# Patient Record
Sex: FEMALE | Race: ASIAN | NOT HISPANIC OR LATINO | Employment: OTHER
[De-identification: names, ages, dates, MRNs, and addresses within clinical notes are randomized per-mention and may not be internally consistent; named-entity substitution may affect disease eponyms.]

---

## 2016-05-31 LAB
ALT SERPL-CCNC: 29 IU/L (ref 8–45)
AST SERPL-CCNC: 25 IU/L (ref 2–10)
CREAT SERPL-MCNC: 0.63 MG/DL (ref 0.57–1.11)
GFR SERPL CREATININE-BSD FRML MDRD: >60 ML/MIN/1.73M2

## 2016-08-26 LAB
ALT SERPL-CCNC: 41 IU/L (ref 8–45)
AST SERPL-CCNC: 29 IU/L (ref 2–40)
CREAT SERPL-MCNC: 0.62 MG/DL (ref 0.57–1.11)
GFR SERPL CREATININE-BSD FRML MDRD: >60 ML/MIN/1.73M2

## 2016-11-30 LAB
ALT SERPL-CCNC: 40 IU/L (ref 8–45)
AST SERPL-CCNC: 29 IU/L (ref 2–10)
CREAT SERPL-MCNC: 0.66 MG/DL (ref 0.57–1.11)
GFR SERPL CREATININE-BSD FRML MDRD: >60 ML/MIN/1.73M2

## 2017-01-06 ENCOUNTER — SURGERY (OUTPATIENT)
Age: 67
End: 2017-01-06

## 2017-01-06 ENCOUNTER — ANESTHESIA EVENT (OUTPATIENT)
Dept: SURGERY | Facility: CLINIC | Age: 67
End: 2017-01-06
Payer: COMMERCIAL

## 2017-01-06 ENCOUNTER — ANESTHESIA (OUTPATIENT)
Dept: SURGERY | Facility: CLINIC | Age: 67
End: 2017-01-06
Payer: COMMERCIAL

## 2017-01-06 PROCEDURE — 25800025 ZZH RX 258: Performed by: NURSE ANESTHETIST, CERTIFIED REGISTERED

## 2017-01-06 PROCEDURE — 25000125 ZZHC RX 250: Performed by: NURSE ANESTHETIST, CERTIFIED REGISTERED

## 2017-01-06 PROCEDURE — 25000128 H RX IP 250 OP 636: Performed by: NURSE ANESTHETIST, CERTIFIED REGISTERED

## 2017-01-06 RX ORDER — ONDANSETRON 2 MG/ML
INJECTION INTRAMUSCULAR; INTRAVENOUS PRN
Status: DISCONTINUED | OUTPATIENT
Start: 2017-01-06 | End: 2017-01-06

## 2017-01-06 RX ORDER — SODIUM CHLORIDE, SODIUM LACTATE, POTASSIUM CHLORIDE, CALCIUM CHLORIDE 600; 310; 30; 20 MG/100ML; MG/100ML; MG/100ML; MG/100ML
INJECTION, SOLUTION INTRAVENOUS CONTINUOUS PRN
Status: DISCONTINUED | OUTPATIENT
Start: 2017-01-06 | End: 2017-01-06

## 2017-01-06 RX ORDER — DEXAMETHASONE SODIUM PHOSPHATE 4 MG/ML
INJECTION, SOLUTION INTRA-ARTICULAR; INTRALESIONAL; INTRAMUSCULAR; INTRAVENOUS; SOFT TISSUE PRN
Status: DISCONTINUED | OUTPATIENT
Start: 2017-01-06 | End: 2017-01-06

## 2017-01-06 RX ORDER — FENTANYL CITRATE 50 UG/ML
INJECTION, SOLUTION INTRAMUSCULAR; INTRAVENOUS PRN
Status: DISCONTINUED | OUTPATIENT
Start: 2017-01-06 | End: 2017-01-06

## 2017-01-06 RX ORDER — PROPOFOL 10 MG/ML
INJECTION, EMULSION INTRAVENOUS PRN
Status: DISCONTINUED | OUTPATIENT
Start: 2017-01-06 | End: 2017-01-06

## 2017-01-06 RX ORDER — LIDOCAINE HYDROCHLORIDE 20 MG/ML
INJECTION, SOLUTION INFILTRATION; PERINEURAL PRN
Status: DISCONTINUED | OUTPATIENT
Start: 2017-01-06 | End: 2017-01-06

## 2017-01-06 RX ADMIN — SODIUM CHLORIDE, POTASSIUM CHLORIDE, SODIUM LACTATE AND CALCIUM CHLORIDE: 600; 310; 30; 20 INJECTION, SOLUTION INTRAVENOUS at 08:40

## 2017-01-06 RX ADMIN — ONDANSETRON 4 MG: 2 INJECTION INTRAMUSCULAR; INTRAVENOUS at 08:54

## 2017-01-06 RX ADMIN — FENTANYL CITRATE 50 MCG: 50 INJECTION, SOLUTION INTRAMUSCULAR; INTRAVENOUS at 08:40

## 2017-01-06 RX ADMIN — DEXAMETHASONE SODIUM PHOSPHATE 4 MG: 4 INJECTION, SOLUTION INTRAMUSCULAR; INTRAVENOUS at 08:54

## 2017-01-06 RX ADMIN — TETRACAINE HYDROCHLORIDE 2 DROP: 5 SOLUTION OPHTHALMIC at 08:49

## 2017-01-06 RX ADMIN — PROPOFOL 10 MG: 10 INJECTION, EMULSION INTRAVENOUS at 08:47

## 2017-01-06 RX ADMIN — ERYTHROMYCIN 2 G: 5 OINTMENT OPHTHALMIC at 08:49

## 2017-01-06 RX ADMIN — PROPOFOL 10 MG: 10 INJECTION, EMULSION INTRAVENOUS at 08:48

## 2017-01-06 RX ADMIN — MIDAZOLAM HYDROCHLORIDE 1 MG: 1 INJECTION, SOLUTION INTRAMUSCULAR; INTRAVENOUS at 08:40

## 2017-01-06 RX ADMIN — DEXMEDETOMIDINE 4 MCG: 100 INJECTION, SOLUTION, CONCENTRATE INTRAVENOUS at 08:40

## 2017-01-06 RX ADMIN — PROPOFOL 20 MG: 10 INJECTION, EMULSION INTRAVENOUS at 08:45

## 2017-01-06 RX ADMIN — LIDOCAINE HYDROCHLORIDE 40 MG: 20 INJECTION, SOLUTION INFILTRATION; PERINEURAL at 08:45

## 2017-01-06 RX ADMIN — Medication 8 ML: at 08:51

## 2017-01-06 NOTE — ANESTHESIA CARE TRANSFER NOTE
Patient: Noreen Florence    COMBINED REPAIR PTOSIS WITH BLEPHAROPLASTY BILATERAL (Bilateral Eye)  REPAIR ENTROPION BILATERAL (Bilateral Eye)  Additional InformationProcedure(s):  BILATERAL UPPER LID BLEPHAROPLASTY WITH PTOSIS REPAIR, BILATERAL LOWER LID ENTROPION REPAIR - Wound Class: I-Clean   - Wound Class: I-Clean    Diagnosis: DERMATOCHALSIS, PTOSIS, ENTROPION   Diagnosis Additional Information: No value filed.    Anesthesia Type:   MAC     Note:  Airway :Room Air  Patient transferred to:PACU  Comments: Pt to PACU on room air, airway patent, VSS.  Report to RN.      Vitals: (Last set prior to Anesthesia Care Transfer)              Electronically Signed By: DEJA Martin CRNA  January 6, 2017  9:49 AM

## 2017-01-06 NOTE — ANESTHESIA POSTPROCEDURE EVALUATION
Patient: Noreen Florence    COMBINED REPAIR PTOSIS WITH BLEPHAROPLASTY BILATERAL (Bilateral Eye)  REPAIR ENTROPION BILATERAL (Bilateral Eye)  Additional InformationProcedure(s):  BILATERAL UPPER LID BLEPHAROPLASTY WITH PTOSIS REPAIR, BILATERAL LOWER LID ENTROPION REPAIR - Wound Class: I-Clean   - Wound Class: I-Clean    Diagnosis:DERMATOCHALSIS, PTOSIS, ENTROPION   Diagnosis Additional Information: No value filed.    Anesthesia Type:  MAC    Note:  Anesthesia Post Evaluation    Patient location during evaluation: PACU  Patient participation: Able to fully participate in evaluation  Level of consciousness: awake  Pain management: adequate  Airway patency: patent  Cardiovascular status: acceptable  Respiratory status: acceptable  Hydration status: acceptable  PONV: controlled     Anesthetic complications: None          Last vitals:  Filed Vitals:    01/06/17 1030 01/06/17 1045 01/06/17 1110   BP: 108/78 105/68 125/78   Temp:  36.9  C (98.4  F)    Resp: 16 18 16   SpO2: 98% 98% 97%       Electronically Signed By: Johnny Santoro MD  January 6, 2017  11:37 AM

## 2017-01-06 NOTE — ANESTHESIA PREPROCEDURE EVALUATION
Anesthesia Evaluation     . Pt has had prior anesthetic. Type: General      ROS/MED HX    ENT/Pulmonary:       Neurologic:       Cardiovascular:     (+) hypertension----. : . . . :. .       METS/Exercise Tolerance:     Hematologic:         Musculoskeletal:   (+) arthritis, , , -       GI/Hepatic:     (+) GERD       Renal/Genitourinary:     (+) chronic renal disease,       Endo:         Psychiatric:         Infectious Disease:         Malignancy:         Other:                              Anesthesia Plan      History & Physical Review  History and physical reviewed and following examination; no interval change.    ASA Status:  2 .        Plan for MAC with Intravenous induction. Maintenance will be TIVA.    PONV prophylaxis:  Ondansetron (or other 5HT-3) and Dexamethasone or Solumedrol       Postoperative Care  Postoperative pain management:  IV analgesics and Oral pain medications.      Consents  Anesthetic plan, risks, benefits and alternatives discussed with:  Patient..                          .

## 2017-01-09 ENCOUNTER — RADIANT APPOINTMENT (OUTPATIENT)
Dept: ULTRASOUND IMAGING | Facility: CLINIC | Age: 67
End: 2017-01-09
Attending: UROLOGY
Payer: COMMERCIAL

## 2017-01-09 ENCOUNTER — TELEPHONE (OUTPATIENT)
Dept: OPHTHALMOLOGY | Facility: CLINIC | Age: 67
End: 2017-01-09

## 2017-01-09 ENCOUNTER — OFFICE VISIT (OUTPATIENT)
Dept: UROLOGY | Facility: CLINIC | Age: 67
End: 2017-01-09
Payer: COMMERCIAL

## 2017-01-09 VITALS
DIASTOLIC BLOOD PRESSURE: 84 MMHG | HEART RATE: 74 BPM | RESPIRATION RATE: 18 BRPM | SYSTOLIC BLOOD PRESSURE: 132 MMHG | OXYGEN SATURATION: 97 %

## 2017-01-09 DIAGNOSIS — D17.71 ANGIOMYOLIPOMA OF RIGHT KIDNEY: ICD-10-CM

## 2017-01-09 DIAGNOSIS — R31.29 MICROSCOPIC HEMATURIA: Primary | ICD-10-CM

## 2017-01-09 DIAGNOSIS — D17.9 ANGIOMYOLIPOMA: ICD-10-CM

## 2017-01-09 LAB
ALBUMIN UR-MCNC: 10 MG/DL
APPEARANCE UR: CLEAR
BILIRUB UR QL STRIP: NEGATIVE
COLOR UR AUTO: YELLOW
GLUCOSE UR STRIP-MCNC: NEGATIVE MG/DL
HGB UR QL STRIP: ABNORMAL
KETONES UR STRIP-MCNC: NEGATIVE MG/DL
LEUKOCYTE ESTERASE UR QL STRIP: ABNORMAL
MUCOUS THREADS #/AREA URNS LPF: PRESENT /LPF
NITRATE UR QL: NEGATIVE
NON-SQ EPI CELLS #/AREA URNS LPF: ABNORMAL /LPF
PH UR STRIP: 7 PH (ref 5–7)
RBC #/AREA URNS AUTO: ABNORMAL /HPF (ref 0–2)
SP GR UR STRIP: 1.02 (ref 1–1.03)
URN SPEC COLLECT METH UR: ABNORMAL
UROBILINOGEN UR STRIP-MCNC: NORMAL MG/DL (ref 0–2)
WBC #/AREA URNS AUTO: ABNORMAL /HPF (ref 0–2)

## 2017-01-09 PROCEDURE — 88112 CYTOPATH CELL ENHANCE TECH: CPT | Performed by: UROLOGY

## 2017-01-09 PROCEDURE — 51798 US URINE CAPACITY MEASURE: CPT | Performed by: UROLOGY

## 2017-01-09 PROCEDURE — 99214 OFFICE O/P EST MOD 30 MIN: CPT | Performed by: UROLOGY

## 2017-01-09 PROCEDURE — 76770 US EXAM ABDO BACK WALL COMP: CPT | Performed by: RADIOLOGY

## 2017-01-09 PROCEDURE — 81001 URINALYSIS AUTO W/SCOPE: CPT | Performed by: UROLOGY

## 2017-01-09 ASSESSMENT — PAIN SCALES - GENERAL: PAINLEVEL: NO PAIN (0)

## 2017-01-09 NOTE — NURSING NOTE
"Noreen Florence's goals for this visit include:   Chief Complaint   Patient presents with     RECHECK     6 month f/u hematuria and angiomyolipoma       She requests these members of her care team be copied on today's visit information: yes, PCP    PCP: Jia Castro    Referring Provider:  No referring provider defined for this encounter.    Chief Complaint   Patient presents with     RECHECK     6 month f/u hematuria and angiomyolipoma       Initial /84 mmHg  Pulse 74  Resp 18  SpO2 97% Estimated body mass index is 21.03 kg/(m^2) as calculated from the following:    Height as of 1/6/17: 1.575 m (5' 2\").    Weight as of 12/29/16: 52.164 kg (115 lb).  BP completed using cuff size: regular    Do you need any medication refills at today's visit? None    post void residual: 0 mL    Deepali Sanchez  General Surgery - Urology RN      "

## 2017-01-09 NOTE — MR AVS SNAPSHOT
After Visit Summary   1/9/2017    Noreen Florence    MRN: [id number]           Patient Information     Date Of Birth          1950        Visit Information        Provider Department      1/9/2017 10:00 AM Emi Morales MD [facility]        Today's Diagnoses     Microscopic hematuria    -  1     Angiomyolipoma of right kidney            Follow-ups after your visit        Follow-up notes from your care team     Return in about 6 months (around 7/9/2017) for ua and urine cytology.      Your next 10 appointments already scheduled     Jan 25, 2017 11:00 AM   [facility] Visit with Carly Norman MD   [facility]    [id number] [place] [id number] [phone]            Jul 17, 2017 10:00 AM   Return Visit with Emi Morales MD   [facility]    [address] [place] [id number] [phone]            Oct 30, 2017 12:30 PM   Return Visit with Shai Levin MD, [facility] NURSE ONLY   [facility]    [address] [place] [id number] [phone]              Future tests that were ordered for you today     Open Future Orders        Priority Expected Expires Ordered    Renal US Routine 1/9/2018 5/9/2018 1/9/2017            Who to contact     If you have questions or need follow up information about today's clinic visit or your schedule please contact [facility] directly at [phone].  Normal or non-critical lab and imaging results will be communicated to you by MyChart, letter or phone within 4 business days after the clinic has received the results. If you do not hear from us within 7 days, please contact the clinic through MyChart or phone. If you have a critical or abnormal lab result, we will notify you by phone as soon as possible.  Submit refill requests through  Touch-Writer or call your pharmacy and they will forward the refill request to us. Please allow 3 business days for your refill to be completed.          Additional Information About Your Visit        logolineuphart Information     Touch-Writer gives you secure access to your electronic health record. If you see a primary care provider, you can also send messages to your care team and make appointments. If you have questions, please call your primary care clinic.  If you do not have a primary care provider, please call [phone] and they will assist you.      Touch-Writer is an electronic gateway that provides easy, online access to your medical records. With Touch-Writer, you can request a clinic appointment, read your test results, renew a prescription or communicate with your care team.     To access your existing account, please contact your [facility] or call [phone] for assistance.        Care EveryWhere ID     This is your Care EveryWhere ID. This could be used by other organizations to access your [facility] medical records  [id number]        Your Vitals Were     Pulse Respirations Pulse Oximetry             74 18 97%          Blood Pressure from Last 3 Encounters:   01/09/17 132/84   01/06/17 125/78   12/29/16 110/69    Weight from Last 3 Encounters:   01/06/17 51.71 kg (114 lb)   12/29/16 52.164 kg (115 lb)   12/23/16 5.352 kg (11 lb 12.8 oz)              We Performed the Following     Cytology non gyn     MEASURE POST-VOID RESIDUAL URINE/BLADDER CAPACITY, US NON-IMAGING     UA reflex to Microscopic and Culture        Primary Care Provider Office Phone # Fax #    Jia Ashli Castro [phone]       [facility] [address]  [facility] [address]        Thank you!     Thank you for choosing [facility]  for your care. Our goal is always to provide you with excellent care. Hearing back from our patients is one way we can continue to improve  our services. Please take a few minutes to complete the written survey that you may receive in the mail after your visit with us. Thank you!             Your Updated Medication List - Protect others around you: Learn how to safely use, store and throw away your medicines at www.disposemymeds.org.          This list is accurate as of: 1/9/17 10:25 AM.  Always use your most recent med list.                   Brand Name Dispense Instructions for use    calcium-vitamin D 500-125 MG-UNIT Tabs     180 tablet    Take 1 tablet by mouth 2 times daily       cetirizine 10 MG tablet    zyrTEC    30 tablet    TAKE 1 TABLET BY MOUTH DAILY       chlorhexidine 0.12 % solution    PERIDEX    473 mL    Swish and spit 15 mLs in mouth 2 times daily       erythromycin ophthalmic ointment    ROMYCIN    3.5 g    Apply small amount to incision sites three times daily for 7 days, then apply to inner lower lid of operative eye(s) at bedtime for 7 days, as directed per physician instructions.       fluticasone 50 MCG/ACT spray    FLONASE    1 Package    Spray 1-2 sprays into both nostrils daily       HYDROcodone-acetaminophen 5-325 MG per tablet    NORCO    10 tablet    Take 1 tablet by mouth every 6 hours as needed for pain Maximum of 4000 mg of acetaminophen in 24 hours.       leucovorin 5 MG tablet    WELLCOVORIN     TK 1 T PO 12 H AFTER METHOTREXATE ONE TIME EACH WEEK       losartan-hydrochlorothiazide 50-12.5 MG per tablet    HYZAAR    90 tablet    Take 1 tablet by mouth daily       methotrexate 2.5 MG tablet     1 tablet    Take 6 tablets (15 mg) by mouth once a week       metoprolol 25 MG tablet    LOPRESSOR    90 tablet    Take 1 tablet (25 mg) by mouth At Bedtime       omeprazole 40 MG capsule    priLOSEC    30 capsule    Take 1 capsule (40 mg) by mouth daily as needed Take 30-60 minutes before a meal.       pravastatin 20 MG tablet    PRAVACHOL    90 tablet    Take 1 tablet (20 mg) by mouth daily       ranitidine 300 MG tablet     ZANTAC    30 tablet    TAKE 1 TABLET(300 MG) BY MOUTH AT BEDTIME       tobramycin-dexamethasone 0.3-0.1 % ophthalmic susp    TOBRADEX    5 mL    Place 1 drop into both eyes 3 times daily for 10 days Instill into operative eye(s) per physician instructions.       triamcinolone 0.1 % cream    KENALOG    80 g    APPLY TOPICALLY TWICE DAILY

## 2017-01-09 NOTE — TELEPHONE ENCOUNTER
Pt's daughter called on evening of 1/8 with concerns regarding itching, bruising, and swelling following surgery on Friday 1/6. With daughter interpreting, mother denied any vision change. She has been using cool compresses. Advised patient to change to warm compress and to call back with any further concerns during the week.

## 2017-01-09 NOTE — PROGRESS NOTES
Reason for Visit:  F/u on microhematuria and review US    Clinical Data: Ms. Noreen Florence is a 66 year old female with a hx of microhematuria with negative w/u.  She was found to have a very small AML (1.2 cm).  She was seen by IR and they recommended yearly u/s to review.    Cytology7/16:  NEM    CT urogram 6/15/16:  Impression:  1. 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 This  abuts the renal collecting system and may be the source of  microhematuria although significant bleeding is uncommon in lesions of  this size. No other abnormality in the urinary collecting system.    2. Other incidental findings as above.    Cystoscopy 6/27/16: Normal.    Renal U/S 1/9/17:  IMPRESSION:  1.  Stable 11 mm angiomyolipoma in the right kidney.    A/P:  66 year old female with microhematuria and negative w/u except for a small 1.2 cm AML, This is unchanged since June of 2016.  We discussed continued observation of this lesion, it is fairly small and patient agrees with this  -cc urine cytology from today.  -f/u in 6 months with urine cytology and UA, bp check  -f/u with in 12 months with renal u/s      Thank you for allowing me to participate in the care of  Ms. Noreen Florence and I will keep you updated on her progress.    Emi Morales MD  25 min spent with patient,  >50% in discussion and coordination of care.

## 2017-01-10 LAB — COPATH REPORT: NORMAL

## 2017-01-11 ENCOUNTER — OFFICE VISIT (OUTPATIENT)
Dept: OPHTHALMOLOGY | Facility: CLINIC | Age: 67
End: 2017-01-11
Payer: COMMERCIAL

## 2017-01-11 DIAGNOSIS — H02.836 DERMATOCHALASIS OF EYELIDS OF BOTH EYES: Primary | ICD-10-CM

## 2017-01-11 DIAGNOSIS — H02.833 DERMATOCHALASIS OF EYELIDS OF BOTH EYES: Primary | ICD-10-CM

## 2017-01-11 DIAGNOSIS — H02.423 MYOGENIC PTOSIS, BILATERAL: ICD-10-CM

## 2017-01-11 PROCEDURE — 99024 POSTOP FOLLOW-UP VISIT: CPT | Performed by: OPHTHALMOLOGY

## 2017-01-11 NOTE — PROGRESS NOTES
Post op day 5 s/p bilateral upper lids blepharoplasty, ptosis repair (mmcr), and both lower eyelid retractor reinsertion for entropion.   She is concerned about the asymmetry, otherwise healing well.    On exam, margin to reflex distance 1: about 3 both eyes. Crease same height, Right upper lid more full and with more edema. Both lower lids in excellent position. No trichiasis. Cornea is clear.    Reassurance provided. Looks good.  She would like to keep her original postop appointment for  1/25/17.  Desirae   Warm soaks    Attending Physician Attestation:  Complete documentation of historical and exam elements from today's encounter can be found in the full encounter summary report (not reduplicated in this progress note).  I personally obtained the chief complaint(s) and history of present illness.  I confirmed and edited as necessary the review of systems, past medical/surgical history, family history, social history, and examination findings as documented by others; and I examined the patient myself.  I personally reviewed the relevant tests, images, and reports as documented above.  I formulated and edited as necessary the assessment and plan and discussed the findings and management plan with the patient and family. - Carly Norman MD

## 2017-01-25 ENCOUNTER — OFFICE VISIT (OUTPATIENT)
Dept: OPHTHALMOLOGY | Facility: CLINIC | Age: 67
End: 2017-01-25
Payer: COMMERCIAL

## 2017-01-25 DIAGNOSIS — H02.023 MECHANICAL ENTROPION OF RIGHT EYE: ICD-10-CM

## 2017-01-25 DIAGNOSIS — H02.026: ICD-10-CM

## 2017-01-25 DIAGNOSIS — H02.836 DERMATOCHALASIS OF EYELIDS OF BOTH EYES: Primary | ICD-10-CM

## 2017-01-25 DIAGNOSIS — H02.423 MYOGENIC PTOSIS, BILATERAL: ICD-10-CM

## 2017-01-25 DIAGNOSIS — H02.833 DERMATOCHALASIS OF EYELIDS OF BOTH EYES: Primary | ICD-10-CM

## 2017-01-25 PROCEDURE — 99024 POSTOP FOLLOW-UP VISIT: CPT | Performed by: OPHTHALMOLOGY

## 2017-01-25 ASSESSMENT — VISUAL ACUITY
OS_SC: 20/20
METHOD: SNELLEN - LINEAR
OS_SC+: -3
OD_SC: 20/20
OD_SC+: -3

## 2017-01-25 NOTE — PROGRESS NOTES
Postop both upper eyelid sosa muscle conjunctival resection , blepharoplasty, and both lower eyelid entropion repair.     Unhappy with difference in crease.  Right upper eyelid more full and crease is a bit higher nasally left eye.   margin to reflex distance 1 is 3 both eyes very symmetric  Both lower lid position excellent with no entropion.     Recommend warm soaks, ceci. I reassured her that she is still swollen, but she may require in office revision.    Attending Physician Attestation:  Complete documentation of historical and exam elements from today's encounter can be found in the full encounter summary report (not reduplicated in this progress note).  I personally obtained the chief complaint(s) and history of present illness.  I confirmed and edited as necessary the review of systems, past medical/surgical history, family history, social history, and examination findings as documented by others; and I examined the patient myself.  I personally reviewed the relevant tests, images, and reports as documented above.  I formulated and edited as necessary the assessment and plan and discussed the findings and management plan with the patient and family. - Carly Norman MD

## 2017-01-25 NOTE — MR AVS SNAPSHOT
After Visit Summary   1/25/2017    Noreen Florence    MRN: [id number]           Patient Information     Date Of Birth          1950        Visit Information        Provider Department      1/25/2017 11:00 AM Carly Norman MD [facility]         Follow-ups after your visit        Your next 10 appointments already scheduled     Mar 08, 2017 10:00 AM   Return Visit with Carly Norman MD   [facility] ([facility])    [address] [place] [id number] [phone]            Jul 17, 2017 10:00 AM   Return Visit with Emi Morales MD   [facility] ([facility])    [address] [place] [id number] [phone]            Oct 30, 2017 12:30 PM   Return Visit with Shai Levin MD, [facility] NURSE ONLY   [facility])    [address] [place] [id number] [phone]              Who to contact     If you have questions or need follow up information about today's clinic visit or your schedule please contact [facility] directly at [phone].  Normal or non-critical lab and imaging results will be communicated to you by MyChart, letter or phone within 4 business days after the clinic has received the results. If you do not hear from us within 7 days, please contact the clinic through MyChart or phone. If you have a critical or abnormal lab result, we will notify you by phone as soon as possible.  Submit refill requests through [email or web address] ([employer]) or call your pharmacy and they will forward the refill request to us. Please allow 3 business days for your refill to be completed.          Additional Information About Your Visit        [employer] Global Technologieshart Information     [email or web address]) gives you secure access to your electronic health record. If you see a primary care provider, you can also send messages to your care team and make  appointments. If you have questions, please call your primary care clinic.  If you do not have a primary care provider, please call [phone] and they will assist you.      FundedByMe is an electronic gateway that provides easy, online access to your medical records. With FundedByMe, you can request a clinic appointment, read your test results, renew a prescription or communicate with your care team.     To access your existing account, please contact your [facility] or call [phone] for assistance.        Care EveryWhere ID     This is your Care EveryWhere ID. This could be used by other organizations to access your [place] medical records  [id number]         Blood Pressure from Last 3 Encounters:   01/09/17 132/84   01/06/17 125/78   12/29/16 110/69    Weight from Last 3 Encounters:   01/06/17 51.71 kg (114 lb)   12/29/16 52.164 kg (115 lb)   12/23/16 5.352 kg (11 lb 12.8 oz)              Today, you had the following     No orders found for display       Primary Care Provider Office Phone # Fax #    Jia Ashli Castro [phone]       [facility] [address] [facility] [address]        Thank you!     Thank you for choosing [facility]  for your care. Our goal is always to provide you with excellent care. Hearing back from our patients is one way we can continue to improve our services. Please take a few minutes to complete the written survey that you may receive in the mail after your visit with us. Thank you!             Your Updated Medication List - Protect others around you: Learn how to safely use, store and throw away your medicines at www.disposemymeds.org.          This list is accurate as of: 1/25/17 11:08 AM.  Always use your most recent med list.                   Brand Name Dispense Instructions for use    calcium-vitamin D 500-125 MG-UNIT Tabs     180 tablet    Take 1 tablet by mouth 2 times daily        cetirizine 10 MG tablet    zyrTEC    30 tablet    TAKE 1 TABLET BY MOUTH DAILY       chlorhexidine 0.12 % solution    PERIDEX    473 mL    Swish and spit 15 mLs in mouth 2 times daily       fluticasone 50 MCG/ACT spray    FLONASE    1 Package    Spray 1-2 sprays into both nostrils daily       leucovorin 5 MG tablet    WELLCOVORIN     TK 1 T PO 12 H AFTER METHOTREXATE ONE TIME EACH WEEK       losartan-hydrochlorothiazide 50-12.5 MG per tablet    HYZAAR    90 tablet    Take 1 tablet by mouth daily       methotrexate 2.5 MG tablet     1 tablet    Take 6 tablets (15 mg) by mouth once a week       metoprolol 25 MG tablet    LOPRESSOR    90 tablet    Take 1 tablet (25 mg) by mouth At Bedtime       omeprazole 40 MG capsule    priLOSEC    30 capsule    Take 1 capsule (40 mg) by mouth daily as needed Take 30-60 minutes before a meal.       pravastatin 20 MG tablet    PRAVACHOL    90 tablet    Take 1 tablet (20 mg) by mouth daily       ranitidine 300 MG tablet    ZANTAC    30 tablet    TAKE 1 TABLET(300 MG) BY MOUTH AT BEDTIME       triamcinolone 0.1 % cream    KENALOG    80 g    APPLY TOPICALLY TWICE DAILY

## 2017-01-25 NOTE — NURSING NOTE
Chief Complaint   Patient presents with     Post-op Blepharaplasty     BILATERAL UPPER LID BLEPHAROPLASTY WITH PTOSIS REPAIR, BILATERAL LOWER LID ENTROPION REPAIR 1/6/17

## 2017-02-24 LAB
ALT SERPL-CCNC: 27 IU/L (ref 8–45)
AST SERPL-CCNC: 25 IU/L (ref 2–40)
CREAT SERPL-MCNC: 0.65 MG/DL (ref 0.57–1.11)
GFR SERPL CREATININE-BSD FRML MDRD: >60 ML/MIN/1.73M2

## 2017-02-28 ENCOUNTER — TRANSFERRED RECORDS (OUTPATIENT)
Dept: HEALTH INFORMATION MANAGEMENT | Facility: CLINIC | Age: 67
End: 2017-02-28

## 2017-03-08 ENCOUNTER — OFFICE VISIT (OUTPATIENT)
Dept: OPHTHALMOLOGY | Facility: CLINIC | Age: 67
End: 2017-03-08
Payer: MEDICARE

## 2017-03-08 DIAGNOSIS — H02.833 DERMATOCHALASIS OF EYELIDS OF BOTH EYES: Primary | ICD-10-CM

## 2017-03-08 DIAGNOSIS — H02.423 MYOGENIC PTOSIS, BILATERAL: ICD-10-CM

## 2017-03-08 DIAGNOSIS — H02.836 DERMATOCHALASIS OF EYELIDS OF BOTH EYES: Primary | ICD-10-CM

## 2017-03-08 DIAGNOSIS — H02.026: ICD-10-CM

## 2017-03-08 DIAGNOSIS — H02.023 MECHANICAL ENTROPION OF RIGHT EYE: ICD-10-CM

## 2017-03-08 PROCEDURE — 99024 POSTOP FOLLOW-UP VISIT: CPT | Performed by: OPHTHALMOLOGY

## 2017-03-08 NOTE — PROGRESS NOTES
Healing well post bulb, b sosa muscle conjunctival resection , Bilateral lower lids retractor reinsertion entropion repair.   She notes nasally on the left the crease is pulled in higher. This has softened since her last visit. I did try a phenylephrine drop - did not seem to help much.     Recommend ceci, warm soaks. I will see her back in 3 months time. May consider small revision but I think she really looks good with mild asymmetry.     Complete documentation of historical and exam elements from today's encounter can be found in the full encounter summary report (not reduplicated in this progress note). I personally obtained the chief complaint(s) and history of present illness.  I confirmed and edited as necessary the review of systems, past medical/surgical history, family history, social history, and examination findings as documented by others; and I examined the patient myself. I personally reviewed the relevant tests, images, and reports as documented above. I formulated and edited as necessary the assessment and plan and discussed the findings and management plan with the patient and family. - Carly Norman MD

## 2017-03-08 NOTE — NURSING NOTE
Patient presents with:  Post Op (Ophthalmology) Both Eyes: BILATERAL UPPER LID BLEPHAROPLASTY WITH PTOSIS REPAIR, BILATERAL LOWER LID ENTROPION REPAIR 1/6/17      Referring Provider:  No referring provider defined for this encounter.    HPI    Symptoms:              Comments:    Post Op (Ophthalmology) Both Eyes: BILATERAL UPPER LID BLEPHAROPLASTY WITH PTOSIS REPAIR, BILATERAL LOWER LID ENTROPION REPAIR 1/6/17

## 2017-03-08 NOTE — MR AVS SNAPSHOT
After Visit Summary   3/8/2017    Noreen Florence    MRN: [id number]           Patient Information     Date Of Birth          1950        Visit Information        Provider Department      3/8/2017 10:00 AM Carly Norman MD [facility]         Follow-ups after your visit        Your next 10 appointments already scheduled     Mar 14, 2017   Procedure with William Charles Duane, MD   [facility] (--)    [address] [facility] [address]   [phone]            Jun 07, 2017 10:30 AM CDT   Return Visit with Carly Norman MD   [facility] ([facility])    [address] [place] [address]   [phone]            Jul 17, 2017 10:00 AM CDT   Return Visit with Emi Morales MD   [facility] ([facility])    [address] [place] [address]   [phone]            Oct 30, 2017 12:30 PM CDT   Return Visit with Shai Levin MD, [facility] NURSE ONLY   [facility] ([facility])    [address] [place] [address]   [phone]              Who to contact     If you have questions or need follow up information about today's clinic visit or your schedule please contact [facility] directly at [phone].  Normal or non-critical lab and imaging results will be communicated to you by MyChart, letter or phone within 4 business days after the clinic has received the results. If you do not hear from us within 7 days, please contact the clinic through NPShart or phone. If you have a critical or abnormal lab result, we will notify you by phone as soon as possible.  Submit refill requests through 28msec or call your pharmacy and they will forward the refill request to us. Please allow 3 business days for your refill to be completed.          Additional Information About Your Visit        [facility]  Information     [facility] gives you secure access to your electronic health record. If you see a primary care provider, you can also send messages to your care team and make appointments. If you have questions, please call your primary care clinic.  If you do not have a primary care provider, please call [phone] and they will assist you.      [facility] is an electronic gateway that provides easy, online access to your medical records. With [facility] you can request a clinic appointment, read your test results, renew a prescription or communicate with your care team.     To access your existing account, please contact your [facility] or call [phone] for assistance.        Care EveryWhere ID     This is your Care EveryWhere ID. This could be used by other organizations to access your [facility] medical records  [id number]         Blood Pressure from Last 3 Encounters:   01/09/17 132/84   01/06/17 125/78   12/29/16 110/69    Weight from Last 3 Encounters:   01/06/17 51.7 kg (114 lb)   12/29/16 52.2 kg (115 lb)   12/23/16 5.352 kg (11 lb 12.8 oz)              Today, you had the following     No orders found for display       Primary Care Provider Office Phone # Fax #    Jia Ashli Castro [phone]       [facility] [address]  [place] [address]        Thank you!     Thank you for choosing [facility]  for your care. Our goal is always to provide you with excellent care. Hearing back from our patients is one way we can continue to improve our services. Please take a few minutes to complete the written survey that you may receive in the mail after your visit with us. Thank you!             Your Updated Medication List - Protect others around you: Learn how to safely use, store and throw away your medicines at www.disposemymeds.org.          This list is accurate as of: 3/8/17 10:14 AM.  Always use your most recent med list.                    Brand Name Dispense Instructions for use    calcium-vitamin D 500-125 MG-UNIT Tabs     180 tablet    Take 1 tablet by mouth 2 times daily       cetirizine 10 MG tablet    zyrTEC    30 tablet    TAKE 1 TABLET BY MOUTH DAILY       chlorhexidine 0.12 % solution    PERIDEX    473 mL    Swish and spit 15 mLs in mouth 2 times daily       fluticasone 50 MCG/ACT spray    FLONASE    1 Package    Spray 1-2 sprays into both nostrils daily       leucovorin 5 MG tablet    WELLCOVORIN     TK 1 T PO 12 H AFTER METHOTREXATE ONE TIME EACH WEEK       losartan-hydrochlorothiazide 50-12.5 MG per tablet    HYZAAR    90 tablet    Take 1 tablet by mouth daily       methotrexate 2.5 MG tablet     1 tablet    Take 6 tablets (15 mg) by mouth once a week       metoprolol 25 MG tablet    LOPRESSOR    90 tablet    Take 1 tablet (25 mg) by mouth At Bedtime       omeprazole 40 MG capsule    priLOSEC    30 capsule    Take 1 capsule (40 mg) by mouth daily as needed Take 30-60 minutes before a meal.       pravastatin 20 MG tablet    PRAVACHOL    90 tablet    Take 1 tablet (20 mg) by mouth daily       ranitidine 300 MG tablet    ZANTAC    30 tablet    TAKE 1 TABLET(300 MG) BY MOUTH AT BEDTIME       triamcinolone 0.1 % cream    KENALOG    80 g    APPLY TOPICALLY TWICE DAILY

## 2017-04-03 DIAGNOSIS — E78.2 MIXED HYPERLIPIDEMIA: ICD-10-CM

## 2017-04-03 DIAGNOSIS — I10 HYPERTENSION GOAL BP (BLOOD PRESSURE) < 150/90: ICD-10-CM

## 2017-04-03 NOTE — TELEPHONE ENCOUNTER
metoprolol (LOPRESSOR) 25 MG tablet      Last Written Prescription Date: 03/14/16  Last Fill Quantity: 90, # refills: 3    Last Office Visit with [facility] or [facility] prescribing provider:  12/29/16   Future Office Visit:    Next 5 appointments (look out 90 days)     Jun 07, 2017 10:30 AM CDT   Return Visit with Carly Norman MD   [facility] ([facility])    [address] [facility] [address] [phone]                    BP Readings from Last 3 Encounters:   01/09/17 132/84   01/06/17 125/78   12/29/16 110/69       pravastatin (PRAVACHOL) 20 MG tablet     Last Written Prescription Date: 03/14/16  Last Fill Quantity: 90, # refills: 3  Last Office Visit with [facility], [facility] or [facility] prescribing provider: 12/29/16    Next 5 appointments (look out 90 days)     Jun 07, 2017 10:30 AM CDT   Return Visit with Carly Norman MD   [facility] ([facility])    [address] [facility] [address] [phone]                   Lab Results   Component Value Date    CHOL 208 03/03/2016     Lab Results   Component Value Date    HDL 41 03/03/2016     Lab Results   Component Value Date     03/03/2016     Lab Results   Component Value Date    TRIG 184 03/03/2016     Lab Results   Component Value Date    CHOLHDLRATIO 5.5 02/09/2015         losartan-hydrochlorothiazide (HYZAAR) 50-12.5 MG per tablet      Last Written Prescription Date: 03/14/16  Last Fill Quantity: 90, # refills: 3  Last Office Visit with [facility], [facility] or [facility] prescribing provider: 12/29/16  Next 5 appointments (look out 90 days)     Jun 07, 2017 10:30 AM CDT   Return Visit with Carly Norman MD   [facility] ([facility])    [address] [facility] [address] [phone]                   Potassium   Date Value Ref Range Status   12/23/2016 3.9 3.4 - 5.3 mmol/L Final     Creatinine   Date Value Ref Range  Status   02/24/2017 0.65 0.57 - 1.11 mg/dL Final     BP Readings from Last 3 Encounters:   01/09/17 132/84   01/06/17 125/78   12/29/16 110/69     [facility]

## 2017-04-04 RX ORDER — PRAVASTATIN SODIUM 20 MG
20 TABLET ORAL DAILY
Qty: 90 TABLET | Refills: 0 | Status: SHIPPED | OUTPATIENT
Start: 2017-04-04 | End: 2017-06-28

## 2017-04-04 RX ORDER — LOSARTAN POTASSIUM AND HYDROCHLOROTHIAZIDE 12.5; 5 MG/1; MG/1
1 TABLET ORAL DAILY
Qty: 90 TABLET | Refills: 0 | Status: SHIPPED | OUTPATIENT
Start: 2017-04-04 | End: 2017-06-28

## 2017-04-04 RX ORDER — METOPROLOL TARTRATE 25 MG/1
25 TABLET, FILM COATED ORAL AT BEDTIME
Qty: 90 TABLET | Refills: 0 | Status: SHIPPED | OUTPATIENT
Start: 2017-04-04 | End: 2017-06-28

## 2017-04-04 NOTE — TELEPHONE ENCOUNTER
Metoprolol and Losartan - Hydrochlorothiazide - Prescription approved per [facility] Refill Protocol.    Pravastatin - Routing refill request to provider for review/approval because:  Labs not current:  Last lipid panel was over one year ago.  Giuliana Booth RN

## 2017-04-05 ENCOUNTER — SURGERY (OUTPATIENT)
Age: 67
End: 2017-04-05

## 2017-04-05 ENCOUNTER — HOSPITAL ENCOUNTER (OUTPATIENT)
Facility: AMBULATORY SURGERY CENTER | Age: 67
Discharge: HOME | End: 2017-04-05
Attending: INTERNAL MEDICINE | Admitting: INTERNAL MEDICINE
Payer: MEDICARE

## 2017-04-05 VITALS
WEIGHT: 114 LBS | OXYGEN SATURATION: 99 % | RESPIRATION RATE: 16 BRPM | BODY MASS INDEX: 20.2 KG/M2 | SYSTOLIC BLOOD PRESSURE: 127 MMHG | HEIGHT: 63 IN | TEMPERATURE: 97.7 F | DIASTOLIC BLOOD PRESSURE: 80 MMHG

## 2017-04-05 LAB — COLONOSCOPY: NORMAL

## 2017-04-05 PROCEDURE — G8907 PT DOC NO EVENTS ON DISCHARG: HCPCS

## 2017-04-05 PROCEDURE — G8918 PT W/O PREOP ORDER IV AB PRO: HCPCS

## 2017-04-05 PROCEDURE — G0121 COLON CA SCRN NOT HI RSK IND: HCPCS

## 2017-04-05 RX ORDER — ONDANSETRON 2 MG/ML
4 INJECTION INTRAMUSCULAR; INTRAVENOUS
Status: DISCONTINUED | OUTPATIENT
Start: 2017-04-05 | End: 2017-04-06 | Stop reason: HOSPADM

## 2017-04-05 RX ORDER — LIDOCAINE 40 MG/G
CREAM TOPICAL
Status: DISCONTINUED | OUTPATIENT
Start: 2017-04-05 | End: 2017-04-06 | Stop reason: HOSPADM

## 2017-04-05 RX ORDER — FENTANYL CITRATE 50 UG/ML
INJECTION, SOLUTION INTRAMUSCULAR; INTRAVENOUS PRN
Status: DISCONTINUED | OUTPATIENT
Start: 2017-04-05 | End: 2017-04-05 | Stop reason: HOSPADM

## 2017-04-05 RX ADMIN — FENTANYL CITRATE 100 MCG: 50 INJECTION, SOLUTION INTRAMUSCULAR; INTRAVENOUS at 07:01

## 2017-04-14 ENCOUNTER — OFFICE VISIT (OUTPATIENT)
Dept: URGENT CARE | Facility: URGENT CARE | Age: 67
End: 2017-04-14
Payer: MEDICARE

## 2017-04-14 VITALS
BODY MASS INDEX: 20.37 KG/M2 | OXYGEN SATURATION: 94 % | WEIGHT: 115 LBS | SYSTOLIC BLOOD PRESSURE: 105 MMHG | HEART RATE: 77 BPM | TEMPERATURE: 97.1 F | DIASTOLIC BLOOD PRESSURE: 70 MMHG

## 2017-04-14 DIAGNOSIS — K21.9 GASTROESOPHAGEAL REFLUX DISEASE, ESOPHAGITIS PRESENCE NOT SPECIFIED: Primary | ICD-10-CM

## 2017-04-14 DIAGNOSIS — R05.9 COUGH: ICD-10-CM

## 2017-04-14 PROCEDURE — 99213 OFFICE O/P EST LOW 20 MIN: CPT | Performed by: FAMILY MEDICINE

## 2017-04-14 NOTE — MR AVS SNAPSHOT
After Visit Summary   4/14/2017    Noreen Florence    MRN: [id number]           Patient Information     Date Of Birth          1950        Visit Information        Provider Department      4/14/2017 11:30 AM Frandy Arteaga MD [facility]        Today's Diagnoses     Gastroesophageal reflux disease, esophagitis presence not specified    -  1    Cough           Follow-ups after your visit        Your next 10 appointments already scheduled     Apr 28, 2017 12:45 PM CDT   MA SCREENING DIGITAL BILATERAL with BKMA1   [facility] ([facility])    [address] [facility] [address]   [phone]           Do not use any powder, lotion or deodorant under your arms or on your breast. If you do, we will ask you to remove it before your exam.  Wear comfortable, two-piece clothing.  If you have any allergies, tell your care team.  Bring any previous mammograms from other facilities or have them mailed to the breast center.            Jun 07, 2017 10:30 AM CDT   Return Visit with Carly Norman MD   [facility])    [address] [place] [id number] [phone]            Jul 17, 2017 10:00 AM CDT   Return Visit with Emi Morales MD   [facility])    [address] [place] [address] [phone]            Oct 30, 2017 12:30 PM CDT   Return Visit with Shai Levin MD, [facility] NURSE ONLY   [facility])    [address] [place] [id number] [phone]              Who to contact     If you have questions or need follow up information about today's clinic visit or your schedule please contact [facility] directly at [phone].  Normal or non-critical lab and imaging results will be communicated to you by [facility]  letter or phone within 4 business days after the clinic has received the results. If you do not hear from us within 7 days, please contact the clinic through Thrupoint or phone. If you have a critical or abnormal lab result, we will notify you by phone as soon as possible.  Submit refill requests through Thrupoint or call your pharmacy and they will forward the refill request to us. Please allow 3 business days for your refill to be completed.          Additional Information About Your Visit        [facility] Information     [employer] gives you secure access to your electronic health record. If you see a primary care provider, you can also send messages to your care team and make appointments. If you have questions, please call your primary care clinic.  If you do not have a primary care provider, please call [phone] and they will assist you.        Care EveryWhere ID     This is your Care EveryWhere ID. This could be used by other organizations to access your [facility] medical records  [id number]        Your Vitals Were     Pulse Temperature Pulse Oximetry Breastfeeding? BMI (Body Mass Index)       77 97.1  F (36.2  C) (Oral) 94% No 20.37 kg/m2        Blood Pressure from Last 3 Encounters:   04/14/17 105/70   04/05/17 127/80   01/09/17 132/84    Weight from Last 3 Encounters:   04/14/17 115 lb (52.2 kg)   03/29/17 114 lb (51.7 kg)   01/06/17 114 lb (51.7 kg)              Today, you had the following     No orders found for display       Primary Care Provider Office Phone # Fax #    Jia Ashli Castro [phone]       [facility] [address]  [facility] [address]        Thank you!     Thank you for choosing [facility]  for your care. Our goal is always to provide you with excellent care. Hearing back from our patients is one way we can continue to improve our services. Please take a few minutes to complete the written survey that you may receive in the mail  after your visit with us. Thank you!             Your Updated Medication List - Protect others around you: Learn how to safely use, store and throw away your medicines at www.disposemymeds.org.          This list is accurate as of: 4/14/17 12:37 PM.  Always use your most recent med list.                   Brand Name Dispense Instructions for use    calcium-vitamin D 500-125 MG-UNIT Tabs     180 tablet    Take 1 tablet by mouth 2 times daily       cetirizine 10 MG tablet    zyrTEC    30 tablet    TAKE 1 TABLET BY MOUTH DAILY       chlorhexidine 0.12 % solution    PERIDEX    473 mL    Swish and spit 15 mLs in mouth 2 times daily       fluticasone 50 MCG/ACT spray    FLONASE    1 Package    Spray 1-2 sprays into both nostrils daily       leucovorin 5 MG tablet    WELLCOVORIN     TK 1 T PO 12 H AFTER METHOTREXATE ONE TIME EACH WEEK       losartan-hydrochlorothiazide 50-12.5 MG per tablet    HYZAAR    90 tablet    Take 1 tablet by mouth daily       methotrexate 2.5 MG tablet     1 tablet    Take 6 tablets (15 mg) by mouth once a week       metoprolol 25 MG tablet    LOPRESSOR    90 tablet    Take 1 tablet (25 mg) by mouth At Bedtime       omeprazole 40 MG capsule    priLOSEC    30 capsule    Take 1 capsule (40 mg) by mouth daily as needed Take 30-60 minutes before a meal.       pravastatin 20 MG tablet    PRAVACHOL    90 tablet    Take 1 tablet (20 mg) by mouth daily       ranitidine 300 MG tablet    ZANTAC    30 tablet    TAKE 1 TABLET(300 MG) BY MOUTH AT BEDTIME       triamcinolone 0.1 % cream    KENALOG    80 g    APPLY TOPICALLY TWICE DAILY

## 2017-04-14 NOTE — NURSING NOTE
"Chief Complaint   Patient presents with     Cough     Pt c/o cough for one week with headaches and mid-back pain.        Initial /70 (BP Location: Right arm, Patient Position: Chair, Cuff Size: Adult Regular)  Pulse 77  Temp 97.1  F (36.2  C) (Oral)  Wt 115 lb (52.2 kg)  SpO2 94%  Breastfeeding? No  BMI 20.37 kg/m2 Estimated body mass index is 20.37 kg/(m^2) as calculated from the following:    Height as of 3/29/17: 5' 3\" (1.6 m).    Weight as of this encounter: 115 lb (52.2 kg).  Medication Reconciliation: complete     Brissa Benitez CMA (AAMA)      "

## 2017-04-14 NOTE — PROGRESS NOTES
Some of this note was populated by a medical assistant.      SUBJECTIVE:                                                    Noreen Florence is a 67 year old female who presents to clinic today for the following health issues:      RESPIRATORY SYMPTOMS      Duration: one week    Description  Cough (non-productive), headaches  Apical (mostly at night) with cough. Lasting 5 min.     Severity: moderate    Accompanying signs and symptoms: mid-back pain started today from cough.     History (predisposing factors):  none    Precipitating or alleviating factors: None    Therapies tried and outcome:  rest and fluids, cough drops- no relief.      Has hx of GERD however stopped her GERD medicine a few weeks ago       Problem list and histories reviewed & adjusted, as indicated.  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 pylori test     Pterygium of right eye     Sicca syndrome (H)     Angiomyolipoma of right kidney     Past Surgical History:   Procedure Laterality Date     COLONOSCOPY       COLONOSCOPY WITH CO2 INSUFFLATION N/A 4/5/2017    Procedure: COLONOSCOPY WITH CO2 INSUFFLATION;  Surgeon: Duane, William Charles, MD;  Location:  OR     COMBINED REPAIR PTOSIS WITH BLEPHAROPLASTY BILATERAL Bilateral 1/6/2017    Procedure: COMBINED REPAIR PTOSIS WITH BLEPHAROPLASTY BILATERAL;  Surgeon: Carly Norman MD;  Location: [facility]     ESOPHAGOSCOPY, GASTROSCOPY, DUODENOSCOPY (EGD), COMBINED N/A 1/5/2016    Procedure: COMBINED ESOPHAGOSCOPY, GASTROSCOPY, DUODENOSCOPY (EGD), BIOPSY SINGLE OR MULTIPLE;  Surgeon: Duane, William Charles, MD;  Location:  OR     REPAIR  ENTROPION BILATERAL Bilateral 1/6/2017    Procedure: REPAIR ENTROPION BILATERAL;  Surgeon: Carly Norman MD;  Location: [facility]       Social History   Substance Use Topics     Smoking status: Never Smoker     Smokeless tobacco: Never Used     Alcohol use No     Family History   Problem Relation Age of Onset     DIABETES Mother      CANCER No family hx of      Hypertension No family hx of      CEREBROVASCULAR DISEASE No family hx of      Thyroid Disease No family hx of      Glaucoma No family hx of      Macular Degeneration No family hx of          Current Outpatient Prescriptions   Medication Sig Dispense Refill     metoprolol (LOPRESSOR) 25 MG tablet Take 1 tablet (25 mg) by mouth At Bedtime 90 tablet 0     pravastatin (PRAVACHOL) 20 MG tablet Take 1 tablet (20 mg) by mouth daily 90 tablet 0     losartan-hydrochlorothiazide (HYZAAR) 50-12.5 MG per tablet Take 1 tablet by mouth daily 90 tablet 0     leucovorin (WELLCOVORIN) 5 MG tablet TK 1 T PO 12 H AFTER METHOTREXATE ONE TIME EACH WEEK  3     methotrexate 2.5 MG tablet Take 6 tablets (15 mg) by mouth once a week 1 tablet 0     omeprazole (PRILOSEC) 40 MG capsule Take 1 capsule (40 mg) by mouth daily as needed Take 30-60 minutes before a meal. 30 capsule 6     chlorhexidine (CHLORHEXIDINE) 0.12 % solution Swish and spit 15 mLs in mouth 2 times daily 473 mL 11     ranitidine (ZANTAC) 300 MG tablet TAKE 1 TABLET(300 MG) BY MOUTH AT BEDTIME 30 tablet 0     cetirizine (ZYRTEC) 10 MG tablet TAKE 1 TABLET BY MOUTH DAILY 30 tablet 11     triamcinolone (KENALOG) 0.1 % cream APPLY TOPICALLY TWICE DAILY 80 g 1     fluticasone (FLONASE) 50 MCG/ACT nasal spray Spray 1-2 sprays into both nostrils daily 1 Package 11     calcium-vitamin D 500-125 MG-UNIT TABS Take 1 tablet by mouth 2 times daily 180 tablet 3     No Known Allergies    Reviewed and updated as needed this visit by clinical staff       Reviewed and updated as needed this visit by Provider          ROS:  Constitutional, HEENT, cardiovascular, pulmonary, gi and gu systems are negative, except as otherwise noted.    OBJECTIVE:                                                    /70 (BP Location: Right arm, Patient Position: Chair, Cuff Size: Adult Regular)  Pulse 77  Temp 97.1  F (36.2  C) (Oral)  Wt 115 lb (52.2 kg)  SpO2 94%  Breastfeeding? No  BMI 20.37 kg/m2  Body mass index is 20.37 kg/(m^2).  GENERAL: healthy, alert and no distress  NECK: no adenopathy, no asymmetry, masses, or scars and thyroid normal to palpation  RESP: lungs clear to auscultation - no rales, rhonchi or wheezes  CV: regular rate and rhythm, normal S1 S2, no S3 or S4, no murmur, click or rub, no peripheral edema and peripheral pulses strong  ABDOMEN: soft, nontender, no hepatosplenomegaly, no masses and bowel sounds normal  MS: no gross musculoskeletal defects noted, no edema    Diagnostic Test Results:  none      ASSESSMENT/PLAN:                                                        ICD-10-CM    1. Gastroesophageal reflux disease, esophagitis presence not specified K21.9    2. Cough R05      -- consider cough made worse secondary to viral process and GERD     PLAN  Discussed use of zantac or omeprazole for 1-2 weeks for therapeutic and/or diagnostic purposes.   Patient educational/instructional material provided including reasons for follow-up   The patient indicates understanding of these issues and agrees with the plan.  Frandy Arteaga MD    [facility]

## 2017-05-02 DIAGNOSIS — I10 HYPERTENSION GOAL BP (BLOOD PRESSURE) < 150/90: ICD-10-CM

## 2017-05-02 DIAGNOSIS — E78.2 MIXED HYPERLIPIDEMIA: ICD-10-CM

## 2017-05-02 NOTE — TELEPHONE ENCOUNTER
metoprolol (LOPRESSOR) 25 MG tablet      Last Written Prescription Date: 4/4/17  Last Fill Quantity: 90, # refills: 0    Last Office Visit with [facility] or [facility] prescribing provider:  12/29/16   Future Office Visit:    Next 5 appointments (look out 90 days)     Jun 07, 2017 10:30 AM CDT   Return Visit with Carly Norman MD   [facility])    [address] [facility] [address]   [phone]            Jul 17, 2017 10:00 AM CDT   Return Visit with Emi Morales MD   [facility])    [address] [facility] [address]   [phone]                    BP Readings from Last 3 Encounters:   04/14/17 105/70   04/05/17 127/80   01/09/17 132/84       losartan-hydrochlorothiazide (HYZAAR) 50-12.5 MG per tablet      Last Written Prescription Date: 4/4/17  Last Fill Quantity: 90, # refills: 0  Last Office Visit with [facility] or [facility] prescribing provider: 12/29/16  Next 5 appointments (look out 90 days)     Jun 07, 2017 10:30 AM CDT   Return Visit with Carly Norman MD   [facility])    [address] [facility] [address]   [phone]            Jul 17, 2017 10:00 AM CDT   Return Visit with Emi Morales MD   [facility])    [address] [facility] [address]   [phone]                   Potassium   Date Value Ref Range Status   12/23/2016 3.9 3.4 - 5.3 mmol/L Final     Creatinine   Date Value Ref Range Status   02/24/2017 0.65 0.57 - 1.11 mg/dL Final     BP Readings from Last 3 Encounters:   04/14/17 105/70   04/05/17 127/80   01/09/17 132/84       pravastatin (PRAVACHOL) 20 MG tablet     Last Written Prescription Date: 4/4/17  Last Fill Quantity: 90, # refills: 0  Last Office Visit with [facility], [facility] or [facility] prescribing provider: 12/29/16  Next 5 appointments (look  out 90 days)     Jun 07, 2017 10:30 AM CDT   Return Visit with Carly Norman MD   [facility])    [address] [address] [place] [address]   [phone]            Jul 17, 2017 10:00 AM CDT   Return Visit with Emi Morales MD   [facility])    [address] [address] [address]   [phone]                   Lab Results   Component Value Date    CHOL 208 03/03/2016     Lab Results   Component Value Date    HDL 41 03/03/2016     Lab Results   Component Value Date     03/03/2016     Lab Results   Component Value Date    TRIG 184 03/03/2016     Lab Results   Component Value Date    CHOLHDLRATIO 5.5 02/09/2015           Rik Faarax  [facility]

## 2017-05-04 RX ORDER — METOPROLOL TARTRATE 25 MG/1
TABLET, FILM COATED ORAL
Qty: 90 TABLET | Refills: 0 | OUTPATIENT
Start: 2017-05-04

## 2017-05-04 RX ORDER — PRAVASTATIN SODIUM 20 MG
TABLET ORAL
Qty: 90 TABLET | Refills: 0 | OUTPATIENT
Start: 2017-05-04

## 2017-05-04 RX ORDER — LOSARTAN POTASSIUM AND HYDROCHLOROTHIAZIDE 12.5; 5 MG/1; MG/1
TABLET ORAL
Qty: 90 TABLET | Refills: 0 | OUTPATIENT
Start: 2017-05-04

## 2017-05-04 NOTE — TELEPHONE ENCOUNTER
Denied as too soon - refill sent 1 month ago for 3 month supply. Pharmacy advised via E-Scribe response.  Last RX in EPIC shows it was sent to the same pharmacy (as requesting) with 'Receipt confirmed by pharmacy'.      Chan Kern RN

## 2017-05-06 ENCOUNTER — RADIANT APPOINTMENT (OUTPATIENT)
Dept: MAMMOGRAPHY | Facility: CLINIC | Age: 67
End: 2017-05-06
Attending: FAMILY MEDICINE
Payer: MEDICARE

## 2017-05-06 DIAGNOSIS — Z12.31 VISIT FOR SCREENING MAMMOGRAM: ICD-10-CM

## 2017-05-06 PROCEDURE — G0202 SCR MAMMO BI INCL CAD: HCPCS | Mod: TC

## 2017-05-25 LAB
ALT SERPL-CCNC: 33 IU/L (ref 8–45)
AST SERPL-CCNC: 29 IU/L (ref 2–40)
CREAT SERPL-MCNC: 0.66 MG/DL (ref 0.57–1.11)
GFR SERPL CREATININE-BSD FRML MDRD: >60 ML/MIN/1.73M2

## 2017-05-31 ENCOUNTER — TRANSFERRED RECORDS (OUTPATIENT)
Dept: HEALTH INFORMATION MANAGEMENT | Facility: CLINIC | Age: 67
End: 2017-05-31

## 2017-06-07 ENCOUNTER — OFFICE VISIT (OUTPATIENT)
Dept: OPHTHALMOLOGY | Facility: CLINIC | Age: 67
End: 2017-06-07
Payer: MEDICARE

## 2017-06-07 DIAGNOSIS — H02.026: ICD-10-CM

## 2017-06-07 DIAGNOSIS — H02.836 DERMATOCHALASIS OF EYELIDS OF BOTH EYES: Primary | ICD-10-CM

## 2017-06-07 DIAGNOSIS — H02.423 MYOGENIC PTOSIS, BILATERAL: ICD-10-CM

## 2017-06-07 DIAGNOSIS — H02.833 DERMATOCHALASIS OF EYELIDS OF BOTH EYES: Primary | ICD-10-CM

## 2017-06-07 PROCEDURE — 99213 OFFICE O/P EST LOW 20 MIN: CPT | Performed by: OPHTHALMOLOGY

## 2017-06-07 ASSESSMENT — VISUAL ACUITY
OS_SC: 20/20
OD_SC: 20/20
OD_SC+: -1
METHOD: SNELLEN - LINEAR
OS_SC+: -1

## 2017-06-07 NOTE — MR AVS SNAPSHOT
After Visit Summary   6/7/2017    Noreen Florence    MRN: [id number]           Patient Information     Date Of Birth          1950        Visit Information        Provider Department      6/7/2017 10:30 AM Carly Norman MD [facility]         Follow-ups after your visit        Your next 10 appointments already scheduled     Jun 19, 2017  1:40 PM CDT   [facility]ia [facility]   [facility]    [id number] [id number] [phone]           Bring a current list of meds and any records pertaining to this visit.  For Physicals, please bring immunization records and any forms needing to be filled out.  Please arrive 10 minutes early to complete paperwork.            Jul 17, 2017 10:00 AM CDT   [facility] Visit with Emi Morales MD   [facility]    [address] [place] [id number] [phone]            Oct 30, 2017 12:30 PM CDT   Return Visit with Shai Levin MD, [facility] NURSE ONLY   [facility]    [address] [place] [id number] [phone]              Who to contact     If you have questions or need follow up information about today's clinic visit or your schedule please contact [facility] directly at [phone].  Normal or non-critical lab and imaging results will be communicated to you by MyChart, letter or phone within 4 business days after the clinic has received the results. If you do not hear from us within 7 days, please contact the clinic through MyChart or phone. If you have a critical or abnormal lab result, we will notify you by phone as soon as possible.  Submit refill requests through [employer] or call your pharmacy and they will forward the refill request to us. Please allow 3 business days for your refill to be  completed.          Additional Information About Your Visit        Trendrating[employer] Information     Hoblee gives you secure access to your electronic health record. If you see a primary care provider, you can also send messages to your care team and make appointments. If you have questions, please call your primary care clinic.  If you do not have a primary care provider, please call [phone] and they will assist you.      Hoblee is an electronic gateway that provides easy, online access to your medical records. With Hoblee, you can request a clinic appointment, read your test results, renew a prescription or communicate with your care team.     To access your existing account, please contact your [facility] or call [phone] for assistance.        Care EveryWhere ID     This is your Care EveryWhere ID. This could be used by other organizations to access your [facility] medical records  [id number]         Blood Pressure from Last 3 Encounters:   04/14/17 105/70   04/05/17 127/80   01/09/17 132/84    Weight from Last 3 Encounters:   04/14/17 52.2 kg (115 lb)   03/29/17 51.7 kg (114 lb)   01/06/17 51.7 kg (114 lb)              Today, you had the following     No orders found for display       Primary Care Provider Office Phone # Fax #    Jia Ashli Castro [phone]       [facility] [address] [address] [address]        Thank you!     Thank you for choosing [facility]  for your care. Our goal is always to provide you with excellent care. Hearing back from our patients is one way we can continue to improve our services. Please take a few minutes to complete the written survey that you may receive in the mail after your visit with us. Thank you!             Your Updated Medication List - Protect others around you: Learn how to safely use, store and throw away your medicines at www.disposemymeds.org.          This list is  accurate as of: 6/7/17 10:45 AM.  Always use your most recent med list.                   Brand Name Dispense Instructions for use    calcium-vitamin D 500-125 MG-UNIT Tabs     180 tablet    Take 1 tablet by mouth 2 times daily       cetirizine 10 MG tablet    zyrTEC    30 tablet    TAKE 1 TABLET BY MOUTH DAILY       chlorhexidine 0.12 % solution    PERIDEX    473 mL    Swish and spit 15 mLs in mouth 2 times daily       fluticasone 50 MCG/ACT spray    FLONASE    1 Package    Spray 1-2 sprays into both nostrils daily       leucovorin 5 MG tablet    WELLCOVORIN     TK 1 T PO 12 H AFTER METHOTREXATE ONE TIME EACH WEEK       losartan-hydrochlorothiazide 50-12.5 MG per tablet    HYZAAR    90 tablet    Take 1 tablet by mouth daily       methotrexate 2.5 MG tablet     1 tablet    Take 6 tablets (15 mg) by mouth once a week       metoprolol 25 MG tablet    LOPRESSOR    90 tablet    Take 1 tablet (25 mg) by mouth At Bedtime       omeprazole 40 MG capsule    priLOSEC    30 capsule    Take 1 capsule (40 mg) by mouth daily as needed Take 30-60 minutes before a meal.       pravastatin 20 MG tablet    PRAVACHOL    90 tablet    Take 1 tablet (20 mg) by mouth daily       ranitidine 300 MG tablet    ZANTAC    30 tablet    TAKE 1 TABLET(300 MG) BY MOUTH AT BEDTIME       triamcinolone 0.1 % cream    KENALOG    80 g    APPLY TOPICALLY TWICE DAILY

## 2017-06-07 NOTE — NURSING NOTE
Patient presents with:  Post Op (Ophthalmology) Both Eyes: BILATERAL UPPER LID BLEPHAROPLASTY WITH PTOSIS REPAIR, BILATERAL LOWER LID ENTROPION REPAIR 1/6/17      Referring Provider:  No referring provider defined for this encounter.    HPI    Symptoms:              Comments:  OS lid still lower than OD.

## 2017-06-19 ENCOUNTER — OFFICE VISIT (OUTPATIENT)
Dept: FAMILY MEDICINE | Facility: CLINIC | Age: 67
End: 2017-06-19
Payer: MEDICARE

## 2017-06-19 VITALS
TEMPERATURE: 98.5 F | OXYGEN SATURATION: 99 % | WEIGHT: 116 LBS | HEIGHT: 63 IN | BODY MASS INDEX: 20.55 KG/M2 | SYSTOLIC BLOOD PRESSURE: 119 MMHG | HEART RATE: 80 BPM | DIASTOLIC BLOOD PRESSURE: 75 MMHG

## 2017-06-19 DIAGNOSIS — L30.9 DERMATITIS: ICD-10-CM

## 2017-06-19 DIAGNOSIS — E78.5 HYPERLIPIDEMIA WITH TARGET LDL LESS THAN 130: ICD-10-CM

## 2017-06-19 DIAGNOSIS — Z11.59 NEED FOR HEPATITIS C SCREENING TEST: ICD-10-CM

## 2017-06-19 DIAGNOSIS — I10 HYPERTENSION, GOAL BELOW 150/90: Primary | ICD-10-CM

## 2017-06-19 DIAGNOSIS — M85.80 OSTEOPENIA: ICD-10-CM

## 2017-06-19 DIAGNOSIS — M17.11 PRIMARY OSTEOARTHRITIS OF RIGHT KNEE: ICD-10-CM

## 2017-06-19 DIAGNOSIS — Z23 NEED FOR PROPHYLACTIC VACCINATION AGAINST STREPTOCOCCUS PNEUMONIAE (PNEUMOCOCCUS): ICD-10-CM

## 2017-06-19 DIAGNOSIS — M06.4 POLYARTHRITIS, INFLAMMATORY (H): ICD-10-CM

## 2017-06-19 DIAGNOSIS — K21.9 GASTROESOPHAGEAL REFLUX DISEASE, ESOPHAGITIS PRESENCE NOT SPECIFIED: ICD-10-CM

## 2017-06-19 PROCEDURE — G0009 ADMIN PNEUMOCOCCAL VACCINE: HCPCS | Performed by: FAMILY MEDICINE

## 2017-06-19 PROCEDURE — 99214 OFFICE O/P EST MOD 30 MIN: CPT | Mod: 25 | Performed by: FAMILY MEDICINE

## 2017-06-19 PROCEDURE — 90670 PCV13 VACCINE IM: CPT | Performed by: FAMILY MEDICINE

## 2017-06-19 RX ORDER — TRIAMCINOLONE ACETONIDE 1 MG/G
CREAM TOPICAL
Qty: 80 G | Refills: 3 | Status: SHIPPED | OUTPATIENT
Start: 2017-06-19 | End: 2018-01-30

## 2017-06-19 ASSESSMENT — PAIN SCALES - GENERAL: PAINLEVEL: NO PAIN (0)

## 2017-06-19 NOTE — PROGRESS NOTES
SUBJECTIVE:                                                    Noreen Florence is a 67 year old female who presents to clinic today for the following health issues:    Joint Pain - Right knee follow up      Onset:  About 2weeks    Description:   Location: Right knee  Character: Dull ache    Intensity: moderate, currently 0/10    Progression of Symptoms: same    Accompanying Signs & Symptoms:  Other symptoms: weakness of right leg   History:   Previous similar pain: YES      Precipitating factors:   Trauma or overuse: no     Alleviating factors:  Improved by: knee injection provide temporary relief, last injection helped x1week       Therapies Tried and outcome: Knee injection done at [facility] with Dr Goode 5/31/17. Thinks that she may need to change clinics for rheumatologist due to insurance changes.     Leg cramps left leg since stopping calcium supplements.       Hyperlipidemia Follow-Up      Rate your low fat/cholesterol diet?: good    Taking statin?  No    Other lipid medications/supplements?:  none     Hypertension Follow-up      Outpatient blood pressures are not being checked.    Low Salt Diet: no added salt       Problem list and histories reviewed & adjusted, as indicated.  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 pylori test     Pterygium of right eye     Sicca syndrome (H)     Angiomyolipoma of right kidney     Past Surgical History:   Procedure Laterality Date     COLONOSCOPY       COLONOSCOPY WITH CO2 INSUFFLATION N/A 4/5/2017    Procedure: COLONOSCOPY WITH CO2 INSUFFLATION;  Surgeon: Duane, William Charles, MD;  Location:  [facility] OR     COMBINED REPAIR PTOSIS WITH BLEPHAROPLASTY BILATERAL Bilateral 1/6/2017    Procedure: COMBINED REPAIR PTOSIS WITH BLEPHAROPLASTY BILATERAL;  Surgeon: Carly Norman MD;  Location: [facility]     ESOPHAGOSCOPY, GASTROSCOPY, DUODENOSCOPY (EGD), COMBINED N/A 1/5/2016    Procedure: COMBINED ESOPHAGOSCOPY, GASTROSCOPY, DUODENOSCOPY (EGD), BIOPSY SINGLE OR MULTIPLE;  Surgeon: Duane, William Charles, MD;  Location: [facility] OR     REPAIR ENTROPION BILATERAL Bilateral 1/6/2017    Procedure: REPAIR ENTROPION BILATERAL;  Surgeon: Carly Norman MD;  Location: [facility]     REPAIR PTOSIS Bilateral 01/2017       Social History   Substance Use Topics     Smoking status: Never Smoker     Smokeless tobacco: Never Used     Alcohol use No     Family History   Problem Relation Age of Onset     DIABETES Mother      CANCER No family hx of      Hypertension No family hx of      CEREBROVASCULAR DISEASE No family hx of      Thyroid Disease No family hx of      Glaucoma No family hx of      Macular Degeneration No family hx of          Current Outpatient Prescriptions   Medication Sig Dispense Refill     triamcinolone (KENALOG) 0.1 % cream APPLY TOPICALLY TWICE DAILY 80 g 3     calcium-vitamin D (CALCIUM 600 + D) 600-400 MG-UNIT per tablet Take 1 tablet by mouth 2 times daily 60 tablet 11     metoprolol (LOPRESSOR) 25 MG tablet Take 1 tablet (25 mg) by mouth At Bedtime 90 tablet 0     pravastatin (PRAVACHOL) 20 MG tablet Take 1 tablet (20 mg) by mouth daily 90 tablet 0     losartan-hydrochlorothiazide (HYZAAR) 50-12.5 MG per tablet Take 1 tablet by mouth daily 90 tablet 0     leucovorin (WELLCOVORIN) 5 MG tablet TK 1 T PO 12 H AFTER METHOTREXATE ONE TIME EACH WEEK  3     methotrexate 2.5 MG tablet Take 6 tablets (15 mg) by mouth once a week 1 tablet 0     omeprazole (PRILOSEC) 40 MG capsule Take 1 capsule (40 mg) by mouth daily as needed Take 30-60 minutes before a meal. 30 capsule 6     chlorhexidine (CHLORHEXIDINE) 0.12 % solution  Swish and spit 15 mLs in mouth 2 times daily 473 mL 11     ranitidine (ZANTAC) 300 MG tablet TAKE 1 TABLET(300 MG) BY MOUTH AT BEDTIME 30 tablet 0     cetirizine (ZYRTEC) 10 MG tablet TAKE 1 TABLET BY MOUTH DAILY 30 tablet 11     fluticasone (FLONASE) 50 MCG/ACT nasal spray Spray 1-2 sprays into both nostrils daily 1 Package 11     No Known Allergies  Recent Labs   Lab Test 02/24/17 12/23/16   1152 11/30/16  10/27/16   0939 08/26/16 03/03/16   0856   02/09/15   0755   04/22/14   0808   A1C   --    --    --   5.9   --    --    --    --    --    --    --    LDL   --    --    --    --    --    --   130*   --   120   --   111   HDL   --    --    --    --    --    --   41*   --   42*   --   38*   TRIG   --    --    --    --    --    --   184*   --   337*   --   104   ALT  27   --   40   --   41   < >   --    < >   --    < >   --    CR  0.65  0.51*  0.66  0.55  0.62   < >   --    < >  0.47*   < >   --    GFRESTIMATED  >60  >90  Non  GFR Calc    >60  >90  Non  GFR Calc    >60   < >   --    < >  >90  Non  GFR Calc     < >   --    GFRESTBLACK  >60  >90   GFR Calc    >60  >90   GFR Calc    >60   < >   --    < >  >90   GFR Calc     < >   --    POTASSIUM   --   3.9   --   3.7   --    < >   --    < >  3.5   < >   --     < > = values in this interval not displayed.      BP Readings from Last 3 Encounters:   06/19/17 119/75   04/14/17 105/70   04/05/17 127/80    Wt Readings from Last 3 Encounters:   06/19/17 116 lb (52.6 kg)   04/14/17 115 lb (52.2 kg)   03/29/17 114 lb (51.7 kg)                  Labs reviewed in EPIC    Reviewed and updated as needed this visit by clinical staff       Reviewed and updated as needed this visit by Provider    Refills needed today for dermatitis.          ROS:  Constitutional, HEENT, cardiovascular, pulmonary, gi and gu systems are negative, except as otherwise noted.    OBJECTIVE:                       "                              /75 (BP Location: Right arm, Patient Position: Chair, Cuff Size: Adult Regular)  Pulse 80  Temp 98.5  F (36.9  C) (Oral)  Ht 5' 3\" (1.6 m)  Wt 116 lb (52.6 kg)  SpO2 99%  Breastfeeding? No  BMI 20.55 kg/m2  Body mass index is 20.55 kg/(m^2).  GENERAL APPEARANCE: healthy, alert and no distress  EYES: Eyes grossly normal to inspection, PERRL and conjunctivae and sclerae normal  HENT: ear canals and TM's normal, nose and mouth without ulcers or lesions, oropharynx clear and oral mucous membranes moist  NECK: no adenopathy, no asymmetry, masses, or scars and thyroid normal to palpation  RESP: lungs clear to auscultation - no rales, rhonchi or wheezes  CV: regular rates and rhythm, normal S1 S2, no S3 or S4, no murmur, click or rub, no peripheral edema and peripheral pulses strong  ABDOMEN: soft, nontender, no hepatosplenomegaly, no masses and bowel sounds normal  MS: no musculoskeletal defects are noted and gait is age appropriate without ataxia  SKIN: no suspicious lesions or rashes  NEURO: Normal strength and tone, sensory exam grossly normal, mentation intact and speech normal  PSYCH: mentation appears normal and affect normal/bright     Diagnostic Test Results:  No results found for this or any previous visit (from the past 24 hour(s)).  Results for orders placed or performed in visit on 05/06/17   MA Screening Digital Bilateral    Narrative    SCREENING MAMMOGRAM, BILATERAL, DIGITAL w/CAD - 5/6/2017 11:00 AM.    BREAST SYMPTOMS: No current breast complaints.     COMPARISON:  04/26/2016, 04/27/2015, 04/24/2014, 01/10/2013.    BREAST DENSITY: Almost entirely fat.    COMMENTS: No findings of suspicion for malignancy.       Impression    IMPRESSION: BI-RADS CATEGORY: 1 - Negative.    RECOMMENDED FOLLOW-UP: Annual Mammography.  Recommend routine annual screening mammography.    Exam results letter mailed to patient.                CHEN COREAS MD        ASSESSMENT/PLAN:        "                                                       ICD-10-CM    1. Hypertension, goal below 150/90 I10 Well controlled on medications    2. Polyarthritis, inflammatory (H) M06.4 RHEUMATOLOGY REFERRAL- knee injection worked and knee is now pain free.    3. Hyperlipidemia with target LDL less than 130 E78.5 Lipid panel reflex to direct LDL   4. Primary osteoarthritis of right knee M17.11 better   5. Need for hepatitis C screening test Z11.59 Hepatitis C antibody   6. Gastroesophageal reflux disease, esophagitis presence not specified K21.9 Continue omeprazole but taking every other day.    7. Dermatitis L30.9 triamcinolone (KENALOG) 0.1 % cream twice a day as needed.    8. Need for prophylactic vaccination against Streptococcus pneumoniae (pneumococcus) Z23      ADMIN VACCINE, ADDL [50803]     Pneumococcal vaccine 13 valent PCV13 IM (Prevnar) [90428]   9. Osteopenia M85.80 calcium-vitamin D (CALCIUM 600 + D) 600-400 MG-UNIT per tablet- restart daily or twice a day, DEXA done last year and does not need to be repeated yet.        CONSULTATION/REFERRAL to rheumatology as needed.   FUTURE LABS:       - Schedule fasting labs in 6 months  FUTURE APPOINTMENTS:       - Follow-up visit in 6 months or sooner if any questions or concerns.   Work on weight loss  Regular exercise  See Patient Instructions    Jia Castro MD  [facility]

## 2017-06-19 NOTE — NURSING NOTE
"Chief Complaint   Patient presents with     Knee Pain     Follow up       Initial /75 (BP Location: Right arm, Patient Position: Chair, Cuff Size: Adult Regular)  Pulse 80  Temp 98.5  F (36.9  C) (Oral)  Ht 5' 3\" (1.6 m)  Wt 116 lb (52.6 kg)  SpO2 99%  Breastfeeding? No  BMI 20.55 kg/m2 Estimated body mass index is 20.55 kg/(m^2) as calculated from the following:    Height as of this encounter: 5' 3\" (1.6 m).    Weight as of this encounter: 116 lb (52.6 kg).  Medication Reconciliation: complete     Tim Montgomery CMA    "

## 2017-06-19 NOTE — PATIENT INSTRUCTIONS
How to contact your care team: [phone] Pharmacy [phone]   MOSES VALENZUELA MD KATYA GEORGIEV, PA-C CHRIS JONES, PA-C NAM HO, MD JONATHAN BATES, MD ARVIN VOCAL, MD    Clinic hours M-Th 7am-7pm Fri 7am-5pm.   Urgent care M-F 11am-9pm  Sat/Sun 9am-5pm.   Pharmacy   Mon-Th:  8:00am-8pm   Fri:  8:00am-6:00pm  Sat/Sun  8:00am-5:00 pm             Established High Blood Pressure    High blood pressure (hypertension) is a chronic disease. Often, healthcare providers don t know what causes it. But it can be caused by certain health conditions and medicines.  If you have high blood pressure, you may not have any symptoms. If you do have symptoms, they may include headache, dizziness, changes in your vision, chest pain, and shortness of breath. But even without symptoms, high blood pressure that s not treated raises your risk for heart attack and stroke. High blood pressure is a serious health risk and shouldn t be ignored.  A blood pressure reading is made up of two numbers: a higher number over a lower number. The top number is the systolic pressure. The bottom number is the diastolic pressure. A normal blood pressure is a systolic pressure of  less than 120 over a diastolic pressure of less than 80. You will see your blood pressure readings written together. For example, a person with a systolic pressure of 188 and a diastolic pressure of 78 will have 118/78 written in the medical record.  High blood pressure is when either the top number is 140 or higher, or the bottom number is 90 or higher. This must be the result when taking your blood pressure a number of times. The blood pressures between normal and high are called prehypertension.  Home care  If you have high blood pressure, you should do what is listed below to lower your blood pressure. If you are taking medicines for high blood pressure, these methods may reduce or end your need for medicines in the future.    Begin a  weight-loss program if you are overweight.    Cut back on how much salt you get in your diet. Here s how to do this:    Don t eat foods that have a lot of salt. These include olives, pickles, smoked meats, and salted potato chips.    Don t add salt to your food at the table.    Use only small amounts of salt when cooking.    Start an exercise program. Talk with your healthcare provider about the type of exercise program that would be best for you. It doesn't have to be hard. Even brisk walking for 20 minutes 3 times a week is a good form of exercise.    Don t take medicines that stimulate the heart. This includes many over-the-counter cold and sinus decongestant pills and sprays, as well as diet pills. Check the warnings about hypertension on the label. Before buying any over-the-counter medicines or supplements, always ask the pharmacist about the product's potential interaction with your high blood pressure and your high blood pressure medicines.    Stimulants such as amphetamine or cocaine could be deadly for someone with high blood pressure. Never take these.    Limit how much caffeine you get in your diet. Switch to caffeine-free products.    Stop smoking. If you are a long-time smoker, this can be hard. Talk to your healthcare provider about medicines and nicotine replacement options to help you. Also, enroll in a stop-smoking program to make it more likely that you will quit for good.    Learn how to handle stress. This is an important part of any program to lower blood pressure. Learn about relaxation methods like meditation, yoga, or biofeedback.    If your provider prescribed medicines, take them exactly as directed. Missing doses may cause your blood pressure get out of control.    If you miss a dose or doses, check with your healthcare provider or pharmacist about what to do.    Consider buying an automatic blood pressure machine. Ask your provider for a recommendation. You can get one of these at most  pharmacies.     The American Heart Association recommends the following guidelines for home blood pressure monitoring:    Don't smoke or drink coffee for 30 minutes before taking your blood pressure.    Go to the bathroom before the test.    Relax for 5 minutes before taking the measurement.    Sit with your back supported (don't sit on a couch or soft chair); keep your feet on the floor uncrossed. Place your arm on a solid flat surface (like a table) with the upper part of the arm at heart level. Place the middle of the cuff directly above the eye of the elbow. Check the monitor's instruction manual for an illustration.    Take multiple readings. When you measure, take 2 to 3 readings one minute apart and record all of the results.    Take your blood pressure at the same time every day, or as your healthcare provider recommends.    Record the date, time, and blood pressure reading.    Take the record with you to your next medical appointment. If your blood pressure monitor has a built-in memory, simply take the monitor with you to your next appointment.    Call your provider if you have several high readings. Don't be frightened by a single high blood pressure reading, but if you get several high readings, check in with your healthcare provider.    Note: When blood pressure reaches a systolic (top number) of 180 or higher OR diastolic (bottom number) of 110 or higher, seek emergency medical treatment.  Follow-up care  You will need to see your healthcare provider regularly. This is to check your blood pressure and to make changes to your medicines. Make a follow-up appointment as directed. Bring the record of your home blood pressure readings to the appointment.  When to seek medical advice  Call your healthcare provider right away if any of these occur:    Blood pressure reaches a systolic (upper number) of 180 or higher OR a diastolic (bottom number) of 110 or higher    Chest pain or shortness of breath    Severe  headache    Throbbing or rushing sound in the ears    Nosebleed    Sudden severe pain in your belly (abdomen)    Extreme drowsiness, confusion, or fainting    Dizziness or spinning sensation (vertigo)    Weakness of an arm or leg or one side of the face    You have problems speaking or seeing   Date Last Reviewed: 12/1/2016 2000-2017 [employer]. [address] [place] [address]. All rights reserved. This information is not intended as a substitute for professional medical care. Always follow your healthcare professional's instructions.

## 2017-06-19 NOTE — MR AVS SNAPSHOT
After Visit Summary   6/19/2017    Noreen Florence    MRN: [id number]           Patient Information     Date Of Birth          1950        Visit Information        Provider Department      6/19/2017 1:40 PM Jia Castro MD [facility]        Today's Diagnoses     Hypertension, goal below 150/90    -  1    Need for hepatitis C screening test        Need for prophylactic vaccination against Streptococcus pneumoniae (pneumococcus)        Polyarthritis, inflammatory (H)        Hyperlipidemia with target LDL less than 130        Primary osteoarthritis of right knee        Gastroesophageal reflux disease, esophagitis presence not specified        Dermatitis        Osteopenia          Care Instructions    How to contact your care team: [phone] Pharmacy [phone]   MOSES VALENZUELA MD KATYA GEORGIEV, PA-C CHRIS JONES, PA-C NAM HO, MD JONATHAN BATES, MD ARVIN VOCAL, MD    Clinic hours M-Th 7am-7pm Fri 7am-5pm.   Urgent care M-F 11am-9pm  Sat/Sun 9am-5pm.   Pharmacy   Mon-Th:  8:00am-8pm   Fri:  8:00am-6:00pm  Sat/Sun  8:00am-5:00 pm             Established High Blood Pressure    High blood pressure (hypertension) is a chronic disease. Often, healthcare providers don t know what causes it. But it can be caused by certain health conditions and medicines.  If you have high blood pressure, you may not have any symptoms. If you do have symptoms, they may include headache, dizziness, changes in your vision, chest pain, and shortness of breath. But even without symptoms, high blood pressure that s not treated raises your risk for heart attack and stroke. High blood pressure is a serious health risk and shouldn t be ignored.  A blood pressure reading is made up of two numbers: a higher number over a lower number. The top number is the systolic pressure. The bottom number is the diastolic pressure. A normal blood pressure is a systolic pressure of  less than  120 over a diastolic pressure of less than 80. You will see your blood pressure readings written together. For example, a person with a systolic pressure of 188 and a diastolic pressure of 78 will have 118/78 written in the medical record.  High blood pressure is when either the top number is 140 or higher, or the bottom number is 90 or higher. This must be the result when taking your blood pressure a number of times. The blood pressures between normal and high are called prehypertension.  Home care  If you have high blood pressure, you should do what is listed below to lower your blood pressure. If you are taking medicines for high blood pressure, these methods may reduce or end your need for medicines in the future.    Begin a weight-loss program if you are overweight.    Cut back on how much salt you get in your diet. Here s how to do this:    Don t eat foods that have a lot of salt. These include olives, pickles, smoked meats, and salted potato chips.    Don t add salt to your food at the table.    Use only small amounts of salt when cooking.    Start an exercise program. Talk with your healthcare provider about the type of exercise program that would be best for you. It doesn't have to be hard. Even brisk walking for 20 minutes 3 times a week is a good form of exercise.    Don t take medicines that stimulate the heart. This includes many over-the-counter cold and sinus decongestant pills and sprays, as well as diet pills. Check the warnings about hypertension on the label. Before buying any over-the-counter medicines or supplements, always ask the pharmacist about the product's potential interaction with your high blood pressure and your high blood pressure medicines.    Stimulants such as amphetamine or cocaine could be deadly for someone with high blood pressure. Never take these.    Limit how much caffeine you get in your diet. Switch to caffeine-free products.    Stop smoking. If you are a long-time smoker,  this can be hard. Talk to your healthcare provider about medicines and nicotine replacement options to help you. Also, enroll in a stop-smoking program to make it more likely that you will quit for good.    Learn how to handle stress. This is an important part of any program to lower blood pressure. Learn about relaxation methods like meditation, yoga, or biofeedback.    If your provider prescribed medicines, take them exactly as directed. Missing doses may cause your blood pressure get out of control.    If you miss a dose or doses, check with your healthcare provider or pharmacist about what to do.    Consider buying an automatic blood pressure machine. Ask your provider for a recommendation. You can get one of these at most pharmacies.     The American Heart Association recommends the following guidelines for home blood pressure monitoring:    Don't smoke or drink coffee for 30 minutes before taking your blood pressure.    Go to the bathroom before the test.    Relax for 5 minutes before taking the measurement.    Sit with your back supported (don't sit on a couch or soft chair); keep your feet on the floor uncrossed. Place your arm on a solid flat surface (like a table) with the upper part of the arm at heart level. Place the middle of the cuff directly above the eye of the elbow. Check the monitor's instruction manual for an illustration.    Take multiple readings. When you measure, take 2 to 3 readings one minute apart and record all of the results.    Take your blood pressure at the same time every day, or as your healthcare provider recommends.    Record the date, time, and blood pressure reading.    Take the record with you to your next medical appointment. If your blood pressure monitor has a built-in memory, simply take the monitor with you to your next appointment.    Call your provider if you have several high readings. Don't be frightened by a single high blood pressure reading, but if you get several  high readings, check in with your healthcare provider.    Note: When blood pressure reaches a systolic (top number) of 180 or higher OR diastolic (bottom number) of 110 or higher, seek emergency medical treatment.  Follow-up care  You will need to see your healthcare provider regularly. This is to check your blood pressure and to make changes to your medicines. Make a follow-up appointment as directed. Bring the record of your home blood pressure readings to the appointment.  When to seek medical advice  Call your healthcare provider right away if any of these occur:    Blood pressure reaches a systolic (upper number) of 180 or higher OR a diastolic (bottom number) of 110 or higher    Chest pain or shortness of breath    Severe headache    Throbbing or rushing sound in the ears    Nosebleed    Sudden severe pain in your belly (abdomen)    Extreme drowsiness, confusion, or fainting    Dizziness or spinning sensation (vertigo)    Weakness of an arm or leg or one side of the face    You have problems speaking or seeing   Date Last Reviewed: 12/1/2016 2000-2017 [employer]. [address] [place] [address]. All rights reserved. This information is not intended as a substitute for professional medical care. Always follow your healthcare professional's instructions.                Follow-ups after your visit        Additional Services     RHEUMATOLOGY REFERRAL       Your provider has referred you to: [facility]: [facility] [phone]   [email or web address]    Please be aware that coverage of these services is subject to the terms and limitations of your health insurance plan.  Call member services at your health plan with any benefit or coverage questions.      Please bring the following with you to your appointment:    (1) Any X-Rays, CTs or MRIs which have been performed.  Contact the facility where they were done to arrange for   prior to your scheduled appointment.    (2) List of current medications   (3) This referral request   (4) Any documents/labs given to you for this referral                  Follow-up notes from your care team     Return in about 6 months (around 12/19/2017) for medication follow up, Lab Work, Physical Exam.      Your next 10 appointments already scheduled     Jul 17, 2017 10:00 AM CDT   [facility]   [facility]    [address] [place] [address] [phone]            Oct 30, 2017 12:30 PM CDT   Return Visit with Shai Levin MD, [facility] NURSE ONLY   [facility]    [address] [place] [address] [phone]              Future tests that were ordered for you today     Open Future Orders        Priority Expected Expires Ordered    Lipid panel reflex to direct LDL Routine  6/19/2018 6/19/2017    Hepatitis C antibody Routine  6/19/2018 6/19/2017            Who to contact     If you have questions or need follow up information about today's clinic visit or your schedule please contact [facility] directly at [phone].  Normal or non-critical lab and imaging results will be communicated to you by [employer], letter or phone within 4 business days after the clinic has received the results. If you do not hear from us within 7 days, please contact the clinic through [employer] or phone. If you have a critical or abnormal lab result, we will notify you by phone as soon as possible.  Submit refill requests through [employer] or call your pharmacy and they will forward the refill request to us. Please allow 3 business days for your refill to be completed.          Additional Information About Your Visit        [employer] Information     [employer] gives you secure access to your electronic health record. If you see a primary care provider, you can  "also send messages to your care team and make appointments. If you have questions, please call your primary care clinic.  If you do not have a primary care provider, please call [phone] and they will assist you.        Care EveryWhere ID     This is your Care EveryWhere ID. This could be used by other organizations to access your [place] medical records  [id number]        Your Vitals Were     Pulse Temperature Height Pulse Oximetry Breastfeeding? BMI (Body Mass Index)    80 98.5  F (36.9  C) (Oral) 5' 3\" (1.6 m) 99% No 20.55 kg/m2       Blood Pressure from Last 3 Encounters:   06/19/17 119/75   04/14/17 105/70   04/05/17 127/80    Weight from Last 3 Encounters:   06/19/17 116 lb (52.6 kg)   04/14/17 115 lb (52.2 kg)   03/29/17 114 lb (51.7 kg)              We Performed the Following          ADMIN VACCINE, ADDL [91781]     Pneumococcal vaccine 13 valent PCV13 IM (Prevnar) [72605]     RHEUMATOLOGY REFERRAL          Today's Medication Changes          These changes are accurate as of: 6/19/17  2:38 PM.  If you have any questions, ask your nurse or doctor.               Start taking these medicines.        Dose/Directions    calcium-vitamin D 600-400 MG-UNIT per tablet   Commonly known as:  calcium 600 + D   Used for:  Osteopenia   Started by:  Jia Castro MD        Dose:  1 tablet   Take 1 tablet by mouth 2 times daily   Quantity:  60 tablet   Refills:  11         These medicines have changed or have updated prescriptions.        Dose/Directions    triamcinolone 0.1 % cream   Commonly known as:  KENALOG   This may have changed:  See the new instructions.   Used for:  Dermatitis   Changed by:  Jia Castro MD        APPLY TOPICALLY TWICE DAILY   Quantity:  80 g   Refills:  3         Stop taking these medicines if you haven't already. Please contact your care team if you have questions.     calcium-vitamin D 500-125 MG-UNIT Tabs   Stopped by:  Jia Castro MD                Where to get " your medicines      These medications were sent to [id number] - [facility], [place] - [address]  [address], [facility] [place] [address]     Phone:  [phone]     calcium-vitamin D 600-400 MG-UNIT per tablet    triamcinolone 0.1 % cream                Primary Care Provider Office Phone # Fax #    Jiaadarsh Castro [phone]       [facility] [address]  [place] [address]        Thank you!     Thank you for choosing [facility]  for your care. Our goal is always to provide you with excellent care. Hearing back from our patients is one way we can continue to improve our services. Please take a few minutes to complete the written survey that you may receive in the mail after your visit with us. Thank you!             Your Updated Medication List - Protect others around you: Learn how to safely use, store and throw away your medicines at www.disposemymeds.org.          This list is accurate as of: 6/19/17  2:38 PM.  Always use your most recent med list.                   Brand Name Dispense Instructions for use    calcium-vitamin D 600-400 MG-UNIT per tablet    calcium 600 + D    60 tablet    Take 1 tablet by mouth 2 times daily       cetirizine 10 MG tablet    zyrTEC    30 tablet    TAKE 1 TABLET BY MOUTH DAILY       chlorhexidine 0.12 % solution    PERIDEX    473 mL    Swish and spit 15 mLs in mouth 2 times daily       fluticasone 50 MCG/ACT spray    FLONASE    1 Package    Spray 1-2 sprays into both nostrils daily       leucovorin 5 MG tablet    WELLCOVORIN     TK 1 T PO 12 H AFTER METHOTREXATE ONE TIME EACH WEEK       losartan-hydrochlorothiazide 50-12.5 MG per tablet    HYZAAR    90 tablet    Take 1 tablet by mouth daily       methotrexate 2.5 MG tablet     1 tablet    Take 6 tablets (15 mg) by mouth once a week       metoprolol 25 MG tablet    LOPRESSOR    90 tablet    Take 1  tablet (25 mg) by mouth At Bedtime       omeprazole 40 MG capsule    priLOSEC    30 capsule    Take 1 capsule (40 mg) by mouth daily as needed Take 30-60 minutes before a meal.       pravastatin 20 MG tablet    PRAVACHOL    90 tablet    Take 1 tablet (20 mg) by mouth daily       ranitidine 300 MG tablet    ZANTAC    30 tablet    TAKE 1 TABLET(300 MG) BY MOUTH AT BEDTIME       triamcinolone 0.1 % cream    KENALOG    80 g    APPLY TOPICALLY TWICE DAILY

## 2017-06-20 ENCOUNTER — CARE COORDINATION (OUTPATIENT)
Dept: CARE COORDINATION | Facility: CLINIC | Age: 67
End: 2017-06-20

## 2017-06-20 NOTE — LETTER
[address] [address]       June 20, 2017      Noreen Florence  [address]  [facility] [address]    Dear Noreen,  I am one of the Clinic Care Coordinators that works with your primary care provider's clinic. I wanted to thank you for spending the time to talk with me.  Below is a description of what Clinic Care Coordination is and how I can further assist you in the future if needed.     The Clinic Care Coordinator role is a Registered Nurse and/or  who understands the health care system. The goal of Clinic Care Coordination is to help you manage your health and improve access to the [facility] in the most efficient manner.  The Registered Nurse can assist you in meeting your health care goals by providing education, coordinating services, and strengthening the communication among your providers. The  can assist you with financial, behavioral, psychosocial, and chemical dependency and counseling/psychiatric resources.    Please feel free to keep this letter and contact information to contact me at [phone] with any further questions or concerns that may arise. We at [facility] are focused on providing you with the highest-quality healthcare experience possible and that all starts with you.       Sincerely,     Melissa Behl BSN, RN, PHN  [facility] Care Coordinator  [phone]  [email or web address]      Enclosed: I have enclosed a copy of a 24 Hour Access Plan. This has helpful phone numbers for you to call when needed. Please keep this in an easy to access place to use as needed.   I have also enclosed an Authorization to Discuss Protected Health Information form. Please review, fill out, sign and send back to me so I can make sure we have this on file to be able to talk to family/friends if you would like us to be able to.

## 2017-06-20 NOTE — PROGRESS NOTES
Clinic Care Coordination Contact  OUTREACH    Referral Information:  Referral Source: Pro-Active Outreach  Reason for Contact: RN CC initial call to patient for proactive outreach.  Care Conference: No     Universal Utilization:   ED Visits in last year: 0  Hospital visits in last year: 0  Last PCP appointment: 06/19/17  Missed Appointments: 1  Concerns: pt denies  Multiple Providers or Specialists: FP, Urology, Ophth    Clinical Concerns:  Current Medical Concerns: Patient with diagnoses of polyarthritis, osteoarthritis, GERD, hypertension.  Patient was seen yesterday by PCP and referred to rheumatology for her polyarthritis.  Patient denies any questions or concerns regarding her health at this time.  RN CC informed patient of future orders for hep c screening and lipid panel.  RN CC assisted patient in scheduling a fasting lab only appointment.    Current Behavioral Concerns: n/a    Education Provided to patient: RN CC educated patient on care coordination services and health maintenance.   Clinical Pathway Name: None    Medication Management:  Patient independent in medication management and verbalizes adherence and understanding of medication regimen.       Functional Status:  Mobility Status: Independent  Equipment Currently Used at Home: none  Transportation: Patient drives.           Psychosocial:  Current living arrangement:: I live in a private home  Financial/Insurance: Denies concerns.       Resources and Interventions:  Current Resources:  ;          Advanced Care Plans/Directives on file:: No     Patient/Caregiver understanding: Patient verbalizes understanding of care coordination services, but denies the need for this service at this time.  Patient verbalized understanding of needing future lab order for lipid panel and hep C screening and future appointment was scheduled for patient tomorrow.       Upcoming appointment: 07/17/17 (urology)     Plan:   Patient will continue to follow treatment plan as  directed and follow up with PCP with concerns ongoing.   Patient will have her lipid panel and Hepatitis C screening drawn tomorrow.  RN CC will mail out care coordination letter, brochure and access plan.  RN CC will do no further outreaches due to patient denying the need for care coordination at this time.    Melissa Behl BSN, RN, PHN  [facility] Care Coordinator  [phone]  [email or web address]

## 2017-06-20 NOTE — LETTER
Health Care Home - Access Care Plan    About Me  Patient Name:  Noreen Florence    YOB: 1950  Age:                            67 year old   Mitch MRN:         [id number] Telephone Information:     Home Phone [phone]   Mobile [phone]       Address:    [address]  [place] [facility] [address] Email address:  [email or web address]      Emergency Contact(s)  Name Relationship Lgl Grd Work Phone Home Phone Mobile Phone   1. ALBERT MIKE Daughter No none [phone] none   2. SHELLY MIKE Daughter    [phone]   3. JOANA MIKE Son No none none [phone]             Health Maintenance: Routine Health maintenance Reviewed: Due/Overdue     My Access Plan  Medical Emergency 911   Questions or concerns during clinic hours Primary Clinic Line, I will call the clinic directly: Primary Clinic: [facility] [place]- [phone]   24 Hour Appointment Line [phone] or  [phone])  (toll free)   24 Hour Nurse Line [phone] (toll free)   Questions or concerns outside clinic hours 24 Hour Appointment Line, I will call the after-hours on-call line:   [facility] [phone] or [phone]) (toll-free)   Preferred Urgent Care Preferred Urgent Care: [facility], [phone]   Preferred Hospital     [facility]acy [facility] - [facility] [address] [facility] [address] [place] [address] [facility] [place]     Behavioral Health Crisis Line [facility], [phone] or 911     My Care Team Members  Patient Care Team       Relationship Specialty Notifications Start End    Jia Castro MD PCP - General Family Practice  4/21/14     Phone: [phone] Fax: [phone]         [facility] [address] [facility] [address]    Jia Castro MD PCP Family Practice All results, Admissions 4/21/14     Phone: [phone] Fax: [phone]         [facility]  [address] [facility] [address]    Emi Morales MD MD Urology  7/6/16     Phone: [phone] Fax: [phone]         [facility] [place] [address] [facility] [address]    Luis Cabrera MD Resident Radiology  8/3/16     Phone: [phone] Fax: [phone]         [address] [facility] [address]    Bri Morales MD MD Urology  8/3/16     Phone: [phone] Fax: [phone]         [address] [facility] [address]        My Medical and Care Information  Problem List   Patient Active Problem List   Diagnosis     Osteopenia     Hayfever     Hypertension, goal below 150/90     Polyarthritis, inflammatory (H)     GERD (gastroesophageal reflux disease)     Hyperlipidemia with target LDL less than 130     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     High risk medication use     Nodule of kidney     Microscopic hematuria     Positive H. pylori test     Pterygium of right eye     Sicca syndrome (H)     Angiomyolipoma of right kidney      Current Medications and Allergies:  See printed Medication Report

## 2017-06-21 DIAGNOSIS — Z11.59 NEED FOR HEPATITIS C SCREENING TEST: ICD-10-CM

## 2017-06-21 DIAGNOSIS — E78.5 HYPERLIPIDEMIA WITH TARGET LDL LESS THAN 130: ICD-10-CM

## 2017-06-21 LAB
CHOLEST SERPL-MCNC: 231 MG/DL
HCV AB SERPL QL IA: NORMAL
HDLC SERPL-MCNC: 53 MG/DL
LDLC SERPL CALC-MCNC: 143 MG/DL
NONHDLC SERPL-MCNC: 178 MG/DL
TRIGL SERPL-MCNC: 174 MG/DL

## 2017-06-21 PROCEDURE — 86803 HEPATITIS C AB TEST: CPT | Performed by: FAMILY MEDICINE

## 2017-06-21 PROCEDURE — G0472 HEP C SCREEN HIGH RISK/OTHER: HCPCS | Performed by: FAMILY MEDICINE

## 2017-06-21 PROCEDURE — 80061 LIPID PANEL: CPT | Performed by: FAMILY MEDICINE

## 2017-06-21 PROCEDURE — 36415 COLL VENOUS BLD VENIPUNCTURE: CPT | Performed by: FAMILY MEDICINE

## 2017-06-22 NOTE — PROGRESS NOTES
Dear Noreen    Your test results are attached. I am happy to let you know that they are stable.    The test for hepatitis C was normal. The cholesterol is staying the same and is not too high.    Please contact me by [employer] if you have any questions about your labs or management.    Jia Castro MD

## 2017-06-28 DIAGNOSIS — K21.00 GASTROESOPHAGEAL REFLUX DISEASE WITH ESOPHAGITIS: ICD-10-CM

## 2017-06-28 DIAGNOSIS — I10 HYPERTENSION GOAL BP (BLOOD PRESSURE) < 150/90: ICD-10-CM

## 2017-06-28 DIAGNOSIS — E78.2 MIXED HYPERLIPIDEMIA: ICD-10-CM

## 2017-06-28 NOTE — TELEPHONE ENCOUNTER
omeprazole (PRILOSEC) 40 MG capsule    WOULD LIKE A 90 DAY SUPPLY FOR THIS  Last Written Prescription Date: 11/04/16  Last Fill Quantity: 30,  # refills: 6   Last Office Visit with [facility], [facility] or [facility] prescribing provider: 06/19/17                                           Next 5 appointments (look out 90 days)     Jul 17, 2017 10:00 AM CDT   [facility]   [facility]    [address] [place] [address] [phone]

## 2017-06-28 NOTE — TELEPHONE ENCOUNTER
metoprolol (LOPRESSOR) 25 MG tablet      Last Written Prescription Date: 4/4/17  Last Fill Quantity: 90, # refills: 0    Last Office Visit with [facility] or [facility] prescribing provider:  6/19/17   Future Office Visit:    Next 5 appointments (look out 90 days)     Jul 17, 2017 10:00 AM CDT   Return Visit with Emi Morales MD   [facility] ([facility])    [address] [facility] [id number] [phone]                    BP Readings from Last 3 Encounters:   06/19/17 119/75   04/14/17 105/70   04/05/17 127/80         losartan-hydrochlorothiazide (HYZAAR) 50-12.5 MG per tablet      Last Written Prescription Date: 4/4/17  Last Fill Quantity: 90, # refills: 0  Last Office Visit with [facility] or [facility] prescribing provider: 6/19/17  Next 5 appointments (look out 90 days)     Jul 17, 2017 10:00 AM CDT   Return Visit with Emi Morales MD   [facility] ([facility])    [address] [facility] [id number] [phone]                   Potassium   Date Value Ref Range Status   12/23/2016 3.9 3.4 - 5.3 mmol/L Final     Creatinine   Date Value Ref Range Status   02/24/2017 0.65 0.57 - 1.11 mg/dL Final     BP Readings from Last 3 Encounters:   06/19/17 119/75   04/14/17 105/70   04/05/17 127/80     pravastatin (PRAVACHOL) 20 MG tablet     Last Written Prescription Date: 4/4/17  Last Fill Quantity: 90, # refills: 0  Last Office Visit with [facility], [facility] or [facility] prescribing provider: 6/19/17  Next 5 appointments (look out 90 days)     Jul 17, 2017 10:00 AM CDT   Return Visit with Emi Morales MD   [facility] ([facility])    [address] [facility] [id number] [phone]                   Lab Results   Component Value Date    CHOL 231 06/21/2017     Lab Results   Component Value Date    HDL 53 06/21/2017     Lab Results   Component Value Date     06/21/2017     Lab  Results   Component Value Date    TRIG 174 06/21/2017     Lab Results   Component Value Date    CHOLHDLRATIO 5.5 02/09/2015           Rik Faarax  [facility]

## 2017-07-06 ENCOUNTER — CARE COORDINATION (OUTPATIENT)
Dept: CARE COORDINATION | Facility: CLINIC | Age: 67
End: 2017-07-06

## 2017-07-06 RX ORDER — PRAVASTATIN SODIUM 20 MG
TABLET ORAL
Qty: 90 TABLET | Refills: 1 | Status: SHIPPED | OUTPATIENT
Start: 2017-07-06 | End: 2017-12-15

## 2017-07-06 RX ORDER — LOSARTAN POTASSIUM AND HYDROCHLOROTHIAZIDE 12.5; 5 MG/1; MG/1
TABLET ORAL
Qty: 90 TABLET | Refills: 1 | Status: SHIPPED | OUTPATIENT
Start: 2017-07-06 | End: 2017-12-14

## 2017-07-06 RX ORDER — OMEPRAZOLE 40 MG/1
40 CAPSULE, DELAYED RELEASE ORAL DAILY PRN
Qty: 90 CAPSULE | Refills: 1 | Status: SHIPPED | OUTPATIENT
Start: 2017-07-06 | End: 2017-12-14

## 2017-07-06 RX ORDER — METOPROLOL TARTRATE 25 MG/1
TABLET, FILM COATED ORAL
Qty: 90 TABLET | Refills: 1 | Status: SHIPPED | OUTPATIENT
Start: 2017-07-06 | End: 2017-12-14

## 2017-07-06 NOTE — PROGRESS NOTES
Clinic Care Coordination Contact  Care Team Conversations    RN CC received a phone call from patient's daughter Betty stating patient is out of all of her medications.  RN CC informed Tram that writer would look into this for patient, as no consent to communicate with patient's daughter is on file.  RN CC called Sami after noting that several of patient's medications were refilled today.  Sami informed writer that all refills were received and patient has no other pending refills.    Melissa Behl BSN, RN, N  [facility] Care Coordinator  [phone]  [email or web address]

## 2017-07-17 ENCOUNTER — OFFICE VISIT (OUTPATIENT)
Dept: UROLOGY | Facility: CLINIC | Age: 67
End: 2017-07-17
Payer: MEDICARE

## 2017-07-17 VITALS — HEART RATE: 79 BPM | DIASTOLIC BLOOD PRESSURE: 82 MMHG | SYSTOLIC BLOOD PRESSURE: 129 MMHG

## 2017-07-17 DIAGNOSIS — R31.29 MICROSCOPIC HEMATURIA: ICD-10-CM

## 2017-07-17 DIAGNOSIS — D17.71 ANGIOMYOLIPOMA OF RIGHT KIDNEY: Primary | ICD-10-CM

## 2017-07-17 LAB
ALBUMIN UR-MCNC: 10 MG/DL
APPEARANCE UR: CLEAR
BACTERIA #/AREA URNS HPF: ABNORMAL /HPF
BILIRUB UR QL STRIP: NEGATIVE
COLOR UR AUTO: YELLOW
GLUCOSE UR STRIP-MCNC: NEGATIVE MG/DL
HGB UR QL STRIP: NEGATIVE
KETONES UR STRIP-MCNC: NEGATIVE MG/DL
LEUKOCYTE ESTERASE UR QL STRIP: ABNORMAL
MUCOUS THREADS #/AREA URNS LPF: PRESENT /LPF
NITRATE UR QL: NEGATIVE
NON-SQ EPI CELLS #/AREA URNS LPF: ABNORMAL /LPF
PH UR STRIP: 7 PH (ref 5–7)
RBC #/AREA URNS AUTO: ABNORMAL /HPF (ref 0–2)
SP GR UR STRIP: 1.02 (ref 1–1.03)
URN SPEC COLLECT METH UR: ABNORMAL
UROBILINOGEN UR STRIP-MCNC: NORMAL MG/DL (ref 0–2)
WBC #/AREA URNS AUTO: ABNORMAL /HPF (ref 0–2)

## 2017-07-17 PROCEDURE — 51798 US URINE CAPACITY MEASURE: CPT | Performed by: UROLOGY

## 2017-07-17 PROCEDURE — 99213 OFFICE O/P EST LOW 20 MIN: CPT | Mod: 25 | Performed by: UROLOGY

## 2017-07-17 PROCEDURE — 81001 URINALYSIS AUTO W/SCOPE: CPT | Performed by: UROLOGY

## 2017-07-17 ASSESSMENT — PAIN SCALES - GENERAL: PAINLEVEL: NO PAIN (0)

## 2017-07-17 NOTE — NURSING NOTE
"Noreen Florence's goals for this visit include:   Chief Complaint   Patient presents with     RECHECK     hematuria and angiomyolipoma     She requests these members of her care team be copied on today's visit information: YES - PCP     Initial /82 (BP Location: Left arm, Patient Position: Chair, Cuff Size: Adult Regular)  Pulse 79 Estimated body mass index is 20.55 kg/(m^2) as calculated from the following:    Height as of 6/19/17: 1.6 m (5' 3\").    Weight as of 6/19/17: 52.6 kg (116 lb).  BP completed using cuff size: regular    post void residual - 0cc        "

## 2017-07-17 NOTE — PROGRESS NOTES
Reason for Visit:  F/u on microhematuria and review US    Clinical Data: Ms. Noreen Florence is a 66 year old female with a hx of microhematuria with negative w/u.  She was found to have a very small AML (1.2 cm).  She was seen by IR and they recommended yearly u/s to review.  She has been doing well and denies any gross hematuria.    Cytology7/16:  NEM    CT urogram 6/15/16:  Impression:  1. 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 This  abuts the renal collecting system and may be the source of  microhematuria although significant bleeding is uncommon in lesions of  this size. No other abnormality in the urinary collecting system.    2. Other incidental findings as above.    Cystoscopy 6/27/16: Normal.    Renal U/S 1/9/17:  IMPRESSION:  1.  Stable 11 mm angiomyolipoma in the right kidney.    UA today with 10 protein and trace LE but negative for blood.  /82 (BP Location: Left arm, Patient Position: Chair, Cuff Size: Adult Regular)  Pulse 79      A/P:  67 year old female with microhematuria and negative w/u except for a small 1.2 cm AML, This is unchanged since June of 2016.  Her UA and BP remain normal.  We discussed continued observation of this lesion, it is fairly small and patient agrees with this  -cc urine cytology from today.  -f/u in 6 months with urine cytology and UA, bp check, and repeat renal U/S.       Thank you for allowing me to participate in the care of  Ms. Noreen Florence and I will keep you updated on her progress.    Emi Morales MD  15 min spent with patient,  >50% in discussion and coordination of care.

## 2017-07-17 NOTE — MR AVS SNAPSHOT
After Visit Summary   7/17/2017    Noreen Florence    MRN: [id number]           Patient Information     Date Of Birth          1950        Visit Information        Provider Department      7/17/2017 10:00 AM Emi Morales MD [facility]        Today's Diagnoses     Angiomyolipoma of right kidney    -  1    Microscopic hematuria           Follow-ups after your visit        Follow-up notes from your care team     Return in about 6 months (around 1/17/2018) for for renal us.      Your next 10 appointments already scheduled     Oct 30, 2017 12:30 PM CDT   Return Visit with Shai Levin MD,  OPH NURSE ONLY   [facility] ([facility])    [address] [place] [address] [phone]            Jan 29, 2018  1:00 PM CST   US RENAL COMPLETE with MG NIMO US TECH   [facility] ([facility])    [address] [place] [address] [phone]           Please bring a list of your medicines (including vitamins, minerals and over-the-counter drugs). Also, tell your doctor about any allergies you may have. Wear comfortable clothes and leave your valuables at home.  You do not need to do anything special to prepare for your exam.  Please call the Imaging Department at your exam site with any questions.            Jan 29, 2018  1:30 PM CST   Return Visit with Emi Morales MD   [facility] ([facility])    [address] [place] [address] [phone]              Future tests that were ordered for you today     Open Future Orders        Priority Expected Expires Ordered    US Renal Complete [[id number]] Routine  7/17/2018 7/17/2017            Who to contact     If you have questions or need follow up information about today's clinic visit or your schedule please contact [facility] directly at [phone].  Normal or  non-critical lab and imaging results will be communicated to you by Telepathyhart, letter or phone within 4 business days after the clinic has received the results. If you do not hear from us within 7 days, please contact the clinic through Attunityt or phone. If you have a critical or abnormal lab result, we will notify you by phone as soon as possible.  Submit refill requests through [employer] or call your pharmacy and they will forward the refill request to us. Please allow 3 business days for your refill to be completed.          Additional Information About Your Visit        [employer] Information     [employer] gives you secure access to your electronic health record. If you see a primary care provider, you can also send messages to your care team and make appointments. If you have questions, please call your primary care clinic.  If you do not have a primary care provider, please call [phone] and they will assist you.      [employer] is an electronic gateway that provides easy, online access to your medical records. With [employer], you can request a clinic appointment, read your test results, renew a prescription or communicate with your care team.     To access your existing account, please contact your [facility] or call [phone] for assistance.        Care EveryWhere ID     This is your Care EveryWhere ID. This could be used by other organizations to access your [facility] medical records  [id number]        Your Vitals Were     Pulse                   79            Blood Pressure from Last 3 Encounters:   07/17/17 129/82   06/19/17 119/75   04/14/17 105/70    Weight from Last 3 Encounters:   06/19/17 52.6 kg (116 lb)   04/14/17 52.2 kg (115 lb)   03/29/17 51.7 kg (114 lb)              We Performed the Following     MEASURE POST-VOID RESIDUAL URINE/BLADDER CAPACITY, US NON-IMAGING     UA reflex to Microscopic and Culture     Urine Microscopic        Primary Care Provider Office Phone #  Fax #    Jia Ashli Castro [phone]       [facility] [address]  [facility] [address]        Equal Access to Services     MILTON WALKER: Hadii mariana de la rosa hadzeldao Soomaali, waaxda luqadaha, qaybta kaalmada adeegyada, waxsaray sanchezjon zuñigapaulo acuna carlo walker. So [facility] [phone].    ATENCIÓN: Si habla español, tiene a barker disposición servicios gratuitos de asistencia lingüística. Llame al [phone].    We comply with applicable federal civil rights laws and [place] laws. We do not discriminate on the basis of race, color, national origin, age, disability sex, sexual orientation or gender identity.            Thank you!     Thank you for choosing [facility]  for your care. Our goal is always to provide you with excellent care. Hearing back from our patients is one way we can continue to improve our services. Please take a few minutes to complete the written survey that you may receive in the mail after your visit with us. Thank you!             Your Updated Medication List - Protect others around you: Learn how to safely use, store and throw away your medicines at www.disposemymeds.org.          This list is accurate as of: 7/17/17 10:30 AM.  Always use your most recent med list.                   Brand Name Dispense Instructions for use Diagnosis    calcium-vitamin D 600-400 MG-UNIT per tablet    calcium 600 + D    60 tablet    Take 1 tablet by mouth 2 times daily    Osteopenia       cetirizine 10 MG tablet    zyrTEC    30 tablet    TAKE 1 TABLET BY MOUTH DAILY    Chronic rhinitis       chlorhexidine 0.12 % solution    PERIDEX    473 mL    Swish and spit 15 mLs in mouth 2 times daily    Gingivitis       fluticasone 50 MCG/ACT spray    FLONASE    1 Package    Spray 1-2 sprays into both nostrils daily    Hayfever       leucovorin 5 MG tablet    WELLCOVORIN     TK 1 T PO 12 H AFTER METHOTREXATE ONE TIME EACH WEEK        losartan-hydrochlorothiazide 50-12.5  MG per tablet    HYZAAR    90 tablet    TAKE 1 TABLET BY MOUTH DAILY    Hypertension goal BP (blood pressure) < 150/90       methotrexate 2.5 MG tablet     1 tablet    Take 6 tablets (15 mg) by mouth once a week    Polyarthritis, inflammatory (H)       metoprolol 25 MG tablet    LOPRESSOR    90 tablet    TAKE 1 TABLET(25 MG) BY MOUTH AT BEDTIME    Hypertension goal BP (blood pressure) < 150/90       omeprazole 40 MG capsule    priLOSEC    90 capsule    Take 1 capsule (40 mg) by mouth daily as needed Take 30-60 minutes before a meal.    Gastroesophageal reflux disease with esophagitis       pravastatin 20 MG tablet    PRAVACHOL    90 tablet    TAKE 1 TABLET(20 MG) BY MOUTH DAILY    Mixed hyperlipidemia       ranitidine 300 MG tablet    ZANTAC    30 tablet    TAKE 1 TABLET(300 MG) BY MOUTH AT BEDTIME    Abdominal pain, epigastric, Gastroesophageal reflux disease with esophagitis       triamcinolone 0.1 % cream    KENALOG    80 g    APPLY TOPICALLY TWICE DAILY    Dermatitis

## 2017-07-18 NOTE — PROGRESS NOTES
Dear Noreen    Your test results are attached. I am happy to let you know that they are stable.    Please contact me by [employer] if you have any questions about your labs or management.    Jia Castro MD

## 2017-08-15 ENCOUNTER — TRANSFERRED RECORDS (OUTPATIENT)
Dept: HEALTH INFORMATION MANAGEMENT | Facility: CLINIC | Age: 67
End: 2017-08-15

## 2017-10-02 ENCOUNTER — OFFICE VISIT (OUTPATIENT)
Dept: FAMILY MEDICINE | Facility: CLINIC | Age: 67
End: 2017-10-02
Payer: MEDICARE

## 2017-10-02 VITALS
HEIGHT: 63 IN | OXYGEN SATURATION: 97 % | BODY MASS INDEX: 20.73 KG/M2 | SYSTOLIC BLOOD PRESSURE: 124 MMHG | TEMPERATURE: 98.6 F | DIASTOLIC BLOOD PRESSURE: 76 MMHG | WEIGHT: 117 LBS | HEART RATE: 84 BPM

## 2017-10-02 DIAGNOSIS — Z13.5 SCREENING FOR DIABETIC RETINOPATHY: ICD-10-CM

## 2017-10-02 DIAGNOSIS — Z23 NEED FOR PROPHYLACTIC VACCINATION AND INOCULATION AGAINST INFLUENZA: ICD-10-CM

## 2017-10-02 DIAGNOSIS — E78.5 HYPERLIPIDEMIA WITH TARGET LDL LESS THAN 130: ICD-10-CM

## 2017-10-02 DIAGNOSIS — I10 HYPERTENSION, GOAL BELOW 150/90: Primary | ICD-10-CM

## 2017-10-02 DIAGNOSIS — M35.00 SICCA SYNDROME (H): ICD-10-CM

## 2017-10-02 DIAGNOSIS — R25.2 CRAMP OF LIMB: ICD-10-CM

## 2017-10-02 DIAGNOSIS — M06.4 POLYARTHRITIS, INFLAMMATORY (H): ICD-10-CM

## 2017-10-02 LAB
ALBUMIN SERPL-MCNC: 4.1 G/DL (ref 3.4–5)
ANION GAP SERPL CALCULATED.3IONS-SCNC: 8 MMOL/L (ref 3–14)
BUN SERPL-MCNC: 11 MG/DL (ref 7–30)
CALCIUM SERPL-MCNC: 9.2 MG/DL (ref 8.5–10.1)
CHLORIDE SERPL-SCNC: 100 MMOL/L (ref 94–109)
CO2 SERPL-SCNC: 29 MMOL/L (ref 20–32)
CREAT SERPL-MCNC: 0.61 MG/DL (ref 0.52–1.04)
ERYTHROCYTE [DISTWIDTH] IN BLOOD BY AUTOMATED COUNT: 14.9 % (ref 10–15)
GFR SERPL CREATININE-BSD FRML MDRD: >90 ML/MIN/1.7M2
GLUCOSE SERPL-MCNC: 115 MG/DL (ref 70–99)
HCT VFR BLD AUTO: 43.5 % (ref 35–47)
HGB BLD-MCNC: 14.6 G/DL (ref 11.7–15.7)
MCH RBC QN AUTO: 29.1 PG (ref 26.5–33)
MCHC RBC AUTO-ENTMCNC: 33.6 G/DL (ref 31.5–36.5)
MCV RBC AUTO: 87 FL (ref 78–100)
PHOSPHATE SERPL-MCNC: 3.5 MG/DL (ref 2.5–4.5)
PLATELET # BLD AUTO: 246 10E9/L (ref 150–450)
POTASSIUM SERPL-SCNC: 3.5 MMOL/L (ref 3.4–5.3)
RBC # BLD AUTO: 5.01 10E12/L (ref 3.8–5.2)
SODIUM SERPL-SCNC: 137 MMOL/L (ref 133–144)
WBC # BLD AUTO: 7.2 10E9/L (ref 4–11)

## 2017-10-02 PROCEDURE — 85027 COMPLETE CBC AUTOMATED: CPT | Performed by: FAMILY MEDICINE

## 2017-10-02 PROCEDURE — 99214 OFFICE O/P EST MOD 30 MIN: CPT | Mod: 25 | Performed by: FAMILY MEDICINE

## 2017-10-02 PROCEDURE — 80069 RENAL FUNCTION PANEL: CPT | Performed by: FAMILY MEDICINE

## 2017-10-02 PROCEDURE — 90662 IIV NO PRSV INCREASED AG IM: CPT | Performed by: FAMILY MEDICINE

## 2017-10-02 PROCEDURE — G0008 ADMIN INFLUENZA VIRUS VAC: HCPCS | Performed by: FAMILY MEDICINE

## 2017-10-02 PROCEDURE — 36415 COLL VENOUS BLD VENIPUNCTURE: CPT | Performed by: FAMILY MEDICINE

## 2017-10-02 ASSESSMENT — PAIN SCALES - GENERAL: PAINLEVEL: NO PAIN (0)

## 2017-10-02 NOTE — PATIENT INSTRUCTIONS
How to contact your care team: [phone] Pharmacy [phone]   MD NICOLE HILL PA-C CHRIS JONES, PA-C NAM HO, MD JONATHAN BATES, MD ARVIN VOCAL, MD    Clinic hours M-Th 7am-7pm Fri 7am-5pm.   Urgent care M-F 11am-9pm  Sat/Sun 9am-5pm.   Pharmacy   Mon-Th:  8:00am-8pm   Fri:  8:00am-6:00pm  Sat/Sun  8:00am-5:00 pm       GERD (Adult)    The esophagus is a tube that carries food from the mouth to the stomach. A valve at the lower end of the esophagus prevents stomach acid from flowing upward. When this valve doesn't work properly, stomach contents may repeatedly flow back up (reflux) into the esophagus. This is called gastroesophageal reflux disease (GERD). GERD can irritate the esophagus. It can cause problems with swallowing or breathing. In severe cases, GERD can cause recurrent pneumonia or other serious problems.  Symptoms of reflux include burning, pressure or sharp pain in the upper abdomen or mid to lower chest. The pain can spread to the neck, back, or shoulder. There may be belching, an acid taste in the back of the throat, chronic cough, or sore throat or hoarseness. GERD symptoms often occur during the day after a big meal. They can also occur at night when lying down.   Home care  Lifestyle changes can help reduce symptoms. If needed, medicines may be prescribed. Symptoms often improve with treatment, but if treatment is stopped, the symptoms often return after a few months. So most persons with GERD will need to continue treatment.  Lifestyle changes    Limit or avoid fatty, fried, and spicy foods, as well as coffee, chocolate, mint, and foods with high acid content such as tomatoes and citrus fruit and juices (orange, grapefruit, lemon).    Don t eat large meals, especially at night. Frequent, smaller meals are best. Do not lie down right after eating. And don t eat anything 3 hours before going to bed.    Avoid drinking alcohol and smoking. As much as possible, stay away  "from second hand smoke.    If you are overweight, losing weight will reduce symptoms.     Avoid wearing tight clothing around your stomach area.    If your symptoms occur during sleep, use a foam wedge to elevate your upper body (not just your head.) Or, place 4\" blocks under the head of your bed.  Medicines  If needed, medicines can help relieve the symptoms of GERD and prevent damage to the esophagus. Discuss a medicine plan with your healthcare provider. This may include one or more of the following medicines:    Antacids to help neutralize the normal acids in your stomach.    Acid blockers (H2 blockers) to decrease acid production.    Acid inhibitors (PPIs) to decrease acid production in a different way than the blockers. They may work better, but can take a little longer to take effect.  Take an antacid 30-60 minutes after eating and at bedtime, but not at the same time as an acid blocker.  Try not to take medicines such as ibuprofen and aspirin. If you are taking aspirin for your heart or other medical reasons, talk to your healthcare provider about stopping it.  Follow-up care  Follow up with your healthcare provider or as advised by our staff.  When to seek medical advice  Call your healthcare provider if any of the following occur:    Stomach pain gets worse or moves to the lower right abdomen (appendix area)    Chest pain appears or gets worse, or spreads to the back, neck, shoulder, or arm    Frequent vomiting (can t keep down liquids)    Blood in the stool or vomit (red or black in color)    Feeling weak or dizzy    Fever of 100.4 F (38 C) or higher, or as directed by your healthcare provider  Date Last Reviewed: 6/23/2015 2000-2017 [employer]. [address] [place] [address], [place] [address]. All rights reserved. This information is not intended as a substitute for professional medical care. Always follow your healthcare professional's instructions.        "

## 2017-10-02 NOTE — PROGRESS NOTES
SUBJECTIVE:   Noreen Florence is a 67 year old female who presents to clinic today for the following health issues:    Headache- not concerned about headache as much as need for follow up eye exam and whether this will be covered by insurance. Needs annual exam for elevated risk for glaucoma.   Onset: 1week    Description:   Location: bilateral in the frontal area   Character: dull pain  Frequency:  Every few days  Duration:  2days    Intensity: mild    Progression of Symptoms:  Same, intermittent    Accompanying Signs & Symptoms:  Stiff neck: no   Neck or upper back pain: no   Fever: no   Sinus pressure: no   Nausea or vomiting: no   Dizziness: no   Numbness: no   Weakness: no   Visual changes: no     History:   Head trauma: no   Family history of migraines: no   Previous tests for headaches: no   Neurologist evaluations: no   Able to do daily activities: YES  Wake with a headaches: no   Do headaches wake you up: no   Daily pain medication use: no   Work/school stressors/changes: no     Precipitating factors:   Does light make it worse: no   Does sound make it worse: no     Alleviating factors:  Does sleep help: yes    Therapies Tried and outcome: None    Joint Pain leg cramps more frequent and not helped by calcium supplements.     Onset: Persistent    Description:   Location: Right knee and calf pain. Worse after right knee injection. Calcium did help.   Character: Denies pain. Hx sharp pain.    Intensity: moderate    Progression of Symptoms: same    Accompanying Signs & Symptoms:  Other symptoms: weakness of right knee    History:   Previous similar pain: YES      Precipitating factors:   Trauma or overuse: no    Alleviating factors:  Improved by: walking    Therapies Tried and outcome: Currently seeing specialist every 6months, walking      GERD symptoms with spicy foods or eating too much.       Hypertension Follow-up      Outpatient blood pressures are not being checked.    Low Salt Diet: no added  salt          Problem list and histories reviewed & adjusted, as indicated.  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 pylori test     Pterygium of right eye     Sicca syndrome (H)     Angiomyolipoma of right kidney     Past Surgical History:   Procedure Laterality Date     COLONOSCOPY       COLONOSCOPY WITH CO2 INSUFFLATION N/A 4/5/2017    Procedure: COLONOSCOPY WITH CO2 INSUFFLATION;  Surgeon: Duane, William Charles, MD;  Location: [facility]     COMBINED REPAIR PTOSIS WITH BLEPHAROPLASTY BILATERAL Bilateral 1/6/2017    Procedure: COMBINED REPAIR PTOSIS WITH BLEPHAROPLASTY BILATERAL;  Surgeon: Carly Norman MD;  Location: [facility]     ESOPHAGOSCOPY, GASTROSCOPY, DUODENOSCOPY (EGD), COMBINED N/A 1/5/2016    Procedure: COMBINED ESOPHAGOSCOPY, GASTROSCOPY, DUODENOSCOPY (EGD), BIOPSY SINGLE OR MULTIPLE;  Surgeon: Duane, William Charles, MD;  Location: [facility]     REPAIR ENTROPION BILATERAL Bilateral 1/6/2017    Procedure: REPAIR ENTROPION BILATERAL;  Surgeon: Carly Norman MD;  Location: [facility]     REPAIR PTOSIS Bilateral 01/2017       Social History   Substance Use Topics     Smoking status: Never Smoker     Smokeless tobacco: Never Used     Alcohol use No     Family History   Problem Relation Age of Onset     DIABETES Mother      CANCER No family hx of      Hypertension No family hx of      CEREBROVASCULAR DISEASE No family hx of      Thyroid Disease No family hx of      Glaucoma No family hx of      Macular Degeneration No family hx of          Current Outpatient Prescriptions   Medication Sig Dispense Refill     metoprolol (LOPRESSOR) 25 MG tablet  TAKE 1 TABLET(25 MG) BY MOUTH AT BEDTIME 90 tablet 1     losartan-hydrochlorothiazide (HYZAAR) 50-12.5 MG per tablet TAKE 1 TABLET BY MOUTH DAILY 90 tablet 1     pravastatin (PRAVACHOL) 20 MG tablet TAKE 1 TABLET(20 MG) BY MOUTH DAILY 90 tablet 1     omeprazole (PRILOSEC) 40 MG capsule Take 1 capsule (40 mg) by mouth daily as needed Take 30-60 minutes before a meal. 90 capsule 1     triamcinolone (KENALOG) 0.1 % cream APPLY TOPICALLY TWICE DAILY 80 g 3     calcium-vitamin D (CALCIUM 600 + D) 600-400 MG-UNIT per tablet Take 1 tablet by mouth 2 times daily 60 tablet 11     leucovorin (WELLCOVORIN) 5 MG tablet TK 1 T PO 12 H AFTER METHOTREXATE ONE TIME EACH WEEK  3     methotrexate 2.5 MG tablet Take 6 tablets (15 mg) by mouth once a week 1 tablet 0     chlorhexidine (CHLORHEXIDINE) 0.12 % solution Swish and spit 15 mLs in mouth 2 times daily 473 mL 11     ranitidine (ZANTAC) 300 MG tablet TAKE 1 TABLET(300 MG) BY MOUTH AT BEDTIME 30 tablet 0     cetirizine (ZYRTEC) 10 MG tablet TAKE 1 TABLET BY MOUTH DAILY 30 tablet 11     fluticasone (FLONASE) 50 MCG/ACT nasal spray Spray 1-2 sprays into both nostrils daily 1 Package 11     No Known Allergies  Recent Labs   Lab Test  06/21/17   0850 05/25/17 02/24/17 12/23/16   1152 11/30/16  10/27/16   0939   03/03/16   0856   02/09/15   0755   A1C   --    --    --    --    --   5.9   --    --    --    --    LDL  143*   --    --    --    --    --    --   130*   --   120   HDL  53   --    --    --    --    --    --   41*   --   42*   TRIG  174*   --    --    --    --    --    --   184*   --   337*   ALT   --   33  27   --   40   --    < >   --    < >   --    CR   --   0.66  0.65  0.51*  0.66  0.55   < >   --    < >  0.47*   GFRESTIMATED   --   >60  >60  >90  Non  GFR Calc    >60  >90  Non  GFR Calc     < >   --    < >  >90  Non  GFR Calc     GFRESTBLACK   --   >60  >60  >90  African American GFR Calc    >60  >90   GFR  "Calc     < >   --    < >  >90   GFR Calc     POTASSIUM   --    --    --   3.9   --   3.7   < >   --    < >  3.5    < > = values in this interval not displayed.      BP Readings from Last 3 Encounters:   10/02/17 124/76   07/17/17 129/82   06/19/17 119/75    Wt Readings from Last 3 Encounters:   10/02/17 117 lb (53.1 kg)   06/19/17 116 lb (52.6 kg)   04/14/17 115 lb (52.2 kg)                  Labs reviewed in EPIC          Reviewed and updated as needed this visit by clinical staffAllergies       Reviewed and updated as needed this visit by Provider         ROS:  Constitutional, HEENT, cardiovascular, pulmonary, gi and gu systems are negative, except as otherwise noted.      OBJECTIVE:   /76 (BP Location: Right arm, Patient Position: Chair, Cuff Size: Adult Regular)  Pulse 84  Temp 98.6  F (37  C) (Oral)  Ht 5' 3\" (1.6 m)  Wt 117 lb (53.1 kg)  SpO2 97%  Breastfeeding? No  BMI 20.73 kg/m2  Body mass index is 20.73 kg/(m^2).  GENERAL APPEARANCE: healthy, alert and no distress  EYES: Eyes grossly normal to inspection, PERRL and conjunctivae and sclerae normal  HENT: ear canals and TM's normal, nose and mouth without ulcers or lesions, oropharynx clear and oral mucous membranes moist  NECK: no adenopathy, no asymmetry, masses, or scars and thyroid normal to palpation  RESP: lungs clear to auscultation - no rales, rhonchi or wheezes  CV: regular rates and rhythm, normal S1 S2, no S3 or S4, no murmur, click or rub, no peripheral edema and peripheral pulses strong  ABDOMEN: soft, nontender, no hepatosplenomegaly, no masses and bowel sounds normal  MS: no musculoskeletal defects are noted and gait is age appropriate without ataxia  SKIN: no suspicious lesions or rashes  NEURO: Normal strength and tone, sensory exam grossly normal, mentation intact and speech normal  PSYCH: mentation appears normal and affect normal/bright but anxious.     Diagnostic Test Results:  Results for orders placed or " performed in visit on 10/02/17   Renal panel   Result Value Ref Range    Sodium 137 133 - 144 mmol/L    Potassium 3.5 3.4 - 5.3 mmol/L    Chloride 100 94 - 109 mmol/L    Carbon Dioxide 29 20 - 32 mmol/L    Anion Gap 8 3 - 14 mmol/L    Glucose 115 (H) 70 - 99 mg/dL    Urea Nitrogen 11 7 - 30 mg/dL    Creatinine 0.61 0.52 - 1.04 mg/dL    GFR Estimate >90 >60 mL/min/1.7m2    GFR Estimate If Black >90 >60 mL/min/1.7m2    Calcium 9.2 8.5 - 10.1 mg/dL    Phosphorus 3.5 2.5 - 4.5 mg/dL    Albumin 4.1 3.4 - 5.0 g/dL   CBC with platelets   Result Value Ref Range    WBC 7.2 4.0 - 11.0 10e9/L    RBC Count 5.01 3.8 - 5.2 10e12/L    Hemoglobin 14.6 11.7 - 15.7 g/dL    Hematocrit 43.5 35.0 - 47.0 %    MCV 87 78 - 100 fl    MCH 29.1 26.5 - 33.0 pg    MCHC 33.6 31.5 - 36.5 g/dL    RDW 14.9 10.0 - 15.0 %    Platelet Count 246 150 - 450 10e9/L       ASSESSMENT/PLAN:               ICD-10-CM    1. Hypertension, goal below 150/90 I10 Renal panel     CBC with platelets   2. Cramp of limb R25.2 Exam normal and no cause found not typical of claudication or restless legs. Discussed stretching and exercise. Recheck labs.    3. Polyarthritis, inflammatory (H) M06.4 Follow up with rheumatology   4. Hyperlipidemia with target LDL less than 130 E78.5 stable   5. Sicca syndrome (H) M35.00 Dry mouth symptoms stable   6. Screening for diabetic retinopathy Z13.5 Due for yearly eye exam after November 9th.    7. Need for prophylactic vaccination and inoculation against influenza Z23 FLU VACCINE, INCREASED ANTIGEN, PRESV FREE, AGE 65+ [85744]     ADMIN INFLUENZA  (For MEDICARE Patients ONLY) []       CONSULTATION/REFERRAL to vascular or neurology if symptoms do not resolve for further testing.   FUTURE LABS:       - Schedule fasting labs in 6 months  FUTURE APPOINTMENTS:       - Follow-up visit in 2-3 months or sooner if any questions or concerns.   See Patient Instructions    Jia Castro MD  [facility]

## 2017-10-02 NOTE — MR AVS SNAPSHOT
After Visit Summary   10/2/2017    Noreen Florence    MRN: [id number]           Patient Information     Date Of Birth          1950        Visit Information        Provider Department      10/2/2017 4:00 PM Jia Castro MD [facility]        Today's Diagnoses     Hypertension, goal below 150/90    -  1    Cramp of limb        Polyarthritis, inflammatory (H)        Hyperlipidemia with target LDL less than 130        Sicca syndrome (H)        Screening for diabetic retinopathy        Need for prophylactic vaccination and inoculation against influenza          Care Instructions    How to contact your care team: [phone] Pharmacy [phone]   MD NICOLE HILL PA-C CHRIS JONES, PA-C NAM HO, MD JONATHAN BATES, MD ARVIN VOCAL, MD    Clinic hours M-Th 7am-7pm Fri 7am-5pm.   Urgent care M-F 11am-9pm  Sat/Sun 9am-5pm.   Pharmacy   Mon-Th:  8:00am-8pm   Fri:  8:00am-6:00pm  Sat/Sun  8:00am-5:00 pm       GERD (Adult)    The esophagus is a tube that carries food from the mouth to the stomach. A valve at the lower end of the esophagus prevents stomach acid from flowing upward. When this valve doesn't work properly, stomach contents may repeatedly flow back up (reflux) into the esophagus. This is called gastroesophageal reflux disease (GERD). GERD can irritate the esophagus. It can cause problems with swallowing or breathing. In severe cases, GERD can cause recurrent pneumonia or other serious problems.  Symptoms of reflux include burning, pressure or sharp pain in the upper abdomen or mid to lower chest. The pain can spread to the neck, back, or shoulder. There may be belching, an acid taste in the back of the throat, chronic cough, or sore throat or hoarseness. GERD symptoms often occur during the day after a big meal. They can also occur at night when lying down.   Home care  Lifestyle changes can help reduce symptoms. If needed, medicines may be  "prescribed. Symptoms often improve with treatment, but if treatment is stopped, the symptoms often return after a few months. So most persons with GERD will need to continue treatment.  Lifestyle changes    Limit or avoid fatty, fried, and spicy foods, as well as coffee, chocolate, mint, and foods with high acid content such as tomatoes and citrus fruit and juices (orange, grapefruit, lemon).    Don t eat large meals, especially at night. Frequent, smaller meals are best. Do not lie down right after eating. And don t eat anything 3 hours before going to bed.    Avoid drinking alcohol and smoking. As much as possible, stay away from second hand smoke.    If you are overweight, losing weight will reduce symptoms.     Avoid wearing tight clothing around your stomach area.    If your symptoms occur during sleep, use a foam wedge to elevate your upper body (not just your head.) Or, place 4\" blocks under the head of your bed.  Medicines  If needed, medicines can help relieve the symptoms of GERD and prevent damage to the esophagus. Discuss a medicine plan with your healthcare provider. This may include one or more of the following medicines:    Antacids to help neutralize the normal acids in your stomach.    Acid blockers (H2 blockers) to decrease acid production.    Acid inhibitors (PPIs) to decrease acid production in a different way than the blockers. They may work better, but can take a little longer to take effect.  Take an antacid 30-60 minutes after eating and at bedtime, but not at the same time as an acid blocker.  Try not to take medicines such as ibuprofen and aspirin. If you are taking aspirin for your heart or other medical reasons, talk to your healthcare provider about stopping it.  Follow-up care  Follow up with your healthcare provider or as advised by our staff.  When to seek medical advice  Call your healthcare provider if any of the following occur:    Stomach pain gets worse or moves to the lower " right abdomen (appendix area)    Chest pain appears or gets worse, or spreads to the back, neck, shoulder, or arm    Frequent vomiting (can t keep down liquids)    Blood in the stool or vomit (red or black in color)    Feeling weak or dizzy    Fever of 100.4 F (38 C) or higher, or as directed by your healthcare provider  Date Last Reviewed: 6/23/2015 2000-2017 [employer]. [address] [place] [address], [place] [address]. All rights reserved. This information is not intended as a substitute for professional medical care. Always follow your healthcare professional's instructions.                Follow-ups after your visit        Follow-up notes from your care team     Return in about 2 months (around 12/2/2017) for medication follow up, recheck.      Your next 10 appointments already scheduled     Oct 30, 2017 12:30 PM CDT   Return Visit with Shai Levin MD, [facility] NURSE ONLY   [facility]    [address] [place] [id number] [phone]            Jan 29, 2018  1:00 PM CST   US RENAL COMPLETE with MGUS1, MG US TECH   [facility] ([facility])    [address] [place] [id number] [phone]           Please bring a list of your medicines (including vitamins, minerals and over-the-counter drugs). Also, tell your doctor about any allergies you may have. Wear comfortable clothes and leave your valuables at home.  You do not need to do anything special to prepare for your exam.  Please call the Imaging Department at your exam site with any questions.            Jan 29, 2018  1:30 PM CST   Return Visit with Emi Morales MD   [facility] ([facility])    [id number] [place] [address] [phone]              Who to contact     If you have questions or need follow up information about today's clinic visit or your schedule please  "contact [facility] directly at [phone].  Normal or non-critical lab and imaging results will be communicated to you by MyChart, letter or phone within 4 business days after the clinic has received the results. If you do not hear from us within 7 days, please contact the clinic through MyChart or phone. If you have a critical or abnormal lab result, we will notify you by phone as soon as possible.  Submit refill requests through [employer] or call your pharmacy and they will forward the refill request to us. Please allow 3 business days for your refill to be completed.          Additional Information About Your Visit        [facility] Information     [employer] gives you secure access to your electronic health record. If you see a primary care provider, you can also send messages to your care team and make appointments. If you have questions, please call your primary care clinic.  If you do not have a primary care provider, please call [phone] and they will assist you.        Care EveryWhere ID     This is your Care EveryWhere ID. This could be used by other organizations to access your [facility] medical records  [id number]        Your Vitals Were     Pulse Temperature Height Pulse Oximetry Breastfeeding? BMI (Body Mass Index)    84 98.6  F (37  C) (Oral) 5' 3\" (1.6 m) 97% No 20.73 kg/m2       Blood Pressure from Last 3 Encounters:   10/02/17 124/76   07/17/17 129/82   06/19/17 119/75    Weight from Last 3 Encounters:   10/02/17 117 lb (53.1 kg)   06/19/17 116 lb (52.6 kg)   04/14/17 115 lb (52.2 kg)              We Performed the Following     ADMIN INFLUENZA  (For MEDICARE Patients ONLY) []     CBC with platelets     FLU VACCINE, INCREASED ANTIGEN, PRESV FREE, AGE 65+ [67927]     Renal panel        Primary Care Provider Office Phone # Fax #    Jia Castro [phone]       [address]  [facility] [address]        Equal Access to Services     MILTON JOAQUIN AH: " Hadii mariana garcia Soyeseniaali, waaxda luqadaha, qaybta kaalmada tom, axel raniin hayaajon zuñigapaulo acuna laanabellajon denise. [facility] [phone].    ATENCIÓN: Si deana rodriguez, tiene a barker disposición servicios gratuitos de asistencia lingüística. Parvez al [phone].    We comply with applicable federal civil rights laws and [place] laws. We do not discriminate on the basis of race, color, national origin, age, disability, sex, sexual orientation, or gender identity.            Thank you!     Thank you for choosing [facility]  for your care. Our goal is always to provide you with excellent care. Hearing back from our patients is one way we can continue to improve our services. Please take a few minutes to complete the written survey that you may receive in the mail after your visit with us. Thank you!             Your Updated Medication List - Protect others around you: Learn how to safely use, store and throw away your medicines at www.disposemymeds.org.          This list is accurate as of: 10/2/17  7:54 PM.  Always use your most recent med list.                   Brand Name Dispense Instructions for use Diagnosis    calcium-vitamin D 600-400 MG-UNIT per tablet    calcium 600 + D    60 tablet    Take 1 tablet by mouth 2 times daily    Osteopenia       cetirizine 10 MG tablet    zyrTEC    30 tablet    TAKE 1 TABLET BY MOUTH DAILY    Chronic rhinitis       chlorhexidine 0.12 % solution    PERIDEX    473 mL    Swish and spit 15 mLs in mouth 2 times daily    Gingivitis       fluticasone 50 MCG/ACT spray    FLONASE    1 Package    Spray 1-2 sprays into both nostrils daily    Hayfever       leucovorin 5 MG tablet    WELLCOVORIN     TK 1 T PO 12 H AFTER METHOTREXATE ONE TIME EACH WEEK        losartan-hydrochlorothiazide 50-12.5 MG per tablet    HYZAAR    90 tablet    TAKE 1 TABLET BY MOUTH DAILY    Hypertension goal BP (blood pressure) < 150/90       methotrexate 2.5 MG tablet     1 tablet    Take 6  tablets (15 mg) by mouth once a week    Polyarthritis, inflammatory (H)       metoprolol 25 MG tablet    LOPRESSOR    90 tablet    TAKE 1 TABLET(25 MG) BY MOUTH AT BEDTIME    Hypertension goal BP (blood pressure) < 150/90       omeprazole 40 MG capsule    priLOSEC    90 capsule    Take 1 capsule (40 mg) by mouth daily as needed Take 30-60 minutes before a meal.    Gastroesophageal reflux disease with esophagitis       pravastatin 20 MG tablet    PRAVACHOL    90 tablet    TAKE 1 TABLET(20 MG) BY MOUTH DAILY    Mixed hyperlipidemia       ranitidine 300 MG tablet    ZANTAC    30 tablet    TAKE 1 TABLET(300 MG) BY MOUTH AT BEDTIME    Abdominal pain, epigastric, Gastroesophageal reflux disease with esophagitis       triamcinolone 0.1 % cream    KENALOG    80 g    APPLY TOPICALLY TWICE DAILY    Dermatitis

## 2017-10-02 NOTE — NURSING NOTE

## 2017-10-02 NOTE — NURSING NOTE
"Chief Complaint   Patient presents with     Knee Pain     Right knee       Initial /76 (BP Location: Right arm, Patient Position: Chair, Cuff Size: Adult Regular)  Pulse 84  Temp 98.6  F (37  C) (Oral)  Ht 5' 3\" (1.6 m)  Wt 117 lb (53.1 kg)  SpO2 97%  Breastfeeding? No  BMI 20.73 kg/m2 Estimated body mass index is 20.73 kg/(m^2) as calculated from the following:    Height as of this encounter: 5' 3\" (1.6 m).    Weight as of this encounter: 117 lb (53.1 kg).  Medication Reconciliation: complete     Tim Montgomery CMA    "

## 2017-10-03 NOTE — PROGRESS NOTES
Dear Noreen    Your test results are attached. I am happy to let you know that they are stable.    The blood sugar is normal and you do not have diabetes. You do have pre-diabetes and we can recheck in 1 year. Please remember to take your vitamin D 1000 units a day. Stretching gently can help also. If you would like a referral to physical therapy let me know.     Please contact me by [facility] if you have any questions about your labs or management.    Jia Castro MD

## 2017-10-30 ENCOUNTER — OFFICE VISIT (OUTPATIENT)
Dept: OPHTHALMOLOGY | Facility: CLINIC | Age: 67
End: 2017-10-30
Payer: MEDICARE

## 2017-10-30 DIAGNOSIS — H52.4 PRESBYOPIA: ICD-10-CM

## 2017-10-30 DIAGNOSIS — H52.03 HYPERMETROPIA OF BOTH EYES: ICD-10-CM

## 2017-10-30 DIAGNOSIS — H25.13 AGE-RELATED NUCLEAR CATARACT OF BOTH EYES: ICD-10-CM

## 2017-10-30 DIAGNOSIS — H40.003 GLAUCOMA SUSPECT OF BOTH EYES: Primary | ICD-10-CM

## 2017-10-30 DIAGNOSIS — H02.055 TRICHIASIS OF LEFT LOWER EYELID: ICD-10-CM

## 2017-10-30 PROCEDURE — 92014 COMPRE OPH EXAM EST PT 1/>: CPT | Performed by: OPHTHALMOLOGY

## 2017-10-30 PROCEDURE — 92015 DETERMINE REFRACTIVE STATE: CPT | Mod: GY | Performed by: OPHTHALMOLOGY

## 2017-10-30 PROCEDURE — 76514 ECHO EXAM OF EYE THICKNESS: CPT | Performed by: OPHTHALMOLOGY

## 2017-10-30 PROCEDURE — 92083 EXTENDED VISUAL FIELD XM: CPT | Performed by: OPHTHALMOLOGY

## 2017-10-30 PROCEDURE — 92133 CPTRZD OPH DX IMG PST SGM ON: CPT | Performed by: OPHTHALMOLOGY

## 2017-10-30 RX ORDER — LATANOPROST 50 UG/ML
1 SOLUTION/ DROPS OPHTHALMIC AT BEDTIME
Qty: 2.5 ML | Refills: 8 | Status: SHIPPED | OUTPATIENT
Start: 2017-10-30 | End: 2018-05-21

## 2017-10-30 ASSESSMENT — PACHYMETRY
OD_CT(UM): 526
OS_CT(UM): 533

## 2017-10-30 ASSESSMENT — VISUAL ACUITY
OS_SC: 20/20
METHOD: SNELLEN - LINEAR
OD_SC+: -1
OS_SC+: -2
OD_SC: 20/70
OS_SC: 20/60
OD_SC: 20/20

## 2017-10-30 ASSESSMENT — TONOMETRY
IOP_METHOD: TONOPEN
OS_IOP_MMHG: 16
OS_IOP_MMHG: 14
OD_IOP_MMHG: 17
OD_IOP_MMHG: 14

## 2017-10-30 ASSESSMENT — REFRACTION_MANIFEST
OS_SPHERE: +0.25
OD_SPHERE: +0.25
OS_AXIS: 045
OD_ADD: +2.50
OS_ADD: +2.50
OD_AXIS: 075
OS_CYLINDER: +0.25
OD_CYLINDER: +0.25

## 2017-10-30 ASSESSMENT — EXTERNAL EXAM - LEFT EYE: OS_EXAM: NORMAL

## 2017-10-30 ASSESSMENT — CUP TO DISC RATIO
OS_RATIO: 0.6
OD_RATIO: 0.6

## 2017-10-30 ASSESSMENT — CONF VISUAL FIELD: COMMENTS: HVF DONE TODAY

## 2017-10-30 ASSESSMENT — EXTERNAL EXAM - RIGHT EYE: OD_EXAM: NORMAL

## 2017-10-30 NOTE — NURSING NOTE
Patient presents with:  Glaucoma Suspect Evaluation: Referred by Dr. Mcmahon.  Pt currently denies any glaucoma gtts.       Referring Provider:  No referring provider defined for this encounter.    HPI    Last Eye Exam:  9/21/16   Additional Referring Providers:  Dr. Mcmahon   Informant(s):  [facility]   Affected eye(s):  Both   Symptoms:     Tearing   Dryness   Itching      Frequency:  Daily       Do you have eye pain now?:  No      Comments:  Does use AT ([employer] )TID OU.  Does have some watering and itching.  Gtts make it feel better. No VA changes.

## 2017-10-30 NOTE — MR AVS SNAPSHOT
After Visit Summary   10/30/2017    Noreen Florence    MRN: [id number]           Patient Information     Date Of Birth          1950        Visit Information        Provider Department      10/30/2017 12:30 PM Shai Levin MD; MG OPHTH NURSE ONLY [facility]        Today's Diagnoses     Glaucoma suspect of both eyes    -  1    Hypermetropia of both eyes        Presbyopia        Age-related nuclear cataract of both eyes        Trichiasis of left lower eyelid           Follow-ups after your visit        Your next 10 appointments already scheduled     Jan 29, 2018  1:00 PM CST   US RENAL COMPLETE with MGUS1, MG US TECH   [facility] ([facility]    [address] [address] [place] [id number] [phone]           Please bring a list of your medicines (including vitamins, minerals and over-the-counter drugs). Also, tell your doctor about any allergies you may have. Wear comfortable clothes and leave your valuables at home.  You do not need to do anything special to prepare for your exam.  Please call the Imaging Department at your exam site with any questions.            Jan 29, 2018  1:30 PM CST   Return Visit with Emi Morales MD   [facility] ([facility]    [address] [place] [id number] [phone]            Apr 30, 2018  1:15 PM CDT   Return Visit with Shai Levin MD, MG OPHTH NURSE ONLY   [facility] ([facility])    [address] [place] [phone] [phone]              Who to contact     If you have questions or need follow up information about today's clinic visit or your schedule please contact [facility] directly at [phone].  Normal or non-critical lab and imaging results will be communicated to you by MyChart, letter or phone within 4 business days after the clinic has received the  results. If you do not hear from us within 7 days, please contact the clinic through [facility] or phone. If you have a critical or abnormal lab result, we will notify you by phone as soon as possible.  Submit refill requests through [facility] or call your pharmacy and they will forward the refill request to us. Please allow 3 business days for your refill to be completed.          Additional Information About Your Visit        [employer] Information     [facility] gives you secure access to your electronic health record. If you see a primary care provider, you can also send messages to your care team and make appointments. If you have questions, please call your primary care clinic.  If you do not have a primary care provider, please call [phone] and they will assist you.      [facility] is an electronic gateway that provides easy, online access to your medical records. With [facility], you can request a clinic appointment, read your test results, renew a prescription or communicate with your care team.     To access your existing account, please contact your [facility] or call [phone] for assistance.        Care EveryWhere ID     This is your Care EveryWhere ID. This could be used by other organizations to access your [place] medical records  [id number]         Blood Pressure from Last 3 Encounters:   10/02/17 124/76   07/17/17 129/82   06/19/17 119/75    Weight from Last 3 Encounters:   10/02/17 53.1 kg (117 lb)   06/19/17 52.6 kg (116 lb)   04/14/17 52.2 kg (115 lb)              We Performed the Following     HVF 24-2 OU     OCT Optic Nerve RNFL Optovue OU (both eyes)        Primary Care Provider Office Phone # Fax #    Jia Ashli Castro [phone]       [address]  [facility] [address]        Equal Access to Services     MILTON JOAQUIN : Shandra Walker, waaxda luqadaha, qaybta kaalmaamairani santos, axel walker. So  [facility] [phone].    ATENCIÓN: Si deana rodriguez, tiene a barker disposición servicios gratuitos de asistencia lingüística. Parvez mansfield [phone].    We comply with applicable federal civil rights laws and [place] laws. We do not discriminate on the basis of race, color, national origin, age, disability, sex, sexual orientation, or gender identity.            Thank you!     Thank you for choosing [facility]  for your care. Our goal is always to provide you with excellent care. Hearing back from our patients is one way we can continue to improve our services. Please take a few minutes to complete the written survey that you may receive in the mail after your visit with us. Thank you!             Your Updated Medication List - Protect others around you: Learn how to safely use, store and throw away your medicines at www.disposemymeds.org.          This list is accurate as of: 10/30/17  1:27 PM.  Always use your most recent med list.                   Brand Name Dispense Instructions for use Diagnosis    calcium-vitamin D 600-400 MG-UNIT per tablet    calcium 600 + D    60 tablet    Take 1 tablet by mouth 2 times daily    Osteopenia       cetirizine 10 MG tablet    zyrTEC    30 tablet    TAKE 1 TABLET BY MOUTH DAILY    Chronic rhinitis       chlorhexidine 0.12 % solution    PERIDEX    473 mL    Swish and spit 15 mLs in mouth 2 times daily    Gingivitis       fluticasone 50 MCG/ACT spray    FLONASE    1 Package    Spray 1-2 sprays into both nostrils daily    Hayfever       leucovorin 5 MG tablet    WELLCOVORIN     TK 1 T PO 12 H AFTER METHOTREXATE ONE TIME EACH WEEK        losartan-hydrochlorothiazide 50-12.5 MG per tablet    HYZAAR    90 tablet    TAKE 1 TABLET BY MOUTH DAILY    Hypertension goal BP (blood pressure) < 150/90       methotrexate 2.5 MG tablet     1 tablet    Take 6 tablets (15 mg) by mouth once a week    Polyarthritis, inflammatory (H)       metoprolol 25 MG tablet    LOPRESSOR    90  tablet    TAKE 1 TABLET(25 MG) BY MOUTH AT BEDTIME    Hypertension goal BP (blood pressure) < 150/90       omeprazole 40 MG capsule    priLOSEC    90 capsule    Take 1 capsule (40 mg) by mouth daily as needed Take 30-60 minutes before a meal.    Gastroesophageal reflux disease with esophagitis       pravastatin 20 MG tablet    PRAVACHOL    90 tablet    TAKE 1 TABLET(20 MG) BY MOUTH DAILY    Mixed hyperlipidemia       ranitidine 300 MG tablet    ZANTAC    30 tablet    TAKE 1 TABLET(300 MG) BY MOUTH AT BEDTIME    Abdominal pain, epigastric, Gastroesophageal reflux disease with esophagitis       triamcinolone 0.1 % cream    KENALOG    80 g    APPLY TOPICALLY TWICE DAILY    Dermatitis

## 2017-10-30 NOTE — PROGRESS NOTES
Assessment & Plan   Noreen Florence is a 67 year old female who presents with:   Review of systems for the eyes was negative other than the pertinent positives and negatives noted in the HPI.  History is obtained from the patient.    Glaucoma suspect of both eyes  - OCT Optic Nerve RNFL Optovue OU (both eyes)  - HVF 24-2 OU  - US OPHTHALMIC DIAG, PACHYMETRY  - latanoprost (XALATAN) 0.005 % ophthalmic solution; Place 1 drop into both eyes At Bedtime    Hypermetropia of both eyes    Presbyopia  - No change in RX indicated  - REFRACTION    Age-related nuclear cataract of both eyes  - Not visually significant, continue to observe    Trichiasis of left lower eyelid  - lashes removed at slit lamp    Return in 6 mo for pressure check only      Documentation for today's encounter was performed by Angie Lepe COA. OSC. Acting as a scribe in my presence. I have reviewed and verified that it is an accurate recording of today's encounter.    Attending Physician Attestation:  Complete documentation of historical and exam elements from today's encounter can be found in the full encounter summary report (not reduplicated in this progress note).  I personally obtained the chief complaint(s) and history of present illness.  I confirmed and edited as necessary the review of systems, past medical/surgical history, family history, social history, and examination findings as documented by others; and I examined the patient myself.  I personally reviewed the relevant tests, images, and reports as documented above.  I formulated and edited as necessary the assessment and plan and discussed the findings and management plan with the patient and family. - Shai Levin MD

## 2017-11-03 ENCOUNTER — OFFICE VISIT (OUTPATIENT)
Dept: FAMILY MEDICINE | Facility: CLINIC | Age: 67
End: 2017-11-03
Payer: MEDICARE

## 2017-11-03 VITALS
OXYGEN SATURATION: 95 % | DIASTOLIC BLOOD PRESSURE: 80 MMHG | WEIGHT: 116 LBS | HEART RATE: 70 BPM | BODY MASS INDEX: 20.55 KG/M2 | TEMPERATURE: 97.9 F | HEIGHT: 63 IN | SYSTOLIC BLOOD PRESSURE: 134 MMHG

## 2017-11-03 DIAGNOSIS — M54.42 ACUTE LEFT-SIDED LOW BACK PAIN WITH LEFT-SIDED SCIATICA: Primary | ICD-10-CM

## 2017-11-03 DIAGNOSIS — M62.830 BACK MUSCLE SPASM: ICD-10-CM

## 2017-11-03 DIAGNOSIS — K64.4 EXTERNAL HEMORRHOIDS: ICD-10-CM

## 2017-11-03 DIAGNOSIS — Z79.899 HIGH RISK MEDICATION USE: ICD-10-CM

## 2017-11-03 DIAGNOSIS — M06.4 POLYARTHRITIS, INFLAMMATORY (H): ICD-10-CM

## 2017-11-03 PROCEDURE — 99214 OFFICE O/P EST MOD 30 MIN: CPT | Performed by: FAMILY MEDICINE

## 2017-11-03 ASSESSMENT — PAIN SCALES - GENERAL: PAINLEVEL: MODERATE PAIN (5)

## 2017-11-03 NOTE — PATIENT INSTRUCTIONS
"At [facility], we strive to deliver an exceptional experience to you, every time we see you.  If you receive a survey in the mail, please send us back your thoughts. We really do value your feedback.    Based on your medical history, these are the current health maintenance/preventive care services that you are due for (some may have been done at this visit.)  Health Maintenance Due   Topic Date Due     ADVANCE DIRECTIVE PLANNING Q5 YRS  02/04/2007         Suggested websites for health information:  Www.Visualase.org : Up to date and easily searchable information on multiple topics.  Www.Pop.it.gov : medication info, interactive tutorials, watch real surgeries online  Www.familydoctor.org : good info from the Academy of Family Physicians  Www.cdc.gov : public health info, travel advisories, epidemics (H1N1)  Www.aap.org : children's health info, normal development, vaccinations  Www.health.[facility].[place].us : [place] dept of health, public health issues in [place], N1N1    Your care team:                            Family Medicine Internal Medicine   MD Alfredo Morgan MD Shantel Branch-Fleming, MD Katya Georgiev PA-C Nam Ho, MD Pediatrics   Jose Bryson, PAKIRAN Holman, CNP Julianna Burton APRN CNP   MD Naila Champion MD Deborah Mielke, MD Kim Thein, APRN CNP      Clinic hours: Monday - Thursday 7 am-7 pm; Fridays 7 am-5 pm.   Urgent care: Monday - Friday 11 am-9 pm; Saturday and Sunday 9 am-5 pm.  Pharmacy : Monday -Thursday 8 am-8 pm; Friday 8 am-6 pm; Saturday and Sunday 9 am-5 pm.     Clinic: [phone]   Pharmacy: [phone]      * Sciatica    Sciatica (\"Lumbar Radiculopathy\") causes a pain that spreads from the lower back down into the buttock, hip and leg. Sometimes leg pain can occur without any back pain. Sciatica is due to irritation or pressure on a spinal nerve as it comes out of the spinal canal. This is most often due to a bulge or " rupture of a nearby spinal disk (the cartilage cushion between each spinal bone), which presses on a nearby nerve. Other causes include spinal stenosis (narrowing of the spinal canal) and spasm of the piriformis muscle (a muscle in the buttocks that the sciatic nerve passes through).  Sciatica may begin after a sudden twisting/bending force (such as in a car accident), or sometimes after a simple awkward movement. In either case, muscle spasm is commonly present and contributes to the pain.  The diagnosis of sciatica is made from the symptoms and physical exam. Unless you had a physical injury (such as a car accident or fall), X-rays are usually not ordered for the initial evaluation of sciatica because the nerves and disks cannot be seen on an X-ray. Most sciatica (80-90%) gets better with time.  What can I do about my low back pain?  There are three main things you can do to ease low back pain and help it go away.    Use heat or cold packs.    Take medicine as directed.    Use positions, movements and exercises. Stay active! Too much rest can make your symptoms worse.  Using heat or cold packs  Try cold packs or gentle heat to ease your pain. Use whichever gives the most relief. Apply the cold pack or heat for 15 minutes at a time, as often as needed.  Taking medicine  If taking over-the-counter medicine:    Take ibuprofen (Advil, Motrin) 600 mg. three times a day as needed for pain.  OR    Take Aleve (naproxen sodium) 220 to 440 mg. two times a day as needed for pain  If your doctor prescribed a muscle relaxant (cyclobenzapine 10 mg.):    Take one half ( ) to 1 tablet at bedtime    Do not drive when taking this medicine. This drug may make you sleepy.  Using positions, movements and exercises  Research tells us that moving your joints and muscles can help you recover from back pain. Such activity should be simple and gentle.  Use the positions below as well as walking to help relieve your discomfort. Try taking a  short walk every 3 to 4 hours during the day. Walk for a few minutes inside your home or take longer walks outside, on a treadmill or at a mall. Slowly increase the amount of time you walk. Expect discomfort when you begin, but it should lessen as your back starts to recover.  Finding a position that is comfortable  When your back pain is new, you may find that certain positions will ease your pain. Gently try each of the following positions until you find one that eases your pain. Once you find a position of comfort, use it as often as you like while you recover. Return to your daily routine as soon as possible.     Lie on your back with your legs bent. You can do this by placing a pillow under your knees or lie on the floor and rest your lower legs on the seat of a chair.    Lie on your side with your knees bent and place a pillow between your knees.    Lie on your stomach over pillows.  When should I call my doctor?  Your back pain should improve over the first couple of weeks. As it improves, you should be able to return to your normal activities. But call your doctor if:    You have a sudden change in your ability to control? your bladder or bowels.    You begin to feel tingling in your groin or legs.    The pain spreads down your leg and into your foot.    Your toes, feet or leg muscles begin to feel weak.    You feel generally unwell or sick.    Your pain gets worse.    6517-3483 [employer]. [address] [place] [address]. All rights reserved. This information is not intended as a substitute for professional medical care. Always follow your healthcare professional's instructions.  This information has been modified by your health care provider with permission from the publisher.        Understanding Lumbar Radiculopathy    Lumbar radiculopathy is irritation or inflammation of a nerve root in the low back. It causes symptoms that spread out from the back down one or both legs. To  understand this condition, it helps to understand the parts of the spine:    Vertebrae. These are bones that stack to form the spine. The lumbar spine contains the 5 bottom vertebrae.    Disks. These are soft pads of tissue between the vertebrae. They act as shock absorbers for the spine.    Spinal canal. This is a tunnel formed within the stacked vertebrae. In the lumbar spine, nerves run through this canal.    Nerves. These branch off and leave the spinal canal, traveling out to parts of the body. As they leave the spinal canal, nerves pass through openings between the vertebrae. The nerve root is the part of the nerve that is closest to the spinal canal.    Sciatic nerve. This is a large nerve formed from several nerve roots in the low back. This nerve extends down the back of the leg to the foot.  With lumbar radiculopathy, nerve roots in the low back become irritated. This leads to pain and symptoms. The sciatic nerve is commonly involved, so the condition is often called sciatica.  What causes lumbar radiculopathy?  Aging, injury, poor posture, extra body weight, and other issues can lead to problems in the low back. These problems may then irritate nerve roots. They include:    Damage to a disk in the lumbar spine. The damaged disk may then press on nearby nerve roots.    Degeneration from wear and tear, and aging. This can lead to narrowing (stenosis) of the openings between the vertebrae. The narrowed openings press on nerve roots as they leave the spinal canal.    Unstable spine. This is when a vertebra slips forward. It can then press on a nerve root.  Other, less common things can put pressure on nerves in the low back. These include diabetes, infection, or a tumor.  Symptoms of lumbar radiculopathy  These include:    Pain in the low back    Pain, numbness, tingling, or weakness that travels into the buttocks, hip, groin, or leg    Muscle spasms  Treatment for lumbar radiculopathy  In most cases, your  healthcare provider will first try treatments that help relieve symptoms. These may include:    Prescription and over-the-counter pain medicines. These help relieve pain, swelling, and irritation.    Limits on positions and activities that increase pain. But lying in bed or avoiding all movement is only recommended for a short period of time.    Physical therapy, including exercises and stretches. This helps decrease pain and increase movement and function.    Steroid shots into the lower back. This may help relieve symptoms for a time.    Weight-loss program. If you are overweight, losing extra pounds may help relieve symptoms.  In some cases, you may need surgery to fix the underlying problem. This depends on the cause, the symptoms, and how long the pain has lasted.  Possible complications  Over time, an irritated and inflamed nerve may become damaged. This may lead to long-lasting (permanent) numbness or weakness in your legs and feet. If symptoms change suddenly or get worse, be sure to let your healthcare provider know.  When to call your healthcare provider  Call your healthcare provider right away if you have any of these:    New pain or pain that gets worse    New or increasing weakness, tingling, or numbness in your leg or foot    Problems controlling your bladder or bowel   Date Last Reviewed: 3/10/2016    4456-0622 [employer]. [address] [place] [address], [place] [address]. All rights reserved. This information is not intended as a substitute for professional medical care. Always follow your healthcare professional's instructions.        Back Exercises: Lower Back Rotation    To start, lie on your back with your knees bent and feet flat on the floor. Don t press your neck or lower back to the floor. Breathe deeply. You should feel comfortable and relaxed in this position.    Drop both knees to one side. Turn your head to the other side. Keep your shoulders flat on the floor.    Do not push  through pain.    Hold for 20 seconds.    Slowly switch sides.    Repeat 2 to 5 times.  Date Last Reviewed: 10/11/2015    0605-6810 [employer]. [address] [place] [address]. All rights reserved. This information is not intended as a substitute for professional medical care. Always follow your healthcare professional's instructions.        Back Exercises: Pelvic Tilt    To start, lie on your back with your knees bent and feet flat on the floor. Don t press your neck or lower back to the floor. Breathe deeply. You should feel comfortable and relaxed in this position:    Tighten your stomach and buttocks, and press your lower back toward the floor. This should be a small, subtle movement. This should not increase your pain.    Hold for 5 to 15 seconds. Release.    Repeat 2 to 5 times.  Date Last Reviewed: 10/11/2015    5752-0596 The "[employer]. [address] [place] [address]. All rights reserved. This information is not intended as a substitute for professional medical care. Always follow your healthcare professional's instructions.        Back Exercises: Back Press    Do this exercise on your hands and knees. Keep your knees under your hips and your hands under your shoulders. Keep your spine in a neutral position (not arched or sagging). Be sure to maintain your neck s natural curve:    Tighten your stomach and buttock muscles to press your back upward. Let your head drop slightly.    Hold for 5 seconds. Return to starting position.    Repeat 5 times.  Date Last Reviewed: 10/11/2015    9257-5310 [employer]. [address] [place] [address]. All rights reserved. This information is not intended as a substitute for professional medical care. Always follow your healthcare professional's instructions.        Back Exercises: Back Release  Do this exercise on your hands and knees. Keep your knees under your hips and your hands under your shoulders.         Relax your abdominal and buttocks muscles, lift your head, and let your back sag. Be sure to keep your weight evenly distributed. Don t sit back on your hips.     Hold for 5 seconds.    Return to starting position.    Tuck your head and lift (arch) your back.    Hold for 5 seconds    Return to starting position.    Repeat 5 times.  Date Last Reviewed: 8/16/2015 2000-2017 [employer]. [address] [place] [address], [place] [address]. All rights reserved. This information is not intended as a substitute for professional medical care. Always follow your healthcare professional's instructions.

## 2017-11-03 NOTE — MR AVS SNAPSHOT
After Visit Summary   11/3/2017    Noreen Florence    MRN: [id number]           Patient Information     Date Of Birth          1950        Visit Information        Provider Department      11/3/2017 10:40 AM Jia Castro MD [facility]        Today's Diagnoses     Acute left-sided low back pain with left-sided sciatica    -  1    Polyarthritis, inflammatory (H)        Back muscle spasm          Care Instructions    At [facility], we strive to deliver an exceptional experience to you, every time we see you.  If you receive a survey in the mail, please send us back your thoughts. We really do value your feedback.    Based on your medical history, these are the current health maintenance/preventive care services that you are due for (some may have been done at this visit.)  Health Maintenance Due   Topic Date Due     ADVANCE DIRECTIVE PLANNING Q5 YRS  02/04/2007         Suggested websites for health information:  Www.[employer].Pictorama : Up to date and easily searchable information on multiple topics.  Www.medlineplus.gov : medication info, interactive tutorials, watch real surgeries online  Www.familydoctor.org : good info from the Academy of Family Physicians  Www.cdc.gov : public health info, travel advisories, epidemics (H1N1)  Www.aap.org : children's health info, normal development, vaccinations  Www.health.state.[place].us : [place] dept of health, public health issues in [place], N1N1    Your care team:                            Family Medicine Internal Medicine   MD Alfredo Morgan MD Shantel Branch-Fleming, MD Katya Georgiev PA-C Nam Ho, MD Pediatrics   MOSES Alberts, PHIL Burton APRN MD Naila Herrera MD Deborah Mielke, MD Kim Thein, APRN CNP      Clinic hours: Monday - Thursday 7 am-7 pm; Fridays 7 am-5 pm.   Urgent care: Monday - Friday 11 am-9 pm; Saturday and Sunday 9 am-5 pm.  Pharmacy :  "Monday -Thursday 8 am-8 pm; Friday 8 am-6 pm; Saturday and Sunday 9 am-5 pm.     Clinic: [phone]   Pharmacy: [phone]      * Sciatica    Sciatica (\"Lumbar Radiculopathy\") causes a pain that spreads from the lower back down into the buttock, hip and leg. Sometimes leg pain can occur without any back pain. Sciatica is due to irritation or pressure on a spinal nerve as it comes out of the spinal canal. This is most often due to a bulge or rupture of a nearby spinal disk (the cartilage cushion between each spinal bone), which presses on a nearby nerve. Other causes include spinal stenosis (narrowing of the spinal canal) and spasm of the piriformis muscle (a muscle in the buttocks that the sciatic nerve passes through).  Sciatica may begin after a sudden twisting/bending force (such as in a car accident), or sometimes after a simple awkward movement. In either case, muscle spasm is commonly present and contributes to the pain.  The diagnosis of sciatica is made from the symptoms and physical exam. Unless you had a physical injury (such as a car accident or fall), X-rays are usually not ordered for the initial evaluation of sciatica because the nerves and disks cannot be seen on an X-ray. Most sciatica (80-90%) gets better with time.  What can I do about my low back pain?  There are three main things you can do to ease low back pain and help it go away.    Use heat or cold packs.    Take medicine as directed.    Use positions, movements and exercises. Stay active! Too much rest can make your symptoms worse.  Using heat or cold packs  Try cold packs or gentle heat to ease your pain. Use whichever gives the most relief. Apply the cold pack or heat for 15 minutes at a time, as often as needed.  Taking medicine  If taking over-the-counter medicine:    Take ibuprofen (Advil, Motrin) 600 mg. three times a day as needed for pain.  OR    Take Aleve (naproxen sodium) 220 to 440 mg. two times a day as needed for " pain  If your doctor prescribed a muscle relaxant (cyclobenzapine 10 mg.):    Take one half ( ) to 1 tablet at bedtime    Do not drive when taking this medicine. This drug may make you sleepy.  Using positions, movements and exercises  Research tells us that moving your joints and muscles can help you recover from back pain. Such activity should be simple and gentle.  Use the positions below as well as walking to help relieve your discomfort. Try taking a short walk every 3 to 4 hours during the day. Walk for a few minutes inside your home or take longer walks outside, on a treadmill or at a mall. Slowly increase the amount of time you walk. Expect discomfort when you begin, but it should lessen as your back starts to recover.  Finding a position that is comfortable  When your back pain is new, you may find that certain positions will ease your pain. Gently try each of the following positions until you find one that eases your pain. Once you find a position of comfort, use it as often as you like while you recover. Return to your daily routine as soon as possible.     Lie on your back with your legs bent. You can do this by placing a pillow under your knees or lie on the floor and rest your lower legs on the seat of a chair.    Lie on your side with your knees bent and place a pillow between your knees.    Lie on your stomach over pillows.  When should I call my doctor?  Your back pain should improve over the first couple of weeks. As it improves, you should be able to return to your normal activities. But call your doctor if:    You have a sudden change in your ability to control? your bladder or bowels.    You begin to feel tingling in your groin or legs.    The pain spreads down your leg and into your foot.    Your toes, feet or leg muscles begin to feel weak.    You feel generally unwell or sick.    Your pain gets worse.    1403-0098 [employer]. [address] [place] [address], [place] [address]. All  rights reserved. This information is not intended as a substitute for professional medical care. Always follow your healthcare professional's instructions.  This information has been modified by your health care provider with permission from the publisher.        Understanding Lumbar Radiculopathy    Lumbar radiculopathy is irritation or inflammation of a nerve root in the low back. It causes symptoms that spread out from the back down one or both legs. To understand this condition, it helps to understand the parts of the spine:    Vertebrae. These are bones that stack to form the spine. The lumbar spine contains the 5 bottom vertebrae.    Disks. These are soft pads of tissue between the vertebrae. They act as shock absorbers for the spine.    Spinal canal. This is a tunnel formed within the stacked vertebrae. In the lumbar spine, nerves run through this canal.    Nerves. These branch off and leave the spinal canal, traveling out to parts of the body. As they leave the spinal canal, nerves pass through openings between the vertebrae. The nerve root is the part of the nerve that is closest to the spinal canal.    Sciatic nerve. This is a large nerve formed from several nerve roots in the low back. This nerve extends down the back of the leg to the foot.  With lumbar radiculopathy, nerve roots in the low back become irritated. This leads to pain and symptoms. The sciatic nerve is commonly involved, so the condition is often called sciatica.  What causes lumbar radiculopathy?  Aging, injury, poor posture, extra body weight, and other issues can lead to problems in the low back. These problems may then irritate nerve roots. They include:    Damage to a disk in the lumbar spine. The damaged disk may then press on nearby nerve roots.    Degeneration from wear and tear, and aging. This can lead to narrowing (stenosis) of the openings between the vertebrae. The narrowed openings press on nerve roots as they leave the spinal  canal.    Unstable spine. This is when a vertebra slips forward. It can then press on a nerve root.  Other, less common things can put pressure on nerves in the low back. These include diabetes, infection, or a tumor.  Symptoms of lumbar radiculopathy  These include:    Pain in the low back    Pain, numbness, tingling, or weakness that travels into the buttocks, hip, groin, or leg    Muscle spasms  Treatment for lumbar radiculopathy  In most cases, your healthcare provider will first try treatments that help relieve symptoms. These may include:    Prescription and over-the-counter pain medicines. These help relieve pain, swelling, and irritation.    Limits on positions and activities that increase pain. But lying in bed or avoiding all movement is only recommended for a short period of time.    Physical therapy, including exercises and stretches. This helps decrease pain and increase movement and function.    Steroid shots into the lower back. This may help relieve symptoms for a time.    Weight-loss program. If you are overweight, losing extra pounds may help relieve symptoms.  In some cases, you may need surgery to fix the underlying problem. This depends on the cause, the symptoms, and how long the pain has lasted.  Possible complications  Over time, an irritated and inflamed nerve may become damaged. This may lead to long-lasting (permanent) numbness or weakness in your legs and feet. If symptoms change suddenly or get worse, be sure to let your healthcare provider know.  When to call your healthcare provider  Call your healthcare provider right away if you have any of these:    New pain or pain that gets worse    New or increasing weakness, tingling, or numbness in your leg or foot    Problems controlling your bladder or bowel   Date Last Reviewed: 3/10/2016    8181-9710 [employer]. [address] [place] [address], [place] [address]. All rights reserved. This information is not intended as a substitute  for professional medical care. Always follow your healthcare professional's instructions.        Back Exercises: Lower Back Rotation    To start, lie on your back with your knees bent and feet flat on the floor. Don t press your neck or lower back to the floor. Breathe deeply. You should feel comfortable and relaxed in this position.    Drop both knees to one side. Turn your head to the other side. Keep your shoulders flat on the floor.    Do not push through pain.    Hold for 20 seconds.    Slowly switch sides.    Repeat 2 to 5 times.  Date Last Reviewed: 10/11/2015    5793-2033 [employer]. [address] [place] [address]. All rights reserved. This information is not intended as a substitute for professional medical care. Always follow your healthcare professional's instructions.        Back Exercises: Pelvic Tilt    To start, lie on your back with your knees bent and feet flat on the floor. Don t press your neck or lower back to the floor. Breathe deeply. You should feel comfortable and relaxed in this position:    Tighten your stomach and buttocks, and press your lower back toward the floor. This should be a small, subtle movement. This should not increase your pain.    Hold for 5 to 15 seconds. Release.    Repeat 2 to 5 times.  Date Last Reviewed: 10/11/2015    8015-7099 [employer]. [address] [place] [address]. All rights reserved. This information is not intended as a substitute for professional medical care. Always follow your healthcare professional's instructions.        Back Exercises: Back Press    Do this exercise on your hands and knees. Keep your knees under your hips and your hands under your shoulders. Keep your spine in a neutral position (not arched or sagging). Be sure to maintain your neck s natural curve:    Tighten your stomach and buttock muscles to press your back upward. Let your head drop slightly.    Hold for 5 seconds. Return to  starting position.    Repeat 5 times.  Date Last Reviewed: 10/11/2015    8300-0234 [employer]. [address] [place] [address]. All rights reserved. This information is not intended as a substitute for professional medical care. Always follow your healthcare professional's instructions.        Back Exercises: Back Release  Do this exercise on your hands and knees. Keep your knees under your hips and your hands under your shoulders.        Relax your abdominal and buttocks muscles, lift your head, and let your back sag. Be sure to keep your weight evenly distributed. Don t sit back on your hips.     Hold for 5 seconds.    Return to starting position.    Tuck your head and lift (arch) your back.    Hold for 5 seconds    Return to starting position.    Repeat 5 times.  Date Last Reviewed: 8/16/2015 2000-2017 [employer]. [address] [place] [address]. All rights reserved. This information is not intended as a substitute for professional medical care. Always follow your healthcare professional's instructions.                Follow-ups after your visit        Additional Services     [facility] PT, HAND, AND CHIROPRACTIC REFERRAL       **This order will print in the [facility] Scheduling Office**    Physical Therapy, Hand Therapy and Chiropractic Care are available through:    *[facility]  *[facility]  *[facility]    Call one number to schedule at any of the above locations: [phone].    Your provider has referred you to: Physical Therapy at [facility] or [facility]    Indication/Reason for Referral: Low Back Pain  Onset of Illness: 2 weeks  Therapy Orders: Evaluate and Treat  Special Programs: None  Special Request: None    Jaky Starkey      Additional Comments for the Therapist or Chiropractor:     Please be aware that coverage of these services is subject to the terms and limitations of your health insurance plan.  Call member  services at your health plan with any benefit or coverage questions.      Please bring the following to your appointment:    *Your personal calendar for scheduling future appointments  *Comfortable clothing                  Follow-up notes from your care team     Return if symptoms worsen or fail to improve.      Your next 10 appointments already scheduled     Jan 29, 2018  1:00 PM CST   US RENAL COMPLETE with MG NIMO US TECH   [facility] ([facility])    [address] [place] [id number] [phone]           Please bring a list of your medicines (including vitamins, minerals and over-the-counter drugs). Also, tell your doctor about any allergies you may have. Wear comfortable clothes and leave your valuables at home.  You do not need to do anything special to prepare for your exam.  Please call the Imaging Department at your exam site with any questions.            Jan 29, 2018  1:30 PM CST   Return Visit with Emi Morales MD   [facility])    [address] [place] [id number] [phone]            Apr 30, 2018  1:30 PM CDT   Return Visit with Shai Levin MD   [facility])    [address] [place] [id number] [phone]              Who to contact     If you have questions or need follow up information about today's clinic visit or your schedule please contact [facility] directly at [phone].  Normal or non-critical lab and imaging results will be communicated to you by MyChart, letter or phone within 4 business days after the clinic has received the results. If you do not hear from us within 7 days, please contact the clinic through MyChart or phone. If you have a critical or abnormal lab result, we will notify you by phone as soon as possible.  Submit refill requests through [employer] or call  "your pharmacy and they will forward the refill request to us. Please allow 3 business days for your refill to be completed.          Additional Information About Your Visit        [employer] Information     [employer] gives you secure access to your electronic health record. If you see a primary care provider, you can also send messages to your care team and make appointments. If you have questions, please call your primary care clinic.  If you do not have a primary care provider, please call [phone] and they will assist you.        Care EveryWhere ID     This is your Care EveryWhere ID. This could be used by other organizations to access your [place] medical records  [id number]        Your Vitals Were     Pulse Temperature Height Pulse Oximetry Breastfeeding? BMI (Body Mass Index)    70 97.9  F (36.6  C) (Oral) 5' 3\" (1.6 m) 95% No 20.55 kg/m2       Blood Pressure from Last 3 Encounters:   11/03/17 134/80   10/02/17 124/76   07/17/17 129/82    Weight from Last 3 Encounters:   11/03/17 116 lb (52.6 kg)   10/02/17 117 lb (53.1 kg)   06/19/17 116 lb (52.6 kg)              We Performed the Following     [facility] PT, HAND, AND CHIROPRACTIC REFERRAL          Today's Medication Changes          These changes are accurate as of: 11/3/17 11:03 AM.  If you have any questions, ask your nurse or doctor.               Start taking these medicines.        Dose/Directions    tiZANidine 4 MG tablet   Commonly known as:  ZANAFLEX   Used for:  Back muscle spasm   Started by:  Jia Castro MD        Dose:  4 mg   Take 1 tablet (4 mg) by mouth 3 times daily as needed for muscle spasms   Quantity:  60 tablet   Refills:  1            Where to get your medicines      These medications were sent to [facility] [address] [facility], [place] - [address] [facility] [address]  [address] [facility] [address]     Phone:  [phone]     tiZANidine 4 MG tablet                Primary Care " Provider Office Phone # Fax #    Jia Ashli Castro [phone] [address]  [facility] [address]        Equal Access to Services     MILTON JOAQUIN : Hadii mariana ku hadzeldao Soomaali, waaxda luqadaha, qaybta kaalmada adeegyada, axel acuna laNomansean walker. So [facility] [phone].    ATENCIÓN: Si habla español, tiene a barker disposición servicios gratuitos de asistencia lingüística. Llame al [phone].    We comply with applicable federal civil rights laws and [place] laws. We do not discriminate on the basis of race, color, national origin, age, disability, sex, sexual orientation, or gender identity.            Thank you!     Thank you for choosing [facility]  for your care. Our goal is always to provide you with excellent care. Hearing back from our patients is one way we can continue to improve our services. Please take a few minutes to complete the written survey that you may receive in the mail after your visit with us. Thank you!             Your Updated Medication List - Protect others around you: Learn how to safely use, store and throw away your medicines at www.disposemymeds.org.          This list is accurate as of: 11/3/17 11:03 AM.  Always use your most recent med list.                   Brand Name Dispense Instructions for use Diagnosis    calcium-vitamin D 600-400 MG-UNIT per tablet    calcium 600 + D    60 tablet    Take 1 tablet by mouth 2 times daily    Osteopenia       cetirizine 10 MG tablet    zyrTEC    30 tablet    TAKE 1 TABLET BY MOUTH DAILY    Chronic rhinitis       chlorhexidine 0.12 % solution    PERIDEX    473 mL    Swish and spit 15 mLs in mouth 2 times daily    Gingivitis       fluticasone 50 MCG/ACT spray    FLONASE    1 Package    Spray 1-2 sprays into both nostrils daily    Hayfever       latanoprost 0.005 % ophthalmic solution    XALATAN    2.5 mL    Place 1 drop into both eyes At Bedtime    Glaucoma suspect of both eyes        leucovorin 5 MG tablet    WELLCOVORIN     TK 1 T PO 12 H AFTER METHOTREXATE ONE TIME EACH WEEK        losartan-hydrochlorothiazide 50-12.5 MG per tablet    HYZAAR    90 tablet    TAKE 1 TABLET BY MOUTH DAILY    Hypertension goal BP (blood pressure) < 150/90       methotrexate 2.5 MG tablet     1 tablet    Take 6 tablets (15 mg) by mouth once a week    Polyarthritis, inflammatory (H)       metoprolol 25 MG tablet    LOPRESSOR    90 tablet    TAKE 1 TABLET(25 MG) BY MOUTH AT BEDTIME    Hypertension goal BP (blood pressure) < 150/90       omeprazole 40 MG capsule    priLOSEC    90 capsule    Take 1 capsule (40 mg) by mouth daily as needed Take 30-60 minutes before a meal.    Gastroesophageal reflux disease with esophagitis       pravastatin 20 MG tablet    PRAVACHOL    90 tablet    TAKE 1 TABLET(20 MG) BY MOUTH DAILY    Mixed hyperlipidemia       ranitidine 300 MG tablet    ZANTAC    30 tablet    TAKE 1 TABLET(300 MG) BY MOUTH AT BEDTIME    Abdominal pain, epigastric, Gastroesophageal reflux disease with esophagitis       tiZANidine 4 MG tablet    ZANAFLEX    60 tablet    Take 1 tablet (4 mg) by mouth 3 times daily as needed for muscle spasms    Back muscle spasm       triamcinolone 0.1 % cream    KENALOG    80 g    APPLY TOPICALLY TWICE DAILY    Dermatitis

## 2017-11-03 NOTE — PROGRESS NOTES
SUBJECTIVE:   Noreen Florence is a 67 year old female who presents to clinic today for the following health issues:    Joint Pain - Leg pain follow up    Onset: [id number]    Description:   Location: Left leg from hip and low back into lateral upper thigh and buttocks  Character: Dull ache/tightness    Intensity: severe    Progression of Symptoms: worse with new type of pain. Currently 5/10. At night 8-9/10. Yesterday felt good but this morning returned.    Accompanying Signs & Symptoms:  Other symptoms: Legs tired like doesn't want to stand    History:   Previous similar pain: YES      Precipitating factors:   Trauma or overuse: YES- standing. Walking is okay. Sitting is better.    Alleviating factors:  Improved by: rest/inactivity    Therapies Tried and outcome: Rest/sitting          Hypertension Follow-up      Outpatient blood pressures are not being checked.    Low Salt Diet: no added salt    Tapered off prednisone for arthritis and now only on methotrexate.    Needs refill of cream for external hemorrhoids.        Problem list and histories reviewed & adjusted, as indicated.  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 pylori test     Pterygium of right eye     Sicca syndrome (H)     Angiomyolipoma of right kidney     Past Surgical History:   Procedure Laterality Date     COLONOSCOPY       COLONOSCOPY WITH CO2 INSUFFLATION N/A 4/5/2017    Procedure: COLONOSCOPY WITH CO2 INSUFFLATION;  Surgeon: Duane, William Charles, MD;  Location: [facility]     COMBINED REPAIR PTOSIS WITH BLEPHAROPLASTY BILATERAL Bilateral 1/6/2017    Procedure: COMBINED REPAIR PTOSIS WITH  BLEPHAROPLASTY BILATERAL;  Surgeon: Carly Norman MD;  Location: [facility]     ESOPHAGOSCOPY, GASTROSCOPY, DUODENOSCOPY (EGD), COMBINED N/A 1/5/2016    Procedure: COMBINED ESOPHAGOSCOPY, GASTROSCOPY, DUODENOSCOPY (EGD), BIOPSY SINGLE OR MULTIPLE;  Surgeon: Duane, William Charles, MD;  Location: [facility]     REPAIR ENTROPION BILATERAL Bilateral 1/6/2017    Procedure: REPAIR ENTROPION BILATERAL;  Surgeon: Carly Norman MD;  Location: [facility]     REPAIR PTOSIS Bilateral 01/2017       Social History   Substance Use Topics     Smoking status: Never Smoker     Smokeless tobacco: Never Used     Alcohol use No     Family History   Problem Relation Age of Onset     DIABETES Mother      CANCER No family hx of      Hypertension No family hx of      CEREBROVASCULAR DISEASE No family hx of      Thyroid Disease No family hx of      Glaucoma No family hx of      Macular Degeneration No family hx of          Current Outpatient Prescriptions   Medication Sig Dispense Refill     tiZANidine (ZANAFLEX) 4 MG tablet Take 1 tablet (4 mg) by mouth 3 times daily as needed for muscle spasms 60 tablet 1     hydrocortisone (ANUSOL-HC) 2.5 % cream Place rectally 2 times daily 30 g 3     latanoprost (XALATAN) 0.005 % ophthalmic solution Place 1 drop into both eyes At Bedtime 2.5 mL 8     metoprolol (LOPRESSOR) 25 MG tablet TAKE 1 TABLET(25 MG) BY MOUTH AT BEDTIME 90 tablet 1     losartan-hydrochlorothiazide (HYZAAR) 50-12.5 MG per tablet TAKE 1 TABLET BY MOUTH DAILY 90 tablet 1     pravastatin (PRAVACHOL) 20 MG tablet TAKE 1 TABLET(20 MG) BY MOUTH DAILY 90 tablet 1     omeprazole (PRILOSEC) 40 MG capsule Take 1 capsule (40 mg) by mouth daily as needed Take 30-60 minutes before a meal. 90 capsule 1     triamcinolone (KENALOG) 0.1 % cream APPLY TOPICALLY TWICE DAILY 80 g 3     calcium-vitamin D (CALCIUM 600 + D) 600-400 MG-UNIT per tablet Take 1 tablet by mouth 2 times daily 60 tablet 11     leucovorin (WELLCOVORIN) 5 MG tablet TK 1 T PO 12 H  AFTER METHOTREXATE ONE TIME EACH WEEK  3     methotrexate 2.5 MG tablet Take 6 tablets (15 mg) by mouth once a week 1 tablet 0     chlorhexidine (CHLORHEXIDINE) 0.12 % solution Swish and spit 15 mLs in mouth 2 times daily 473 mL 11     ranitidine (ZANTAC) 300 MG tablet TAKE 1 TABLET(300 MG) BY MOUTH AT BEDTIME 30 tablet 0     cetirizine (ZYRTEC) 10 MG tablet TAKE 1 TABLET BY MOUTH DAILY 30 tablet 11     fluticasone (FLONASE) 50 MCG/ACT nasal spray Spray 1-2 sprays into both nostrils daily 1 Package 11     No Known Allergies  Recent Labs   Lab Test  10/02/17   1646  06/21/17   0850 05/25/17 02/24/17 12/23/16   1152 11/30/16  10/27/16   0939   03/03/16   0856   02/09/15   0755   A1C   --    --    --    --    --    --   5.9   --    --    --    --    LDL   --   143*   --    --    --    --    --    --   130*   --   120   HDL   --   53   --    --    --    --    --    --   41*   --   42*   TRIG   --   174*   --    --    --    --    --    --   184*   --   337*   ALT   --    --   33  27   --   40   --    < >   --    < >   --    CR  0.61   --   0.66  0.65  0.51*  0.66  0.55   < >   --    < >  0.47*   GFRESTIMATED  >90   --   >60  >60  >90  Non  GFR Calc    >60  >90  Non  GFR Calc     < >   --    < >  >90  Non  GFR Calc     GFRESTBLACK  >90   --   >60  >60  >90  African American GFR Calc    >60  >90   GFR Calc     < >   --    < >  >90   GFR Calc     POTASSIUM  3.5   --    --    --   3.9   --   3.7   < >   --    < >  3.5    < > = values in this interval not displayed.      BP Readings from Last 3 Encounters:   11/03/17 134/80   10/02/17 124/76   07/17/17 129/82    Wt Readings from Last 3 Encounters:   11/03/17 116 lb (52.6 kg)   10/02/17 117 lb (53.1 kg)   06/19/17 116 lb (52.6 kg)                  Labs reviewed in EPIC          Reviewed and updated as needed this visit by clinical staffTobacco  Allergies  Med Hx  Surg Hx  Fam Hx  Soc Hx     "  Reviewed and updated as needed this visit by Provider         ROS:  Constitutional, HEENT, cardiovascular, pulmonary, gi and gu systems are negative, except as otherwise noted.      OBJECTIVE:   /80 (BP Location: Left arm, Patient Position: Chair, Cuff Size: Adult Regular)  Pulse 70  Temp 97.9  F (36.6  C) (Oral)  Ht 5' 3\" (1.6 m)  Wt 116 lb (52.6 kg)  SpO2 95%  Breastfeeding? No  BMI 20.55 kg/m2  Body mass index is 20.55 kg/(m^2).  GENERAL APPEARANCE: healthy, alert and no distress  EYES: Eyes grossly normal to inspection, PERRL and conjunctivae and sclerae normal  HENT: ear canals and TM's normal, nose and mouth without ulcers or lesions, oropharynx clear and oral mucous membranes moist  NECK: no adenopathy, no asymmetry, masses, or scars and thyroid normal to palpation  RESP: lungs clear to auscultation - no rales, rhonchi or wheezes  CV: regular rates and rhythm, normal S1 S2, no S3 or S4, no murmur, click or rub, no peripheral edema and peripheral pulses strong  ABDOMEN: soft, nontender, no hepatosplenomegaly, no masses and bowel sounds normal  MS: no musculoskeletal defects are noted and gait is age appropriate without ataxia, tends to move very quickly, some pain with straight leg raise on left side otherwise normal back and leg exam. Mild back spasms.  SKIN: no suspicious lesions or rashes  NEURO: Normal strength and tone, sensory exam grossly normal, mentation intact and speech normal  PSYCH: mentation appears normal and affect normal/bright     Diagnostic Test Results:  No results found for this or any previous visit (from the past 24 hour(s)).    ASSESSMENT/PLAN:               ICD-10-CM    1. Acute left-sided low back pain with left-sided sciatica M54.42 [facility] PT, HAND, AND CHIROPRACTIC REFERRAL for physical therapy if symptoms do not improve with exercise and medication    2. Polyarthritis, inflammatory (H) M06.4 Now off prednisone and only on methotrexate. Follow up with rheumatology as " scheduled.    3. Back muscle spasm M62.830 tiZANidine (ZANAFLEX) 4 MG tablet three times a day as needed   4. External hemorrhoids K64.4 hydrocortisone (ANUSOL-HC) 2.5 % cream   5. High risk medication use Z79.899 methotrexate       CONSULTATION/REFERRAL to physical therapy for treatment if not improving in 2-3 weeks  FUTURE LABS:       - Schedule fasting labs in 6 months  FUTURE APPOINTMENTS:       - Follow-up visit in 2-3 months or sooner if any questions or concerns.   Work on weight loss  Regular exercise    Patient Instructions     At [facility], we strive to deliver an exceptional experience to you, every time we see you.  If you receive a survey in the mail, please send us back your thoughts. We really do value your feedback.    Based on your medical history, these are the current health maintenance/preventive care services that you are due for (some may have been done at this visit.)  Health Maintenance Due   Topic Date Due     ADVANCE DIRECTIVE PLANNING Q5 YRS  02/04/2007         Suggested websites for health information:  Www.[employer] : Up to date and easily searchable information on multiple topics.  Www.medlineplus.gov : medication info, interactive tutorials, watch real surgeries online  Www.familydoctor.org : good info from the Academy of Family Physicians  Www.cdc.gov : public health info, travel advisories, epidemics (H1N1)  Www.aap.org : children's health info, normal development, vaccinations  Www.health.state.[place].us : [place] dept of health, public health issues in [place], N1N1    Your care team:                            Family Medicine Internal Medicine   MD Alfredo Morgan MD Shantel Branch-Fleming, MD Katya Georgiev PA-C Nam Ho, MD Pediatrics   MOSES Alberts, MD Naila Thompson CNP, MD Deborah Mielke, MD Kim Thein, APRN CNP      Clinic hours: Monday - Thursday 7 am-7 pm; Fridays 7 am-5  "pm.   Urgent care: Monday - Friday 11 am-9 pm; Saturday and Sunday 9 am-5 pm.  Pharmacy : Monday -Thursday 8 am-8 pm; Friday 8 am-6 pm; Saturday and Sunday 9 am-5 pm.     Clinic: [phone]   Pharmacy: [phone]      * Sciatica    Sciatica (\"Lumbar Radiculopathy\") causes a pain that spreads from the lower back down into the buttock, hip and leg. Sometimes leg pain can occur without any back pain. Sciatica is due to irritation or pressure on a spinal nerve as it comes out of the spinal canal. This is most often due to a bulge or rupture of a nearby spinal disk (the cartilage cushion between each spinal bone), which presses on a nearby nerve. Other causes include spinal stenosis (narrowing of the spinal canal) and spasm of the piriformis muscle (a muscle in the buttocks that the sciatic nerve passes through).  Sciatica may begin after a sudden twisting/bending force (such as in a car accident), or sometimes after a simple awkward movement. In either case, muscle spasm is commonly present and contributes to the pain.  The diagnosis of sciatica is made from the symptoms and physical exam. Unless you had a physical injury (such as a car accident or fall), X-rays are usually not ordered for the initial evaluation of sciatica because the nerves and disks cannot be seen on an X-ray. Most sciatica (80-90%) gets better with time.  What can I do about my low back pain?  There are three main things you can do to ease low back pain and help it go away.    Use heat or cold packs.    Take medicine as directed.    Use positions, movements and exercises. Stay active! Too much rest can make your symptoms worse.  Using heat or cold packs  Try cold packs or gentle heat to ease your pain. Use whichever gives the most relief. Apply the cold pack or heat for 15 minutes at a time, as often as needed.  Taking medicine  If taking over-the-counter medicine:    Take ibuprofen (Advil, Motrin) 600 mg. three times a day as needed for " pain.  OR    Take Aleve (naproxen sodium) 220 to 440 mg. two times a day as needed for pain  If your doctor prescribed a muscle relaxant (cyclobenzapine 10 mg.):    Take one half ( ) to 1 tablet at bedtime    Do not drive when taking this medicine. This drug may make you sleepy.  Using positions, movements and exercises  Research tells us that moving your joints and muscles can help you recover from back pain. Such activity should be simple and gentle.  Use the positions below as well as walking to help relieve your discomfort. Try taking a short walk every 3 to 4 hours during the day. Walk for a few minutes inside your home or take longer walks outside, on a treadmill or at a mall. Slowly increase the amount of time you walk. Expect discomfort when you begin, but it should lessen as your back starts to recover.  Finding a position that is comfortable  When your back pain is new, you may find that certain positions will ease your pain. Gently try each of the following positions until you find one that eases your pain. Once you find a position of comfort, use it as often as you like while you recover. Return to your daily routine as soon as possible.     Lie on your back with your legs bent. You can do this by placing a pillow under your knees or lie on the floor and rest your lower legs on the seat of a chair.    Lie on your side with your knees bent and place a pillow between your knees.    Lie on your stomach over pillows.  When should I call my doctor?  Your back pain should improve over the first couple of weeks. As it improves, you should be able to return to your normal activities. But call your doctor if:    You have a sudden change in your ability to control? your bladder or bowels.    You begin to feel tingling in your groin or legs.    The pain spreads down your leg and into your foot.    Your toes, feet or leg muscles begin to feel weak.    You feel generally unwell or sick.    Your pain gets worse.     4812-0144 [employer]. [address] [place] [address], [place] [address]. All rights reserved. This information is not intended as a substitute for professional medical care. Always follow your healthcare professional's instructions.  This information has been modified by your health care provider with permission from the publisher.        Understanding Lumbar Radiculopathy    Lumbar radiculopathy is irritation or inflammation of a nerve root in the low back. It causes symptoms that spread out from the back down one or both legs. To understand this condition, it helps to understand the parts of the spine:    Vertebrae. These are bones that stack to form the spine. The lumbar spine contains the 5 bottom vertebrae.    Disks. These are soft pads of tissue between the vertebrae. They act as shock absorbers for the spine.    Spinal canal. This is a tunnel formed within the stacked vertebrae. In the lumbar spine, nerves run through this canal.    Nerves. These branch off and leave the spinal canal, traveling out to parts of the body. As they leave the spinal canal, nerves pass through openings between the vertebrae. The nerve root is the part of the nerve that is closest to the spinal canal.    Sciatic nerve. This is a large nerve formed from several nerve roots in the low back. This nerve extends down the back of the leg to the foot.  With lumbar radiculopathy, nerve roots in the low back become irritated. This leads to pain and symptoms. The sciatic nerve is commonly involved, so the condition is often called sciatica.  What causes lumbar radiculopathy?  Aging, injury, poor posture, extra body weight, and other issues can lead to problems in the low back. These problems may then irritate nerve roots. They include:    Damage to a disk in the lumbar spine. The damaged disk may then press on nearby nerve roots.    Degeneration from wear and tear, and aging. This can lead to narrowing (stenosis) of the openings  between the vertebrae. The narrowed openings press on nerve roots as they leave the spinal canal.    Unstable spine. This is when a vertebra slips forward. It can then press on a nerve root.  Other, less common things can put pressure on nerves in the low back. These include diabetes, infection, or a tumor.  Symptoms of lumbar radiculopathy  These include:    Pain in the low back    Pain, numbness, tingling, or weakness that travels into the buttocks, hip, groin, or leg    Muscle spasms  Treatment for lumbar radiculopathy  In most cases, your healthcare provider will first try treatments that help relieve symptoms. These may include:    Prescription and over-the-counter pain medicines. These help relieve pain, swelling, and irritation.    Limits on positions and activities that increase pain. But lying in bed or avoiding all movement is only recommended for a short period of time.    Physical therapy, including exercises and stretches. This helps decrease pain and increase movement and function.    Steroid shots into the lower back. This may help relieve symptoms for a time.    Weight-loss program. If you are overweight, losing extra pounds may help relieve symptoms.  In some cases, you may need surgery to fix the underlying problem. This depends on the cause, the symptoms, and how long the pain has lasted.  Possible complications  Over time, an irritated and inflamed nerve may become damaged. This may lead to long-lasting (permanent) numbness or weakness in your legs and feet. If symptoms change suddenly or get worse, be sure to let your healthcare provider know.  When to call your healthcare provider  Call your healthcare provider right away if you have any of these:    New pain or pain that gets worse    New or increasing weakness, tingling, or numbness in your leg or foot    Problems controlling your bladder or bowel   Date Last Reviewed: 3/10/2016    0649-7336 [employer]. [address] [facility] [facility],  [place] [address]. All rights reserved. This information is not intended as a substitute for professional medical care. Always follow your healthcare professional's instructions.        Back Exercises: Lower Back Rotation    To start, lie on your back with your knees bent and feet flat on the floor. Don t press your neck or lower back to the floor. Breathe deeply. You should feel comfortable and relaxed in this position.    Drop both knees to one side. Turn your head to the other side. Keep your shoulders flat on the floor.    Do not push through pain.    Hold for 20 seconds.    Slowly switch sides.    Repeat 2 to 5 times.  Date Last Reviewed: 10/11/2015    4612-2287 [employer]. [address] [place] [address]. All rights reserved. This information is not intended as a substitute for professional medical care. Always follow your healthcare professional's instructions.        Back Exercises: Pelvic Tilt    To start, lie on your back with your knees bent and feet flat on the floor. Don t press your neck or lower back to the floor. Breathe deeply. You should feel comfortable and relaxed in this position:    Tighten your stomach and buttocks, and press your lower back toward the floor. This should be a small, subtle movement. This should not increase your pain.    Hold for 5 to 15 seconds. Release.    Repeat 2 to 5 times.  Date Last Reviewed: 10/11/2015    0181-2016 [employer]. [address] [place] [address]. All rights reserved. This information is not intended as a substitute for professional medical care. Always follow your healthcare professional's instructions.        Back Exercises: Back Press    Do this exercise on your hands and knees. Keep your knees under your hips and your hands under your shoulders. Keep your spine in a neutral position (not arched or sagging). Be sure to maintain your neck s natural curve:    Tighten your stomach and buttock muscles to  press your back upward. Let your head drop slightly.    Hold for 5 seconds. Return to starting position.    Repeat 5 times.  Date Last Reviewed: 10/11/2015    1545-1327 [employer] [address] [place] [address]. All rights reserved. This information is not intended as a substitute for professional medical care. Always follow your healthcare professional's instructions.        Back Exercises: Back Release  Do this exercise on your hands and knees. Keep your knees under your hips and your hands under your shoulders.        Relax your abdominal and buttocks muscles, lift your head, and let your back sag. Be sure to keep your weight evenly distributed. Don t sit back on your hips.     Hold for 5 seconds.    Return to starting position.    Tuck your head and lift (arch) your back.    Hold for 5 seconds    Return to starting position.    Repeat 5 times.  Date Last Reviewed: 8/16/2015 2000-2017 [employer] [address] [place] [address]. All rights reserved. This information is not intended as a substitute for professional medical care. Always follow your healthcare professional's instructions.            Jia Castro MD  [facility]

## 2017-11-03 NOTE — NURSING NOTE
"Chief Complaint   Patient presents with     Leg Pain     New symptom [id number]       Initial /80 (BP Location: Left arm, Patient Position: Chair, Cuff Size: Adult Regular)  Pulse 70  Temp 97.9  F (36.6  C) (Oral)  Ht 5' 3\" (1.6 m)  Wt 116 lb (52.6 kg)  SpO2 95%  Breastfeeding? No  BMI 20.55 kg/m2 Estimated body mass index is 20.55 kg/(m^2) as calculated from the following:    Height as of this encounter: 5' 3\" (1.6 m).    Weight as of this encounter: 116 lb (52.6 kg).  Medication Reconciliation: complete     Tim Montgomery CMA    "

## 2017-11-06 NOTE — TELEPHONE ENCOUNTER
Patient contacted and informed. She was confused about instructions whether she needed to take 3 times a day for 20 days and reiterated instructions.  Tim Montgomery CMA

## 2017-12-14 DIAGNOSIS — K21.00 GASTROESOPHAGEAL REFLUX DISEASE WITH ESOPHAGITIS: ICD-10-CM

## 2017-12-14 DIAGNOSIS — K05.10 GINGIVITIS: ICD-10-CM

## 2017-12-14 DIAGNOSIS — E78.2 MIXED HYPERLIPIDEMIA: ICD-10-CM

## 2017-12-14 DIAGNOSIS — I10 HYPERTENSION GOAL BP (BLOOD PRESSURE) < 150/90: ICD-10-CM

## 2017-12-15 DIAGNOSIS — K21.00 GASTROESOPHAGEAL REFLUX DISEASE WITH ESOPHAGITIS: ICD-10-CM

## 2017-12-15 DIAGNOSIS — I10 HYPERTENSION GOAL BP (BLOOD PRESSURE) < 150/90: ICD-10-CM

## 2017-12-15 DIAGNOSIS — K05.10 GINGIVITIS: ICD-10-CM

## 2017-12-15 DIAGNOSIS — E78.2 MIXED HYPERLIPIDEMIA: ICD-10-CM

## 2017-12-15 NOTE — TELEPHONE ENCOUNTER
Requested Prescriptions   Pending Prescriptions Disp Refills     pravastatin (PRAVACHOL) 20 MG tablet [Pharmacy Med Name: PRAVASTATIN 20MG TABLETS]    Last Written Prescription Date:  7/6/17  Last Fill Quantity: 90,  # refills: 1   Last Office Visit with [facility] or [facility] prescribing provider:  11/3/17   Future Office Visit:    Next 5 appointments (look out 90 days)     Jan 29, 2018  1:30 PM CST   Return Visit with Emi Morales MD   [facility] ([facility])    [address] [address] [place] [address] [phone] [phone]                  90 tablet 0     Sig: TAKE 1 TABLET(20 MG) BY MOUTH DAILY    Statins Protocol Passed    12/15/2017  3:30 PM       Passed - LDL on file in past 12 months    Recent Labs   Lab Test  06/21/17   0850   LDL  143*            Passed - No abnormal creatine kinase in past 12 months    No lab results found.         Passed - Recent or future visit with authorizing provider    Patient had office visit in the last year or has a visit in the next 30 days with authorizing provider.  See chart review.              Passed - Patient is age 18 or older       Passed - No active pregnancy on record       Passed - No positive pregnancy test in past 12 months        omeprazole (PRILOSEC) 40 MG capsule [Pharmacy Med Name: OMEPRAZOLE 40MG CAPSULES]    Last Written Prescription Date:  7/6/17  Last Fill Quantity: 90,  # refills: 1   Last Office Visit with [facility] or [facility] prescribing provider:  11/3/17   Future Office Visit:    Next 5 appointments (look out 90 days)     Jan 29, 2018  1:30 PM CST   Return Visit with Emi Morales MD   [facility] ([facility]    [address] [address] [place] [address] [id number] [phone]                  90 capsule 0     Sig: TAKE 1 CAPSULE(40 MG) BY MOUTH DAILY 30 TO 60 MINUTES BEFORE A MEAL AS NEEDED    PPI Protocol Passed    12/15/2017  3:30 PM       Passed - Not on Clopidogrel (unless  Pantoprazole ordered)       Passed - No diagnosis of osteoporosis on record       Passed - Recent or future visit with authorizing provider's specialty    Patient had office visit in the last year or has a visit in the next 30 days with authorizing provider.  See chart review.              Passed - Patient is age 18 or older       Passed - No active pregnacy on record       Passed - No positive pregnancy test in past 12 months        metoprolol (LOPRESSOR) 25 MG tablet [Pharmacy Med Name: METOPROLOL TARTRATE 25MG TABLETS]    Last Written Prescription Date:  7/6/17  Last Fill Quantity: 90,  # refills: 1   Last Office Visit with [facility] or [facility] prescribing provider:  11/3/17   Future Office Visit:    Next 5 appointments (look out 90 days)     Jan 29, 2018  1:30 PM CST   Return Visit with Emi Morales MD   [facility]    [address] [place] [id number] [phone]                  90 tablet 0     Sig: TAKE 1 TABLET(25 MG) BY MOUTH AT BEDTIME    Beta-Blockers Protocol Passed    12/15/2017  3:30 PM       Passed - Blood pressure under 140/90    BP Readings from Last 3 Encounters:   11/03/17 134/80   10/02/17 124/76   07/17/17 129/82                Passed - Patient is age 6 or older       Passed - Recent or future visit with authorizing provider's specialty    Patient had office visit in the last year or has a visit in the next 30 days with authorizing provider.  See chart review.               losartan-hydrochlorothiazide (HYZAAR) 50-12.5 MG per tablet [Pharmacy Med Name: LOSARTAN/HCTZ 50/12.5MG TABLETS]    Last Written Prescription Date:  7/6/17  Last Fill Quantity: 90,  # refills: 1   Last Office Visit with [facility], [facility] or [facility] prescribing provider:  11/3/17   Future Office Visit:    Next 5 appointments (look out 90 days)     Jan 29, 2018  1:30 PM CST   Return Visit with Emi Morales MD   [facility] ([facility])    [address] [place] [address] [phone]                  90 tablet 0     Sig: TAKE 1 TABLET BY MOUTH DAILY    Angiotensin-II Receptors Passed    12/15/2017  3:30 PM       Passed - Blood pressure under 140/90 in past 12 months.    BP Readings from Last 3 Encounters:   11/03/17 134/80   10/02/17 124/76   07/17/17 129/82                Passed - Recent or future visit with authorizing provider's specialty    Patient had office visit in the last year or has a visit in the next 30 days with authorizing provider.  See chart review.              Passed - Patient is age 18 or older       Passed - No active pregnancy on record       Passed - Normal serum creatinine on file in past 12 months    Recent Labs   Lab Test  10/02/17   1646   CR  0.61            Passed - Normal serum potassium on file in past 12 months    Recent Labs   Lab Test  10/02/17   1646   POTASSIUM  3.5                   Passed - No positive pregnancy test in past 12 months        chlorhexidine (PERIDEX) 0.12 % solution [Pharmacy Med Name: CHLORHEXIDINE 0.12% ORAL RINSE]    Last Written Prescription Date:  11/4/16  Last Fill Quantity: 473 ml,  # refills: 11   Last Office Visit with [facility] or [facility] prescribing provider:  11/3/17   Future Office Visit:    Next 5 appointments (look out 90 days)     Jan 29, 2018  1:30 PM CST   Return Visit with Emi Morales MD   [facility] ([facility])    [address] [address] [place] [address] [phone]                  473 mL 0     Sig: SWISH AND SPIT 15 ML BY MOUTH TWICE DAILY    There is no refill protocol information for this order              Rik Faarax  [facility]

## 2017-12-15 NOTE — TELEPHONE ENCOUNTER
Requested Prescriptions   Pending Prescriptions Disp Refills     pravastatin (PRAVACHOL) 20 MG tablet [Pharmacy Med Name: PRAVASTATIN 20MG TABLETS]    Last Written Prescription Date:  7/6/17  Last Fill Quantity: 90,  # refills: 1   Last Office Visit with [facility] prescribing provider:  11/3/17   Future Office Visit:    Next 5 appointments (look out 90 days)     Jan 29, 2018  1:30 PM CST   Return Visit with Emi Morales MD   [facility] ([facility])    [address] [place] [phone]                  90 tablet 0     Sig: TAKE 1 TABLET(20 MG) BY MOUTH DAILY    Statins Protocol Passed    12/14/2017  4:05 PM       Passed - LDL on file in past 12 months    Recent Labs   Lab Test  06/21/17   0850   LDL  143*            Passed - No abnormal creatine kinase in past 12 months    No lab results found.         Passed - Recent or future visit with authorizing provider    Patient had office visit in the last year or has a visit in the next 30 days with authorizing provider.  See chart review.              Passed - Patient is age 18 or older       Passed - No active pregnancy on record       Passed - No positive pregnancy test in past 12 months        omeprazole (PRILOSEC) 40 MG capsule [Pharmacy Med Name: OMEPRAZOLE 40MG CAPSULES]    Last Written Prescription Date:  7/6/17  Last Fill Quantity: 90,  # refills: 1   Last Office Visit with [facility] prescribing provider:  11/3/17   Future Office Visit:    Next 5 appointments (look out 90 days)     Jan 29, 2018  1:30 PM CST   Return Visit with Emi Morales MD   [facility] ([facility])    [address] [place] [address] [phone]                      90 capsule 0     Sig: TAKE 1 CAPSULE(40 MG) BY MOUTH DAILY 30 TO 60 MINUTES BEFORE A MEAL AS NEEDED    PPI Protocol Passed    12/14/2017  4:05 PM       Passed - Not on Clopidogrel  (unless Pantoprazole ordered)       Passed - No diagnosis of osteoporosis on record       Passed - Recent or future visit with authorizing provider's specialty    Patient had office visit in the last year or has a visit in the next 30 days with authorizing provider.  See chart review.              Passed - Patient is age 18 or older       Passed - No active pregnacy on record       Passed - No positive pregnancy test in past 12 months        chlorhexidine (PERIDEX) 0.12 % solution [Pharmacy Med Name: CHLORHEXIDINE 0.12% ORAL RINSE]    Last Written Prescription Date:  11/4/16  Last Fill Quantity: 473 ml,  # refills: 11   [facility] Visit [facility] prescribing provider:  11/3/17   Future Office Visit:    Next 5 appointments (look out 90 days)     Jan 29, 2018  1:30 PM CST   Return Visit with Emi Morales MD   [facility] ([facility])    [address] [place] [address] [phone]                  473 mL 0     Sig: SWISH AND SPIT 15 ML BY MOUTH TWICE DAILY    There is no refill protocol information for this order        metoprolol (LOPRESSOR) 25 MG tablet [Pharmacy Med Name: METOPROLOL TARTRATE 25MG TABLETS]    Last Written Prescription Date:  7/6/17  Last Fill Quantity: 90,  # refills: 1   Last Office Visit with [facility] or [facility] prescribing provider:  11/3/17   Future Office Visit:    Next 5 appointments (look out 90 days)     Jan 29, 2018  1:30 PM CST   Return Visit with Emi Morales MD   [facility] ([facility])    [address] [place] [address] [phone]                  90 tablet 0     Sig: TAKE 1 TABLET(25 MG) BY MOUTH AT BEDTIME    Beta-Blockers Protocol Passed    12/14/2017  4:05 PM       Passed - Blood pressure under 140/90    BP Readings from Last 3 Encounters:   11/03/17 134/80   10/02/17 124/76   07/17/17 129/82                Passed - Patient is age 6 or older        Passed - Recent or future visit with authorizing provider's specialty    Patient had office visit in the last year or has a visit in the next 30 days with authorizing provider.  See chart review.               losartan-hydrochlorothiazide (HYZAAR) 50-12.5 MG per tablet [Pharmacy Med Name: LOSARTAN/HCTZ 50/12.5MG TABLETS]    Last Written Prescription Date:  7/6/17  Last Fill Quantity: 90,  # refills: 1   Last Office Visit with [facility], [facility] or [facility] prescribing provider:  11/3/17   Future Office Visit:    Next 5 appointments (look out 90 days)     Jan 29, 2018  1:30 PM CST   Return Visit with Emi Morales MD   [facility] ([facility])    [address] [place] [phone]                  90 tablet 0     Sig: TAKE 1 TABLET BY MOUTH DAILY    Angiotensin-II Receptors Passed    12/14/2017  4:05 PM       Passed - Blood pressure under 140/90 in past 12 months.    BP Readings from Last 3 Encounters:   11/03/17 134/80   10/02/17 124/76   07/17/17 129/82                Passed - Recent or future visit with authorizing provider's specialty    Patient had office visit in the last year or has a visit in the next 30 days with authorizing provider.  See chart review.              Passed - Patient is age 18 or older       Passed - No active pregnancy on record       Passed - Normal serum creatinine on file in past 12 months    Recent Labs   Lab Test  10/02/17   1646   CR  0.61            Passed - Normal serum potassium on file in past 12 months    Recent Labs   Lab Test  10/02/17   1646   POTASSIUM  3.5                   Passed - No positive pregnancy test in past 12 months              Rik Faarax  [facility]

## 2017-12-20 RX ORDER — OMEPRAZOLE 40 MG/1
CAPSULE, DELAYED RELEASE ORAL
Qty: 90 CAPSULE | Refills: 1 | Status: SHIPPED | OUTPATIENT
Start: 2017-12-20 | End: 2018-02-06

## 2017-12-20 RX ORDER — METOPROLOL TARTRATE 25 MG/1
TABLET, FILM COATED ORAL
Qty: 90 TABLET | Refills: 0 | OUTPATIENT
Start: 2017-12-20

## 2017-12-20 RX ORDER — CHLORHEXIDINE GLUCONATE ORAL RINSE 1.2 MG/ML
SOLUTION DENTAL
Qty: 473 ML | Refills: 0 | Status: SHIPPED | OUTPATIENT
Start: 2017-12-20 | End: 2018-02-06

## 2017-12-20 RX ORDER — METOPROLOL TARTRATE 25 MG/1
TABLET, FILM COATED ORAL
Qty: 90 TABLET | Refills: 0 | Status: SHIPPED | OUTPATIENT
Start: 2017-12-20 | End: 2018-02-06

## 2017-12-20 RX ORDER — PRAVASTATIN SODIUM 20 MG
TABLET ORAL
Qty: 90 TABLET | Refills: 3 | Status: SHIPPED | OUTPATIENT
Start: 2017-12-20 | End: 2019-02-25

## 2017-12-20 RX ORDER — LOSARTAN POTASSIUM AND HYDROCHLOROTHIAZIDE 12.5; 5 MG/1; MG/1
TABLET ORAL
Qty: 90 TABLET | Refills: 0 | Status: SHIPPED | OUTPATIENT
Start: 2017-12-20 | End: 2018-02-06

## 2017-12-20 RX ORDER — CHLORHEXIDINE GLUCONATE ORAL RINSE 1.2 MG/ML
SOLUTION DENTAL
Qty: 473 ML | Refills: 3 | Status: SHIPPED | OUTPATIENT
Start: 2017-12-20 | End: 2018-09-14

## 2017-12-20 RX ORDER — PRAVASTATIN SODIUM 20 MG
TABLET ORAL
Qty: 90 TABLET | Refills: 0 | Status: SHIPPED | OUTPATIENT
Start: 2017-12-20 | End: 2018-02-06

## 2017-12-20 RX ORDER — LOSARTAN POTASSIUM AND HYDROCHLOROTHIAZIDE 12.5; 5 MG/1; MG/1
TABLET ORAL
Qty: 90 TABLET | Refills: 0 | OUTPATIENT
Start: 2017-12-20

## 2017-12-20 RX ORDER — OMEPRAZOLE 40 MG/1
CAPSULE, DELAYED RELEASE ORAL
Qty: 90 CAPSULE | Refills: 3 | Status: SHIPPED | OUTPATIENT
Start: 2017-12-20 | End: 2018-09-14

## 2017-12-20 NOTE — TELEPHONE ENCOUNTER
chlorhexidine (CHLORHEXIDINE) 0.12 % solution  Routing refill request to provider for review/approval because:  Drug not on the [facility] refill protocol   Order is considered  as it was sent 2016    losartan-hydrochlorothiazide (HYZAAR) 50-12.5 MG per tablet  Rx was sent 2017 for 90 tabs and 0 refills.   Pharmacy notified via E-prescribe refusal.  Mansi Vincent RN, BSN     metoprolol (LOPRESSOR) 25 MG tablet  Rx was sent 2017 for 90 tabs and 0 refills.   Pharmacy notified via E-prescribe refusal.  Mansi Vincent RN, BSN     omeprazole (PRILOSEC) 40 MG capsule  Routing refill request to provider for review/approval because:  Drug interaction warning with Methotrexate    pravastatin (PRAVACHOL) 20 MG tablet  Prescription approved per [facility] Refill Protocol.    Mansi Vincent RN, BSN

## 2017-12-20 NOTE — TELEPHONE ENCOUNTER
Pravastatin  Routing refill request to provider for review/approval because:  Labs out of range:  LDL    Omeprazole  Routing refill request to provider for review/approval because:  Drug interaction warning    Chlorhexidine  Routing refill request to provider for review/approval because:  Drug not on the [facility] refill protocol     Metoprolol & Hyzaar  Prescription approved per [facility] Refill Protocol.    Wyatt Terry RN, BSN

## 2018-01-29 ENCOUNTER — CARE COORDINATION (OUTPATIENT)
Dept: CARE COORDINATION | Facility: CLINIC | Age: 68
End: 2018-01-29

## 2018-01-29 ENCOUNTER — RADIANT APPOINTMENT (OUTPATIENT)
Dept: ULTRASOUND IMAGING | Facility: CLINIC | Age: 68
End: 2018-01-29
Attending: UROLOGY
Payer: MEDICARE

## 2018-01-29 ENCOUNTER — OFFICE VISIT (OUTPATIENT)
Dept: UROLOGY | Facility: CLINIC | Age: 68
End: 2018-01-29
Payer: MEDICARE

## 2018-01-29 VITALS
WEIGHT: 116 LBS | DIASTOLIC BLOOD PRESSURE: 76 MMHG | HEART RATE: 79 BPM | BODY MASS INDEX: 20.55 KG/M2 | SYSTOLIC BLOOD PRESSURE: 131 MMHG | HEIGHT: 63 IN

## 2018-01-29 DIAGNOSIS — R31.29 MICROSCOPIC HEMATURIA: ICD-10-CM

## 2018-01-29 DIAGNOSIS — R31.21 ASYMPTOMATIC MICROSCOPIC HEMATURIA: Primary | ICD-10-CM

## 2018-01-29 DIAGNOSIS — D17.71 ANGIOMYOLIPOMA OF RIGHT KIDNEY: ICD-10-CM

## 2018-01-29 LAB
ALBUMIN UR-MCNC: NEGATIVE MG/DL
APPEARANCE UR: CLEAR
BILIRUB UR QL STRIP: NEGATIVE
COLOR UR AUTO: ABNORMAL
GLUCOSE UR STRIP-MCNC: NEGATIVE MG/DL
HGB UR QL STRIP: ABNORMAL
KETONES UR STRIP-MCNC: NEGATIVE MG/DL
LEUKOCYTE ESTERASE UR QL STRIP: ABNORMAL
NITRATE UR QL: NEGATIVE
NON-SQ EPI CELLS #/AREA URNS LPF: ABNORMAL /LPF
PH UR STRIP: 6.5 PH (ref 5–7)
RBC #/AREA URNS AUTO: ABNORMAL /HPF
SOURCE: ABNORMAL
SP GR UR STRIP: 1.01 (ref 1–1.03)
UROBILINOGEN UR STRIP-MCNC: NORMAL MG/DL (ref 0–2)
WBC #/AREA URNS AUTO: ABNORMAL /HPF

## 2018-01-29 PROCEDURE — 81001 URINALYSIS AUTO W/SCOPE: CPT | Performed by: UROLOGY

## 2018-01-29 PROCEDURE — 88112 CYTOPATH CELL ENHANCE TECH: CPT | Performed by: UROLOGY

## 2018-01-29 PROCEDURE — 99212 OFFICE O/P EST SF 10 MIN: CPT | Performed by: UROLOGY

## 2018-01-29 PROCEDURE — 76770 US EXAM ABDO BACK WALL COMP: CPT | Performed by: RADIOLOGY

## 2018-01-29 ASSESSMENT — PAIN SCALES - GENERAL: PAINLEVEL: NO PAIN (0)

## 2018-01-29 NOTE — NURSING NOTE
"Noreen Ludivina's goals for this visit include: No chief complaint on file.      She requests these members of her care team be copied on today's visit information: yes    PCP: Jia Castro    Referring Provider:  No referring provider defined for this encounter.    No chief complaint on file.      Initial /76  Pulse 79  Ht 1.6 m (5' 3\")  Wt 52.6 kg (116 lb)  BMI 20.55 kg/m2 Estimated body mass index is 20.55 kg/(m^2) as calculated from the following:    Height as of this encounter: 1.6 m (5' 3\").    Weight as of this encounter: 52.6 kg (116 lb).  Medication Reconciliation: complete    Do you need any medication refills at today's visit?  No    Amorrmery Farah CMA         "

## 2018-01-29 NOTE — PROGRESS NOTES
Reason for Visit:  F/u on small AML and review US     Clinical Data: Ms. Noreen Florence is a 67 year old female with a hx of microhematuria with negative w/u.  She was found to have a very small AML (1.2 cm).  She was seen by IR and they recommended yearly u/s to review.  She has been doing well and denies any gross hematuria.    In reviewing the u/s today it is slightly larger now 1.3 cm     Cytology7/16:  NEM     CT urogram 6/15/16:  Impression:  1. 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 This  abuts the renal collecting system and may be the source of  microhematuria although significant bleeding is uncommon in lesions of  this size. No other abnormality in the urinary collecting system.    2. Other incidental findings as above.     Cystoscopy 6/27/16: Normal.     Renal U/S 1/9/17:  IMPRESSION:  1.  Stable 11 mm angiomyolipoma in the right kidney.     UA today with 10 protein and trace LE but negative for blood.  /82 (BP Location: Left arm, Patient Position: Chair, Cuff Size: Adult Regular)  Pulse 79        A/P:  67 year old female with microhematuria and negative w/u except for a small 1.3 cm AML previously 1.1 in June of 2016.  Her UA and BP remain normal.  We discussed continued observation of this lesion, it is fairly small and patient agrees with this  -cc urine cytology from today.  -f/u in 12 months with urine cytology and UA, bp check, and repeat renal U/S.         Thank you for allowing me to participate in the care of  Ms. Noreen Florence and I will keep you updated on her progress.     Emi Morales MD

## 2018-01-29 NOTE — MR AVS SNAPSHOT
After Visit Summary   1/29/2018    Noreen Florence    MRN: [id number]           Patient Information     Date Of Birth          1950        Visit Information        Provider Department      1/29/2018 1:30 PM Emi Morales MD [facility]        Today's Diagnoses     Hematuria    -  1    Angiomyolipoma of right kidney           Follow-ups after your visit        Your next 10 appointments already scheduled     Apr 30, 2018  1:30 PM CDT   [facility] Visit with Shai Levin MD   [facility]    [address] [place] [phone]            Jan 28, 2019  1:30 PM CST   Return Visit with Emi Morales MD   [facility]    [address] [place] [phone]              Who to contact     If you have questions or need follow up information about today's clinic visit or your schedule please contact [facility] directly at [phone].  Normal or non-critical lab and imaging results will be communicated to you by [employer], letter or phone within 4 business days after the clinic has received the results. If you do not hear from us within 7 days, please contact the clinic through [employer] or phone. If you have a critical or abnormal lab result, we will notify you by phone as soon as possible.  Submit refill requests through [employer] or call your pharmacy and they will forward the refill request to us. Please allow 3 business days for your refill to be completed.          Additional Information About Your Visit        [employer] Information     [employer] gives you secure access to your electronic health record. If you see a primary care provider, you can also send messages to your care team and make appointments. If you have questions, please call your primary care clinic.  If you do not have a primary care provider, please call  "[phone] and they will assist you.      [email or web address] is an electronic gateway that provides easy, online access to your medical records. With [email or web address], you can request a clinic appointment, read your test results, renew a prescription or communicate with your care team.     To access your existing account, please contact your [facility] or call [phone] for assistance.        Care EveryWhere ID     This is your Care EveryWhere ID. This could be used by other organizations to access your [facility] medical records  [id number]        Your Vitals Were     Pulse Height BMI (Body Mass Index)             79 1.6 m (5' 3\") 20.55 kg/m2          Blood Pressure from Last 3 Encounters:   01/29/18 131/76   11/03/17 134/80   10/02/17 124/76    Weight from Last 3 Encounters:   01/29/18 52.6 kg (116 lb)   11/03/17 52.6 kg (116 lb)   10/02/17 53.1 kg (117 lb)              We Performed the Following     Cytology non gyn     MEASURE POST-VOID RESIDUAL URINE/BLADDER CAPACITY, US NON-IMAGING     UA reflex to Microscopic and Culture        Primary Care Provider Office Phone # Fax #    Jia Ashli Castro [phone]       [id number] [address] [facility] [address]        Equal Access to Services     MILTON JOAQUIN AH: Hadii aad ku hadasho Soomaali, waaxda luqadaha, qaybta kaalmada adeegyada, waxay idiin hayaan sydnie khree matos . So [facility] [phone].    ATENCIÓN: Si habla español, tiene a barker disposición servicios gratuitos de asistencia lingüística. Llame al [phone].    We comply with applicable federal civil rights laws and [place] laws. We do not discriminate on the basis of race, color, national origin, age, disability, sex, sexual orientation, or gender identity.            Thank you!     Thank you for choosing [facility]  for your care. Our goal is always to provide you with excellent care. Hearing back from our patients is one way we can continue to improve " our services. Please take a few minutes to complete the written survey that you may receive in the mail after your visit with us. Thank you!             Your Updated Medication List - Protect others around you: Learn how to safely use, store and throw away your medicines at www.disposemymeds.org.          This list is accurate as of 1/29/18  2:47 PM.  Always use your most recent med list.                   Brand Name Dispense Instructions for use Diagnosis    calcium-vitamin D 600-400 MG-UNIT per tablet    calcium 600 + D    60 tablet    Take 1 tablet by mouth 2 times daily    Osteopenia       cetirizine 10 MG tablet    zyrTEC    30 tablet    TAKE 1 TABLET BY MOUTH DAILY    Chronic rhinitis       * chlorhexidine 0.12 % solution    PERIDEX    473 mL    SWISH AND SPIT 15 ML BY MOUTH TWICE DAILY    Gingivitis       * chlorhexidine 0.12 % solution    PERIDEX    473 mL    SWISH AND SPIT 15 ML BY MOUTH TWICE DAILY    Gingivitis       fluticasone 50 MCG/ACT spray    FLONASE    1 Package    Spray 1-2 sprays into both nostrils daily    Hayfever       hydrocortisone 2.5 % cream    ANUSOL-HC    30 g    Place rectally 2 times daily    External hemorrhoids       latanoprost 0.005 % ophthalmic solution    XALATAN    2.5 mL    Place 1 drop into both eyes At Bedtime    Glaucoma suspect of both eyes       leucovorin 5 MG tablet    WELLCOVORIN     TK 1 T PO 12 H AFTER METHOTREXATE ONE TIME EACH WEEK        losartan-hydrochlorothiazide 50-12.5 MG per tablet    HYZAAR    90 tablet    TAKE 1 TABLET BY MOUTH DAILY    Hypertension goal BP (blood pressure) < 150/90       methotrexate 2.5 MG tablet     1 tablet    Take 6 tablets (15 mg) by mouth once a week    Polyarthritis, inflammatory (H)       metoprolol tartrate 25 MG tablet    LOPRESSOR    90 tablet    TAKE 1 TABLET(25 MG) BY MOUTH AT BEDTIME    Hypertension goal BP (blood pressure) < 150/90       * omeprazole 40 MG capsule    priLOSEC    90 capsule    TAKE 1 CAPSULE(40 MG) BY MOUTH  DAILY 30 TO 60 MINUTES BEFORE A MEAL AS NEEDED    Gastroesophageal reflux disease with esophagitis       * omeprazole 40 MG capsule    priLOSEC    90 capsule    TAKE 1 CAPSULE(40 MG) BY MOUTH DAILY 30 TO 60 MINUTES BEFORE A MEAL AS NEEDED    Gastroesophageal reflux disease with esophagitis       * pravastatin 20 MG tablet    PRAVACHOL    90 tablet    TAKE 1 TABLET(20 MG) BY MOUTH DAILY    Mixed hyperlipidemia       * pravastatin 20 MG tablet    PRAVACHOL    90 tablet    TAKE 1 TABLET(20 MG) BY MOUTH DAILY    Mixed hyperlipidemia       ranitidine 300 MG tablet    ZANTAC    30 tablet    TAKE 1 TABLET(300 MG) BY MOUTH AT BEDTIME    Abdominal pain, epigastric, Gastroesophageal reflux disease with esophagitis       tiZANidine 4 MG tablet    ZANAFLEX    60 tablet    Take 1 tablet (4 mg) by mouth 3 times daily as needed for muscle spasms    Back muscle spasm       triamcinolone 0.1 % cream    KENALOG    80 g    APPLY TOPICALLY TWICE DAILY    Dermatitis       * Notice:  This list has 6 medication(s) that are the same as other medications prescribed for you. Read the directions carefully, and ask your doctor or other care provider to review them with you.

## 2018-01-29 NOTE — PROGRESS NOTES
Clinic Care Coordination Contact  RN CC received a phone call from patient and her daughter Tram inquiring if she has had a flu shot this season.  RN CC reviewed last flu shot on file, 10/2/17.  Patient and daughter had no further questions.    Melissa Behl BSN, RN, [facility] Care Coordinator  [phone]

## 2018-01-29 NOTE — LETTER
Patient:  Noreen Wolf  :   1950  MRN:     [id number]        Ms.Hanh Wolf  [address]  [facility] [address]        2018    Dear ,    We are writing to inform you of your test results which are essentially normal. The urine cytology was normal (no cancer cells seen).     Resulted Orders   UA reflex to Microscopic and Culture   Result Value Ref Range    Color Urine Light Yellow     Appearance Urine Clear     Glucose Urine Negative NEG^Negative mg/dL    Bilirubin Urine Negative NEG^Negative    Ketones Urine Negative NEG^Negative mg/dL    Specific Gravity Urine 1.006 1.003 - 1.035    Blood Urine Trace (A) NEG^Negative    pH Urine 6.5 5.0 - 7.0 pH    Protein Albumin Urine Negative NEG^Negative mg/dL    Urobilinogen mg/dL Normal 0.0 - 2.0 mg/dL    Nitrite Urine Negative NEG^Negative    Leukocyte Esterase Urine Small (A) NEG^Negative    Source Midstream Urine    Cytology non gyn   Result Value Ref Range    Copath Report       Patient Name: NOREEN WOLF  MR#: [id number]  Specimen #: [id number]  Collected: 2018  Received: 2018  Reported: 2018 16:05  Ordering Phy(s): JUANJOSE CHRISTIANSON    For improved result formatting, select 'View Enhanced Report Format' under   Linked Documents section.    SPECIMEN/STAIN PROCESS:  Urine-voided       Pap-Cyto x 1    ----------------------------------------------------------------    CYTOLOGIC INTERPRETATION:    Urine, voided:  - Negative for high-grade urothelial carcinoma  - Acute inflammation present  Specimen Adequacy: Satisfactory for evaluation.    I have personally reviewed all specimens and/or slides, including the   listed special stains, and used them  with my medical judgement to determine or confirm the final diagnosis.    Electronically signed out by:  Benita Johnson M.D., Physicians    Processed and screened at [facility]    CLINICAL HISTORY:  This patient is a 67-year-old  female with a histor y of microhematuria and   small angiomyolipoma.    ,    GROSS:  Urine-voided:  Received 30 ml of yellow, clear fluid, processed as 1 Pap   stained Autocyte..    MICROSCOPIC:  Microscopic examination is performed.    Nicol Holliday MD, Pathology Resident; Renay Dominique MD, Cytopathology   Fellow; Benita Johnson MD.    CLINICAL LAB RESULTS:  Battery Order No. Lab Test Code Clinical Result Ref. Range Units Result   Date  UA Macro Rfx Ward&Cult [id number] MG Color Light Yellow   1/29/2018 14:32       Appearance Clear   1/29/2018 14:32       Glucose, Urine Negative NEG mg/dL 1/29/2018 14:32       Bilirubin, Urine Negative NEG  1/29/2018 14:32       Ketones, Urine Negative NEG mg/dL 1/29/2018 14:32       Specific Gravity, Ur 1.006 1.003-1.035  1/29/2018 14:32       Blood, Urine A Trace NEG  1/29/2018 14:32       pH, Urine 6.5 5.0-7.0 pH 1/29/2018 14:32       Protein, Albumin, Ur Negative NEG mg/dL 1/29/2018 14:32       Urobilinogen Normal 0.0-2.0 mg/dL 1/29/2018 14:32       Nitrites Negative NE G  1/29/2018 14:32       Leuk Esterase A Small NEG  1/29/2018 14:32       Source SEE TEXT   1/29/2018 14:28       Text/Comments:  Midstream Urine    Urine Microscopic with reflex culture   WBC A 2-5 OTO2 /HPF 1/29/2018   14:50       RBC O - 2 OTO2 /HPF 1/29/2018 14:50       Squamous Epithelial Few FEW /LPF 1/29/2018 14:50    CPT Codes:  A: 90561-CECUQTX    TESTING LAB LOCATION:  [facility], [facility] [address] [place]   [address] [phone]    COLLECTION SITE:  Client:  [facility]  Location:  [facility] (B)    Resident  MX     Urine Microscopic   Result Value Ref Range    WBC Urine 2-5 (A) OTO2^O - 2 /HPF    RBC Urine O - 2 OTO2^O - 2 /HPF    Squamous Epithelial /LPF Urine Few FEW^Few /LPF       Please call [facility] at [phone] with any questions or concerns.      Sincerely,      Dr. Morales's  Office

## 2018-01-30 DIAGNOSIS — L30.9 DERMATITIS: ICD-10-CM

## 2018-01-30 LAB — COPATH REPORT: NORMAL

## 2018-01-30 NOTE — TELEPHONE ENCOUNTER
"Requested Prescriptions   Pending Prescriptions Disp Refills     triamcinolone (KENALOG) 0.1 % cream    Last Written Prescription Date:  6/19/17  Last Fill Quantity: 80 g,  # refills: 3   Last Office Visit with RANI, HUGO or [facility] prescribing provider:  11/3/17   Future Office Visit:    Next 5 appointments (look out 90 days)     Apr 30, 2018  1:30 PM CDT   [facility] Visit with Shai Levin MD   [facility]    [address] [place] [address] [phone]                  80 g 3     Sig: APPLY TOPICALLY TWICE DAILY    Topical Steroid Protocol Passed    1/30/2018  2:27 PM       Passed - Patient is age 6 or older       Passed - Authorizing prescriber's most recent note related to this medication read.       Passed - High potency steroid not ordered       Passed - Recent or future visit with authorizing provider's specialty    Patient had office visit in the last year or has a visit in the next 30 days with authorizing provider.  See \"Patient Info\" tab in inbasket, or \"Choose Columns\" in Meds & Orders section of the refill encounter.                   Rik Faarax  [facility]  "

## 2018-02-02 RX ORDER — TRIAMCINOLONE ACETONIDE 1 MG/G
CREAM TOPICAL
Qty: 80 G | Refills: 0 | Status: SHIPPED | OUTPATIENT
Start: 2018-02-02 | End: 2018-06-21

## 2018-02-06 ENCOUNTER — OFFICE VISIT (OUTPATIENT)
Dept: FAMILY MEDICINE | Facility: CLINIC | Age: 68
End: 2018-02-06
Payer: MEDICARE

## 2018-02-06 VITALS
DIASTOLIC BLOOD PRESSURE: 76 MMHG | OXYGEN SATURATION: 98 % | TEMPERATURE: 97.9 F | HEIGHT: 63 IN | BODY MASS INDEX: 20.91 KG/M2 | WEIGHT: 118 LBS | SYSTOLIC BLOOD PRESSURE: 113 MMHG | RESPIRATION RATE: 20 BRPM | HEART RATE: 84 BPM

## 2018-02-06 DIAGNOSIS — M06.4 POLYARTHRITIS, INFLAMMATORY (H): ICD-10-CM

## 2018-02-06 DIAGNOSIS — R07.89 ATYPICAL CHEST PAIN: ICD-10-CM

## 2018-02-06 DIAGNOSIS — K21.9 GASTROESOPHAGEAL REFLUX DISEASE, ESOPHAGITIS PRESENCE NOT SPECIFIED: Primary | ICD-10-CM

## 2018-02-06 DIAGNOSIS — M85.89 OSTEOPENIA OF MULTIPLE SITES: ICD-10-CM

## 2018-02-06 DIAGNOSIS — L30.9 DERMATITIS: ICD-10-CM

## 2018-02-06 DIAGNOSIS — I10 HYPERTENSION, GOAL BELOW 150/90: ICD-10-CM

## 2018-02-06 DIAGNOSIS — E78.5 HYPERLIPIDEMIA WITH TARGET LDL LESS THAN 130: ICD-10-CM

## 2018-02-06 DIAGNOSIS — Z12.11 SCREEN FOR COLON CANCER: ICD-10-CM

## 2018-02-06 PROCEDURE — 99214 OFFICE O/P EST MOD 30 MIN: CPT | Performed by: FAMILY MEDICINE

## 2018-02-06 RX ORDER — TRIAMCINOLONE ACETONIDE 1 MG/G
CREAM TOPICAL
Qty: 80 G | Refills: 3 | Status: SHIPPED | OUTPATIENT
Start: 2018-02-06 | End: 2018-12-26

## 2018-02-06 RX ORDER — METOPROLOL TARTRATE 25 MG/1
25 TABLET, FILM COATED ORAL AT BEDTIME
Qty: 90 TABLET | Refills: 3 | Status: SHIPPED | OUTPATIENT
Start: 2018-02-06 | End: 2018-06-21

## 2018-02-06 RX ORDER — LOSARTAN POTASSIUM AND HYDROCHLOROTHIAZIDE 12.5; 5 MG/1; MG/1
1 TABLET ORAL DAILY
Qty: 90 TABLET | Refills: 3 | Status: SHIPPED | OUTPATIENT
Start: 2018-02-06 | End: 2018-06-21

## 2018-02-06 ASSESSMENT — PAIN SCALES - GENERAL: PAINLEVEL: NO PAIN (0)

## 2018-02-06 NOTE — MR AVS SNAPSHOT
After Visit Summary   2/6/2018    Noreen Florence    MRN: [id number]           Patient Information     Date Of Birth          1950        Visit Information        Provider Department      2/6/2018 5:20 PM Jia Castro MD [facility]        Today's Diagnoses     Hypertension, goal below 150/90    -  1    Polyarthritis, inflammatory (H)        Gastroesophageal reflux disease, esophagitis presence not specified        Atypical chest pain        Osteopenia of multiple sites        Screen for colon cancer        Hypertension goal BP (blood pressure) < 150/90        Dermatitis        Hyperlipidemia with target LDL less than 130          Care Instructions    At [facility], we strive to deliver an exceptional experience to you, every time we see you.  If you receive a survey in the mail, please send us back your thoughts. We really do value your feedback.    Based on your medical history, these are the current health maintenance/preventive care services that you are due for (some may have been done at this visit.)  Health Maintenance Due   Topic Date Due     ADVANCE DIRECTIVE PLANNING Q5 YRS  02/04/2007     FIT Q1 YR  12/27/2017         Suggested websites for health information:  Www.[employer].GoMiles : Up to date and easily searchable information on multiple topics.  Www.medlineplus.gov : medication info, interactive tutorials, watch real surgeries online  Www.familydoctor.org : good info from the Academy of Family Physicians  Www.cdc.gov : public health info, travel advisories, epidemics (H1N1)  Www.aap.org : children's health info, normal development, vaccinations  Www.health.state.[place].us : [place] dept of health, public health issues in [place], N1N1    Your care team:                            Family Medicine Internal Medicine   MD Alfredo Morgan MD Shantel Branch-Fleming, MD Katya Georgiev PA-C Nam Ho, MD Pediatrics   MOSES Alberts,  PHIL Burton APRN CNP   MD Naila Champion MD Deborah Mielke, MD Kim Thein, APRN [facility]      Clinic hours: Monday - Thursday 7 am-7 pm; Fridays 7 am-5 pm.   Urgent care: Monday - Friday 11 am-9 pm; Saturday and Sunday 9 am-5 pm.  Pharmacy : Monday -Thursday 8 am-8 pm; Friday 8 am-6 pm; Saturday and Sunday 9 am-5 pm.     Clinic: [phone]   Pharmacy: [phone]    GERD (Adult)    The esophagus is a tube that carries food from the mouth to the stomach. A valve at the lower end of the esophagus prevents stomach acid from flowing upward. When this valve doesn't work properly, stomach contents may repeatedly flow back up (reflux) into the esophagus. This is called gastroesophageal reflux disease (GERD). GERD can irritate the esophagus. It can cause problems with swallowing or breathing. In severe cases, GERD can cause recurrent pneumonia or other serious problems.  Symptoms of reflux include burning, pressure or sharp pain in the upper abdomen or mid to lower chest. The pain can spread to the neck, back, or shoulder. There may be belching, an acid taste in the back of the throat, chronic cough, or sore throat or hoarseness. GERD symptoms often occur during the day after a big meal. They can also occur at night when lying down.   Home care  Lifestyle changes can help reduce symptoms. If needed, medicines may be prescribed. Symptoms often improve with treatment, but if treatment is stopped, the symptoms often return after a few months. So most persons with GERD will need to continue treatment.  Lifestyle changes    Limit or avoid fatty, fried, and spicy foods, as well as coffee, chocolate, mint, and foods with high acid content such as tomatoes and citrus fruit and juices (orange, grapefruit, lemon).    Don t eat large meals, especially at night. Frequent, smaller meals are best. Do not lie down right after eating. And don t eat anything 3 hours before going to bed.    Avoid drinking  "alcohol and smoking. As much as possible, stay away from second hand smoke.    If you are overweight, losing weight will reduce symptoms.     Avoid wearing tight clothing around your stomach area.    If your symptoms occur during sleep, use a foam wedge to elevate your upper body (not just your head.) Or, place 4\" blocks under the head of your bed.  Medicines  If needed, medicines can help relieve the symptoms of GERD and prevent damage to the esophagus. Discuss a medicine plan with your healthcare provider. This may include one or more of the following medicines:    Antacids to help neutralize the normal acids in your stomach.    Acid blockers (H2 blockers) to decrease acid production.    Acid inhibitors (PPIs) to decrease acid production in a different way than the blockers. They may work better, but can take a little longer to take effect.  Take an antacid 30-60 minutes after eating and at bedtime, but not at the same time as an acid blocker.  Try not to take medicines such as ibuprofen and aspirin. If you are taking aspirin for your heart or other medical reasons, talk to your healthcare provider about stopping it.  Follow-up care  Follow up with your healthcare provider or as advised by our staff.  When to seek medical advice  Call your healthcare provider if any of the following occur:    Stomach pain gets worse or moves to the lower right abdomen (appendix area)    Chest pain appears or gets worse, or spreads to the back, neck, shoulder, or arm    Frequent vomiting (can t keep down liquids)    Blood in the stool or vomit (red or black in color)    Feeling weak or dizzy    Fever of 100.4 F (38 C) or higher, or as directed by your healthcare provider  Date Last Reviewed: 6/23/2015 2000-2017 [employer]. [address] [place] [address], [place] [address]. All rights reserved. This information is not intended as a substitute for professional medical care. Always follow your healthcare professional's " instructions.                Follow-ups after your visit        Additional Services     GASTROENTEROLOGY ADULT REF PROCEDURE ONLY [facility] [phone]       Last Lab Result: Creatinine (mg/dL)       Date                     Value                 10/02/2017               0.61             ----------  Body mass index is 20.9 kg/(m^2).     Needed:  No  Language:  Greenlandic    Patient will be contacted to schedule procedure.     Please be aware that coverage of these services is subject to the terms and limitations of your health insurance plan.  Call member services at your health plan with any benefit or coverage questions.  Any procedures must be performed at a [facility] OR coordinated by your clinic's referral office.    Please bring the following with you to your appointment:    (1) Any X-Rays, CTs or MRIs which have been performed.  Contact the facility where they were done to arrange for  prior to your scheduled appointment.    (2) List of current medications   (3) This referral request   (4) Any documents/labs given to you for this referral                  Follow-up notes from your care team     Return in about 4 months (around 6/6/2018).      Your next 10 appointments already scheduled     Apr 30, 2018  1:30 PM CDT   Return Visit with Shai Levin MD   [facility]    [id number] [place] [id number] [phone]            Jun 04, 2018  9:00 AM CDT   PHYSICAL with Jia Castro MD   [facility]    [address] [facility] [id number] [phone]            Jan 28, 2019  1:00 PM CST   US RENAL COMPLETE with MGUS1, MG US TECH   [facility] ([facility])    [address] [place] [id number] [phone]           Please bring a list of your medicines (including vitamins, minerals  and over-the-counter drugs). Also, tell your doctor about any allergies you may have. Wear comfortable clothes and leave your valuables at home.  You do not need to do anything special to prepare for your exam.  Please call the Imaging Department at your exam site with any questions.            Jan 28, 2019  1:30 PM CST   Return Visit with Emi Morales MD   [facility] ([facility])    [address] [place] [address] [phone]              Future tests that were ordered for you today     Open Future Orders        Priority Expected Expires Ordered    Fecal colorectal cancer screen (FIT) Routine 2/27/2018 5/1/2018 2/6/2018            Who to contact     If you have questions or need follow up information about today's clinic visit or your schedule please contact [facility] directly at [phone].  Normal or non-critical lab and imaging results will be communicated to you by MyChart, letter or phone within 4 business days after the clinic has received the results. If you do not hear from us within 7 days, please contact the clinic through [employer] or phone. If you have a critical or abnormal lab result, we will notify you by phone as soon as possible.  Submit refill requests through [employer] or call your pharmacy and they will forward the refill request to us. Please allow 3 business days for your refill to be completed.          Additional Information About Your Visit        [employer] Information     [employer] gives you secure access to your electronic health record. If you see a primary care provider, you can also send messages to your care team and make appointments. If you have questions, please call your primary care clinic.  If you do not have a primary care provider, please call [phone] and they will assist you.        Care EveryWhere ID     This is your Care EveryWhere ID. This could be used by other organizations to access your  "[facility] medical records  [id number]        Your Vitals Were     Pulse Temperature Respirations Height Pulse Oximetry Breastfeeding?    84 97.9  F (36.6  C) (Oral) 20 5' 3\" (1.6 m) 98% No    BMI (Body Mass Index)                   20.9 kg/m2            Blood Pressure from Last 3 Encounters:   02/06/18 113/76   01/29/18 131/76   11/03/17 134/80    Weight from Last 3 Encounters:   02/06/18 118 lb (53.5 kg)   01/29/18 116 lb (52.6 kg)   11/03/17 116 lb (52.6 kg)              We Performed the Following     GASTROENTEROLOGY ADULT REF PROCEDURE ONLY [facility] [phone]          Today's Medication Changes          These changes are accurate as of 2/6/18  5:59 PM.  If you have any questions, ask your nurse or doctor.               These medicines have changed or have updated prescriptions.        Dose/Directions    losartan-hydrochlorothiazide 50-12.5 MG per tablet   Commonly known as:  HYZAAR   This may have changed:  See the new instructions.   Used for:  Hypertension goal BP (blood pressure) < 150/90   Changed by:  Jia Castro MD        Dose:  1 tablet   Take 1 tablet by mouth daily   Quantity:  90 tablet   Refills:  3       metoprolol tartrate 25 MG tablet   Commonly known as:  LOPRESSOR   This may have changed:  See the new instructions.   Used for:  Hypertension goal BP (blood pressure) < 150/90   Changed by:  Jia Castro MD        Dose:  25 mg   Take 1 tablet (25 mg) by mouth At Bedtime   Quantity:  90 tablet   Refills:  3       pravastatin 20 MG tablet   Commonly known as:  PRAVACHOL   This may have changed:  Another medication with the same name was removed. Continue taking this medication, and follow the directions you see here.   Used for:  Mixed hyperlipidemia   Changed by:  Jia Castro MD        TAKE 1 TABLET(20 MG) BY MOUTH DAILY   Quantity:  90 tablet   Refills:  3       * triamcinolone 0.1 % cream   Commonly known as:  KENALOG   This may have changed:  Another " medication with the same name was added. Make sure you understand how and when to take each.   Used for:  Dermatitis   Changed by:  Jia Castro MD        APPLY TOPICALLY TWICE DAILY   Quantity:  80 g   Refills:  0       * triamcinolone 0.1 % cream   Commonly known as:  KENALOG   This may have changed:  You were already taking a medication with the same name, and this prescription was added. Make sure you understand how and when to take each.   Used for:  Dermatitis   Changed by:  Jia Castro MD        APPLY TOPICALLY TWICE DAILY   Quantity:  80 g   Refills:  3       * Notice:  This list has 2 medication(s) that are the same as other medications prescribed for you. Read the directions carefully, and ask your doctor or other care provider to review them with you.      Stop taking these medicines if you haven't already. Please contact your care team if you have questions.     ranitidine 300 MG tablet   Commonly known as:  ZANTAC   Stopped by:  Jia Castro MD                Where to get your medicines      These medications were sent to [facility] [address] [address] AT [facility]  [address] [facility] [address]     Phone:  [phone]     calcium-vitamin D 600-400 MG-UNIT per tablet    losartan-hydrochlorothiazide 50-12.5 MG per tablet    metoprolol tartrate 25 MG tablet    triamcinolone 0.1 % cream                Primary Care Provider Office Phone # Fax #    Jia Castro [phone]       [address]  [place] [address]        Equal Access to Services     [facility] AH: Hadii aad ku hadasho Soomaali, waaxda luqadaha, qaybta kaalmada adeegyada, axel walker. So [facility] [phone].    ATENCIÓN: Si habla español, tiene a barker disposición servicios gratuitos de asistencia lingüística. Llame al [phone].    We comply with applicable federal civil rights  laws and [place] laws. We do not discriminate on the basis of race, color, national origin, age, disability, sex, sexual orientation, or gender identity.            Thank you!     Thank you for choosing [facility]  for your care. Our goal is always to provide you with excellent care. Hearing back from our patients is one way we can continue to improve our services. Please take a few minutes to complete the written survey that you may receive in the mail after your visit with us. Thank you!             Your Updated Medication List - Protect others around you: Learn how to safely use, store and throw away your medicines at www.disposemymeds.org.          This list is accurate as of 2/6/18  5:59 PM.  Always use your most recent med list.                   Brand Name Dispense Instructions for use Diagnosis    calcium-vitamin D 600-400 MG-UNIT per tablet    calcium 600 + D    60 tablet    Take 1 tablet by mouth 2 times daily    Osteopenia of multiple sites       cetirizine 10 MG tablet    zyrTEC    30 tablet    TAKE 1 TABLET BY MOUTH DAILY    Chronic rhinitis       chlorhexidine 0.12 % solution    PERIDEX    473 mL    SWISH AND SPIT 15 ML BY MOUTH TWICE DAILY    Gingivitis       fluticasone 50 MCG/ACT spray    FLONASE    1 Package    Spray 1-2 sprays into both nostrils daily    Hayfever       hydrocortisone 2.5 % cream    ANUSOL-HC    30 g    Place rectally 2 times daily    External hemorrhoids       latanoprost 0.005 % ophthalmic solution    XALATAN    2.5 mL    Place 1 drop into both eyes At Bedtime    Glaucoma suspect of both eyes       leucovorin 5 MG tablet    WELLCOVORIN     TK 1 T PO 12 H AFTER METHOTREXATE ONE TIME EACH WEEK        losartan-hydrochlorothiazide 50-12.5 MG per tablet    HYZAAR    90 tablet    Take 1 tablet by mouth daily    Hypertension goal BP (blood pressure) < 150/90       methotrexate 2.5 MG tablet     1 tablet    Take 6 tablets (15 mg) by mouth once a week     Polyarthritis, inflammatory (H)       metoprolol tartrate 25 MG tablet    LOPRESSOR    90 tablet    Take 1 tablet (25 mg) by mouth At Bedtime    Hypertension goal BP (blood pressure) < 150/90       omeprazole 40 MG capsule    priLOSEC    90 capsule    TAKE 1 CAPSULE(40 MG) BY MOUTH DAILY 30 TO 60 MINUTES BEFORE A MEAL AS NEEDED    Gastroesophageal reflux disease with esophagitis       pravastatin 20 MG tablet    PRAVACHOL    90 tablet    TAKE 1 TABLET(20 MG) BY MOUTH DAILY    Mixed hyperlipidemia       tiZANidine 4 MG tablet    ZANAFLEX    60 tablet    Take 1 tablet (4 mg) by mouth 3 times daily as needed for muscle spasms    Back muscle spasm       * triamcinolone 0.1 % cream    KENALOG    80 g    APPLY TOPICALLY TWICE DAILY    Dermatitis       * triamcinolone 0.1 % cream    KENALOG    80 g    APPLY TOPICALLY TWICE DAILY    Dermatitis       * Notice:  This list has 2 medication(s) that are the same as other medications prescribed for you. Read the directions carefully, and ask your doctor or other care provider to review them with you.

## 2018-02-06 NOTE — PATIENT INSTRUCTIONS
At [facility], we strive to deliver an exceptional experience to you, every time we see you.  If you receive a survey in the mail, please send us back your thoughts. We really do value your feedback.    Based on your medical history, these are the current health maintenance/preventive care services that you are due for (some may have been done at this visit.)  Health Maintenance Due   Topic Date Due     ADVANCE DIRECTIVE PLANNING Q5 YRS  02/04/2007     FIT Q1 YR  12/27/2017         Suggested websites for health information:  Www.Foods You Can.org : Up to date and easily searchable information on multiple topics.  Www.Sun LifeLight.gov : medication info, interactive tutorials, watch real surgeries online  Www.familydoctor.org : good info from the Academy of Family Physicians  Www.cdc.gov : public health info, travel advisories, epidemics (H1N1)  Www.aap.org : children's health info, normal development, vaccinations  Www.health.[facility].[place].us : [place] dept of health, public health issues in [place], N1N1    Your care team:                            Family Medicine Internal Medicine   MD Alfredo Morgan MD Shantel Branch-Fleming, MD Katya Georgiev PA-C Nam Ho, MD Pediatrics   MOSES Alberts, PHIL SHORT CNP   MD Naila Champion MD Deborah Mielke, MD Kim Thein, APRN [facility]      Clinic hours: Monday - Thursday 7 am-7 pm; Fridays 7 am-5 pm.   Urgent care: Monday - Friday 11 am-9 pm; Saturday and Sunday 9 am-5 pm.  Pharmacy : Monday -Thursday 8 am-8 pm; Friday 8 am-6 pm; Saturday and Sunday 9 am-5 pm.     Clinic: [phone]   Pharmacy: [phone]    GERD (Adult)    The esophagus is a tube that carries food from the mouth to the stomach. A valve at the lower end of the esophagus prevents stomach acid from flowing upward. When this valve doesn't work properly, stomach contents may repeatedly flow back up (reflux) into the esophagus. This is  "called gastroesophageal reflux disease (GERD). GERD can irritate the esophagus. It can cause problems with swallowing or breathing. In severe cases, GERD can cause recurrent pneumonia or other serious problems.  Symptoms of reflux include burning, pressure or sharp pain in the upper abdomen or mid to lower chest. The pain can spread to the neck, back, or shoulder. There may be belching, an acid taste in the back of the throat, chronic cough, or sore throat or hoarseness. GERD symptoms often occur during the day after a big meal. They can also occur at night when lying down.   Home care  Lifestyle changes can help reduce symptoms. If needed, medicines may be prescribed. Symptoms often improve with treatment, but if treatment is stopped, the symptoms often return after a few months. So most persons with GERD will need to continue treatment.  Lifestyle changes    Limit or avoid fatty, fried, and spicy foods, as well as coffee, chocolate, mint, and foods with high acid content such as tomatoes and citrus fruit and juices (orange, grapefruit, lemon).    Don t eat large meals, especially at night. Frequent, smaller meals are best. Do not lie down right after eating. And don t eat anything 3 hours before going to bed.    Avoid drinking alcohol and smoking. As much as possible, stay away from second hand smoke.    If you are overweight, losing weight will reduce symptoms.     Avoid wearing tight clothing around your stomach area.    If your symptoms occur during sleep, use a foam wedge to elevate your upper body (not just your head.) Or, place 4\" blocks under the head of your bed.  Medicines  If needed, medicines can help relieve the symptoms of GERD and prevent damage to the esophagus. Discuss a medicine plan with your healthcare provider. This may include one or more of the following medicines:    Antacids to help neutralize the normal acids in your stomach.    Acid blockers (H2 blockers) to decrease acid " production.    Acid inhibitors (PPIs) to decrease acid production in a different way than the blockers. They may work better, but can take a little longer to take effect.  Take an antacid 30-60 minutes after eating and at bedtime, but not at the same time as an acid blocker.  Try not to take medicines such as ibuprofen and aspirin. If you are taking aspirin for your heart or other medical reasons, talk to your healthcare provider about stopping it.  Follow-up care  Follow up with your healthcare provider or as advised by our staff.  When to seek medical advice  Call your healthcare provider if any of the following occur:    Stomach pain gets worse or moves to the lower right abdomen (appendix area)    Chest pain appears or gets worse, or spreads to the back, neck, shoulder, or arm    Frequent vomiting (can t keep down liquids)    Blood in the stool or vomit (red or black in color)    Feeling weak or dizzy    Fever of 100.4 F (38 C) or higher, or as directed by your healthcare provider  Date Last Reviewed: 6/23/2015 2000-2017 [employer] [address] [place] [id number]. All rights reserved. This information is not intended as a substitute for professional medical care. Always follow your healthcare professional's instructions.

## 2018-02-06 NOTE — PROGRESS NOTES
SUBJECTIVE:   Noreen Florence is a 68 year old female who presents to clinic today for the following health issues:    Medication Followup of Omeprazole 40mg  Yesterday at night noticed a sharp pain left sided chest for few seconds, similar pain as before when discuss that may be related to acid reflux. She drank water and helped. No anginal symptoms or chest pressure.       Taking Medication as prescribed: no only takes once every morning 30-60 minutes before breakfast    Side Effects:  None    Medication Helping Symptoms: Yes in the morning       Hyperlipidemia Follow-Up      Rate your low fat/cholesterol diet?: good    Taking statin?  No    Other lipid medications/supplements?:  none    Hypertension Follow-up      Outpatient blood pressures are not being checked.    Low Salt Diet: no added salt      Problem list and histories reviewed & adjusted, as indicated.  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 pylori test     Pterygium of right eye     Sicca syndrome (H)     Angiomyolipoma of right kidney     Past Surgical History:   Procedure Laterality Date     COLONOSCOPY       COLONOSCOPY WITH CO2 INSUFFLATION N/A 4/5/2017    Procedure: COLONOSCOPY WITH CO2 INSUFFLATION;  Surgeon: Duane, William Charles, MD;  Location: [facility]     COMBINED REPAIR PTOSIS WITH BLEPHAROPLASTY BILATERAL Bilateral 1/6/2017    Procedure: COMBINED REPAIR PTOSIS WITH BLEPHAROPLASTY BILATERAL;  Surgeon: Carly Norman MD;  Location: [facility]     ESOPHAGOSCOPY, GASTROSCOPY, DUODENOSCOPY (EGD), COMBINED N/A 1/5/2016    Procedure: COMBINED ESOPHAGOSCOPY, GASTROSCOPY, DUODENOSCOPY (EGD),  BIOPSY SINGLE OR MULTIPLE;  Surgeon: Duane, William Charles, MD;  Location: [facility]     REPAIR ENTROPION BILATERAL Bilateral 1/6/2017    Procedure: REPAIR ENTROPION BILATERAL;  Surgeon: Carly Norman MD;  Location: [facility]     REPAIR PTOSIS Bilateral 01/2017       Social History   Substance Use Topics     Smoking status: Never Smoker     Smokeless tobacco: Never Used     Alcohol use No     Family History   Problem Relation Age of Onset     DIABETES Mother      CANCER No family hx of      Hypertension No family hx of      CEREBROVASCULAR DISEASE No family hx of      Thyroid Disease No family hx of      Glaucoma No family hx of      Macular Degeneration No family hx of          Current Outpatient Prescriptions   Medication Sig Dispense Refill     calcium-vitamin D (CALCIUM 600 + D) 600-400 MG-UNIT per tablet Take 1 tablet by mouth 2 times daily 60 tablet 11     metoprolol tartrate (LOPRESSOR) 25 MG tablet Take 1 tablet (25 mg) by mouth At Bedtime 90 tablet 3     losartan-hydrochlorothiazide (HYZAAR) 50-12.5 MG per tablet Take 1 tablet by mouth daily 90 tablet 3     triamcinolone (KENALOG) 0.1 % cream APPLY TOPICALLY TWICE DAILY 80 g 3     triamcinolone (KENALOG) 0.1 % cream APPLY TOPICALLY TWICE DAILY 80 g 0     pravastatin (PRAVACHOL) 20 MG tablet TAKE 1 TABLET(20 MG) BY MOUTH DAILY 90 tablet 3     omeprazole (PRILOSEC) 40 MG capsule TAKE 1 CAPSULE(40 MG) BY MOUTH DAILY 30 TO 60 MINUTES BEFORE A MEAL AS NEEDED 90 capsule 3     chlorhexidine (PERIDEX) 0.12 % solution SWISH AND SPIT 15 ML BY MOUTH TWICE DAILY 473 mL 3     tiZANidine (ZANAFLEX) 4 MG tablet Take 1 tablet (4 mg) by mouth 3 times daily as needed for muscle spasms 60 tablet 1     hydrocortisone (ANUSOL-HC) 2.5 % cream Place rectally 2 times daily 30 g 3     latanoprost (XALATAN) 0.005 % ophthalmic solution Place 1 drop into both eyes At Bedtime 2.5 mL 8     leucovorin (WELLCOVORIN) 5 MG tablet TK 1 T PO 12 H AFTER METHOTREXATE ONE TIME EACH WEEK  3      methotrexate 2.5 MG tablet Take 6 tablets (15 mg) by mouth once a week 1 tablet 0     cetirizine (ZYRTEC) 10 MG tablet TAKE 1 TABLET BY MOUTH DAILY 30 tablet 11     fluticasone (FLONASE) 50 MCG/ACT nasal spray Spray 1-2 sprays into both nostrils daily 1 Package 11     No Known Allergies  Recent Labs   Lab Test  10/02/17   1646  06/21/17   0850 05/25/17 02/24/17 12/23/16   1152 11/30/16  10/27/16   0939   03/03/16   0856   02/09/15   0755   A1C   --    --    --    --    --    --   5.9   --    --    --    --    LDL   --   143*   --    --    --    --    --    --   130*   --   120   HDL   --   53   --    --    --    --    --    --   41*   --   42*   TRIG   --   174*   --    --    --    --    --    --   184*   --   337*   ALT   --    --   33  27   --   40   --    < >   --    < >   --    CR  0.61   --   0.66  0.65  0.51*  0.66  0.55   < >   --    < >  0.47*   GFRESTIMATED  >90   --   >60  >60  >90  Non  GFR Calc    >60  >90  Non  GFR Calc     < >   --    < >  >90  Non  GFR Calc     GFRESTBLACK  >90   --   >60  >60  >90  African American GFR Calc    >60  >90   GFR Calc     < >   --    < >  >90   GFR Calc     POTASSIUM  3.5   --    --    --   3.9   --   3.7   < >   --    < >  3.5    < > = values in this interval not displayed.      BP Readings from Last 3 Encounters:   02/06/18 113/76   01/29/18 131/76   11/03/17 134/80    Wt Readings from Last 3 Encounters:   02/06/18 118 lb (53.5 kg)   01/29/18 116 lb (52.6 kg)   11/03/17 116 lb (52.6 kg)                  Labs reviewed in EPIC    Reviewed and updated as needed this visit by clinical staff  Tobacco  Allergies  Meds  Med Hx  Surg Hx  Fam Hx  Soc Hx      Reviewed and updated as needed this visit by Provider         ROS:  Constitutional, HEENT, cardiovascular, pulmonary, gi and gu systems are negative, except as otherwise noted.    OBJECTIVE:     /76 (BP Location: Right arm, Patient  "Position: Chair, Cuff Size: Adult Regular)  Pulse 84  Temp 97.9  F (36.6  C) (Oral)  Resp 20  Ht 5' 3\" (1.6 m)  Wt 118 lb (53.5 kg)  SpO2 98%  Breastfeeding? No  BMI 20.9 kg/m2  Body mass index is 20.9 kg/(m^2).  GENERAL APPEARANCE: healthy, alert and no distress  EYES: Eyes grossly normal to inspection, PERRL and conjunctivae and sclerae normal  HENT: ear canals and TM's normal, nose and mouth without ulcers or lesions, oropharynx clear and oral mucous membranes moist  NECK: no adenopathy, no asymmetry, masses, or scars and thyroid normal to palpation  RESP: lungs clear to auscultation - no rales, rhonchi or wheezes  CV: regular rates and rhythm, normal S1 S2, no S3 or S4, no murmur, click or rub, no peripheral edema and peripheral pulses strong  ABDOMEN: soft, nontender, no hepatosplenomegaly, no masses and bowel sounds normal  MS: no musculoskeletal defects are noted and gait is age appropriate without ataxia  SKIN: no suspicious lesions or rashes  NEURO: Normal strength and tone, sensory exam grossly normal, mentation intact and speech normal  PSYCH: mentation appears normal and affect normal/bright     Diagnostic Test Results:  Results for orders placed or performed in visit on 01/29/18   UA reflex to Microscopic and Culture   Result Value Ref Range    Color Urine Light Yellow     Appearance Urine Clear     Glucose Urine Negative NEG^Negative mg/dL    Bilirubin Urine Negative NEG^Negative    Ketones Urine Negative NEG^Negative mg/dL    Specific Gravity Urine 1.006 1.003 - 1.035    Blood Urine Trace (A) NEG^Negative    pH Urine 6.5 5.0 - 7.0 pH    Protein Albumin Urine Negative NEG^Negative mg/dL    Urobilinogen mg/dL Normal 0.0 - 2.0 mg/dL    Nitrite Urine Negative NEG^Negative    Leukocyte Esterase Urine Small (A) NEG^Negative    Source Midstream Urine    Cytology non gyn   Result Value Ref Range    Copath Report       Patient Name: MOISES WOLF  MR#: [id number]  Specimen #: [id number]  Collected: " 1/29/2018  Received: 1/30/2018  Reported: 1/30/2018 16:05  Ordering Phy(s): JUANJOSE CHRISTIANSON    For improved result formatting, select 'View Enhanced Report Format' under   Linked Documents section.    SPECIMEN/STAIN PROCESS:  Urine-voided       Pap-Cyto x 1    ----------------------------------------------------------------    CYTOLOGIC INTERPRETATION:    Urine, voided:  - Negative for high-grade urothelial carcinoma  - Acute inflammation present  Specimen Adequacy: Satisfactory for evaluation.    I have personally reviewed all specimens and/or slides, including the   listed special stains, and used them  with my medical judgement to determine or confirm the final diagnosis.    Electronically signed out by:  Benita Johnson M.D., [facility]    Processed and screened at [facility]    CLINICAL HISTORY:  This patient is a 67-year-old female with a histor y of microhematuria and   small angiomyolipoma.    ,    GROSS:  Urine-voided:  Received 30 ml of yellow, clear fluid, processed as 1 Pap   stained Autocyte..    MICROSCOPIC:  Microscopic examination is performed.    Nicol Holliday MD, Pathology Resident; Renay Dominique MD, Cytopathology   Fellow; Benita Johnson MD.    CLINICAL LAB RESULTS:  Battery Order No. Lab Test Code Clinical Result Ref. Range Units Result   Date  UA Macro Rfx Ward&Cult [id number] MG Color Light Yellow   1/29/2018 14:32       Appearance Clear   1/29/2018 14:32       Glucose, Urine Negative NEG mg/dL 1/29/2018 14:32       Bilirubin, Urine Negative NEG  1/29/2018 14:32       Ketones, Urine Negative NEG mg/dL 1/29/2018 14:32       Specific Gravity, Ur 1.006 1.003-1.035  1/29/2018 14:32       Blood, Urine A Trace NEG  1/29/2018 14:32       pH, Urine 6.5 5.0-7.0 pH 1/29/2018 14:32       Protein, Albumin, Ur Negative NEG mg/dL 1/29/2018 14:32       Urobilinogen Normal 0.0-2.0 mg/dL 1/29/2018 14:32       Nitrites Negative NE G  1/29/2018  14:32       Leuk Esterase A Small NEG  1/29/2018 14:32       Source SEE TEXT   1/29/2018 14:28       Text/Comments:  Midstream Urine    Urine Microscopic with reflex culture   WBC A 2-5 OTO2 /HPF 1/29/2018   14:50       RBC O - 2 OTO2 /HPF 1/29/2018 14:50       Squamous Epithelial Few FEW /LPF 1/29/2018 14:50    CPT Codes:  A: 63317-[id number]    TESTING LAB LOCATION:  [facility], [address]   [address] [phone]    COLLECTION SITE:  Client:  [facility]  Location:  [facility] (B)    Resident  [id number]     Urine Microscopic   Result Value Ref Range    WBC Urine 2-5 (A) OTO2^O - 2 /HPF    RBC Urine O - 2 OTO2^O - 2 /HPF    Squamous Epithelial /LPF Urine Few FEW^Few /LPF       ASSESSMENT/PLAN:               ICD-10-CM    1. Gastroesophageal reflux disease, esophagitis presence not specified K21.9 Symptoms responding to omeprazole and discussed taking as needed and not every day.   2. Atypical chest pain R07.89 Very brief pains and discussed causes but not suspicious of cardiac source. Discussed signs and symptoms of heart problems   3. Hypertension, goal below 150/90 I10 Well controlled on medications    4. Polyarthritis, inflammatory (H) M06.4 Managed by rheumatology and is stable   5. Osteopenia of multiple sites M85.89 calcium-vitamin D (CALCIUM 600 + D) 600-400 MG-UNIT per tablet   6. Screen for colon cancer Z12.11 GASTROENTEROLOGY ADULT REF PROCEDURE ONLY [facility] [phone]     Fecal colorectal cancer screen (FIT)   7. Dermatitis L30.9 triamcinolone (KENALOG) 0.1 % cream   8. Hyperlipidemia with target LDL less than 130 E78.5 Stable        FUTURE APPOINTMENTS:       - Follow-up visit in 3-4 months or sooner if any questions or concerns.   See Patient Instructions    Jia Castro MD  [facility]

## 2018-02-08 ENCOUNTER — TELEPHONE (OUTPATIENT)
Dept: FAMILY MEDICINE | Facility: CLINIC | Age: 68
End: 2018-02-08

## 2018-02-08 DIAGNOSIS — Z12.11 SCREEN FOR COLON CANCER: ICD-10-CM

## 2018-02-08 LAB — HEMOCCULT STL QL IA: NEGATIVE

## 2018-02-08 PROCEDURE — 82274 ASSAY TEST FOR BLOOD FECAL: CPT | Performed by: FAMILY MEDICINE

## 2018-02-08 NOTE — LETTER
February 9, 2018      Noreen Florence  [address] [place] [address]        Dear ,    We are writing to inform you of your test results.    The lab results from the most recent visit are all normal or in a good range.     There was no blood in the stool. Recheck in 1 year.     Please call me if you have any questions about your condition or care.     Resulted Orders   Fecal colorectal cancer screen (FIT)   Result Value Ref Range    Occult Blood Scn FIT Negative NEG^Negative       If you have any questions or concerns, please call the clinic at the number listed above.       Sincerely,    Dr. Castro

## 2018-02-08 NOTE — TELEPHONE ENCOUNTER
Chart review shows last colonoscopy done in 4/2017 with recommendation to repeat in 10 years.   Routing to provider to review and advise.   Rylie Gómez RN

## 2018-02-08 NOTE — TELEPHONE ENCOUNTER
Reason for Call:  Other     Detailed comments: Son called for mom patient had colonscopy 1.5 yrs ago why repeat now. Please call back and advise.    Phone Number can be reached at: Archie Jimenez (son) [phone]    Best Time: any    Can we leave a detailed message on this number? YES    Call taken on 2/8/2018 at 9:40 AM by Ebony Ackerman

## 2018-02-09 ENCOUNTER — CARE COORDINATION (OUTPATIENT)
Dept: CARE COORDINATION | Facility: CLINIC | Age: 68
End: 2018-02-09

## 2018-02-09 NOTE — PROGRESS NOTES
I agree. I see no reason for her to have a colonoscopy before 2027. I'm not sure why the colonoscopy order was pended, but Dr. Castro signed it.

## 2018-02-09 NOTE — PROGRESS NOTES
Dear Noreen Florence,    The lab results from the most recent visit are all normal or in a good range.     There was no blood in the stool. Recheck in 1 year.    Please call me if you have any questions about your condition or care.     Sincerely,    Jia Castro MD

## 2018-02-09 NOTE — LETTER
[place] [facility] CARE COORDINATION  [address] [address]    February 9, 2018      Noreen Florence  [address]  [facility] [address]      Dear Noreen,    I have enclosed an Authorization to Discuss Protected Health Information form. Please review, fill out, sign and send back to me so I can make sure we have this on file to be able to talk to family/friends if you would like us to be able to.   Please call me with any questions or concerns.  Sincerely,  Melissa Behl BSN, RN, PHN  [facility] Care Coordinator  [phone]

## 2018-02-09 NOTE — PROGRESS NOTES
Clinic Care Coordination Contact  Patient called into writer to inquire why a colonoscopy was ordered, as she had one done on 4/5/17 and was told her next one was due in 10 years.    Please advise.    Melissa Behl BSN, RN, N  [facility] Care Coordinator  [phone]

## 2018-02-09 NOTE — PROGRESS NOTES
This writer attempted to contact [facility] on 02/09/18      Reason for call Colonsocopy and left message to return call.      If patient calls back:   Patient contacted by clinic RN team. Inform patient that someone from the team will contact them, document that pt called and route to care team.         Katerina Pina RN

## 2018-02-12 NOTE — PROGRESS NOTES
Pt updated on the below, no questions at this time, verbalized understanding.    Ayesha Ritter RN

## 2018-02-15 ENCOUNTER — TRANSFERRED RECORDS (OUTPATIENT)
Dept: HEALTH INFORMATION MANAGEMENT | Facility: CLINIC | Age: 68
End: 2018-02-15

## 2018-04-16 ENCOUNTER — OFFICE VISIT (OUTPATIENT)
Dept: FAMILY MEDICINE | Facility: CLINIC | Age: 68
End: 2018-04-16
Payer: MEDICARE

## 2018-04-16 VITALS
HEIGHT: 63 IN | TEMPERATURE: 98.4 F | DIASTOLIC BLOOD PRESSURE: 68 MMHG | BODY MASS INDEX: 21.09 KG/M2 | OXYGEN SATURATION: 98 % | WEIGHT: 119 LBS | HEART RATE: 91 BPM | SYSTOLIC BLOOD PRESSURE: 123 MMHG

## 2018-04-16 DIAGNOSIS — Z79.899 HIGH RISK MEDICATION USE: ICD-10-CM

## 2018-04-16 DIAGNOSIS — R10.13 ABDOMINAL PAIN, EPIGASTRIC: Primary | ICD-10-CM

## 2018-04-16 DIAGNOSIS — I10 HYPERTENSION, GOAL BELOW 150/90: ICD-10-CM

## 2018-04-16 DIAGNOSIS — M06.4 POLYARTHRITIS, INFLAMMATORY (H): ICD-10-CM

## 2018-04-16 DIAGNOSIS — K21.00 GASTROESOPHAGEAL REFLUX DISEASE WITH ESOPHAGITIS: ICD-10-CM

## 2018-04-16 PROBLEM — Z86.19 HISTORY OF HELICOBACTER PYLORI INFECTION: Status: ACTIVE | Noted: 2018-04-16

## 2018-04-16 LAB
ALBUMIN SERPL-MCNC: 3.8 G/DL (ref 3.4–5)
ALP SERPL-CCNC: 70 U/L (ref 40–150)
ALT SERPL W P-5'-P-CCNC: 31 U/L (ref 0–50)
ANION GAP SERPL CALCULATED.3IONS-SCNC: 7 MMOL/L (ref 3–14)
AST SERPL W P-5'-P-CCNC: 27 U/L (ref 0–45)
BILIRUB SERPL-MCNC: 0.3 MG/DL (ref 0.2–1.3)
BUN SERPL-MCNC: 13 MG/DL (ref 7–30)
CALCIUM SERPL-MCNC: 9.2 MG/DL (ref 8.5–10.1)
CHLORIDE SERPL-SCNC: 103 MMOL/L (ref 94–109)
CO2 SERPL-SCNC: 29 MMOL/L (ref 20–32)
CREAT SERPL-MCNC: 0.43 MG/DL (ref 0.52–1.04)
ERYTHROCYTE [DISTWIDTH] IN BLOOD BY AUTOMATED COUNT: 14.9 % (ref 10–15)
GFR SERPL CREATININE-BSD FRML MDRD: >90 ML/MIN/1.7M2
GLUCOSE SERPL-MCNC: 122 MG/DL (ref 70–99)
HCT VFR BLD AUTO: 41.3 % (ref 35–47)
HGB BLD-MCNC: 13.9 G/DL (ref 11.7–15.7)
MCH RBC QN AUTO: 28.7 PG (ref 26.5–33)
MCHC RBC AUTO-ENTMCNC: 33.7 G/DL (ref 31.5–36.5)
MCV RBC AUTO: 85 FL (ref 78–100)
PLATELET # BLD AUTO: 238 10E9/L (ref 150–450)
POTASSIUM SERPL-SCNC: 3.6 MMOL/L (ref 3.4–5.3)
PROT SERPL-MCNC: 7.9 G/DL (ref 6.8–8.8)
RBC # BLD AUTO: 4.84 10E12/L (ref 3.8–5.2)
SODIUM SERPL-SCNC: 139 MMOL/L (ref 133–144)
WBC # BLD AUTO: 6.9 10E9/L (ref 4–11)

## 2018-04-16 PROCEDURE — 36415 COLL VENOUS BLD VENIPUNCTURE: CPT | Performed by: FAMILY MEDICINE

## 2018-04-16 PROCEDURE — 80053 COMPREHEN METABOLIC PANEL: CPT | Performed by: FAMILY MEDICINE

## 2018-04-16 PROCEDURE — 85027 COMPLETE CBC AUTOMATED: CPT | Performed by: FAMILY MEDICINE

## 2018-04-16 PROCEDURE — 99214 OFFICE O/P EST MOD 30 MIN: CPT | Performed by: FAMILY MEDICINE

## 2018-04-16 RX ORDER — ALUMINA, MAGNESIA, AND SIMETHICONE 2400; 2400; 240 MG/30ML; MG/30ML; MG/30ML
15 SUSPENSION ORAL EVERY 6 HOURS PRN
Qty: 1 BOTTLE | Refills: 1 | Status: SHIPPED | OUTPATIENT
Start: 2018-04-16 | End: 2018-09-14

## 2018-04-16 ASSESSMENT — PAIN SCALES - GENERAL: PAINLEVEL: NO PAIN (0)

## 2018-04-16 NOTE — PROGRESS NOTES
SUBJECTIVE:   Noreen Florence is a 68 year old female who presents to clinic today for the following health issues:      ABDOMINAL PAIN     Onset: Last night     Description:   Character: Sharp pain lasted for 5-6 hours  Location: epigastric region  Radiation: None    Intensity: moderate    Progression of Symptoms:  improving    Accompanying Signs & Symptoms:  Fever/Chills?: no   Gas/Bloating: yes, preceded the pain  Nausea: no   Vomitting: no   Diarrhea?: no   Constipation:no   Dysuria or Hematuria: no    History:   Trauma: no   Previous similar pain: YES- but it last only 10 min    Previous tests done: none    Precipitating factors:   Does the pain change with:     Food: no      BM: no     Urination: no     Alleviating factors:  none    Therapies Tried and outcome: Tums didn't help this time    LMP:  not applicable       Hypertension Follow-up      Outpatient blood pressures are not being checked.    Low Salt Diet: no added salt    Chronic Pain Follow-Up       Type / Location of Pain: multiple joints, neck, hands, feet- treatment for inflammatory arthritis keeps this mostly under control but still has pain at times.  Analgesia/pain control:       Recent changes:  same      Overall control: Tolerable with discomfort  Activity level/function:      Daily activities:  Able to do light housework, cooking    Work:  Able to work part time with limitations  Adverse effects:  No  Adherance    Taking medication as directed?  Yes    Participating in other treatments: yes  Risk Factors:    Sleep:  Fair    Mood/anxiety:  controlled    Recent family or social stressors:  none noted    Other aggravating factors: none  No flowsheet data found.  No flowsheet data found.  Encounter-Level CSA:     There are no encounter-level csa.          Problem list and histories reviewed & adjusted, as indicated.  Additional history: as documented    Patient Active Problem List   Diagnosis     Osteopenia     Hayfever     Hypertension, goal below  150/90     Polyarthritis, inflammatory (H)     GERD (gastroesophageal reflux disease)     Hyperlipidemia with target LDL less than 130     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     High risk medication use     Microscopic hematuria     Pterygium of right eye     Sicca syndrome (H)     Angiomyolipoma of right kidney     History of Helicobacter pylori infection     Past Surgical History:   Procedure Laterality Date     COLONOSCOPY       COLONOSCOPY WITH CO2 INSUFFLATION N/A 4/5/2017    Procedure: COLONOSCOPY WITH CO2 INSUFFLATION;  Surgeon: Duane, William Charles, MD;  Location: [facility]     COMBINED REPAIR PTOSIS WITH BLEPHAROPLASTY BILATERAL Bilateral 1/6/2017    Procedure: COMBINED REPAIR PTOSIS WITH BLEPHAROPLASTY BILATERAL;  Surgeon: Carly Norman MD;  Location: [facility]     ESOPHAGOSCOPY, GASTROSCOPY, DUODENOSCOPY (EGD), COMBINED N/A 1/5/2016    Procedure: COMBINED ESOPHAGOSCOPY, GASTROSCOPY, DUODENOSCOPY (EGD), BIOPSY SINGLE OR MULTIPLE;  Surgeon: Duane, William Charles, MD;  Location: [facility]     REPAIR ENTROPION BILATERAL Bilateral 1/6/2017    Procedure: REPAIR ENTROPION BILATERAL;  Surgeon: Carly Norman MD;  Location: [facility]     REPAIR PTOSIS Bilateral 01/2017       Social History   Substance Use Topics     Smoking status: Never Smoker     Smokeless tobacco: Never Used     Alcohol use No     Family History   Problem Relation Age of Onset     DIABETES Mother      CANCER No family hx of      Hypertension No family hx of      CEREBROVASCULAR DISEASE No family hx of      Thyroid Disease No family hx of      Glaucoma No family hx of      Macular Degeneration No family hx of          Current Outpatient Prescriptions   Medication Sig Dispense Refill     alum & mag hydroxide-simethicone (MYLANTA MAXIMUM STRENGTH) 400-400-40 MG/5ML SUSP suspension Take 15 mLs by mouth every 6 hours as needed for indigestion 1 Bottle 1     calcium-vitamin D (CALCIUM 600 + D) 600-400 MG-UNIT per tablet Take 1 tablet by  mouth 2 times daily 60 tablet 11     metoprolol tartrate (LOPRESSOR) 25 MG tablet Take 1 tablet (25 mg) by mouth At Bedtime 90 tablet 3     losartan-hydrochlorothiazide (HYZAAR) 50-12.5 MG per tablet Take 1 tablet by mouth daily 90 tablet 3     triamcinolone (KENALOG) 0.1 % cream APPLY TOPICALLY TWICE DAILY 80 g 3     triamcinolone (KENALOG) 0.1 % cream APPLY TOPICALLY TWICE DAILY 80 g 0     pravastatin (PRAVACHOL) 20 MG tablet TAKE 1 TABLET(20 MG) BY MOUTH DAILY 90 tablet 3     omeprazole (PRILOSEC) 40 MG capsule TAKE 1 CAPSULE(40 MG) BY MOUTH DAILY 30 TO 60 MINUTES BEFORE A MEAL AS NEEDED 90 capsule 3     chlorhexidine (PERIDEX) 0.12 % solution SWISH AND SPIT 15 ML BY MOUTH TWICE DAILY 473 mL 3     tiZANidine (ZANAFLEX) 4 MG tablet Take 1 tablet (4 mg) by mouth 3 times daily as needed for muscle spasms 60 tablet 1     hydrocortisone (ANUSOL-HC) 2.5 % cream Place rectally 2 times daily 30 g 3     latanoprost (XALATAN) 0.005 % ophthalmic solution Place 1 drop into both eyes At Bedtime 2.5 mL 8     leucovorin (WELLCOVORIN) 5 MG tablet TK 1 T PO 12 H AFTER METHOTREXATE ONE TIME EACH WEEK  3     methotrexate 2.5 MG tablet Take 6 tablets (15 mg) by mouth once a week 1 tablet 0     cetirizine (ZYRTEC) 10 MG tablet TAKE 1 TABLET BY MOUTH DAILY 30 tablet 11     fluticasone (FLONASE) 50 MCG/ACT nasal spray Spray 1-2 sprays into both nostrils daily 1 Package 11     No Known Allergies  Recent Labs   Lab Test  04/16/18   1610  10/02/17   1646  06/21/17   0850 05/25/17 02/24/17   10/27/16   0939   03/03/16   0856   02/09/15   0755   A1C   --    --    --    --    --    --   5.9   --    --    --    --    LDL   --    --   143*   --    --    --    --    --   130*   --   120   HDL   --    --   53   --    --    --    --    --   41*   --   42*   TRIG   --    --   174*   --    --    --    --    --   184*   --   337*   ALT  31   --    --   33  27   < >   --    < >   --    < >   --    CR  0.43*  0.61   --   0.66  0.65   < >  0.55   < >    "--    < >  0.47*   GFRESTIMATED  >90  >90   --   >60  >60   < >  >90  Non  GFR Calc     < >   --    < >  >90  Non  GFR Calc     GFRESTBLACK  >90  >90   --   >60  >60   < >  >90   GFR Calc     < >   --    < >  >90   GFR Calc     POTASSIUM  3.6  3.5   --    --    --    < >  3.7   < >   --    < >  3.5    < > = values in this interval not displayed.      BP Readings from Last 3 Encounters:   04/16/18 123/68   02/06/18 113/76   01/29/18 131/76    Wt Readings from Last 3 Encounters:   04/16/18 119 lb (54 kg)   02/06/18 118 lb (53.5 kg)   01/29/18 116 lb (52.6 kg)                  Labs reviewed in EPIC    Reviewed and updated as needed this visit by clinical staff  Tobacco  Allergies  Meds  Problems  Med Hx  Surg Hx  Fam Hx  Soc Hx        Reviewed and updated as needed this visit by Provider         ROS:  Constitutional, HEENT, cardiovascular, pulmonary, gi and gu systems are negative, except as otherwise noted.    OBJECTIVE:     /68 (BP Location: Right arm, Patient Position: Chair, Cuff Size: Adult Regular)  Pulse 91  Temp 98.4  F (36.9  C) (Oral)  Ht 5' 3\" (1.6 m)  Wt 119 lb (54 kg)  SpO2 98%  BMI 21.08 kg/m2  Body mass index is 21.08 kg/(m^2).  GENERAL APPEARANCE: healthy, alert and no distress  EYES: Eyes grossly normal to inspection, PERRL and conjunctivae and sclerae normal  HENT: ear canals and TM's normal, nose and mouth without ulcers or lesions, oropharynx clear and oral mucous membranes moist  NECK: no adenopathy, no asymmetry, masses, or scars and thyroid normal to palpation  RESP: lungs clear to auscultation - no rales, rhonchi or wheezes  CV: regular rates and rhythm, normal S1 S2, no S3 or S4, no murmur, click or rub, no peripheral edema and peripheral pulses strong  ABDOMEN: soft, nontender, no hepatosplenomegaly, no masses and bowel sounds normal  MS: no musculoskeletal defects are noted and gait is age appropriate without " "ataxia  SKIN: no suspicious lesions or rashes  NEURO: Normal strength and tone, sensory exam grossly normal, mentation intact and speech normal  PSYCH: mentation appears normal and affect normal/bright but anxious and focused on symptoms that have already resolved.     Diagnostic Test Results:  Results for orders placed or performed in visit on 04/16/18 (from the past 24 hour(s))   CBC with platelets   Result Value Ref Range    WBC 6.9 4.0 - 11.0 10e9/L    RBC Count 4.84 3.8 - 5.2 10e12/L    Hemoglobin 13.9 11.7 - 15.7 g/dL    Hematocrit 41.3 35.0 - 47.0 %    MCV 85 78 - 100 fl    MCH 28.7 26.5 - 33.0 pg    MCHC 33.7 31.5 - 36.5 g/dL    RDW 14.9 10.0 - 15.0 %    Platelet Count 238 150 - 450 10e9/L   Comprehensive metabolic panel   Result Value Ref Range    Sodium 139 133 - 144 mmol/L    Potassium 3.6 3.4 - 5.3 mmol/L    Chloride 103 94 - 109 mmol/L    Carbon Dioxide 29 20 - 32 mmol/L    Anion Gap 7 3 - 14 mmol/L    Glucose 122 (H) 70 - 99 mg/dL    Urea Nitrogen 13 7 - 30 mg/dL    Creatinine 0.43 (L) 0.52 - 1.04 mg/dL    GFR Estimate >90 >60 mL/min/1.7m2    GFR Estimate If Black >90 >60 mL/min/1.7m2    Calcium 9.2 8.5 - 10.1 mg/dL    Bilirubin Total 0.3 0.2 - 1.3 mg/dL    Albumin 3.8 3.4 - 5.0 g/dL    Protein Total 7.9 6.8 - 8.8 g/dL    Alkaline Phosphatase 70 40 - 150 U/L    ALT 31 0 - 50 U/L    AST 27 0 - 45 U/L       ASSESSMENT/PLAN:         Tobacco Cessation:   reports that she has never smoked. She has never used smokeless tobacco.      BMI:   Estimated body mass index is 21.08 kg/(m^2) as calculated from the following:    Height as of this encounter: 5' 3\" (1.6 m).    Weight as of this encounter: 119 lb (54 kg).           ICD-10-CM    1. Abdominal pain, epigastric- limited episode with abdominal bloating. Exam is normal at this time and pain has resolved. Will follow up if pain is recurrent. Go to emergency department if severe.  R10.13 CBC with platelets     Comprehensive metabolic panel     US Abdomen Limited "     alum & mag hydroxide-simethicone (MYLANTA MAXIMUM STRENGTH) 400-400-40 MG/5ML SUSP suspension     H Pylori antigen, stool   2. Hypertension, goal below 150/90 I10 Well controlled on medications    3. Polyarthritis, inflammatory (H) M06.4 Discussed follow up with rheumatologist and medication management    4. Gastroesophageal reflux disease with esophagitis K21.0 Symptoms under good control   5. High risk medication use Z79.899 Monitored by specialist but is at risk for infections.       FURTHER TESTING:       - abdominal ultrasound if pain is recurrent to look for gallstones or liver disease  FUTURE LABS:       - Schedule fasting labs in 6 months  FUTURE APPOINTMENTS:       - Follow-up visit in 1-2 months or sooner if any questions or concerns.   Work on weight loss  Regular exercise  See Patient Instructions    Jia Castro MD  [facility]

## 2018-04-16 NOTE — MR AVS SNAPSHOT
After Visit Summary   4/16/2018    Noreen Florence    MRN: [id number]           Patient Information     Date Of Birth          1950        Visit Information        Provider Department      4/16/2018 3:40 PM Jia Castro MD [facility]        Today's Diagnoses     Abdominal pain, epigastric    -  1    Hypertension, goal below 150/90          Care Instructions      Epigastric Pain (Uncertain Cause)     Epigastric pain can be a sign of disease in the upper abdomen. Common causes include:    Acid reflux (stomach acid flowing up into the esophagus)    Gastritis (irritation of the stomach lining)    Peptic Ulcer Disease    Inflammation of the pancreas    Gallstone    Infection in the gallbladder  Pain may be dull or burning. It may spread upward to the chest or to the back. There may be other symptoms such as belching, bloating, cramps or hunger pains. There may be weight loss or poor appetite, nausea or vomiting.  Since the diagnosis of your pain is not certain yet, further tests may sometimes be needed. Sometimes the doctor will treat you for the most likely condition to see if there is improvement before doing further tests.  Home care  Medicines    Antacids help neutralize the normal acids in your stomach. Examples are Maalox, Mylanta, Rolaids, and Tums. If you don t like the liquid, you can also try a chewable one. You may find one works better than another for you. Overuse can cause diarrhea or constipation.    Acid blockers (H2 blockers) decrease acid production. Examples are cimetidine (Tagamet), famotidine (Pepcid) and ranitidine (Zantac).    Acid inhibitors (PPIs) decrease acid production in a different way than the blockers. You may find they work better, but can take a little longer to take effect.  Examples are omeprazole (Prilosec), lansoprazole (Prevacid), pantoprazole (Protonix), rabeprazole (Aciphex), and esomeprazole (Nexium).    Take an antacid 30-60 minutes after  eating and at bedtime, but not at the same time as an acid blocker.    Try not to take NSAIDs. Aspirin may also cause problems, but if taking it for your heart or other medical reasons, talk to your doctor before stopping it; you do not want to cause a worse problem, like a heart attack or stroke.  Diet    If certain foods seem to cause your spasm, try to avoid them.     Eat slowly and chew food well before swallowing. Symptoms of gastritis can be worsened by certain foods. Limit or avoid fatty, fried, and spicy foods, as well as coffee, chocolate, mint, and foods with high acid content such as tomatoes and citrus fruit and juices (orange, grapefruit, lemon).    Avoid alcohol, caffeine, and tobacco, which can delay healing and worsen your problem.    Try eating smaller meals with snacks in between  Follow-up care  Follow up with your healthcare provider or as advised.  When to seek medical advice  Call your healthcare provider right away if any of the following occur:    Stomach pain worsens or moves to the right lower part of the abdomen    Chest pain appears, or if it worsens or spreads to the chest, back, neck, shoulder, or arm    Frequent vomiting (can t keep down liquids)    Blood in the stool or vomit (red or black color)    Feeling weak or dizzy, fainting, or having trouble breathing    Fever of 100.4 F (38 C) or higher, or as directed by your healthcare provider    Abdominal swelling  Date Last Reviewed: 9/25/2015 2000-2017 [employer]. [address] [place] [address]. All rights reserved. This information is not intended as a substitute for professional medical care. Always follow your healthcare professional's instructions.                Follow-ups after your visit        Follow-up notes from your care team     Return in about 3 months (around 7/16/2018) for Lab Work, medication follow up, BP Recheck.      Your next 10 appointments already scheduled     May 21, 2018 10:45 AM CDT    [facility] Levin MD   [facility]    [id number] [place] [id number] [phone]            Jun 04, 2018  9:00 AM CDT   PHYSICAL with Jia Castro MD   [facility] ([facility])    [address] [facility] [id number] [phone]            Jan 28, 2019  1:00 PM CST   US RENAL COMPLETE with MGUS1 MG US TECH   [facility]    [address] [place] [id number] [phone]           Please bring a list of your medicines (including vitamins, minerals and over-the-counter drugs). Also, tell your doctor about any allergies you may have. Wear comfortable clothes and leave your valuables at home.  You do not need to do anything special to prepare for your exam.  Please call the Imaging Department at your exam site with any questions.            Jan 28, 2019  1:30 PM CST   Return Visit with Emi Morales MD   [facility] ([facility])    [address] [place] [id number] [phone]              Future tests that were ordered for you today     Open Future Orders        Priority Expected Expires Ordered    H Pylori antigen, stool Routine  5/16/2018 4/16/2018    US Abdomen Limited Routine  4/16/2019 4/16/2018            Who to contact     If you have questions or need follow up information about today's clinic visit or your schedule please contact [facility] directly at [phone].  Normal or non-critical lab and imaging results will be communicated to you by MyChart, letter or phone within 4 business days after the clinic has received the results. If you do not hear from us within 7 days, please contact the clinic through MyChart or phone. If you have a critical or abnormal lab result, we will notify you by phone as soon as  "possible.  Submit refill requests through [employer] or call your pharmacy and they will forward the refill request to us. Please allow 3 business days for your refill to be completed.          Additional Information About Your Visit        [employer] Information     [employer] gives you secure access to your electronic health record. If you see a primary care provider, you can also send messages to your care team and make appointments. If you have questions, please call your primary care clinic.  If you do not have a primary care provider, please call [phone] and they will assist you.        Care EveryWhere ID     This is your Care EveryWhere ID. This could be used by other organizations to access your [facility] medical records  [id number]        Your Vitals Were     Pulse Temperature Height Pulse Oximetry BMI (Body Mass Index)       91 98.4  F (36.9  C) (Oral) 5' 3\" (1.6 m) 98% 21.08 kg/m2        Blood Pressure from Last 3 Encounters:   04/16/18 123/68   02/06/18 113/76   01/29/18 131/76    Weight from Last 3 Encounters:   04/16/18 119 lb (54 kg)   02/06/18 118 lb (53.5 kg)   01/29/18 116 lb (52.6 kg)              We Performed the Following     CBC with platelets     Comprehensive metabolic panel          Today's Medication Changes          These changes are accurate as of 4/16/18  4:05 PM.  If you have any questions, ask your nurse or doctor.               Start taking these medicines.        Dose/Directions    alum & mag hydroxide-simethicone 400-400-40 MG/5ML Susp suspension   Commonly known as:  MYLANTA MAXIMUM STRENGTH   Used for:  Abdominal pain, epigastric   Started by:  Jia Castro MD        Dose:  15 mL   Take 15 mLs by mouth every 6 hours as needed for indigestion   Quantity:  1 Bottle   Refills:  1            Where to get your medicines      These medications were sent to [facility] - [place], [place] - [address] [facility]  [address], " [facility] [address]     Phone:  [phone]     alum & mag hydroxide-simethicone 400-400-40 MG/5ML Susp suspension                Primary Care Provider Office Phone # Fax #    Jia Ashli Castro [phone] [address]  [facility] [address]        Equal Access to Services     [facility] : Hadii aad ku hadasho Soomaali, waaxda luqadaha, qaybta kaalmada adeegyada, waxay idiin hayaan adeeg kharash la'aan ah. So [facility] [phone].    ATENCIÓN: Si habla español, tiene a barker disposición servicios gratuitos de asistencia lingüística. Parvez al [phone].    We comply with applicable federal civil rights laws and [place] laws. We do not discriminate on the basis of race, color, national origin, age, disability, sex, sexual orientation, or gender identity.            Thank you!     Thank you for choosing [facility]  for your care. Our goal is always to provide you with excellent care. Hearing back from our patients is one way we can continue to improve our services. Please take a few minutes to complete the written survey that you may receive in the mail after your visit with us. Thank you!             Your Updated Medication List - Protect others around you: Learn how to safely use, store and throw away your medicines at www.disposemymeds.org.          This list is accurate as of 4/16/18  4:05 PM.  Always use your most recent med list.                   Brand Name Dispense Instructions for use Diagnosis    alum & mag hydroxide-simethicone 400-400-40 MG/5ML Susp suspension    MYLANTA MAXIMUM STRENGTH    1 Bottle    Take 15 mLs by mouth every 6 hours as needed for indigestion    Abdominal pain, epigastric       calcium-vitamin D 600-400 MG-UNIT per tablet    calcium 600 + D    60 tablet    Take 1 tablet by mouth 2 times daily    Osteopenia of multiple sites       cetirizine 10 MG tablet    zyrTEC    30 tablet    TAKE 1 TABLET BY MOUTH DAILY    Chronic rhinitis        chlorhexidine 0.12 % solution    PERIDEX    473 mL    SWISH AND SPIT 15 ML BY MOUTH TWICE DAILY    Gingivitis       fluticasone 50 MCG/ACT spray    FLONASE    1 Package    Spray 1-2 sprays into both nostrils daily    Hayfever       hydrocortisone 2.5 % cream    ANUSOL-HC    30 g    Place rectally 2 times daily    External hemorrhoids       latanoprost 0.005 % ophthalmic solution    XALATAN    2.5 mL    Place 1 drop into both eyes At Bedtime    Glaucoma suspect of both eyes       leucovorin 5 MG tablet    WELLCOVORIN     TK 1 T PO 12 H AFTER METHOTREXATE ONE TIME EACH WEEK        losartan-hydrochlorothiazide 50-12.5 MG per tablet    HYZAAR    90 tablet    Take 1 tablet by mouth daily        methotrexate 2.5 MG tablet     1 tablet    Take 6 tablets (15 mg) by mouth once a week    Polyarthritis, inflammatory (H)       metoprolol tartrate 25 MG tablet    LOPRESSOR    90 tablet    Take 1 tablet (25 mg) by mouth At Bedtime        omeprazole 40 MG capsule    priLOSEC    90 capsule    TAKE 1 CAPSULE(40 MG) BY MOUTH DAILY 30 TO 60 MINUTES BEFORE A MEAL AS NEEDED    Gastroesophageal reflux disease with esophagitis       pravastatin 20 MG tablet    PRAVACHOL    90 tablet    TAKE 1 TABLET(20 MG) BY MOUTH DAILY    Mixed hyperlipidemia       tiZANidine 4 MG tablet    ZANAFLEX    60 tablet    Take 1 tablet (4 mg) by mouth 3 times daily as needed for muscle spasms    Back muscle spasm       * triamcinolone 0.1 % cream    KENALOG    80 g    APPLY TOPICALLY TWICE DAILY    Dermatitis       * triamcinolone 0.1 % cream    KENALOG    80 g    APPLY TOPICALLY TWICE DAILY    Dermatitis       * Notice:  This list has 2 medication(s) that are the same as other medications prescribed for you. Read the directions carefully, and ask your doctor or other care provider to review them with you.

## 2018-04-16 NOTE — PATIENT INSTRUCTIONS
Epigastric Pain (Uncertain Cause)     Epigastric pain can be a sign of disease in the upper abdomen. Common causes include:    Acid reflux (stomach acid flowing up into the esophagus)    Gastritis (irritation of the stomach lining)    Peptic Ulcer Disease    Inflammation of the pancreas    Gallstone    Infection in the gallbladder  Pain may be dull or burning. It may spread upward to the chest or to the back. There may be other symptoms such as belching, bloating, cramps or hunger pains. There may be weight loss or poor appetite, nausea or vomiting.  Since the diagnosis of your pain is not certain yet, further tests may sometimes be needed. Sometimes the doctor will treat you for the most likely condition to see if there is improvement before doing further tests.  Home care  Medicines    Antacids help neutralize the normal acids in your stomach. Examples are Maalox, Mylanta, Rolaids, and Tums. If you don t like the liquid, you can also try a chewable one. You may find one works better than another for you. Overuse can cause diarrhea or constipation.    Acid blockers (H2 blockers) decrease acid production. Examples are cimetidine (Tagamet), famotidine (Pepcid) and ranitidine (Zantac).    Acid inhibitors (PPIs) decrease acid production in a different way than the blockers. You may find they work better, but can take a little longer to take effect.  Examples are omeprazole (Prilosec), lansoprazole (Prevacid), pantoprazole (Protonix), rabeprazole (Aciphex), and esomeprazole (Nexium).    Take an antacid 30-60 minutes after eating and at bedtime, but not at the same time as an acid blocker.    Try not to take NSAIDs. Aspirin may also cause problems, but if taking it for your heart or other medical reasons, talk to your doctor before stopping it; you do not want to cause a worse problem, like a heart attack or stroke.  Diet    If certain foods seem to cause your spasm, try to avoid them.     Eat slowly and chew food well  before swallowing. Symptoms of gastritis can be worsened by certain foods. Limit or avoid fatty, fried, and spicy foods, as well as coffee, chocolate, mint, and foods with high acid content such as tomatoes and citrus fruit and juices (orange, grapefruit, lemon).    Avoid alcohol, caffeine, and tobacco, which can delay healing and worsen your problem.    Try eating smaller meals with snacks in between  Follow-up care  Follow up with your healthcare provider or as advised.  When to seek medical advice  Call your healthcare provider right away if any of the following occur:    Stomach pain worsens or moves to the right lower part of the abdomen    Chest pain appears, or if it worsens or spreads to the chest, back, neck, shoulder, or arm    Frequent vomiting (can t keep down liquids)    Blood in the stool or vomit (red or black color)    Feeling weak or dizzy, fainting, or having trouble breathing    Fever of 100.4 F (38 C) or higher, or as directed by your healthcare provider    Abdominal swelling  Date Last Reviewed: 9/25/2015 2000-2017 The "[employer]. [address] [place] [address], [place] [address]. All rights reserved. This information is not intended as a substitute for professional medical care. Always follow your healthcare professional's instructions.

## 2018-04-17 DIAGNOSIS — A04.8 H. PYLORI INFECTION: Primary | ICD-10-CM

## 2018-04-17 DIAGNOSIS — R10.13 ABDOMINAL PAIN, EPIGASTRIC: ICD-10-CM

## 2018-04-17 PROCEDURE — 87338 HPYLORI STOOL AG IA: CPT | Performed by: FAMILY MEDICINE

## 2018-04-18 LAB
H PYLORI AG STL QL IA: ABNORMAL
SPECIMEN SOURCE: ABNORMAL

## 2018-04-18 RX ORDER — AMOXICILLIN 500 MG/1
1000 CAPSULE ORAL 2 TIMES DAILY
Qty: 56 CAPSULE | Refills: 0 | Status: SHIPPED | OUTPATIENT
Start: 2018-04-18 | End: 2019-02-01

## 2018-04-18 RX ORDER — CLARITHROMYCIN 500 MG
500 TABLET ORAL 2 TIMES DAILY
Qty: 28 TABLET | Refills: 0 | Status: SHIPPED | OUTPATIENT
Start: 2018-04-18 | End: 2019-02-01

## 2018-04-18 NOTE — PROGRESS NOTES
Dear Noreen    Your test results are attached. I am happy to let you know that they are stable.    The blood sugar is under 125 and this is pre-diabetes. The kidney and liver tests were normal. We can recheck labs in 6 months. Eating healthy and exercise can help to prevent diabetes.     Please contact me by [employer] if you have any questions about your labs or management.    Jia Castro MD

## 2018-04-18 NOTE — PROGRESS NOTES
Dear Noreen    Your test results are attached.     The test for Helicobacter Pylori is positive and should be retreated. I will send prescriptions for the antibiotics to your pharmacy. We should check for cure at least 4 weeks after you finish the treatment. Please let me know if you have any questions. You must call your rheumatologist about starting antibiotics to see if the dose of methotrexate needs to be adjusted. Continue to take the omeprazole 40 mg once a day.     Please contact me by [employer] if you have any questions about your labs or management.    Jia Castro MD

## 2018-05-11 ENCOUNTER — RADIANT APPOINTMENT (OUTPATIENT)
Dept: MAMMOGRAPHY | Facility: CLINIC | Age: 68
End: 2018-05-11
Attending: FAMILY MEDICINE
Payer: MEDICARE

## 2018-05-11 DIAGNOSIS — Z12.31 VISIT FOR SCREENING MAMMOGRAM: ICD-10-CM

## 2018-05-11 PROCEDURE — 77067 SCR MAMMO BI INCL CAD: CPT | Mod: TC

## 2018-05-13 NOTE — PROGRESS NOTES
Dear Noreen Florence,    I am happy to let you know that your mammogram was normal. You may schedule a follow up mammogram in 1-2 years depending on your risk factors and family history. Please let me know if you have any questions about your results. You should be receiving a letter from the radiologist.    Jia Castro MD

## 2018-05-21 ENCOUNTER — OFFICE VISIT (OUTPATIENT)
Dept: OPHTHALMOLOGY | Facility: CLINIC | Age: 68
End: 2018-05-21
Payer: MEDICARE

## 2018-05-21 DIAGNOSIS — H40.003 GLAUCOMA SUSPECT OF BOTH EYES: ICD-10-CM

## 2018-05-21 PROCEDURE — 92012 INTRM OPH EXAM EST PATIENT: CPT | Performed by: OPHTHALMOLOGY

## 2018-05-21 RX ORDER — LATANOPROST 50 UG/ML
1 SOLUTION/ DROPS OPHTHALMIC AT BEDTIME
Qty: 2.5 ML | Refills: 8 | Status: SHIPPED | OUTPATIENT
Start: 2018-05-21 | End: 2018-05-24

## 2018-05-21 ASSESSMENT — VISUAL ACUITY
OS_SC+: -2
OS_SC: 20/20
OD_SC+: -1
OD_SC: 20/20
METHOD: SNELLEN - LINEAR

## 2018-05-21 ASSESSMENT — EXTERNAL EXAM - RIGHT EYE: OD_EXAM: NORMAL

## 2018-05-21 ASSESSMENT — TONOMETRY
OS_IOP_MMHG: 14
OD_IOP_MMHG: 14
OD_IOP_MMHG: 10
IOP_METHOD: TONOPEN

## 2018-05-21 ASSESSMENT — CUP TO DISC RATIO
OD_RATIO: 0.6
OS_RATIO: 0.6

## 2018-05-21 ASSESSMENT — EXTERNAL EXAM - LEFT EYE: OS_EXAM: NORMAL

## 2018-05-21 NOTE — MR AVS SNAPSHOT
After Visit Summary   5/21/2018    Noreen Florence    MRN: [id number]           Patient Information     Date Of Birth          1950        Visit Information        Provider Department      5/21/2018 10:45 AM Shai Levin MD [facility]        Today's Diagnoses     Glaucoma suspect of both eyes           Follow-ups after your visit        Your next 10 appointments already scheduled     Jun 04, 2018  9:00 AM CDT   PHYSICAL with Jia Castro MD   [facility]    [address] [place] [id number]   [phone]            Oct 29, 2018 12:30 PM CDT   Return Visit with Shai Levin MD, MG OPHTH NURSE ONLY   [facility]    [address] [place] [id number] [phone]            Jan 28, 2019  1:00 PM CST   US RENAL COMPLETE with MGUS1, MG US TECH   [facility] ([facility])    [address] [place] [id number] [phone]           Please bring a list of your medicines (including vitamins, minerals and over-the-counter drugs). Also, tell your doctor about any allergies you may have. Wear comfortable clothes and leave your valuables at home.  You do not need to do anything special to prepare for your exam.  Please call the Imaging Department at your exam site with any questions.            Jan 28, 2019  1:30 PM CST   Return Visit with Emi Morales MD   [facility]    [address] [place] [address] [phone]              Who to contact     If you have questions or need follow up information about today's clinic visit or your schedule please contact [facility] directly at [phone].  Normal or non-critical lab and imaging results will be communicated to you by MyChart, letter or phone within 4  business days after the clinic has received the results. If you do not hear from us within 7 days, please contact the clinic through [employer] or phone. If you have a critical or abnormal lab result, we will notify you by phone as soon as possible.  Submit refill requests through [employer] or call your pharmacy and they will forward the refill request to us. Please allow 3 business days for your refill to be completed.          Additional Information About Your Visit        Osen Information     Osen gives you secure access to your electronic health record. If you see a primary care provider, you can also send messages to your care team and make appointments. If you have questions, please call your primary care clinic.  If you do not have a primary care provider, please call [phone] and they will assist you.      [employer] is an electronic gateway that provides easy, online access to your medical records. With [employer], you can request a clinic appointment, read your test results, renew a prescription or communicate with your care team.     To access your existing account, please contact your [facility] or call [phone] for assistance.        Care EveryWhere ID     This is your Care EveryWhere ID. This could be used by other organizations to access your [place] medical records  [id number]         Blood Pressure from Last 3 Encounters:   04/16/18 123/68   02/06/18 113/76   01/29/18 131/76    Weight from Last 3 Encounters:   04/16/18 54 kg (119 lb)   02/06/18 53.5 kg (118 lb)   01/29/18 52.6 kg (116 lb)              Today, you had the following     No orders found for display       Primary Care Provider Office Phone # Fax #    Jia Ashli Castro [phone]       [address]  [facility] [address]        Equal Access to Services     MILTON JOAQUIN : Shandra Walker, thaddeus howe, axel veliz  laignacio walker. [facility] [phone].    ATENCIÓN: Si wallacela michael, tiene a barker disposición servicios gratuitos de asistencia lingüística. Parvez mansfield [phone].    We comply with applicable federal civil rights laws and [place] laws. We do not discriminate on the basis of race, color, national origin, age, disability, sex, sexual orientation, or gender identity.            Thank you!     Thank you for choosing [facility]  for your care. Our goal is always to provide you with excellent care. Hearing back from our patients is one way we can continue to improve our services. Please take a few minutes to complete the written survey that you may receive in the mail after your visit with us. Thank you!             Your Updated Medication List - Protect others around you: Learn how to safely use, store and throw away your medicines at www.disposemymeds.org.          This list is accurate as of 5/21/18 11:00 AM.  Always use your most recent med list.                   Brand Name Dispense Instructions for use Diagnosis    alum & mag hydroxide-simethicone 400-400-40 MG/5ML Susp suspension    MYLANTA MAXIMUM STRENGTH    1 Bottle    Take 15 mLs by mouth every 6 hours as needed for indigestion    Abdominal pain, epigastric       calcium-vitamin D 600-400 MG-UNIT per tablet    calcium 600 + D    60 tablet    Take 1 tablet by mouth 2 times daily    Osteopenia of multiple sites       cetirizine 10 MG tablet    zyrTEC    30 tablet    TAKE 1 TABLET BY MOUTH DAILY    Chronic rhinitis       chlorhexidine 0.12 % solution    PERIDEX    473 mL    SWISH AND SPIT 15 ML BY MOUTH TWICE DAILY    Gingivitis       fluticasone 50 MCG/ACT spray    FLONASE    1 Package    Spray 1-2 sprays into both nostrils daily    Hayfever       hydrocortisone 2.5 % cream    ANUSOL-HC    30 g    Place rectally 2 times daily    External hemorrhoids       latanoprost 0.005 % ophthalmic solution    XALATAN    2.5 mL    Place 1 drop into both eyes At  Bedtime    Glaucoma suspect of both eyes       leucovorin 5 MG tablet    WELLCOVORIN     TK 1 T PO 12 H AFTER METHOTREXATE ONE TIME EACH WEEK        losartan-hydrochlorothiazide 50-12.5 MG per tablet    HYZAAR    90 tablet    Take 1 tablet by mouth daily        methotrexate 2.5 MG tablet     1 tablet    Take 6 tablets (15 mg) by mouth once a week    Polyarthritis, inflammatory (H)       metoprolol tartrate 25 MG tablet    LOPRESSOR    90 tablet    Take 1 tablet (25 mg) by mouth At Bedtime        omeprazole 40 MG capsule    priLOSEC    90 capsule    TAKE 1 CAPSULE(40 MG) BY MOUTH DAILY 30 TO 60 MINUTES BEFORE A MEAL AS NEEDED    Gastroesophageal reflux disease with esophagitis       pravastatin 20 MG tablet    PRAVACHOL    90 tablet    TAKE 1 TABLET(20 MG) BY MOUTH DAILY    Mixed hyperlipidemia       tiZANidine 4 MG tablet    ZANAFLEX    60 tablet    Take 1 tablet (4 mg) by mouth 3 times daily as needed for muscle spasms    Back muscle spasm       * triamcinolone 0.1 % cream    KENALOG    80 g    APPLY TOPICALLY TWICE DAILY    Dermatitis       * triamcinolone 0.1 % cream    KENALOG    80 g    APPLY TOPICALLY TWICE DAILY    Dermatitis       * Notice:  This list has 2 medication(s) that are the same as other medications prescribed for you. Read the directions carefully, and ask your doctor or other care provider to review them with you.

## 2018-05-21 NOTE — NURSING NOTE
Patient presents with:  Pressure Check: started Xalatan 10/30/17, compliant every night, last drop last night      Referring Provider:  No referring provider defined for this encounter.    HPI    Affected eye(s):  Both   Symptoms:     No blurred vision   No redness   No foreign body sensation         Do you have eye pain now?:  No      Comments:     Pressure Check: started Xalatan 10/30/17, compliant every night, last drop last night                 Angie TEMPLETON. [facility]

## 2018-05-22 NOTE — PROGRESS NOTES
Assessment & Plan   Noreen Florence is a 68 year old female who presents with:   Review of systems for the eyes was negative other than the pertinent positives and negatives noted in the HPI.    Glaucoma suspect of both eyes  - Continue Latanoprost (XALATAN) 0.005 % ophthalmic solution (1 drop into both eyes at bedtime)  - Stable exam  - [facility]          Attending Physician Attestation:  Complete documentation of historical and exam elements from today's encounter can be found in the full encounter summary report (not reduplicated in this progress note).  I personally obtained the chief complaint(s) and history of present illness.  I confirmed and edited as necessary the review of systems, past medical/surgical history, family history, social history, and examination findings as documented by others; and I examined the patient myself.  I personally reviewed the relevant tests, images, and reports as documented above.  I formulated and edited as necessary the assessment and plan and discussed the findings and management plan with the patient and family. - Shai Levin MD

## 2018-05-24 DIAGNOSIS — H40.003 GLAUCOMA SUSPECT OF BOTH EYES: ICD-10-CM

## 2018-05-24 RX ORDER — LATANOPROST 50 UG/ML
1 SOLUTION/ DROPS OPHTHALMIC AT BEDTIME
Qty: 7.5 ML | Refills: 1 | Status: SHIPPED | OUTPATIENT
Start: 2018-05-24 | End: 2018-10-22

## 2018-06-14 ENCOUNTER — APPOINTMENT (OUTPATIENT)
Dept: OPTOMETRY | Facility: CLINIC | Age: 68
End: 2018-06-14
Payer: MEDICARE

## 2018-06-14 PROCEDURE — 92340 FIT SPECTACLES MONOFOCAL: CPT | Performed by: OPTOMETRIST

## 2018-06-21 ENCOUNTER — OFFICE VISIT (OUTPATIENT)
Dept: FAMILY MEDICINE | Facility: CLINIC | Age: 68
End: 2018-06-21
Payer: MEDICARE

## 2018-06-21 VITALS
SYSTOLIC BLOOD PRESSURE: 130 MMHG | BODY MASS INDEX: 21.17 KG/M2 | TEMPERATURE: 98.1 F | WEIGHT: 112.1 LBS | RESPIRATION RATE: 16 BRPM | DIASTOLIC BLOOD PRESSURE: 81 MMHG | HEIGHT: 61 IN | OXYGEN SATURATION: 97 % | HEART RATE: 79 BPM

## 2018-06-21 DIAGNOSIS — Z00.01 ENCOUNTER FOR ROUTINE ADULT PHYSICAL EXAM WITH ABNORMAL FINDINGS: Primary | ICD-10-CM

## 2018-06-21 DIAGNOSIS — K64.4 EXTERNAL HEMORRHOIDS: ICD-10-CM

## 2018-06-21 DIAGNOSIS — M62.830 BACK MUSCLE SPASM: ICD-10-CM

## 2018-06-21 DIAGNOSIS — R73.9 ELEVATED BLOOD SUGAR: ICD-10-CM

## 2018-06-21 DIAGNOSIS — J31.0 CHRONIC RHINITIS, UNSPECIFIED TYPE: ICD-10-CM

## 2018-06-21 DIAGNOSIS — I10 HYPERTENSION, GOAL BELOW 150/90: ICD-10-CM

## 2018-06-21 DIAGNOSIS — E78.5 HYPERLIPIDEMIA WITH TARGET LDL LESS THAN 130: ICD-10-CM

## 2018-06-21 DIAGNOSIS — Z86.19 HISTORY OF HELICOBACTER PYLORI INFECTION: ICD-10-CM

## 2018-06-21 DIAGNOSIS — M06.4 POLYARTHRITIS, INFLAMMATORY (H): ICD-10-CM

## 2018-06-21 LAB
ALBUMIN SERPL-MCNC: 3.7 G/DL (ref 3.4–5)
ANION GAP SERPL CALCULATED.3IONS-SCNC: 10 MMOL/L (ref 3–14)
BUN SERPL-MCNC: 15 MG/DL (ref 7–30)
CALCIUM SERPL-MCNC: 9.5 MG/DL (ref 8.5–10.1)
CHLORIDE SERPL-SCNC: 104 MMOL/L (ref 94–109)
CHOLEST SERPL-MCNC: 174 MG/DL
CO2 SERPL-SCNC: 27 MMOL/L (ref 20–32)
CREAT SERPL-MCNC: 0.47 MG/DL (ref 0.52–1.04)
CREAT UR-MCNC: 86 MG/DL
ERYTHROCYTE [DISTWIDTH] IN BLOOD BY AUTOMATED COUNT: 14.4 % (ref 10–15)
GFR SERPL CREATININE-BSD FRML MDRD: >90 ML/MIN/1.7M2
GLUCOSE SERPL-MCNC: 105 MG/DL (ref 70–99)
HBA1C MFR BLD: 5.9 % (ref 0–5.6)
HCT VFR BLD AUTO: 42.2 % (ref 35–47)
HDLC SERPL-MCNC: 36 MG/DL
HGB BLD-MCNC: 13.9 G/DL (ref 11.7–15.7)
LDLC SERPL CALC-MCNC: 88 MG/DL
MCH RBC QN AUTO: 28.3 PG (ref 26.5–33)
MCHC RBC AUTO-ENTMCNC: 32.9 G/DL (ref 31.5–36.5)
MCV RBC AUTO: 86 FL (ref 78–100)
MICROALBUMIN UR-MCNC: 8 MG/L
MICROALBUMIN/CREAT UR: 9.85 MG/G CR (ref 0–25)
NONHDLC SERPL-MCNC: 138 MG/DL
PHOSPHATE SERPL-MCNC: 3.9 MG/DL (ref 2.5–4.5)
PLATELET # BLD AUTO: 231 10E9/L (ref 150–450)
POTASSIUM SERPL-SCNC: 3.9 MMOL/L (ref 3.4–5.3)
RBC # BLD AUTO: 4.91 10E12/L (ref 3.8–5.2)
SODIUM SERPL-SCNC: 141 MMOL/L (ref 133–144)
TRIGL SERPL-MCNC: 250 MG/DL
WBC # BLD AUTO: 5.6 10E9/L (ref 4–11)

## 2018-06-21 PROCEDURE — 83036 HEMOGLOBIN GLYCOSYLATED A1C: CPT | Performed by: FAMILY MEDICINE

## 2018-06-21 PROCEDURE — 80069 RENAL FUNCTION PANEL: CPT | Performed by: FAMILY MEDICINE

## 2018-06-21 PROCEDURE — G0438 PPPS, INITIAL VISIT: HCPCS | Performed by: FAMILY MEDICINE

## 2018-06-21 PROCEDURE — 80061 LIPID PANEL: CPT | Performed by: FAMILY MEDICINE

## 2018-06-21 PROCEDURE — 36415 COLL VENOUS BLD VENIPUNCTURE: CPT | Performed by: FAMILY MEDICINE

## 2018-06-21 PROCEDURE — 82043 UR ALBUMIN QUANTITATIVE: CPT | Performed by: FAMILY MEDICINE

## 2018-06-21 PROCEDURE — 85027 COMPLETE CBC AUTOMATED: CPT | Performed by: FAMILY MEDICINE

## 2018-06-21 RX ORDER — CETIRIZINE HYDROCHLORIDE 10 MG/1
10 TABLET ORAL DAILY
Qty: 90 TABLET | Refills: 3 | Status: SHIPPED | OUTPATIENT
Start: 2018-06-21 | End: 2019-05-14

## 2018-06-21 RX ORDER — LOSARTAN POTASSIUM AND HYDROCHLOROTHIAZIDE 12.5; 5 MG/1; MG/1
1 TABLET ORAL DAILY
Qty: 90 TABLET | Refills: 3 | Status: SHIPPED | OUTPATIENT
Start: 2018-06-21 | End: 2019-07-31

## 2018-06-21 RX ORDER — METOPROLOL TARTRATE 25 MG/1
25 TABLET, FILM COATED ORAL AT BEDTIME
Qty: 90 TABLET | Refills: 3 | Status: SHIPPED | OUTPATIENT
Start: 2018-06-21 | End: 2019-05-14

## 2018-06-21 ASSESSMENT — ACTIVITIES OF DAILY LIVING (ADL): CURRENT_FUNCTION: NO ASSISTANCE NEEDED

## 2018-06-21 ASSESSMENT — PAIN SCALES - GENERAL: PAINLEVEL: NO PAIN (0)

## 2018-06-21 NOTE — PATIENT INSTRUCTIONS
At [facility], we strive to deliver an exceptional experience to you, every time we see you.  If you receive a survey in the mail, please send us back your thoughts. We really do value your feedback.    Based on your medical history, these are the current health maintenance/preventive care services that you are due for (some may have been done at this visit.)  Health Maintenance Due   Topic Date Due     ADVANCE DIRECTIVE PLANNING Q5 YRS  02/04/2007     PNEUMOCOCCAL (2 of 2 - PPSV23) 06/19/2018         Suggested websites for health information:  Www.Toothpick.org : Up to date and easily searchable information on multiple topics.  Www.CondoDomain.gov : medication info, interactive tutorials, watch real surgeries online  Www.familydoctor.org : good info from the Academy of Family Physicians  Www.cdc.gov : public health info, travel advisories, epidemics (H1N1)  Www.aap.org : children's health info, normal development, vaccinations  Www.health.[facility].[place].us : [place] dept of health, public health issues in [place], N1N1    Your care team:                            Family Medicine Internal Medicine   MD Alfredo Morgan MD Shantel Branch-Fleming, MD Katya Georgiev PA-C Nam Ho, MD Pediatrics   MOSES Alberts, PHIL SHORT CNP   MD Naila Champion MD Deborah Mielke, MD Kim Thein, APRN CNP      Clinic hours: Monday - Thursday 7 am-7 pm; Fridays 7 am-5 pm.   Urgent care: Monday - Friday 11 am-9 pm; Saturday and Sunday 9 am-5 pm.  Pharmacy : Monday -Thursday 8 am-8 pm; Friday 8 am-6 pm; Saturday and Sunday 9 am-5 pm.     Clinic: [phone]   Pharmacy: [phone]    Preventive Health Recommendations  Female Ages 65 +    Yearly exam:     See your health care provider every year in order to  o Review health changes.   o Discuss preventive care.    o Review your medicines if your doctor has prescribed any.      You no longer need a yearly  Pap test unless you've had an abnormal Pap test in the past 10 years. If you have vaginal symptoms, such as bleeding or discharge, be sure to talk with your provider about a Pap test.      Every 1 to 2 years, have a mammogram.  If you are over 69, talk with your health care provider about whether or not you want to continue having screening mammograms.      Every 10 years, have a colonoscopy. Or, have a yearly FIT test (stool test). These exams will check for colon cancer.       Have a cholesterol test every 5 years, or more often if your doctor advises it.       Have a diabetes test (fasting glucose) every three years. If you are at risk for diabetes, you should have this test more often.       At age 65, have a bone density scan (DEXA) to check for osteoporosis (brittle bone disease).    Shots:    Get a flu shot each year.    Get a tetanus shot every 10 years.    Talk to your doctor about your pneumonia vaccines. There are now two you should receive - Pneumovax (PPSV 23) and Prevnar (PCV 13).    Talk to your doctor about the shingles vaccine.    Talk to your doctor about the hepatitis B vaccine.    Nutrition:     Eat at least 5 servings of fruits and vegetables each day.      Eat whole-grain bread, whole-wheat pasta and brown rice instead of white grains and rice.      Talk to your provider about Calcium and Vitamin D.     Lifestyle    Exercise at least 150 minutes a week (30 minutes a day, 5 days a week). This will help you control your weight and prevent disease.      Limit alcohol to one drink per day.      No smoking.       Wear sunscreen to prevent skin cancer.       See your dentist twice a year for an exam and cleaning.      See your eye doctor every 1 to 2 years to screen for conditions such as glaucoma, macular degeneration, cataracts, etc

## 2018-06-21 NOTE — PROGRESS NOTES
SUBJECTIVE:   Noreen Florence is a 68 year old female who presents for Preventive Visit.  Are you in the first 12 months of your Medicare coverage?  No    Physical   Annual:     Getting at least 3 servings of Calcium per day::  Yes    Bi-annual eye exam::  Yes    Dental care twice a year::  Yes    Sleep apnea or symptoms of sleep apnea::  None    Diet::  Regular (no restrictions)    Frequency of exercise::  6-7 days/week    Duration of exercise::  30-45 minutes (about 40 minutes per day)    Taking medications regularly::  Yes    Medication side effects::  None    Additional concerns today::  No    Ability to successfully perform activities of daily living: no assistance needed  Home Safety:  No safety concerns identified  Hearing Impairment: no hearing concerns    Fall risk:  Fallen 2 or more times in the past year?: No  Any fall with injury in the past year?: No    COGNITIVE SCREEN  1) Repeat 3 items (Banana, Sunrise, Chair)    2) Clock draw: NORMAL  3) 3 item recall: Recalls 3 objects  Results: 3 items recalled: COGNITIVE IMPAIRMENT LESS LIKELY    Mini-CogTM Copyright GUIDO Cárdenas. Licensed by the author for use in [facility]; reprinted with permission (anthony@[facility]). All rights reserved.        Reviewed and updated as needed this visit by clinical staff  Tobacco  Allergies  Meds  Med Hx  Surg Hx  Fam Hx  Soc Hx        Reviewed and updated as needed this visit by Provider        Social History   Substance Use Topics     Smoking status: Never Smoker     Smokeless tobacco: Never Used     Alcohol use No       No flowsheet data found.No flowsheet data found.        Hyperlipidemia Follow-Up      Rate your low fat/cholesterol diet?: good    Taking statin?  Yes, no muscle aches from statin    Other lipid medications/supplements?:  none    Hypertension Follow-up      Outpatient blood pressures are not being checked.    Low Salt Diet: no added salt    Treated for Helicobacter Pylori and no follow up test done.  Feeling better but still taking omeprazole as needed.     Today's PHQ-2 Score:   PHQ-2 ( 1999 Pfizer) 6/21/2018   Q1: Little interest or pleasure in doing things 0   Q2: Feeling down, depressed or hopeless 0   PHQ-2 Score 0       Do you feel safe in your environment - No    Do you have a Health Care Directive?: No: Advance care planning reviewed with patient; information given to patient to review.    Current providers sharing in care for this patient include:   Patient Care Team:  Jia Castro MD as PCP - General (Family Practice)  Jia Castro MD as PCP (Family Practice)  Emi Morales MD as MD (Urology)  Luis Cabrera MD as Resident (Radiology)  Bri Morales MD as MD (Urology)    The following health maintenance items are reviewed in Epic and correct as of today:  Health Maintenance   Topic Date Due     ADVANCE DIRECTIVE PLANNING Q5 YRS  02/04/2007     PNEUMOCOCCAL (2 of 2 - PPSV23) 06/19/2018     FALL RISK ASSESSMENT  02/06/2019     PHQ-2 Q1 YR  02/06/2019     EYE EXAM Q1 YEAR  05/22/2019     MAMMO SCREEN Q2 YR (SYSTEM ASSIGNED)  05/11/2020     LIPID SCREEN Q5 YR FEMALE (SYSTEM ASSIGNED)  06/21/2022     TETANUS IMMUNIZATION (SYSTEM ASSIGNED)  07/03/2022     COLONOSCOPY Q10 YR  04/05/2027     DEXA SCAN SCREENING (SYSTEM ASSIGNED)  Completed     INFLUENZA VACCINE  Completed     HEPATITIS C SCREENING  Completed     Labs reviewed in EPIC  BP Readings from Last 3 Encounters:   06/21/18 130/81   04/16/18 123/68   02/06/18 113/76    Wt Readings from Last 3 Encounters:   06/21/18 112 lb 1.6 oz (50.8 kg)   04/16/18 119 lb (54 kg)   02/06/18 118 lb (53.5 kg)                  Patient Active Problem List   Diagnosis     Osteopenia     Hayfever     Hypertension, goal below 150/90     Polyarthritis, inflammatory (H)     GERD (gastroesophageal reflux disease)     Hyperlipidemia with target LDL less than 130     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     High risk medication  use     Microscopic hematuria     Pterygium of right eye     Sicca syndrome (H)     Angiomyolipoma of right kidney     History of Helicobacter pylori infection     Past Surgical History:   Procedure Laterality Date     COLONOSCOPY       COLONOSCOPY WITH CO2 INSUFFLATION N/A 4/5/2017    Procedure: COLONOSCOPY WITH CO2 INSUFFLATION;  Surgeon: Duane, William Charles, MD;  Location: [facility] OR     COMBINED REPAIR PTOSIS WITH BLEPHAROPLASTY BILATERAL Bilateral 1/6/2017    Procedure: COMBINED REPAIR PTOSIS WITH BLEPHAROPLASTY BILATERAL;  Surgeon: Carly Norman MD;  Location: [facility]     ESOPHAGOSCOPY, GASTROSCOPY, DUODENOSCOPY (EGD), COMBINED N/A 1/5/2016    Procedure: COMBINED ESOPHAGOSCOPY, GASTROSCOPY, DUODENOSCOPY (EGD), BIOPSY SINGLE OR MULTIPLE;  Surgeon: Duane, William Charles, MD;  Location: [facility]     REPAIR ENTROPION BILATERAL Bilateral 1/6/2017    Procedure: REPAIR ENTROPION BILATERAL;  Surgeon: Carly Norman MD;  Location: [facility]     REPAIR PTOSIS Bilateral 01/2017       Social History   Substance Use Topics     Smoking status: Never Smoker     Smokeless tobacco: Never Used     Alcohol use No     Family History   Problem Relation Age of Onset     Diabetes Mother      Cancer No family hx of      Hypertension No family hx of      Cerebrovascular Disease No family hx of      Thyroid Disease No family hx of      Glaucoma No family hx of      Macular Degeneration No family hx of          Current Outpatient Prescriptions   Medication Sig Dispense Refill     alum & mag hydroxide-simethicone (MYLANTA MAXIMUM STRENGTH) 400-400-40 MG/5ML SUSP suspension Take 15 mLs by mouth every 6 hours as needed for indigestion 1 Bottle 1     calcium-vitamin D (CALCIUM 600 + D) 600-400 MG-UNIT per tablet Take 1 tablet by mouth 2 times daily 60 tablet 11     cetirizine (ZYRTEC) 10 MG tablet Take 1 tablet (10 mg) by mouth daily 90 tablet 3     chlorhexidine (PERIDEX) 0.12 % solution SWISH AND SPIT 15 ML BY MOUTH TWICE DAILY 473 mL 3      fluticasone (FLONASE) 50 MCG/ACT nasal spray Spray 1-2 sprays into both nostrils daily 1 Package 11     hydrocortisone (ANUSOL-HC) 2.5 % cream Place rectally 2 times daily 30 g 3     latanoprost (XALATAN) 0.005 % ophthalmic solution Place 1 drop into both eyes At Bedtime 7.5 mL 1     leucovorin (WELLCOVORIN) 5 MG tablet TK 1 T PO 12 H AFTER METHOTREXATE ONE TIME EACH WEEK  3     losartan-hydrochlorothiazide (HYZAAR) 50-12.5 MG per tablet Take 1 tablet by mouth daily 90 tablet 3     methotrexate 2.5 MG tablet Take 6 tablets (15 mg) by mouth once a week 1 tablet 0     metoprolol tartrate (LOPRESSOR) 25 MG tablet Take 1 tablet (25 mg) by mouth At Bedtime 90 tablet 3     omeprazole (PRILOSEC) 40 MG capsule TAKE 1 CAPSULE(40 MG) BY MOUTH DAILY 30 TO 60 MINUTES BEFORE A MEAL AS NEEDED 90 capsule 3     pravastatin (PRAVACHOL) 20 MG tablet TAKE 1 TABLET(20 MG) BY MOUTH DAILY 90 tablet 3     tiZANidine (ZANAFLEX) 4 MG tablet Take 1 tablet (4 mg) by mouth 3 times daily as needed for muscle spasms 60 tablet 1     triamcinolone (KENALOG) 0.1 % cream APPLY TOPICALLY TWICE DAILY 80 g 3     [DISCONTINUED] losartan-hydrochlorothiazide (HYZAAR) 50-12.5 MG per tablet Take 1 tablet by mouth daily 90 tablet 3     [DISCONTINUED] metoprolol tartrate (LOPRESSOR) 25 MG tablet Take 1 tablet (25 mg) by mouth At Bedtime 90 tablet 3     No Known Allergies  Recent Labs   Lab Test  06/21/18   0953  04/16/18   1610  10/02/17   1646  06/21/17   0850 05/25/17 02/24/17   10/27/16   0939   03/03/16   0856   02/09/15   0755   A1C  5.9*   --    --    --    --    --    --   5.9   --    --    --    --    LDL   --    --    --   143*   --    --    --    --    --   130*   --   120   HDL   --    --    --   53   --    --    --    --    --   41*   --   42*   TRIG   --    --    --   174*   --    --    --    --    --   184*   --   337*   ALT   --   31   --    --   33  27   < >   --    < >   --    < >   --    CR   --   0.43*  0.61   --   0.66  0.65   < >   "0.55   < >   --    < >  0.47*   GFRESTIMATED   --   >90  >90   --   >60  >60   < >  >90  Non  GFR Calc     < >   --    < >  >90  Non  GFR Calc     GFRESTBLACK   --   >90  >90   --   >60  >60   < >  >90   GFR Calc     < >   --    < >  >90   GFR Calc     POTASSIUM   --   3.6  3.5   --    --    --    < >  3.7   < >   --    < >  3.5    < > = values in this interval not displayed.        Pneumonia Vaccine:Adults age 65+ who received Pneumovax (PPSV23) at 65 years or older: Should be given PCV13 > 1 year after their most recent PPSV23  Mammogram Screening: Mammo discussed, not appropriate for or declined by this patient.    Review of Systems  Constitutional, HEENT, cardiovascular, pulmonary, gi and gu systems are negative, except as otherwise noted.    OBJECTIVE:   /81 (BP Location: Right arm, Patient Position: Chair, Cuff Size: Adult Regular)  Pulse 79  Temp 98.1  F (36.7  C) (Oral)  Resp 16  Ht 5' 1\" (1.549 m)  Wt 112 lb 1.6 oz (50.8 kg)  SpO2 97%  BMI 21.18 kg/m2 Estimated body mass index is 21.18 kg/(m^2) as calculated from the following:    Height as of this encounter: 5' 1\" (1.549 m).    Weight as of this encounter: 112 lb 1.6 oz (50.8 kg).  Physical Exam  GENERAL APPEARANCE: healthy, alert and no distress  EYES: Eyes grossly normal to inspection, PERRL and conjunctivae and sclerae normal  HENT: ear canals and TM's normal, nose and mouth without ulcers or lesions, oropharynx clear and oral mucous membranes moist  NECK: no adenopathy, no asymmetry, masses, or scars and thyroid normal to palpation  RESP: lungs clear to auscultation - no rales, rhonchi or wheezes  CV: regular rates and rhythm, normal S1 S2, no S3 or S4, no murmur, click or rub, no peripheral edema and peripheral pulses strong  ABDOMEN: soft, nontender, no hepatosplenomegaly, no masses and bowel sounds normal  MS: no musculoskeletal defects are noted and gait is age appropriate " "without ataxia  SKIN: no suspicious lesions or rashes  NEURO: Normal strength and tone, sensory exam grossly normal, mentation intact and speech normal  PSYCH: mentation appears normal and affect normal/bright     ASSESSMENT / PLAN:       ICD-10-CM    1. Encounter for routine adult physical exam with abnormal findings Z00.01 Doing well but has intermittent symptoms from her arthritis. Follow up with rheumatology in 2 weeks.    2. Chronic rhinitis, unspecified type J31.0 cetirizine (ZYRTEC) 10 MG tablet daily as needed    3. Polyarthritis, inflammatory (H) M06.4 Medication managed by rheumatology with periodic blood work.    4. Back muscle spasm M62.830 tiZANidine (ZANAFLEX) 4 MG tablet   5. External hemorrhoids K64.4 hydrocortisone (ANUSOL-HC) 2.5 % cream   6. History of Helicobacter pylori infection Z86.19 H Pylori antigen, stool- check for cure, endoscopy if still positive    7. Hypertension, goal below 150/90- well controlled on medications  I10 losartan-hydrochlorothiazide (HYZAAR) 50-12.5 MG per tablet     metoprolol tartrate (LOPRESSOR) 25 MG tablet     CBC with platelets     Renal panel     Albumin Random Urine Quantitative with Creat Ratio   8. Hyperlipidemia with target LDL less than 130 E78.5 Lipid panel reflex to direct LDL Fasting   9. Elevated blood sugar R73.9 Hemoglobin A1c       End of Life Planning:  Patient currently has an advanced directive: Yes.  Practitioner is supportive of decision.    COUNSELING:  Reviewed preventive health counseling, as reflected in patient instructions       Regular exercise       Healthy diet/nutrition       Dental care       Osteoporosis Prevention/Bone Health        Estimated body mass index is 21.18 kg/(m^2) as calculated from the following:    Height as of this encounter: 5' 1\" (1.549 m).    Weight as of this encounter: 112 lb 1.6 oz (50.8 kg).     reports that she has never smoked. She has never used smokeless tobacco.      Appropriate preventive services were " discussed with this patient, including applicable screening as appropriate for cardiovascular disease, diabetes, osteopenia/osteoporosis, and glaucoma.  As appropriate for age/gender, discussed screening for colorectal cancer, prostate cancer, breast cancer, and cervical cancer. Checklist reviewing preventive services available has been given to the patient.    Reviewed patients plan of care and provided an AVS. The Basic Care Plan (routine screening as documented in Health Maintenance) for Noreen meets the Care Plan requirement. This Care Plan has been established and reviewed with the Patient.    Counseling Resources:  ATP IV Guidelines  Pooled Cohorts Equation Calculator  Breast Cancer Risk Calculator  FRAX Risk Assessment  ICSI Preventive Guidelines  Dietary Guidelines for Americans, 2010  USDA's MyPlate  ASA Prophylaxis  Lung CA Screening    Jia Castro MD  [facility]

## 2018-06-21 NOTE — PROGRESS NOTES
Dear Noreen    Your test results are attached. I am happy to let you know that they are stable.    The blood sugar is normal and you do not have diabetes. The cholesterol looks great. The kidneys are healthy. We can recheck labs in 6 months.     Please contact me by Didi-Dachehart if you have any questions about your labs or management.    Jia Castro MD

## 2018-06-21 NOTE — TELEPHONE ENCOUNTER
Received refill request error for patient-pharmacy had her as male instead of female.  Pharmacy requesting 90 day supply of tizanidine  Routing refill request to provider for review/approval because:  Drug [place] [facility] [place]     Requested Prescriptions   Pending Prescriptions Disp Refills     tiZANidine (ZANAFLEX) 4 MG tablet 270 tablet 0     Sig: Take 1 tablet (4 mg) by mouth 3 times daily as needed for muscle spasms    There is no refill protocol information for this order          Jaky Driver RN, BSN

## 2018-06-21 NOTE — MR AVS SNAPSHOT
After Visit Summary   6/21/2018    Noreen Florence    MRN: [id number]           Patient Information     Date Of Birth          1950        Visit Information        Provider Department      6/21/2018 9:20 AM Jia Castro MD [facility]        Today's Diagnoses     Encounter for routine adult physical exam with abnormal findings    -  1    Chronic rhinitis, unspecified type        Polyarthritis, inflammatory (H)        Back muscle spasm        External hemorrhoids        History of Helicobacter pylori infection        Hypertension, goal below 150/90        Hyperlipidemia with target LDL less than 130        Elevated blood sugar          Care Instructions    At [facility], we strive to deliver an exceptional experience to you, every time we see you.  If you receive a survey in the mail, please send us back your thoughts. We really do value your feedback.    Based on your medical history, these are the current health maintenance/preventive care services that you are due for (some may have been done at this visit.)  Health Maintenance Due   Topic Date Due     ADVANCE DIRECTIVE PLANNING Q5 YRS  02/04/2007     PNEUMOCOCCAL (2 of 2 - PPSV23) 06/19/2018         Suggested websites for health information:  Www.Swarm64.org : Up to date and easily searchable information on multiple topics.  Www.medlineplus.gov : medication info, interactive tutorials, watch real surgeries online  Www.familydoctor.org : good info from the Academy of Family Physicians  Www.cdc.gov : public health info, travel advisories, epidemics (H1N1)  Www.aap.org : children's health info, normal development, vaccinations  Www.health.[facility].[place].us : [place] dept of health, public health issues in [place], N1N1    Your care team:                            Family Medicine Internal Medicine   MD Alfredo Morgan MD Shantel Branch-Fleming, MD Katya Georgiev PA-C Nam Ho, MD Pediatrics   Jose  MOSES Bryson CNP Amelia Massimini APRN CNP Shaista Malik, MD Bethany Templen, MD Deborah Mielke, MD Kim Thein, APRN CNP      Clinic hours: Monday - Thursday 7 am-7 pm; Fridays 7 am-5 pm.   Urgent care: Monday - Friday 11 am-9 pm; Saturday and Sunday 9 am-5 pm.  Pharmacy : Monday -Thursday 8 am-8 pm; Friday 8 am-6 pm; Saturday and Sunday 9 am-5 pm.     Clinic: [phone]   Pharmacy: [phone]    Preventive Health Recommendations  Female Ages 65 +    Yearly exam:     See your health care provider every year in order to  o Review health changes.   o Discuss preventive care.    o Review your medicines if your doctor has prescribed any.      You no longer need a yearly Pap test unless you've had an abnormal Pap test in the past 10 years. If you have vaginal symptoms, such as bleeding or discharge, be sure to talk with your provider about a Pap test.      Every 1 to 2 years, have a mammogram.  If you are over 69, talk with your health care provider about whether or not you want to continue having screening mammograms.      Every 10 years, have a colonoscopy. Or, have a yearly FIT test (stool test). These exams will check for colon cancer.       Have a cholesterol test every 5 years, or more often if your doctor advises it.       Have a diabetes test (fasting glucose) every three years. If you are at risk for diabetes, you should have this test more often.       At age 65, have a bone density scan (DEXA) to check for osteoporosis (brittle bone disease).    Shots:    Get a flu shot each year.    Get a tetanus shot every 10 years.    Talk to your doctor about your pneumonia vaccines. There are now two you should receive - Pneumovax (PPSV 23) and Prevnar (PCV 13).    Talk to your doctor about the shingles vaccine.    Talk to your doctor about the hepatitis B vaccine.    Nutrition:     Eat at least 5 servings of fruits and vegetables each day.      Eat whole-grain bread, whole-wheat pasta and  brown rice instead of white grains and rice.      Talk to your provider about Calcium and Vitamin D.     Lifestyle    Exercise at least 150 minutes a week (30 minutes a day, 5 days a week). This will help you control your weight and prevent disease.      Limit alcohol to one drink per day.      No smoking.       Wear sunscreen to prevent skin cancer.       See your dentist twice a year for an exam and cleaning.      See your eye doctor every 1 to 2 years to screen for conditions such as glaucoma, macular degeneration, cataracts, etc           Follow-ups after your visit        Follow-up notes from your care team     Return in about 6 months (around 12/21/2018) for Lab Work, medication follow up, BP Recheck.      Your next 10 appointments already scheduled     Oct 29, 2018 12:30 PM CDT   Return Visit with Shai Levin MD, MG [facility] NURSE ONLY   [facility]    [address] [place] [address] [phone]            Jan 28, 2019  1:00 PM CST   US RENAL COMPLETE with MGUS1, MG US TECH   [facility]    [address] [place] [address] [phone]           Please bring a list of your medicines (including vitamins, minerals and over-the-counter drugs). Also, tell your doctor about any allergies you may have. Wear comfortable clothes and leave your valuables at home.  You do not need to do anything special to prepare for your exam.  Please call the Imaging Department at your exam site with any questions.            Jan 28, 2019  1:30 PM CST   Return Visit with Emi Morales MD   [facility]    [address] [place] [address] [phone]              Future tests that were ordered for you today     Open Future Orders        Priority Expected Expires Ordered    H Pylori antigen, stool Routine  7/21/2018 6/21/2018        "     Who to contact     If you have questions or need follow up information about today's clinic visit or your schedule please contact [facility] [place] directly at [phone].  Normal or non-critical lab and imaging results will be communicated to you by MobAppCreatorhart, letter or phone within 4 business days after the clinic has received the results. If you do not hear from us within 7 days, please contact the clinic through MobAppCreatorhart or phone. If you have a critical or abnormal lab result, we will notify you by phone as soon as possible.  Submit refill requests through [employer]ef or call your pharmacy and they will forward the refill request to us. Please allow 3 business days for your refill to be completed.          Additional Information About Your Visit        MobAppCreatorharSTEMpowerkids Information     [employer] gives you secure access to your electronic health record. If you see a primary care provider, you can also send messages to your care team and make appointments. If you have questions, please call your primary care clinic.  If you do not have a primary care provider, please call [phone] and they will assist you.        Care EveryWhere ID     This is your Care EveryWhere ID. This could be used by other organizations to access your [place] medical records  [id number]        Your Vitals Were     Pulse Temperature Respirations Height Last Period Pulse Oximetry    79 98.1  F (36.7  C) (Oral) 16 5' 1\" (1.549 m) 06/21/2002 (Approximate) 97%    BMI (Body Mass Index)                   21.18 kg/m2            Blood Pressure from Last 3 Encounters:   06/21/18 130/81   04/16/18 123/68   02/06/18 113/76    Weight from Last 3 Encounters:   06/21/18 112 lb 1.6 oz (50.8 kg)   04/16/18 119 lb (54 kg)   02/06/18 118 lb (53.5 kg)              We Performed the Following     Albumin Random Urine Quantitative with Creat Ratio     CBC with platelets     Hemoglobin A1c     Lipid panel reflex to direct LDL Fasting     Renal panel        "   Today's Medication Changes          These changes are accurate as of 6/21/18 10:49 AM.  If you have any questions, ask your nurse or doctor.               These medicines have changed or have updated prescriptions.        Dose/Directions    cetirizine 10 MG tablet   Commonly known as:  zyrTEC   This may have changed:  See the new instructions.   Used for:  Chronic rhinitis, unspecified type   Changed by:  Jia Castro MD        Dose:  10 mg   Take 1 tablet (10 mg) by mouth daily   Quantity:  90 tablet   Refills:  3            Where to get your medicines      These medications were sent to [facility] [address] [facility], [place] - [address] [facility] [address] [facility]  [address] [facility] [address]     Phone:  [phone]     cetirizine 10 MG tablet    hydrocortisone 2.5 % cream    losartan-hydrochlorothiazide 50-12.5 MG per tablet    metoprolol tartrate 25 MG tablet    tiZANidine 4 MG tablet                Primary Care Provider Office Phone # Fax #    Jia Castro [phone]       [address]  [place] [address]        Equal Access to Services     KIM JOAQUIN AH: Hadii mariana ku hadasho Soomaali, waaxda luqadaha, qaybta kaalmada adeegyada, waxay idiin hayaan adepaulo khree walker. So [facility] [phone].    ATENCIÓN: Si habla español, tiene a barker disposición servicios gratuitos de asistencia lingüística. Llame al [phone].    We comply with applicable federal civil rights laws and [place] laws. We do not discriminate on the basis of race, color, national origin, age, disability, sex, sexual orientation, or gender identity.            Thank you!     Thank you for choosing [facility]  for your care. Our goal is always to provide you with excellent care. Hearing back from our patients is one way we can continue to improve our services. Please take a few minutes to complete the written survey that you may  receive in the mail after your visit with us. Thank you!             Your Updated Medication List - Protect others around you: Learn how to safely use, store and throw away your medicines at www.disposemymeds.org.          This list is accurate as of 6/21/18 10:49 AM.  Always use your most recent med list.                   Brand Name Dispense Instructions for use Diagnosis    alum & mag hydroxide-simethicone 400-400-40 MG/5ML Susp suspension    MYLANTA MAXIMUM STRENGTH    1 Bottle    Take 15 mLs by mouth every 6 hours as needed for indigestion    Abdominal pain, epigastric       calcium-vitamin D 600-400 MG-UNIT per tablet    calcium 600 + D    60 tablet    Take 1 tablet by mouth 2 times daily    Osteopenia of multiple sites       cetirizine 10 MG tablet    zyrTEC    90 tablet    Take 1 tablet (10 mg) by mouth daily    Chronic rhinitis, unspecified type       chlorhexidine 0.12 % solution    PERIDEX    473 mL    SWISH AND SPIT 15 ML BY MOUTH TWICE DAILY    Gingivitis       fluticasone 50 MCG/ACT spray    FLONASE    1 Package    Spray 1-2 sprays into both nostrils daily    Hayfever       hydrocortisone 2.5 % cream    ANUSOL-HC    30 g    Place rectally 2 times daily    External hemorrhoids       latanoprost 0.005 % ophthalmic solution    XALATAN    7.5 mL    Place 1 drop into both eyes At Bedtime    Glaucoma suspect of both eyes       leucovorin 5 MG tablet    WELLCOVORIN     TK 1 T PO 12 H AFTER METHOTREXATE ONE TIME EACH WEEK        losartan-hydrochlorothiazide 50-12.5 MG per tablet    HYZAAR    90 tablet    Take 1 tablet by mouth daily    Hypertension, goal below 150/90       methotrexate 2.5 MG tablet     1 tablet    Take 6 tablets (15 mg) by mouth once a week    Polyarthritis, inflammatory (H)       metoprolol tartrate 25 MG tablet    LOPRESSOR    90 tablet    Take 1 tablet (25 mg) by mouth At Bedtime    Hypertension, goal below 150/90       omeprazole 40 MG capsule    priLOSEC    90 capsule    TAKE 1  CAPSULE(40 MG) BY MOUTH DAILY 30 TO 60 MINUTES BEFORE A MEAL AS NEEDED    Gastroesophageal reflux disease with esophagitis       pravastatin 20 MG tablet    PRAVACHOL    90 tablet    TAKE 1 TABLET(20 MG) BY MOUTH DAILY    Mixed hyperlipidemia       tiZANidine 4 MG tablet    ZANAFLEX    60 tablet    Take 1 tablet (4 mg) by mouth 3 times daily as needed for muscle spasms    Back muscle spasm       triamcinolone 0.1 % cream    KENALOG    80 g    APPLY TOPICALLY TWICE DAILY    Dermatitis

## 2018-06-22 DIAGNOSIS — Z86.19 HISTORY OF HELICOBACTER PYLORI INFECTION: ICD-10-CM

## 2018-06-22 DIAGNOSIS — A04.8 H. PYLORI INFECTION: Primary | ICD-10-CM

## 2018-06-22 PROCEDURE — 87338 HPYLORI STOOL AG IA: CPT | Performed by: FAMILY MEDICINE

## 2018-06-25 LAB
H PYLORI AG STL QL IA: ABNORMAL
SPECIMEN SOURCE: ABNORMAL

## 2018-06-25 NOTE — PROGRESS NOTES
Dear Noreen    Your test results are attached.     The test for Helicobacter Pylori still shows positive and I would like to refer you to gastroenterology so that they can see if this is a true infection.     Preferred Location: [facility] [phone]    Please call to schedule an appointment at one of their clinic sites.     Please contact me by MyChart if you have any questions about your labs or management.    Jia Castro MD

## 2018-07-16 ENCOUNTER — TRANSFERRED RECORDS (OUTPATIENT)
Dept: HEALTH INFORMATION MANAGEMENT | Facility: CLINIC | Age: 68
End: 2018-07-16

## 2018-08-04 DIAGNOSIS — K21.00 GASTROESOPHAGEAL REFLUX DISEASE WITH ESOPHAGITIS: ICD-10-CM

## 2018-08-04 NOTE — TELEPHONE ENCOUNTER
"Requested Prescriptions   Pending Prescriptions Disp Refills     omeprazole (PRILOSEC) 40 MG capsule [Pharmacy Med Name: OMEPRAZOLE 40MG CAPSULES] 90 capsule 0     Sig: TAKE 1 CAPSULE(40 MG) BY MOUTH DAILY 30 TO 60 MINUTES BEFORE A MEAL AS NEEDED    PPI Protocol Passed    8/4/2018  9:04 AM       Passed - Not on Clopidogrel (unless Pantoprazole ordered)       Passed - No diagnosis of osteoporosis on record       Passed - Recent (12 mo) or future (30 days) visit within the authorizing provider's specialty    Patient had office visit in the last 12 months or has a visit in the next 30 days with authorizing provider or within the authorizing provider's specialty.  See \"Patient Info\" tab in inbasket, or \"Choose Columns\" in Meds & Orders section of the refill encounter.           Passed - Patient is age 18 or older       Passed - No active pregnacy on record       Passed - No positive pregnancy test in past 12 months        Last Written Prescription Date:  12/20/17  Last Fill Quantity: 90,  # refills: 3   Last office visit: 6/21/2018 with prescribing provider:     Future Office Visit:   Next 5 appointments (look out 90 days)     Oct 29, 2018 12:30 PM CDT   Return Visit with Shai Levin MD, MG [facility] NURSE ONLY   [facility]    [address] [address] [place] [address] [phone]                   "

## 2018-08-07 RX ORDER — OMEPRAZOLE 40 MG/1
CAPSULE, DELAYED RELEASE ORAL
Qty: 90 CAPSULE | Refills: 0 | OUTPATIENT
Start: 2018-08-07

## 2018-08-17 ENCOUNTER — TRANSFERRED RECORDS (OUTPATIENT)
Dept: HEALTH INFORMATION MANAGEMENT | Facility: CLINIC | Age: 68
End: 2018-08-17

## 2018-09-04 ENCOUNTER — TRANSFERRED RECORDS (OUTPATIENT)
Dept: HEALTH INFORMATION MANAGEMENT | Facility: CLINIC | Age: 68
End: 2018-09-04

## 2018-09-14 ENCOUNTER — OFFICE VISIT (OUTPATIENT)
Dept: FAMILY MEDICINE | Facility: CLINIC | Age: 68
End: 2018-09-14
Payer: MEDICARE

## 2018-09-14 VITALS
RESPIRATION RATE: 16 BRPM | HEART RATE: 79 BPM | DIASTOLIC BLOOD PRESSURE: 80 MMHG | SYSTOLIC BLOOD PRESSURE: 126 MMHG | OXYGEN SATURATION: 97 % | TEMPERATURE: 98.2 F | WEIGHT: 115.4 LBS | BODY MASS INDEX: 21.79 KG/M2 | HEIGHT: 61 IN

## 2018-09-14 DIAGNOSIS — B96.81 CHRONIC HELICOBACTER PYLORI GASTRITIS: Primary | ICD-10-CM

## 2018-09-14 DIAGNOSIS — K05.10 GINGIVITIS: ICD-10-CM

## 2018-09-14 DIAGNOSIS — K29.50 CHRONIC HELICOBACTER PYLORI GASTRITIS: Primary | ICD-10-CM

## 2018-09-14 PROCEDURE — 99214 OFFICE O/P EST MOD 30 MIN: CPT | Performed by: FAMILY MEDICINE

## 2018-09-14 RX ORDER — PANTOPRAZOLE SODIUM 20 MG/1
20 TABLET, DELAYED RELEASE ORAL 2 TIMES DAILY
Qty: 180 TABLET | Refills: 1 | Status: SHIPPED | OUTPATIENT
Start: 2018-09-14 | End: 2018-09-17

## 2018-09-14 RX ORDER — PANTOPRAZOLE SODIUM 20 MG/1
20 TABLET, DELAYED RELEASE ORAL 2 TIMES DAILY
Qty: 60 TABLET | Refills: 1 | Status: SHIPPED | OUTPATIENT
Start: 2018-09-14 | End: 2018-09-14

## 2018-09-14 RX ORDER — CHLORHEXIDINE GLUCONATE ORAL RINSE 1.2 MG/ML
SOLUTION DENTAL
Qty: 473 ML | Refills: 3 | Status: SHIPPED | OUTPATIENT
Start: 2018-09-14 | End: 2019-05-14

## 2018-09-14 RX ORDER — LEVOFLOXACIN 500 MG/1
500 TABLET, FILM COATED ORAL DAILY
Qty: 14 TABLET | Refills: 0 | Status: SHIPPED | OUTPATIENT
Start: 2018-09-14 | End: 2019-02-01

## 2018-09-14 RX ORDER — AMOXICILLIN 500 MG/1
1000 CAPSULE ORAL 2 TIMES DAILY
Qty: 56 CAPSULE | Refills: 0 | Status: SHIPPED | OUTPATIENT
Start: 2018-09-14 | End: 2019-02-01

## 2018-09-14 NOTE — MR AVS SNAPSHOT
After Visit Summary   9/14/2018    Noreen Florence    MRN: [id number]           Patient Information     Date Of Birth          1950        Visit Information        Provider Department      9/14/2018 3:00 PM Jia Castro MD [facility]        Today's Diagnoses     Chronic Helicobacter pylori gastritis    -  1    Gingivitis          Care Instructions      H. Pylori Infection with Peptic Ulcer    A peptic ulcer is an open sore in the lining of the stomach. It may also form in the lining of the first part of the small intestine (duodenum). Symptoms of a peptic ulcer include stomach pain and upset. Nausea, vomiting, bloating, or bleeding may sometimes occur. In many cases, bacteria called H. pylori are thought to be involved in the development of a peptic ulcer.  Many people have H. pylori in their bodies. Most of the time, it causes no problems. In some people, though, the H. pylori infection causes irritation of the stomach lining. This may make the lining more likely to be damaged by normal stomach acids. H. pylori may also increase the amount of acid in the stomach. It is not clear why this infection leads to problems in some people and not in others.  Tests may be done to check for H. pylori infection. These include a blood test, a breath test, and a stool test. In some cases, a test called endoscopy may be done. During this test, a thin, lighted tube is put into the mouth and down the throat. The healthcare provider can look at the esophagus, stomach, and duodenum through this tube. During this test, a tiny sample of stomach lining (biopsy) may be taken and tested for H. pylori.  Home care    Medicines are used to treat H. pylori infection. Two or more medicines are usually taken together.     Take all prescribed medicines as directed. Take all of the medicines until they are gone or you are told to stop. This is very important. If you do not finish the medicines, the infection  may remain and may be harder to treat.    Ask your healthcare provider what side effects the medicines might cause. These can include stomach cramps, diarrhea, or constipation.    After the medicine is finished, you may have another test to see if H. pylori infection is still present.    Avoid alcohol during treatment.  Follow-up care  Follow up with your healthcare provider as directed. Be sure to return to be retested for H. pylori after treatment.  When to seek medical advice  Call your healthcare provider for any of the following:    Stomach pain that worsens or moves to the right lower part of the abdomen    Chest pain appears or worsens, or spreads to the back, neck, shoulder, or arm    Vomiting    Blood in stool or vomit    Feeling weak or dizzy  Date Last Reviewed: 6/24/2015 2000-2017 [employer]. [address] [place] [address]. All rights reserved. This information is not intended as a substitute for professional medical care. Always follow your healthcare professional's instructions.                Follow-ups after your visit        Additional Services     GASTROENTEROLOGY ADULT REF CONSULT ONLY       Preferred Location: [facility]: [phone]      Please be aware that coverage of these services is subject to the terms and limitations of your health insurance plan.  Call member services at your health plan with any benefit or coverage questions.  Any procedures must be performed at a [facility] facility OR coordinated by your clinic's referral office.    Please bring the following with you to your appointment:    (1) Any X-Rays, CTs or MRIs which have been performed.  Contact the facility where they were done to arrange for  prior to your scheduled appointment.    (2) List of current medications   (3) This referral request   (4) Any documents/labs given to you for this referral                  Follow-up notes from your care team     Return in about 3  months (around 12/14/2018) for recheck.      Your next 10 appointments already scheduled     Oct 22, 2018  2:00 PM CDT   Return Visit with Shai Levin MD, Mg Ophth Nurse Only 2   [facility])    [address] [place] [address] [phone]            Jan 28, 2019  1:00 PM CST   US RENAL COMPLETE with MGUS1, MG US TECH   [facility])    [address] [address] [place] [address] [phone]           How do I prepare for my exam? (Food and drink instructions) No Food and Drink Restrictions.  How do I prepare for my exam? (Other instructions) You do not need to do anything special to prepare for your exam.  What should I wear: Wear comfortable clothes.  How long does the exam take: Most ultrasounds take 30 to 60 minutes.  What should I bring: Bring a list of your medicines, including vitamins, minerals and over-the-counter drugs. It is safest to leave personal items at home.  Do I need a :  No  is needed.  What do I need to tell my doctor: Tell your doctor about any allergies you may have.  What should I do after the exam: No restrictions, You may resume normal activities.  What is this test: An ultrasound uses sound waves to make pictures of the body. Sound waves do not cause pain. The only discomfort may be the pressure of the wand against your skin or full bladder.  Who should I call with questions: If you have any questions, please call the Imaging Department where you will have your exam. Directions, parking instructions, and other information is available on our website, [email or web address].            Jan 28, 2019  1:30 PM CST   Return Visit with Emi Morales MD   [facility])    [address] [place] [address] [phone]              Future tests that were ordered for you today     Open Future Orders  "       Priority Expected Expires Ordered    H Pylori antigen, stool Routine 10/29/2018 11/14/2018 9/14/2018            Who to contact     If you have questions or need follow up information about today's clinic visit or your schedule please contact [facility] directly at [phone].  Normal or non-critical lab and imaging results will be communicated to you by MyChart, letter or phone within 4 business days after the clinic has received the results. If you do not hear from us within 7 days, please contact the clinic through [employer] or phone. If you have a critical or abnormal lab result, we will notify you by phone as soon as possible.  Submit refill requests through [employer] or call your pharmacy and they will forward the refill request to us. Please allow 3 business days for your refill to be completed.          Additional Information About Your Visit        MyChart Information     [employer] gives you secure access to your electronic health record. If you see a primary care provider, you can also send messages to your care team and make appointments. If you have questions, please call your primary care clinic.  If you do not have a primary care provider, please call [phone] and they will assist you.        Care EveryWhere ID     This is your Care EveryWhere ID. This could be used by other organizations to access your [facility] medical records  [id number]        Your Vitals Were     Pulse Temperature Respirations Height Last Period Pulse Oximetry    79 98.2  F (36.8  C) (Oral) 16 5' 1\" (1.549 m) 06/21/2002 (Approximate) 97%    BMI (Body Mass Index)                   21.8 kg/m2            Blood Pressure from Last 3 Encounters:   09/14/18 126/80   06/21/18 130/81   04/16/18 123/68    Weight from Last 3 Encounters:   09/14/18 115 lb 6.4 oz (52.3 kg)   06/21/18 112 lb 1.6 oz (50.8 kg)   04/16/18 119 lb (54 kg)              We Performed the Following     GASTROENTEROLOGY ADULT REF CONSULT ONLY  "         Today's Medication Changes          These changes are accurate as of 9/14/18  3:36 PM.  If you have any questions, ask your nurse or doctor.               Start taking these medicines.        Dose/Directions    amoxicillin 500 MG capsule   Commonly known as:  AMOXIL   Used for:  Chronic Helicobacter pylori gastritis   Started by:  Jia Castro MD        Dose:  1000 mg   Take 2 capsules (1,000 mg) by mouth 2 times daily for 14 days   Quantity:  56 capsule   Refills:  0       levofloxacin 500 MG tablet   Commonly known as:  LEVAQUIN   Used for:  Chronic Helicobacter pylori gastritis   Started by:  Jia Castro MD        Dose:  500 mg   Take 1 tablet (500 mg) by mouth daily   Quantity:  14 tablet   Refills:  0       pantoprazole 20 MG EC tablet   Commonly known as:  PROTONIX   Used for:  Chronic Helicobacter pylori gastritis   Started by:  Jia Castro MD        Dose:  20 mg   Take 1 tablet (20 mg) by mouth 2 times daily Take by mouth 30-60 minutes before a meal.   Quantity:  180 tablet   Refills:  1         Stop taking these medicines if you haven't already. Please contact your care team if you have questions.     alum & mag hydroxide-simethicone 400-400-40 MG/5ML Susp suspension   Commonly known as:  MYLANTA MAXIMUM STRENGTH   Stopped by:  Jia Castro MD           omeprazole 40 MG capsule   Commonly known as:  priLOSEC   Stopped by:  Jia Castro MD                Where to get your medicines      These medications were sent to [facility] [address] [facility] [place] - [address] [address] [address]  [address] [address]     Phone:  [phone]     amoxicillin 500 MG capsule    chlorhexidine 0.12 % solution    levofloxacin 500 MG tablet    pantoprazole 20 MG EC tablet                Primary Care Provider Office Phone # Fax #    Jia Castro [phone] [address]  [facility] [address]        Equal Access to Services     [facility]: Hadii aad ku hadzeldakarolina Kevali, waaxda luqadaha, qaybta kaalmada tom, axel walker. So [facility] [phone].    ATENCIÓN: Si habla español, tiene a barker disposición servicios gratuitos de asistencia lingüística. Parvez al [phone].    We comply with applicable federal civil rights laws and [place] laws. We do not discriminate on the basis of race, color, national origin, age, disability, sex, sexual orientation, or gender identity.            Thank you!     Thank you for choosing [facility]  for your care. Our goal is always to provide you with excellent care. Hearing back from our patients is one way we can continue to improve our services. Please take a few minutes to complete the written survey that you may receive in the mail after your visit with us. Thank you!             Your Updated Medication List - Protect others around you: Learn how to safely use, store and throw away your medicines at www.disposemymeds.org.          This list is accurate as of 9/14/18  3:36 PM.  Always use your most recent med list.                   Brand Name Dispense Instructions for use Diagnosis    amoxicillin 500 MG capsule    AMOXIL    56 capsule    Take 2 capsules (1,000 mg) by mouth 2 times daily for 14 days    Chronic Helicobacter pylori gastritis       calcium carbonate 600 mg-vitamin D 400 units 600-400 MG-UNIT per tablet    calcium 600 + D    60 tablet    Take 1 tablet by mouth 2 times daily    Osteopenia of multiple sites       cetirizine 10 MG tablet    zyrTEC    90 tablet    Take 1 tablet (10 mg) by mouth daily    Chronic rhinitis, unspecified type       chlorhexidine 0.12 % solution    PERIDEX    473 mL    SWISH AND SPIT 15 ML BY MOUTH TWICE DAILY    Gingivitis       fluticasone 50 MCG/ACT spray    FLONASE    1 Package    Spray 1-2 sprays into both nostrils daily    Hayfever       hydrocortisone 2.5 %  cream    ANUSOL-HC    30 g    Place rectally 2 times daily    External hemorrhoids       latanoprost 0.005 % ophthalmic solution    XALATAN    7.5 mL    Place 1 drop into both eyes At Bedtime    Glaucoma suspect of both eyes       leucovorin 5 MG tablet    WELLCOVORIN     TK 1 T PO 12 H AFTER METHOTREXATE ONE TIME EACH WEEK        levofloxacin 500 MG tablet    LEVAQUIN    14 tablet    Take 1 tablet (500 mg) by mouth daily    Chronic Helicobacter pylori gastritis       losartan-hydrochlorothiazide 50-12.5 MG per tablet    HYZAAR    90 tablet    Take 1 tablet by mouth daily    Hypertension, goal below 150/90       methotrexate 2.5 MG tablet     1 tablet    Take 6 tablets (15 mg) by mouth once a week    Polyarthritis, inflammatory (H)       metoprolol tartrate 25 MG tablet    LOPRESSOR    90 tablet    Take 1 tablet (25 mg) by mouth At Bedtime    Hypertension, goal below 150/90       pantoprazole 20 MG EC tablet    PROTONIX    180 tablet    Take 1 tablet (20 mg) by mouth 2 times daily Take by mouth 30-60 minutes before a meal.    Chronic Helicobacter pylori gastritis       pravastatin 20 MG tablet    PRAVACHOL    90 tablet    TAKE 1 TABLET(20 MG) BY MOUTH DAILY    Mixed hyperlipidemia       tiZANidine 4 MG tablet    ZANAFLEX    90 tablet    Take 1 tablet (4 mg) by mouth 3 times daily as needed for muscle spasms    Back muscle spasm       triamcinolone 0.1 % cream    KENALOG    80 g    APPLY TOPICALLY TWICE DAILY    Dermatitis

## 2018-09-14 NOTE — PROGRESS NOTES
SUBJECTIVE:   Noreen Florence is a 68 year old female who presents to clinic today for the following health issues:        ABDOMINAL/Fu on H-Pylori     Onset: about a month ago    Description:   Character: None  Location: middle abdominal  Radiation: None    Intensity: None    Progression of Symptoms:  improving    Accompanying Signs & Symptoms:  Fever/Chills?: no   Gas/Bloating: YES  Nausea: no   Vomitting: no   Diarrhea?: no   Constipation:no   Dysuria or Hematuria: no    History:   Trauma: no   Previous similar pain: no    Previous tests done: Gastroenterology    Precipitating factors:   Does the pain change with:     Food: no      BM: no     Urination: no       Therapies Tried and outcome: None    LMP:  not applicable     Dates of Helicobacter Pylori positive:  7-6-2016 11-6-2016 4- 6- 9-4-2018    The first 3 episodes were treated with Amoxicillin 1000 mg/ Clarithromycin 500 mg twice a day for 14 days with omeprazole 20 mg twice a day. The last 2 dates have not been treated at this time. She was referred to [facility] but they have not initiated medication and she would not like to go back to their clinic.     Hypertension Follow-up      Outpatient blood pressures are not being checked.    Low Salt Diet: no added salt    Inflammatory arthritis on methotrexate.     Problem list and histories reviewed & adjusted, as indicated.  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     High risk medication use     Microscopic hematuria     Pterygium of right eye     Sicca syndrome (H)     Angiomyolipoma of right kidney     History of Helicobacter pylori infection     Past Surgical History:   Procedure Laterality Date     COLONOSCOPY        COLONOSCOPY WITH CO2 INSUFFLATION N/A 4/5/2017    Procedure: COLONOSCOPY WITH CO2 INSUFFLATION;  Surgeon: Duane, William Charles, MD;  Location: [facility] OR     COMBINED REPAIR PTOSIS WITH BLEPHAROPLASTY BILATERAL Bilateral 1/6/2017    Procedure: COMBINED REPAIR PTOSIS WITH BLEPHAROPLASTY BILATERAL;  Surgeon: Carly Norman MD;  Location: [facility]     ESOPHAGOSCOPY, GASTROSCOPY, DUODENOSCOPY (EGD), COMBINED N/A 1/5/2016    Procedure: COMBINED ESOPHAGOSCOPY, GASTROSCOPY, DUODENOSCOPY (EGD), BIOPSY SINGLE OR MULTIPLE;  Surgeon: Duane, William Charles, MD;  Location: [facility] OR     REPAIR ENTROPION BILATERAL Bilateral 1/6/2017    Procedure: REPAIR ENTROPION BILATERAL;  Surgeon: Carly Norman MD;  Location: [facility]     REPAIR PTOSIS Bilateral 01/2017       Social History   Substance Use Topics     Smoking status: Never Smoker     Smokeless tobacco: Never Used     Alcohol use No     Family History   Problem Relation Age of Onset     Diabetes Mother      Cancer No family hx of      Hypertension No family hx of      Cerebrovascular Disease No family hx of      Thyroid Disease No family hx of      Glaucoma No family hx of      Macular Degeneration No family hx of          Current Outpatient Prescriptions   Medication Sig Dispense Refill     amoxicillin (AMOXIL) 500 MG capsule Take 2 capsules (1,000 mg) by mouth 2 times daily for 14 days 56 capsule 0     calcium-vitamin D (CALCIUM 600 + D) 600-400 MG-UNIT per tablet Take 1 tablet by mouth 2 times daily 60 tablet 11     cetirizine (ZYRTEC) 10 MG tablet Take 1 tablet (10 mg) by mouth daily 90 tablet 3     chlorhexidine (PERIDEX) 0.12 % solution SWISH AND SPIT 15 ML BY MOUTH TWICE DAILY 473 mL 3     fluticasone (FLONASE) 50 MCG/ACT nasal spray Spray 1-2 sprays into both nostrils daily 1 Package 11     hydrocortisone (ANUSOL-HC) 2.5 % cream Place rectally 2 times daily 30 g 3     latanoprost (XALATAN) 0.005 % ophthalmic solution Place 1 drop into both eyes At Bedtime 7.5 mL 1      leucovorin (WELLCOVORIN) 5 MG tablet TK 1 T PO 12 H AFTER METHOTREXATE ONE TIME EACH WEEK  3     levofloxacin (LEVAQUIN) 500 MG tablet Take 1 tablet (500 mg) by mouth daily 14 tablet 0     losartan-hydrochlorothiazide (HYZAAR) 50-12.5 MG per tablet Take 1 tablet by mouth daily 90 tablet 3     methotrexate 2.5 MG tablet Take 6 tablets (15 mg) by mouth once a week 1 tablet 0     metoprolol tartrate (LOPRESSOR) 25 MG tablet Take 1 tablet (25 mg) by mouth At Bedtime 90 tablet 3     pantoprazole (PROTONIX) 20 MG EC tablet Take 1 tablet (20 mg) by mouth 2 times daily Take by mouth 30-60 minutes before a meal. 180 tablet 1     pravastatin (PRAVACHOL) 20 MG tablet TAKE 1 TABLET(20 MG) BY MOUTH DAILY 90 tablet 3     tiZANidine (ZANAFLEX) 4 MG tablet Take 1 tablet (4 mg) by mouth 3 times daily as needed for muscle spasms 90 tablet 1     triamcinolone (KENALOG) 0.1 % cream APPLY TOPICALLY TWICE DAILY 80 g 3     No Known Allergies  Recent Labs   Lab Test  06/21/18   0953  04/16/18   1610   06/21/17   0850 05/25/17 02/24/17   10/27/16   0939   03/03/16   0856   A1C  5.9*   --    --    --    --    --    --   5.9   --    --    LDL  88   --    --   143*   --    --    --    --    --   130*   HDL  36*   --    --   53   --    --    --    --    --   41*   TRIG  250*   --    --   174*   --    --    --    --    --   184*   ALT   --   31   --    --   33  27   < >   --    < >   --    CR  0.47*  0.43*   < >   --   0.66  0.65   < >  0.55   < >   --    GFRESTIMATED  >90  >90   < >   --   >60  >60   < >  >90  Non  GFR Calc     < >   --    GFRESTBLACK  >90  >90   < >   --   >60  >60   < >  >90   GFR Calc     < >   --    POTASSIUM  3.9  3.6   < >   --    --    --    < >  3.7   < >   --     < > = values in this interval not displayed.      BP Readings from Last 3 Encounters:   09/14/18 126/80   06/21/18 130/81   04/16/18 123/68    Wt Readings from Last 3 Encounters:   09/14/18 115 lb 6.4 oz (52.3 kg)   06/21/18 112  "lb 1.6 oz (50.8 kg)   04/16/18 119 lb (54 kg)                  Labs reviewed in EPIC    Reviewed and updated as needed this visit by clinical staff  Tobacco  Allergies  Meds  Med Hx  Surg Hx  Fam Hx  Soc Hx      Reviewed and updated as needed this visit by Provider         ROS:  Constitutional, HEENT, cardiovascular, pulmonary, gi and gu systems are negative, except as otherwise noted.    OBJECTIVE:     /80 (BP Location: Right arm, Patient Position: Sitting, Cuff Size: Adult Regular)  Pulse 79  Temp 98.2  F (36.8  C) (Oral)  Resp 16  Ht 5' 1\" (1.549 m)  Wt 115 lb 6.4 oz (52.3 kg)  LMP 06/21/2002 (Approximate)  SpO2 97%  BMI 21.8 kg/m2  Body mass index is 21.8 kg/(m^2).  GENERAL: healthy, alert and no distress  EYES: Eyes grossly normal to inspection, conjunctivae and sclerae normal  MS: no gross musculoskeletal defects noted, no edema  SKIN: no suspicious lesions or rashes  NEURO: Normal strength and tone, gait and speech normal  PSYCH: mentation appears normal, affect normal/bright     Diagnostic Test Results:  Results for orders placed or performed in visit on 06/22/18   H Pylori antigen, stool   Result Value Ref Range    Specimen Description Feces     H Pylori Antigen (A)      Positive for Helicobacter pylori antigen by enzyme immunoassay. A positive result   indicates the presence of H. pylori antigen.         ASSESSMENT/PLAN:             1. Chronic Helicobacter pylori gastritis  The correct second line therapy for persistent Helicobacter Pylori is to change the clarithromycin, which has not been done yet. Will do a new treatment regiment and schedule follow up lab and follow up with GI at [facility] as originally recommended. If test comes back negative, she can cancel the consult.   - pantoprazole (PROTONIX) 20 MG EC tablet; Take 1 tablet (20 mg) by mouth 2 times daily Take by mouth 30-60 minutes before a meal.  Dispense: 180 tablet; Refill: 1  - levofloxacin (LEVAQUIN) 500 MG " tablet; Take 1 tablet (500 mg) by mouth daily  Dispense: 14 tablet; Refill: 0  - amoxicillin (AMOXIL) 500 MG capsule; Take 2 capsules (1,000 mg) by mouth 2 times daily for 14 days  Dispense: 56 capsule; Refill: 0  - GASTROENTEROLOGY ADULT REF CONSULT ONLY- schedule in November  - H Pylori antigen, stool; Future- 1 month after therapy completed    2. Gingivitis  Refill requested.   - chlorhexidine (PERIDEX) 0.12 % solution; SWISH AND SPIT 15 ML BY MOUTH TWICE DAILY  Dispense: 473 mL; Refill: 3    FUTURE APPOINTMENTS:       - Follow-up visit in 3 months or sooner if any questions or concerns.     Jia Castro MD  [facility]

## 2018-09-14 NOTE — PATIENT INSTRUCTIONS
H. Pylori Infection with Peptic Ulcer    A peptic ulcer is an open sore in the lining of the stomach. It may also form in the lining of the first part of the small intestine (duodenum). Symptoms of a peptic ulcer include stomach pain and upset. Nausea, vomiting, bloating, or bleeding may sometimes occur. In many cases, bacteria called H. pylori are thought to be involved in the development of a peptic ulcer.  Many people have H. pylori in their bodies. Most of the time, it causes no problems. In some people, though, the H. pylori infection causes irritation of the stomach lining. This may make the lining more likely to be damaged by normal stomach acids. H. pylori may also increase the amount of acid in the stomach. It is not clear why this infection leads to problems in some people and not in others.  Tests may be done to check for H. pylori infection. These include a blood test, a breath test, and a stool test. In some cases, a test called endoscopy may be done. During this test, a thin, lighted tube is put into the mouth and down the throat. The healthcare provider can look at the esophagus, stomach, and duodenum through this tube. During this test, a tiny sample of stomach lining (biopsy) may be taken and tested for H. pylori.  Home care    Medicines are used to treat H. pylori infection. Two or more medicines are usually taken together.     Take all prescribed medicines as directed. Take all of the medicines until they are gone or you are told to stop. This is very important. If you do not finish the medicines, the infection may remain and may be harder to treat.    Ask your healthcare provider what side effects the medicines might cause. These can include stomach cramps, diarrhea, or constipation.    After the medicine is finished, you may have another test to see if H. pylori infection is still present.    Avoid alcohol during treatment.  Follow-up care  Follow up with your healthcare provider as directed.  Be sure to return to be retested for H. pylori after treatment.  When to seek medical advice  Call your healthcare provider for any of the following:    Stomach pain that worsens or moves to the right lower part of the abdomen    Chest pain appears or worsens, or spreads to the back, neck, shoulder, or arm    Vomiting    Blood in stool or vomit    Feeling weak or dizzy  Date Last Reviewed: 6/24/2015 2000-2017 [employer]. [address] [place] [address], [place] [address]. All rights reserved. This information is not intended as a substitute for professional medical care. Always follow your healthcare professional's instructions.

## 2018-09-17 ENCOUNTER — PATIENT OUTREACH (OUTPATIENT)
Dept: CARE COORDINATION | Facility: CLINIC | Age: 68
End: 2018-09-17

## 2018-09-17 ENCOUNTER — TELEPHONE (OUTPATIENT)
Dept: FAMILY MEDICINE | Facility: CLINIC | Age: 68
End: 2018-09-17

## 2018-09-17 DIAGNOSIS — B96.81 CHRONIC HELICOBACTER PYLORI GASTRITIS: ICD-10-CM

## 2018-09-17 DIAGNOSIS — K29.50 CHRONIC HELICOBACTER PYLORI GASTRITIS: ICD-10-CM

## 2018-09-17 RX ORDER — PANTOPRAZOLE SODIUM 20 MG/1
40 TABLET, DELAYED RELEASE ORAL DAILY
Qty: 90 TABLET | Refills: 1 | Status: SHIPPED | OUTPATIENT
Start: 2018-09-17 | End: 2018-09-17

## 2018-09-17 RX ORDER — PANTOPRAZOLE SODIUM 40 MG/1
TABLET, DELAYED RELEASE ORAL
Qty: 90 TABLET | Refills: 1 | Status: SHIPPED | OUTPATIENT
Start: 2018-09-17 | End: 2019-02-01 | Stop reason: ALTCHOICE

## 2018-09-17 ASSESSMENT — ACTIVITIES OF DAILY LIVING (ADL): DEPENDENT_IADLS:: INDEPENDENT

## 2018-09-17 NOTE — PROGRESS NOTES
Clinic Care Coordination Contact  Care Team Conversations    Patient's daughter Tram (consent to communicate on file) reports patient's pantoprazole prescription was unable to be filled at the pharmacy and she is inquiring what to do to obtain this medication for patient.    RN CC noted a telephone encounter from 9/17/18 regarding insurance coverage not covering 2 times/day.     RN CC will monitor for outcome and update patient/daughter Tram.    Melissa Behl BSN, RN, PHN  [facility] Care Coordinator  [phone]

## 2018-09-17 NOTE — TELEPHONE ENCOUNTER
I can send the once a day, but the directions for treatment of Helicobacter Pylori specify twice a day dosing and the insurance company should not be altering this treatment. She has had 4 positive Helicobacter Pylori infections that have not resolved. New prescription sent. Jia Castro MD

## 2018-09-17 NOTE — TELEPHONE ENCOUNTER
Insurance does not cover 2 per day (pantoprazole (PROTONIX) 20 MG EC tablet)    Can you send order for 40mg one time daily?    That would be covered option.  Plan [phone] ID# [id number], if PA is needed    Thanks

## 2018-09-19 NOTE — PROGRESS NOTES
Clinic Care Coordination Contact  [facility]/Voicemail    Referral Source: Self-patient/Caregiver  Clinical Data: Care Coordinator Outreach  Noted a new prescription of pantoprazole 40 mg was sent for once/day by PCP on 9/17/18.  Outreach attempted x 1.  Left message on voicemail with call back information and requested return call.  Plan: Care Coordinator mailed out care coordination introduction letter on 6/20/17. Care Coordinator will try to reach patient again in 3-5 business days.    Melissa Behl BSN, RN, PHN  [facility] Care Coordinator  [phone]

## 2018-09-25 NOTE — PROGRESS NOTES
Clinic Care Coordination Contact  Care Team Conversations    RN CC spoke with patient's daughter Tram who states patient received her medication and has no other questions, concerns or needs from care coordination at this time.    Plan:   RN CC will make no further outreaches, but will remain available to patient, family and care team if needed in the future.    Melissa Behl BSN, RN, N  [facility] Care Coordinator  [phone]

## 2018-10-22 ENCOUNTER — OFFICE VISIT (OUTPATIENT)
Dept: OPHTHALMOLOGY | Facility: CLINIC | Age: 68
End: 2018-10-22
Payer: MEDICARE

## 2018-10-22 DIAGNOSIS — H40.003 GLAUCOMA SUSPECT OF BOTH EYES: ICD-10-CM

## 2018-10-22 PROCEDURE — 92014 COMPRE OPH EXAM EST PT 1/>: CPT | Performed by: OPHTHALMOLOGY

## 2018-10-22 RX ORDER — LATANOPROST 50 UG/ML
1 SOLUTION/ DROPS OPHTHALMIC AT BEDTIME
Qty: 7.5 ML | Refills: 4 | Status: SHIPPED | OUTPATIENT
Start: 2018-10-22 | End: 2019-05-14

## 2018-10-22 ASSESSMENT — REFRACTION_MANIFEST
OS_ADD: +2.50
OS_SPHERE: +0.25
OD_SPHERE: +0.25
OS_CYLINDER: +0.50
OD_CYLINDER: +0.50
OD_ADD: +2.50

## 2018-10-22 ASSESSMENT — VISUAL ACUITY
OS_SC+: -2
OD_SC+: -2
METHOD: SNELLEN - LINEAR
OD_SC: 20/20
OS_SC: 20/20
OD_CC: 20/25
OS_CC: 20/25

## 2018-10-22 ASSESSMENT — EXTERNAL EXAM - LEFT EYE: OS_EXAM: NORMAL

## 2018-10-22 ASSESSMENT — CUP TO DISC RATIO
OS_RATIO: 0.6
OD_RATIO: 0.6

## 2018-10-22 ASSESSMENT — TONOMETRY
OD_IOP_MMHG: 11
OS_IOP_MMHG: 14
OD_IOP_MMHG: 14
OS_IOP_MMHG: 10
IOP_METHOD: ICARE

## 2018-10-22 ASSESSMENT — EXTERNAL EXAM - RIGHT EYE: OD_EXAM: NORMAL

## 2018-10-22 NOTE — MR AVS SNAPSHOT
After Visit Summary   10/22/2018    Noreen Florence    MRN: [id number]           Patient Information     Date Of Birth          1950        Visit Information        Provider Department      10/22/2018 2:00 PM 2, Mg Ophth Nurse Only; Shai Levin MD [facility]        Today's Diagnoses     Glaucoma suspect of both eyes           Follow-ups after your visit        Your next 10 appointments already scheduled     Nov 13, 2018 11:00 AM CST   New Visit with Sly De Santiago MD   [facility])    [address] [place] [id number] [phone]            Jan 14, 2019  1:00 PM CST   US RENAL COMPLETE with MGUS1, MG US TECH   [facility])    [address] [place] [id number] [phone]           How do I prepare for my exam? (Food and drink instructions) No Food and Drink Restrictions.  How do I prepare for my exam? (Other instructions) You do not need to do anything special to prepare for your exam.  What should I wear: Wear comfortable clothes.  How long does the exam take: Most ultrasounds take 30 to 60 minutes.  What should I bring: Bring a list of your medicines, including vitamins, minerals and over-the-counter drugs. It is safest to leave personal items at home.  Do I need a :  No  is needed.  What do I need to tell my doctor: Tell your doctor about any allergies you may have.  What should I do after the exam: No restrictions, You may resume normal activities.  What is this test: An ultrasound uses sound waves to make pictures of the body. Sound waves do not cause pain. The only discomfort may be the pressure of the wand against your skin or full bladder.  Who should I call with questions: If you have any questions, please call the Imaging Department where you will have your exam. Directions, parking instructions, and other information  is available on our website, [email or web address].            Jan 14, 2019  1:30 PM CST   [facility]   [facility]    [address] [place] [address] [phone]            Oct 21, 2019  2:00 PM CDT   Return Visit with Shai Levin MD,  Oph Nurse Only 2   [facility]    [address] [place] [address] [phone]              Who to contact     If you have questions or need follow up information about today's clinic visit or your schedule please contact [facility] directly at [phone].  Normal or non-critical lab and imaging results will be communicated to you by Ev[employer]hart, letter or phone within 4 business days after the clinic has received the results. If you do not hear from us within 7 days, please contact the clinic through Evocalizehart or phone. If you have a critical or abnormal lab result, we will notify you by phone as soon as possible.  Submit refill requests through [employer] or call your pharmacy and they will forward the refill request to us. Please allow 3 business days for your refill to be completed.          Additional Information About Your Visit        [facility] Information     [employer] gives you secure access to your electronic health record. If you see a primary care provider, you can also send messages to your care team and make appointments. If you have questions, please call your primary care clinic.  If you do not have a primary care provider, please call [phone] and they will assist you.      [employer] is an electronic gateway that provides easy, online access to your medical records. With [employer], you can request a clinic appointment, read your test results, renew a prescription or communicate with your care team.     To access your existing account, please contact your [facility]  Clinic or call [phone] for assistance.        Care EveryWhere ID     This is your Care EveryWhere ID. This could be used by other organizations to access your [place] medical records  [id number]        Your Vitals Were     Last Period                   06/21/2002 (Approximate)            Blood Pressure from Last 3 Encounters:   09/14/18 126/80   06/21/18 130/81   04/16/18 123/68    Weight from Last 3 Encounters:   09/14/18 52.3 kg (115 lb 6.4 oz)   06/21/18 50.8 kg (112 lb 1.6 oz)   04/16/18 54 kg (119 lb)              Today, you had the following     No orders found for display       Primary Care Provider Office Phone # Fax #    Jia Ashli Castro [phone]       [address] [place]  [facility] [address]        Equal Access to Services     [facility]: Hadii aad ku hadasho Soomaali, waaxda luqadaha, qaybta kaalmada adeegyada, waxay raniin hayaan sydnie matos . So [facility] [phone].    ATENCIÓN: Si habla español, tiene a barker disposición servicios gratuitos de asistencia lingüística. Llame al [phone].    We comply with applicable federal civil rights laws and [place] laws. We do not discriminate on the basis of race, color, national origin, age, disability, sex, sexual orientation, or gender identity.            Thank you!     Thank you for choosing [facility]  for your care. Our goal is always to provide you with excellent care. Hearing back from our patients is one way we can continue to improve our services. Please take a few minutes to complete the written survey that you may receive in the mail after your visit with us. Thank you!             Your Updated Medication List - Protect others around you: Learn how to safely use, store and throw away your medicines at www.disposemymeds.org.          This list is accurate as of 10/22/18  2:43 PM.  Always use your most recent med list.                   Brand Name Dispense Instructions for use Diagnosis     calcium carbonate 600 mg-vitamin D 400 units 600-400 MG-UNIT per tablet    calcium 600 + D    60 tablet    Take 1 tablet by mouth 2 times daily    Osteopenia of multiple sites       cetirizine 10 MG tablet    zyrTEC    90 tablet    Take 1 tablet (10 mg) by mouth daily    Chronic rhinitis, unspecified type       chlorhexidine 0.12 % solution    PERIDEX    473 mL    SWISH AND SPIT 15 ML BY MOUTH TWICE DAILY    Gingivitis       fluticasone 50 MCG/ACT spray    FLONASE    1 Package    Spray 1-2 sprays into both nostrils daily    Hayfever       hydrocortisone 2.5 % cream    ANUSOL-HC    30 g    Place rectally 2 times daily    External hemorrhoids       latanoprost 0.005 % ophthalmic solution    XALATAN    7.5 mL    Place 1 drop into both eyes At Bedtime    Glaucoma suspect of both eyes       leucovorin 5 MG tablet    WELLCOVORIN     TK 1 T PO 12 H AFTER METHOTREXATE ONE TIME EACH WEEK        levofloxacin 500 MG tablet    LEVAQUIN    14 tablet    Take 1 tablet (500 mg) by mouth daily    Chronic Helicobacter pylori gastritis       losartan-hydrochlorothiazide 50-12.5 MG per tablet    HYZAAR    90 tablet    Take 1 tablet by mouth daily    Hypertension, goal below 150/90       methotrexate 2.5 MG tablet     1 tablet    Take 6 tablets (15 mg) by mouth once a week    Polyarthritis, inflammatory (H)       metoprolol tartrate 25 MG tablet    LOPRESSOR    90 tablet    Take 1 tablet (25 mg) by mouth At Bedtime    Hypertension, goal below 150/90       pantoprazole 40 MG EC tablet    PROTONIX    90 tablet    TAKE 1 TABLET(40 MG) BY MOUTH DAILY 30 TO 60 MINUTES BEFORE A MEAL    Chronic Helicobacter pylori gastritis       pravastatin 20 MG tablet    PRAVACHOL    90 tablet    TAKE 1 TABLET(20 MG) BY MOUTH DAILY    Mixed hyperlipidemia       tiZANidine 4 MG tablet    ZANAFLEX    90 tablet    Take 1 tablet (4 mg) by mouth 3 times daily as needed for muscle spasms    Back muscle spasm       triamcinolone 0.1 % cream    KENALOG    80 g     APPLY TOPICALLY TWICE DAILY    Dermatitis

## 2018-10-22 NOTE — NURSING NOTE
Patient presents with:  COMPREHENSIVE EYE EXAM: no visual complaints   Follow Up For: 6 month Glaucoma Suspect Latanoprost (XALATAN) 0.005 % ophthalmic solution qhs OU patient compliant with gtts      Referring Provider:  No referring provider defined for this encounter.    HPI    Last Eye Exam:  10/30/17   Informant(s):  pt   Symptoms:              Comments:  No visual or eye comfort complaints             Taylor Ribeiro, COA

## 2018-10-22 NOTE — PROGRESS NOTES
Assessment & Plan   Noreen Florence is a 68 year old female who presents with:   Review of systems for the eyes was negative other than the pertinent positives and negatives noted in the HPI.  History is obtained from the patient.    Chief Complaint   Patient presents with     COMPREHENSIVE EYE EXAM     no visual complaints      Follow Up For     6 month Glaucoma Suspect Latanoprost (XALATAN) 0.005 % ophthalmic solution qhs OU patient compliant with gtts       Lab Results   Component Value Date    A1C 5.9 06/21/2018    A1C 5.9 10/27/2016         Glaucoma suspect of both eyes  - Excellent IOP today, CPM w/ Xalatan. Refills sent to pharmacy  - latanoprost (XALATAN) 0.005 % ophthalmic solution; Place 1 drop into both eyes At Bedtime  - OCT Optic Nerve RNFL Optovue OU (both eyes)  - HVF 24-2 OU      Return in about 1 year (around 10/22/2019) for Annual Eye Exam, Glaucoma check, OCT.    Documentation for today's encounter was performed by Angie Lepe COA. OSC. Acting as a scribe in my presence. I have reviewed and verified that it is an accurate recording of today's encounter.    Attending Physician Attestation:  Complete documentation of historical and exam elements from today's encounter can be found in the full encounter summary report (not reduplicated in this progress note).  I personally obtained the chief complaint(s) and history of present illness.  I confirmed and edited as necessary the review of systems, past medical/surgical history, family history, social history, and examination findings as documented by others; and I examined the patient myself.  I personally reviewed the relevant tests, images, and reports as documented above.  I formulated and edited as necessary the assessment and plan and discussed the findings and management plan with the patient and family. - Shai Levin MD

## 2018-10-29 DIAGNOSIS — B96.81 CHRONIC HELICOBACTER PYLORI GASTRITIS: ICD-10-CM

## 2018-10-29 DIAGNOSIS — K29.50 CHRONIC HELICOBACTER PYLORI GASTRITIS: ICD-10-CM

## 2018-10-29 PROCEDURE — 87338 HPYLORI STOOL AG IA: CPT | Performed by: FAMILY MEDICINE

## 2018-10-30 ENCOUNTER — ALLIED HEALTH/NURSE VISIT (OUTPATIENT)
Dept: NURSING | Facility: CLINIC | Age: 68
End: 2018-10-30
Payer: MEDICARE

## 2018-10-30 DIAGNOSIS — Z23 NEED FOR PROPHYLACTIC VACCINATION AND INOCULATION AGAINST INFLUENZA: Primary | ICD-10-CM

## 2018-10-30 LAB
H PYLORI AG STL QL IA: ABNORMAL
SPECIMEN SOURCE: ABNORMAL

## 2018-10-30 PROCEDURE — G0008 ADMIN INFLUENZA VIRUS VAC: HCPCS

## 2018-10-30 PROCEDURE — 90662 IIV NO PRSV INCREASED AG IM: CPT

## 2018-10-30 PROCEDURE — 99207 ZZC NO CHARGE NURSE ONLY: CPT

## 2018-10-30 NOTE — MR AVS SNAPSHOT
After Visit Summary   10/30/2018    Noreen Florence    MRN: [id number]           Patient Information     Date Of Birth          1950        Visit Information        Provider Department      10/30/2018 3:00 PM BK ANCILLARY [facility]        Today's Diagnoses     Need for prophylactic vaccination and inoculation against influenza    -  1       Follow-ups after your visit        Your next 10 appointments already scheduled     Nov 13, 2018 11:00 AM CST   New Visit with Sly De Santiago MD   [facility])    [address] [place] [id number] [phone]            Jan 14, 2019  1:00 PM CST   US RENAL COMPLETE with MGUS1, MG US TECH   [facility])    [address] [place] [id number] [phone]           How do I prepare for my exam? (Food and drink instructions) No Food and Drink Restrictions.  How do I prepare for my exam? (Other instructions) You do not need to do anything special to prepare for your exam.  What should I wear: Wear comfortable clothes.  How long does the exam take: Most ultrasounds take 30 to 60 minutes.  What should I bring: Bring a list of your medicines, including vitamins, minerals and over-the-counter drugs. It is safest to leave personal items at home.  Do I need a :  No  is needed.  What do I need to tell my doctor: Tell your doctor about any allergies you may have.  What should I do after the exam: No restrictions, You may resume normal activities.  What is this test: An ultrasound uses sound waves to make pictures of the body. Sound waves do not cause pain. The only discomfort may be the pressure of the wand against your skin or full bladder.  Who should I call with questions: If you have any questions, please call the Imaging Department where you will have your exam. Directions, parking instructions, and other  information is available on our website, [email or web address].            Jan 14, 2019  1:30 PM CST   [facility] Visit with Emi [facility]ales MD   [facility]    [address] [address] [place] [address] [phone]            Oct 21, 2019  2:00 PM CDT   Return Visit with Shai Levin MD, Mg Oph Nurse Only 2   [facility]    [address] [address] [place] [address] [phone]              Who to contact     If you have questions or need follow up information about today's clinic visit or your schedule please contact [facility] directly at [phone].  Normal or non-critical lab and imaging results will be communicated to you by MyChart, letter or phone within 4 business days after the clinic has received the results. If you do not hear from us within 7 days, please contact the clinic through MyChart or phone. If you have a critical or abnormal lab result, we will notify you by phone as soon as possible.  Submit refill requests through [employer] or call your pharmacy and they will forward the refill request to us. Please allow 3 business days for your refill to be completed.          Additional Information About Your Visit        MyChart Information     [employer] gives you secure access to your electronic health record. If you see a primary care provider, you can also send messages to your care team and make appointments. If you have questions, please call your primary care clinic.  If you do not have a primary care provider, please call [phone] and they will assist you.        Care EveryWhere ID     This is your Care EveryWhere ID. This could be used by other organizations to access your [facility] medical records  [id number]        Your Vitals Were     Last Period                   06/21/2002 (Approximate)            Blood Pressure from Last 3 Encounters:   09/14/18  126/80   06/21/18 130/81   04/16/18 123/68    Weight from Last 3 Encounters:   09/14/18 115 lb 6.4 oz (52.3 kg)   06/21/18 112 lb 1.6 oz (50.8 kg)   04/16/18 119 lb (54 kg)              We Performed the Following     FLU VACCINE, INCREASED ANTIGEN, PRESV FREE, AGE 65+ [15355]     Vaccine Administration, Initial [08030]        Primary Care Provider Office Phone # Fax #    Jia Ashli Castro [phone]       [address]  [facility] [address]        Equal Access to Services     [facility]MAN[facility] : Hadii aad ku hadasho Sopam, waaxda luqadaha, qaybta kaalmada tom, axel walker. So [facility] [phone].    ATENCIÓN: Si habla español, tiene a barker disposición servicios gratuitos de asistencia lingüística. [facility] [phone].    We comply with applicable federal civil rights laws and [place] laws. We do not discriminate on the basis of race, color, national origin, age, disability, sex, sexual orientation, or gender identity.            Thank you!     Thank you for choosing [facility]  for your care. Our goal is always to provide you with excellent care. Hearing back from our patients is one way we can continue to improve our services. Please take a few minutes to complete the written survey that you may receive in the mail after your visit with us. Thank you!             Your Updated Medication List - Protect others around you: Learn how to safely use, store and throw away your medicines at www.disposemymeds.org.          This list is accurate as of 10/30/18  3:49 PM.  Always use your most recent med list.                   Brand Name Dispense Instructions for use Diagnosis    calcium carbonate 600 mg-vitamin D 400 units 600-400 MG-UNIT per tablet    calcium 600 + D    60 tablet    Take 1 tablet by mouth 2 times daily    Osteopenia of multiple sites       cetirizine 10 MG tablet    zyrTEC    90 tablet    Take 1 tablet (10 mg) by mouth daily     Chronic rhinitis, unspecified type       chlorhexidine 0.12 % solution    PERIDEX    473 mL    SWISH AND SPIT 15 ML BY MOUTH TWICE DAILY    Gingivitis       fluticasone 50 MCG/ACT spray    FLONASE    1 Package    Spray 1-2 sprays into both nostrils daily    Hayfever       hydrocortisone 2.5 % cream    ANUSOL-HC    30 g    Place rectally 2 times daily    External hemorrhoids       latanoprost 0.005 % ophthalmic solution    XALATAN    7.5 mL    Place 1 drop into both eyes At Bedtime    Glaucoma suspect of both eyes       leucovorin 5 MG tablet    WELLCOVORIN     TK 1 T PO 12 H AFTER METHOTREXATE ONE TIME EACH WEEK        levofloxacin 500 MG tablet    LEVAQUIN    14 tablet    Take 1 tablet (500 mg) by mouth daily    Chronic Helicobacter pylori gastritis       losartan-hydrochlorothiazide 50-12.5 MG per tablet    HYZAAR    90 tablet    Take 1 tablet by mouth daily    Hypertension, goal below 150/90       methotrexate 2.5 MG tablet     1 tablet    Take 6 tablets (15 mg) by mouth once a week    Polyarthritis, inflammatory (H)       metoprolol tartrate 25 MG tablet    LOPRESSOR    90 tablet    Take 1 tablet (25 mg) by mouth At Bedtime    Hypertension, goal below 150/90       pantoprazole 40 MG EC tablet    PROTONIX    90 tablet    TAKE 1 TABLET(40 MG) BY MOUTH DAILY 30 TO 60 MINUTES BEFORE A MEAL    Chronic Helicobacter pylori gastritis       pravastatin 20 MG tablet    PRAVACHOL    90 tablet    TAKE 1 TABLET(20 MG) BY MOUTH DAILY    Mixed hyperlipidemia       tiZANidine 4 MG tablet    ZANAFLEX    90 tablet    Take 1 tablet (4 mg) by mouth 3 times daily as needed for muscle spasms    Back muscle spasm       triamcinolone 0.1 % cream    KENALOG    80 g    APPLY TOPICALLY TWICE DAILY    Dermatitis

## 2018-10-31 NOTE — PROGRESS NOTES
Dear Noreen    Your test results are attached.     The results show that you still have the Helicobacter Pylori infection. I see that you have an appointment with Dr. De Santiago at the [facility] on November 13th at 11 am. Please keep this appointment. He will be able to help you. I have met him and I think that you will like his care.     Please contact me by [employer] if you have any questions about your labs or management.    Jia Castro MD

## 2018-11-13 ENCOUNTER — OFFICE VISIT (OUTPATIENT)
Dept: GASTROENTEROLOGY | Facility: CLINIC | Age: 68
End: 2018-11-13
Attending: FAMILY MEDICINE
Payer: MEDICARE

## 2018-11-13 VITALS
BODY MASS INDEX: 22.77 KG/M2 | HEART RATE: 76 BPM | DIASTOLIC BLOOD PRESSURE: 90 MMHG | OXYGEN SATURATION: 98 % | SYSTOLIC BLOOD PRESSURE: 144 MMHG | WEIGHT: 120.5 LBS

## 2018-11-13 DIAGNOSIS — A04.8 H. PYLORI INFECTION: Primary | ICD-10-CM

## 2018-11-13 PROCEDURE — 99204 OFFICE O/P NEW MOD 45 MIN: CPT | Performed by: INTERNAL MEDICINE

## 2018-11-13 RX ORDER — METRONIDAZOLE 250 MG/1
250 TABLET ORAL 4 TIMES DAILY
Qty: 56 TABLET | Refills: 0 | Status: SHIPPED | OUTPATIENT
Start: 2018-11-13 | End: 2019-02-01

## 2018-11-13 RX ORDER — DOXYCYCLINE 100 MG/1
100 CAPSULE ORAL DAILY
Qty: 14 CAPSULE | Refills: 0 | Status: SHIPPED | OUTPATIENT
Start: 2018-11-13 | End: 2019-02-01

## 2018-11-13 RX ORDER — BISMUTH SUBSALICYLATE 262 MG/1
1 TABLET, CHEWABLE ORAL 4 TIMES DAILY
Qty: 56 TABLET | Refills: 0 | Status: SHIPPED | OUTPATIENT
Start: 2018-11-13 | End: 2019-02-01

## 2018-11-13 RX ORDER — TETRACYCLINE HYDROCHLORIDE 500 MG/1
500 CAPSULE ORAL 4 TIMES DAILY
Qty: 56 CAPSULE | Refills: 0 | Status: SHIPPED | OUTPATIENT
Start: 2018-11-13 | End: 2018-11-13

## 2018-11-13 ASSESSMENT — PAIN SCALES - GENERAL: PAINLEVEL: NO PAIN (0)

## 2018-11-13 NOTE — NURSING NOTE
Chief Complaint   Patient presents with     Consult     Chronic Helicobacter pylori gastritis     Sly MONROY

## 2018-11-13 NOTE — MR AVS SNAPSHOT
After Visit Summary   11/13/2018    Noreen Florence    MRN: [id number]           Patient Information     Date Of Birth          1950        Visit Information        Provider Department      11/13/2018 11:00 AM Sly De Santiago MD [facility]        Today's Diagnoses     H. pylori infection    -  1      Care Instructions    For Treatment of H-pylori:    1.  Increase omeprazole twice per day for 2 weeks while on antibiotics.  Take 30 minutes before breakfast and 30 minutes before dinner.    2.  Take the bismuth tablet, the tetracycline antibiotic and the metronidazole antibiotic four times per day for 14 days.    Then, you will finish the antibiotic ~ November 28th.    On January 2nd, stop the omeprazole and use zantac over the counter for acid reducing medication for 2 weeks.  After 2 weeks, ~January 16th, go to the lab and give a stool sample to check that the H-pylori is gone.  After you give the stool sample to check the H-pylori, you can stop the Zantac and restart your omeprazole 1 time per day.    Then follow up here in the GI office ~Feb 5th in case the H-pylori is still there and needs more treatment.          Follow-ups after your visit        Follow-up notes from your care team     Return in about 3 months (around 2/13/2019).      Your next 10 appointments already scheduled     Jan 14, 2019  1:00 PM JONA MCNULTY RENAL COMPLETE with MGUS1, MG US TECH   [facility] ([facility])    [address] [place] [id number] [phone]           How do I prepare for my exam? (Food and drink instructions) No Food and Drink Restrictions.  How do I prepare for my exam? (Other instructions) You do not need to do anything special to prepare for your exam.  What should I wear: Wear comfortable clothes.  How long does the exam take: Most ultrasounds take 30 to 60 minutes.  What should I bring: Bring a list of your medicines, including  vitamins, minerals and over-the-counter drugs. It is safest to leave personal items at home.  Do I need a :  No  is needed.  What do I need to tell my doctor: Tell your doctor about any allergies you may have.  What should I do after the exam: No restrictions, You may resume normal activities.  What is this test: An ultrasound uses sound waves to make pictures of the body. Sound waves do not cause pain. The only discomfort may be the pressure of the wand against your skin or full bladder.  Who should I call with questions: If you have any questions, please call the Imaging Department where you will have your exam. Directions, parking instructions, and other information is available on our website, [email or web address].            Jan 14, 2019  1:30 PM CST   Return Visit with Emi Morales MD   [facility] ([facility])    [address] [place] [id number] [phone]            Feb 19, 2019  1:30 PM CST   Return Visit with Sly De Santiago MD   [facility] ([facility])    [address] [place] [id number] [phone]            Oct 21, 2019  2:00 PM CDT   Return Visit with Shai Levin MD, Mg Ophth Nurse Only 2   [facility] ([facility])    [address] [place] [id number] [phone]              Future tests that were ordered for you today     Open Future Orders        Priority Expected Expires Ordered    H Pylori antigen stool Routine 1/16/2019 11/13/2019 11/13/2018            Who to contact     If you have questions or need follow up information about today's clinic visit or your schedule please contact [facility] directly at [phone].  Normal or non-critical lab and imaging results will be communicated to you by MyChart, letter or phone within 4 business days after the clinic has received the results.  If you do not hear from us within 7 days, please contact the clinic through [employer] or phone. If you have a critical or abnormal lab result, we will notify you by phone as soon as possible.  Submit refill requests through [employer] or call your pharmacy and they will forward the refill request to us. Please allow 3 business days for your refill to be completed.          Additional Information About Your Visit        [facility] [employer] Information     [employer] gives you secure access to your electronic health record. If you see a primary care provider, you can also send messages to your care team and make appointments. If you have questions, please call your primary care clinic.  If you do not have a primary care provider, please call [phone] and they will assist you.      [employer] is an electronic gateway that provides easy, online access to your medical records. With [employer], you can request a clinic appointment, read your test results, renew a prescription or communicate with your care team.     To access your existing account, please contact your [facility] or call [phone] for assistance.        Care EveryWhere ID     This is your Care EveryWhere ID. This could be used by other organizations to access your [facility] medical records  [id number]        Your Vitals Were     Pulse Last Period Pulse Oximetry BMI (Body Mass Index)          76 06/21/2002 (Approximate) 98% 22.77 kg/m2         Blood Pressure from Last 3 Encounters:   11/13/18 144/90   09/14/18 126/80   06/21/18 130/81    Weight from Last 3 Encounters:   11/13/18 54.7 kg (120 lb 8 oz)   09/14/18 52.3 kg (115 lb 6.4 oz)   06/21/18 50.8 kg (112 lb 1.6 oz)                 Today's Medication Changes          These changes are accurate as of 11/13/18 11:37 AM.  If you have any questions, ask your nurse or doctor.               Start taking these medicines.        Dose/Directions    bismuth subsalicylate 262 MG chewable tablet    Commonly known as:  PEPTO BISMOL   Used for:  H. pylori infection   Started by:  Sly De Santiago MD        Dose:  1 tablet   Take 1 tablet (262 mg) by mouth 4 times daily for 14 days   Quantity:  56 tablet   Refills:  0       metroNIDAZOLE 250 MG tablet   Commonly known as:  FLAGYL   Used for:  H. pylori infection   Started by:  Sly De Santiago MD        Dose:  250 mg   Take 1 tablet (250 mg) by mouth 4 times daily for 14 days   Quantity:  56 tablet   Refills:  0       omeprazole 20 MG CR capsule   Commonly known as:  priLOSEC   Used for:  H. pylori infection   Started by:  Sly De Santiago MD        Dose:  20 mg   Take 1 capsule (20 mg) by mouth 2 times daily for 14 days   Quantity:  28 capsule   Refills:  0       tetracycline 500 MG capsule   Commonly known as:  ACHROMYCIN/SUMYCIN   Used for:  H. pylori infection   Started by:  Sly De Santiago MD        Dose:  500 mg   Take 1 capsule (500 mg) by mouth 4 times daily for 14 days   Quantity:  56 capsule   Refills:  0            Where to get your medicines      These medications were sent to [facility] [address] [facility] [address] [facility] [address] [facility]  [address] [facility] [facility], [facility] [address]     Phone:  [phone]     bismuth subsalicylate 262 MG chewable tablet    metroNIDAZOLE 250 MG tablet    omeprazole 20 MG CR capsule    tetracycline 500 MG capsule                Primary Care Provider Office Phone # Fax #    Jia Ashli Castro [phone]       [address]  [facility] [address]        Equal Access to Services     [facility]: Hadii aad ku hadasho Soomaali, waaxda luqadaha, qaybta kaalmada adeegyada, axel walker. So [facility] [phone].    ATENCIÓN: Si habla español, tiene a barker disposición servicios gratuitos de asistencia lingüística. Llame al [phone].    We comply with applicable federal civil rights  laws and [place] laws. We do not discriminate on the basis of race, color, national origin, age, disability, sex, sexual orientation, or gender identity.            Thank you!     Thank you for choosing [facility]  for your care. Our goal is always to provide you with excellent care. Hearing back from our patients is one way we can continue to improve our services. Please take a few minutes to complete the written survey that you may receive in the mail after your visit with us. Thank you!             Your Updated Medication List - Protect others around you: Learn how to safely use, store and throw away your medicines at www.disposemymeds.org.          This list is accurate as of 11/13/18 11:37 AM.  Always use your most recent med list.                   Brand Name Dispense Instructions for use Diagnosis    bismuth subsalicylate 262 MG chewable tablet    PEPTO BISMOL    56 tablet    Take 1 tablet (262 mg) by mouth 4 times daily for 14 days    H. pylori infection       calcium carbonate 600 mg-vitamin D 400 units 600-400 MG-UNIT per tablet    calcium 600 + D    60 tablet    Take 1 tablet by mouth 2 times daily    Osteopenia of multiple sites       cetirizine 10 MG tablet    zyrTEC    90 tablet    Take 1 tablet (10 mg) by mouth daily    Chronic rhinitis, unspecified type       chlorhexidine 0.12 % solution    PERIDEX    473 mL    SWISH AND SPIT 15 ML BY MOUTH TWICE DAILY    Gingivitis       fluticasone 50 MCG/ACT spray    FLONASE    1 Package    Spray 1-2 sprays into both nostrils daily    Hayfever       hydrocortisone 2.5 % cream    ANUSOL-HC    30 g    Place rectally 2 times daily    External hemorrhoids       latanoprost 0.005 % ophthalmic solution    XALATAN    7.5 mL    Place 1 drop into both eyes At Bedtime    Glaucoma suspect of both eyes       leucovorin 5 MG tablet    WELLCOVORIN     TK 1 T PO 12 H AFTER METHOTREXATE ONE TIME EACH WEEK        levofloxacin 500 MG tablet    LEVAQUIN    14  tablet    Take 1 tablet (500 mg) by mouth daily    Chronic Helicobacter pylori gastritis       losartan-hydrochlorothiazide 50-12.5 MG per tablet    HYZAAR    90 tablet    Take 1 tablet by mouth daily    Hypertension, goal below 150/90       methotrexate 2.5 MG tablet     1 tablet    Take 6 tablets (15 mg) by mouth once a week    Polyarthritis, inflammatory (H)       metoprolol tartrate 25 MG tablet    LOPRESSOR    90 tablet    Take 1 tablet (25 mg) by mouth At Bedtime    Hypertension, goal below 150/90       metroNIDAZOLE 250 MG tablet    FLAGYL    56 tablet    Take 1 tablet (250 mg) by mouth 4 times daily for 14 days    H. pylori infection       omeprazole 20 MG CR capsule    priLOSEC    28 capsule    Take 1 capsule (20 mg) by mouth 2 times daily for 14 days    H. pylori infection       pantoprazole 40 MG EC tablet    PROTONIX    90 tablet    TAKE 1 TABLET(40 MG) BY MOUTH DAILY 30 TO 60 MINUTES BEFORE A MEAL    Chronic Helicobacter pylori gastritis       pravastatin 20 MG tablet    PRAVACHOL    90 tablet    TAKE 1 TABLET(20 MG) BY MOUTH DAILY    Mixed hyperlipidemia       tetracycline 500 MG capsule    ACHROMYCIN/SUMYCIN    56 capsule    Take 1 capsule (500 mg) by mouth 4 times daily for 14 days    H. pylori infection       tiZANidine 4 MG tablet    ZANAFLEX    90 tablet    Take 1 tablet (4 mg) by mouth 3 times daily as needed for muscle spasms    Back muscle spasm       triamcinolone 0.1 % cream    KENALOG    80 g    APPLY TOPICALLY TWICE DAILY    Dermatitis

## 2018-11-13 NOTE — PATIENT INSTRUCTIONS
For Treatment of H-pylori:    1.  Increase omeprazole twice per day for 2 weeks while on antibiotics.  Take 30 minutes before breakfast and 30 minutes before dinner.    2.  Take the bismuth tablet, the tetracycline antibiotic and the metronidazole antibiotic four times per day for 14 days.    Then, you will finish the antibiotic ~ November 28th.    On January 2nd, stop the omeprazole and use zantac over the counter for acid reducing medication for 2 weeks.  After 2 weeks, ~January 16th, go to the lab and give a stool sample to check that the H-pylori is gone.  After you give the stool sample to check the H-pylori, you can stop the Zantac and restart your omeprazole 1 time per day.    Then follow up here in the GI office ~Feb 5th in case the H-pylori is still there and needs more treatment.

## 2018-11-15 NOTE — PROGRESS NOTES
GASTROENTEROLOGY NEW PATIENT CLINIC VISIT    CC/REFERRING MD:    Jia Castro    REASON FOR CONSULTATION:   Jia Castro for   Chief Complaint   Patient presents with     Consult     Chronic Helicobacter pylori gastritis       HISTORY OF PRESENT ILLNESS:    Noreen Florence is 68 year old female who presents for evaluation of chronic H. pylori.  She has been noted to have H. pylori since approximately 2016.  She has previously been treated with amoxicillin/clarithromycin plus PPI for 14 days which was unsuccessful and then was recently treated in September 2018 with levofloxacin/amoxicillin  plus PPI therapy but she remains H. pylori positive on follow-up stool antigen testing.  She notes that she is getting epigastric pain and the last episode was 2 days ago.  She notes that she does have some improvement with water.  She is not losing weight.  She has not seen blood in her stool.  No family history of GI cancers.  She does not take NSAIDs.  Last EGD was in 2016 with biopsy for Franco's which was negative.  And last colonoscopy was in 2017 which was unremarkable.    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 Provider to review and confirm.       GERD  (gastroesophageal reflux disease) 03/20/2015     Priority: Medium     Polyarthritis, inflammatory (H) 02/16/2015     Priority: Medium     Hypertension, goal below 150/90 11/28/2014     Priority: Medium     Osteopenia 04/21/2014     Priority: Medium     Hayfever 04/21/2014     Priority: Medium       PERTINENT PAST MEDICAL HISTORY:  (I personally reviewed this history with the patient at today's visit)   Past Medical History:   Diagnosis Date     GERD (gastroesophageal reflux disease) 3/20/2015     Hyperlipidemia LDL goal < 130 8/3/2015     Hypertension      Hypertension, goal below 150/90 11/28/2014     Rheumatoid arthritis, adult (H)          PREVIOUS SURGERIES: (I personally reviewed this history with the patient at today's visit)   Past Surgical History:   Procedure Laterality Date     COLONOSCOPY       COLONOSCOPY WITH CO2 INSUFFLATION N/A 4/5/2017    Procedure: COLONOSCOPY WITH CO2 INSUFFLATION;  Surgeon: Duane, William Charles, MD;  Location: [facility] OR     COMBINED REPAIR PTOSIS WITH BLEPHAROPLASTY BILATERAL Bilateral 1/6/2017    Procedure: COMBINED REPAIR PTOSIS WITH BLEPHAROPLASTY BILATERAL;  Surgeon: Carly Norman MD;  Location: [facility]     ESOPHAGOSCOPY, GASTROSCOPY, DUODENOSCOPY (EGD), COMBINED N/A 1/5/2016    Procedure: COMBINED ESOPHAGOSCOPY, GASTROSCOPY, DUODENOSCOPY (EGD), BIOPSY SINGLE OR MULTIPLE;  Surgeon: Duane, William Charles, MD;  Location: [facility]     REPAIR ENTROPION BILATERAL Bilateral 1/6/2017    Procedure: REPAIR ENTROPION BILATERAL;  Surgeon: Carly Norman MD;  Location: [facility]     REPAIR PTOSIS Bilateral 01/2017         ALLERGIES:   No Known Allergies    PERTINENT MEDICATIONS:    Current Outpatient Prescriptions:      bismuth subsalicylate (PEPTO BISMOL) 262 MG chewable tablet, Take 1 tablet (262 mg) by mouth 4 times daily for 14 days, Disp: 56 tablet, Rfl: 0     calcium-vitamin D (CALCIUM 600 + D) 600-400 MG-UNIT per tablet, Take 1 tablet by mouth 2 times daily, Disp: 60 tablet, Rfl:  11     cetirizine (ZYRTEC) 10 MG tablet, Take 1 tablet (10 mg) by mouth daily, Disp: 90 tablet, Rfl: 3     chlorhexidine (PERIDEX) 0.12 % solution, SWISH AND SPIT 15 ML BY MOUTH TWICE DAILY, Disp: 473 mL, Rfl: 3     doxycycline (VIBRAMYCIN) 100 MG capsule, Take 1 capsule (100 mg) by mouth daily, Disp: 14 capsule, Rfl: 0     fluticasone (FLONASE) 50 MCG/ACT nasal spray, Spray 1-2 sprays into both nostrils daily, Disp: 1 Package, Rfl: 11     hydrocortisone (ANUSOL-HC) 2.5 % cream, Place rectally 2 times daily, Disp: 30 g, Rfl: 3     latanoprost (XALATAN) 0.005 % ophthalmic solution, Place 1 drop into both eyes At Bedtime, Disp: 7.5 mL, Rfl: 4     leucovorin (WELLCOVORIN) 5 MG tablet, TK 1 T PO 12 H AFTER METHOTREXATE ONE TIME EACH WEEK, Disp: , Rfl: 3     levofloxacin (LEVAQUIN) 500 MG tablet, Take 1 tablet (500 mg) by mouth daily, Disp: 14 tablet, Rfl: 0     losartan-hydrochlorothiazide (HYZAAR) 50-12.5 MG per tablet, Take 1 tablet by mouth daily, Disp: 90 tablet, Rfl: 3     methotrexate 2.5 MG tablet, Take 6 tablets (15 mg) by mouth once a week, Disp: 1 tablet, Rfl: 0     metoprolol tartrate (LOPRESSOR) 25 MG tablet, Take 1 tablet (25 mg) by mouth At Bedtime, Disp: 90 tablet, Rfl: 3     metroNIDAZOLE (FLAGYL) 250 MG tablet, Take 1 tablet (250 mg) by mouth 4 times daily for 14 days, Disp: 56 tablet, Rfl: 0     omeprazole (PRILOSEC) 20 MG CR capsule, Take 1 capsule (20 mg) by mouth 2 times daily for 14 days, Disp: 28 capsule, Rfl: 0     pantoprazole (PROTONIX) 40 MG EC tablet, TAKE 1 TABLET(40 MG) BY MOUTH DAILY 30 TO 60 MINUTES BEFORE A MEAL, Disp: 90 tablet, Rfl: 1     pravastatin (PRAVACHOL) 20 MG tablet, TAKE 1 TABLET(20 MG) BY MOUTH DAILY, Disp: 90 tablet, Rfl: 3     tiZANidine (ZANAFLEX) 4 MG tablet, Take 1 tablet (4 mg) by mouth 3 times daily as needed for muscle spasms, Disp: 90 tablet, Rfl: 1     triamcinolone (KENALOG) 0.1 % cream, APPLY TOPICALLY TWICE DAILY, Disp: 80 g, Rfl: 3  No current  facility-administered medications for this visit.     Facility-Administered Medications Ordered in Other Visits:      gadobutrol (GADAVIST) injection 7.5 mL, 7.5 mL, Intravenous, Once, Palomo Terrell MD    SOCIAL HISTORY:  Social History     Social History     Marital status:      Spouse name: N/A     Number of children: N/A     Years of education: N/A     Occupational History     Not on file.     Social History Main Topics     Smoking status: Never Smoker     Smokeless tobacco: Never Used     Alcohol use No     Drug use: No     Sexual activity: Not Currently     Partners: Male     Birth control/ protection: None     Other Topics Concern     Parent/Sibling W/ Cabg, Mi Or Angioplasty Before 65f 55m? No     Social History Narrative       FAMILY HISTORY: (I personally reviewed this history with the patient at today's visit)  Family History   Problem Relation Age of Onset     Diabetes Mother      Cancer No family hx of      Hypertension No family hx of      Cerebrovascular Disease No family hx of      Thyroid Disease No family hx of      Glaucoma No family hx of      Macular Degeneration No family hx of         ROS:    No fevers or chills  No weight loss  No blurry vision, double vision or change in vision  No sore throat  No lymphadenopathy  No headache, paraesthesias, or weakness in a limb  No shortness of breath or wheezing  No chest pain or pressure  No arthralgias or myalgias  No rashes or skin changes  No odynophagia or dysphagia  No BRBPR, hematochezia, melena  No dysuria, frequency or urgency  No hot/cold intolerance or polyria  No anxiety or depression  PHYSICAL EXAMINATION:  Constitutional: aaox3, cooperative, pleasant, not dyspneic/diaphoretic, no acute distress  Vitals reviewed: /90  Pulse 76  Wt 54.7 kg (120 lb 8 oz)  LMP 06/21/2002 (Approximate)  SpO2 98%  BMI 22.77 kg/m2  Wt:   Wt Readings from Last 2 Encounters:   11/13/18 54.7 kg (120 lb 8 oz)   09/14/18 52.3 kg (115 lb 6.4 oz)       Eyes: Sclera anicteric/injected  Ears/nose/mouth/throat: Normal oropharynx without ulcers or exudate, mucus membranes moist, hearing intact  Neck: supple, thyroid normal size  CV: No edema, RRR  Respiratory: Unlabored breathing, CTAB  Lymph: No submandibular, supraclavicular or inguinal lymphadenopathy  Abd: Nondistended, no masses, +bs, no hepatosplenomegaly, nontender, no peritoneal signs  Skin: warm, perfused, no jaundice  Psych: Normal affect  MSK: Normal gait      PERTINENT STUDIES: (I personally reviewed these laboratory studies today)  Most recent CBC:   Recent Labs   Lab Test  06/21/18   0953  04/16/18   1610   WBC  5.6  6.9   HGB  13.9  13.9   HCT  42.2  41.3   PLT  231  238     Most recent hepatic panel:  Recent Labs   Lab Test  04/16/18   1610 05/25/17   ALT  31  33   AST  27  29     Most recent creatinine:  Recent Labs   Lab Test  06/21/18   0953  04/16/18   1610   CR  0.47*  0.43*         ASSESSMENT/PLAN:    Noreen Florence is a 68 year old female who presents for evaluation of chronic H. pylori.  She is failed 2 different antibiotic regimens including clarithromycin/amoxicillin triple therapy and levofloxacin based triple therapy.  We will prescribe her quadruple therapy at this time with bismuth/metronidazole/tetracycline/PPI.  Due to the patient reported high cost of tetracycline, it would be acceptable to substitute doxycycline.  She will do 14 days of therapy and then approximately 6 weeks later stop her PPI and change to an H2 blocker.  She will then go to get another stool sample after 1-2 weeks off PPI in order to see if she is clear the infection.  We will then plan to see her back in the office.      H. pylori infection  Orders Placed This Encounter   Procedures     H Pylori antigen stool     Standing Status:   Future     Standing Expiration Date:   11/13/2019       RTC 3 months    Thank you for this consultation.  It was a pleasure to participate in the care of this patient; please contact us  with any further questions.      This note was created with voice recognition software, and while reviewed for accuracy, typos may remain.     Sly De Santiago MD  Adjunct  of Medicine  Division of Gastroenterology, Hepatology and Nutrition  [facility]  [phone]

## 2018-11-18 ENCOUNTER — OFFICE VISIT (OUTPATIENT)
Dept: URGENT CARE | Facility: URGENT CARE | Age: 68
End: 2018-11-18
Payer: MEDICARE

## 2018-11-18 VITALS
RESPIRATION RATE: 17 BRPM | SYSTOLIC BLOOD PRESSURE: 136 MMHG | TEMPERATURE: 97.8 F | DIASTOLIC BLOOD PRESSURE: 86 MMHG | HEART RATE: 77 BPM | WEIGHT: 117 LBS | BODY MASS INDEX: 22.11 KG/M2 | OXYGEN SATURATION: 98 %

## 2018-11-18 DIAGNOSIS — K64.8 INTERNAL BLEEDING HEMORRHOIDS: Primary | ICD-10-CM

## 2018-11-18 PROCEDURE — 99213 OFFICE O/P EST LOW 20 MIN: CPT | Performed by: FAMILY MEDICINE

## 2018-11-18 NOTE — MR AVS SNAPSHOT
After Visit Summary   11/18/2018    Noreen Florence    MRN: [id number]           Patient Information     Date Of Birth          1950        Visit Information        Provider Department      11/18/2018 10:50 AM Deep Nolen MD [facility]        Today's Diagnoses     Internal bleeding hemorrhoids    -  1       Follow-ups after your visit        Your next 10 appointments already scheduled     Jan 14, 2019  1:00 PM CST   US RENAL COMPLETE with MGUS1, MG US TECH   [facility])    [id number] [place] [id number] [phone]           How do I prepare for my exam? (Food and drink instructions) No Food and Drink Restrictions.  How do I prepare for my exam? (Other instructions) You do not need to do anything special to prepare for your exam.  What should I wear: Wear comfortable clothes.  How long does the exam take: Most ultrasounds take 30 to 60 minutes.  What should I bring: Bring a list of your medicines, including vitamins, minerals and over-the-counter drugs. It is safest to leave personal items at home.  Do I need a :  No  is needed.  What do I need to tell my doctor: Tell your doctor about any allergies you may have.  What should I do after the exam: No restrictions, You may resume normal activities.  What is this test: An ultrasound uses sound waves to make pictures of the body. Sound waves do not cause pain. The only discomfort may be the pressure of the wand against your skin or full bladder.  Who should I call with questions: If you have any questions, please call the Imaging Department where you will have your exam. Directions, parking instructions, and other information is available on our website, [email or web address] (now [email or web address].            Jan 14, 2019  1:30 PM CST   Return Visit with Emi Morales MD   [facility])    [address] [place] [address]  [id number]   [phone]            Feb 19, 2019  1:30 PM CST   Return Visit with Sly De Santiago MD   [facility] ([facility]    [address] [place] [id number] [phone]            Oct 21, 2019  2:00 PM CDT   Return Visit with Shai Levin MD, Mg Ophth Nurse Only 2   [facility] ([facility])    [address] [place] [id number] [phone]              Who to contact     If you have questions or need follow up information about today's clinic visit or your schedule please contact [facility] directly at [phone].  Normal or non-critical lab and imaging results will be communicated to you by MyChart, letter or phone within 4 business days after the clinic has received the results. If you do not hear from us within 7 days, please contact the clinic through [employer] or phone. If you have a critical or abnormal lab result, we will notify you by phone as soon as possible.  Submit refill requests through Known or call your pharmacy and they will forward the refill request to us. Please allow 3 business days for your refill to be completed.          Additional Information About Your Visit        [employer] Information     Known gives you secure access to your electronic health record. If you see a primary care provider, you can also send messages to your care team and make appointments. If you have questions, please call your primary care clinic.  If you do not have a primary care provider, please call [phone] and they will assist you.        Care EveryWhere ID     This is your Care EveryWhere ID. This could be used by other organizations to access your [facility] medical records  [id number]        Your Vitals Were     Pulse Temperature Respirations Last Period Pulse Oximetry BMI (Body Mass Index)    77 97.8  F (36.6  C) (Oral) 17 06/21/2002 (Approximate) 98% 22.11  kg/m2       Blood Pressure from Last 3 Encounters:   11/18/18 136/86   11/13/18 144/90   09/14/18 126/80    Weight from Last 3 Encounters:   11/18/18 117 lb (53.1 kg)   11/13/18 120 lb 8 oz (54.7 kg)   09/14/18 115 lb 6.4 oz (52.3 kg)              Today, you had the following     No orders found for display       Primary Care Provider Office Phone # Fax #    Jia Ashli Castro [phone]       [address]  [facility] [address]        Equal Access to Services     [facility]: Hadii aad ku hadasho Soomaali, waaxda luqadaha, qaybta kaalmada adeegyada, axel matos . So [facility] [phone].    ATENCIÓN: Si habla español, tiene a barker disposición servicios gratuitos de asistencia lingüística. [facility] [phone].    We comply with applicable federal civil rights laws and [place] laws. We do not discriminate on the basis of race, color, national origin, age, disability, sex, sexual orientation, or gender identity.            Thank you!     Thank you for choosing [facility]  for your care. Our goal is always to provide you with excellent care. Hearing back from our patients is one way we can continue to improve our services. Please take a few minutes to complete the written survey that you may receive in the mail after your visit with us. Thank you!             Your Updated Medication List - Protect others around you: Learn how to safely use, store and throw away your medicines at www.disposemymeds.org.          This list is accurate as of 11/18/18 11:23 AM.  Always use your most recent med list.                   Brand Name Dispense Instructions for use Diagnosis    bismuth subsalicylate 262 MG chewable tablet    PEPTO BISMOL    56 tablet    Take 1 tablet (262 mg) by mouth 4 times daily for 14 days    H. pylori infection       calcium carbonate 600 mg-vitamin D 400 units 600-400 MG-UNIT per tablet    calcium 600 + D    60 tablet    Take 1 tablet  by mouth 2 times daily    Osteopenia of multiple sites       cetirizine 10 MG tablet    zyrTEC    90 tablet    Take 1 tablet (10 mg) by mouth daily    Chronic rhinitis, unspecified type       chlorhexidine 0.12 % solution    PERIDEX    473 mL    SWISH AND SPIT 15 ML BY MOUTH TWICE DAILY    Gingivitis       doxycycline 100 MG capsule    VIBRAMYCIN    14 capsule    Take 1 capsule (100 mg) by mouth daily    H. pylori infection       fluticasone 50 MCG/ACT spray    FLONASE    1 Package    Spray 1-2 sprays into both nostrils daily    Hayfever       hydrocortisone 2.5 % cream    ANUSOL-HC    30 g    Place rectally 2 times daily    External hemorrhoids       latanoprost 0.005 % ophthalmic solution    XALATAN    7.5 mL    Place 1 drop into both eyes At Bedtime    Glaucoma suspect of both eyes       leucovorin 5 MG tablet    WELLCOVORIN     TK 1 T PO 12 H AFTER METHOTREXATE ONE TIME EACH WEEK        levofloxacin 500 MG tablet    LEVAQUIN    14 tablet    Take 1 tablet (500 mg) by mouth daily    Chronic Helicobacter pylori gastritis       losartan-hydrochlorothiazide 50-12.5 MG per tablet    HYZAAR    90 tablet    Take 1 tablet by mouth daily    Hypertension, goal below 150/90       methotrexate 2.5 MG tablet     1 tablet    Take 6 tablets (15 mg) by mouth once a week    Polyarthritis, inflammatory (H)       metoprolol tartrate 25 MG tablet    LOPRESSOR    90 tablet    Take 1 tablet (25 mg) by mouth At Bedtime    Hypertension, goal below 150/90       metroNIDAZOLE 250 MG tablet    FLAGYL    56 tablet    Take 1 tablet (250 mg) by mouth 4 times daily for 14 days    H. pylori infection       omeprazole 20 MG CR capsule    priLOSEC    28 capsule    Take 1 capsule (20 mg) by mouth 2 times daily for 14 days    H. pylori infection       pantoprazole 40 MG EC tablet    PROTONIX    90 tablet    TAKE 1 TABLET(40 MG) BY MOUTH DAILY 30 TO 60 MINUTES BEFORE A MEAL    Chronic Helicobacter pylori gastritis       pravastatin 20 MG tablet     PRAVACHOL    90 tablet    TAKE 1 TABLET(20 MG) BY MOUTH DAILY    Mixed hyperlipidemia       tiZANidine 4 MG tablet    ZANAFLEX    90 tablet    Take 1 tablet (4 mg) by mouth 3 times daily as needed for muscle spasms    Back muscle spasm       triamcinolone 0.1 % cream    KENALOG    80 g    APPLY TOPICALLY TWICE DAILY    Dermatitis

## 2018-11-18 NOTE — PROGRESS NOTES
Subjective:   Noreen Florence is a 68 year old female who presents for   Chief Complaint   Patient presents with     Rectal Problem     blood in stool- this morning, bright red and alot     Patient comes in today for one episode of blood in her stool which occurred this morning and saw about 3 small drops of blood going to the toilet.  She had a second bowel movement later and there was no blood to be seen.  She states that she does have a previous history of external hemorrhoids when use a cream to apply to this but has not had any discomfort recently was sitting down or external anal pain.  She did have a colonoscopy reports a year ago and this was all normal recommended to follow-up in 10 years.  Currently being treated for H. pylori he is taking bismuth, metronidazole, tetracycline, omeprazole she questions whether not she really needs to take all these medications and does not report any known side effects at this current time. No reported abdominal pain.       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 Provider to review and confirm.       GERD (gastroesophageal reflux disease) 03/20/2015     Priority: Medium     Polyarthritis, inflammatory (H) 02/16/2015     Priority: Medium     Hypertension, goal below 150/90 11/28/2014     Priority: Medium     Osteopenia 04/21/2014     Priority: Medium     Hayfever 04/21/2014      Priority: Medium       Current Outpatient Prescriptions   Medication     bismuth subsalicylate (PEPTO BISMOL) 262 MG chewable tablet     calcium-vitamin D (CALCIUM 600 + D) 600-400 MG-UNIT per tablet     cetirizine (ZYRTEC) 10 MG tablet     chlorhexidine (PERIDEX) 0.12 % solution     doxycycline (VIBRAMYCIN) 100 MG capsule     fluticasone (FLONASE) 50 MCG/ACT nasal spray     hydrocortisone (ANUSOL-HC) 2.5 % cream     latanoprost (XALATAN) 0.005 % ophthalmic solution     leucovorin (WELLCOVORIN) 5 MG tablet     levofloxacin (LEVAQUIN) 500 MG tablet     losartan-hydrochlorothiazide (HYZAAR) 50-12.5 MG per tablet     methotrexate 2.5 MG tablet     metoprolol tartrate (LOPRESSOR) 25 MG tablet     metroNIDAZOLE (FLAGYL) 250 MG tablet     omeprazole (PRILOSEC) 20 MG CR capsule     pantoprazole (PROTONIX) 40 MG EC tablet     pravastatin (PRAVACHOL) 20 MG tablet     tiZANidine (ZANAFLEX) 4 MG tablet     triamcinolone (KENALOG) 0.1 % cream     No current facility-administered medications for this visit.      Facility-Administered Medications Ordered in Other Visits   Medication     gadobutrol (GADAVIST) injection 7.5 mL       ROS:  As above per HPI    Objective:   /86 (BP Location: Left arm, Patient Position: Chair, Cuff Size: Adult Regular)  Pulse 77  Temp 97.8  F (36.6  C) (Oral)  Resp 17  Wt 117 lb (53.1 kg)  LMP 06/21/2002 (Approximate)  SpO2 98%  BMI 22.11 kg/m2, Body mass index is 22.11 kg/(m^2).  Gen:  NAD, well-nourished, sitting in chair comfortably  HEENT: EOMI, sclera anicteric, Head normocephalic, ; nares patent; moist mucous membranes  Neck: trachea midline, no thyromegaly  CV:  Hemodynamically stable, RRR  Pulm:  no increased work of breathing   Anal/: deferred  Extrem: no cyanosis, edema or clubbing  Skin: no obvious rashes or abnormalities  Psych: Euthymic, linear thoughts, normal rate of speech    Assessment & Plan:   Noreen Florence, 68 year old female who presents with:  Internal bleeding  hemorrhoids  Single brief episode of internal bleeding makes this most likely internal hemorrhoid.  I provided reassurance that with her colonoscopy being normally year ago that she should not be worried about colon cancer at this time.  If her symptoms were to recur and be persistent I recommend that she be reevaluated but currently it appears that her bowel movements are regular she has 1-2 a day and they are mostly soft stools she says she does not strain thus no changes to her diet were recommended.     patient did have some hesitancy with taking all current medications to treat her H. pylori reassured her that this is okay to take and it is needed to eradicate the infection.     Deep Nolen MD   [facility]     Options for treatment and/or follow-up care were reviewed with the patient. Noreen Florence and/or legal guardian was engaged and actively involved in the decision making process. Patient/guardian verbalized understanding of the options discussed and was satisfied with the final plan.

## 2018-12-19 DIAGNOSIS — L30.9 DERMATITIS: ICD-10-CM

## 2018-12-19 NOTE — TELEPHONE ENCOUNTER
"Refill Error:  The refill authorization request message failed for the following reason: Patient not found    Incoming refill request had patient as \"male\" for gender and a different address of:   [address]  [facility] [id number]    Phone number is correct in request:   [phone] (TE)    It appears patient is requesting a refill of Triamcinolone cream.     Team, please contact patient and ensure she is requesting this Rx and if we need to update her address (as well as patient calling pharmacy to update their demographics, appropriate.)    Mercedes Juarez RN  "

## 2018-12-20 NOTE — TELEPHONE ENCOUNTER
Called and spoke to patient, she did request this medication, the address that the pharmacy has is patient's old address, the address we have in Epic is the current address, patient is not a male she is a female. Please address the refill for patient or call patient with any further questions.  Paco Bueno,  For [employer] [facility]

## 2018-12-26 RX ORDER — TRIAMCINOLONE ACETONIDE 1 MG/G
CREAM TOPICAL
Qty: 80 G | Refills: 3 | Status: SHIPPED | OUTPATIENT
Start: 2018-12-26 | End: 2020-02-12

## 2018-12-26 NOTE — TELEPHONE ENCOUNTER
Prescription approved per [facility] Refill Protocol.      Jaky Driver RN, BSN

## 2019-01-14 ENCOUNTER — ANCILLARY PROCEDURE (OUTPATIENT)
Dept: ULTRASOUND IMAGING | Facility: CLINIC | Age: 69
End: 2019-01-14
Attending: UROLOGY
Payer: MEDICARE

## 2019-01-14 ENCOUNTER — OFFICE VISIT (OUTPATIENT)
Dept: UROLOGY | Facility: CLINIC | Age: 69
End: 2019-01-14
Payer: MEDICARE

## 2019-01-14 VITALS
BODY MASS INDEX: 22.64 KG/M2 | HEART RATE: 85 BPM | HEIGHT: 61 IN | WEIGHT: 119.9 LBS | SYSTOLIC BLOOD PRESSURE: 122 MMHG | OXYGEN SATURATION: 94 % | DIASTOLIC BLOOD PRESSURE: 80 MMHG

## 2019-01-14 DIAGNOSIS — D17.71 ANGIOMYOLIPOMA OF RIGHT KIDNEY: ICD-10-CM

## 2019-01-14 DIAGNOSIS — R31.29 MICROSCOPIC HEMATURIA: Primary | ICD-10-CM

## 2019-01-14 LAB
ALBUMIN UR-MCNC: NEGATIVE MG/DL
APPEARANCE UR: CLEAR
BILIRUB UR QL STRIP: NEGATIVE
COLOR UR AUTO: ABNORMAL
GLUCOSE UR STRIP-MCNC: NEGATIVE MG/DL
HGB UR QL STRIP: NEGATIVE
KETONES UR STRIP-MCNC: NEGATIVE MG/DL
LEUKOCYTE ESTERASE UR QL STRIP: ABNORMAL
NITRATE UR QL: NEGATIVE
NON-SQ EPI CELLS #/AREA URNS LPF: NORMAL /LPF
PH UR STRIP: 6.5 PH (ref 5–7)
RBC #/AREA URNS AUTO: NORMAL /HPF
SOURCE: ABNORMAL
SP GR UR STRIP: 1 (ref 1–1.03)
UROBILINOGEN UR STRIP-MCNC: NORMAL MG/DL (ref 0–2)
WBC #/AREA URNS AUTO: NORMAL /HPF

## 2019-01-14 PROCEDURE — 76770 US EXAM ABDO BACK WALL COMP: CPT

## 2019-01-14 PROCEDURE — 81001 URINALYSIS AUTO W/SCOPE: CPT | Performed by: UROLOGY

## 2019-01-14 PROCEDURE — 99212 OFFICE O/P EST SF 10 MIN: CPT | Performed by: UROLOGY

## 2019-01-14 ASSESSMENT — MIFFLIN-ST. JEOR: SCORE: 1021.24

## 2019-01-14 ASSESSMENT — PAIN SCALES - GENERAL: PAINLEVEL: NO PAIN (0)

## 2019-01-14 NOTE — NURSING NOTE
"Noreen Florence's goals for this visit include:   Chief Complaint   Patient presents with     RECHECK     Microscopic hematuria       She requests these members of her care team be copied on today's visit information: yes    PCP: Jia Castro    Referring Provider:  No referring provider defined for this encounter.    /80   Pulse 85   Ht 1.549 m (5' 1\")   Wt 54.4 kg (119 lb 14.4 oz)   LMP 06/21/2002 (Approximate)   SpO2 94%   BMI 22.65 kg/m      Do you need any medication refills at today's visit? No    post void residual  0ml    "

## 2019-01-14 NOTE — PROGRESS NOTES
Reason for Visit:  F/u on small AML and review US     Clinical Data: Ms. Noreen Florence is a 68 year old female with a hx of microhematuria with negative w/u.  She was found to have a very small AML (1.2 cm).  She was seen by IR and they recommended yearly u/s to review.  She has been doing well and denies any gross hematuria.    In reviewing the u/s today it is slightly larger now 1.3 cm     Cytology 1/2018:  NEM     CT urogram 6/15/16:  Impression:  1. 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 This  abuts the renal collecting system and may be the source of  microhematuria although significant bleeding is uncommon in lesions of  this size. No other abnormality in the urinary collecting system.    2. Other incidental findings as above.     Cystoscopy 6/27/16: Normal.     Renal U/S 1/9/17:  IMPRESSION:  1.  Stable 11 mm angiomyolipoma in the right kidney.    Renal u/s 1/14/19:  AML 1 X 1 X 0.9  Previously 1.3      /82 (BP Location: Left arm, Patient Position: Chair, Cuff Size: Adult Regular)  Pulse 79        A/P:  68 year old female with microhematuria and negative w/u except for a small 1 cm AML previously 1.3 in Jan of 2017.  Her UA and BP remain normal. We discussed continued observation of this lesion, it is fairly small and patient agrees with this  -f/u in 2.5 years with repeat renal U/S with Haley as it is less than 2 cm.         Thank you for allowing me to participate in the care of  Ms. Noreen Florence and I will keep you updated on her progress.     Emi Morales MD

## 2019-01-15 ENCOUNTER — TELEPHONE (OUTPATIENT)
Dept: UROLOGY | Facility: CLINIC | Age: 69
End: 2019-01-15

## 2019-01-15 NOTE — TELEPHONE ENCOUNTER
Notes recorded by Sun Mcintyre LPN on 1/15/2019 at 8:43 AM CST  Called pt and made aware of results. Pt had no questions or concerns.     Sun Mcintyre LPN    ------    Notes recorded by Emi Morales MD on 1/14/2019 at 1:56 PM CST  Normal result

## 2019-01-17 ENCOUNTER — OFFICE VISIT (OUTPATIENT)
Dept: FAMILY MEDICINE | Facility: CLINIC | Age: 69
End: 2019-01-17
Payer: MEDICARE

## 2019-01-17 VITALS
RESPIRATION RATE: 18 BRPM | DIASTOLIC BLOOD PRESSURE: 71 MMHG | WEIGHT: 120 LBS | HEART RATE: 83 BPM | BODY MASS INDEX: 22.66 KG/M2 | TEMPERATURE: 98.1 F | OXYGEN SATURATION: 97 % | HEIGHT: 61 IN | SYSTOLIC BLOOD PRESSURE: 124 MMHG

## 2019-01-17 DIAGNOSIS — M06.4 POLYARTHRITIS, INFLAMMATORY (H): ICD-10-CM

## 2019-01-17 DIAGNOSIS — A04.8 H. PYLORI INFECTION: ICD-10-CM

## 2019-01-17 DIAGNOSIS — D17.71 ANGIOMYOLIPOMA OF RIGHT KIDNEY: ICD-10-CM

## 2019-01-17 DIAGNOSIS — Z23 NEED FOR SHINGLES VACCINE: ICD-10-CM

## 2019-01-17 DIAGNOSIS — K21.00 GASTROESOPHAGEAL REFLUX DISEASE WITH ESOPHAGITIS: Primary | ICD-10-CM

## 2019-01-17 DIAGNOSIS — E78.5 HYPERLIPIDEMIA WITH TARGET LDL LESS THAN 130: ICD-10-CM

## 2019-01-17 DIAGNOSIS — I10 HYPERTENSION, GOAL BELOW 150/90: ICD-10-CM

## 2019-01-17 PROCEDURE — 87338 HPYLORI STOOL AG IA: CPT | Performed by: INTERNAL MEDICINE

## 2019-01-17 PROCEDURE — 90750 HZV VACC RECOMBINANT IM: CPT | Performed by: FAMILY MEDICINE

## 2019-01-17 PROCEDURE — 90471 IMMUNIZATION ADMIN: CPT | Performed by: FAMILY MEDICINE

## 2019-01-17 PROCEDURE — 99214 OFFICE O/P EST MOD 30 MIN: CPT | Mod: 25 | Performed by: FAMILY MEDICINE

## 2019-01-17 ASSESSMENT — PAIN SCALES - GENERAL: PAINLEVEL: NO PAIN (0)

## 2019-01-17 ASSESSMENT — MIFFLIN-ST. JEOR: SCORE: 1021.7

## 2019-01-17 NOTE — PATIENT INSTRUCTIONS
At [facility], we strive to deliver an exceptional experience to you, every time we see you.  If you receive a survey in the mail, please send us back your thoughts. We really do value your feedback.    Based on your medical history, these are the current health maintenance/preventive care services that you are due for (some may have been done at this visit.)  Health Maintenance Due   Topic Date Due     ADVANCE DIRECTIVE PLANNING Q5 YRS  02/04/2007     ZOSTER IMMUNIZATION (2 of 3) 08/26/2016         Suggested websites for health information:  Www.[facility].org : Up to date and easily searchable information on multiple topics.  Www.Liiiike.gov : medication info, interactive tutorials, watch real surgeries online  Www.familydoctor.org : good info from the Academy of Family Physicians  Www.cdc.gov : public health info, travel advisories, epidemics (H1N1)  Www.aap.org : children's health info, normal development, vaccinations  Www.health.[facility].us : [place] dept of health, public health issues in [place], N1N1    Your care team:                            Family Medicine Internal Medicine   MD Alfredo Morgan MD Shantel Branch-Fleming, MD Katya Georgiev PA-C Nam Ho, MD Pediatrics   MOSES Alberts, PHIL SHORT CNP   MD Naila Champion MD Deborah Mielke, MD Kim Thein, APRN CNP      Clinic hours: Monday - Thursday 7 am-7 pm; Fridays 7 am-5 pm.   Urgent care: Monday - Friday 11 am-9 pm; Saturday and Sunday 9 am-5 pm.  Pharmacy : Monday -Thursday 8 am-8 pm; Friday 8 am-6 pm; Saturday and Sunday 9 am-5 pm.     Clinic: [phone]   Pharmacy: [phone]

## 2019-01-17 NOTE — PROGRESS NOTES
SUBJECTIVE:   Noreen Florence is a 68 year old female who presents to clinic today for the following health issues:    Medication Followup of Abdominal pain/H. Pylori    Taking Medication as prescribed: NO-patient only took Pantoprazole(Protonix) [id number] then went back to old medication Omeprazole    Side Effects:  None    Medication Helping Symptoms:  Since 12/10/18 - current had 3 episodes where she felt weird in the middle of chest but after drinking water, each times it goes away, usually 2-3 o'clock in the morning.          Hyperlipidemia Follow-Up      Rate your low fat/cholesterol diet?: good    Taking statin?  No    Other lipid medications/supplements?:  none    Hypertension Follow-up      Outpatient blood pressures are not being checked.    Low Salt Diet: no added salt      Problem list and histories reviewed & adjusted, as indicated.  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     High risk medication use     Microscopic hematuria     Pterygium of right eye     Sicca syndrome (H)     Angiomyolipoma of right kidney     History of Helicobacter pylori infection     Past Surgical History:   Procedure Laterality Date     COLONOSCOPY       COLONOSCOPY WITH CO2 INSUFFLATION N/A 4/5/2017    Procedure: COLONOSCOPY WITH CO2 INSUFFLATION;  Surgeon: Duane, William Charles, MD;  Location: [facility]     COMBINED REPAIR PTOSIS WITH BLEPHAROPLASTY BILATERAL Bilateral 1/6/2017    Procedure: COMBINED REPAIR PTOSIS WITH BLEPHAROPLASTY BILATERAL;  Surgeon: Carly Norman MD;  Location: [facility]     ESOPHAGOSCOPY, GASTROSCOPY, DUODENOSCOPY (EGD), COMBINED N/A 1/5/2016    Procedure: COMBINED ESOPHAGOSCOPY, GASTROSCOPY, DUODENOSCOPY (EGD), BIOPSY SINGLE OR  MULTIPLE;  Surgeon: Duane, William Charles, MD;  Location: [facility]     REPAIR ENTROPION BILATERAL Bilateral 1/6/2017    Procedure: REPAIR ENTROPION BILATERAL;  Surgeon: Carly Norman MD;  Location:  [facility]     REPAIR PTOSIS Bilateral 01/2017       Social History     Tobacco Use     Smoking status: Never Smoker     Smokeless tobacco: Never Used   Substance Use Topics     Alcohol use: No     Family History   Problem Relation Age of Onset     Diabetes Mother      Cancer No family hx of      Hypertension No family hx of      Cerebrovascular Disease No family hx of      Thyroid Disease No family hx of      Glaucoma No family hx of      Macular Degeneration No family hx of          Current Outpatient Medications   Medication Sig Dispense Refill     calcium-vitamin D (CALCIUM 600 + D) 600-400 MG-UNIT per tablet Take 1 tablet by mouth 2 times daily 60 tablet 11     cetirizine (ZYRTEC) 10 MG tablet Take 1 tablet (10 mg) by mouth daily 90 tablet 3     chlorhexidine (PERIDEX) 0.12 % solution SWISH AND SPIT 15 ML BY MOUTH TWICE DAILY 473 mL 3     doxycycline (VIBRAMYCIN) 100 MG capsule Take 1 capsule (100 mg) by mouth daily 14 capsule 0     fluticasone (FLONASE) 50 MCG/ACT nasal spray Spray 1-2 sprays into both nostrils daily 1 Package 11     hydrocortisone (ANUSOL-HC) 2.5 % cream Place rectally 2 times daily 30 g 3     latanoprost (XALATAN) 0.005 % ophthalmic solution Place 1 drop into both eyes At Bedtime 7.5 mL 4     leucovorin (WELLCOVORIN) 5 MG tablet TK 1 T PO 12 H AFTER METHOTREXATE ONE TIME EACH WEEK  3     levofloxacin (LEVAQUIN) 500 MG tablet Take 1 tablet (500 mg) by mouth daily 14 tablet 0     losartan-hydrochlorothiazide (HYZAAR) 50-12.5 MG per tablet Take 1 tablet by mouth daily 90 tablet 3     methotrexate 2.5 MG tablet Take 6 tablets (15 mg) by mouth once a week 1 tablet 0     metoprolol tartrate (LOPRESSOR) 25 MG tablet Take 1 tablet (25 mg) by mouth At Bedtime 90 tablet 3     pravastatin (PRAVACHOL) 20 MG  tablet TAKE 1 TABLET(20 MG) BY MOUTH DAILY 90 tablet 3     tiZANidine (ZANAFLEX) 4 MG tablet Take 1 tablet (4 mg) by mouth 3 times daily as needed for muscle spasms 90 tablet 1     triamcinolone (KENALOG) 0.1 % external cream APPLY TOPICALLY TWICE DAILY 80 g 3     pantoprazole (PROTONIX) 40 MG EC tablet TAKE 1 TABLET(40 MG) BY MOUTH DAILY 30 TO 60 MINUTES BEFORE A MEAL (Patient not taking: Reported on 1/17/2019) 90 tablet 1     No Known Allergies  Recent Labs   Lab Test 06/21/18  0953 04/16/18  1610  06/21/17  0850 05/25/17 02/24/17  10/27/16  0939  03/03/16  0856   A1C 5.9*  --   --   --   --   --   --  5.9  --   --    LDL 88  --   --  143*  --   --   --   --   --  130*   HDL 36*  --   --  53  --   --   --   --   --  41*   TRIG 250*  --   --  174*  --   --   --   --   --  184*   ALT  --  31  --   --  33 27   < >  --    < >  --    CR 0.47* 0.43*   < >  --  0.66 0.65   < > 0.55   < >  --    GFRESTIMATED >90 >90   < >  --  >60 >60   < > >90  Non  GFR Calc     < >  --    GFRESTBLACK >90 >90   < >  --  >60 >60   < > >90  African American GFR Calc     < >  --    POTASSIUM 3.9 3.6   < >  --   --   --    < > 3.7   < >  --     < > = values in this interval not displayed.      BP Readings from Last 3 Encounters:   01/17/19 124/71   01/14/19 122/80   11/18/18 136/86    Wt Readings from Last 3 Encounters:   01/17/19 54.4 kg (120 lb)   01/14/19 54.4 kg (119 lb 14.4 oz)   11/18/18 53.1 kg (117 lb)                  Labs reviewed in EPIC    Reviewed and updated as needed this visit by clinical staff  Tobacco  Allergies  Meds  Med Hx  Surg Hx  Fam Hx  Soc Hx      Reviewed and updated as needed this visit by Provider         ROS:  Constitutional, HEENT, cardiovascular, pulmonary, gi and gu systems are negative, except as otherwise noted.    OBJECTIVE:     /71 (BP Location: Right arm, Patient Position: Chair, Cuff Size: Adult Regular)   Pulse 83   Temp 98.1  F (36.7  C) (Oral)   Resp 18   Ht 1.549 m  "(5' 1\")   Wt 54.4 kg (120 lb)   LMP 06/21/2002 (Approximate)   SpO2 97%   BMI 22.67 kg/m    Body mass index is 22.67 kg/m .  GENERAL APPEARANCE: healthy, alert and no distress  EYES: Eyes grossly normal to inspection, PERRL and conjunctivae and sclerae normal  HENT: ear canals and TM's normal, nose and mouth without ulcers or lesions, oropharynx clear and oral mucous membranes moist  NECK: no adenopathy, no asymmetry, masses, or scars and thyroid normal to palpation  RESP: lungs clear to auscultation - no rales, rhonchi or wheezes  CV: regular rates and rhythm, normal S1 S2, no S3 or S4, no murmur, click or rub, no peripheral edema and peripheral pulses strong  ABDOMEN: soft, nontender, no hepatosplenomegaly, no masses and bowel sounds normal  MS: no musculoskeletal defects are noted and gait is age appropriate without ataxia  SKIN: no suspicious lesions or rashes  NEURO: Normal strength and tone, sensory exam grossly normal, mentation intact and speech normal  PSYCH: mentation appears normal and affect normal/bright     Diagnostic Test Results:  Results for orders placed or performed in visit on 01/14/19   *UA reflex to Microscopic and Culture ([place]; [facility]; [facility]; [facility]; [facility]; [place]; [place])   Result Value Ref Range    Color Urine Straw     Appearance Urine Clear     Glucose Urine Negative NEG^Negative mg/dL    Bilirubin Urine Negative NEG^Negative    Ketones Urine Negative NEG^Negative mg/dL    Specific Gravity Urine 1.003 1.003 - 1.035    Blood Urine Negative NEG^Negative    pH Urine 6.5 5.0 - 7.0 pH    Protein Albumin Urine Negative NEG^Negative mg/dL    Urobilinogen mg/dL Normal 0.0 - 2.0 mg/dL    Nitrite Urine Negative NEG^Negative    Leukocyte Esterase Urine Small (A) NEG^Negative    Source Midstream Urine    Urine Microscopic   Result Value Ref Range    WBC Urine 0 - 5 OTO5^0 - 5 /HPF    RBC Urine O - 2 OTO2^O - 2 /HPF    Squamous Epithelial " "/LPF Urine Few FEW^Few /LPF       ASSESSMENT/PLAN:         Tobacco Cessation:   reports that  has never smoked. she has never used smokeless tobacco.      BMI:   Estimated body mass index is 22.67 kg/m  as calculated from the following:    Height as of this encounter: 1.549 m (5' 1\").    Weight as of this encounter: 54.4 kg (120 lb).           ICD-10-CM    1. Gastroesophageal reflux disease with esophagitis- persistent symptoms after treatment, discussed endoscopy and follow up with gastroenterology. Dropped off stool test today for Helicobacter Pylori.  K21.0 GASTROENTEROLOGY ADULT REF PROCEDURE ONLY [facility] [phone]; [facility]   2. Polyarthritis, inflammatory (H) M06.4 Stable on medication    3. Hypertension, goal below 150/90 I10 Well controlled on medications    4. Hyperlipidemia with target LDL less than 130 E78.5 Stable    5. Angiomyolipoma of right kidney D30.01 Smaller in size   6. Need for shingles vaccine Z23 ZOSTER VACCINE RECOMBINANT ADJUVANTED IM NJX     ADMIN 1st VACCINE       CONSULTATION/REFERRAL to gastroenterology for follow up.   FUTURE LABS:       - Schedule fasting labs in 6 months  FUTURE APPOINTMENTS:       - Follow-up visit in 6 months or sooner if any questions or concerns.   Regular exercise  See Patient Instructions    Jia Castro MD  [facility]    "

## 2019-01-18 LAB
H PYLORI AG STL QL IA: NORMAL
SPECIMEN SOURCE: NORMAL

## 2019-01-22 ENCOUNTER — TRANSFERRED RECORDS (OUTPATIENT)
Dept: HEALTH INFORMATION MANAGEMENT | Facility: CLINIC | Age: 69
End: 2019-01-22

## 2019-02-01 ENCOUNTER — OFFICE VISIT (OUTPATIENT)
Dept: FAMILY MEDICINE | Facility: CLINIC | Age: 69
End: 2019-02-01
Payer: MEDICARE

## 2019-02-01 VITALS
WEIGHT: 121 LBS | HEART RATE: 76 BPM | SYSTOLIC BLOOD PRESSURE: 131 MMHG | OXYGEN SATURATION: 94 % | HEIGHT: 61 IN | DIASTOLIC BLOOD PRESSURE: 81 MMHG | TEMPERATURE: 98.3 F | RESPIRATION RATE: 20 BRPM | BODY MASS INDEX: 22.84 KG/M2

## 2019-02-01 DIAGNOSIS — K64.9 HEMORRHOIDS, UNSPECIFIED HEMORRHOID TYPE: Primary | ICD-10-CM

## 2019-02-01 PROCEDURE — 99213 OFFICE O/P EST LOW 20 MIN: CPT | Performed by: NURSE PRACTITIONER

## 2019-02-01 RX ORDER — HYDROCORTISONE ACETATE 25 MG/1
25 SUPPOSITORY RECTAL 2 TIMES DAILY
Qty: 20 SUPPOSITORY | Refills: 0 | Status: SHIPPED | OUTPATIENT
Start: 2019-02-01 | End: 2019-05-14

## 2019-02-01 ASSESSMENT — PAIN SCALES - GENERAL: PAINLEVEL: NO PAIN (0)

## 2019-02-01 ASSESSMENT — MIFFLIN-ST. JEOR: SCORE: 1026.23

## 2019-02-01 NOTE — PROGRESS NOTES
SUBJECTIVE:   Noreen Florence is a 68 year old female who presents to clinic today for the following health issues:    Hemorrhoids  Onset: Follow up    Description:   Pain: no   Itching: no     Accompanying Signs & Symptoms:  Blood streaked toilet paper: YES x3-4 days  Blood in stool: no   Changes in stool pattern: no     History:   Any previous GI studies done:Colonoscopy - Concerns if related and need to do again sooner than 10 years  Family History of colon cancer: no     Precipitating factors:   none    Alleviating factors:  None    Therapies Tried and outcome: rectal cream x3-4 days    Pleasant 68 year old female presents with the above concerns. She was seen Nov 2018 for similar. She was given cream and symptoms resolved until few days ago. When she wipes with BMs last 3-4 days there is blood on toilet paper. No blood in stool. No abdominal pain. Not painful. No itching.    Problem list and histories reviewed & adjusted, as indicated.  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     High risk medication use     Microscopic hematuria     Pterygium of right eye     Sicca syndrome (H)     Angiomyolipoma of right kidney     History of Helicobacter pylori infection     Past Surgical History:   Procedure Laterality Date     COLONOSCOPY       COLONOSCOPY WITH CO2 INSUFFLATION N/A 4/5/2017    Procedure: COLONOSCOPY WITH CO2 INSUFFLATION;  Surgeon: Duane, William Charles, MD;  Location: [facility]     COMBINED REPAIR PTOSIS WITH BLEPHAROPLASTY BILATERAL Bilateral 1/6/2017    Procedure: COMBINED REPAIR PTOSIS WITH BLEPHAROPLASTY BILATERAL;  Surgeon: Carly Norman MD;  Location: [facility]     ESOPHAGOSCOPY, GASTROSCOPY, DUODENOSCOPY (EGD), COMBINED N/A  1/5/2016    Procedure: COMBINED ESOPHAGOSCOPY, GASTROSCOPY, DUODENOSCOPY (EGD), BIOPSY SINGLE OR MULTIPLE;  Surgeon: Duane, William Charles, MD;  Location: [facility]     REPAIR ENTROPION BILATERAL Bilateral 1/6/2017    Procedure: REPAIR ENTROPION BILATERAL;  Surgeon: Carly Norman MD;  [facility]: [facility]     REPAIR PTOSIS Bilateral 01/2017       Social History     Tobacco Use     Smoking status: Never Smoker     Smokeless tobacco: Never Used   Substance Use Topics     Alcohol use: No     Family History   Problem Relation Age of Onset     Diabetes Mother      Cancer No family hx of      Hypertension No family hx of      Cerebrovascular Disease No family hx of      Thyroid Disease No family hx of      Glaucoma No family hx of      Macular Degeneration No family hx of          Current Outpatient Medications   Medication Sig Dispense Refill     calcium-vitamin D (CALCIUM 600 + D) 600-400 MG-UNIT per tablet Take 1 tablet by mouth 2 times daily 60 tablet 11     cetirizine (ZYRTEC) 10 MG tablet Take 1 tablet (10 mg) by mouth daily 90 tablet 3     chlorhexidine (PERIDEX) 0.12 % solution SWISH AND SPIT 15 ML BY MOUTH TWICE DAILY 473 mL 3     hydrocortisone (ANUSOL-HC) 2.5 % cream Place rectally 2 times daily 30 g 1     hydrocortisone (ANUSOL-HC) 25 MG suppository Place 1 suppository (25 mg) rectally 2 times daily for 10 days 20 suppository 0     latanoprost (XALATAN) 0.005 % ophthalmic solution Place 1 drop into both eyes At Bedtime 7.5 mL 4     leucovorin (WELLCOVORIN) 5 MG tablet TK 1 T PO 12 H AFTER METHOTREXATE ONE TIME EACH WEEK  3     losartan-hydrochlorothiazide (HYZAAR) 50-12.5 MG per tablet Take 1 tablet by mouth daily 90 tablet 3     methotrexate 2.5 MG tablet Take 6 tablets (15 mg) by mouth once a week 1 tablet 0     metoprolol tartrate (LOPRESSOR) 25 MG tablet Take 1 tablet (25 mg) by mouth At Bedtime 90 tablet 3     pravastatin (PRAVACHOL) 20 MG tablet TAKE 1 TABLET(20 MG) BY MOUTH DAILY 90 tablet 3      "triamcinolone (KENALOG) 0.1 % external cream APPLY TOPICALLY TWICE DAILY 80 g 3     No Known Allergies    Reviewed and updated as needed this visit by clinical staff  Tobacco  Allergies  Meds  Problems  Med Hx  Surg Hx  Fam Hx       Reviewed and updated as needed this visit by Provider  Tobacco  Allergies  Meds  Problems  Med Hx  Surg Hx  Fam Hx         ROS:  Constitutional, HEENT, cardiovascular, pulmonary, GI, , musculoskeletal, neuro, skin, endocrine and psych systems are negative, except as otherwise noted.    OBJECTIVE:     /81 (BP Location: Right arm, Patient Position: Chair, Cuff Size: Adult Regular)   Pulse 76   Temp 98.3  F (36.8  C) (Oral)   Resp 20   Ht 1.549 m (5' 1\")   Wt 54.9 kg (121 lb)   LMP 06/21/2002 (Approximate)   SpO2 94%   BMI 22.86 kg/m    Body mass index is 22.86 kg/m .  GENERAL: healthy, alert and no distress  RESP: lungs clear to auscultation - no rales, rhonchi or wheezes  CV: regular rate and rhythm, normal S1 S2, no S3 or S4, no murmur, click or rub, no peripheral edema and peripheral pulses strong  ABDOMEN: soft, nontender, no hepatosplenomegaly, no masses and bowel sounds normal  RECTAL (female): normal sphincter tone, no rectal masses  MS: no gross musculoskeletal defects noted, no edema  PSYCH: mentation appears normal, affect normal/bright    Diagnostic Test Results:  none     ASSESSMENT/PLAN:       ICD-10-CM    1. Hemorrhoids, unspecified hemorrhoid type K64.9 GASTROENTEROLOGY ADULT REF CONSULT ONLY     hydrocortisone (ANUSOL-HC) 25 MG suppository     Refill of annusol cream sent (suppositories not covered by insurance). Will refer to GI. She already has appointment for EGD on 2/13  Supportive interventions discussed including sitz baths and increasing fiber in diet.  Had colonoscopy 1 year ago and it was normal - recommended next at 10 years.    See Patient Instructions    The benefits, risks and potential side effects were discussed in detail. Black " box warnings discussed as relevant. All patient questions were answered. The patient was instructed to follow up immediately if any adverse reactions develop.    Return precautions discussed, including when to seek urgent/emergent care.    Patient verbalizes understanding and agrees with plan of care. Patient stable for discharge.      DEJA Dumont [facility]

## 2019-02-01 NOTE — PATIENT INSTRUCTIONS
Please make appointment with gastroenterology.  Let them know your symptoms and that you already have appointment for EGD on 2/13  Refill for Annusol cream sent    At [facility], we strive to deliver an exceptional experience to you, every time we see you.  If you receive a survey in the mail, please send us back your thoughts. We really do value your feedback.    Based on your medical history, these are the current health maintenance/preventive care services that you are due for (some may have been done at this visit.)  Health Maintenance Due   Topic Date Due     ADVANCE DIRECTIVE PLANNING Q5 YRS  02/04/2007         Suggested websites for health information:  Www.[employer].org : Up to date and easily searchable information on multiple topics.  Www.medlineplus.gov : medication info, interactive tutorials, watch real surgeries online  Www.familydoctor.org : good info from the Academy of Family Physicians  Www.cdc.gov : public health info, travel advisories, epidemics (H1N1)  Www.aap.org : children's health info, normal development, vaccinations  Www.health.[facility].[place].us : [place] dept of health, public health issues in [place], N1N1    Your care team:                            Family Medicine Internal Medicine   MD Alfredo Morgan MD Shantel Branch-Fleming, MD Katya Georgiev PA-C Nam Ho, MD Pediatrics   MOSES Alberts, PHIL Burton APRMD Naila Webb CNP, MD Deborah Mielke, MD Kim Thein, APRN [facility]      Clinic hours: Monday - Thursday 7 am-7 pm; Fridays 7 am-5 pm.   Urgent care: Monday - Friday 11 am-9 pm; Saturday and Sunday 9 am-5 pm.  Pharmacy : Monday -Thursday 8 am-8 pm; Friday 8 am-6 pm; Saturday and Sunday 9 am-5 pm.     Clinic: [phone]   Pharmacy: [phone]    Patient Education     Hemorrhoids    Hemorrhoids are swollen and inflamed veins inside the rectum and near the anus. The rectum is the last several inches of  the colon. The anus is the passage between the rectum and the outside of the body.  Causes  The veins can become swollen due to increased pressure in them. This is most often caused by:    Chronic constipation or diarrhea    Straining when having a bowel movement    Sitting too long on the toilet    A low-fiber diet    Pregnancy  Symptoms    Bleeding from the rectum (this may be noticeable after bowel movements)    Lump near the anus    Itching around the anus    Pain around the anus  There are different types of hemorrhoids. Depending on the type you have and the severity, you may be able to treat yourself at home. In some cases, a procedure may be the best treatment option. Your healthcare provider can tell you more about this, if needed.  Home care  General care    To get relief from pain or itching, try:  ? Medicines. Your healthcare provider may recommend stool softeners, suppositories, or laxatives to help manage constipation. Use these exactly as directed.  ? Sitz baths. A sitz bath involves sitting in a few inches of warm bath water. Be careful not to make the water so hot that you burn yourself--test it before sitting in it. Soak for about 10 to 15 minutes a few times a day. This may help relieve pain.  ? Topical products. Your healthcare provider may prescribe or recommend creams, ointments, or pads that can be applied to the hemorrhoid. Use these exactly as directed.  Tips to help prevent hemorrhoids    Eat more fiber. Fiber adds bulk to stool and absorbs water as it moves through your colon. This makes stool softer and easier to pass.  ? Increase the fiber in your diet with more fiber-rich foods. These include fresh fruit, vegetables, and whole grains.  ? Take a fiber supplement or bulking agent, if advised by your healthcare provider. These include products such as psyllium or methylcellulose.    Drink more water. Your healthcare provider may direct you to drink plenty of water. This can help keep stool  soft.    Be more active. Frequent exercise aids digestion and helps prevent constipation. It may also help make bowel movements more regular.    Don t strain during bowel movements. This can make hemorrhoids more likely. Also, don t sit on the toilet for long periods of time.  Follow-up care  Follow up with your healthcare provider as advised. If a culture or imaging tests were done, someone will let you know the results when they are ready. This may take a few days or longer. If your healthcare provider recommends a procedure for your hemorrhoids, these options can be discussed. Options may include surgery and outpatient office treatments.  When to seek medical advice  Call your healthcare provider right away if any of these occur:    Increased bleeding from the rectum    Increased pain around the rectum or anus    Weakness or dizziness  Call 911  Call 911 if any of these occur:    Trouble breathing or swallowing    Fainting or loss of consciousness    Unusually fast heart rate    Vomiting blood    Large amounts of blood in stool or black, tarry stools  Date Last Reviewed: 9/1/2017 2000-2018 [employer]. [address] [place] [address]. All rights reserved. This information is not intended as a substitute for professional medical care. Always follow your healthcare professional's instructions.           Patient Education     Treating Hemorrhoids: Self-Care    Follow your healthcare provider s advice about caring for your hemorrhoids at home. Some treatments help relieve symptoms right away. Others involve making changes in your diet and exercise habits. These can help ease constipation and prevent hemorrhoid symptoms from coming back.  Relieving symptoms  Your healthcare provider may prescribe anti-inflammatory medicine to help ease your symptoms. The following tips will also help relieve pain and swelling.    Take sitz baths. Taking a sitz bath means sitting in a few inches of warm bath  water. Soaking for 10 minutes twice a day can provide welcome relief from painful hemorrhoids. It can also help the area stay clean.    Develop good bowel habits. Use the bathroom when you need to. Don t ignore the urge to move your bowels. This can lead to constipation, hard stools, and straining. Also, don t read while on the toilet. Sit only as long as needed. Wipe gently with soft, unscented toilet tissue or baby wipes.    Use ice packs. Placing an ice pack on a thrombosed external hemorrhoid can help relieve pain right away. It will also help reduce the blood clot. Use the ice for 15 to 20 minutes at a time. Keep a cloth between the ice and your skin to prevent skin damage.    Use other measures. Laxatives and enemas can help ease constipation. But use them only on your healthcare provider s advice. For symptom relief, try using cotton pads soaked in witch hazel. These are available at most drugstores. Over-the-counter hemorrhoid ointments and petroleum jelly can also provide relief.  Add fiber to your diet  Adding fiber to your diet can help relieve constipation by making stools softer and easier to pass. To increase your fiber intake, your healthcare provider may recommend a bulking agent, such as psyllium. This is a high-fiber supplement available at most grocery stores and drugstores. Eating more fiber-rich foods will also help. There are two types of fiber:    Insoluble fiber is the main ingredient in bulking agents. It s also found in foods such as wheat bran, whole-grain breads, fresh fruits, and vegetables.    Soluble fiber is found in foods such as oat bran. Although soluble fiber is good for you, it may not ease constipation as much as foods high in insoluble fiber.  Drink more water  Along with a high-fiber diet, drinking more water can help ease constipation. This is because insoluble fiber absorbs water, making stools soft and bulky. Be sure to drink plenty of water throughout the day. Drinking  fruit juices, such as prune juice or apple juice, can also help prevent constipation.  Get more exercise  Regular exercise aids digestion and helps prevent constipation. It s also great for your health. So talk with your healthcare provider about starting an exercise program. Low-impact activities, such as swimming or walking, are good places to start. Take it easy at first. And remember to drink plenty of water when you exercise.  High-fiber foods  High-fiber foods offer many benefits. By making your stools softer, they help heal and prevent swollen hemorrhoids. They may also help reduce the risk of colon and rectal cancer. Best of all, they re usually low in calories and taste great. Here are some examples of fiber-rich foods.    Whole grains, such as wheat bran, corn bran, and brown rice.    Vegetables, especially carrots, broccoli, cabbage, and peas.    Fruits, such as apples, bananas, raisins, peaches, and pears.    Nuts and legumes, especially peanuts, lentils, and kidney beans.  Easy ways to add fiber  The tips below offer some simple ways to add more high-fiber foods to your meals.    Start your day with a high-fiber breakfast. Eat a wheat bran cereal along with a sliced banana. Or, try peanut butter on whole-wheat toast.    Eat carrot sticks for snacks. They re easy to prepare, taste great, and are low in calories.    Use whole-grain breads instead of white bread for sandwiches.    Eat fruits for treats. Try an apple and some raisins instead of a candy bar.   Date Last Reviewed: 7/1/2016 2000-2018 [employer]. [address] [place] [address], [place] [address]. All rights reserved. This information is not intended as a substitute for professional medical care. Always follow your healthcare professional's instructions.

## 2019-02-13 ENCOUNTER — SURGERY (OUTPATIENT)
Age: 69
End: 2019-02-13
Payer: MEDICARE

## 2019-02-13 ENCOUNTER — HOSPITAL ENCOUNTER (OUTPATIENT)
Facility: AMBULATORY SURGERY CENTER | Age: 69
Discharge: HOME | End: 2019-02-13
Attending: INTERNAL MEDICINE | Admitting: INTERNAL MEDICINE
Payer: MEDICARE

## 2019-02-13 VITALS
DIASTOLIC BLOOD PRESSURE: 68 MMHG | RESPIRATION RATE: 16 BRPM | OXYGEN SATURATION: 95 % | HEART RATE: 66 BPM | SYSTOLIC BLOOD PRESSURE: 111 MMHG

## 2019-02-13 LAB — UPPER GI ENDOSCOPY: NORMAL

## 2019-02-13 PROCEDURE — 43239 EGD BIOPSY SINGLE/MULTIPLE: CPT | Performed by: INTERNAL MEDICINE

## 2019-02-13 PROCEDURE — G8918 PT W/O PREOP ORDER IV AB PRO: HCPCS

## 2019-02-13 PROCEDURE — 43239 EGD BIOPSY SINGLE/MULTIPLE: CPT

## 2019-02-13 PROCEDURE — G8907 PT DOC NO EVENTS ON DISCHARG: HCPCS

## 2019-02-13 RX ORDER — ONDANSETRON 2 MG/ML
4 INJECTION INTRAMUSCULAR; INTRAVENOUS
Status: DISCONTINUED | OUTPATIENT
Start: 2019-02-13 | End: 2019-02-14 | Stop reason: HOSPADM

## 2019-02-13 RX ORDER — OMEPRAZOLE 40 MG/1
40 CAPSULE, DELAYED RELEASE ORAL DAILY
COMMUNITY
End: 2019-02-25

## 2019-02-13 RX ORDER — FENTANYL CITRATE 50 UG/ML
INJECTION, SOLUTION INTRAMUSCULAR; INTRAVENOUS PRN
Status: DISCONTINUED | OUTPATIENT
Start: 2019-02-13 | End: 2019-02-13 | Stop reason: HOSPADM

## 2019-02-13 RX ORDER — LIDOCAINE 40 MG/G
CREAM TOPICAL
Status: DISCONTINUED | OUTPATIENT
Start: 2019-02-13 | End: 2019-02-14 | Stop reason: HOSPADM

## 2019-02-13 RX ADMIN — FENTANYL CITRATE 50 MCG: 50 INJECTION, SOLUTION INTRAMUSCULAR; INTRAVENOUS at 11:10

## 2019-02-19 ENCOUNTER — OFFICE VISIT (OUTPATIENT)
Dept: GASTROENTEROLOGY | Facility: CLINIC | Age: 69
End: 2019-02-19
Payer: MEDICARE

## 2019-02-19 VITALS
HEART RATE: 79 BPM | BODY MASS INDEX: 21.53 KG/M2 | OXYGEN SATURATION: 96 % | HEIGHT: 63 IN | SYSTOLIC BLOOD PRESSURE: 139 MMHG | WEIGHT: 121.5 LBS | DIASTOLIC BLOOD PRESSURE: 81 MMHG

## 2019-02-19 DIAGNOSIS — K64.4 EXTERNAL HEMORRHOIDS: Primary | ICD-10-CM

## 2019-02-19 DIAGNOSIS — R10.13 EPIGASTRIC PAIN: ICD-10-CM

## 2019-02-19 DIAGNOSIS — Z86.19 HISTORY OF HELICOBACTER PYLORI INFECTION: ICD-10-CM

## 2019-02-19 LAB — COPATH REPORT: NORMAL

## 2019-02-19 PROCEDURE — 99214 OFFICE O/P EST MOD 30 MIN: CPT | Performed by: INTERNAL MEDICINE

## 2019-02-19 ASSESSMENT — PAIN SCALES - GENERAL: PAINLEVEL: NO PAIN (0)

## 2019-02-19 ASSESSMENT — MIFFLIN-ST. JEOR: SCORE: 1055.25

## 2019-02-19 NOTE — NURSING NOTE
"Noreen Florence's goals for this visit include:   Chief Complaint   Patient presents with     RECHECK     Endoscopy results       She requests these members of her care team be copied on today's visit information: Yes    PCP: Jia Castro    Referring Provider:  DEJA Montano CNP  [id number] [place] [address]    /81 (BP Location: Left arm, Patient Position: Sitting, Cuff Size: Adult Regular)   Pulse 79   Ht 1.6 m (5' 3\")   Wt 55.1 kg (121 lb 8 oz)   LMP 06/21/2002 (Approximate)   SpO2 96%   BMI 21.52 kg/m      Do you need any medication refills at today's visit? No    "

## 2019-02-19 NOTE — PROGRESS NOTES
GASTROENTEROLOGY FOLLOW UP CLINIC VISIT    CC/REFERRING MD:    Jia Castro    REASON FOR CONSULTATION:   Ana Rosa Akers for   Chief Complaint   Patient presents with     RECHECK     Endoscopy results       HISTORY OF PRESENT ILLNESS:    Noreen Florence is 69 year old female who presents for follow up of H. pylori.  She was originally seen in the office November 2018 for H. pylori and abdominal discomfort.  At that time she had been noted to have several episodes of H. pylori in the past.  She was treated with quadruple therapy (bismuth based).  Follow-up stool H. pylori antigen was negative following treatment.  A recent upper endoscopy 6 days ago noted normal stomach and biopsies noted chronic gastritis but no H. pylori.  She had a mildly irregular Z line and no biopsies were taken given it was less than 1 cm.  She notes that she continues to have intermittent symptoms of epigastric discomfort.  She will take Mylanta or Tums and the symptoms then resolved.  She does continue to take omeprazole daily.  She also notes that she has had a few episodes of epigastric squeezing pain which resolves with drinking water.  She also notes that she had rectal bleeding 1 month ago with blood on the toilet paper.  This lasted for several days.  She was prescribed Proctozone cream and this improved her symptoms.  She reports she is now having regular bowel movements.  Last colonoscopy was 1 year ago and was unremarkable.    PERTINENT PAST MEDICAL HISTORY:    Past Medical History:   Diagnosis Date     GERD (gastroesophageal reflux disease) 3/20/2015     Hyperlipidemia LDL goal < 130 8/3/2015     Hypertension      Hypertension, goal below 150/90 11/28/2014     Rheumatoid arthritis, adult (H)        PREVIOUS SURGERIES:   Past Surgical History:   Procedure Laterality Date     COLONOSCOPY       COLONOSCOPY WITH CO2 INSUFFLATION N/A 4/5/2017    Procedure: COLONOSCOPY WITH CO2 INSUFFLATION;  Surgeon: Duane,  Deep Molina MD;  Location: [facility] OR     COMBINED ESOPHAGOSCOPY, GASTROSCOPY, DUODENOSCOPY (EGD) WITH CO2 INSUFFLATION N/A 2/13/2019    Procedure: COMBINED ESOPHAGOSCOPY, GASTROSCOPY, DUODENOSCOPY (EGD) WITH CO2 INSUFFLATION;  Surgeon: Sly De Santiago MD;  Location: [facility]     COMBINED REPAIR PTOSIS WITH BLEPHAROPLASTY BILATERAL Bilateral 1/6/2017    Procedure: COMBINED REPAIR PTOSIS WITH BLEPHAROPLASTY BILATERAL;  Surgeon: Carly Norman MD;  Location: [facility]     ESOPHAGOSCOPY, GASTROSCOPY, DUODENOSCOPY (EGD), COMBINED N/A 1/5/2016    Procedure: COMBINED ESOPHAGOSCOPY, GASTROSCOPY, DUODENOSCOPY (EGD), BIOPSY SINGLE OR MULTIPLE;  Surgeon: Duane, William Charles, MD;  Location: [facility]     ESOPHAGOSCOPY, GASTROSCOPY, DUODENOSCOPY (EGD), COMBINED N/A 2/13/2019    Procedure: Combined Esophagoscopy, Gastroscopy, Duodenoscopy (Egd), Biopsy Single Or Multiple;  Surgeon: Sly De Santiago MD;  Location: [facility]     REPAIR ENTROPION BILATERAL Bilateral 1/6/2017    Procedure: REPAIR ENTROPION BILATERAL;  Surgeon: Carly Norman MD;  Location: [facility]     REPAIR PTOSIS Bilateral 01/2017       ALLERGIES:   No Known Allergies    PERTINENT MEDICATIONS:    Current Outpatient Medications:      calcium-vitamin D (CALCIUM 600 + D) 600-400 MG-UNIT per tablet, Take 1 tablet by mouth 2 times daily, Disp: 60 tablet, Rfl: 11     cetirizine (ZYRTEC) 10 MG tablet, Take 1 tablet (10 mg) by mouth daily, Disp: 90 tablet, Rfl: 3     chlorhexidine (PERIDEX) 0.12 % solution, SWISH AND SPIT 15 ML BY MOUTH TWICE DAILY, Disp: 473 mL, Rfl: 3     hydrocortisone (ANUSOL-HC) 2.5 % cream, Place rectally 2 times daily, Disp: 30 g, Rfl: 1     latanoprost (XALATAN) 0.005 % ophthalmic solution, Place 1 drop into both eyes At Bedtime, Disp: 7.5 mL, Rfl: 4     leucovorin (WELLCOVORIN) 5 MG tablet, TK 1 T PO 12 H AFTER METHOTREXATE ONE TIME EACH WEEK, Disp: , Rfl: 3     losartan-hydrochlorothiazide (HYZAAR) 50-12.5 MG per tablet, Take 1  "tablet by mouth daily, Disp: 90 tablet, Rfl: 3     methotrexate 2.5 MG tablet, Take 6 tablets (15 mg) by mouth once a week, Disp: 1 tablet, Rfl: 0     methylcellulose (CITRUCEL) powder, Take 2 g by mouth daily, Disp: 180 g, Rfl: 3     metoprolol tartrate (LOPRESSOR) 25 MG tablet, Take 1 tablet (25 mg) by mouth At Bedtime, Disp: 90 tablet, Rfl: 3     omeprazole (PRILOSEC) 40 MG DR capsule, Take 40 mg by mouth daily, Disp: , Rfl:      pravastatin (PRAVACHOL) 20 MG tablet, TAKE 1 TABLET(20 MG) BY MOUTH DAILY, Disp: 90 tablet, Rfl: 3     triamcinolone (KENALOG) 0.1 % external cream, APPLY TOPICALLY TWICE DAILY, Disp: 80 g, Rfl: 3  No current facility-administered medications for this visit.     Facility-Administered Medications Ordered in Other Visits:      gadobutrol (GADAVIST) injection 7.5 mL, 7.5 mL, Intravenous, Once, Palomo Terrell MD    FAMILY HISTORY:   Family History   Problem Relation Age of Onset     Diabetes Mother      Cancer No family hx of      Hypertension No family hx of      Cerebrovascular Disease No family hx of      Thyroid Disease No family hx of      Glaucoma No family hx of      Macular Degeneration No family hx of         ROS:    No fevers or chills  No weight loss  No blurry vision, double vision or change in vision  No sore throat  No lymphadenopathy  No headache, paraesthesias, or weakness in a limb  No shortness of breath or wheezing  No chest pain or pressure  No arthralgias or myalgias  No rashes or skin changes  No odynophagia or dysphagia  No BRBPR, hematochezia, melena  No dysuria, frequency or urgency  No hot/cold intolerance or polyria  No anxiety or depression  PHYSICAL EXAMINATION:  Constitutional: aaox3, cooperative, pleasant, not dyspneic/diaphoretic, no acute distress  Vitals reviewed: /81 (BP Location: Left arm, Patient Position: Sitting, Cuff Size: Adult Regular)   Pulse 79   Ht 1.6 m (5' 3\")   Wt 55.1 kg (121 lb 8 oz)   LMP 06/21/2002 (Approximate)   SpO2 96% "   BMI 21.52 kg/m    Wt:   Wt Readings from Last 2 Encounters:   02/19/19 55.1 kg (121 lb 8 oz)   02/01/19 54.9 kg (121 lb)      Eyes: Sclera anicteric/injected  Ears/nose/mouth/throat: Normal oropharynx without ulcers or exudate, mucus membranes moist, hearing intact  Neck: supple, thyroid normal size  CV: No edema  Respiratory: Unlabored breathing  Lymph: No submandibular, supraclavicular or inguinal lymphadenopathy  Abd:  Nondistended, no masses, +bs, no hepatosplenomegaly, nontender, no peritoneal signs  Skin: warm, perfused, no jaundice  Psych: Normal affect  MSK: Normal gait      PERTINENT STUDIES:   Most recent CBC:  Recent Labs   Lab Test 06/21/18  0953 04/16/18  1610   WBC 5.6 6.9   HGB 13.9 13.9   HCT 42.2 41.3    238     Most recent hepatic panel:  Recent Labs   Lab Test 04/16/18  1610 05/25/17   ALT 31 33   AST 27 29     Most recent creatinine:  Recent Labs   Lab Test 06/21/18  0953 04/16/18  1610   CR 0.47* 0.43*     ASSESSMENT/PLAN:    Noreen Florence is a 69 year old female who presents for follow up of chronic H. pylori, epigastric pain and rectal bleeding.    H. pylori- now resolved status post quadruple therapy.  The stool H. pylori antigen was negative and follow-up biopsies negative.  No additional testing or treatment required.    Epigastric discomfort-intermittent symptoms noted and chronic gastritis noted on biopsies.  I suggest continuing omeprazole and she may use Mylanta or Tums as needed.  Discussed lifestyle changes including avoiding eating late at night, avoid alcohol and elevate head of bed.    Rectal bleeding- most likely due to hemorrhoids.  She has only very small hemorrhoids on external exam today.  Colonoscopy report from last year was reviewed and no large hemorrhoids are noted.  I recommend that she use Citrucel 2 g daily.  She may continue to use the rectal hydrocortisone cream twice daily when symptoms are ongoing.  If rectal bleeding fails to improve, I recommend proceeding  with a flexible sigmoidoscopy in order to better assess for the presence of internal hemorrhoids or other pathology    RTC PRN.  She was offered a scheduled follow-up today in 6 months but prefers to check in on an as-needed basis at this point which is reasonable    Thank you for this consultation.  It was a pleasure to participate in the care of this patient; please contact us with any further questions.    This note was created with voice recognition software, and while reviewed for accuracy, typos may remain.     Sly De Santiago MD  Adjunct  of Medicine  Division of Gastroenterology, Hepatology and Nutrition  [facility]  [phone]

## 2019-02-19 NOTE — PATIENT INSTRUCTIONS
Take Citrucel 1 scoop per day    Use the proctozone cream twice daily if bleeding.  Call the office if bleeding for more than 2 weeks.    Continue to take omeprazole daily    Ok to use TUMS if heartburn symptoms.

## 2019-02-25 DIAGNOSIS — E78.2 MIXED HYPERLIPIDEMIA: ICD-10-CM

## 2019-02-25 DIAGNOSIS — K21.00 GASTROESOPHAGEAL REFLUX DISEASE WITH ESOPHAGITIS: Primary | ICD-10-CM

## 2019-02-25 RX ORDER — OMEPRAZOLE 40 MG/1
CAPSULE, DELAYED RELEASE ORAL
Qty: 90 CAPSULE | Refills: 0 | Status: SHIPPED | OUTPATIENT
Start: 2019-02-25 | End: 2019-05-06

## 2019-02-25 RX ORDER — PRAVASTATIN SODIUM 20 MG
TABLET ORAL
Qty: 90 TABLET | Refills: 0 | Status: SHIPPED | OUTPATIENT
Start: 2019-02-25 | End: 2019-05-06

## 2019-02-25 NOTE — TELEPHONE ENCOUNTER
For omeprazole:  Routing refill request to provider for review/approval because:  Medication is reported/historical for 40 mg tablets    For pravastatin  Prescription approved per [facility] Refill Protocol.          Jaky Driver RN, BSN

## 2019-02-25 NOTE — TELEPHONE ENCOUNTER
Patient's daughter contacted RN CC to state patient is almost out of her omeprazole and pravastatin and is requesting a refill ASAP.  Please contact once refilled.    Melissa Behl BSN, RN, N  [facility] Care Coordinator  [phone]

## 2019-02-25 NOTE — TELEPHONE ENCOUNTER
"Requested Prescriptions   Pending Prescriptions Disp Refills     pravastatin (PRAVACHOL) 20 MG tablet [Pharmacy Med Name: PRAVASTATIN 20MG TABLETS] 90 tablet 0      Last Written Prescription Date:  12/20/17  Last Fill Quantity: 90,  # refills: 3   Last Office Visit with [facility], [facility] or [facility] prescribing provider:  02/01/19-Aylett    Future Office Visit:    Next 5 appointments (look out 90 days)    Mar 25, 2019  1:00 PM CDT  Nurse Only with [facility]  [address] [id number] [phone]        Sig: TAKE 1 TABLET(20 MG) BY MOUTH DAILY    Statins Protocol Passed - 2/25/2019  8:39 AM       Passed - LDL on file in past 12 months    Recent Labs   Lab Test 06/21/18  0953   LDL 88            Passed - No abnormal creatine kinase in past 12 months    No lab results found.            Passed - Recent (12 mo) or future (30 days) visit within the authorizing provider's specialty    Patient had office visit in the last 12 months or has a visit in the next 30 days with authorizing provider or within the authorizing provider's specialty.  See \"Patient Info\" tab in inbasket, or \"Choose Columns\" in Meds & Orders section of the refill encounter.             Passed - Medication is active on med list       Passed - Patient is age 18 or older       Passed - No active pregnancy on record       Passed - No positive pregnancy test in past 12 months        omeprazole (PRILOSEC) 40 MG DR capsule [Pharmacy Med Name: OMEPRAZOLE 40MG CAPSULES] 90 capsule 0      Last Written Prescription Date:  11/13/18  Last Fill Quantity: 28,  # refills: 0   Last Office Visit with [facility], [facility] or  Health prescribing provider:  02/01/19-Aylett   Future Office Visit:    Next 5 appointments (look out 90 days)    Mar 25, 2019  1:00 PM CDT  Nurse Only with [facility]  [facility] [address] [facility] " "[id number]  [phone]        Sig: TAKE 1 CAPSULE(40 MG) BY MOUTH DAILY 30 TO 60 MINUTES BEFORE A MEAL AS NEEDED    PPI Protocol Passed - 2/25/2019  8:39 AM       Passed - Not on Clopidogrel (unless Pantoprazole ordered)       Passed - No diagnosis of osteoporosis on record       Passed - Recent (12 mo) or future (30 days) visit within the authorizing provider's specialty    Patient had office visit in the last 12 months or has a visit in the next 30 days with authorizing provider or within the authorizing provider's specialty.  See \"Patient Info\" tab in inbasket, or \"Choose Columns\" in Meds & Orders section of the refill encounter.             Passed - Medication is active on med list       Passed - Patient is age 18 or older       Passed - No active pregnacy on record       Passed - No positive pregnancy test in past 12 months          "

## 2019-03-22 ENCOUNTER — PATIENT OUTREACH (OUTPATIENT)
Dept: CARE COORDINATION | Facility: CLINIC | Age: 69
End: 2019-03-22

## 2019-03-22 ASSESSMENT — ACTIVITIES OF DAILY LIVING (ADL): DEPENDENT_IADLS:: INDEPENDENT

## 2019-03-22 NOTE — PROGRESS NOTES
Clinic Care Coordination Contact  Care Team Conversations    RN CC received a call from patient's daughter Betty stating patient needed a new medication due to a medication recall of losartan.  RN CC informed Tram that the recall only affected certain manufacturers and advised Tram to have patient or her contact the pharmacy.  Betty then contacted writer back stating the patient contacted the pharmacy stating she needed a new medication for losartan-hydrochlorothiazide, as this was affected by the recall.    RN CC contacted patient's [facility]'s pharmacy and was informed that patient's medication was not affected by the recall and that all affected LOT numbers were pulled from the shelves.  Pharmacy states they will contact the patient.    No other primary care RN Care coordination needs identified.  RN CC will make no further outreaches.    Melissa Behl BSN, RN, PHN  [facility] Care Coordinator  [phone]

## 2019-03-25 ENCOUNTER — ALLIED HEALTH/NURSE VISIT (OUTPATIENT)
Dept: NURSING | Facility: CLINIC | Age: 69
End: 2019-03-25
Payer: MEDICARE

## 2019-03-25 DIAGNOSIS — Z23 NEED FOR SHINGLES VACCINE: Primary | ICD-10-CM

## 2019-03-25 PROCEDURE — 90750 HZV VACC RECOMBINANT IM: CPT

## 2019-03-25 PROCEDURE — 90471 IMMUNIZATION ADMIN: CPT

## 2019-03-25 PROCEDURE — 99207 ZZC NO CHARGE NURSE ONLY: CPT

## 2019-03-25 NOTE — NURSING NOTE
1.  Has the patient received the information for the Shingrix vaccine? YES    2.  Does the patient have any of the following contraindications?     Allergy to Neomycin or Gelatin? No       Current moderate or severe illness? No  Temp = 97.6F  If temp > 99.0 degrees orally or patient has above allergies, vaccine is deferred    3.  The vaccine has been administered in the usual fashion and the patient was instructed to wait 15 minutes before leaving the building in the event of an allergic reaction: YES    Prior to injection, verified patient identity using patient's name and date of birth.    Vaccination given by Tim Montgomery CMA.  Recorded by Tim Montgomery

## 2019-04-22 ENCOUNTER — OFFICE VISIT (OUTPATIENT)
Dept: URGENT CARE | Facility: URGENT CARE | Age: 69
End: 2019-04-22
Payer: MEDICARE

## 2019-04-22 VITALS
HEART RATE: 76 BPM | SYSTOLIC BLOOD PRESSURE: 128 MMHG | TEMPERATURE: 98.3 F | BODY MASS INDEX: 21.61 KG/M2 | WEIGHT: 122 LBS | OXYGEN SATURATION: 96 % | DIASTOLIC BLOOD PRESSURE: 82 MMHG | RESPIRATION RATE: 18 BRPM

## 2019-04-22 DIAGNOSIS — R22.0 SWELLING OF UPPER LIP: ICD-10-CM

## 2019-04-22 DIAGNOSIS — K12.0 CANKER SORE: Primary | ICD-10-CM

## 2019-04-22 PROCEDURE — 99213 OFFICE O/P EST LOW 20 MIN: CPT | Performed by: FAMILY MEDICINE

## 2019-04-22 RX ORDER — MELOXICAM 15 MG/1
TABLET ORAL
Refills: 3 | COMMUNITY
Start: 2019-04-14 | End: 2019-11-08

## 2019-04-22 NOTE — PROGRESS NOTES
Subjective:   Noreen Florence is a 69 year old female who presents for   Chief Complaint   Patient presents with     Oral Swelling     Swelling of upper lip on the inside x1 week      Patient has upper right lip swelling present for a week. Interferes with brushing teeth and eating. No biting of the hip per her report. No allergies to meds/foods.   Patient reports she went to dentist recently and was given Clinpro 5000 for sensitive teeth which she started 10 days ago. No episodes of her face being swollen.   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 Provider to review and confirm.       GERD (gastroesophageal reflux disease) 03/20/2015     Priority: Medium     Polyarthritis, inflammatory (H) 02/16/2015     Priority: Medium     Hypertension, goal below 150/90 11/28/2014     Priority: Medium     Osteopenia 04/21/2014     Priority: Medium     Hayfever 04/21/2014     Priority: Medium       Current Outpatient Medications   Medication     calcium-vitamin D (CALCIUM 600 + D) 600-400 MG-UNIT per tablet     cetirizine (ZYRTEC) 10 MG tablet     chlorhexidine (PERIDEX) 0.12 % solution     hydrocortisone (ANUSOL-HC) 2.5 % cream     latanoprost (XALATAN) 0.005 % ophthalmic solution     leucovorin (WELLCOVORIN) 5 MG tablet     losartan-hydrochlorothiazide (HYZAAR) 50-12.5 MG per tablet     meloxicam (MOBIC) 15 MG tablet      methotrexate 2.5 MG tablet     methylcellulose (CITRUCEL) powder     metoprolol tartrate (LOPRESSOR) 25 MG tablet     omeprazole (PRILOSEC) 40 MG DR capsule     pravastatin (PRAVACHOL) 20 MG tablet     triamcinolone (KENALOG) 0.1 % external cream     No current facility-administered medications for this visit.      Facility-Administered Medications Ordered in Other Visits   Medication     gadobutrol (GADAVIST) injection 7.5 mL       ROS:  As above per HPI    Objective:   /82   Pulse 76   Temp 98.3  F (36.8  C) (Oral)   Resp 18   Wt 55.3 kg (122 lb)   LMP 06/21/2002 (Approximate)   SpO2 96%   BMI 21.61 kg/m  , Body mass index is 21.61 kg/m .  Gen:  NAD, well-nourished, sitting in chair comfortably  HEENT: EOMI, sclera anicteric, Head normocephalic, ; nares patent; moist mucous membranes, no other oral lesions than a solitary ulcerated sore about 2mm in diameter on the inside of upper right lip  Neck: trachea midline, no thyromegaly  CV:  Hemodynamically stable  Pulm:  no increased work of breathing   Extrem: no cyanosis, edema or clubbing  Skin: no obvious rashes or abnormalities  Psych: Euthymic, linear thoughts, normal rate of speech            Assessment & Plan:   Noreen Florence, 69 year old female who presents with:    Canker sore  Swelling of upper lip  Uncomplicated canker sore that is superficial at this time. Discussed avoiding aggravating the area such as brushing/touching and that is should resolve in a week or so. She seemed very concerned about this. I doubt this is due to her new tooth paste medication - if she has concerns about this or if the sore doesn't resolve I recommended visit with her dentist. No other oral lesions seen along the gums/cheeks/posterior pharynx or underneath the tongue.       Deep Nolen MD   [facility]    The use of Dragon/Tutto dictation services may have been used to construct the content in this note; any grammatical or spelling errors are  non-intentional. Please contact the author of this note directly if you are in need of any clarification.

## 2019-04-22 NOTE — PATIENT INSTRUCTIONS
Do not brush the area or scrub the area of the sore.     This should resolve in the next week    If this gets worse or doesn't go away in the next 7-10 days please return to see your dentist

## 2019-04-26 ENCOUNTER — OFFICE VISIT (OUTPATIENT)
Dept: FAMILY MEDICINE | Facility: CLINIC | Age: 69
End: 2019-04-26
Payer: MEDICARE

## 2019-04-26 VITALS
DIASTOLIC BLOOD PRESSURE: 80 MMHG | OXYGEN SATURATION: 97 % | WEIGHT: 124 LBS | BODY MASS INDEX: 21.97 KG/M2 | TEMPERATURE: 98.1 F | HEART RATE: 73 BPM | HEIGHT: 63 IN | SYSTOLIC BLOOD PRESSURE: 120 MMHG

## 2019-04-26 DIAGNOSIS — R53.83 FATIGUE, UNSPECIFIED TYPE: Primary | ICD-10-CM

## 2019-04-26 DIAGNOSIS — I10 HYPERTENSION, GOAL BELOW 150/90: ICD-10-CM

## 2019-04-26 DIAGNOSIS — M06.4 POLYARTHRITIS, INFLAMMATORY (H): ICD-10-CM

## 2019-04-26 DIAGNOSIS — R73.01 IMPAIRED FASTING GLUCOSE: ICD-10-CM

## 2019-04-26 LAB
ALBUMIN SERPL-MCNC: 4 G/DL (ref 3.4–5)
ALP SERPL-CCNC: 68 U/L (ref 40–150)
ALT SERPL W P-5'-P-CCNC: 53 U/L (ref 0–50)
ANION GAP SERPL CALCULATED.3IONS-SCNC: 9 MMOL/L (ref 3–14)
AST SERPL W P-5'-P-CCNC: 42 U/L (ref 0–45)
BILIRUB SERPL-MCNC: 0.4 MG/DL (ref 0.2–1.3)
BUN SERPL-MCNC: 13 MG/DL (ref 7–30)
CALCIUM SERPL-MCNC: 9.5 MG/DL (ref 8.5–10.1)
CHLORIDE SERPL-SCNC: 103 MMOL/L (ref 94–109)
CO2 SERPL-SCNC: 25 MMOL/L (ref 20–32)
CREAT SERPL-MCNC: 0.55 MG/DL (ref 0.52–1.04)
GFR SERPL CREATININE-BSD FRML MDRD: >90 ML/MIN/{1.73_M2}
GLUCOSE SERPL-MCNC: 129 MG/DL (ref 70–99)
HBA1C MFR BLD: 5.9 % (ref 0–5.6)
POTASSIUM SERPL-SCNC: 3.5 MMOL/L (ref 3.4–5.3)
PROT SERPL-MCNC: 7.6 G/DL (ref 6.8–8.8)
SODIUM SERPL-SCNC: 137 MMOL/L (ref 133–144)
TSH SERPL DL<=0.005 MIU/L-ACNC: 1.26 MU/L (ref 0.4–4)
VIT B12 SERPL-MCNC: 673 PG/ML (ref 193–986)

## 2019-04-26 PROCEDURE — 80053 COMPREHEN METABOLIC PANEL: CPT | Performed by: PREVENTIVE MEDICINE

## 2019-04-26 PROCEDURE — 99214 OFFICE O/P EST MOD 30 MIN: CPT | Performed by: PREVENTIVE MEDICINE

## 2019-04-26 PROCEDURE — 84443 ASSAY THYROID STIM HORMONE: CPT | Performed by: PREVENTIVE MEDICINE

## 2019-04-26 PROCEDURE — 82607 VITAMIN B-12: CPT | Performed by: PREVENTIVE MEDICINE

## 2019-04-26 PROCEDURE — 36415 COLL VENOUS BLD VENIPUNCTURE: CPT | Performed by: PREVENTIVE MEDICINE

## 2019-04-26 PROCEDURE — 83036 HEMOGLOBIN GLYCOSYLATED A1C: CPT | Performed by: PREVENTIVE MEDICINE

## 2019-04-26 ASSESSMENT — MIFFLIN-ST. JEOR: SCORE: 1066.59

## 2019-04-26 ASSESSMENT — PAIN SCALES - GENERAL: PAINLEVEL: NO PAIN (0)

## 2019-04-26 NOTE — PROGRESS NOTES
SUBJECTIVE:   Noreen Florence is a 69 year old female who presents to clinic today for the following   health issues:    Here without interpretor    Musculoskeletal problem/pain      Duration: 1 x yesterday    Description  Location: all over    Intensity:  moderate    Accompanying signs and symptoms: weakness     History  Previous similar problem: YES  Previous evaluation:  none    Precipitating or alleviating factors:  Trauma or overuse: no   Aggravating factors include: none    Therapies tried and outcome: ate food    Had an episode yesterday at noon, just felt sudden tiredness    No LOC    Was able to eat and felt better    No seizures    No chest pain    No loss of vision    No falls     No emesis    No headaches    Lasted about 30 minutes    No diarrhea    No abdominal pain    No sweating    No tremors    Always worried about diabetes so does not eat sugars    Last HbA1C was 5.9     CBC and renal panel      Inflammatory arthritis:  -Seen by Rheumatology     Additional history: as documented    Reviewed  and updated as needed this visit by clinical staff  Tobacco  Allergies  Meds  Problems  Med Hx  Surg Hx  Fam Hx         Reviewed and updated as needed this visit by Provider  Tobacco  Allergies  Meds  Problems  Med Hx  Surg Hx  Fam Hx         Patient Active Problem List   Diagnosis     Osteopenia     Hayfever     Hypertension, goal below 150/90     Polyarthritis, inflammatory (H)     GERD (gastroesophageal reflux disease)     Hyperlipidemia with target LDL less than 130     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     High risk medication use     Microscopic hematuria     Pterygium of right eye     Sicca syndrome (H)     Angiomyolipoma of right kidney     History of Helicobacter pylori infection     Past Surgical History:   Procedure Laterality Date     COLONOSCOPY       COLONOSCOPY WITH CO2 INSUFFLATION N/A 4/5/2017    Procedure: COLONOSCOPY WITH CO2 INSUFFLATION;  Surgeon: Duane, William  MD Jesse;  Location: [facility]     COMBINED ESOPHAGOSCOPY, GASTROSCOPY, DUODENOSCOPY (EGD) WITH CO2 INSUFFLATION N/A 2/13/2019    Procedure: COMBINED ESOPHAGOSCOPY, GASTROSCOPY, DUODENOSCOPY (EGD) WITH CO2 INSUFFLATION;  Surgeon: Sly De Santiago MD;  Location: [facility]     COMBINED REPAIR PTOSIS WITH BLEPHAROPLASTY BILATERAL Bilateral 1/6/2017    Procedure: COMBINED REPAIR PTOSIS WITH BLEPHAROPLASTY BILATERAL;  Surgeon: Carly Norman MD;  Location: [facility]     ESOPHAGOSCOPY, GASTROSCOPY, DUODENOSCOPY (EGD), COMBINED N/A 1/5/2016    Procedure: COMBINED ESOPHAGOSCOPY, GASTROSCOPY, DUODENOSCOPY (EGD), BIOPSY SINGLE OR MULTIPLE;  Surgeon: Duane, William Charles, MD;  Location: [facility]     ESOPHAGOSCOPY, GASTROSCOPY, DUODENOSCOPY (EGD), COMBINED N/A 2/13/2019    Procedure: Combined Esophagoscopy, Gastroscopy, Duodenoscopy (Egd), Biopsy Single Or Multiple;  Surgeon: Sly De Santiago MD;  Location: [facility]     REPAIR ENTROPION BILATERAL Bilateral 1/6/2017    Procedure: REPAIR ENTROPION BILATERAL;  Surgeon: Carly Norman MD;  Location: [facility]     REPAIR PTOSIS Bilateral 01/2017       Social History     Tobacco Use     Smoking status: Never Smoker     Smokeless tobacco: Never Used   Substance Use Topics     Alcohol use: No     Family History   Problem Relation Age of Onset     Diabetes Mother      Cancer No family hx of      Hypertension No family hx of      Cerebrovascular Disease No family hx of      Thyroid Disease No family hx of      Glaucoma No family hx of      Macular Degeneration No family hx of          Current Outpatient Medications   Medication Sig Dispense Refill     cetirizine (ZYRTEC) 10 MG tablet Take 1 tablet (10 mg) by mouth daily 90 tablet 3     chlorhexidine (PERIDEX) 0.12 % solution SWISH AND SPIT 15 ML BY MOUTH TWICE DAILY 473 mL 3     hydrocortisone (ANUSOL-HC) 2.5 % cream Place rectally 2 times daily 30 g 1     latanoprost (XALATAN) 0.005 % ophthalmic solution Place 1 drop into  "both eyes At Bedtime 7.5 mL 4     leucovorin (WELLCOVORIN) 5 MG tablet TK 1 T PO 12 H AFTER METHOTREXATE ONE TIME EACH WEEK  3     losartan-hydrochlorothiazide (HYZAAR) 50-12.5 MG per tablet Take 1 tablet by mouth daily 90 tablet 3     meloxicam (MOBIC) 15 MG tablet TK 1 T PO  ONCE D WITH DINNER  3     methotrexate 2.5 MG tablet Take 6 tablets (15 mg) by mouth once a week 1 tablet 0     methylcellulose (CITRUCEL) powder Take 2 g by mouth daily 180 g 3     metoprolol tartrate (LOPRESSOR) 25 MG tablet Take 1 tablet (25 mg) by mouth At Bedtime 90 tablet 3     omeprazole (PRILOSEC) 40 MG DR capsule TAKE 1 CAPSULE(40 MG) BY MOUTH DAILY 30 TO 60 MINUTES BEFORE A MEAL AS NEEDED 90 capsule 0     pravastatin (PRAVACHOL) 20 MG tablet TAKE 1 TABLET(20 MG) BY MOUTH DAILY 90 tablet 0     triamcinolone (KENALOG) 0.1 % external cream APPLY TOPICALLY TWICE DAILY 80 g 3     No Known Allergies  BP Readings from Last 3 Encounters:   04/26/19 120/80   04/22/19 128/82   02/19/19 139/81    Wt Readings from Last 3 Encounters:   04/26/19 56.2 kg (124 lb)   04/22/19 55.3 kg (122 lb)   02/19/19 55.1 kg (121 lb 8 oz)                  Labs reviewed in EPIC    ROS:  Constitutional, neuro, ENT, endocrine, pulmonary, cardiac, gastrointestinal, genitourinary, musculoskeletal, integument and psychiatric systems are negative, except as otherwise noted.    OBJECTIVE:                                                    /80   Pulse 73   Temp 98.1  F (36.7  C) (Oral)   Ht 1.6 m (5' 3\")   Wt 56.2 kg (124 lb)   LMP 06/21/2002 (Approximate)   SpO2 97%   Breastfeeding? No   BMI 21.97 kg/m    Body mass index is 21.97 kg/m .      GENERAL APPEARANCE: healthy, alert and no distress  EYES: Eyes grossly normal to inspection and conjunctivae and sclerae normal  HENT: nose and mouth without ulcers or lesions  NECK: no adenopathy and trachea midline and normal to palpation  RESP: lungs clear to auscultation - no rales, rhonchi or wheezes  CV: regular " rates and rhythm, normal S1 S2, no S3 or S4 and no murmur, click or rub  ABDOMEN: soft, non-tender and no rebound or guarding   MS: extremities normal- no gross deformities noted and peripheral pulses normal  SKIN: no suspicious lesions or rashes  NEURO: Normal strength and tone, mentation intact and speech normal, Romberg negative, able to heel and toe walk  PSYCH: mentation appears normal      Diagnostic test results:  Diagnostic Test Results:  No results found for this or any previous visit (from the past 24 hour(s)).     ASSESSMENT/PLAN:                                                    1. Fatigue, unspecified type  -One episode yesterday lasted 30 minutes, better with food, may be related to hypoglycemia   -Stay well hydrated and eat regular meals   - Comprehensive metabolic panel  - Vitamin B12  - TSH with free T4 reflex  - Hemoglobin A1c  -ER precautions: chest pain, focal weakness, loss of vision, slurred speech     2. Polyarthritis, inflammatory (H)  -Per Rheumatology   - Comprehensive metabolic panel  - Vitamin B12  - TSH with free T4 reflex    3. Hypertension, goal below 150/90  -At goal  -continue current medication     4. Impaired fasting glucose  -Last HbA1C was 5.9   - Hemoglobin A1c      Follow up with Provider - if symptoms recur in 1-2 weeks      Jud Morales MD MPH    [facility]

## 2019-04-26 NOTE — PATIENT INSTRUCTIONS
At [facility], we strive to deliver an exceptional experience to you, every time we see you.  If you receive a survey in the mail, please send us back your thoughts. We really do value your feedback.    Your care team:                            Family Medicine Internal Medicine   MD Alfredo Morgan MD Shantel Branch-Fleming, MD Katya Georgiev PA-C Megan Hill, APRN PHIL Barnes MD Pediatrics   Jose Bryson, MOSES Holman, MD Julianna Ca APRN CNP   MD Naila Champion MD Deborah Mielke, MD Ashli Monge, APRN [facility]      Clinic hours: Monday - Thursday 7 am-7 pm; Fridays 7 am-5 pm.   Urgent care: Monday - Friday 11 am-9 pm; Saturday and Sunday 9 am-5 pm.  Pharmacy : Monday -Thursday 8 am-8 pm; Friday 8 am-6 pm; Saturday and Sunday 9 am-5 pm.     Clinic: [phone]   Pharmacy: [phone]

## 2019-04-26 NOTE — RESULT ENCOUNTER NOTE
Noreen,     Three month glucose number is unchanged from last check. You still have pre diabetes, this means that you do not have diabetes now but are at an increased risk of developing diabetes. Regular exercise will help prevent this. Other labs are pending.     Please do not hesitate to call us at [phone] if you have any questions or concerns.    Thank you,    Jud Morales MD MPH

## 2019-04-29 NOTE — RESULT ENCOUNTER NOTE
Noreen,     Vitamin B 12, electrolytes, kidney function and thyroid function are normal.  One liver function test is borderline, plan to recheck with next set if routine labs.  Plan of care and follow up as discussed in clinic.     Please do not hesitate to call us at [phone] if you have any questions or concerns.    Thank you,    Jud Morales MD MPH

## 2019-05-06 DIAGNOSIS — K21.00 GASTROESOPHAGEAL REFLUX DISEASE WITH ESOPHAGITIS: ICD-10-CM

## 2019-05-06 DIAGNOSIS — E78.2 MIXED HYPERLIPIDEMIA: ICD-10-CM

## 2019-05-06 NOTE — TELEPHONE ENCOUNTER
"Requested Prescriptions   Pending Prescriptions Disp Refills     pravastatin (PRAVACHOL) 20 MG tablet [Pharmacy Med Name: PRAVASTATIN 20MG TABLETS]  Last Written Prescription Date:  02/25/19  Last Fill Quantity: 90,  # refills: 00   Last Office Visit with [facility] or [facility] prescribing provider:  04/26/19-Andrew   Future Office Visit:    Next 5 appointments (look out 90 days)    May 14, 2019  3:20 PM CDT  [facility] Visit with [facility]ia Castro MD  [facility] [address] [address]  [phone]   Jul 05, 2019  9:00 AM CDT  Office Visit with Jia Castro MD  [facility] [address] [address]  [phone]        90 tablet 0     Sig: TAKE 1 TABLET(20 MG) BY MOUTH DAILY       Statins Protocol Passed - 5/6/2019  8:37 AM        Passed - LDL on file in past 12 months     Recent Labs   Lab Test 06/21/18  0953   LDL 88             Passed - No abnormal creatine kinase in past 12 months     No lab results found.             Passed - Recent (12 mo) or future (30 days) visit within the authorizing provider's specialty     Patient had office visit in the last 12 months or has a visit in the next 30 days with authorizing provider or within the authorizing provider's specialty.  See \"Patient Info\" tab in inbasket, or \"Choose Columns\" in Meds & Orders section of the refill encounter.              Passed - Medication is active on med list        Passed - Patient is age 18 or older        Passed - No active pregnancy on record        Passed - No positive pregnancy test in past 12 months        omeprazole (PRILOSEC) 40 MG DR capsule [Pharmacy Med Name: OMEPRAZOLE 40MG CAPSULES]  Last Written Prescription Date:  02/25/19  Last Fill Quantity: 90,  # refills: 1   Last Office Visit with [facility] or [facility] prescribing provider:  04/26/19Lea   Future Office Visit:  " "  Next 5 appointments (look out 90 days)    May 14, 2019  3:20 PM CDT  Office Visit with Jia Castro MD  [facility] ([facility]) [address] [address]  [phone]   Jul 05, 2019  9:00 AM CDT  Office Visit with Jia Castro MD  [facility] ([facility]) [address] [address]  [phone]        90 capsule 0     Sig: TAKE 1 CAPSULE(40 MG) BY MOUTH DAILY 30 TO 60 MINUTES BEFORE A MEAL AS NEEDED       PPI Protocol Passed - 5/6/2019  8:37 AM        Passed - Not on Clopidogrel (unless Pantoprazole ordered)        Passed - No diagnosis of osteoporosis on record        Passed - Recent (12 mo) or future (30 days) visit within the authorizing provider's specialty     Patient had office visit in the last 12 months or has a visit in the next 30 days with authorizing provider or within the authorizing provider's specialty.  See \"Patient Info\" tab in inbasket, or \"Choose Columns\" in Meds & Orders section of the refill encounter.              Passed - Medication is active on med list        Passed - Patient is age 18 or older        Passed - No active pregnacy on record        Passed - No positive pregnancy test in past 12 months          "

## 2019-05-08 RX ORDER — OMEPRAZOLE 40 MG/1
CAPSULE, DELAYED RELEASE ORAL
Qty: 30 CAPSULE | Refills: 0 | Status: SHIPPED | OUTPATIENT
Start: 2019-05-08 | End: 2019-05-14

## 2019-05-08 RX ORDER — PRAVASTATIN SODIUM 20 MG
TABLET ORAL
Qty: 30 TABLET | Refills: 0 | Status: SHIPPED | OUTPATIENT
Start: 2019-05-08 | End: 2019-05-14

## 2019-05-08 NOTE — TELEPHONE ENCOUNTER
Medication is being filled for 1 time refill only due to:  Patient needs labs due in June 2019.    Next 5 appointments (look out 90 days)    May 14, 2019  3:20 PM CDT  Office Visit with Jia Castro MD  [facility]) [address] [address]  [phone]   Jul 05, 2019  9:00 AM CDT  Office Visit with Jia Castro MD  [facility] ([facility]) [address] [address]  [phone]            Jaky Driver RN, BSN, PHN

## 2019-05-14 ENCOUNTER — OFFICE VISIT (OUTPATIENT)
Dept: FAMILY MEDICINE | Facility: CLINIC | Age: 69
End: 2019-05-14
Payer: MEDICARE

## 2019-05-14 VITALS
HEART RATE: 113 BPM | RESPIRATION RATE: 18 BRPM | TEMPERATURE: 98.2 F | DIASTOLIC BLOOD PRESSURE: 88 MMHG | OXYGEN SATURATION: 93 % | BODY MASS INDEX: 21.62 KG/M2 | SYSTOLIC BLOOD PRESSURE: 133 MMHG | WEIGHT: 122 LBS | HEIGHT: 63 IN

## 2019-05-14 DIAGNOSIS — K03.89 TOOTH SENSITIVITY: ICD-10-CM

## 2019-05-14 DIAGNOSIS — K21.00 GASTROESOPHAGEAL REFLUX DISEASE WITH ESOPHAGITIS: ICD-10-CM

## 2019-05-14 DIAGNOSIS — H40.003 GLAUCOMA SUSPECT OF BOTH EYES: ICD-10-CM

## 2019-05-14 DIAGNOSIS — J31.0 CHRONIC RHINITIS: ICD-10-CM

## 2019-05-14 DIAGNOSIS — R21 RASH: ICD-10-CM

## 2019-05-14 DIAGNOSIS — Z79.899 HIGH RISK MEDICATION USE: ICD-10-CM

## 2019-05-14 DIAGNOSIS — E78.2 MIXED HYPERLIPIDEMIA: ICD-10-CM

## 2019-05-14 DIAGNOSIS — I10 HYPERTENSION, GOAL BELOW 150/90: ICD-10-CM

## 2019-05-14 DIAGNOSIS — K64.4 EXTERNAL HEMORRHOIDS: ICD-10-CM

## 2019-05-14 DIAGNOSIS — J30.1 HAYFEVER: ICD-10-CM

## 2019-05-14 DIAGNOSIS — K05.10 GINGIVITIS: ICD-10-CM

## 2019-05-14 DIAGNOSIS — M06.4 POLYARTHRITIS, INFLAMMATORY (H): Primary | ICD-10-CM

## 2019-05-14 PROCEDURE — 99214 OFFICE O/P EST MOD 30 MIN: CPT | Performed by: FAMILY MEDICINE

## 2019-05-14 RX ORDER — LATANOPROST 50 UG/ML
1 SOLUTION/ DROPS OPHTHALMIC AT BEDTIME
Qty: 7.5 ML | Refills: 4 | Status: SHIPPED | OUTPATIENT
Start: 2019-05-14 | End: 2020-06-26

## 2019-05-14 RX ORDER — FLUTICASONE PROPIONATE 50 MCG
1-2 SPRAY, SUSPENSION (ML) NASAL DAILY
Qty: 16 G | Refills: 3 | Status: SHIPPED | OUTPATIENT
Start: 2019-05-14 | End: 2019-05-14

## 2019-05-14 RX ORDER — OMEPRAZOLE 40 MG/1
CAPSULE, DELAYED RELEASE ORAL
Qty: 90 CAPSULE | Refills: 3 | Status: SHIPPED | OUTPATIENT
Start: 2019-05-14 | End: 2020-07-27

## 2019-05-14 RX ORDER — METOPROLOL TARTRATE 25 MG/1
25 TABLET, FILM COATED ORAL AT BEDTIME
Qty: 90 TABLET | Refills: 3 | Status: SHIPPED | OUTPATIENT
Start: 2019-05-14 | End: 2020-07-23

## 2019-05-14 RX ORDER — CETIRIZINE HYDROCHLORIDE 10 MG/1
10 TABLET ORAL DAILY
Qty: 90 TABLET | Refills: 3 | Status: SHIPPED | OUTPATIENT
Start: 2019-05-14 | End: 2020-04-22

## 2019-05-14 RX ORDER — PRAVASTATIN SODIUM 20 MG
20 TABLET ORAL DAILY
Qty: 90 TABLET | Refills: 3 | Status: SHIPPED | OUTPATIENT
Start: 2019-05-14 | End: 2019-07-05

## 2019-05-14 RX ORDER — CHLORHEXIDINE GLUCONATE ORAL RINSE 1.2 MG/ML
SOLUTION DENTAL
Qty: 473 ML | Refills: 3 | Status: SHIPPED | OUTPATIENT
Start: 2019-05-14 | End: 2019-11-08

## 2019-05-14 ASSESSMENT — MIFFLIN-ST. JEOR: SCORE: 1057.52

## 2019-05-14 ASSESSMENT — PAIN SCALES - GENERAL: PAINLEVEL: NO PAIN (0)

## 2019-05-14 NOTE — PROGRESS NOTES
SUBJECTIVE:   Noreen Florence is a 69 year old female who presents to clinic today for the following health issues:    HPI   Travel Consult - Patient will be going to [facility] [place] and [place] 6/17/19 for 10 days.  Patient requesting 1 year of 90 days supply of  medications and new Rx Fluticasone for nasal drainage. She did not need it for a while now but wants to restart.      Hyperlipidemia Follow-Up      Rate your low fat/cholesterol diet?: good    Taking statin?  Yes, possible muscle aches from statin    Other lipid medications/supplements?:  none    Hypertension Follow-up      Outpatient blood pressures are not being checked.    Low Salt Diet: no added salt    Additional history: as documented    Reviewed and updated as needed this visit by clinical staff  Tobacco  Allergies  Meds  Problems  Med Hx  Surg Hx  Fam Hx  Soc Hx          Reviewed and updated as needed this visit by Provider  Problems             Patient Active Problem List   Diagnosis     Osteopenia     Hayfever     Hypertension, goal below 150/90     Polyarthritis, inflammatory (H)     GERD (gastroesophageal reflux disease)     Hyperlipidemia with target LDL less than 130     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     High risk medication use     Microscopic hematuria     Pterygium of right eye     Sicca syndrome (H)     Angiomyolipoma of right kidney     History of Helicobacter pylori infection     Past Surgical History:   Procedure Laterality Date     COLONOSCOPY       COLONOSCOPY WITH CO2 INSUFFLATION N/A 4/5/2017    Procedure: COLONOSCOPY WITH CO2 INSUFFLATION;  Surgeon: Duane, William Charles, MD;  Location:  OR     COMBINED ESOPHAGOSCOPY, GASTROSCOPY, DUODENOSCOPY (EGD) WITH CO2 INSUFFLATION N/A 2/13/2019    Procedure: COMBINED ESOPHAGOSCOPY, GASTROSCOPY, DUODENOSCOPY (EGD) WITH CO2 INSUFFLATION;  Surgeon: Sly De Santiago MD;  Location:  OR     COMBINED REPAIR PTOSIS WITH BLEPHAROPLASTY BILATERAL Bilateral  1/6/2017    Procedure: COMBINED REPAIR PTOSIS WITH BLEPHAROPLASTY BILATERAL;  Surgeon: Carly Norman MD;  Location: [facility]     ESOPHAGOSCOPY, GASTROSCOPY, DUODENOSCOPY (EGD), COMBINED N/A 1/5/2016    Procedure: COMBINED ESOPHAGOSCOPY, GASTROSCOPY, DUODENOSCOPY (EGD), BIOPSY SINGLE OR MULTIPLE;  Surgeon: Duane, William Charles, MD;  Location: [facility] OR     ESOPHAGOSCOPY, GASTROSCOPY, DUODENOSCOPY (EGD), COMBINED N/A 2/13/2019    Procedure: Combined Esophagoscopy, Gastroscopy, Duodenoscopy (Egd), Biopsy Single Or Multiple;  Surgeon: Sly De Santiago MD;  Location: [facility] OR     REPAIR ENTROPION BILATERAL Bilateral 1/6/2017    Procedure: REPAIR ENTROPION BILATERAL;  Surgeon: Carly Norman MD;  Location: [facility]     REPAIR PTOSIS Bilateral 01/2017       Social History     Tobacco Use     Smoking status: Never Smoker     Smokeless tobacco: Never Used   Substance Use Topics     Alcohol use: No     Family History   Problem Relation Age of Onset     Diabetes Mother      Cancer No family hx of      Hypertension No family hx of      Cerebrovascular Disease No family hx of      Thyroid Disease No family hx of      Glaucoma No family hx of      Macular Degeneration No family hx of          Current Outpatient Medications   Medication Sig Dispense Refill     cetirizine (ZYRTEC) 10 MG tablet Take 1 tablet (10 mg) by mouth daily 90 tablet 3     chlorhexidine (PERIDEX) 0.12 % solution SWISH AND SPIT 15 ML BY MOUTH TWICE DAILY 473 mL 3     fluticasone (FLONASE) 50 MCG/ACT nasal spray Spray 1-2 sprays into both nostrils daily 16 g 3     hydrocortisone (ANUSOL-HC) 2.5 % cream Place rectally 2 times daily 30 g 3     latanoprost (XALATAN) 0.005 % ophthalmic solution Place 1 drop into both eyes At Bedtime 7.5 mL 4     leucovorin (WELLCOVORIN) 5 MG tablet TK 1 T PO 12 H AFTER METHOTREXATE ONE TIME EACH WEEK  3     losartan-hydrochlorothiazide (HYZAAR) 50-12.5 MG per tablet Take 1 tablet by mouth daily 90 tablet 3     meloxicam  (MOBIC) 15 MG tablet TK 1 T PO  ONCE D WITH DINNER  3     methotrexate 2.5 MG tablet Take 6 tablets (15 mg) by mouth once a week 1 tablet 0     methylcellulose (CITRUCEL) powder Take 2 g by mouth daily 180 g 3     metoprolol tartrate (LOPRESSOR) 25 MG tablet Take 1 tablet (25 mg) by mouth At Bedtime 90 tablet 3     omeprazole (PRILOSEC) 40 MG DR capsule TAKE 1 CAPSULE(40 MG) BY MOUTH DAILY 30 TO 60 MINUTES BEFORE A MEAL AS NEEDED 90 capsule 3     pravastatin (PRAVACHOL) 20 MG tablet Take 1 tablet (20 mg) by mouth daily 90 tablet 3     Sodium Fluoride 1.1 % PSTE Apply 1 Application to affected area 2 times daily 112 Bottle 3     triamcinolone (KENALOG) 0.1 % external cream APPLY TOPICALLY TWICE DAILY 80 g 3     No Known Allergies  Recent Labs   Lab Test 04/26/19  1407 06/21/18  0953 04/16/18  1610  06/21/17  0850 05/25/17  10/27/16  0939  03/03/16  0856   A1C 5.9* 5.9*  --   --   --   --   --  5.9  --   --    LDL  --  88  --   --  143*  --   --   --   --  130*   HDL  --  36*  --   --  53  --   --   --   --  41*   TRIG  --  250*  --   --  174*  --   --   --   --  184*   ALT 53*  --  31  --   --  33   < >  --    < >  --    CR 0.55 0.47* 0.43*   < >  --  0.66   < > 0.55   < >  --    GFRESTIMATED >90 >90 >90   < >  --  >60   < > >90  Non  GFR Calc     < >  --    GFRESTBLACK >90 >90 >90   < >  --  >60   < > >90  African American GFR Calc     < >  --    POTASSIUM 3.5 3.9 3.6   < >  --   --    < > 3.7   < >  --    TSH 1.26  --   --   --   --   --   --   --   --   --     < > = values in this interval not displayed.      BP Readings from Last 3 Encounters:   05/14/19 133/88   04/26/19 120/80   04/22/19 128/82    Wt Readings from Last 3 Encounters:   05/14/19 55.3 kg (122 lb)   04/26/19 56.2 kg (124 lb)   04/22/19 55.3 kg (122 lb)                  Labs reviewed in EPIC    ROS:  Constitutional, HEENT, cardiovascular, pulmonary, gi and gu systems are negative, except as otherwise noted.    OBJECTIVE:     BP  "133/88 (BP Location: Right arm, Patient Position: Chair, Cuff Size: Adult Regular)   Pulse 113   Temp 98.2  F (36.8  C) (Oral)   Resp 18   Ht 1.6 m (5' 3\")   Wt 55.3 kg (122 lb)   LMP 06/21/2002 (Approximate)   SpO2 93%   BMI 21.61 kg/m    Body mass index is 21.61 kg/m .  GENERAL APPEARANCE: healthy, alert and no distress  EYES: Eyes grossly normal to inspection, PERRL and conjunctivae and sclerae normal  HENT: ear canals and TM's normal, nose and mouth without ulcers or lesions, oropharynx clear and oral mucous membranes moist  NECK: no adenopathy, no asymmetry, masses, or scars and thyroid normal to palpation  RESP: lungs clear to auscultation - no rales, rhonchi or wheezes  CV: regular rates and rhythm, normal S1 S2, no S3 or S4, no murmur, click or rub, no peripheral edema and peripheral pulses strong  ABDOMEN: soft, nontender, no hepatosplenomegaly, no masses and bowel sounds normal  MS: no musculoskeletal defects are noted and gait is age appropriate without ataxia  SKIN: no suspicious lesions or rashes  NEURO: Normal strength and tone, sensory exam grossly normal, mentation intact and speech normal  PSYCH: mentation appears normal and affect normal/bright     Diagnostic Test Results:  Results for orders placed or performed in visit on 04/26/19   Comprehensive metabolic panel   Result Value Ref Range    Sodium 137 133 - 144 mmol/L    Potassium 3.5 3.4 - 5.3 mmol/L    Chloride 103 94 - 109 mmol/L    Carbon Dioxide 25 20 - 32 mmol/L    Anion Gap 9 3 - 14 mmol/L    Glucose 129 (H) 70 - 99 mg/dL    Urea Nitrogen 13 7 - 30 mg/dL    Creatinine 0.55 0.52 - 1.04 mg/dL    GFR Estimate >90 >60 mL/min/[1.73_m2]    GFR Estimate If Black >90 >60 mL/min/[1.73_m2]    Calcium 9.5 8.5 - 10.1 mg/dL    Bilirubin Total 0.4 0.2 - 1.3 mg/dL    Albumin 4.0 3.4 - 5.0 g/dL    Protein Total 7.6 6.8 - 8.8 g/dL    Alkaline Phosphatase 68 40 - 150 U/L    ALT 53 (H) 0 - 50 U/L    AST 42 0 - 45 U/L   Vitamin B12   Result Value Ref " "Range    Vitamin B12 673 193 - 986 pg/mL   TSH with free T4 reflex   Result Value Ref Range    TSH 1.26 0.40 - 4.00 mU/L   Hemoglobin A1c   Result Value Ref Range    Hemoglobin A1C 5.9 (H) 0 - 5.6 %       ASSESSMENT/PLAN:         Tobacco Cessation:   reports that she has never smoked. She has never used smokeless tobacco.      BMI:   Estimated body mass index is 21.61 kg/m  as calculated from the following:    Height as of this encounter: 1.6 m (5' 3\").    Weight as of this encounter: 55.3 kg (122 lb).           ICD-10-CM    1. Polyarthritis, inflammatory (H) M06.4 Stable on medication but having muscle aches and pains.    2. Chronic rhinitis J31.0 cetirizine (ZYRTEC) 10 MG tablet   3. Hypertension, goal below 150/90 I10 metoprolol tartrate (LOPRESSOR) 25 MG tablet   4. External hemorrhoids K64.4 methylcellulose (CITRUCEL) powder     hydrocortisone (ANUSOL-HC) 2.5 % cream as needed.    5. Mixed hyperlipidemia E78.2 pravastatin (PRAVACHOL) 20 MG tablet- take a 1 month holiday to see if muscle cramps improve, then re challenge. Not sure that patient really understands and will follow up in 3 months.   6. Gastroesophageal reflux disease with esophagitis K21.0 omeprazole (PRILOSEC) 40 MG DR capsule as needed   7. Glaucoma suspect of both eyes H40.003 latanoprost (XALATAN) 0.005 % ophthalmic solution   8. Gingivitis K05.10 chlorhexidine (PERIDEX) 0.12 % solution   9. High risk medication use Z79.899 Plavix   10. Rash R21 Using over the counter steroid cream   11. Hayfever J30.1 fluticasone (FLONASE) 50 MCG/ACT nasal spray- refill requested due to worsening symptoms this spring   12. Tooth sensitivity K03.89 Sodium Fluoride 1.1 % PSTE- recommended by her dentist       CONSULTATION/REFERRAL to rheumatology for follow up as scheduled.   FUTURE LABS:       - Schedule fasting labs in 3 months  FUTURE APPOINTMENTS:       - Follow-up visit in 3 months or sooner if any questions or concerns.   See Patient Instructions    Jia " Ashli Castro MD  [facility]

## 2019-05-14 NOTE — PATIENT INSTRUCTIONS
At [facility], we strive to deliver an exceptional experience to you, every time we see you.  If you receive a survey in the mail, please send us back your thoughts. We really do value your feedback.    Based on your medical history, these are the current health maintenance/preventive care services that you are due for (some may have been done at this visit.)  Health Maintenance Due   Topic Date Due     ADVANCE DIRECTIVE PLANNING Q5 YRS  02/04/2007     PHQ-2  01/01/2019         Suggested websites for health information:  Www.Code Fever.org : Up to date and easily searchable information on multiple topics.  Www.Apps Genius.gov : medication info, interactive tutorials, watch real surgeries online  Www.familydoctor.org : good info from the Academy of Family Physicians  Www.cdc.gov : public health info, travel advisories, epidemics (H1N1)  Www.aap.org : children's health info, normal development, vaccinations  Www.health.[facility].[place].us : [place] dept of health, public health issues in [place], N1N1    Your care team:                            Family Medicine Internal Medicine   MD Alfredo Morgan MD Shantel Branch-Fleming, MD Katya Georgiev PA-C Nam Ho, MD Pediatrics   MOSES Alberts, MD Naila Thompson CNP, MD Deborah Mielke, MD Kim Thein, APRN CNP      Clinic hours: Monday - Thursday 7 am-7 pm; Fridays 7 am-5 pm.   Urgent care: Monday - Friday 11 am-9 pm; Saturday and Sunday 9 am-5 pm.  Pharmacy : Monday -Thursday 8 am-8 pm; Friday 8 am-6 pm; Saturday and Sunday 9 am-5 pm.     Clinic: [phone]   Pharmacy: [phone]

## 2019-05-14 NOTE — TELEPHONE ENCOUNTER
Patient is requesting 90 days supply for fluticasone (FLONASE) 50 MCG/ACT nasal spray.          Rik Faarax  [facility]

## 2019-05-15 RX ORDER — FLUTICASONE PROPIONATE 50 MCG
SPRAY, SUSPENSION (ML) NASAL
Qty: 48 ML | Refills: 3 | Status: SHIPPED | OUTPATIENT
Start: 2019-05-15 | End: 2020-04-28

## 2019-05-15 NOTE — TELEPHONE ENCOUNTER
90 day supply approved per [facility] [place].      Jaky Driver RN, BSN, PHN

## 2019-05-16 ENCOUNTER — TELEPHONE (OUTPATIENT)
Dept: FAMILY MEDICINE | Facility: CLINIC | Age: 69
End: 2019-05-16

## 2019-05-16 DIAGNOSIS — K05.10 GINGIVITIS: Primary | ICD-10-CM

## 2019-05-16 NOTE — TELEPHONE ENCOUNTER
Outpatient Medication Detail      Disp Refills Start End TAMMIE   Sodium Fluoride 1.1 % PSTE 112 Bottle 3 5/14/2019  --   Sig - Route: Apply 1 Application to affected area 2 times daily - Dental   Sent to pharmacy as: Sodium Fluoride 1.1 % PSTE   Class: E-Prescribe   Order: [id number]   E-Prescribing Status: Receipt confirmed by pharmacy (5/14/2019  4:17 PM CDT)     The above medication is not covered by patient's insurance. Provider, to please advise on the other two options: Clinpropst or Fluoridexpst.    Routing to provider to review and advise.     Mercedes Juarez, RN, BSN

## 2019-05-16 NOTE — TELEPHONE ENCOUNTER
"Pharmacy comments:    \"Drug not covered by patient plan. The preferred alternative is Clinpropst, Fluoridexpst. Please call/fax the pharmacy to change medication along with strength, directions, quantity, and refills. Thank you.\"  "

## 2019-05-16 NOTE — TELEPHONE ENCOUNTER
This writer attempted to contact [facility] on 05/16/19    Reason for call formulary issue and left message to return call.    When patient calls back, please contact 1st floor Alejandra Laws. routine priority.        Tim Montgomery

## 2019-05-17 NOTE — TELEPHONE ENCOUNTER
Called patient and informed of provider message. Alternatives given to patient to check with dentist or whether recommends something else. Patient will call back to let us know more.    Tim Montgomery CMA

## 2019-05-21 ENCOUNTER — TRANSFERRED RECORDS (OUTPATIENT)
Dept: HEALTH INFORMATION MANAGEMENT | Facility: CLINIC | Age: 69
End: 2019-05-21

## 2019-05-21 NOTE — TELEPHONE ENCOUNTER
Phone disconnected so patient called back and provided dentist office phone [phone] [facility]. Patient wants us call and ask her dentist.    Called dentist office and they recommend Clinpropst.    Routing back to provider.  Tim Montgomery CMA

## 2019-05-21 NOTE — TELEPHONE ENCOUNTER
Called patient to check. She states her daughter called and they will not prescribe for her. I told her we can prescribe but want to know about what is recommended.    Tim Montgomery CMA

## 2019-05-24 ENCOUNTER — ANCILLARY PROCEDURE (OUTPATIENT)
Dept: MAMMOGRAPHY | Facility: CLINIC | Age: 69
End: 2019-05-24
Attending: FAMILY MEDICINE
Payer: MEDICARE

## 2019-05-24 DIAGNOSIS — Z12.31 VISIT FOR SCREENING MAMMOGRAM: ICD-10-CM

## 2019-05-24 PROCEDURE — 77067 SCR MAMMO BI INCL CAD: CPT | Mod: TC

## 2019-05-24 NOTE — LETTER
May 28, 2019      Noreen Florence  [address]  [place] [address]        Dear Noreen Florence    I am happy to let you know that your mammogram was normal. You may schedule a follow up mammogram in 1-2 years depending on your risk factors and family history. Please let me know if you have any questions about your results. You should be receiving a letter from the radiologist.     Enclosed is a copy of the results.  It was a pleasure to see you at your last appointment.      Sincerely,      Jia Castro MD, MPH /MARIA DE JESUS Pearson MA      Results for orders placed or performed in visit on 05/24/19   *MA Screening Digital Bilateral    Narrative    SCREENING MAMMOGRAM, BILATERAL, DIGITAL w/CAD - 5/24/2019 1:32 PM    BREAST SYMPTOMS: No current breast complaints.     COMPARISON:  05/11/2018, 05/06/2017, 04/26/2016, 04/27/2015.    BREAST DENSITY: Scattered fibroglandular densities.    COMMENTS: No findings of suspicion for malignancy.       Impression    IMPRESSION: BI-RADS CATEGORY: 1 -  Negative    RECOMMENDED FOLLOW-UP: Annual Mammography.    Exam results letter mailed to patient.      ALLISON MACIAS MD

## 2019-05-30 DIAGNOSIS — K08.89 PAIN, DENTAL: Primary | ICD-10-CM

## 2019-05-30 NOTE — TELEPHONE ENCOUNTER
Requested Prescriptions   Pending Prescriptions Disp Refills     SF 1.1 % GEL topical gel [Pharmacy Med Name: SF 1.1% MINT GEL 56GM] 392 g 3     Sig: APPLY TO AFFECTED AREAS TWICE DAILY      Last Written Prescription Date:  5/21/19  Last Fill Quantity: 112 g,  # refills: 3   Last Office Visit with G, P or [facility] prescribing provider:  5/14/19   Future Office Visit:    Next 5 appointments (look out 90 days)    Jul 05, 2019  9:00 AM CDT  [facility] [address] [place] [address] [id number] [phone]                There is no refill protocol information for this order              Rik Faarax  [facility]

## 2019-06-04 RX ORDER — 1.1% SODIUM FLUORIDE 11 MG/G
GEL DENTAL
Qty: 392 G | Refills: 3 | Status: SHIPPED | OUTPATIENT
Start: 2019-06-04 | End: 2020-05-29

## 2019-06-05 ENCOUNTER — TELEPHONE (OUTPATIENT)
Dept: FAMILY MEDICINE | Facility: CLINIC | Age: 69
End: 2019-06-05

## 2019-06-05 NOTE — TELEPHONE ENCOUNTER
This is recommended by her dentist. If it is not covered. We can request a PA. I think that we have tried multiple times to get something covered for her. Nothing seems to be working.  Jia Castro MD

## 2019-06-05 NOTE — TELEPHONE ENCOUNTER
Plan does not cover this medication. Please call plan at [phone] to initiate prior authorization or call/fax pharmacy to change medication at  [phone]. Patient ID # is [id number].        Rik Serna  [facility]

## 2019-06-06 NOTE — TELEPHONE ENCOUNTER
Central Prior Authorization Team   Phone: [phone]    PA Initiation    Medication: SF 1.1 % GEL topical gel  Insurance Company: [employer] - Phone [phone] Fax [phone]  Pharmacy Filling the Rx: [facility] [address] [place] [address] [place] [facility] [address] [facility]  Filling Pharmacy Phone: [phone]  Filling Pharmacy Fax: [phone]  Start Date: 6/6/2019

## 2019-06-07 NOTE — TELEPHONE ENCOUNTER
Prior Authorization Approval    Authorization Effective Date: 1/1/2019  Authorization Expiration Date: 12/31/2019  Medication: SF 1.1 % GEL-APPROVED  Approved Dose/Quantity:    Reference #:     Insurance Company: [employer] - Phone [phone] Fax [phone]  Expected CoPay:       CoPay Card Available:      Foundation Assistance Needed:    Which Pharmacy is filling the prescription (Not needed for infusion/clinic administered): [facility] [address] [place] [address], [place] - [address] [place] [address] AT [address] [place] [address]  Pharmacy Notified: Yes  Patient Notified: Yes  **Instructed pharmacy to notify patient when script is ready to /ship.**

## 2019-06-10 ENCOUNTER — THERAPY VISIT (OUTPATIENT)
Dept: PHYSICAL THERAPY | Facility: CLINIC | Age: 69
End: 2019-06-10
Payer: MEDICARE

## 2019-06-10 DIAGNOSIS — M76.60 ACHILLES TENDON PAIN: ICD-10-CM

## 2019-06-10 PROCEDURE — 97161 PT EVAL LOW COMPLEX 20 MIN: CPT | Mod: GP | Performed by: PHYSICAL THERAPIST

## 2019-06-10 PROCEDURE — 97110 THERAPEUTIC EXERCISES: CPT | Mod: GP | Performed by: PHYSICAL THERAPIST

## 2019-06-10 NOTE — PROGRESS NOTES
[facility] Initial Evaluation  Subjective:  The history is provided by the patient. No  was used.   Noreen Florence is a 69 year old female with a left ankle condition.  Condition occurred with:  Insidious onset.  Condition occurred: for unknown reasons.  This is a chronic condition  Patient reports that she had insidious onset of left Achilles pain about 4 months ago. She notes a worsening of her pain. MD order from 5-21-19..    Patient reports pain:  Posterior (Achilles tendon).    Pain is described as aching and sharp and is constant and reported as 8/10.   Pain is worse in the A.M..  Symptoms are exacerbated by ascending stairs, descending stairs and bending/squatting (7-8/10 to descend steps reciprocally, squat to garden with 9-10/10  pain) and relieved by rest.  Since onset symptoms are gradually worsening.        General health as reported by patient is good.  Pertinent medical history includes:  High blood pressure.  Medical allergies: no.  Other surgeries include:  None reported.  Current medications:  High blood pressure medication.  Current occupation is retired.        Barriers include:  None as reported by patient.    Red flags:  None as reported by patient.                        Objective:    Gait:    Assistive Devices:  None  Deviations:  Ankle:  Push off decr L    Flexibility/Screens:       Lower Extremity:  Decreased left lower extremity flexibility:Gastroc    Decreased right lower extremity flexibility:  Gastroc          Ankle/Foot Evaluation  ROM:  AROM is normal.PROM is normal.      Strength:    Dorsiflexion:  Left: 5/5      Pain:-   Right: 5/5    Pain:-  Plantarflexion: Left: 4/5    Pain:++   Right: 5/5   Pain:-  Inversion:Left: 5/5   Pain:-     Right: 5/5  Pain:  Eversion:Left: 5-/5   Pain:-  Right: 5/5   Pain:-    Extension Great Toe:Left: 5/5   Pain:-  Right: 5/5   Pain:-              LIGAMENT TESTING: normal              SPECIAL TESTS: normal    PALPATION:    Left ankle tenderness present at:  achilles tendon    EDEMA: normal          MOBILITY TESTING:       Talocrural Left: hypomobile                                                              General     ROS    Assessment/Plan:    Patient is a 69 year old female with left side ankle complaints.    Patient has the following significant findings with corresponding treatment plan.                Diagnosis 1:  Achilles tendonitis  Pain -  hot/cold therapy, US, manual therapy, self management, education, directional preference exercise and home program  Decreased ROM/flexibility - manual therapy, therapeutic exercise and home program  Decreased joint mobility - manual therapy, therapeutic exercise and home program  Decreased strength - therapeutic exercise, therapeutic activities and home program  Decreased proprioception - neuro re-education, therapeutic activities and home program  Impaired gait - gait training and home program  Impaired muscle performance - neuro re-education and home program  Decreased function - therapeutic activities and home program    Therapy Evaluation Codes:   1) History comprised of:   Personal factors that impact the plan of care:      None.    Comorbidity factors that impact the plan of care are:      None.     Medications impacting care: None.  2) Examination of Body Systems comprised of:   Body structures and functions that impact the plan of care:      Ankle.   Activity limitations that impact the plan of care are:      Squatting/kneeling and Stairs.  3) Clinical presentation characteristics are:   Stable/Uncomplicated.  4) Decision-Making    Low complexity using standardized patient assessment instrument and/or measureable assessment of functional outcome.  Cumulative Therapy Evaluation is: Low complexity.    Previous and current functional limitations:  (See Goal Flow Sheet for this information)    Short term and Long term goals: (See Goal Flow Sheet for this information)      Communication ability:  Patient appears to be able to clearly communicate and understand verbal and written communication and follow directions correctly.  Treatment Explanation - The following has been discussed with the patient:   RX ordered/plan of care  Anticipated outcomes  Possible risks and side effects  This patient would benefit from PT intervention to resume normal activities.   Rehab potential is good.    Frequency:  1 X week, once daily  Duration:  for 10 weeks  Discharge Plan:  Achieve all LTG.  Independent in home treatment program.  Reach maximal therapeutic benefit.    Please refer to the daily flowsheet for treatment today, total treatment time and time spent performing 1:1 timed codes.

## 2019-06-10 NOTE — LETTER
DEPARTMENT OF HEALTH AND HUMAN SERVICES  CENTERS FOR MEDICARE & MEDICAID SERVICES    PLAN/UPDATED PLAN OF PROGRESS FOR OUTPATIENT REHABILITATION    PATIENTS NAME:  Noreen Florence   : 1950  PROVIDER NUMBER:    [id number]  HICN: [id number]  PROVIDER NAME: [place] [facility] CHRIS SAEZ  MEDICAL RECORD NUMBER: [id number]   START OF CARE DATE:  SOC Date: 06/10/19   TYPE:  PT  PRIMARY/TREATMENT DIAGNOSIS: (Pertinent Medical Diagnosis)  Achilles tendon pain  VISITS FROM START OF CARE:  Rxs Used: 1   [place] [facility] Initial Evaluation  Subjective:  The history is provided by the patient. No  was used.   Noreen Florence is a 69 year old female with a left ankle condition.  Condition occurred with:  Insidious onset.  Condition occurred: for unknown reasons.  This is a chronic condition  Patient reports that she had insidious onset of left Achilles pain about 4 months ago. She notes a worsening of her pain. MD order from 19..    Patient reports pain:  Posterior (Achilles tendon).    Pain is described as aching and sharp and is constant and reported as 8/10.   Pain is worse in the A.M..  Symptoms are exacerbated by ascending stairs, descending stairs and bending/squatting (7-8/10 to descend steps reciprocally, squat to garden with 9-10/10  pain) and relieved by rest.  Since onset symptoms are gradually worsening.        General health as reported by patient is good.  Pertinent medical history includes:  High blood pressure.  Medical allergies: no.  Other surgeries include:  None reported.  Current medications:  High blood pressure medication.  Current occupation is retired.      Barriers include:  None as reported by patient.  Red flags:  None as reported by patient.        Objective:  Gait:    Assistive Devices:  None  Deviations:  Ankle:  Push off decr L  Flexibility/Screens:   Lower Extremity:  Decreased left lower extremity flexibility:Gastroc  Decreased right lower  extremity flexibility:  Gastroc  Ankle/Foot Evaluation  ROM:  AROM is normal.PROM is normal.  Strength:    Dorsiflexion:  Left: 5/5      Pain:-   Right: 5/5    Pain:-  Plantarflexion: Left: 4/5    Pain:++   Right: 5/5   Pain:-  Inversion:Left: 5/5   Pain:-     Right: 5/5  Pain:  Eversion:Left: 5-/5   Pain:-  Right: 5/5   Pain:-  Extension Great Toe:Left: 5/5   Pain:-  Right: 5/5   Pain:-  LIGAMENT TESTING: normal  SPECIAL TESTS: normal  PALPATION:   Left ankle tenderness present at:  achilles tendon  EDEMA: normal  MOBILITY TESTING:   Talocrural Left: hypomobile        Assessment/Plan:    Patient is a 69 year old female with left side ankle complaints.    Patient has the following significant findings with corresponding treatment plan.                Diagnosis 1:  Achilles tendonitis  Pain -  hot/cold therapy, US, manual therapy, self management, education, directional preference exercise and home program  Decreased ROM/flexibility - manual therapy, therapeutic exercise and home program  Decreased joint mobility - manual therapy, therapeutic exercise and home program  Decreased strength - therapeutic exercise, therapeutic activities and home program  Decreased proprioception - neuro re-education, therapeutic activities and home program  Impaired gait - gait training and home program  Impaired muscle performance - neuro re-education and home program  Decreased function - therapeutic activities and home program    Therapy Evaluation Codes:   1) History comprised of:   Personal factors that impact the plan of care:      None.    Comorbidity factors that impact the plan of care are:      None.     Medications impacting care: None.  2) Examination of Body Systems comprised of:   Body structures and functions that impact the plan of care:      Ankle.   Activity limitations that impact the plan of care are:      Squatting/kneeling and Stairs.  3) Clinical presentation characteristics  "are:   Stable/Uncomplicated.  4) Decision-Making    Low complexity using standardized patient assessment instrument and/or measureable assessment of functional outcome.  Cumulative Therapy Evaluation is: Low complexity.  Previous and current functional limitations:  (See Goal Flow Sheet for this information)    Short term and Long term goals: (See Goal Flow Sheet for this information)   Communication ability:  Patient appears to be able to clearly communicate and understand verbal and written communication and follow directions correctly.  Treatment Explanation - The following has been discussed with the patient:   RX ordered/plan of care  Anticipated outcomes  Possible risks and side effects  This patient would benefit from PT intervention to resume normal activities.   Rehab potential is good.  Frequency:  1 X week, once daily  Duration:  for 10 weeks  Discharge Plan:  Achieve all LTG.  Independent in home treatment program.  Reach maximal therapeutic benefit.    Please refer to the daily flowsheet for treatment today, total treatment time and time spent performing 1:1 timed codes.     Caregiver Signature/Credentials ______________________________________________ Date ________        Kaylin Ruiz PT   I have reviewed and certified the need for these services and plan of treatment while under my care.        PHYSICIAN'S SIGNATURE:   ______________________________________________  Date___________     Jesneia Bazan MD    Certification period:  Beginning of Cert date period: 06/10/19 to  End of Cert period date: 08/30/19   Functional Level Progress Report: Please see attached \"Goal Flow sheet for Functional level.\"  ____X____ Continue Services or       ________ DC Services              Service dates: From  SOC Date: 06/10/19 date to present                         "

## 2019-06-11 PROBLEM — M76.60 ACHILLES TENDON PAIN: Status: ACTIVE | Noted: 2019-06-11

## 2019-07-02 ENCOUNTER — THERAPY VISIT (OUTPATIENT)
Dept: PHYSICAL THERAPY | Facility: CLINIC | Age: 69
End: 2019-07-02
Payer: MEDICARE

## 2019-07-02 DIAGNOSIS — M76.60 ACHILLES TENDON PAIN: ICD-10-CM

## 2019-07-02 PROCEDURE — 97140 MANUAL THERAPY 1/> REGIONS: CPT | Mod: GP | Performed by: PHYSICAL THERAPIST

## 2019-07-02 PROCEDURE — 97035 APP MDLTY 1+ULTRASOUND EA 15: CPT | Mod: GP | Performed by: PHYSICAL THERAPIST

## 2019-07-02 PROCEDURE — 97110 THERAPEUTIC EXERCISES: CPT | Mod: GP | Performed by: PHYSICAL THERAPIST

## 2019-07-02 NOTE — PROGRESS NOTES
SUBJECTIVE  Subjective changes as noted by pt:  Patient reports that she has not done any exercises since she was first here for therapy. SHe went on a trip to [place] for 10 days and did a lot of walking and the pain wasn't too bad. She just got back from her trip 2 days ago.        Current Pain level: 8/10   Changes in function:  Yes (See Goal flowsheet attached for changes in current functional level)     Adverse reaction to treatment or activity:  None    OBJECTIVE  Changes in objective findings:  None        ASSESSMENT  Noreen continues to require intervention to meet STG and LTG's: PT  No change of symptoms has been noted.  Response to therapy has shown lack of progress in  pain level and function  Progress made towards STG/LTG?  Yes (See Goal flowsheet attached for updates on achievement of STG and LTG)    PLAN  Continue current treatment plan until patient demonstrates readiness to progress to higher level exercises.    PTA/ATC plan:  N/A    Please refer to the daily flowsheet for treatment today, total treatment time and time spent performing 1:1 timed codes.

## 2019-07-05 ENCOUNTER — OFFICE VISIT (OUTPATIENT)
Dept: FAMILY MEDICINE | Facility: CLINIC | Age: 69
End: 2019-07-05
Payer: MEDICARE

## 2019-07-05 VITALS
HEART RATE: 73 BPM | RESPIRATION RATE: 18 BRPM | BODY MASS INDEX: 21.09 KG/M2 | HEIGHT: 63 IN | OXYGEN SATURATION: 98 % | TEMPERATURE: 98.1 F | DIASTOLIC BLOOD PRESSURE: 87 MMHG | WEIGHT: 119 LBS | SYSTOLIC BLOOD PRESSURE: 128 MMHG

## 2019-07-05 DIAGNOSIS — I10 HYPERTENSION, GOAL BELOW 150/90: Primary | ICD-10-CM

## 2019-07-05 DIAGNOSIS — E78.2 MIXED HYPERLIPIDEMIA: ICD-10-CM

## 2019-07-05 DIAGNOSIS — M06.4 POLYARTHRITIS, INFLAMMATORY (H): ICD-10-CM

## 2019-07-05 DIAGNOSIS — M35.00 SICCA SYNDROME (H): ICD-10-CM

## 2019-07-05 DIAGNOSIS — E78.5 HYPERLIPIDEMIA WITH TARGET LDL LESS THAN 130: ICD-10-CM

## 2019-07-05 LAB
CHOLEST SERPL-MCNC: 215 MG/DL
HDLC SERPL-MCNC: 31 MG/DL
LDLC SERPL CALC-MCNC: ABNORMAL MG/DL
LDLC SERPL DIRECT ASSAY-MCNC: 149 MG/DL
NONHDLC SERPL-MCNC: 184 MG/DL
TRIGL SERPL-MCNC: 407 MG/DL

## 2019-07-05 PROCEDURE — 99214 OFFICE O/P EST MOD 30 MIN: CPT | Performed by: FAMILY MEDICINE

## 2019-07-05 PROCEDURE — 36415 COLL VENOUS BLD VENIPUNCTURE: CPT | Performed by: FAMILY MEDICINE

## 2019-07-05 PROCEDURE — 80061 LIPID PANEL: CPT | Performed by: FAMILY MEDICINE

## 2019-07-05 PROCEDURE — 83721 ASSAY OF BLOOD LIPOPROTEIN: CPT | Mod: 59 | Performed by: FAMILY MEDICINE

## 2019-07-05 RX ORDER — PRAVASTATIN SODIUM 20 MG
20 TABLET ORAL DAILY
Qty: 90 TABLET | Refills: 3 | Status: SHIPPED | OUTPATIENT
Start: 2019-07-05 | End: 2020-07-27

## 2019-07-05 ASSESSMENT — PAIN SCALES - GENERAL: PAINLEVEL: EXTREME PAIN (8)

## 2019-07-05 ASSESSMENT — MIFFLIN-ST. JEOR: SCORE: 1043.91

## 2019-07-05 NOTE — PATIENT INSTRUCTIONS
At [facility], we strive to deliver an exceptional experience to you, every time we see you.  If you receive a survey in the mail, please send us back your thoughts. We really do value your feedback.    Based on your medical history, these are the current health maintenance/preventive care services that you are due for (some may have been done at this visit.)  Health Maintenance Due   Topic Date Due     ADVANCE CARE PLANNING  1950     PHQ-2  01/01/2019     HPV  04/21/2019     PAP  04/21/2019     FALL RISK ASSESSMENT  06/21/2019     MEDICARE ANNUAL WELLNESS VISIT  06/21/2019         Suggested websites for health information:  Www.[place].org : Up to date and easily searchable information on multiple topics.  Www.medlineplus.gov : medication info, interactive tutorials, watch real surgeries online  Www.familydoctor.org : good info from the Academy of Family Physicians  Www.cdc.gov : public health info, travel advisories, epidemics (H1N1)  Www.aap.org : children's health info, normal development, vaccinations  Www.health.[facility].[place].us : [place] dept of health, public health issues in [place], N1N1    Your care team:                            Family Medicine Internal Medicine   MD Alfredo Morgan MD Shantel Branch-Fleming, MD Katya Georgiev PA-C Nam Ho, MD Pediatrics   MOSES Alberts, PHIL Burton APRMD Naila Webb CNP, MD Deborah Mielke, MD Kim Thein, APRN [facility]      Clinic hours: Monday - Thursday 7 am-7 pm; Fridays 7 am-5 pm.   Urgent care: Monday - Friday 11 am-9 pm; Saturday and Sunday 9 am-5 pm.  Pharmacy : Monday -Thursday 8 am-8 pm; Friday 8 am-6 pm; Saturday and Sunday 9 am-5 pm.     Clinic: [phone]   Pharmacy: [phone]      Patient Education     Controlling Your Cholesterol  Cholesterol is a waxy substance. It travels in your blood through the blood vessels. When you have high cholesterol, it can build up  "along the walls of the blood vessels. This makes the vessels narrower and decreases blood flow. You are then at greater risk of having a heart attack or a stroke.  Good and bad cholesterol  Lipids are fats, and blood is mostly water. Fat and water don't mix. So our bodies need lipoproteins (lipids inside a protein shell) to carry the lipids. The protein shell carries its lipids through the bloodstream. There are two main kinds of lipoproteins:    LDL (low-density lipoprotein) is known as \"bad cholesterol.\" It mainly carries cholesterol. It delivers this cholesterol to body cells. Excess LDL cholesterol will build up in artery walls. This increases your risk for heart disease and stroke.    HDL (high-density lipoprotein) is known as \"good cholesterol.\" This protein shell collects excess cholesterol that LDLs have left behind on blood vessel walls. That's why high levels of HDL cholesterol can decrease your risk of heart disease and stroke.  Controlling cholesterol levels  Total cholesterol includes LDL and HDL cholesterol, as well as other fats in the bloodstream. If your total cholesterol is high, follow the steps below to help lower your total cholesterol level:  Eat less unhealthy fat    Cut back on saturated fats and trans fats (also called hydrogenated) by selecting lean cuts of meat, low-fat dairy, and using oils instead of solid fats. Limit baked goods, processed meats, and fried foods. A diet that s high in these fats increases your bad cholesterol. It's not enough to just cut back on foods containing cholesterol.    Eat about 2 servings of fish per week. Most fish contain omega-3 fatty acids. These help lower blood cholesterol.    Eat more whole grains and soluble fiber (such as oat bran). These lower overall cholesterol.  Be active    Choose an activity you enjoy. Walking, swimming, and riding a bike are some good ways to be active.    Start at a level where you feel comfortable. Increase your time and pace " a little each week.    Work up to 30 to 40 minutes of moderate to high intensity physical activity at least 3 to 4 days per week.    Remember, some activity is better than none.    If you haven't been exercising regularly, start slowly. Check with your healthcare provider to make sure the exercise plan is right for you.  Quit smoking  Quitting smoking can improve your lipid levels. It also lowers your risk for heart disease and stroke.  Manage your weight  If you are overweight or obese, your healthcare provider will work with you to lose weight and lower your BMI (body mass index) to a normal or near-normal level. Making diet changes and increasing physical activity can help.  Take medicine as directed  Many people need medicine to get their LDL levels to a safe level. Medicine to lower cholesterol levels is effective and safe. Taking medicine is not a substitute for exercise or watching your diet! Your healthcare provider can tell you whether you might benefit from a cholesterol-lowering medicine.  Date Last Reviewed: 6/1/2017 2000-2018 [employer]. [address] [place] [address], [place] [address]. All rights reserved. This information is not intended as a substitute for professional medical care. Always follow your healthcare professional's instructions.

## 2019-07-05 NOTE — RESULT ENCOUNTER NOTE
Dear Noreen    Your test results are attached.     The cholesterol is higher off medication and your risk of heart disease in the next 10 years is 11 % without medication. We feel that this is too high and recommend restarting the pravastatin to lower your risk. I will send in the new prescription.    Please contact me by [place] if you have any questions about your labs or management.    Jia Castro MD    The 10-year ASCVD risk score (Veronakatrina TURK Jr., et al., 2013) is: 11.4%    Values used to calculate the score:      Age: 69 years      Sex: Female      Is Non- : No      Diabetic: No      Tobacco smoker: No      Systolic Blood Pressure: 128 mmHg      Is BP treated: Yes      HDL Cholesterol: 31 mg/dL      Total Cholesterol: 215 mg/dL

## 2019-07-05 NOTE — PROGRESS NOTES
Subjective     Noreen Florence is a 69 year old female who presents to clinic today for the following health issues:    HPI   Hyperlipidemia Follow-Up      Are you having any of the following symptoms? (Select all that apply)  No complaints of shortness of breath, chest pain or pressure.  No increased sweating or nausea with activity.  No left-sided neck or arm pain.  No complaints of pain in calves when walking 1-2 blocks.    Are you regularly taking any medication or supplement to lower your cholesterol?   No - told to stop Pravastatin x1 month to check lipids off medication and see if this affects muscle aches. No change in symptoms     Are you having muscle aches or other side effects that you think could be caused by your cholesterol lowering medication?  No      Hypertension Follow-up      Do you check your blood pressure regularly outside of the clinic? No     Are you following a low salt diet? No    Are your blood pressures ever more than 140 on the top number (systolic) OR more   than 90 on the bottom number (diastolic), for example 140/90? Yes       Amount of exercise or physical activity: 6-7 days/week every morning and PT for foot pain every week    Problems taking medications regularly: No    Medication side effects: none    Diet: regular (no restrictions)            Patient Active Problem List   Diagnosis     Osteopenia     Hayfever     Hypertension, goal below 150/90     Polyarthritis, inflammatory (H)     GERD (gastroesophageal reflux disease)     Hyperlipidemia with target LDL less than 130     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     High risk medication use     Microscopic hematuria     Pterygium of right eye     Sicca syndrome (H)     Angiomyolipoma of right kidney     History of Helicobacter pylori infection     Achilles tendon pain     Past Surgical History:   Procedure Laterality Date     COLONOSCOPY       COLONOSCOPY WITH CO2 INSUFFLATION N/A 4/5/2017    Procedure: COLONOSCOPY WITH CO2  INSUFFLATION;  Surgeon: Duane, William Charles, MD;  Location: [facility] OR     COMBINED ESOPHAGOSCOPY, GASTROSCOPY, DUODENOSCOPY (EGD) WITH CO2 INSUFFLATION N/A 2/13/2019    Procedure: COMBINED ESOPHAGOSCOPY, GASTROSCOPY, DUODENOSCOPY (EGD) WITH CO2 INSUFFLATION;  Surgeon: Sly De Santiago MD;  Location: [facility] OR     COMBINED REPAIR PTOSIS WITH BLEPHAROPLASTY BILATERAL Bilateral 1/6/2017    Procedure: COMBINED REPAIR PTOSIS WITH BLEPHAROPLASTY BILATERAL;  Surgeon: Carly Norman MD;  Location: [facility]     ESOPHAGOSCOPY, GASTROSCOPY, DUODENOSCOPY (EGD), COMBINED N/A 1/5/2016    Procedure: COMBINED ESOPHAGOSCOPY, GASTROSCOPY, DUODENOSCOPY (EGD), BIOPSY SINGLE OR MULTIPLE;  Surgeon: Duane, William Charles, MD;  Location: [facility]     ESOPHAGOSCOPY, GASTROSCOPY, DUODENOSCOPY (EGD), COMBINED N/A 2/13/2019    Procedure: Combined Esophagoscopy, Gastroscopy, Duodenoscopy (Egd), Biopsy Single Or Multiple;  Surgeon: Sly De Santiago MD;  Location: [facility]     REPAIR ENTROPION BILATERAL Bilateral 1/6/2017    Procedure: REPAIR ENTROPION BILATERAL;  Surgeon: Carly Norman MD;  Location: [facility]     REPAIR PTOSIS Bilateral 01/2017       Social History     Tobacco Use     Smoking status: Never Smoker     Smokeless tobacco: Never Used   Substance Use Topics     Alcohol use: No     Family History   Problem Relation Age of Onset     Diabetes Mother      Cancer No family hx of      Hypertension No family hx of      Cerebrovascular Disease No family hx of      Thyroid Disease No family hx of      Glaucoma No family hx of      Macular Degeneration No family hx of          Current Outpatient Medications   Medication Sig Dispense Refill     cetirizine (ZYRTEC) 10 MG tablet Take 1 tablet (10 mg) by mouth daily 90 tablet 3     chlorhexidine (PERIDEX) 0.12 % solution SWISH AND SPIT 15 ML BY MOUTH TWICE DAILY 473 mL 3     fluticasone (FLONASE) 50 MCG/ACT nasal spray SHAKE LIQUID AND USE 1 TO 2 SPRAYS IN EACH NOSTRIL DAILY 48 mL 3      hydrocortisone (ANUSOL-HC) 2.5 % cream Place rectally 2 times daily 30 g 3     latanoprost (XALATAN) 0.005 % ophthalmic solution Place 1 drop into both eyes At Bedtime 7.5 mL 4     leucovorin (WELLCOVORIN) 5 MG tablet TK 1 T PO 12 H AFTER METHOTREXATE ONE TIME EACH WEEK  3     losartan-hydrochlorothiazide (HYZAAR) 50-12.5 MG per tablet Take 1 tablet by mouth daily 90 tablet 3     meloxicam (MOBIC) 15 MG tablet TK 1 T PO  ONCE D WITH DINNER  3     methotrexate 2.5 MG tablet Take 6 tablets (15 mg) by mouth once a week 1 tablet 0     methylcellulose (CITRUCEL) powder Take 2 g by mouth daily 180 g 3     metoprolol tartrate (LOPRESSOR) 25 MG tablet Take 1 tablet (25 mg) by mouth At Bedtime 90 tablet 3     omeprazole (PRILOSEC) 40 MG DR capsule TAKE 1 CAPSULE(40 MG) BY MOUTH DAILY 30 TO 60 MINUTES BEFORE A MEAL AS NEEDED 90 capsule 3     SF 1.1 % GEL topical gel APPLY TO AFFECTED AREAS TWICE DAILY 392 g 3     Sodium Fluoride (CLINPRO 5000) 1.1 % PSTE Apply 1 Application to affected area 2 times daily 112 g 3     Sodium Fluoride 1.1 % PSTE Apply 1 Application to affected area 2 times daily 112 Bottle 3     triamcinolone (KENALOG) 0.1 % external cream APPLY TOPICALLY TWICE DAILY 80 g 3     pravastatin (PRAVACHOL) 20 MG tablet Take 1 tablet (20 mg) by mouth daily (Patient not taking: Reported on 7/5/2019) 90 tablet 3     No Known Allergies  Recent Labs   Lab Test 04/26/19  1407 06/21/18  0953 04/16/18  1610  06/21/17  0850 05/25/17  10/27/16  0939  03/03/16  0856   A1C 5.9* 5.9*  --   --   --   --   --  5.9  --   --    LDL  --  88  --   --  143*  --   --   --   --  130*   HDL  --  36*  --   --  53  --   --   --   --  41*   TRIG  --  250*  --   --  174*  --   --   --   --  184*   ALT 53*  --  31  --   --  33   < >  --    < >  --    CR 0.55 0.47* 0.43*   < >  --  0.66   < > 0.55   < >  --    GFRESTIMATED >90 >90 >90   < >  --  >60   < > >90  Non  GFR Calc     < >  --    GFRESTBLACK >90 >90 >90   < >  --   ">60   < > >90   GFR Calc     < >  --    POTASSIUM 3.5 3.9 3.6   < >  --   --    < > 3.7   < >  --    TSH 1.26  --   --   --   --   --   --   --   --   --     < > = values in this interval not displayed.      BP Readings from Last 3 Encounters:   07/05/19 128/87   05/14/19 133/88   04/26/19 120/80    Wt Readings from Last 3 Encounters:   07/05/19 54 kg (119 lb)   05/14/19 55.3 kg (122 lb)   04/26/19 56.2 kg (124 lb)                    Reviewed and updated as needed this visit by Provider         Review of Systems   ROS COMP: Constitutional, HEENT, cardiovascular, pulmonary, gi and gu systems are negative, except as otherwise noted.      Objective    /87 (BP Location: Right arm, Patient Position: Chair, Cuff Size: Adult Regular)   Pulse 73   Temp 98.1  F (36.7  C) (Oral)   Resp 18   Ht 1.6 m (5' 3\")   Wt 54 kg (119 lb)   LMP 06/21/2002 (Approximate)   SpO2 98%   BMI 21.08 kg/m    Body mass index is 21.08 kg/m .  Physical Exam   GENERAL APPEARANCE: healthy, alert and no distress  EYES: Eyes grossly normal to inspection, PERRL and conjunctivae and sclerae normal  HENT: ear canals and TM's normal, nose and mouth without ulcers or lesions, oropharynx clear and oral mucous membranes moist  NECK: no adenopathy, no asymmetry, masses, or scars and thyroid normal to palpation  RESP: lungs clear to auscultation - no rales, rhonchi or wheezes  CV: regular rates and rhythm, normal S1 S2, no S3 or S4, no murmur, click or rub, no peripheral edema and peripheral pulses strong  ABDOMEN: soft, nontender, no hepatosplenomegaly, no masses and bowel sounds normal  MS: no musculoskeletal defects are noted and gait is age appropriate without ataxia  SKIN: no suspicious lesions or rashes  NEURO: Normal strength and tone, sensory exam grossly normal, mentation intact and speech normal  PSYCH: mentation appears normal and affect normal/bright     Diagnostic Test Results:  Labs reviewed in Epic  Results for orders " placed or performed in visit on 05/24/19   *MA Screening Digital Bilateral    Narrative    SCREENING MAMMOGRAM, BILATERAL, DIGITAL w/CAD - 5/24/2019 1:32 PM    BREAST SYMPTOMS: No current breast complaints.     COMPARISON:  05/11/2018, 05/06/2017, 04/26/2016, 04/27/2015.    BREAST DENSITY: Scattered fibroglandular densities.    COMMENTS: No findings of suspicion for malignancy.       Impression    IMPRESSION: BI-RADS CATEGORY: 1 -  Negative    RECOMMENDED FOLLOW-UP: Annual Mammography.    Exam results letter mailed to patient.      ALLISON MACIAS MD           Assessment & Plan       ICD-10-CM    1. Hypertension, goal below 150/90 I10 Well controlled on medications    2. Hyperlipidemia with target LDL less than 130 E78.5 Lipid panel reflex to direct LDL Fasting- recheck off pravastatin.    3. Polyarthritis, inflammatory (H) M06.4 Managed by rheumatologist with labs done in May, normal.   4. Sicca syndrome (H) M35.00 Dry mouth and eyes. Stable           FUTURE LABS:       - Schedule fasting labs in a year, then see provider  FUTURE APPOINTMENTS:       - Follow-up visit in 6 months or sooner if any questions or concerns.   Regular exercise  See Patient Instructions    Return in about 6 months (around 1/5/2020) for medication follow up, BP Recheck, Routine Visit.    Jia Castro MD  [facility]

## 2019-07-15 ENCOUNTER — THERAPY VISIT (OUTPATIENT)
Dept: PHYSICAL THERAPY | Facility: CLINIC | Age: 69
End: 2019-07-15
Payer: MEDICARE

## 2019-07-15 DIAGNOSIS — M76.60 ACHILLES TENDON PAIN: ICD-10-CM

## 2019-07-15 PROCEDURE — 97110 THERAPEUTIC EXERCISES: CPT | Mod: GP | Performed by: PHYSICAL THERAPIST

## 2019-07-15 PROCEDURE — 97035 APP MDLTY 1+ULTRASOUND EA 15: CPT | Mod: GP | Performed by: PHYSICAL THERAPIST

## 2019-07-15 PROCEDURE — 97140 MANUAL THERAPY 1/> REGIONS: CPT | Mod: GP | Performed by: PHYSICAL THERAPIST

## 2019-07-15 NOTE — PROGRESS NOTES
SUBJECTIVE  Subjective changes as noted by pt:  Patient reports that she is feeling better. She has less pain when she walks faster versus slower. She will have some aching when first walking, but it gets better fairly quickly.        Current Pain level: 6/10   Changes in function:  Yes (See Goal flowsheet attached for changes in current functional level)     Adverse reaction to treatment or activity:  None    OBJECTIVE  Changes in objective findings:  Pain with repeated PF in toe raise on the L. Tenderness to mid L Achilles tendon, but less. Patient now able to perform correct eccentric toe raise without cues.         ASSESSMENT  Noreen continues to require intervention to meet STG and LTG's: PT  Patient's symptoms are resolving.  Patient is progressing as expected.  Response to therapy has shown an improvement in  pain level and function  Progress made towards STG/LTG?  Yes (See Goal flowsheet attached for updates on achievement of STG and LTG)    PLAN  Continue current treatment plan until patient demonstrates readiness to progress to higher level exercises.    PTA/ATC plan:  N/A    Please refer to the daily flowsheet for treatment today, total treatment time and time spent performing 1:1 timed codes.

## 2019-07-31 DIAGNOSIS — I10 HYPERTENSION, GOAL BELOW 150/90: ICD-10-CM

## 2019-07-31 NOTE — TELEPHONE ENCOUNTER
"Requested Prescriptions   Pending Prescriptions Disp Refills     losartan-hydrochlorothiazide (HYZAAR) 50-12.5 MG tablet [Pharmacy Med Name: LOSARTAN/HCTZ 50/12.5MG TABLETS]  Last Written Prescription Date:  06/21/18  Last Fill Quantity: 90,  # refills: 3   Last Office Visit with ANNE-MARIE, KUSH or [facility] prescribing provider:  07/05/19-Gloria   Future Office Visit:    Next 5 appointments (look out 90 days)    Oct 21, 2019  2:00 PM CDT  Return Visit with Shai Levin MD  [facility] ([facility]) [address] [address] [place] [id number] [phone]        90 tablet 0     Sig: TAKE 1 TABLET BY MOUTH DAILY       Angiotensin-II Receptors Passed - 7/31/2019  7:35 AM        Passed - Blood pressure under 140/90 in past 12 months     BP Readings from Last 3 Encounters:   07/05/19 128/87   05/14/19 133/88   04/26/19 120/80                 Passed - Recent (12 mo) or future (30 days) visit within the authorizing provider's specialty     Patient had office visit in the last 12 months or has a visit in the next 30 days with authorizing provider or within the authorizing provider's specialty.  See \"Patient Info\" tab in inbasket, or \"Choose Columns\" in Meds & Orders section of the refill encounter.              Passed - Medication is active on med list        Passed - Patient is age 18 or older        Passed - No active pregnancy on record        Passed - Normal serum creatinine on file in past 12 months     Recent Labs   Lab Test 04/26/19  1407   CR 0.55             Passed - Normal serum potassium on file in past 12 months     Recent Labs   Lab Test 04/26/19  1407   POTASSIUM 3.5                    Passed - No positive pregnancy test in past 12 months          "

## 2019-08-02 RX ORDER — LOSARTAN POTASSIUM AND HYDROCHLOROTHIAZIDE 12.5; 5 MG/1; MG/1
1 TABLET ORAL DAILY
Qty: 90 TABLET | Refills: 3 | Status: SHIPPED | OUTPATIENT
Start: 2019-08-02 | End: 2020-07-03

## 2019-08-02 NOTE — TELEPHONE ENCOUNTER
Prescription approved per [facility] Refill Protocol.      Deepali Yao RN, [facility]

## 2019-08-05 ENCOUNTER — THERAPY VISIT (OUTPATIENT)
Dept: PHYSICAL THERAPY | Facility: CLINIC | Age: 69
End: 2019-08-05
Payer: MEDICARE

## 2019-08-05 DIAGNOSIS — M76.60 ACHILLES TENDON PAIN: ICD-10-CM

## 2019-08-05 PROCEDURE — 97035 APP MDLTY 1+ULTRASOUND EA 15: CPT | Mod: GP | Performed by: PHYSICAL THERAPIST

## 2019-08-05 PROCEDURE — 97140 MANUAL THERAPY 1/> REGIONS: CPT | Mod: GP | Performed by: PHYSICAL THERAPIST

## 2019-08-05 PROCEDURE — 97110 THERAPEUTIC EXERCISES: CPT | Mod: GP | Performed by: PHYSICAL THERAPIST

## 2019-08-05 NOTE — PROGRESS NOTES
SUBJECTIVE  Subjective changes as noted by pt:  Patient reports that she has improved by about 50-60% since starting therapy. She has been doing her exercises about every other day.      Current Pain level: 5/10   Changes in function:  Yes (See Goal flowsheet attached for changes in current functional level)     Adverse reaction to treatment or activity:  None    OBJECTIVE  Changes in objective findings:  Insertional tenderness noted L Achilles tendon, which is decreasing. Pain onset with repeated PF on the L, but this has improved.          ASSESSMENT  Noreen continues to require intervention to meet STG and LTG's: PT  Patient's symptoms are resolving.  Patient is progressing as expected.  Response to therapy has shown an improvement in  pain level and function  Progress made towards STG/LTG?  Yes (See Goal flowsheet attached for updates on achievement of STG and LTG)    PLAN  Continue current treatment plan until patient demonstrates readiness to progress to higher level exercises.  Patient instructed to do her exercises twice a day for best results.     PTA/ATC plan:  N/A    Please refer to the daily flowsheet for treatment today, total treatment time and time spent performing 1:1 timed codes.

## 2019-08-27 ENCOUNTER — OFFICE VISIT (OUTPATIENT)
Dept: URGENT CARE | Facility: URGENT CARE | Age: 69
End: 2019-08-27
Payer: MEDICARE

## 2019-08-27 VITALS
BODY MASS INDEX: 20.55 KG/M2 | OXYGEN SATURATION: 97 % | DIASTOLIC BLOOD PRESSURE: 72 MMHG | TEMPERATURE: 98.1 F | WEIGHT: 116 LBS | RESPIRATION RATE: 20 BRPM | HEART RATE: 74 BPM | SYSTOLIC BLOOD PRESSURE: 132 MMHG

## 2019-08-27 DIAGNOSIS — K04.7 DENTAL INFECTION: Primary | ICD-10-CM

## 2019-08-27 PROCEDURE — 99213 OFFICE O/P EST LOW 20 MIN: CPT | Performed by: FAMILY MEDICINE

## 2019-08-27 RX ORDER — AMOXICILLIN 875 MG
875 TABLET ORAL 2 TIMES DAILY
Qty: 20 TABLET | Refills: 0 | Status: SHIPPED | OUTPATIENT
Start: 2019-08-27 | End: 2019-11-08

## 2019-08-27 ASSESSMENT — PAIN SCALES - GENERAL: PAINLEVEL: EXTREME PAIN (9)

## 2019-08-27 NOTE — PROGRESS NOTES
SUBJECTIVE:  Chief Complaint   Patient presents with     Dental Pain     x5 days      Noreen Florence is a 69 year old female with a chief complaint of dental pain .  Onset of symptoms was 5 day(s) ago.    She has attempted to arrange emergency dental care yes-  She was previously told she had problems with decay under a permanent bridge left lower from canine to molar.  She was told she would need implants to replace what she has, but she can not afford that-  She did not have arrangements to have the bridge removed.     Course of illness: sudden onset, still present and constant.    Severity:  Moderate.especially painful with hot and cold   Current and Associated symptoms:  None-  No other illness  Treatment measures tried include anesthetic gel, ibuprofen,  Sensitive teeth toothpaste     Past Medical History:   Diagnosis Date     GERD (gastroesophageal reflux disease) 3/20/2015     Hyperlipidemia LDL goal < 130 8/3/2015     Hypertension      Hypertension, goal below 150/90 11/28/2014     Rheumatoid arthritis, adult (H)      Patient Active Problem List   Diagnosis     Osteopenia     Hayfever     Hypertension, goal below 150/90     Polyarthritis, inflammatory (H)     GERD (gastroesophageal reflux disease)     Hyperlipidemia with target LDL less than 130     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     High risk medication use     Microscopic hematuria     Pterygium of right eye     Sicca syndrome (H)     Angiomyolipoma of right kidney     History of Helicobacter pylori infection     Achilles tendon pain       ALLERGIES:  Patient has no known allergies.        Current Outpatient Medications on File Prior to Visit:  cetirizine (ZYRTEC) 10 MG tablet Take 1 tablet (10 mg) by mouth daily   chlorhexidine (PERIDEX) 0.12 % solution SWISH AND SPIT 15 ML BY MOUTH TWICE DAILY   fluticasone (FLONASE) 50 MCG/ACT nasal spray SHAKE LIQUID AND USE 1 TO 2 SPRAYS IN EACH NOSTRIL DAILY   hydrocortisone (ANUSOL-HC) 2.5 % cream Place  rectally 2 times daily   latanoprost (XALATAN) 0.005 % ophthalmic solution Place 1 drop into both eyes At Bedtime   leucovorin (WELLCOVORIN) 5 MG tablet TK 1 T PO 12 H AFTER METHOTREXATE ONE TIME EACH WEEK   losartan-hydrochlorothiazide (HYZAAR) 50-12.5 MG tablet TAKE 1 TABLET BY MOUTH DAILY   meloxicam (MOBIC) 15 MG tablet TK 1 T PO  ONCE D WITH DINNER   methotrexate 2.5 MG tablet Take 6 tablets (15 mg) by mouth once a week   methylcellulose (CITRUCEL) powder Take 2 g by mouth daily   metoprolol tartrate (LOPRESSOR) 25 MG tablet Take 1 tablet (25 mg) by mouth At Bedtime   omeprazole (PRILOSEC) 40 MG DR capsule TAKE 1 CAPSULE(40 MG) BY MOUTH DAILY 30 TO 60 MINUTES BEFORE A MEAL AS NEEDED   pravastatin (PRAVACHOL) 20 MG tablet Take 1 tablet (20 mg) by mouth daily   SF 1.1 % GEL topical gel APPLY TO AFFECTED AREAS TWICE DAILY   Sodium Fluoride (CLINPRO 5000) 1.1 % PSTE Apply 1 Application to affected area 2 times daily   Sodium Fluoride 1.1 % PSTE Apply 1 Application to affected area 2 times daily   triamcinolone (KENALOG) 0.1 % external cream APPLY TOPICALLY TWICE DAILY     Current Facility-Administered Medications on File Prior to Visit:  gadobutrol (GADAVIST) injection 7.5 mL       Social History     Tobacco Use     Smoking status: Never Smoker     Smokeless tobacco: Never Used   Substance Use Topics     Alcohol use: No       Family History   Problem Relation Age of Onset     Diabetes Mother      Cancer No family hx of      Hypertension No family hx of      Cerebrovascular Disease No family hx of      Thyroid Disease No family hx of      Glaucoma No family hx of      Macular Degeneration No family hx of          ROS:  CONSTITUTIONAL:NEGATIVE for fever, chills,    INTEGUMENTARY/SKIN: NEGATIVE for worrisome rashes,  or lesions  EYES: NEGATIVE for vision changes or irritation  RESP:NEGATIVE for significant cough or SOB       OBJECTIVE:   /72   Pulse 74   Temp 98.1  F (36.7  C) (Oral)   Resp 20   Wt 52.6 kg  (116 lb)   LMP 06/21/2002 (Approximate)   SpO2 97%   BMI 20.55 kg/m    Patient indicates that the following teeth is causing the pain symptoms:  left   lower canine to 2nd molar - bridge in place  Caries not visible  Pain with percussion over the affected tooth - yes  Tenderness of the gingiva   adjacent to the affected tooth:     GENERAL APPEARANCE: alert, moderate distress and cooperative  EYES: EOMI,  PERRL, conjunctiva clear  HENT: ear canals and TM's normal.  Nose normal.  Pharynx erythematous with some exudate noted.  NECK: supple, non-tender to palpation, no adenopathy noted  RESP: lungs clear to auscultation - no rales, rhonchi or wheezes  CV: regular rates and rhythm, normal S1 S2, no murmur noted  SKIN: no suspicious lesions or rashes     ASSESSMENT:  Dental infection     - amoxicillin (AMOXIL) 875 MG tablet; Take 1 tablet (875 mg) by mouth 2 times daily     Symptomatic treatment    OTC analgesic (ibuprofen and or acetaminophen)  as needed.   Follow-up with primary dental  clinic for definitive therapy.

## 2019-08-27 NOTE — PATIENT INSTRUCTIONS
Patient Education     Understanding Tooth Decay  Plaque is a sticky coating of bacteria and other substances that forms on your teeth and gums. It can cause 2 serious problems: tooth decay and gum disease. These problems damage the teeth and gums. They may even lead to tooth loss. When the mouth is well cared for, tooth decay and gum disease can be reversed in their early stages. Better yet, you can prevent these problems from starting by:    Brushing and flossing daily    Not snacking between meals on foods high in sugar and starch     Once tooth decay eats through the enamel, it spreads quickly through the softer dentin.       After tooth decay is removed, the hole is filled with a hard material.      How tooth decay occurs  Tooth decay happens when bacteria in plaque make acids that eat away at the tooth. Cavities (also called caries) are holes that form in the teeth. They are most common in places that are hard to reach with a toothbrush. This includes the grooves at the tops of the back teeth, and on the sides where the teeth touch. In late stages, tooth decay can be painful. It can also lead to tooth loss.  Treating tooth decay  Tooth decay can be treated to keep it from moving farther into the tooth. This is often done by filling cavities. First any tooth decay is removed. This protects the tooth from more damage. Then the cavity is filled with a hard material. This filling protects the damaged tooth and restores the tooth's surface. If the tooth is severely damaged by decay, other treatments are available.  Follow-up visits  Visit your dental team at least every 6 months for a checkup and cleaning. If you re being treated for tooth decay or gum disease, you may need more frequent visits. These visits will likely decrease as your mouth care efforts start to pay off. Keep flossing and brushing, and maintain a healthy diet. Follow any special instructions your dentist or dental hygienist gives you. And enjoy  flashing your healthy smile!  Date Last Reviewed: 8/1/2017 2000-2018 [employer]. [address] [place] [address], [place] [address]. All rights reserved. This information is not intended as a substitute for professional medical care. Always follow your healthcare professional's instructions.

## 2019-09-10 PROBLEM — M76.60 ACHILLES TENDON PAIN: Status: RESOLVED | Noted: 2019-06-11 | Resolved: 2019-09-10

## 2019-09-10 NOTE — PROGRESS NOTES
Discharge Note    Progress reporting period is from initial evaluation date (please see noted date below) to Aug 5, 2019.  No linked episodes      Noreen failed to follow up and current status is unknown.  Please see information below for last relevant information on current status.  Patient seen for 4 visits.    SUBJECTIVE  Subjective changes noted by patient:     .  Current pain level is 5/10.     Previous pain level was   .   Changes in function:  Yes (See Goal flowsheet attached for changes in current functional level)  Adverse reaction to treatment or activity: None    OBJECTIVE  Changes noted in objective findings:       ASSESSMENT/PLAN  Diagnosis: L Achilles tendonitis   Updated problem list and treatment plan:   Pain - HEP  Decreased ROM/flexibility - HEP  Decreased function - HEP  Impaired muscle performance - HEP  Decreased joint mobility - HEP  STG/LTGs have been met or progress has been made towards goals:  Yes, please see goal flowsheet for most current information  Assessment of Progress: current status is unknown.    Last current status:     Self Management Plans:  HEP  I have re-evaluated this patient and find that the nature, scope, duration and intensity of the therapy is appropriate for the medical condition of the patient.  Noreen continues to require the following intervention to meet STG and LTG's:  HEP.    Recommendations:  Discharge with current home program.  Patient to follow up with MD as needed.    Please refer to the daily flowsheet for treatment today, total treatment time and time spent performing 1:1 timed codes.

## 2019-09-24 ENCOUNTER — TRANSFERRED RECORDS (OUTPATIENT)
Dept: HEALTH INFORMATION MANAGEMENT | Facility: CLINIC | Age: 69
End: 2019-09-24

## 2019-10-04 ENCOUNTER — OFFICE VISIT (OUTPATIENT)
Dept: FAMILY MEDICINE | Facility: CLINIC | Age: 69
End: 2019-10-04
Payer: MEDICARE

## 2019-10-04 VITALS
DIASTOLIC BLOOD PRESSURE: 64 MMHG | HEIGHT: 63 IN | TEMPERATURE: 97.4 F | BODY MASS INDEX: 20.55 KG/M2 | SYSTOLIC BLOOD PRESSURE: 132 MMHG | RESPIRATION RATE: 18 BRPM | OXYGEN SATURATION: 98 % | WEIGHT: 116 LBS | HEART RATE: 67 BPM

## 2019-10-04 DIAGNOSIS — R10.13 ABDOMINAL PAIN, EPIGASTRIC: Primary | ICD-10-CM

## 2019-10-04 DIAGNOSIS — Z23 ENCOUNTER FOR IMMUNIZATION: ICD-10-CM

## 2019-10-04 LAB
ALBUMIN SERPL-MCNC: 3.8 G/DL (ref 3.4–5)
ALP SERPL-CCNC: 77 U/L (ref 40–150)
ALT SERPL W P-5'-P-CCNC: 50 U/L (ref 0–50)
ANION GAP SERPL CALCULATED.3IONS-SCNC: 9 MMOL/L (ref 3–14)
AST SERPL W P-5'-P-CCNC: 35 U/L (ref 0–45)
BASOPHILS # BLD AUTO: 0 10E9/L (ref 0–0.2)
BASOPHILS NFR BLD AUTO: 0.4 %
BILIRUB SERPL-MCNC: 0.4 MG/DL (ref 0.2–1.3)
BUN SERPL-MCNC: 14 MG/DL (ref 7–30)
CALCIUM SERPL-MCNC: 10.2 MG/DL (ref 8.5–10.1)
CHLORIDE SERPL-SCNC: 103 MMOL/L (ref 94–109)
CO2 SERPL-SCNC: 28 MMOL/L (ref 20–32)
CREAT SERPL-MCNC: 0.51 MG/DL (ref 0.52–1.04)
DIFFERENTIAL METHOD BLD: NORMAL
EOSINOPHIL # BLD AUTO: 0.2 10E9/L (ref 0–0.7)
EOSINOPHIL NFR BLD AUTO: 2.7 %
ERYTHROCYTE [DISTWIDTH] IN BLOOD BY AUTOMATED COUNT: 14.3 % (ref 10–15)
GFR SERPL CREATININE-BSD FRML MDRD: >90 ML/MIN/{1.73_M2}
GLUCOSE SERPL-MCNC: 131 MG/DL (ref 70–99)
HCT VFR BLD AUTO: 44.9 % (ref 35–47)
HGB BLD-MCNC: 14.9 G/DL (ref 11.7–15.7)
LIPASE SERPL-CCNC: 247 U/L (ref 73–393)
LYMPHOCYTES # BLD AUTO: 2.1 10E9/L (ref 0.8–5.3)
LYMPHOCYTES NFR BLD AUTO: 30.7 %
MCH RBC QN AUTO: 29.2 PG (ref 26.5–33)
MCHC RBC AUTO-ENTMCNC: 33.2 G/DL (ref 31.5–36.5)
MCV RBC AUTO: 88 FL (ref 78–100)
MONOCYTES # BLD AUTO: 0.6 10E9/L (ref 0–1.3)
MONOCYTES NFR BLD AUTO: 8.8 %
NEUTROPHILS # BLD AUTO: 4 10E9/L (ref 1.6–8.3)
NEUTROPHILS NFR BLD AUTO: 57.4 %
PLATELET # BLD AUTO: 236 10E9/L (ref 150–450)
POTASSIUM SERPL-SCNC: 3.6 MMOL/L (ref 3.4–5.3)
PROT SERPL-MCNC: 7.3 G/DL (ref 6.8–8.8)
RBC # BLD AUTO: 5.11 10E12/L (ref 3.8–5.2)
SODIUM SERPL-SCNC: 140 MMOL/L (ref 133–144)
WBC # BLD AUTO: 7 10E9/L (ref 4–11)

## 2019-10-04 PROCEDURE — 90662 IIV NO PRSV INCREASED AG IM: CPT | Performed by: FAMILY MEDICINE

## 2019-10-04 PROCEDURE — G0009 ADMIN PNEUMOCOCCAL VACCINE: HCPCS | Performed by: FAMILY MEDICINE

## 2019-10-04 PROCEDURE — G0008 ADMIN INFLUENZA VIRUS VAC: HCPCS | Performed by: FAMILY MEDICINE

## 2019-10-04 PROCEDURE — 80053 COMPREHEN METABOLIC PANEL: CPT | Performed by: FAMILY MEDICINE

## 2019-10-04 PROCEDURE — 83690 ASSAY OF LIPASE: CPT | Performed by: FAMILY MEDICINE

## 2019-10-04 PROCEDURE — 90732 PPSV23 VACC 2 YRS+ SUBQ/IM: CPT | Performed by: FAMILY MEDICINE

## 2019-10-04 PROCEDURE — 36415 COLL VENOUS BLD VENIPUNCTURE: CPT | Performed by: FAMILY MEDICINE

## 2019-10-04 PROCEDURE — 99214 OFFICE O/P EST MOD 30 MIN: CPT | Mod: 25 | Performed by: FAMILY MEDICINE

## 2019-10-04 PROCEDURE — 85025 COMPLETE CBC W/AUTO DIFF WBC: CPT | Performed by: FAMILY MEDICINE

## 2019-10-04 RX ORDER — FAMOTIDINE 20 MG/1
20 TABLET, FILM COATED ORAL 2 TIMES DAILY
Qty: 180 TABLET | Refills: 3 | Status: SHIPPED | OUTPATIENT
Start: 2019-10-04 | End: 2019-11-08 | Stop reason: ALTCHOICE

## 2019-10-04 RX ORDER — SUCRALFATE 1 G/1
1 TABLET ORAL 4 TIMES DAILY
Qty: 120 TABLET | Refills: 3 | Status: SHIPPED | OUTPATIENT
Start: 2019-10-04 | End: 2019-11-08 | Stop reason: ALTCHOICE

## 2019-10-04 ASSESSMENT — PAIN SCALES - GENERAL: PAINLEVEL: WORST PAIN (10)

## 2019-10-04 ASSESSMENT — MIFFLIN-ST. JEOR: SCORE: 1030.3

## 2019-10-04 NOTE — PATIENT INSTRUCTIONS
At [facility], we strive to deliver an exceptional experience to you, every time we see you.  If you receive a survey in the mail, please send us back your thoughts. We really do value your feedback.    Based on your medical history, these are the current health maintenance/preventive care services that you are due for (some may have been done at this visit.)  Health Maintenance Due   Topic Date Due     ADVANCE CARE PLANNING  1950     PNEUMOCOCCAL IMMUNIZATION 65+ LOW/MEDIUM RISK (2 of 2 - PPSV23) 06/19/2018     PHQ-2  01/01/2019     MEDICARE ANNUAL WELLNESS VISIT  06/21/2019     FALL RISK ASSESSMENT  06/21/2019     INFLUENZA VACCINE (1) 09/01/2019     EYE EXAM  10/22/2019         Suggested websites for health information:  Www.[employer].Talentwise : Up to date and easily searchable information on multiple topics.  Www.[employer].gov : medication info, interactive tutorials, watch real surgeries online  Www.familydoctor.org : good info from the Academy of Family Physicians  Www.cdc.gov : public health info, travel advisories, epidemics (H1N1)  Www.aap.org : children's health info, normal development, vaccinations  Www.health.[facility].[place].us : [place] dept of health, public health issues in [place], N1N1    Your care team:                            Family Medicine Internal Medicine   MD Alfredo Morgan MD Shantel Branch-Fleming, MD Katya Georgiev PA-C Nam Ho, MD Pediatrics   MOSES Alberts, MD Naila Thompson CNP, MD Deborah Mielke, MD Kim Thein, APRN CNP      Clinic hours: Monday - Thursday 7 am-7 pm; Fridays 7 am-5 pm.   Urgent care: Monday - Friday 11 am-9 pm; Saturday and Sunday 9 am-5 pm.  Pharmacy : Monday -Thursday 8 am-8 pm; Friday 8 am-6 pm; Saturday and Sunday 9 am-5 pm.     Clinic: [phone]   Pharmacy: [phone]

## 2019-10-04 NOTE — PROGRESS NOTES
Subjective     Noreen Florence is a 69 year old female who presents to clinic today for the following health issues:    HPI   ABDOMINAL   PAIN     Onset: Last night 10/3/19 about 10pm    Description:   Character: Sharp  Location: epigastric region  Radiation: None    Intensity: severe    Progression of Symptoms:  worsening    Accompanying Signs & Symptoms:  Fever/Chills?: no  Gas/Bloating: YES- bloating  Nausea: no   Vomitting: no   Diarrhea?: no   Constipation:YES  Dysuria or Hematuria: no    History:   Trauma: no   Previous similar pain: YES   Previous tests done: none    Precipitating factors:   Does the pain change with:     Food: no      BM: no     Urination: no     Alleviating factors:  none    Therapies Tried and outcome: Tums helped before but not currently, Omeprazole helped for a long time but didn't help recently    LMP:  not applicable     Patient stated she has been taking ibuprofen 600 mg tabs and amoxicillin that her dentist gave her.    Patient Active Problem List   Diagnosis     Osteopenia     Hayfever     Hypertension, goal below 150/90     Polyarthritis, inflammatory (H)     GERD (gastroesophageal reflux disease)     Hyperlipidemia with target LDL less than 130     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     High risk medication use     Microscopic hematuria     Pterygium of right eye     Sicca syndrome (H)     Angiomyolipoma of right kidney     History of Helicobacter pylori infection     Past Surgical History:   Procedure Laterality Date     COLONOSCOPY       COLONOSCOPY WITH CO2 INSUFFLATION N/A 4/5/2017    Procedure: COLONOSCOPY WITH CO2 INSUFFLATION;  Surgeon: Duane, William Charles, MD;  Location:  OR     COMBINED ESOPHAGOSCOPY, GASTROSCOPY, DUODENOSCOPY (EGD) WITH CO2 INSUFFLATION N/A 2/13/2019    Procedure: COMBINED ESOPHAGOSCOPY, GASTROSCOPY, DUODENOSCOPY (EGD) WITH CO2 INSUFFLATION;  Surgeon: Sly De Santiago MD;  Location:  OR     COMBINED REPAIR PTOSIS WITH  "BLEPHAROPLASTY BILATERAL Bilateral 1/6/2017    Procedure: COMBINED REPAIR PTOSIS WITH BLEPHAROPLASTY BILATERAL;  Surgeon: Carly Norman MD;  Location: [facility]     ESOPHAGOSCOPY, GASTROSCOPY, DUODENOSCOPY (EGD), COMBINED N/A 1/5/2016    Procedure: COMBINED ESOPHAGOSCOPY, GASTROSCOPY, DUODENOSCOPY (EGD), BIOPSY SINGLE OR MULTIPLE;  Surgeon: Duane, William Charles, MD;  Location: [facility] OR     ESOPHAGOSCOPY, GASTROSCOPY, DUODENOSCOPY (EGD), COMBINED N/A 2/13/2019    Procedure: Combined Esophagoscopy, Gastroscopy, Duodenoscopy (Egd), Biopsy Single Or Multiple;  Surgeon: Sly De Santiago MD;  Location: [facility] OR     REPAIR ENTROPION BILATERAL Bilateral 1/6/2017    Procedure: REPAIR ENTROPION BILATERAL;  Surgeon: Carly Norman MD;  Location: [facility]     REPAIR PTOSIS Bilateral 01/2017       Social History     Tobacco Use     Smoking status: Never Smoker     Smokeless tobacco: Never Used   Substance Use Topics     Alcohol use: No     Family History   Problem Relation Age of Onset     Diabetes Mother      Cancer No family hx of      Hypertension No family hx of      Cerebrovascular Disease No family hx of      Thyroid Disease No family hx of      Glaucoma No family hx of      Macular Degeneration No family hx of            Reviewed and updated as needed this visit by Provider         Review of Systems   ROS COMP: Constitutional, HEENT, cardiovascular, pulmonary, GI, , musculoskeletal, neuro, skin, endocrine and psych systems are negative, except as otherwise noted.      Objective    /64 (BP Location: Left arm, Patient Position: Chair, Cuff Size: Adult Regular)   Pulse 67   Temp 97.4  F (36.3  C) (Oral)   Resp 18   Ht 1.6 m (5' 3\")   Wt 52.6 kg (116 lb)   LMP 06/21/2002 (Approximate)   SpO2 98%   BMI 20.55 kg/m    Body mass index is 20.55 kg/m .  Physical Exam   GENERAL: healthy, alert and no distress  NECK: no adenopathy, no asymmetry, masses, or scars and thyroid normal to palpation  RESP: lungs " clear to auscultation - no rales, rhonchi or wheezes  CV: regular rate and rhythm, normal S1 S2, no S3 or S4, no murmur, click or rub, no peripheral edema and peripheral pulses strong  ABDOMEN: soft, epigastric tenderness, no r/r/g, no hepatosplenomegaly, no masses and bowel sounds normal  MS: no gross musculoskeletal defects noted, no edema    Diagnostic Test Results:  Labs reviewed in Epic        Assessment & Plan     1. Abdominal pain, epigastric  Likely gastritis induced by recent ibuprofen usage. Continue with omeprazole. Added famotidine and sucralfate short term to help. May use tylenol 3 as needed. R/o pancreatitis, hepatitis, bleeding ulcers.  - famotidine (PEPCID) 20 MG tablet; Take 1 tablet (20 mg) by mouth 2 times daily  Dispense: 180 tablet; Refill: 3  - sucralfate (CARAFATE) 1 GM tablet; Take 1 tablet (1 g) by mouth 4 times daily  Dispense: 120 tablet; Refill: 3  - acetaminophen-codeine (TYLENOL #3) 300-30 MG tablet; Take 1-2 tablets by mouth every 6 hours as needed for severe pain  Dispense: 20 tablet; Refill: 0  - Lipase  - Comprehensive metabolic panel (BMP + Alb, Alk Phos, ALT, AST, Total. Bili, TP)  - CBC with platelets differential    2. Encounter for immunization    - Pneumococcal vaccine 23 valent PPSV23  (Pneumovax) [43104]  - ADMIN MEDICARE: Pneumococcal Vaccine ()  - INFLUENZA (HIGH DOSE) 3 VALENT VACCINE [06436]  - ADMIN INFLUENZA  (For MEDICARE Patients ONLY) []       Regular exercise  See Patient Instructions    Return in about 4 weeks (around 11/1/2019) for as needed.    Binh Barnes MD, MD  [facility]

## 2019-10-21 ENCOUNTER — OFFICE VISIT (OUTPATIENT)
Dept: OPHTHALMOLOGY | Facility: CLINIC | Age: 69
End: 2019-10-21
Payer: MEDICARE

## 2019-10-21 DIAGNOSIS — H40.003 GLAUCOMA SUSPECT OF BOTH EYES: Primary | ICD-10-CM

## 2019-10-21 PROCEDURE — 92133 CPTRZD OPH DX IMG PST SGM ON: CPT | Performed by: OPHTHALMOLOGY

## 2019-10-21 PROCEDURE — 92014 COMPRE OPH EXAM EST PT 1/>: CPT | Performed by: OPHTHALMOLOGY

## 2019-10-21 ASSESSMENT — TONOMETRY
OD_IOP_MMHG: 14
OD_IOP_MMHG: 10
IOP_METHOD: TONOPEN
OS_IOP_MMHG: 13
OS_IOP_MMHG: 14

## 2019-10-21 ASSESSMENT — VISUAL ACUITY
OS_SC+: -3
METHOD: SNELLEN - LINEAR
OS_SC: 20/20
OD_SC: 20/20

## 2019-10-21 ASSESSMENT — REFRACTION_MANIFEST
OD_SPHERE: PLANO
OS_AXIS: 040
OS_SPHERE: PLANO
OD_CYLINDER: SPHERE
OS_CYLINDER: +0.75
OD_ADD: +2.50
OS_ADD: +2.50

## 2019-10-21 ASSESSMENT — CONF VISUAL FIELD
OS_NORMAL: 1
OD_NORMAL: 1
METHOD: COUNTING FINGERS

## 2019-10-21 ASSESSMENT — CUP TO DISC RATIO
OD_RATIO: 0.6
OS_RATIO: 0.6

## 2019-10-21 ASSESSMENT — EXTERNAL EXAM - LEFT EYE: OS_EXAM: NORMAL

## 2019-10-21 ASSESSMENT — EXTERNAL EXAM - RIGHT EYE: OD_EXAM: NORMAL

## 2019-10-21 NOTE — NURSING NOTE
Chief Complaints and History of Present Illnesses   Patient presents with     Follow Up      Chief Complaint(s) and History of Present Illness(es)     Follow Up     Laterality: both eyes    Quality: blurred vision              Comments     Follow up of glaucoma suspect  Blurred vision with current glasses  Patient requesting refraction for new glasses today  No other eye concerns  Eye meds: TID systane each eye, latanaprost at bedtime @10-11pm last night  EZIO Brown 10/21/2019 2:35 PM

## 2019-11-08 ENCOUNTER — OFFICE VISIT (OUTPATIENT)
Dept: FAMILY MEDICINE | Facility: CLINIC | Age: 69
End: 2019-11-08
Payer: MEDICARE

## 2019-11-08 VITALS
DIASTOLIC BLOOD PRESSURE: 77 MMHG | TEMPERATURE: 98.1 F | WEIGHT: 115 LBS | RESPIRATION RATE: 16 BRPM | HEIGHT: 63 IN | HEART RATE: 70 BPM | OXYGEN SATURATION: 97 % | SYSTOLIC BLOOD PRESSURE: 125 MMHG | BODY MASS INDEX: 20.38 KG/M2

## 2019-11-08 DIAGNOSIS — K21.00 GASTROESOPHAGEAL REFLUX DISEASE WITH ESOPHAGITIS: Primary | ICD-10-CM

## 2019-11-08 DIAGNOSIS — E78.5 HYPERLIPIDEMIA WITH TARGET LDL LESS THAN 130: ICD-10-CM

## 2019-11-08 DIAGNOSIS — M06.4 POLYARTHRITIS, INFLAMMATORY (H): ICD-10-CM

## 2019-11-08 DIAGNOSIS — M35.00 SICCA SYNDROME (H): ICD-10-CM

## 2019-11-08 DIAGNOSIS — R73.03 PRE-DIABETES: ICD-10-CM

## 2019-11-08 DIAGNOSIS — I10 HYPERTENSION, GOAL BELOW 150/90: ICD-10-CM

## 2019-11-08 DIAGNOSIS — K05.10 GINGIVITIS: ICD-10-CM

## 2019-11-08 DIAGNOSIS — E55.9 VITAMIN D DEFICIENCY: ICD-10-CM

## 2019-11-08 PROCEDURE — 99214 OFFICE O/P EST MOD 30 MIN: CPT | Performed by: FAMILY MEDICINE

## 2019-11-08 RX ORDER — CHLORHEXIDINE GLUCONATE ORAL RINSE 1.2 MG/ML
SOLUTION DENTAL
Qty: 473 ML | Refills: 3 | Status: SHIPPED | OUTPATIENT
Start: 2019-11-08 | End: 2020-01-08

## 2019-11-08 RX ORDER — FAMOTIDINE 20 MG
1000 TABLET ORAL DAILY
Qty: 100 CAPSULE | Refills: 3 | Status: SHIPPED | OUTPATIENT
Start: 2019-11-08 | End: 2020-10-08

## 2019-11-08 ASSESSMENT — MIFFLIN-ST. JEOR: SCORE: 1025.77

## 2019-11-08 ASSESSMENT — PAIN SCALES - GENERAL: PAINLEVEL: NO PAIN (0)

## 2019-11-08 NOTE — PROGRESS NOTES
Subjective     Noreen Florence is a 69 year old female who presents to clinic today for the following health issues:    HPI   GERD/Heartburn  Onset: long time and intermittent symptoms. Had a more severe episode recently. Taking 2 Tums seemed to help the most. Taking omeprazole 20 mg every other day. Did not start famotidine and Carafate did not help. Has had a normal upper endoscopy earlier this year.     Description:     Burning in chest: no     Intensity: moderate    Progression of Symptoms: intermittent    Accompanying Signs & Symptoms:  Does it feel like food gets stuck: no   Nausea: no   Vomiting (bloody?): no   Abdominal Pain: YES- epigastric region  Black-Tarry stools: no :  Bloody stools: no     History:   Previous ulcers: no     Precipitating factors:   Caffeine use: no   Alcohol use: no   NSAID/Aspirin use: no   Tobacco use: no   Worse with no particular food or drink.    Alleviating factors:  Tums    Therapies Tried and outcome:Tums: effective    Hyperlipidemia Follow-Up      Are you having any of the following symptoms? (Select all that apply)  No complaints of shortness of breath, chest pain or pressure.  No increased sweating or nausea with activity.  No left-sided neck or arm pain.  No complaints of pain in calves when walking 1-2 blocks.    Are you regularly taking any medication or supplement to lower your cholesterol?   Yes- pravastatin    Are you having muscle aches or other side effects that you think could be caused by your cholesterol lowering medication?  No    Hypertension Follow-up      Do you check your blood pressure regularly outside of the clinic? Yes     Are you following a low salt diet? Yes    Are your blood pressures ever more than 140 on the top number (systolic) OR more   than 90 on the bottom number (diastolic), for example 140/90? No    Chronic Pain Follow-Up       Type / Location of Pain: multiple joints treated for inflammatory arthritis with methotrexate.   Analgesia/pain control:        Recent changes:  same      Overall control: Tolerable with discomfort  Activity level/function:      Daily activities:  Able to do light housework, cooking    Work:  Able to work part time with limitations  Adverse effects:  No  Adherance    Taking medication as directed?  Yes    Participating in other treatments: yes  Risk Factors:    Sleep:  Fair    Mood/anxiety:  controlled    Recent family or social stressors:  none noted    Other aggravating factors: none  No flowsheet data found.  No flowsheet data found.  Encounter-Level CSA:    There are no encounter-level csa.     Patient-Level CSA:    There are no patient-level csa.         Patient Active Problem List   Diagnosis     Osteopenia     Hayfever     Hypertension, goal below 150/90     Polyarthritis, inflammatory (H)     GERD (gastroesophageal reflux disease)     Hyperlipidemia with target LDL less than 130     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     High risk medication use     Microscopic hematuria     Pterygium of right eye     Sicca syndrome (H)     Angiomyolipoma of right kidney     History of Helicobacter pylori infection     Pre-diabetes     Past Surgical History:   Procedure Laterality Date     COLONOSCOPY       COLONOSCOPY WITH CO2 INSUFFLATION N/A 4/5/2017    Procedure: COLONOSCOPY WITH CO2 INSUFFLATION;  Surgeon: Duane, William Charles, MD;  Location:  OR     COMBINED ESOPHAGOSCOPY, GASTROSCOPY, DUODENOSCOPY (EGD) WITH CO2 INSUFFLATION N/A 2/13/2019    Procedure: COMBINED ESOPHAGOSCOPY, GASTROSCOPY, DUODENOSCOPY (EGD) WITH CO2 INSUFFLATION;  Surgeon: Sly De Santiago MD;  Location:  OR     COMBINED REPAIR PTOSIS WITH BLEPHAROPLASTY BILATERAL Bilateral 1/6/2017    Procedure: COMBINED REPAIR PTOSIS WITH BLEPHAROPLASTY BILATERAL;  Surgeon: Carly Norman MD;  [facility]: [facility]     ESOPHAGOSCOPY, GASTROSCOPY, DUODENOSCOPY (EGD), COMBINED N/A 1/5/2016    Procedure: COMBINED ESOPHAGOSCOPY, GASTROSCOPY, DUODENOSCOPY (EGD),  BIOPSY SINGLE OR MULTIPLE;  Surgeon: Duane, William Charles, MD;  Location: [facility] OR     ESOPHAGOSCOPY, GASTROSCOPY, DUODENOSCOPY (EGD), COMBINED N/A 2/13/2019    Procedure: Combined Esophagoscopy, Gastroscopy, Duodenoscopy (Egd), Biopsy Single Or Multiple;  Surgeon: Sly De Santiago MD;  Location: [facility] OR     REPAIR ENTROPION BILATERAL Bilateral 1/6/2017    Procedure: REPAIR ENTROPION BILATERAL;  Surgeon: Carly Norman MD;  Location: [facility]     REPAIR PTOSIS Bilateral 01/2017       Social History     Tobacco Use     Smoking status: Never Smoker     Smokeless tobacco: Never Used   Substance Use Topics     Alcohol use: No     Family History   Problem Relation Age of Onset     Diabetes Mother      Cancer No family hx of      Hypertension No family hx of      Cerebrovascular Disease No family hx of      Thyroid Disease No family hx of      Glaucoma No family hx of      Macular Degeneration No family hx of          Current Outpatient Medications   Medication Sig Dispense Refill     cetirizine (ZYRTEC) 10 MG tablet Take 1 tablet (10 mg) by mouth daily 90 tablet 3     chlorhexidine (PERIDEX) 0.12 % solution SWISH AND SPIT 15 ML BY MOUTH TWICE DAILY 473 mL 3     fluticasone (FLONASE) 50 MCG/ACT nasal spray SHAKE LIQUID AND USE 1 TO 2 SPRAYS IN EACH NOSTRIL DAILY 48 mL 3     hydrocortisone (ANUSOL-HC) 2.5 % cream Place rectally 2 times daily 30 g 3     latanoprost (XALATAN) 0.005 % ophthalmic solution Place 1 drop into both eyes At Bedtime 7.5 mL 4     leucovorin (WELLCOVORIN) 5 MG tablet TK 1 T PO 12 H AFTER METHOTREXATE ONE TIME EACH WEEK  3     losartan-hydrochlorothiazide (HYZAAR) 50-12.5 MG tablet TAKE 1 TABLET BY MOUTH DAILY 90 tablet 3     methotrexate 2.5 MG tablet Take 6 tablets (15 mg) by mouth once a week 1 tablet 0     methylcellulose (CITRUCEL) powder Take 2 g by mouth daily 180 g 3     metoprolol tartrate (LOPRESSOR) 25 MG tablet Take 1 tablet (25 mg) by mouth At Bedtime 90 tablet 3     omeprazole  (PRILOSEC) 40 MG DR capsule TAKE 1 CAPSULE(40 MG) BY MOUTH DAILY 30 TO 60 MINUTES BEFORE A MEAL AS NEEDED 90 capsule 3     pravastatin (PRAVACHOL) 20 MG tablet Take 1 tablet (20 mg) by mouth daily 90 tablet 3     SF 1.1 % GEL topical gel APPLY TO AFFECTED AREAS TWICE DAILY 392 g 3     Sodium Fluoride (CLINPRO 5000) 1.1 % PSTE Apply 1 Application to affected area 2 times daily 112 g 3     Sodium Fluoride 1.1 % PSTE Apply 1 Application to affected area 2 times daily 112 Bottle 3     triamcinolone (KENALOG) 0.1 % external cream APPLY TOPICALLY TWICE DAILY 80 g 3     Vitamin D, Cholecalciferol, 25 MCG (1000 UT) CAPS Take 1,000 Units by mouth daily 100 capsule 3     No Known Allergies  Recent Labs   Lab Test 10/04/19  0814 07/05/19  0927 04/26/19  1407 06/21/18  0953 04/16/18  1610  06/21/17  0850  10/27/16  0939   A1C  --   --  5.9* 5.9*  --   --   --   --  5.9   LDL  --  Cannot estimate LDL when triglyceride exceeds 400 mg/dL  149*  --  88  --   --  143*  --   --    HDL  --  31*  --  36*  --   --  53  --   --    TRIG  --  407*  --  250*  --   --  174*  --   --    ALT 50  --  53*  --  31  --   --    < >  --    CR 0.51*  --  0.55 0.47* 0.43*   < >  --    < > 0.55   GFRESTIMATED >90  --  >90 >90 >90   < >  --    < > >90  Non  GFR Calc     GFRESTBLACK >90  --  >90 >90 >90   < >  --    < > >90   GFR Calc     POTASSIUM 3.6  --  3.5 3.9 3.6   < >  --    < > 3.7   TSH  --   --  1.26  --   --   --   --   --   --     < > = values in this interval not displayed.      BP Readings from Last 3 Encounters:   11/08/19 125/77   10/04/19 132/64   08/27/19 132/72    Wt Readings from Last 3 Encounters:   11/08/19 52.2 kg (115 lb)   10/04/19 52.6 kg (116 lb)   08/27/19 52.6 kg (116 lb)                      Reviewed and updated as needed this visit by Provider         Review of Systems   ROS COMP: Constitutional, HEENT, cardiovascular, pulmonary, gi and gu systems are negative, except as otherwise noted.     "  Objective    /77 (BP Location: Right arm, Patient Position: Chair, Cuff Size: Adult Regular)   Pulse 70   Temp 98.1  F (36.7  C) (Oral)   Resp 16   Ht 1.6 m (5' 3\")   Wt 52.2 kg (115 lb)   LMP 06/21/2002 (Approximate)   SpO2 97%   Breastfeeding? No   BMI 20.37 kg/m    Body mass index is 20.37 kg/m .  Physical Exam   GENERAL APPEARANCE: healthy, alert and no distress  EYES: Eyes grossly normal to inspection, PERRL and conjunctivae and sclerae normal  HENT: ear canals and TM's normal, nose and mouth without ulcers or lesions, oropharynx clear and oral mucous membranes moist  NECK: no adenopathy, no asymmetry, masses, or scars and thyroid normal to palpation  RESP: lungs clear to auscultation - no rales, rhonchi or wheezes  CV: regular rates and rhythm, normal S1 S2, no S3 or S4, no murmur, click or rub, no peripheral edema and peripheral pulses strong  ABDOMEN: soft, nontender, no hepatosplenomegaly, no masses and bowel sounds normal  MS: no musculoskeletal defects are noted and gait is age appropriate without ataxia  SKIN: no suspicious lesions or rashes  NEURO: Normal strength and tone, sensory exam grossly normal, mentation intact and speech normal  PSYCH: mentation appears normal and affect normal/bright     Diagnostic Test Results:  Labs reviewed in Epic  No results found for this or any previous visit (from the past 24 hour(s)).        Assessment & Plan       ICD-10-CM    1. Gastroesophageal reflux disease with esophagitis K21.0 Continue omeprazole every other day and as needed. Avoid NSAID's due to sensitive stomach and use of methotrexate. May take up to 2 Tums as needed.    2. Vitamin D deficiency E55.9 Vitamin D, Cholecalciferol, 25 MCG (1000 UT) CAPS- recommend daily Vitamin D supplement. Rheumatologist is going to check level next visit.    3. Gingivitis K05.10 chlorhexidine (PERIDEX) 0.12 % solution- refill   4. Hypertension, goal below 150/90 I10 Well controlled on medications    5. " Pre-diabetes R73.03 Yearly check   6. Hyperlipidemia with target LDL less than 130 E78.5 Stable on medication    7. Polyarthritis, inflammatory (H) M06.4 High risk medication use Methotrexate and follow up with rheumatology   8. Sicca syndrome (H) M35.00 Uses oral hygiene to keep gums healthy           CONSULTATION/REFERRAL to rheumatology as scheduled.   FUTURE LABS:       - Schedule non-fasting labs in 3 months  FUTURE APPOINTMENTS:       - Follow-up visit in 3-4 months or sooner if any questions or concerns.   Regular exercise  See Patient Instructions    Return in about 3 months (around 2/8/2020) for medication follow up, Lab Work.    Jia Castro MD  [facility]

## 2019-11-08 NOTE — PATIENT INSTRUCTIONS
At [facility], we strive to deliver an exceptional experience to you, every time we see you.  If you receive a survey in the mail, please send us back your thoughts. We really do value your feedback.    Based on your medical history, these are the current health maintenance/preventive care services that you are due for (some may have been done at this visit.)  Health Maintenance Due   Topic Date Due     ADVANCE CARE PLANNING  1950     PHQ-2  01/01/2019     MEDICARE ANNUAL WELLNESS VISIT  06/21/2019         Suggested websites for health information:  Www.[employer].org : Up to date and easily searchable information on multiple topics.  Www.[employer].gov : medication info, interactive tutorials, watch real surgeries online  Www.familydoctor.org : good info from the Academy of Family Physicians  Www.cdc.gov : public health info, travel advisories, epidemics (H1N1)  Www.aap.org : children's health info, normal development, vaccinations  Www.health.[facility].[place].us : [place] dept of health, public health issues in [place], N1N1    Your care team:                            Family Medicine Internal Medicine   MD Alfredo Morgan MD Shantel Branch-Fleming, MD Katya Georgiev PA-C Nam Ho, MD Pediatrics   MOSES Alberts, PHIL SHORT CNP   MD Naila Champion MD Deborah Mielke, MD Kim Thein, APRN CNP      Clinic hours: Monday - Thursday 7 am-7 pm; Fridays 7 am-5 pm.   Urgent care: Monday - Friday 11 am-9 pm; Saturday and Sunday 9 am-5 pm.  Pharmacy : Monday -Thursday 8 am-8 pm; Friday 8 am-6 pm; Saturday and Sunday 9 am-5 pm.     Clinic: [phone]   Pharmacy: [phone]    Patient Education     GERD (Adult)    The esophagus is a tube that carries food from the mouth to the stomach. A valve (the LES, lower esophageal sphincter) at the lower end of the esophagus prevents stomach acid from flowing upward. When this valve doesn't work  "properly, stomach contents may repeatedly flow back up (reflux) into the esophagus. This is called gastroesophageal reflux disease (GERD). GERD can irritate the esophagus. It can cause problems with pain, swallowing or breathing. In severe cases, GERD can cause recurrent pneumonia (from aspiration or breathing in particles) or other serious problems.  Symptoms of reflux include burning, pressure or sharp pain in the upper abdomen or mid to lower chest. The pain can spread to the neck, back, or shoulder. There may be belching, an acid taste in the back of the throat, chronic cough, or sore throat, or hoarseness. GERD symptoms often occur during the day after a big meal. They can also occur at night when lying down.   Home care  Lifestyle changes can help reduce symptoms. If needed, your healthcare provider may prescribe medicines. Symptoms often improve with treatment, but if treatment is stopped, the symptoms often return after a few months. So most persons with GERD will need to continue treatment or get treatment on and off.  Lifestyle changes    Limit or avoid fatty, fried, and spicy foods, as well as coffee, chocolate, mint, and foods with high acid content such as tomatoes and citrus fruit and juices (orange, grapefruit, lemon).    Don t eat large meals, especially at night. Frequent, smaller meals are best. Don't lie down right after eating. And don t eat anything 3 hours before going to bed.    Don't drink alcohol or smoke. As much as possible, stay away from second hand smoke.    If you are overweight, losing weight will reduce symptoms.     Don't wear tight clothing around your stomach area.    If your symptoms occur during sleep, use a foam wedge to elevate your upper body (not just your head.) Or, place 4\" blocks under the head of your bed. Or use 2 bed risers under your bedframe.  Medicines  If needed, medicines can help relieve the symptoms of GERD and prevent damage to the esophagus. Discuss a medicine " plan with your healthcare provider. This may include one or more of the following medicines:    Antacids to help neutralize the normal acids in your stomach.    Acid blockers (Histamine or H2 blockers) to decrease acid production.    Acid inhibitors (proton pump inhibitors PPIs) to decrease acid production in a different way than the blockers. They may work better, but can take a little longer to take effect.  Take an antacid 30 to 60 minutes after eating and at bedtime, but not at the same time as an acid blocker.Try not to take medicines such as ibuprofen and aspirin. If you are taking aspirin for your heart or other medical reasons, talk to your healthcare provider about stopping it.  Follow-up care  Follow up with your healthcare provider or as advised by our staff.  When to seek medical advice  Call your healthcare provider if any of the following occur:    Stomach pain gets worse or moves to the lower right abdomen (appendix area)    Chest pain appears or gets worse, or spreads to the back, neck, shoulder, or arm    An over-the-counter trial of medicine doesn't relieve your symptoms    Weight loss that can't be explained    Trouble or pain swallowing    Frequent vomiting (can t keep down liquids)    Blood in the stool or vomit (red or black in color)    Feeling weak or dizzy    Fever of 100.4 F (38 C) or higher, or as directed by your healthcare provider  Date Last Reviewed: 3/1/2018    3083-4989 [employer]. [address] [place] [address], [place] [address]. All rights reserved. This information is not intended as a substitute for professional medical care. Always follow your healthcare professional's instructions.

## 2019-12-03 NOTE — PROGRESS NOTES
Assessment & Plan   Noreen Florence is a 69 year old female who presents with:   Review of systems for the eyes was negative other than the pertinent positives and negatives noted in the HPI.    Glaucoma suspect of both eyes  - Excellent IOP's  - OCT Optic Nerve RNFL Optovue OU (both eyes) - stable OU    Return in 12 months for annual with glaucoma testing.      Attending Physician Attestation:  Complete documentation of historical and exam elements from today's encounter can be found in the full encounter summary report (not reduplicated in this progress note).  I personally obtained the chief complaint(s) and history of present illness.  I confirmed and edited as necessary the review of systems, past medical/surgical history, family history, social history, and examination findings as documented by others; and I examined the patient myself.  I personally reviewed the relevant tests, images, and reports as documented above.  I formulated and edited as necessary the assessment and plan and discussed the findings and management plan with the patient and family. - Shai Levin MD

## 2020-01-06 DIAGNOSIS — K05.10 GINGIVITIS: ICD-10-CM

## 2020-01-06 NOTE — TELEPHONE ENCOUNTER
Requested Prescriptions   Pending Prescriptions Disp Refills     chlorhexidine (PERIDEX) 0.12 % solution [Pharmacy Med Name: CHLORHEXIDINE 0.12% ORAL RINSE] 473 mL 3     Sig: SWISH AND SPIT 15 ML BY MOUTH TWICE DAILY       There is no refill protocol information for this order        Last Written Prescription Date:  11/8/19  Last Fill Quantity: 473,  # refills: 3   Last Office Visit with G, P or [facility] prescribing provider:  11/8/19   Future Office Visit:

## 2020-01-07 NOTE — TELEPHONE ENCOUNTER
Routing refill request to provider for review/approval because:  Drug not on the [facility] refill protocol     Jaky Driver RN, BSN, PHN

## 2020-01-08 RX ORDER — CHLORHEXIDINE GLUCONATE ORAL RINSE 1.2 MG/ML
SOLUTION DENTAL
Qty: 473 ML | Refills: 3 | Status: SHIPPED | OUTPATIENT
Start: 2020-01-08 | End: 2021-12-17

## 2020-01-31 ENCOUNTER — OFFICE VISIT (OUTPATIENT)
Dept: FAMILY MEDICINE | Facility: CLINIC | Age: 70
End: 2020-01-31
Payer: MEDICARE

## 2020-01-31 VITALS
SYSTOLIC BLOOD PRESSURE: 121 MMHG | DIASTOLIC BLOOD PRESSURE: 77 MMHG | HEART RATE: 81 BPM | OXYGEN SATURATION: 96 % | BODY MASS INDEX: 20.73 KG/M2 | TEMPERATURE: 98.2 F | WEIGHT: 117 LBS | HEIGHT: 63 IN | RESPIRATION RATE: 12 BRPM

## 2020-01-31 DIAGNOSIS — M25.552 HIP PAIN, LEFT: Primary | ICD-10-CM

## 2020-01-31 DIAGNOSIS — M76.60 ACHILLES TENDON PAIN: ICD-10-CM

## 2020-01-31 DIAGNOSIS — M25.531 RIGHT WRIST PAIN: ICD-10-CM

## 2020-01-31 PROCEDURE — 99214 OFFICE O/P EST MOD 30 MIN: CPT | Performed by: FAMILY MEDICINE

## 2020-01-31 ASSESSMENT — MIFFLIN-ST. JEOR: SCORE: 1034.84

## 2020-01-31 ASSESSMENT — PAIN SCALES - GENERAL: PAINLEVEL: EXTREME PAIN (8)

## 2020-01-31 NOTE — PROGRESS NOTES
Subjective     Noreen Florence is a 69 year old female who presents to clinic today for the following health issues:    HPI   Joint Pain    Onset: 1/29    Description:   Location: left hip  Character: Sharp    Intensity: moderate, severe; currently no pain    Progression of Symptoms: better - pt states the pain only lasted from 10-5am and when she woke up the pain was gone, has not felt pain since. She states it was very painful could not sleep.    Accompanying Signs & Symptoms:  Other symptoms: none    History:   Previous similar pain: no       Precipitating factors:   Trauma or overuse: no - she was sitting down and watching TV for 20 minutes when she noticed the pain    Alleviating factors:  Improved by: nothing    Therapies Tried and outcome: None. Went to  on 1/29      Past medical, family, and social histories, medications, and allergies are reviewed and updated in [facility].    Review of Systems   ROS COMP: Constitutional, HEENT, cardiovascular, pulmonary, gi and gu systems are negative, except as otherwise noted.      MS: She has had pain at her Achilles tendon for a long time (insidious onset 2/19). Physical therapy sessions (4) have not helped. She also notes having a chronic problem with wrist pain and swelling (she has a history of inflammatory polyarthritis for which she sees a Rheumatologist through the [facility] system). She notes that nowadays the ulnar side of her right wrist continues to be swollen, and the wrist pain contributes to weakness or a sense of weakness.    This document serves as a record of the services and decisions personally performed and made by Dr. Bridges. It was created on his behalf by Isidra Singleton, a trained medical scribe. The creation of this document is based the provider's statements to the medical scribe.  Isidra Singleton,  5:22 PM     Objective    /77 (BP Location: Left arm, Patient Position: Sitting, Cuff Size: Adult Regular)   Pulse 81   Temp 98.2  F (36.8  C) (Oral)   Resp  "12   Ht 1.6 m (5' 3\")   Wt 53.1 kg (117 lb)   LMP 06/21/2002 (Approximate)   SpO2 96%   BMI 20.73 kg/m    Body mass index is 20.73 kg/m .     Physical Exam   GENERAL: healthy, alert and no distress  EYES: Eyes grossly normal to inspection, PERRL and conjunctivae and sclerae normal  MS: no gross musculoskeletal defects noted, no edema, no CVA tenderness  SKIN: no suspicious lesions or rashes to visible skin   NEURO: Normal strength and tone, mentation intact and speech normal, lower extremity DTRs intact, heel and toe walk normal  PSYCH: mentation appears normal, affect normal/bright        Assessment & Plan     (M25.552) Hip pain, left  (primary encounter diagnosis)  Comment: self-limited MS problem, which has resolved  Plan: I recommend no treatment for this since she is now asymptomatic, especially considering she is on multiple medications including DMARDs    (M76.60) Achilles tendon pain  Comment: left, chronic, see ROS above  Plan: Orthopedic & Spine  Referral        Handout(s) provided     (M25.531) Right wrist pain  Comment: likely secondary to her inflammatory arthritis. I am not sure if Orthopedics can contribute, but she would like to discuss it with them.  Plan: Orthopedic & Spine  Referral                     The information in this document, created by the medical scribe for me, accurately reflects the services I personally performed and the decisions made by me. I have reviewed and approved this document for accuracy prior to leaving the patient care area.  Maverick Bridges MD  "

## 2020-01-31 NOTE — PATIENT INSTRUCTIONS
At [facility], we strive to deliver an exceptional experience to you, every time we see you.  If you receive a survey in the mail, please send us back your thoughts. We really do value your feedback.    Based on your medical history, these are the current health maintenance/preventive care services that you are due for (some may have been done at this visit.)  Health Maintenance Due   Topic Date Due     ADVANCE CARE PLANNING  1950     MEDICARE ANNUAL WELLNESS VISIT  06/21/2019     PHQ-2  01/01/2020         Suggested websites for health information:  Www.[employer].org : Up to date and easily searchable information on multiple topics.  Www.[employer].org.gov : medication info, interactive tutorials, watch real surgeries online  Www.familydoctor.org : good info from the Academy of Family Physicians  Www.cdc.gov : public health info, travel advisories, epidemics (H1N1)  Www.aap.org : children's health info, normal development, vaccinations  Www.health.[facility].[place].us : [place] dept of health, public health issues in [place], N1N1    Your care team:                            Family Medicine Internal Medicine   MD Alfredo Morgan MD Shantel Branch-Fleming, MD Katya Georgiev PA-C Nam Ho, MD Pediatrics   MOSES Alberts, MD Naila Thompson CNP, MD Deborah Mielke, MD Kim Thein, DEJA CNP      Clinic hours: Monday - Thursday 7 am-7 pm; Fridays 7 am-5 pm.   Urgent care: Monday - Friday 11 am-9 pm; Saturday and Sunday 9 am-5 pm.  Pharmacy : Monday -Thursday 8 am-8 pm; Friday 8 am-6 pm; Saturday and Sunday 9 am-5 pm.     Clinic: [phone]   Pharmacy: [phone]    Patient Education     What Is Tendonitis of the Foot?  When you use a set of muscles too much, you re likely to strain the tendons (soft tissues) that connect those muscles to your bones. At first, pain or swelling may come and go quickly. But if you do too much  too soon, your muscles may overtire again. The strain may cause a tendon s outer covering to swell or small fibers in a tendon to pull apart. If you keep pushing your muscles, damage to the tendons adds up, and tendonitis develops. Over time, pain and swelling may limit your activities. But with your healthcare provider s help, tendonitis can be controlled. Both your symptoms and your risk of future problems including tendon rupture can be reduced.       The back of your foot  The Achilles tendon connects the calf muscle to the heel bone. If tendonitis occurs here, you may feel pain when your foot touches down or when your heel lifts off the ground.   The front of your foot  The anterior tibial tendon helps control the front of your foot when it meets the ground. If this tendon is strained, you may feel pain when you go down stairs or walk or run on hills.     The inside of your foot  The posterior tibial tendon runs along the inside of the ankle and foot. If this tendon is strained, your foot may hurt when it moves forward to push off the ground. Or you may feel pain when your heel shifts from side to side.   The outside of your foot  The peroneal tendons wrap across the bottom of your foot, from the outside to the inside. Tendonitis here may cause pain when you stand or push off the ground and when walking on uneven surfaces.   Date Last Reviewed: 1/1/2018 2000-2019 [employer]. [address] [place] [id number]. All rights reserved. This information is not intended as a substitute for professional medical care. Always follow your healthcare professional's instructions.

## 2020-02-10 DIAGNOSIS — L30.9 DERMATITIS: ICD-10-CM

## 2020-02-10 NOTE — TELEPHONE ENCOUNTER
"Requested Prescriptions   Pending Prescriptions Disp Refills     triamcinolone (KENALOG) 0.1 % external cream [Pharmacy Med Name: TRIAMCINOLONE 0.1% CREAM 80GM]  Last Written Prescription Date:  12/26/18  Last Fill Quantity: 80g,  # refills: 3   Last Office Visit with ANNE-MARIE, KUSH or [facility] prescribing provider:  01/31/2020-Yuliana   Future Office Visit:    Next 5 appointments (look out 90 days)    Apr 20, 2020  1:00 PM CDT  [facility]  [facility] [id number] [place] [id number] [phone]        80 g 3     Sig: APPLY EXTERNALLY TO THE AFFECTED AREA TWICE DAILY       Topical Steroids and Nonsteroidals Protocol Passed - 2/10/2020 12:30 PM        Passed - Patient is age 6 or older        Passed - Authorizing prescriber's most recent note related to this medication read.     If refill request is for ophthalmic use, please forward request to provider for approval.          Passed - High potency steroid not ordered        Passed - Recent (12 mo) or future (30 days) visit within the authorizing provider's specialty     Patient has had an office visit with the authorizing provider or a provider within the authorizing providers department within the previous 12 mos or has a future within next 30 days. See \"Patient Info\" tab in inbasket, or \"Choose Columns\" in Meds & Orders section of the refill encounter.              Passed - Medication is active on med list          "

## 2020-02-12 RX ORDER — TRIAMCINOLONE ACETONIDE 1 MG/G
CREAM TOPICAL
Qty: 80 G | Refills: 3 | Status: SHIPPED | OUTPATIENT
Start: 2020-02-12 | End: 2020-07-22

## 2020-02-12 NOTE — TELEPHONE ENCOUNTER
Routing refill request to provider for review/approval because:  A break in medication    Kaylin Eid RN  [facility]

## 2020-02-23 ENCOUNTER — HEALTH MAINTENANCE LETTER (OUTPATIENT)
Age: 70
End: 2020-02-23

## 2020-04-20 DIAGNOSIS — J31.0 CHRONIC RHINITIS: ICD-10-CM

## 2020-04-22 RX ORDER — CETIRIZINE HYDROCHLORIDE 10 MG/1
TABLET ORAL
Qty: 90 TABLET | Refills: 3 | Status: SHIPPED | OUTPATIENT
Start: 2020-04-22 | End: 2021-05-11

## 2020-04-22 NOTE — TELEPHONE ENCOUNTER
Routing refill request to provider for review/approval because:  > 64 yrs old    Jen Trevino, RN, BSN

## 2020-04-24 DIAGNOSIS — J30.1 HAYFEVER: ICD-10-CM

## 2020-04-28 RX ORDER — FLUTICASONE PROPIONATE 50 MCG
SPRAY, SUSPENSION (ML) NASAL
Qty: 48 G | Refills: 2 | Status: SHIPPED | OUTPATIENT
Start: 2020-04-28 | End: 2021-12-17

## 2020-04-28 NOTE — TELEPHONE ENCOUNTER
Prescription approved per [facility] Refill Protocol.    Flor Conway RN  [facility]

## 2020-05-26 DIAGNOSIS — K05.10 GINGIVITIS: ICD-10-CM

## 2020-05-26 DIAGNOSIS — J30.1 HAYFEVER: ICD-10-CM

## 2020-05-28 RX ORDER — FLUTICASONE PROPIONATE 50 MCG
SPRAY, SUSPENSION (ML) NASAL
Qty: 16 G | OUTPATIENT
Start: 2020-05-28

## 2020-05-28 RX ORDER — 1.1% SODIUM FLUORIDE 11 MG/G
GEL DENTAL
Qty: 112 G | Refills: 3 | Status: SHIPPED | OUTPATIENT
Start: 2020-05-28 | End: 2020-05-29

## 2020-05-28 NOTE — TELEPHONE ENCOUNTER
Fluticasone nasal spray was refilled on 4/28/20 for 3 mo supply with 2 refills. Should be on file with pharmacy.  Denied today for this reason.      SF 1.1 % GEL topical gel   Routing refill request to provider for review/approval because:  medication not on the [facility] refill protocol       Flor Conway RN  [facility]

## 2020-05-29 DIAGNOSIS — K05.10 GINGIVITIS: ICD-10-CM

## 2020-05-29 RX ORDER — 1.1% SODIUM FLUORIDE 11 MG/G
GEL DENTAL
Qty: 392 G | Refills: 1 | Status: SHIPPED | OUTPATIENT
Start: 2020-05-29 | End: 2022-03-24

## 2020-05-29 NOTE — TELEPHONE ENCOUNTER
Prescription approved per [facility] Refill Protocol for 90 day supply      Jaky Driver RN, BSN, PHN

## 2020-06-26 DIAGNOSIS — H40.003 GLAUCOMA SUSPECT OF BOTH EYES: ICD-10-CM

## 2020-06-26 RX ORDER — LATANOPROST 50 UG/ML
SOLUTION/ DROPS OPHTHALMIC
Qty: 7.5 ML | Refills: 4 | Status: SHIPPED | OUTPATIENT
Start: 2020-06-26 | End: 2021-03-11

## 2020-06-26 NOTE — TELEPHONE ENCOUNTER
Requested Prescriptions   Pending Prescriptions Disp Refills     latanoprost (XALATAN) 0.005 % ophthalmic solution [Pharmacy Med Name: LATANOPROST 0.005% OPHTH SOLN 2.5ML] 7.5 mL 4     Sig: INSTILL 1 DROP IN BOTH EYES AT BEDTIME       There is no refill protocol information for this order        Routing refill request to provider for review/approval because:  Drug not on the [facility] refill protocol         Jaky Driver RN, BSN, PHN

## 2020-07-01 DIAGNOSIS — I10 HYPERTENSION, GOAL BELOW 150/90: ICD-10-CM

## 2020-07-03 RX ORDER — LOSARTAN POTASSIUM AND HYDROCHLOROTHIAZIDE 12.5; 5 MG/1; MG/1
1 TABLET ORAL DAILY
Qty: 90 TABLET | Refills: 3 | Status: SHIPPED | OUTPATIENT
Start: 2020-07-03 | End: 2021-06-16

## 2020-07-03 NOTE — TELEPHONE ENCOUNTER
Routing refill request to provider for review/approval because:  Labs out of range:  Serum creatinine      Jaky Driver RN, BSN, PHN

## 2020-07-03 NOTE — TELEPHONE ENCOUNTER
"Requested Prescriptions   Pending Prescriptions Disp Refills     losartan-hydrochlorothiazide (HYZAAR) 50-12.5 MG tablet [Pharmacy Med Name: LOSARTAN/HCTZ 50/12.5MG TABLETS] 90 tablet 3     Sig: TAKE 1 TABLET BY MOUTH DAILY       Angiotensin-II Receptors Failed - 7/3/2020 10:08 AM        Failed - Normal serum creatinine on file in past 12 months     Recent Labs   Lab Test 10/04/19  0814   CR 0.51*       Ok to refill medication if creatinine is low          Passed - Last blood pressure under 140/90 in past 12 months     BP Readings from Last 3 Encounters:   01/31/20 121/77   11/08/19 125/77   10/04/19 132/64                 Passed - Recent (12 mo) or future (30 days) visit within the authorizing provider's specialty     Patient has had an office visit with the authorizing provider or a provider within the authorizing providers department within the previous 12 mos or has a future within next 30 days. See \"Patient Info\" tab in inbasket, or \"Choose Columns\" in Meds & Orders section of the refill encounter.              Passed - Medication is active on med list        Passed - Patient is age 18 or older        Passed - No active pregnancy on record        Passed - Normal serum potassium on file in past 12 months     Recent Labs   Lab Test 10/04/19  0814   POTASSIUM 3.6                    Passed - No positive pregnancy test in past 12 months       Diuretics (Including Combos) Protocol Failed - 7/3/2020 10:08 AM        Failed - Normal serum creatinine on file in past 12 months     Recent Labs   Lab Test 10/04/19  0814   CR 0.51*              Passed - Blood pressure under 140/90 in past 12 months     BP Readings from Last 3 Encounters:   01/31/20 121/77   11/08/19 125/77   10/04/19 132/64                 Passed - Recent (12 mo) or future (30 days) visit within the authorizing provider's specialty     Patient has had an office visit with the authorizing provider or a provider within the authorizing providers department " "within the previous 12 mos or has a future within next 30 days. See \"Patient Info\" tab in inbasket, or \"Choose Columns\" in Meds & Orders section of the refill encounter.              Passed - Medication is active on med list        Passed - Patient is age 18 or older        Passed - No active pregancy on record        Passed - Normal serum potassium on file in past 12 months     Recent Labs   Lab Test 10/04/19  0814   POTASSIUM 3.6                    Passed - Normal serum sodium on file in past 12 months     Recent Labs   Lab Test 10/04/19  0814                 Passed - No positive pregnancy test in past 12 months             "

## 2020-07-10 ENCOUNTER — ANCILLARY PROCEDURE (OUTPATIENT)
Dept: GENERAL RADIOLOGY | Facility: CLINIC | Age: 70
End: 2020-07-10
Attending: PHYSICIAN ASSISTANT
Payer: MEDICARE

## 2020-07-10 ENCOUNTER — OFFICE VISIT (OUTPATIENT)
Dept: URGENT CARE | Facility: URGENT CARE | Age: 70
End: 2020-07-10
Payer: MEDICARE

## 2020-07-10 ENCOUNTER — NURSE TRIAGE (OUTPATIENT)
Dept: NURSING | Facility: CLINIC | Age: 70
End: 2020-07-10

## 2020-07-10 VITALS
SYSTOLIC BLOOD PRESSURE: 166 MMHG | OXYGEN SATURATION: 97 % | WEIGHT: 115 LBS | BODY MASS INDEX: 20.37 KG/M2 | RESPIRATION RATE: 16 BRPM | HEART RATE: 84 BPM | DIASTOLIC BLOOD PRESSURE: 91 MMHG | TEMPERATURE: 98.1 F

## 2020-07-10 DIAGNOSIS — S82.892A ANKLE FRACTURE, LEFT, CLOSED, INITIAL ENCOUNTER: Primary | ICD-10-CM

## 2020-07-10 DIAGNOSIS — S99.912A ANKLE INJURY, LEFT, INITIAL ENCOUNTER: ICD-10-CM

## 2020-07-10 PROCEDURE — 99214 OFFICE O/P EST MOD 30 MIN: CPT | Performed by: PHYSICIAN ASSISTANT

## 2020-07-10 PROCEDURE — 73610 X-RAY EXAM OF ANKLE: CPT | Mod: LT

## 2020-07-10 RX ORDER — HYDROCODONE BITARTRATE AND ACETAMINOPHEN 5; 325 MG/1; MG/1
1 TABLET ORAL EVERY 6 HOURS PRN
Qty: 15 TABLET | Refills: 0 | Status: SHIPPED | OUTPATIENT
Start: 2020-07-10 | End: 2020-08-14

## 2020-07-10 ASSESSMENT — ENCOUNTER SYMPTOMS
NECK STIFFNESS: 0
WHEEZING: 0
FEVER: 0
JOINT SWELLING: 1
WOUND: 0
CHILLS: 0
ARTHRALGIAS: 1
PALPITATIONS: 0
CONSTITUTIONAL NEGATIVE: 1
MYALGIAS: 1
CARDIOVASCULAR NEGATIVE: 1
CHEST TIGHTNESS: 0
SHORTNESS OF BREATH: 0
COLOR CHANGE: 1
NECK PAIN: 0
COUGH: 0
FATIGUE: 0
BACK PAIN: 0

## 2020-07-10 ASSESSMENT — PAIN SCALES - GENERAL: PAINLEVEL: WORST PAIN (10)

## 2020-07-11 NOTE — TELEPHONE ENCOUNTER
Pt son called in states Pt has ankle injury today.  The injury happened one hour ago.  It is her left ankle,  The caller states her ankle is twisted and swelling.  The Pt is stand and walk slowly.  There is bruise.  The Pt has pain 10/10 on the scale.  The disposition is to be seen today.  Care advice given per protocol.  Patient son agrees with care advice given.   Agreed to call back if he has additional symptoms or questions.      Ramirez Chacon [place] Nurse Advisor 7/10/2020 7:25 PM      Additional Information    Negative: Major bleeding (actively dripping or spurting) that can't be stopped    Negative: Amputation or bone sticking through the skin    Negative: Looks like a dislocated joint (crooked or deformed)    Negative: Sounds like a life-threatening emergency to the triager    Negative: Wound looks infected    Negative: Caused by an animal bite    Negative: Puncture wound of foot    Negative: Toe injury is the main symptom    Negative: Cast problems or questions    SEVERE pain (e.g., excruciating)    Negative: Can't stand (bear weight) or walk (e.g., 4 steps)    Negative: Skin is split open or gaping (length > 1/2 inch or 12 mm)    Negative: Bleeding won't stop after 10 minutes of direct pressure (using correct technique)    Negative: Dirt in the wound and not removed after 15 minutes of scrubbing    Negative: Numbness (new loss of sensation) of toe(s)    Negative: Looks infected (e.g., spreading redness, pus, red streak)    Negative: Sounds like a serious injury to the triager    Negative: Bullet, stabbed by knife or other serious penetrating wound    Protocols used: ANKLE AND FOOT INJURY-A-[place]

## 2020-07-11 NOTE — PROGRESS NOTES
Subjective   Noreen Florence is a 70 year old female who presents to clinic today for the following health issues:  HPI   Musculoskeletal problem/pain    Duration: today    Description  Location: L ankle    Intensity:  moderate    Accompanying signs and symptoms: No radicular pain, numbness, tingling or weakness.  Reports swelling and bruising but no redness, drainage or fevers.      History  Previous similar problem: no   Previous evaluation:  none    Precipitating or alleviating factors:  Trauma or overuse: YES- sustained a L ankle injury after tripping over the water hose at home.  No head/neck injuries or LOC.  Aggravating factors include: standing, walking and overuse    Therapies tried and outcome: rest/inactivity, ice, immobilization, Ibuprofen with minimal relief    Patient Active Problem List   Diagnosis     Osteopenia     Hayfever     Hypertension, goal below 150/90     Polyarthritis, inflammatory (H)     GERD (gastroesophageal reflux disease)     Hyperlipidemia with target LDL less than 130     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     High risk medication use     Microscopic hematuria     Pterygium of right eye     Sicca syndrome (H)     Angiomyolipoma of right kidney     History of Helicobacter pylori infection     Pre-diabetes     Past Surgical History:   Procedure Laterality Date     COLONOSCOPY       COLONOSCOPY WITH CO2 INSUFFLATION N/A 4/5/2017    Procedure: COLONOSCOPY WITH CO2 INSUFFLATION;  Surgeon: Duane, William Charles, MD;  Location:  OR     COMBINED ESOPHAGOSCOPY, GASTROSCOPY, DUODENOSCOPY (EGD) WITH CO2 INSUFFLATION N/A 2/13/2019    Procedure: COMBINED ESOPHAGOSCOPY, GASTROSCOPY, DUODENOSCOPY (EGD) WITH CO2 INSUFFLATION;  Surgeon: Sly De Santiago MD;  Location:  OR     COMBINED REPAIR PTOSIS WITH BLEPHAROPLASTY BILATERAL Bilateral 1/6/2017    Procedure: COMBINED REPAIR PTOSIS WITH BLEPHAROPLASTY BILATERAL;  Surgeon: Carly Norman MD;  Location: [facility]      ESOPHAGOSCOPY, GASTROSCOPY, DUODENOSCOPY (EGD), COMBINED N/A 1/5/2016    Procedure: COMBINED ESOPHAGOSCOPY, GASTROSCOPY, DUODENOSCOPY (EGD), BIOPSY SINGLE OR MULTIPLE;  Surgeon: Duane, William Charles, MD;  Location: [facility] OR     ESOPHAGOSCOPY, GASTROSCOPY, DUODENOSCOPY (EGD), COMBINED N/A 2/13/2019    Procedure: Combined Esophagoscopy, Gastroscopy, Duodenoscopy (Egd), Biopsy Single Or Multiple;  Surgeon: Sly De Santiago MD;  Location: [facility] OR     REPAIR ENTROPION BILATERAL Bilateral 1/6/2017    Procedure: REPAIR ENTROPION BILATERAL;  Surgeon: Carly Norman MD;  Location: [facility]     REPAIR PTOSIS Bilateral 01/2017       Social History     Tobacco Use     Smoking status: Never Smoker     Smokeless tobacco: Never Used   Substance Use Topics     Alcohol use: No     Family History   Problem Relation Age of Onset     Diabetes Mother      Cancer No family hx of      Hypertension No family hx of      Cerebrovascular Disease No family hx of      Thyroid Disease No family hx of      Glaucoma No family hx of      Macular Degeneration No family hx of          Current Outpatient Medications   Medication Sig Dispense Refill     cetirizine (ZYRTEC) 10 MG tablet TAKE 1 TABLET(10 MG) BY MOUTH DAILY 90 tablet 3     chlorhexidine (PERIDEX) 0.12 % solution SWISH AND SPIT 15 ML BY MOUTH TWICE DAILY 473 mL 3     fluticasone (FLONASE) 50 MCG/ACT nasal spray SHAKE WELL AND USE 1 OR 2 SPRAYS IN EACH NOSTRIL DAILY 48 g 2     hydrocortisone (ANUSOL-HC) 2.5 % cream Place rectally 2 times daily 30 g 3     latanoprost (XALATAN) 0.005 % ophthalmic solution INSTILL 1 DROP IN BOTH EYES AT BEDTIME 7.5 mL 4     leucovorin (WELLCOVORIN) 5 MG tablet TK 1 T PO 12 H AFTER METHOTREXATE ONE TIME EACH WEEK  3     methotrexate 2.5 MG tablet Take 6 tablets (15 mg) by mouth once a week 1 tablet 0     methylcellulose (CITRUCEL) powder Take 2 g by mouth daily 180 g 3     metoprolol tartrate (LOPRESSOR) 25 MG tablet Take 1 tablet (25 mg) by mouth At  Bedtime 90 tablet 3     omeprazole (PRILOSEC) 40 MG DR capsule TAKE 1 CAPSULE(40 MG) BY MOUTH DAILY 30 TO 60 MINUTES BEFORE A MEAL AS NEEDED 90 capsule 3     pravastatin (PRAVACHOL) 20 MG tablet Take 1 tablet (20 mg) by mouth daily 90 tablet 3     SF 1.1 % GEL topical gel APPLY TO THE AFFECTED AREA TWICE DAILY 392 g 1     Sodium Fluoride 1.1 % PSTE Apply 1 Application to affected area 2 times daily 112 Bottle 3     triamcinolone (KENALOG) 0.1 % external cream APPLY EXTERNALLY TO THE AFFECTED AREA TWICE DAILY 80 g 3     Vitamin D, Cholecalciferol, 25 MCG (1000 UT) CAPS Take 1,000 Units by mouth daily 100 capsule 3     losartan-hydrochlorothiazide (HYZAAR) 50-12.5 MG tablet TAKE 1 TABLET BY MOUTH DAILY 90 tablet 3     No Known Allergies    Reviewed and updated as needed this visit by Provider       Review of Systems   Constitutional: Negative.  Negative for chills, fatigue and fever.   Respiratory: Negative for cough, chest tightness, shortness of breath and wheezing.    Cardiovascular: Negative.  Negative for chest pain, palpitations and peripheral edema.   Musculoskeletal: Positive for arthralgias, gait problem, joint swelling and myalgias. Negative for back pain, neck pain and neck stiffness.   Skin: Positive for color change. Negative for pallor, rash and wound.   All other systems reviewed and are negative.           Objective    BP (!) 166/91 (BP Location: Left arm, Patient Position: Sitting, Cuff Size: Adult Regular)   Pulse 84   Temp 98.1  F (36.7  C) (Tympanic)   Resp 16   Wt 52.2 kg (115 lb)   LMP 06/21/2002 (Approximate)   SpO2 97%   Breastfeeding No   BMI 20.37 kg/m    Body mass index is 20.37 kg/m .  Physical Exam  Vitals signs and nursing note reviewed.   Constitutional:       General: She is not in acute distress.     Appearance: Normal appearance. She is well-developed and normal weight. She is not ill-appearing.   Musculoskeletal:      Right ankle: Normal. She exhibits normal range of motion  and no swelling. No tenderness. Achilles tendon normal.      Left ankle: She exhibits decreased range of motion and swelling. She exhibits no ecchymosis, no deformity, no laceration and normal pulse. Tenderness. Lateral malleolus and AITFL tenderness found. No medial malleolus, no CF ligament, no posterior TFL, no head of 5th metatarsal and no proximal fibula tenderness found. Achilles tendon normal.      Right foot: Normal.      Left foot: Normal. Normal range of motion and normal capillary refill. No tenderness, bony tenderness, swelling, crepitus, deformity or laceration.   Skin:     General: Skin is warm and dry.      Capillary Refill: Capillary refill takes less than 2 seconds.      Findings: Bruising present.      Comments: Distal pulses are 2+ and symmetric.  No peripheral edema.   Neurological:      Mental Status: She is alert and oriented to person, place, and time.      Sensory: Sensation is intact. No sensory deficit.      Motor: Motor function is intact.      Gait: Gait abnormal.      Deep Tendon Reflexes: Reflexes are normal and symmetric.   Psychiatric:         Mood and Affect: Mood normal.         Behavior: Behavior normal.         Thought Content: Thought content normal.         Judgment: Judgment normal.     Diagnostic Test Results:  Labs reviewed in Epic  Results for orders placed or performed in visit on 07/10/20 (from the past 24 hour(s))   XR Ankle Left G/E 3 Views    Narrative    ANKLE THREE VIEWS LEFT 7/10/2020 8:26 PM     HISTORY: Ankle injury, left, initial encounter    COMPARISON: None.      Impression    IMPRESSION: Acute obliquely oriented fracture of the distal fibula at  the level of the tibial plafond. There is 2 mm lateral and posterior  displacement. There is normal joint spacing and alignment. The ankle  mortise is congruent. The talar dome is unremarkable. Small posterior  calcaneal enthesophyte.    NIRANJAN BRANTLEY MD             Assessment & Plan   Ankle fracture, left, closed,  initial encounter:  Xrays shows fx of the distal fibula with mortise intact.  She was placed in CAM boot and recommended non-weight bearing with crutches.  Recommend RICE and will give norco#15 which she can take ibuprofen prn pain.  Discussed risks and benefits of medication along with side effects, direction for use.  No driving or operating machinery due to sedation.  Will also send to orthopedics for further evaluation and management.  Recheck in clinic if symptoms worsen or if symptoms do not improve.   -     XR Ankle Left G/E 3 Views  -     HYDROcodone-acetaminophen (NORCO) 5-325 MG tablet; Take 1 tablet by mouth every 6 hours as needed for pain  -     Orthopedic & Spine  Referral    Ankle injury, left, initial encounter           Parisa See MOSES Salcedo  [facility]

## 2020-07-13 ENCOUNTER — OFFICE VISIT (OUTPATIENT)
Dept: OPHTHALMOLOGY | Facility: CLINIC | Age: 70
End: 2020-07-13
Payer: MEDICARE

## 2020-07-13 DIAGNOSIS — H40.003 GLAUCOMA SUSPECT OF BOTH EYES: Primary | ICD-10-CM

## 2020-07-13 DIAGNOSIS — H25.13 NUCLEAR SENILE CATARACT OF BOTH EYES: ICD-10-CM

## 2020-07-13 PROCEDURE — 92012 INTRM OPH EXAM EST PATIENT: CPT | Performed by: OPHTHALMOLOGY

## 2020-07-13 PROCEDURE — 92083 EXTENDED VISUAL FIELD XM: CPT | Performed by: OPHTHALMOLOGY

## 2020-07-13 ASSESSMENT — VISUAL ACUITY
OD_SC+: -2
METHOD: SNELLEN - LINEAR
OS_SC: 20/20
OD_SC: 20/20
OS_SC+: -2

## 2020-07-13 ASSESSMENT — TONOMETRY
IOP_METHOD: ICARE
OS_IOP_MMHG: 14
OD_IOP_MMHG: 10
OS_IOP_MMHG: 9
OD_IOP_MMHG: 14

## 2020-07-13 ASSESSMENT — CUP TO DISC RATIO
OS_RATIO: 0.6
OD_RATIO: 0.6

## 2020-07-13 ASSESSMENT — EXTERNAL EXAM - LEFT EYE: OS_EXAM: NORMAL

## 2020-07-13 ASSESSMENT — SLIT LAMP EXAM - LIDS
COMMENTS: NORMAL
COMMENTS: NORMAL

## 2020-07-13 ASSESSMENT — EXTERNAL EXAM - RIGHT EYE: OD_EXAM: NORMAL

## 2020-07-13 NOTE — NURSING NOTE
Chief Complaints and History of Present Illnesses   Patient presents with     Glaucoma Follow-Up       Chief Complaint(s) and History of Present Illness(es)     Glaucoma Follow-Up     Laterality: both eyes    Treatment side effects: none    Compliance with Treatment: always              Comments     Ocular meds: Latanoprost each eye at bedtime, artificial tears each eye TID.                Melanie Jeans, OA

## 2020-07-13 NOTE — PROGRESS NOTES
Assessment & Plan   Noreen Florence is a 70 year old female who presents with:   Review of systems for the eyes was negative other than the pertinent positives and negatives noted in the HPI.    Glaucoma suspect of both eyes  - Excellent IOP's  - HVF 24-2 each eye - stable  - OCT reviewed from LV - normal each eye despite large C/D    Nuclear senile cataract of both eyes  - Good vision   - Observe    Refractive error  - Rx per MR dispensed      Return in 12 months  (DFE, OCT RNFL, HVF 24-2)      Attending Physician Attestation:  Complete documentation of historical and exam elements from today's encounter can be found in the full encounter summary report (not reduplicated in this progress note).  I personally obtained the chief complaint(s) and history of present illness.  I confirmed and edited as necessary the review of systems, past medical/surgical history, family history, social history, and examination findings as documented by others; and I examined the patient myself.  I personally reviewed the relevant tests, images, and reports as documented above.  I formulated and edited as necessary the assessment and plan and discussed the findings and management plan with the patient and family. - Shai Levin MD

## 2020-07-14 ENCOUNTER — OFFICE VISIT (OUTPATIENT)
Dept: PODIATRY | Facility: CLINIC | Age: 70
End: 2020-07-14
Payer: MEDICARE

## 2020-07-14 VITALS
WEIGHT: 117 LBS | BODY MASS INDEX: 20.73 KG/M2 | HEIGHT: 63 IN | DIASTOLIC BLOOD PRESSURE: 74 MMHG | SYSTOLIC BLOOD PRESSURE: 106 MMHG

## 2020-07-14 DIAGNOSIS — M25.572 ACUTE LEFT ANKLE PAIN: ICD-10-CM

## 2020-07-14 DIAGNOSIS — S82.892A CLOSED FRACTURE OF LEFT ANKLE, INITIAL ENCOUNTER: Primary | ICD-10-CM

## 2020-07-14 PROCEDURE — 99204 OFFICE O/P NEW MOD 45 MIN: CPT | Performed by: PODIATRIST

## 2020-07-14 ASSESSMENT — MIFFLIN-ST. JEOR: SCORE: 1029.84

## 2020-07-14 NOTE — PROGRESS NOTES
PATIENT HISTORY:  Parisa Salcedo PA-C requested I see this patient for their foot issue.  Noreen Florence is a 70 year old female who presents to clinic for left ankle fracture.  Notes that she was doing gardening 3 days ago and rolled her ankle.  She immediately had pain and cannot put pressure on it.  She went to urgent care and had x-rays which showed a fracture of the fibula and was told to follow-up with me in clinic.  She has been nonweightbearing and she was put in a cam boot at that time.  She notes that when it initially happened it was very painful but now she does not really have any pain.  She denies fever, nausea, shortness of breath.  Wondering if she needs surgery.    Review of Systems:  Patient denies fever, chills, rash, wound, stiffness, numbness, weakness, heart burn, blood in stool, chest pain with activity, calf pain when walking, shortness of breath with activity, chronic cough, easy bleeding/bruising, swelling of ankles, excessive thirst, fatigue, depression, anxiety.  Patient admits to limping at times..     PAST MEDICAL HISTORY:   Past Medical History:   Diagnosis Date     GERD (gastroesophageal reflux disease) 3/20/2015     Glaucoma (increased eye pressure)      Hyperlipidemia LDL goal < 130 8/3/2015     Hypertension      Hypertension, goal below 150/90 11/28/2014     Rheumatoid arthritis, adult (H)         PAST SURGICAL HISTORY:   Past Surgical History:   Procedure Laterality Date     COLONOSCOPY       COLONOSCOPY WITH CO2 INSUFFLATION N/A 4/5/2017    Procedure: COLONOSCOPY WITH CO2 INSUFFLATION;  Surgeon: Duane, William Charles, MD;  Location:  OR     COMBINED ESOPHAGOSCOPY, GASTROSCOPY, DUODENOSCOPY (EGD) WITH CO2 INSUFFLATION N/A 2/13/2019    Procedure: COMBINED ESOPHAGOSCOPY, GASTROSCOPY, DUODENOSCOPY (EGD) WITH CO2 INSUFFLATION;  Surgeon: Sly De Santiago MD;  Location:  OR     COMBINED REPAIR PTOSIS WITH BLEPHAROPLASTY BILATERAL Bilateral 1/6/2017    Procedure: COMBINED REPAIR  PTOSIS WITH BLEPHAROPLASTY BILATERAL;  Surgeon: Carly Norman MD;  Location: [facility]     ESOPHAGOSCOPY, GASTROSCOPY, DUODENOSCOPY (EGD), COMBINED N/A 1/5/2016    Procedure: COMBINED ESOPHAGOSCOPY, GASTROSCOPY, DUODENOSCOPY (EGD), BIOPSY SINGLE OR MULTIPLE;  Surgeon: Duane, William Charles, MD;  Location: [facility]     ESOPHAGOSCOPY, GASTROSCOPY, DUODENOSCOPY (EGD), COMBINED N/A 2/13/2019    Procedure: Combined Esophagoscopy, Gastroscopy, Duodenoscopy (Egd), Biopsy Single Or Multiple;  Surgeon: Sly De Santiago MD;  Location: [facility] OR     REPAIR ENTROPION BILATERAL Bilateral 1/6/2017    Procedure: REPAIR ENTROPION BILATERAL;  Surgeon: Carly Norman MD;  Location: [facility]     REPAIR PTOSIS Bilateral 01/2017        MEDICATIONS:   Current Outpatient Medications:      cetirizine (ZYRTEC) 10 MG tablet, TAKE 1 TABLET(10 MG) BY MOUTH DAILY, Disp: 90 tablet, Rfl: 3     chlorhexidine (PERIDEX) 0.12 % solution, SWISH AND SPIT 15 ML BY MOUTH TWICE DAILY, Disp: 473 mL, Rfl: 3     fluticasone (FLONASE) 50 MCG/ACT nasal spray, SHAKE WELL AND USE 1 OR 2 SPRAYS IN EACH NOSTRIL DAILY, Disp: 48 g, Rfl: 2     hydrocortisone (ANUSOL-HC) 2.5 % cream, Place rectally 2 times daily, Disp: 30 g, Rfl: 3     latanoprost (XALATAN) 0.005 % ophthalmic solution, INSTILL 1 DROP IN BOTH EYES AT BEDTIME, Disp: 7.5 mL, Rfl: 4     leucovorin (WELLCOVORIN) 5 MG tablet, TK 1 T PO 12 H AFTER METHOTREXATE ONE TIME EACH WEEK, Disp: , Rfl: 3     losartan-hydrochlorothiazide (HYZAAR) 50-12.5 MG tablet, TAKE 1 TABLET BY MOUTH DAILY, Disp: 90 tablet, Rfl: 3     methotrexate 2.5 MG tablet, Take 6 tablets (15 mg) by mouth once a week, Disp: 1 tablet, Rfl: 0     methylcellulose (CITRUCEL) powder, Take 2 g by mouth daily, Disp: 180 g, Rfl: 3     metoprolol tartrate (LOPRESSOR) 25 MG tablet, Take 1 tablet (25 mg) by mouth At Bedtime, Disp: 90 tablet, Rfl: 3     omeprazole (PRILOSEC) 40 MG DR capsule, TAKE 1 CAPSULE(40 MG) BY MOUTH DAILY 30 TO 60 MINUTES  BEFORE A MEAL AS NEEDED, Disp: 90 capsule, Rfl: 3     pravastatin (PRAVACHOL) 20 MG tablet, Take 1 tablet (20 mg) by mouth daily, Disp: 90 tablet, Rfl: 3     SF 1.1 % GEL topical gel, APPLY TO THE AFFECTED AREA TWICE DAILY, Disp: 392 g, Rfl: 1     Sodium Fluoride 1.1 % PSTE, Apply 1 Application to affected area 2 times daily, Disp: 112 Bottle, Rfl: 3     triamcinolone (KENALOG) 0.1 % external cream, APPLY EXTERNALLY TO THE AFFECTED AREA TWICE DAILY, Disp: 80 g, Rfl: 3     Vitamin D, Cholecalciferol, 25 MCG (1000 UT) CAPS, Take 1,000 Units by mouth daily, Disp: 100 capsule, Rfl: 3  No current facility-administered medications for this visit.     Facility-Administered Medications Ordered in Other Visits:      gadobutrol (GADAVIST) injection 7.5 mL, 7.5 mL, Intravenous, Once, Palomo Terrell MD     ALLERGIES:  No Known Allergies     SOCIAL HISTORY:   Social History     Socioeconomic History     Marital status:      Spouse name: Not on file     Number of children: Not on file     Years of education: Not on file     Highest education level: Not on file   Occupational History     Not on file   Social Needs     Financial resource strain: Not on file     Food insecurity     Worry: Not on file     Inability: Not on file     Transportation needs     Medical: Not on file     Non-medical: Not on file   Tobacco Use     Smoking status: Never Smoker     Smokeless tobacco: Never Used   Substance and Sexual Activity     Alcohol use: No     Drug use: No     Sexual activity: Not Currently     Partners: Male     Birth control/protection: None   Lifestyle     Physical activity     Days per week: Not on file     Minutes per session: Not on file     Stress: Not on file   Relationships     Social connections     Talks on phone: Not on file     Gets together: Not on file     Attends Sabianism service: Not on file     Active member of club or organization: Not on file     Attends meetings of clubs or organizations: Not on file  "    Relationship status: Not on file     Intimate partner violence     Fear of current or ex partner: Not on file     Emotionally abused: Not on file     Physically abused: Not on file     Forced sexual activity: Not on file   Other Topics Concern     Parent/sibling w/ CABG, MI or angioplasty before 65F 55M? No   Social History Narrative     Not on file        FAMILY HISTORY:   Family History   Problem Relation Age of Onset     Diabetes Mother      Cancer No family hx of      Hypertension No family hx of      Cerebrovascular Disease No family hx of      Thyroid Disease No family hx of      Glaucoma No family hx of      Macular Degeneration No family hx of         EXAM:Vitals: /74 (BP Location: Right arm, Patient Position: Chair, Cuff Size: Adult Regular)   Ht 1.6 m (5' 3\")   Wt 53.1 kg (117 lb)   LMP 06/21/2002 (Approximate)   BMI 20.73 kg/m    BMI= Body mass index is 20.73 kg/m .    General appearance: Patient is alert and fully cooperative with history & exam.  No sign of distress is noted during the visit.     Psychiatric: Affect is pleasant & appropriate.  Patient appears motivated to improve health.     Respiratory: Breathing is regular & unlabored while sitting.     HEENT: Hearing is intact to spoken word.  Speech is clear.  No gross evidence of visual impairment that would impact ambulation.     Dermatologic: minimal bruising to left lateral ankle. Pain on palpation of the fibular fracture.      Vascular: DP & PT pulses are intact & regular bilaterally.  No significant edema or varicosities noted.  CFT and skin temperature is normal to both lower extremities.     Neurologic: Lower extremity sensation is intact to light touch.  No evidence of weakness or contracture in the lower extremities.  No evidence of neuropathy.     Musculoskeletal: Patient is ambulatory non ambulatory in cam boot.     Radiographs:  Left ankle xray - I personally reviewed the xrays. Acute obliquely oriented fracture of the " distal fibula at the level of the tibial plafond. There is 2 mm lateral and posterior  displacement. There is normal joint spacing and alignment. The ankle mortise is congruent. The talar dome is unremarkable. Small posterior calcaneal enthesophyte.     ASSESSMENT:    Closed fracture of left ankle, initial encounter  Acute left ankle pain     PLAN:  Reviewed [facility] chart in [facility].  Reviewed x-rays with patient.  The fracture is right on the border of needing surgery.  Given her history of rheumatoid arthritis and sicca syndrome I would try to avoid surgery if possible.  We will order an MRI to make sure there is no ligamentous damage.  If there is no damage to the ligament or the syndesmosis we will treat conservatively with nonweightbearing for 1 month and then minimal weightbearing in a boot for 4 to 6 weeks.  If there is ligament damage we may have to proceed with surgery to stabilize the ankle.  We will call her with the MRI results.  She will continue nonweightbearing at this time.  All questions were answered to patient satisfaction and she will call with further questions or concerns.       Laverne Mayfield DPM, Podiatry/Foot and Ankle Surgery

## 2020-07-14 NOTE — LETTER
7/14/2020         RE: Noreen Florence  [address]  [place] [address]        Dear Colleague,    Thank you for referring your patient, Noreen Florence, to the [facility]. Please see a copy of my visit note below.    PATIENT HISTORY:  Parisa Salcedo PA-C requested I see this patient for their foot issue.  Noreen Florence is a 70 year old female who presents to clinic for left ankle fracture.  Notes that she was doing gardening 3 days ago and rolled her ankle.  She immediately had pain and cannot put pressure on it.  She went to urgent care and had x-rays which showed a fracture of the fibula and was told to follow-up with me in clinic.  She has been nonweightbearing and she was put in a cam boot at that time.  She notes that when it initially happened it was very painful but now she does not really have any pain.  She denies fever, nausea, shortness of breath.  Wondering if she needs surgery.    Review of Systems:  Patient denies fever, chills, rash, wound, stiffness, numbness, weakness, heart burn, blood in stool, chest pain with activity, calf pain when walking, shortness of breath with activity, chronic cough, easy bleeding/bruising, swelling of ankles, excessive thirst, fatigue, depression, anxiety.  Patient admits to limping at times..     PAST MEDICAL HISTORY:   Past Medical History:   Diagnosis Date     GERD (gastroesophageal reflux disease) 3/20/2015     Glaucoma (increased eye pressure)      Hyperlipidemia LDL goal < 130 8/3/2015     Hypertension      Hypertension, goal below 150/90 11/28/2014     Rheumatoid arthritis, adult (H)         PAST SURGICAL HISTORY:   Past Surgical History:   Procedure Laterality Date     COLONOSCOPY       COLONOSCOPY WITH CO2 INSUFFLATION N/A 4/5/2017    Procedure: COLONOSCOPY WITH CO2 INSUFFLATION;  Surgeon: Duane, William Charles, MD;  Location: [facility]     COMBINED ESOPHAGOSCOPY, GASTROSCOPY, DUODENOSCOPY (EGD) WITH CO2 INSUFFLATION N/A 2/13/2019    Procedure: COMBINED ESOPHAGOSCOPY,  GASTROSCOPY, DUODENOSCOPY (EGD) WITH CO2 INSUFFLATION;  Surgeon: Sly De Santiago MD;  Location: [facility]     COMBINED REPAIR PTOSIS WITH BLEPHAROPLASTY BILATERAL Bilateral 1/6/2017    Procedure: COMBINED REPAIR PTOSIS WITH BLEPHAROPLASTY BILATERAL;  Surgeon: Carly Norman MD;  Location: [facility]     ESOPHAGOSCOPY, GASTROSCOPY, DUODENOSCOPY (EGD), COMBINED N/A 1/5/2016    Procedure: COMBINED ESOPHAGOSCOPY, GASTROSCOPY, DUODENOSCOPY (EGD), BIOPSY SINGLE OR MULTIPLE;  Surgeon: Duane, William Charles, MD;  Location: [facility]     ESOPHAGOSCOPY, GASTROSCOPY, DUODENOSCOPY (EGD), COMBINED N/A 2/13/2019    Procedure: Combined Esophagoscopy, Gastroscopy, Duodenoscopy (Egd), Biopsy Single Or Multiple;  Surgeon: Sly De Santiago MD;  Location: [facility]     REPAIR ENTROPION BILATERAL Bilateral 1/6/2017    Procedure: REPAIR ENTROPION BILATERAL;  Surgeon: Carly Norman MD;  Location: [facility]     REPAIR PTOSIS Bilateral 01/2017        MEDICATIONS:   Current Outpatient Medications:      cetirizine (ZYRTEC) 10 MG tablet, TAKE 1 TABLET(10 MG) BY MOUTH DAILY, Disp: 90 tablet, Rfl: 3     chlorhexidine (PERIDEX) 0.12 % solution, SWISH AND SPIT 15 ML BY MOUTH TWICE DAILY, Disp: 473 mL, Rfl: 3     fluticasone (FLONASE) 50 MCG/ACT nasal spray, SHAKE WELL AND USE 1 OR 2 SPRAYS IN EACH NOSTRIL DAILY, Disp: 48 g, Rfl: 2     hydrocortisone (ANUSOL-HC) 2.5 % cream, Place rectally 2 times daily, Disp: 30 g, Rfl: 3     latanoprost (XALATAN) 0.005 % ophthalmic solution, INSTILL 1 DROP IN BOTH EYES AT BEDTIME, Disp: 7.5 mL, Rfl: 4     leucovorin (WELLCOVORIN) 5 MG tablet, TK 1 T PO 12 H AFTER METHOTREXATE ONE TIME EACH WEEK, Disp: , Rfl: 3     losartan-hydrochlorothiazide (HYZAAR) 50-12.5 MG tablet, TAKE 1 TABLET BY MOUTH DAILY, Disp: 90 tablet, Rfl: 3     methotrexate 2.5 MG tablet, Take 6 tablets (15 mg) by mouth once a week, Disp: 1 tablet, Rfl: 0     methylcellulose (CITRUCEL) powder, Take 2 g by mouth daily, Disp: 180 g, Rfl:  3     metoprolol tartrate (LOPRESSOR) 25 MG tablet, Take 1 tablet (25 mg) by mouth At Bedtime, Disp: 90 tablet, Rfl: 3     omeprazole (PRILOSEC) 40 MG DR capsule, TAKE 1 CAPSULE(40 MG) BY MOUTH DAILY 30 TO 60 MINUTES BEFORE A MEAL AS NEEDED, Disp: 90 capsule, Rfl: 3     pravastatin (PRAVACHOL) 20 MG tablet, Take 1 tablet (20 mg) by mouth daily, Disp: 90 tablet, Rfl: 3     SF 1.1 % GEL topical gel, APPLY TO THE AFFECTED AREA TWICE DAILY, Disp: 392 g, Rfl: 1     Sodium Fluoride 1.1 % PSTE, Apply 1 Application to affected area 2 times daily, Disp: 112 Bottle, Rfl: 3     triamcinolone (KENALOG) 0.1 % external cream, APPLY EXTERNALLY TO THE AFFECTED AREA TWICE DAILY, Disp: 80 g, Rfl: 3     Vitamin D, Cholecalciferol, 25 MCG (1000 UT) CAPS, Take 1,000 Units by mouth daily, Disp: 100 capsule, Rfl: 3  No current facility-administered medications for this visit.     Facility-Administered Medications Ordered in Other Visits:      gadobutrol (GADAVIST) injection 7.5 mL, 7.5 mL, Intravenous, Once, Palomo Terrell MD     ALLERGIES:  No Known Allergies     SOCIAL HISTORY:   Social History     Socioeconomic History     Marital status:      Spouse name: Not on file     Number of children: Not on file     Years of education: Not on file     Highest education level: Not on file   Occupational History     Not on file   Social Needs     Financial resource strain: Not on file     Food insecurity     Worry: Not on file     Inability: Not on file     Transportation needs     Medical: Not on file     Non-medical: Not on file   Tobacco Use     Smoking status: Never Smoker     Smokeless tobacco: Never Used   Substance and Sexual Activity     Alcohol use: No     Drug use: No     Sexual activity: Not Currently     Partners: Male     Birth control/protection: None   Lifestyle     Physical activity     Days per week: Not on file     Minutes per session: Not on file     Stress: Not on file   Relationships     Social connections      "Talks on phone: Not on file     Gets together: Not on file     Attends Episcopalian service: Not on file     Active member of club or organization: Not on file     Attends meetings of clubs or organizations: Not on file     Relationship status: Not on file     Intimate partner violence     Fear of current or ex partner: Not on file     Emotionally abused: Not on file     Physically abused: Not on file     Forced sexual activity: Not on file   Other Topics Concern     Parent/sibling w/ CABG, MI or angioplasty before 65F 55M? No   Social History Narrative     Not on file        FAMILY HISTORY:   Family History   Problem Relation Age of Onset     Diabetes Mother      Cancer No family hx of      Hypertension No family hx of      Cerebrovascular Disease No family hx of      Thyroid Disease No family hx of      Glaucoma No family hx of      Macular Degeneration No family hx of         EXAM:Vitals: /74 (BP Location: Right arm, Patient Position: Chair, Cuff Size: Adult Regular)   Ht 1.6 m (5' 3\")   Wt 53.1 kg (117 lb)   LMP 06/21/2002 (Approximate)   BMI 20.73 kg/m    BMI= Body mass index is 20.73 kg/m .    General appearance: Patient is alert and fully cooperative with history & exam.  No sign of distress is noted during the visit.     Psychiatric: Affect is pleasant & appropriate.  Patient appears motivated to improve health.     Respiratory: Breathing is regular & unlabored while sitting.     HEENT: Hearing is intact to spoken word.  Speech is clear.  No gross evidence of visual impairment that would impact ambulation.     Dermatologic: minimal bruising to left lateral ankle. Pain on palpation of the fibular fracture.      Vascular: DP & PT pulses are intact & regular bilaterally.  No significant edema or varicosities noted.  CFT and skin temperature is normal to both lower extremities.     Neurologic: Lower extremity sensation is intact to light touch.  No evidence of weakness or contracture in the lower " extremities.  No evidence of neuropathy.     Musculoskeletal: Patient is ambulatory non ambulatory in cam boot.     Radiographs:  Left ankle xray - I personally reviewed the xrays. Acute obliquely oriented fracture of the distal fibula at the level of the tibial plafond. There is 2 mm lateral and posterior  displacement. There is normal joint spacing and alignment. The ankle mortise is congruent. The talar dome is unremarkable. Small posterior calcaneal enthesophyte.     ASSESSMENT:    Closed fracture of left ankle, initial encounter  Acute left ankle pain     PLAN:  Reviewed patient's chart in [facility].  Reviewed x-rays with patient.  The fracture is right on the border of needing surgery.  Given her history of rheumatoid arthritis and sicca syndrome I would try to avoid surgery if possible.  We will order an MRI to make sure there is no ligamentous damage.  If there is no damage to the ligament or the syndesmosis we will treat conservatively with nonweightbearing for 1 month and then minimal weightbearing in a boot for 4 to 6 weeks.  If there is ligament damage we may have to proceed with surgery to stabilize the ankle.  We will call her with the MRI results.  She will continue nonweightbearing at this time.  All questions were answered to patient satisfaction and she will call with further questions or concerns.       Laverne Mayfield DPM, Podiatry/Foot and Ankle Surgery            Again, thank you for allowing me to participate in the care of your patient.        Sincerely,        Laverne Mayfield DPM, Podiatry/Foot and Ankle Surgery

## 2020-07-14 NOTE — PATIENT INSTRUCTIONS
Thank you for choosing [facility] Podiatry / Foot & Ankle Surgery!    DR. TOLEDO'S CURRENT CLINIC SCHEDULE  TUESDAY - WEDNESDAY - FRIDAY   [address]    [id number]   [place] [address]   [phone] / [phone]      SCHEDULE SURGERY: [phone]   BILLING QUESTIONS: [phone]   AFTER HOURS: [phone]   APPOINTMENTS: [phone]   CONSUMER PRICE LINE: [phone]      Follow up: 1 month  Diagnosis: left ankle fracture  Next steps: MRI left ankle, please call [phone] to schedule the exam    Ankle Fractures  What Is an Ankle Fracture?  A fracture is a partial or complete break in a bone. Fractures in the ankle can range from the less serious avulsion injuries (small pieces of bone that have been pulled off) to severe shattering-type breaks of the tibia, fibula, or both.  Ankle fractures are common injuries that are most often caused by the ankle rolling inward or outward. Many people mistake an ankle fracture for an ankle sprain, but they are quite different and therefore require an accurate and early diagnosis. They sometimes occur simultaneously.  Symptoms  An ankle fracture is accompanied by one or all of these symptoms:  Pain at the site of the fracture, which in some cases can extend from the foot to the knee   Significant swelling, which may occur along the length of the leg or may be more localized   Blisters may occur over the fracture site. These should be promptly treated by a foot and ankle surgeon.   Bruising that develops soon after the injury   Inability to walk--however, it is possible to walk with less severe breaks, so never rely on walking as a test of whether a bone has been fractured   Change in the appearance of the ankle - it will look different from the other ankle   Bone protruding through the skin--a sign that immediate care is needed. Fractures that richmond the skin require immediate attention because they can lead to severe infection and prolonged recovery.    Diagnosis  Following an ankle injury it is important to have the ankle evaluated by a foot and ankle surgeon for proper diagnosis and treatment. If you are unable to do so right away, go to the emergency room and then follow up with a foot and ankle surgeon as soon as possible for a more thorough assessment.  The affected limb will be examined by the foot and ankle surgeon by touching specific areas to evaluate the injury. In addition, the surgeon may order x-rays and other imaging studies, as necessary.  Non-Surgical Treatment  Treatment of ankle fractures depends upon the type and severity of the injury. At first, the foot and ankle surgeon will want you to follow the R.I.C.E. protocol:  Rest: Stay off the injured ankle. Walking may cause further injury.   Ice: Apply an ice pack to the injured area, placing a thin towel between the ice and the skin. Use ice for 20 minutes and then wait at least 40 minutes before icing again.   Compression: An elastic wrap should be used to control swelling.   Elevation: The ankle should be raised slightly above the level of your heart to reduce swelling.   Additional treatment options include:  Immobilization. Certain fractures are treated by protecting and restricting the ankle and foot in a cast or splint. This allows the bone to heal.   Prescription medications. To help relieve the pain, the surgeon may prescribe pain medications or anti-inflammatory drugs.   When is Surgery Needed?  For some ankle fractures, surgery is needed to repair the fracture and other soft tissue related injuries, if present. The foot and ankle surgeon will select the procedure that is appropriate for your injury.  Follow-up Care  It is important to follow your surgeon s instructions after treatment. Failure to do so can lead to infection, deformity, arthritis, and chronic pain.        Please read through the following handouts and if you have any questions, please feel free to call us or send a  MyChart message!          Noreen to follow up with Primary Care provider regarding elevated blood pressure. (if equal or greater than 140/90)    BODY WEIGHT AND YOUR FEET  The following information is included in the after visit summary for all patients. Body weight can be a sensitive issue to discuss in clinic, but we think the following information is very important. Although we focus on the feet and ankles, we do support the overall health of our patients.     Many things can cause foot and ankle problems. Foot structure, activity level, foot mechanics and injuries are common causes of pain. One very important issue that often goes unmentioned, is body weight. Extra weight can cause increased stress on muscles, ligaments, bones and tendons. Sometimes just a few extra pounds is all it takes to put one over her/his threshold. Without reducing that stress, it can be difficult to alleviate pain. As Foot & Ankle specialists, our job is addressing the lower extremity problem and possible causes. Regarding extra body weight, we encourage patients to discuss diet and weight management plans with their primary care doctors. It is this team approach that gives you the best opportunity for pain relief and getting you back on your feet.      [place] has a Comprehensive Weight Management Program. This program includes counseling, education, non-surgical and surgical approaches to weight loss. If you are interested in learning more either talk to you primary care provider or call [phone].

## 2020-07-15 ENCOUNTER — ANCILLARY PROCEDURE (OUTPATIENT)
Dept: MRI IMAGING | Facility: CLINIC | Age: 70
End: 2020-07-15
Attending: PODIATRIST
Payer: MEDICARE

## 2020-07-15 DIAGNOSIS — M25.572 ACUTE LEFT ANKLE PAIN: ICD-10-CM

## 2020-07-15 DIAGNOSIS — S82.892A CLOSED FRACTURE OF LEFT ANKLE, INITIAL ENCOUNTER: ICD-10-CM

## 2020-07-15 PROCEDURE — 73721 MRI JNT OF LWR EXTRE W/O DYE: CPT | Mod: TC

## 2020-07-19 DIAGNOSIS — L30.9 DERMATITIS: ICD-10-CM

## 2020-07-22 RX ORDER — TRIAMCINOLONE ACETONIDE 1 MG/G
CREAM TOPICAL
Qty: 80 G | Refills: 3 | Status: SHIPPED | OUTPATIENT
Start: 2020-07-22 | End: 2021-01-29

## 2020-07-22 NOTE — TELEPHONE ENCOUNTER
Prescription approved per [facility] Refill Protocol.    Deepali Yao RN, [facility] Triage

## 2020-07-23 DIAGNOSIS — E78.2 MIXED HYPERLIPIDEMIA: ICD-10-CM

## 2020-07-23 DIAGNOSIS — K21.00 GASTROESOPHAGEAL REFLUX DISEASE WITH ESOPHAGITIS: ICD-10-CM

## 2020-07-27 RX ORDER — PRAVASTATIN SODIUM 20 MG
TABLET ORAL
Qty: 90 TABLET | Refills: 3 | Status: SHIPPED | OUTPATIENT
Start: 2020-07-27 | End: 2021-06-18

## 2020-07-27 RX ORDER — OMEPRAZOLE 40 MG/1
CAPSULE, DELAYED RELEASE ORAL
Qty: 90 CAPSULE | Refills: 1 | Status: SHIPPED | OUTPATIENT
Start: 2020-07-27 | End: 2021-01-12

## 2020-07-27 NOTE — TELEPHONE ENCOUNTER
"For pravastatin:  Routing refill request to provider for review/approval because:  Labs not current:  LDL      For omeprazole:  Prescription approved per [facility] Refill Protocol.    Requested Prescriptions   Pending Prescriptions Disp Refills     pravastatin (PRAVACHOL) 20 MG tablet [Pharmacy Med Name: PRAVASTATIN 20MG TABLETS] 90 tablet 3     Sig: TAKE 1 TABLET(20 MG) BY MOUTH DAILY       Statins Protocol Failed - 7/27/2020  2:52 PM        Failed - LDL on file in past 12 months     Recent Labs   Lab Test 07/05/19  0927   LDL Cannot estimate LDL when triglyceride exceeds 400 mg/dL  149*             Passed - No abnormal creatine kinase in past 12 months     No lab results found.             Passed - Recent (12 mo) or future (30 days) visit within the authorizing provider's specialty     Patient has had an office visit with the authorizing provider or a provider within the authorizing providers department within the previous 12 mos or has a future within next 30 days. See \"Patient Info\" tab in inbasket, or \"Choose Columns\" in Meds & Orders section of the refill encounter.              Passed - Medication is active on med list        Passed - Patient is age 18 or older        Passed - No active pregnancy on record        Passed - No positive pregnancy test in past 12 months           omeprazole (PRILOSEC) 40 MG DR capsule [Pharmacy Med Name: OMEPRAZOLE 40MG CAPSULES] 90 capsule 3     Sig: TAKE 1 CAPSULE(40 MG) BY MOUTH DAILY 30 TO 60 MINUTES BEFORE A MEAL AS NEEDED       PPI Protocol Passed - 7/27/2020  2:52 PM        Passed - Not on Clopidogrel (unless Pantoprazole ordered)        Passed - No diagnosis of osteoporosis on record        Passed - Recent (12 mo) or future (30 days) visit within the authorizing provider's specialty     Patient has had an office visit with the authorizing provider or a provider within the authorizing providers department within the previous 12 mos or has a future within next 30 days. See " "\"Patient Info\" tab in inbasket, or \"Choose Columns\" in Meds & Orders section of the refill encounter.              Passed - Medication is active on med list        Passed - Patient is age 18 or older        Passed - No active pregnacy on record        Passed - No positive pregnancy test in past 12 months               Jaky Driver RN, BSN, PHN    "

## 2020-07-28 ENCOUNTER — OFFICE VISIT (OUTPATIENT)
Dept: URGENT CARE | Facility: URGENT CARE | Age: 70
End: 2020-07-28
Payer: MEDICARE

## 2020-07-28 VITALS
SYSTOLIC BLOOD PRESSURE: 157 MMHG | HEART RATE: 85 BPM | DIASTOLIC BLOOD PRESSURE: 83 MMHG | TEMPERATURE: 98.5 F | RESPIRATION RATE: 16 BRPM | BODY MASS INDEX: 20.19 KG/M2 | WEIGHT: 114 LBS | OXYGEN SATURATION: 98 %

## 2020-07-28 DIAGNOSIS — B00.1 COLD SORE: Primary | ICD-10-CM

## 2020-07-28 DIAGNOSIS — K12.0 CANKER SORES ORAL: ICD-10-CM

## 2020-07-28 PROCEDURE — 99213 OFFICE O/P EST LOW 20 MIN: CPT | Performed by: NURSE PRACTITIONER

## 2020-07-28 RX ORDER — VALACYCLOVIR HYDROCHLORIDE 1 G/1
1000 TABLET, FILM COATED ORAL 2 TIMES DAILY
Qty: 20 TABLET | Refills: 0 | Status: SHIPPED | OUTPATIENT
Start: 2020-07-28 | End: 2020-08-14

## 2020-07-28 ASSESSMENT — ENCOUNTER SYMPTOMS
NAUSEA: 0
DIARRHEA: 0
HEADACHES: 0
VOMITING: 0
RHINORRHEA: 0
FEVER: 0
CHILLS: 0
SHORTNESS OF BREATH: 0
SORE THROAT: 0
COUGH: 0

## 2020-07-28 NOTE — PATIENT INSTRUCTIONS
Patient Education     Understanding Cold Sores  Cold sores, which are also called fever blisters, are small blisters or sores on the lip or sometimes inside the mouth. Many people get them from time to time. Cold sores usually are not serious, and they usually heal in a few days, sometimes longer. They are caused by 2 related viruses, herpes simplex type 1 and 2. These viruses spread very easily. Many people have one or both of these viruses in their body. More than 4 in every 5 people are infected with herpes simplex type 1 and this is the most common cause of cold sores. Once you have the virus that causes cold sores, it stays in your body for the rest of your life. But it can be inactive for long periods.  What causes a cold sore?  Cold sores are usually caused by herpes simplex virus type 1. Less often, they are caused by herpes simplex virus type 2. Herpes simplex virus type 2 is the more common cause of genital sores. The herpes viruses can enter the body through a break in the skin such as a scrape. Or they may enter through mucous membranes such as the lips or mouth. Some ways to get the viruses include:    Kissing someone who has a cold sore    Sharing a drinking glass, eating utensils, or lip balm with someone who has a cold sore    Having sex with someone who has a cold sore  A  baby can also get the infection at birth.  If you have a herpes virus, you can to pass it along even when you don t have a sore.  Cold sores flare up occasionally. Things that can cause an outbreak include:    Sun exposure    Fever    Stress or exhaustion    Menstruation    Skin irritation    Another unrelated Illness such as pneumonia, urinary infection, or cancer  What are the symptoms of a cold sore?  Symptoms can include:    A blister-like sore or cluster of sores. These often occur at the edge of the lips but may appear inside the mouth.    Skin redness around the sores.    Pain or itching in the area of the  outbreak. Often the pain or itching develops 12 to 24 hours before the sore become visible.    Flu-like symptoms, including swollen glands, headache, body ache, or fever. These typically occur only at the time of the first infection.  Cold sores may also occur on fingers. They may rarely infect the eyes, a serious possible complication.  Some people have symptoms a day or two before an outbreak. They may feel tenderness, burning, itching, or tingling before a cold sore appears. Cold sores tend to come back in the same area that they first appeared.  How are cold sores treated?  Treatment for cold sores focuses on relieving and shortening symptoms. For people with frequent outbreaks, treatment works to decrease how often and how symptomatic future episodes will be.  Treatments may include:    Prescription or over-the-counter pain medicines. These can help with discomfort, especially if sores are inside the mouth.    Antiviral medicines. These may be pills that are taken by mouth or a cream to apply to sores. They may help shorten an outbreak and reduce the severity of symptoms.  They may be used to help prevent future outbreaks if you have bothersome recurrent infections.    Self-care such as extra rest and drinking more fluids. These may help relieve the flu-like symptoms of a first outbreak.  How are cold sores diagnosed?  Your healthcare provider makes the diagnosis mainly by looking at the sores and doing a clinical exam.  This may be confirmed by swab tests or blood tests.  How can I prevent cold sores?  You can help reduce the spread of the herpes viruses that cause cold sores. This can help both you and others avoid getting cold sores. Follow these tips:    Do not kiss others if you have a cold sore. Also avoid kissing someone with a cold sore.    Do not share eating utensils, lip balm, razors, or towels with someone who has a cold sore.    Wash your hands after touching the area of a cold sore. The herpes  virus can be carried from your face to your hands when you touch the area of a cold sore. When this happens, wash your hands thoroughly, for at least 20 seconds. When you can t wash with soap and water, use an alcohol-based hand .    Disinfect things you touch often, such as phones and keyboards.      If you feel a cold sore coming on, do the same things you would do when a cold sore is present to avoid spreading the virus.    Use condoms to help prevent passing on the viruses through sex.  What are the possible complications of a cold sore?  Cold sores usually go away by themselves within a few days, occasionally 1-2 weeks or longer. For most people cold sores are not serious. The viruses that cause cold sores can cause more serious illness, though. People who have a weak immune system may get more serious infections from herpes viruses. These include people being treated for cancer or who have HIV disease. Babies may also become very ill from a herpes infection.   When should I call my healthcare provider?  Call your healthcare provider right away if you have any of these:    Fever of 100.4 F (38 C) or higher, or as directed    Pain that gets worse    You cannot eat or drink because of painful sores    Symptoms don t get better within 5 to 7 days    Blisters spread beyond the mouth or lip to areas on the chest, arms, face (especially the eyes), or legs  Date Last Reviewed: 3/28/2016    [address]25-[address] [employer]. [address] [place] [address], [place] [address]. All rights reserved. This information is not intended as a substitute for professional medical care. Always follow your healthcare professional's instructions.

## 2020-07-28 NOTE — PROGRESS NOTES
SUBJECTIVE:   Noreen Florence is a 70 year old female presenting with a chief complaint of   Chief Complaint   Patient presents with     Oral Swelling     Swelling on the tip of tongue for about 3 days.       She is an established patient of [facility].    Oral sore    Onset of symptoms was 3 day(s) ago.  Course of illness is worsening.    Severity moderate  Current and Associated symptoms: pain, red and some white parts on tongue  Treatment measures tried include Tylenol/Ibuprofen.  Predisposing factors include None.        Review of Systems   Constitutional: Negative for chills and fever.   HENT: Positive for mouth sores. Negative for congestion, ear pain, rhinorrhea and sore throat.    Respiratory: Negative for cough and shortness of breath.    Gastrointestinal: Negative for diarrhea, nausea and vomiting.   Neurological: Negative for headaches.   All other systems reviewed and are negative.      Past Medical History:   Diagnosis Date     GERD (gastroesophageal reflux disease) 3/20/2015     Glaucoma (increased eye pressure)      Hyperlipidemia LDL goal < 130 8/3/2015     Hypertension      Hypertension, goal below 150/90 11/28/2014     Rheumatoid arthritis, adult (H)      Family History   Problem Relation Age of Onset     Diabetes Mother      Cancer No family hx of      Hypertension No family hx of      Cerebrovascular Disease No family hx of      Thyroid Disease No family hx of      Glaucoma No family hx of      Macular Degeneration No family hx of      Current Outpatient Medications   Medication Sig Dispense Refill     cetirizine (ZYRTEC) 10 MG tablet TAKE 1 TABLET(10 MG) BY MOUTH DAILY 90 tablet 3     chlorhexidine (PERIDEX) 0.12 % solution SWISH AND SPIT 15 ML BY MOUTH TWICE DAILY 473 mL 3     fluticasone (FLONASE) 50 MCG/ACT nasal spray SHAKE WELL AND USE 1 OR 2 SPRAYS IN EACH NOSTRIL DAILY 48 g 2     hydrocortisone (ANUSOL-HC) 2.5 % cream Place rectally 2 times daily 30 g 3     latanoprost (XALATAN) 0.005 % ophthalmic  solution INSTILL 1 DROP IN BOTH EYES AT BEDTIME 7.5 mL 4     leucovorin (WELLCOVORIN) 5 MG tablet TK 1 T PO 12 H AFTER METHOTREXATE ONE TIME EACH WEEK  3     losartan-hydrochlorothiazide (HYZAAR) 50-12.5 MG tablet TAKE 1 TABLET BY MOUTH DAILY 90 tablet 3     methotrexate 2.5 MG tablet Take 6 tablets (15 mg) by mouth once a week 1 tablet 0     methylcellulose (CITRUCEL) powder Take 2 g by mouth daily 180 g 3     metoprolol tartrate (LOPRESSOR) 25 MG tablet TAKE 1 TABLET(25 MG) BY MOUTH AT BEDTIME 90 tablet 1     omeprazole (PRILOSEC) 40 MG DR capsule TAKE 1 CAPSULE(40 MG) BY MOUTH DAILY 30 TO 60 MINUTES BEFORE A MEAL AS NEEDED 90 capsule 1     order for DME Knee roller. Will need for 3 months. Ankle fracture non weight bearing. Unable to use crutches. 1 Device 0     pravastatin (PRAVACHOL) 20 MG tablet TAKE 1 TABLET(20 MG) BY MOUTH DAILY 90 tablet 3     SF 1.1 % GEL topical gel APPLY TO THE AFFECTED AREA TWICE DAILY 392 g 1     Sodium Fluoride 1.1 % PSTE Apply 1 Application to affected area 2 times daily 112 Bottle 3     triamcinolone (KENALOG) 0.1 % external cream APPLY EXTERNALLY TO THE AFFECTED AREA TWICE DAILY 80 g 3     valACYclovir (VALTREX) 1000 mg tablet Take 1 tablet (1,000 mg) by mouth 2 times daily for 10 days 20 tablet 0     Vitamin D, Cholecalciferol, 25 MCG (1000 UT) CAPS Take 1,000 Units by mouth daily 100 capsule 3     Social History     Tobacco Use     Smoking status: Never Smoker     Smokeless tobacco: Never Used   Substance Use Topics     Alcohol use: No       OBJECTIVE  BP (!) 157/83 (BP Location: Left arm, Patient Position: Sitting, Cuff Size: Adult Small)   Pulse 85   Temp 98.5  F (36.9  C) (Tympanic)   Resp 16   Wt 51.7 kg (114 lb)   LMP 06/21/2002 (Approximate)   SpO2 98%   BMI 20.19 kg/m      Physical Exam  Vitals signs and nursing note reviewed.   Constitutional:       General: She is not in acute distress.     Appearance: She is well-developed. She is not diaphoretic.   HENT:       Head: Normocephalic and atraumatic.      Right Ear: Tympanic membrane and external ear normal.      Left Ear: Tympanic membrane and external ear normal.   Eyes:      Pupils: Pupils are equal, round, and reactive to light.   Neck:      Musculoskeletal: Normal range of motion and neck supple.   Pulmonary:      Effort: Pulmonary effort is normal. No respiratory distress.      Breath sounds: Normal breath sounds.   Lymphadenopathy:      Cervical: No cervical adenopathy.   Skin:     General: Skin is warm and dry.      Comments: Tender annular pea-sized rash on tongue with erythema and a white base.   Neurological:      Mental Status: She is alert.      Cranial Nerves: No cranial nerve deficit.     }    ASSESSMENT:      ICD-10-CM    1. Cold sore  B00.1 valACYclovir (VALTREX) 1000 mg tablet   2. Canker sores oral  K12.0 valACYclovir (VALTREX) 1000 mg tablet        PLAN:  Patient educational/instructional material provided including reasons for follow-up    The patient indicates understanding of these issues and agrees with the plan.      Patient Instructions     Patient Education     Understanding Cold Sores  Cold sores, which are also called fever blisters, are small blisters or sores on the lip or sometimes inside the mouth. Many people get them from time to time. Cold sores usually are not serious, and they usually heal in a few days, sometimes longer. They are caused by 2 related viruses, herpes simplex type 1 and 2. These viruses spread very easily. Many people have one or both of these viruses in their body. More than 4 in every 5 people are infected with herpes simplex type 1 and this is the most common cause of cold sores. Once you have the virus that causes cold sores, it stays in your body for the rest of your life. But it can be inactive for long periods.  What causes a cold sore?  Cold sores are usually caused by herpes simplex virus type 1. Less often, they are caused by herpes simplex virus type 2. Herpes  simplex virus type 2 is the more common cause of genital sores. The herpes viruses can enter the body through a break in the skin such as a scrape. Or they may enter through mucous membranes such as the lips or mouth. Some ways to get the viruses include:    Kissing someone who has a cold sore    Sharing a drinking glass, eating utensils, or lip balm with someone who has a cold sore    Having sex with someone who has a cold sore  A  baby can also get the infection at birth.  If you have a herpes virus, you can to pass it along even when you don t have a sore.  Cold sores flare up occasionally. Things that can cause an outbreak include:    Sun exposure    Fever    Stress or exhaustion    Menstruation    Skin irritation    Another unrelated Illness such as pneumonia, urinary infection, or cancer  What are the symptoms of a cold sore?  Symptoms can include:    A blister-like sore or cluster of sores. These often occur at the edge of the lips but may appear inside the mouth.    Skin redness around the sores.    Pain or itching in the area of the outbreak. Often the pain or itching develops 12 to 24 hours before the sore become visible.    Flu-like symptoms, including swollen glands, headache, body ache, or fever. These typically occur only at the time of the first infection.  Cold sores may also occur on fingers. They may rarely infect the eyes, a serious possible complication.  Some people have symptoms a day or two before an outbreak. They may feel tenderness, burning, itching, or tingling before a cold sore appears. Cold sores tend to come back in the same area that they first appeared.  How are cold sores treated?  Treatment for cold sores focuses on relieving and shortening symptoms. For people with frequent outbreaks, treatment works to decrease how often and how symptomatic future episodes will be.  Treatments may include:    Prescription or over-the-counter pain medicines. These can help with discomfort,  especially if sores are inside the mouth.    Antiviral medicines. These may be pills that are taken by mouth or a cream to apply to sores. They may help shorten an outbreak and reduce the severity of symptoms.  They may be used to help prevent future outbreaks if you have bothersome recurrent infections.    Self-care such as extra rest and drinking more fluids. These may help relieve the flu-like symptoms of a first outbreak.  How are cold sores diagnosed?  Your healthcare provider makes the diagnosis mainly by looking at the sores and doing a clinical exam.  This may be confirmed by swab tests or blood tests.  How can I prevent cold sores?  You can help reduce the spread of the herpes viruses that cause cold sores. This can help both you and others avoid getting cold sores. Follow these tips:    Do not kiss others if you have a cold sore. Also avoid kissing someone with a cold sore.    Do not share eating utensils, lip balm, razors, or towels with someone who has a cold sore.    Wash your hands after touching the area of a cold sore. The herpes virus can be carried from your face to your hands when you touch the area of a cold sore. When this happens, wash your hands thoroughly, for at least 20 seconds. When you can t wash with soap and water, use an alcohol-based hand .    Disinfect things you touch often, such as phones and keyboards.      If you feel a cold sore coming on, do the same things you would do when a cold sore is present to avoid spreading the virus.    Use condoms to help prevent passing on the viruses through sex.  What are the possible complications of a cold sore?  Cold sores usually go away by themselves within a few days, occasionally 1-2 weeks or longer. For most people cold sores are not serious. The viruses that cause cold sores can cause more serious illness, though. People who have a weak immune system may get more serious infections from herpes viruses. These include people being  treated for cancer or who have HIV disease. Babies may also become very ill from a herpes infection.   When should I call my healthcare provider?  Call your healthcare provider right away if you have any of these:    Fever of 100.4 F (38 C) or higher, or as directed    Pain that gets worse    You cannot eat or drink because of painful sores    Symptoms don t get better within 5 to 7 days    Blisters spread beyond the mouth or lip to areas on the chest, arms, face (especially the eyes), or legs  Date Last Reviewed: 3/28/2016    1321-7436 [employer]. [address] [place] [address]. All rights reserved. This information is not intended as a substitute for professional medical care. Always follow your healthcare professional's instructions.

## 2020-07-29 ENCOUNTER — TELEPHONE (OUTPATIENT)
Dept: PODIATRY | Facility: CLINIC | Age: 70
End: 2020-07-29

## 2020-07-29 NOTE — TELEPHONE ENCOUNTER
Patient only needs to wear the boot when walking. Not to bed.     Please let patient know.     Laverne Mayfield DPM

## 2020-07-29 NOTE — TELEPHONE ENCOUNTER
Reason for call:  Meghan calling for Noreen. Asking if she needs to use the boot while sleeping. Please call back.    Phone number to reach patient:  Other phone number:  [phone]*    Best Time:  any    Can we leave a detailed message on this number?  NO

## 2020-07-29 NOTE — TELEPHONE ENCOUNTER
Upon chart review, consent to communicate on file for patient's children Meghan, Tram and Archie.    Number listed on form for patient's daughter Meghan is [phone].    Return call to [facility] at number above. Meghan states that her mother has been wearing the boot at night but has been very uncomfortable and has had difficulty sleeping. She states patient did try sleeping with a body pillow between her legs last night which did help. Explained that she could also try laying on her back with her leg / foot propped up on a few pillows.    Meghan is wondering if her mother needs to wear the boot at night or if it can be removed.    Informed her that due to the patient's injury, the boot should stay on at night to provide some protection / support. Writer will verify with Dr. Mayfield in case she has other recommendations.    She verbalized understanding and was appreciative of call back.    Katerina Davidson, ATC

## 2020-07-29 NOTE — TELEPHONE ENCOUNTER
Return call to [facility]. Informed her of Dr. Mayfield's recommendations. She verbalized understanding and was appreciative of call back. Instructed her to call with any further questions or concerns.    Katerina Davidson ATC

## 2020-08-12 ENCOUNTER — APPOINTMENT (OUTPATIENT)
Dept: OPTOMETRY | Facility: CLINIC | Age: 70
End: 2020-08-12
Payer: MEDICARE

## 2020-08-12 PROCEDURE — 92341 FIT SPECTACLES BIFOCAL: CPT | Mod: GY | Performed by: OPTOMETRIST

## 2020-08-14 ENCOUNTER — ANCILLARY PROCEDURE (OUTPATIENT)
Dept: GENERAL RADIOLOGY | Facility: CLINIC | Age: 70
End: 2020-08-14
Attending: PODIATRIST
Payer: MEDICARE

## 2020-08-14 ENCOUNTER — OFFICE VISIT (OUTPATIENT)
Dept: PODIATRY | Facility: CLINIC | Age: 70
End: 2020-08-14
Payer: MEDICARE

## 2020-08-14 VITALS — SYSTOLIC BLOOD PRESSURE: 120 MMHG | DIASTOLIC BLOOD PRESSURE: 72 MMHG

## 2020-08-14 DIAGNOSIS — M25.572 ACUTE LEFT ANKLE PAIN: Primary | ICD-10-CM

## 2020-08-14 DIAGNOSIS — S82.402G: ICD-10-CM

## 2020-08-14 DIAGNOSIS — M25.572 ACUTE LEFT ANKLE PAIN: ICD-10-CM

## 2020-08-14 DIAGNOSIS — R60.0 EDEMA OF LEFT LOWER LEG: ICD-10-CM

## 2020-08-14 DIAGNOSIS — S82.202G: ICD-10-CM

## 2020-08-14 PROCEDURE — 73610 X-RAY EXAM OF ANKLE: CPT | Mod: LT

## 2020-08-14 PROCEDURE — 99213 OFFICE O/P EST LOW 20 MIN: CPT | Performed by: PODIATRIST

## 2020-08-14 NOTE — PATIENT INSTRUCTIONS
Thank you for choosing [facility] / Foot & Ankle Surgery!    DR. TOLEDO'S CLINIC:  [facility]   [id number]   [place] [address]   [phone] / [phone]      SCHEDULE SURGERY: [phone]   BILLING QUESTIONS: [phone]   AFTER HOURS: [phone]   APPOINTMENTS: [phone]   CONSUMER PRICE LINE: [phone]      Follow up: 1 month  Next steps: physical therapy will contact you to schedule      BODY WEIGHT AND YOUR FEET  The following information is included in the after visit summary for all patients. Body weight can be a sensitive issue to discuss in clinic, but we think the following information is very important. Although we focus on the feet and ankles, we do support the overall health of our patients. Many things can cause foot and ankle problems. Foot structure, activity level, foot mechanics and injuries are common causes of pain. One very important issue that often goes unmentioned, is body weight. Extra weight can cause increased stress on muscles, ligaments, bones and tendons. Sometimes just a few extra pounds is all it takes to put one over her/his threshold. Without reducing that stress, it can be difficult to alleviate pain. As Foot & Ankle specialists, our job is addressing the lower extremity problem and possible causes. Regarding extra body weight, we encourage patients to discuss diet and weight management plans with their primary care doctors. It is this team approach that gives you the best opportunity for pain relief and getting you back on your feet. [facility] has a Comprehensive Weight Management Program. This program includes counseling, education, non-surgical and surgical approaches to weight loss. If you are interested in learning more either talk to you primary care provider or call [phone].

## 2020-08-14 NOTE — LETTER
8/14/2020         RE: Noreen Florence  [address]  [place] [address]        Dear Colleague,    Thank you for referring your patient, Noreen Florence, to the [facility] PODIATRY. Please see a copy of my visit note below.    Podiatry / Foot and Ankle Surgery Progress Note    August 14, 2020    Subject: Patient was seen for follow up on left ankle fractures.  Notes that she is doing well.  Does not have much pain unless she has direct pressure on the left ankle usually when she crosses her legs.  Denies fever, nausea, shortness of breath.  No concerns at this time.    Objective:  Vitals:/72   LMP 06/21/2002 (Approximate)     General:  Patient is alert and orientated.  NAD    Dermatologic: minimal bruising to left lateral ankle. Pain on palpation of the fibular fracture.      Vascular: DP & PT pulses are intact & regular bilaterally.  No significant edema or varicosities noted.  CFT and skin temperature is normal to both lower extremities.     Neurologic: Lower extremity sensation is intact to light touch.  No evidence of weakness or contracture in the lower extremities.  No evidence of neuropathy.     Musculoskeletal: Patient is ambulatory non ambulatory in cam boot.     Imaging: left ankle xrays. I personally reviewed the xrays - Acute obliquely oriented fracture of the distal fibula at the level of the tibial plafond. There is 2 mm lateral and posterior  displacement.  There is some bony consolidation to the fracture noted.  There is still some of the fracture line noted so this is not completely healed.    Assessment:    Acute left ankle pain  Closed fracture of part of fibula with tibia, left, with delayed healing, subsequent encounter  Edema of left lower leg    Plan:   Reviewed x-rays with patient.    Reviewed and discussed x-rays with patient.  At this time she can start putting weight on her foot in the boot.  She will do this for the next month.  We will do an order for physical therapy.  We will  have her follow-up in 1 month for re-x-ray and read assessment.  If doing well we will transition to shoes. All questions were answered to patient satisfaction and she will call with further questions or concerns.    Laverne Mayfield DPM, Podiatry/Foot and Ankle Surgery    Recommended to Noreen Ludivina to follow up with Primary Care provider regarding elevated blood pressure.      Again, thank you for allowing me to participate in the care of your patient.        Sincerely,        Laverne Mayfield DPM, Podiatry/Foot and Ankle Surgery

## 2020-08-14 NOTE — PROGRESS NOTES
Podiatry / Foot and Ankle Surgery Progress Note    August 14, 2020    Subject: Patient was seen for follow up on left ankle fractures.  Notes that she is doing well.  Does not have much pain unless she has direct pressure on the left ankle usually when she crosses her legs.  Denies fever, nausea, shortness of breath.  No concerns at this time.    Objective:  Vitals:/72   LMP 06/21/2002 (Approximate)     General:  Patient is alert and orientated.  NAD    Dermatologic: minimal bruising to left lateral ankle. Pain on palpation of the fibular fracture.      Vascular: DP & PT pulses are intact & regular bilaterally.  No significant edema or varicosities noted.  CFT and skin temperature is normal to both lower extremities.     Neurologic: Lower extremity sensation is intact to light touch.  No evidence of weakness or contracture in the lower extremities.  No evidence of neuropathy.     Musculoskeletal: Patient is ambulatory non ambulatory in cam boot.     Imaging: left ankle xrays. I personally reviewed the xrays - Acute obliquely oriented fracture of the distal fibula at the level of the tibial plafond. There is 2 mm lateral and posterior  displacement.  There is some bony consolidation to the fracture noted.  There is still some of the fracture line noted so this is not completely healed.    Assessment:    Acute left ankle pain  Closed fracture of part of fibula with tibia, left, with delayed healing, subsequent encounter  Edema of left lower leg    Plan:   Reviewed x-rays with patient.    Reviewed and discussed x-rays with patient.  At this time she can start putting weight on her foot in the boot.  She will do this for the next month.  We will do an order for physical therapy.  We will have her follow-up in 1 month for re-x-ray and read assessment.  If doing well we will transition to shoes. All questions were answered to patient satisfaction and she will call with further questions or concerns.    Laverne TAMAYO  MALGORZATA Mayfield, Podiatry/Foot and Ankle Surgery    Recommended to Noreen Florence to follow up with Primary Care provider regarding elevated blood pressure.

## 2020-09-01 ENCOUNTER — THERAPY VISIT (OUTPATIENT)
Dept: PHYSICAL THERAPY | Facility: CLINIC | Age: 70
End: 2020-09-01
Payer: MEDICARE

## 2020-09-01 DIAGNOSIS — M25.572 ACUTE LEFT ANKLE PAIN: ICD-10-CM

## 2020-09-01 DIAGNOSIS — M25.572 PAIN IN JOINT, ANKLE AND FOOT, LEFT: ICD-10-CM

## 2020-09-01 DIAGNOSIS — S82.202G: ICD-10-CM

## 2020-09-01 DIAGNOSIS — R60.0 EDEMA OF LEFT LOWER LEG: ICD-10-CM

## 2020-09-01 DIAGNOSIS — S82.402G: ICD-10-CM

## 2020-09-01 PROCEDURE — 97161 PT EVAL LOW COMPLEX 20 MIN: CPT | Mod: GP | Performed by: PHYSICAL THERAPIST

## 2020-09-01 PROCEDURE — 97110 THERAPEUTIC EXERCISES: CPT | Mod: GP | Performed by: PHYSICAL THERAPIST

## 2020-09-01 NOTE — LETTER
DEPARTMENT OF HEALTH AND HUMAN SERVICES  CENTERS FOR MEDICARE & MEDICAID SERVICES    PLAN/UPDATED PLAN OF PROGRESS FOR OUTPATIENT REHABILITATION@       PATIENTS NAME:  Noreen Florence   : 1950  PROVIDER NUMBER:    [id number]  HICN:[id number]  PROVIDER NAME: [facility]  MEDICAL RECORD NUMBER: [id number]   START OF CARE DATE:  SOC Date: 20   TYPE:  PT  PRIMARY/TREATMENT DIAGNOSIS: (Pertinent Medical Diagnosis)  Acute left ankle pain  Closed fracture of part of fibula with tibia, left, with delayed healing, subsequent encounter  Edema of left lower leg  Pain in joint, ankle and foot, left  VISITS FROM START OF CARE:  Rxs Used: 1     [facility] Initial Evaluation  Subjective:  Patient Health History  Noreen Florence being seen for L ankle fracture.   Problem began: 2020.   Problem occurred: Slipped in garden and twisted ankle   Pain is reported as 1/10 on pain scale.  General health as reported by patient is good.  Pertinent medical history includes: high blood pressure and rheumatoid arthritis.   Red flags:  None as reported by patient.  Medical allergies: none.   Surgeries include:  None.    Current medications:  High blood pressure medication.    Current occupation is Retired.   Primary job tasks include:  Repetitive tasks, prolonged standing and lifting/carrying.     Therapist Generated HPI Evaluation  Problem details: Pt presents to clinic with ongoing complaints of L foot/ankle pain. Pt twisted ankle and fell in garden on 2020, resulting in closed distal fibula fracture. Pt has been in CAM boot and was NWB initially. Pt was able to begin weight bearing in CAM boot on 2020. Pt was referred to PT at that time. .         Type of problem:  Left foot and left ankle.  This is a new condition.  Condition occurred with:  A twist and a fall/slip.  Patient reports pain:  Lateral.  Pain radiates to:  No radiation.   Associated symptoms:  Loss of  motion/stiffness, edema and loss of strength. Symptoms are exacerbated by ascending stairs, bending/squatting, descending stairs, weight bearing, standing and walking  and relieved by rest.    Assessment/Plan:    Patient is a 70 year old female with left side ankle complaints.    Patient has the following significant findings with corresponding treatment plan.                Diagnosis 1:  L ankle fracture, closed distal fibula fracture  Pain -  hot/cold therapy, manual therapy, self management, education and home program  Decreased ROM/flexibility - manual therapy, therapeutic exercise, therapeutic activity and home program  Decreased strength - therapeutic exercise, therapeutic activities and home program  Impaired balance - neuro re-education, therapeutic activities and home program  Impaired gait - gait training, assistive devices and home program  Impaired muscle performance - neuro re-education and home program  Decreased function - therapeutic activities and home program    Therapy Evaluation Codes:   1) History comprised of:   Personal factors that impact the plan of care:      None.    Comorbidity factors that impact the plan of care are:      High blood pressure and Rheumatoid arthritis.     Medications impacting care: High blood pressure.  2) Examination of Body Systems comprised of:   Body structures and functions that impact the plan of care:      Ankle.   Activity limitations that impact the plan of care are:      Squatting/kneeling, Stairs and Standing.  3) Clinical presentation characteristics are:   Stable/Uncomplicated.  4) Decision-Making    Low complexity using standardized patient assessment instrument and/or measureable assessment of functional outcome.  Cumulative Therapy Evaluation is: Low complexity.    Previous and current functional limitations:  (See Goal Flow Sheet for this information)    Short term and Long term goals: (See Goal Flow Sheet for this information)     Communication  "ability:  Patient appears to be able to clearly communicate and understand verbal and written communication and follow directions correctly.  Treatment Explanation - The following has been discussed with the patient:   RX ordered/plan of care  Anticipated outcomes  Possible risks and side effects  This patient would benefit from PT intervention to resume normal activities.   Rehab potential is good.  Frequency:  1 X week, once daily  Duration:  for 8 weeks  Discharge Plan:  Achieve all LTG.  Independent in home treatment program.  Return to previous functional level by discharge.  Reach maximal therapeutic benefit.  Please refer to the daily flowsheet for treatment today, total treatment time and time spent performing 1:1 timed codes.     Caregiver Signature/Credentials _____________________________________________ Date ________       Juan Manuel Angel DPT   I have reviewed and certified the need for these services and plan of treatment while under my care.        PHYSICIAN'S SIGNATURE:   ______________________________________________  Date___________     Laverne Mayfield MD    Certification period:  Beginning of Cert date period: 09/01/20 to  End of Cert period date: 11/29/20   Functional Level Progress Report: Please see attached \"Goal Flow sheet for Functional level.\"  ____X____ Continue Services or       ________ DC Services              Service dates: From  SOC Date: 09/01/20 date to present                         "

## 2020-09-01 NOTE — PROGRESS NOTES
[facility] Initial Evaluation  Subjective:    Patient Health History  Noreen Florence being seen for L ankle fracture.     Problem began: 7/12/2020.   Problem occurred: Slipped in garden and twisted ankle   Pain is reported as 1/10 on pain scale.  General health as reported by patient is good.  Pertinent medical history includes: high blood pressure and rheumatoid arthritis.   Red flags:  None as reported by patient.  Medical allergies: none.   Surgeries include:  None.    Current medications:  High blood pressure medication.    Current occupation is Retired.   Primary job tasks include:  Repetitive tasks, prolonged standing and lifting/carrying.                  Therapist Generated HPI Evaluation  Problem details: Pt presents to clinic with ongoing complaints of L foot/ankle pain. Pt twisted ankle and fell in garden on 7/12/2020, resulting in closed distal fibula fracture. Pt has been in CAM boot and was NWB initially. Pt was able to begin weight bearing in CAM boot on 8/14/2020. Pt was referred to PT at that time. .         Type of problem:  Left foot and left ankle.    This is a new condition.  Condition occurred with:  A twist and a fall/slip.    Patient reports pain:  Lateral.    Pain radiates to:  No radiation.     Associated symptoms:  Loss of motion/stiffness, edema and loss of strength. Symptoms are exacerbated by ascending stairs, bending/squatting, descending stairs, weight bearing, standing and walking  and relieved by rest.                              Objective:  System    Physical Exam    General     ROS    Assessment/Plan:    Patient is a 70 year old female with left side ankle complaints.    Patient has the following significant findings with corresponding treatment plan.                Diagnosis 1:  L ankle fracture, closed distal fibula fracture  Pain -  hot/cold therapy, manual therapy, self management, education and home program  Decreased ROM/flexibility - manual therapy,  therapeutic exercise, therapeutic activity and home program  Decreased strength - therapeutic exercise, therapeutic activities and home program  Impaired balance - neuro re-education, therapeutic activities and home program  Impaired gait - gait training, assistive devices and home program  Impaired muscle performance - neuro re-education and home program  Decreased function - therapeutic activities and home program    Therapy Evaluation Codes:   1) History comprised of:   Personal factors that impact the plan of care:      None.    Comorbidity factors that impact the plan of care are:      High blood pressure and Rheumatoid arthritis.     Medications impacting care: High blood pressure.  2) Examination of Body Systems comprised of:   Body structures and functions that impact the plan of care:      Ankle.   Activity limitations that impact the plan of care are:      Squatting/kneeling, Stairs and Standing.  3) Clinical presentation characteristics are:   Stable/Uncomplicated.  4) Decision-Making    Low complexity using standardized patient assessment instrument and/or measureable assessment of functional outcome.  Cumulative Therapy Evaluation is: Low complexity.    Previous and current functional limitations:  (See Goal Flow Sheet for this information)    Short term and Long term goals: (See Goal Flow Sheet for this information)     Communication ability:  Patient appears to be able to clearly communicate and understand verbal and written communication and follow directions correctly.  Treatment Explanation - The following has been discussed with the patient:   RX ordered/plan of care  Anticipated outcomes  Possible risks and side effects  This patient would benefit from PT intervention to resume normal activities.   Rehab potential is good.    Frequency:  1 X week, once daily  Duration:  for 8 weeks  Discharge Plan:  Achieve all LTG.  Independent in home treatment program.  Return to previous functional level by  discharge.  Reach maximal therapeutic benefit.    Please refer to the daily flowsheet for treatment today, total treatment time and time spent performing 1:1 timed codes.

## 2020-09-08 ENCOUNTER — THERAPY VISIT (OUTPATIENT)
Dept: PHYSICAL THERAPY | Facility: CLINIC | Age: 70
End: 2020-09-08
Payer: MEDICARE

## 2020-09-08 DIAGNOSIS — M25.572 PAIN IN JOINT, ANKLE AND FOOT, LEFT: ICD-10-CM

## 2020-09-08 PROCEDURE — 97112 NEUROMUSCULAR REEDUCATION: CPT | Mod: GP | Performed by: PHYSICAL THERAPIST

## 2020-09-08 PROCEDURE — 97140 MANUAL THERAPY 1/> REGIONS: CPT | Mod: GP | Performed by: PHYSICAL THERAPIST

## 2020-09-08 PROCEDURE — 97110 THERAPEUTIC EXERCISES: CPT | Mod: GP | Performed by: PHYSICAL THERAPIST

## 2020-09-09 ENCOUNTER — ANCILLARY PROCEDURE (OUTPATIENT)
Dept: GENERAL RADIOLOGY | Facility: CLINIC | Age: 70
End: 2020-09-09
Attending: PODIATRIST
Payer: MEDICARE

## 2020-09-09 ENCOUNTER — OFFICE VISIT (OUTPATIENT)
Dept: PODIATRY | Facility: CLINIC | Age: 70
End: 2020-09-09
Payer: MEDICARE

## 2020-09-09 VITALS
DIASTOLIC BLOOD PRESSURE: 70 MMHG | SYSTOLIC BLOOD PRESSURE: 114 MMHG | HEIGHT: 63 IN | WEIGHT: 114 LBS | BODY MASS INDEX: 20.2 KG/M2

## 2020-09-09 DIAGNOSIS — M25.572 ACUTE LEFT ANKLE PAIN: Primary | ICD-10-CM

## 2020-09-09 DIAGNOSIS — Z87.81 HEALED FRACTURE: ICD-10-CM

## 2020-09-09 DIAGNOSIS — R60.0 EDEMA OF LEFT LOWER EXTREMITY: ICD-10-CM

## 2020-09-09 DIAGNOSIS — M25.572 ACUTE LEFT ANKLE PAIN: ICD-10-CM

## 2020-09-09 PROCEDURE — 73610 X-RAY EXAM OF ANKLE: CPT | Mod: LT

## 2020-09-09 PROCEDURE — 99213 OFFICE O/P EST LOW 20 MIN: CPT | Performed by: PODIATRIST

## 2020-09-09 ASSESSMENT — MIFFLIN-ST. JEOR: SCORE: 1016.23

## 2020-09-09 NOTE — LETTER
"    9/9/2020         RE: Noreen Florence  [address]  [place] [address]        Dear Colleague,    Thank you for referring your patient, Noreen Florence, to the [facility] [place] PODIATRY. Please see a copy of my visit note below.    Podiatry / Foot and Ankle Surgery Progress Note    September 9, 2020    Subject: Patient was seen for follow up on left ankle fracture. She has been 2 month in a boot.  She notes she is doing well.  Is not really having any pain.  Has been doing some physical therapy.  No concerns today.    Objective:  Vitals: Blood pressure 114/70, height 1.6 m (5' 3\"), weight 51.7 kg (114 lb), last menstrual period 06/21/2002, not currently breastfeeding.      A1C: 5.9 (4/2019)    General:  Patient is alert and orientated.  NAD.    Dermatologic: minimal bruising to left lateral ankle. Pain on palpation of the fibular fracture.      Vascular: DP & PT pulses are intact & regular bilaterally.  edema left foot and ankle but no varicosities noted.  CFT and skin temperature is normal to both lower extremities.     Neurologic: Lower extremity sensation is intact to light touch.  No evidence of weakness or contracture in the lower extremities.  No evidence of neuropathy.     Musculoskeletal: Patient is ambulatory non ambulatory in cam boot.  No pain on palpation of the left distal fibula.    Imaging: left ankle xray - I personally reviewed the xrays.  Shows bony consolidation across the fibular fracture.  No further displacement of fracture fragments.  No widening of the ankle joint.    Assessment:     Acute left ankle pain  Healed fracture  Edema of left lower extremity    Plan: At this time we will have her transition from a boot to an ankle brace.  She will wear that for the next 4 to 6 weeks.  She will continue out physical therapy at this time.  We will also give her an order for compression socks due to some swelling to the left foot.  At this time she will follow-up as needed.  She was told to call further " questions or concerns.    Laverne Mayfield DPM, Podiatry/Foot and Ankle Surgery          Again, thank you for allowing me to participate in the care of your patient.        Sincerely,        Laverne Mayfield DPM, Podiatry/Foot and Ankle Surgery

## 2020-09-09 NOTE — PATIENT INSTRUCTIONS
Thank you for choosing [facility] Podiatry / Foot & Ankle Surgery!    [facility] [place] SCHEDULE SURGERY: [phone] [address] BILLING QUESTIONS: [phone]   [place] [address] AFTER HOURS: [phone]   PH: [phone] CONSUMER PRICE LINE:[phone]   FAX: [phone] APPOINTMENTS: [phone]     Follow up: as needed

## 2020-09-09 NOTE — PROGRESS NOTES
"Podiatry / Foot and Ankle Surgery Progress Note    September 9, 2020    Subject: Patient was seen for follow up on left ankle fracture. She has been 2 month in a boot.  She notes she is doing well.  Is not really having any pain.  Has been doing some physical therapy.  No concerns today.    Objective:  Vitals: Blood pressure 114/70, height 1.6 m (5' 3\"), weight 51.7 kg (114 lb), last menstrual period 06/21/2002, not currently breastfeeding.      A1C: 5.9 (4/2019)    General:  Patient is alert and orientated.  NAD.    Dermatologic: minimal bruising to left lateral ankle. Pain on palpation of the fibular fracture.      Vascular: DP & PT pulses are intact & regular bilaterally.  edema left foot and ankle but no varicosities noted.  CFT and skin temperature is normal to both lower extremities.     Neurologic: Lower extremity sensation is intact to light touch.  No evidence of weakness or contracture in the lower extremities.  No evidence of neuropathy.     Musculoskeletal: Patient is ambulatory non ambulatory in cam boot.  No pain on palpation of the left distal fibula.    Imaging: left ankle xray - I personally reviewed the xrays.  Shows bony consolidation across the fibular fracture.  No further displacement of fracture fragments.  No widening of the ankle joint.    Assessment:     Acute left ankle pain  Healed fracture  Edema of left lower extremity    Plan: At this time we will have her transition from a boot to an ankle brace.  She will wear that for the next 4 to 6 weeks.  She will continue out physical therapy at this time.  We will also give her an order for compression socks due to some swelling to the left foot.  At this time she will follow-up as needed.  She was told to call further questions or concerns.    Laverne Mayfield DPM, Podiatry/Foot and Ankle Surgery        "

## 2020-09-15 ENCOUNTER — THERAPY VISIT (OUTPATIENT)
Dept: PHYSICAL THERAPY | Facility: CLINIC | Age: 70
End: 2020-09-15
Payer: MEDICARE

## 2020-09-15 DIAGNOSIS — M25.572 PAIN IN JOINT, ANKLE AND FOOT, LEFT: ICD-10-CM

## 2020-09-15 PROCEDURE — 97112 NEUROMUSCULAR REEDUCATION: CPT | Mod: GP | Performed by: PHYSICAL THERAPIST

## 2020-09-15 PROCEDURE — 97140 MANUAL THERAPY 1/> REGIONS: CPT | Mod: GP | Performed by: PHYSICAL THERAPIST

## 2020-09-15 PROCEDURE — 97110 THERAPEUTIC EXERCISES: CPT | Mod: GP | Performed by: PHYSICAL THERAPIST

## 2020-10-08 ENCOUNTER — OFFICE VISIT (OUTPATIENT)
Dept: FAMILY MEDICINE | Facility: CLINIC | Age: 70
End: 2020-10-08
Payer: MEDICARE

## 2020-10-08 VITALS
RESPIRATION RATE: 16 BRPM | SYSTOLIC BLOOD PRESSURE: 132 MMHG | OXYGEN SATURATION: 98 % | WEIGHT: 114 LBS | HEART RATE: 76 BPM | BODY MASS INDEX: 20.2 KG/M2 | TEMPERATURE: 98 F | DIASTOLIC BLOOD PRESSURE: 84 MMHG | HEIGHT: 63 IN

## 2020-10-08 DIAGNOSIS — M06.4 POLYARTHRITIS, INFLAMMATORY (H): ICD-10-CM

## 2020-10-08 DIAGNOSIS — E78.5 HYPERLIPIDEMIA WITH TARGET LDL LESS THAN 130: ICD-10-CM

## 2020-10-08 DIAGNOSIS — I10 HYPERTENSION, GOAL BELOW 150/90: ICD-10-CM

## 2020-10-08 DIAGNOSIS — M35.00 SICCA SYNDROME (H): ICD-10-CM

## 2020-10-08 DIAGNOSIS — M85.89 OSTEOPENIA OF MULTIPLE SITES: ICD-10-CM

## 2020-10-08 DIAGNOSIS — Z00.00 ENCOUNTER FOR MEDICARE ANNUAL WELLNESS EXAM: Primary | ICD-10-CM

## 2020-10-08 DIAGNOSIS — R53.82 CHRONIC FATIGUE: ICD-10-CM

## 2020-10-08 DIAGNOSIS — Z79.899 HIGH RISK MEDICATION USE: ICD-10-CM

## 2020-10-08 DIAGNOSIS — E55.9 VITAMIN D DEFICIENCY: ICD-10-CM

## 2020-10-08 DIAGNOSIS — R73.9 ELEVATED BLOOD SUGAR: ICD-10-CM

## 2020-10-08 LAB
ALT SERPL W P-5'-P-CCNC: 44 U/L (ref 0–50)
ANION GAP SERPL CALCULATED.3IONS-SCNC: 5 MMOL/L (ref 3–14)
BUN SERPL-MCNC: 12 MG/DL (ref 7–30)
CALCIUM SERPL-MCNC: 9.4 MG/DL (ref 8.5–10.1)
CHLORIDE SERPL-SCNC: 106 MMOL/L (ref 94–109)
CHOLEST SERPL-MCNC: 184 MG/DL
CO2 SERPL-SCNC: 31 MMOL/L (ref 20–32)
CREAT SERPL-MCNC: 0.41 MG/DL (ref 0.52–1.04)
CREAT UR-MCNC: 125 MG/DL
DEPRECATED CALCIDIOL+CALCIFEROL SERPL-MC: 54 UG/L (ref 20–75)
GFR SERPL CREATININE-BSD FRML MDRD: >90 ML/MIN/{1.73_M2}
GLUCOSE SERPL-MCNC: 101 MG/DL (ref 70–99)
HBA1C MFR BLD: 5.6 % (ref 0–5.6)
HDLC SERPL-MCNC: 41 MG/DL
HGB BLD-MCNC: 14.4 G/DL (ref 11.7–15.7)
LDLC SERPL CALC-MCNC: 92 MG/DL
MICROALBUMIN UR-MCNC: 32 MG/L
MICROALBUMIN/CREAT UR: 25.68 MG/G CR (ref 0–25)
NONHDLC SERPL-MCNC: 143 MG/DL
POTASSIUM SERPL-SCNC: 3.7 MMOL/L (ref 3.4–5.3)
SODIUM SERPL-SCNC: 142 MMOL/L (ref 133–144)
TRIGL SERPL-MCNC: 255 MG/DL
TSH SERPL DL<=0.005 MIU/L-ACNC: 2.23 MU/L (ref 0.4–4)

## 2020-10-08 PROCEDURE — 85018 HEMOGLOBIN: CPT | Performed by: FAMILY MEDICINE

## 2020-10-08 PROCEDURE — 84460 ALANINE AMINO (ALT) (SGPT): CPT | Performed by: FAMILY MEDICINE

## 2020-10-08 PROCEDURE — 82306 VITAMIN D 25 HYDROXY: CPT | Performed by: FAMILY MEDICINE

## 2020-10-08 PROCEDURE — 80048 BASIC METABOLIC PNL TOTAL CA: CPT | Performed by: FAMILY MEDICINE

## 2020-10-08 PROCEDURE — 36415 COLL VENOUS BLD VENIPUNCTURE: CPT | Performed by: FAMILY MEDICINE

## 2020-10-08 PROCEDURE — G0439 PPPS, SUBSEQ VISIT: HCPCS | Performed by: FAMILY MEDICINE

## 2020-10-08 PROCEDURE — 82043 UR ALBUMIN QUANTITATIVE: CPT | Performed by: FAMILY MEDICINE

## 2020-10-08 PROCEDURE — 80061 LIPID PANEL: CPT | Performed by: FAMILY MEDICINE

## 2020-10-08 PROCEDURE — 83036 HEMOGLOBIN GLYCOSYLATED A1C: CPT | Performed by: FAMILY MEDICINE

## 2020-10-08 PROCEDURE — 84443 ASSAY THYROID STIM HORMONE: CPT | Performed by: FAMILY MEDICINE

## 2020-10-08 PROCEDURE — G0008 ADMIN INFLUENZA VIRUS VAC: HCPCS | Performed by: FAMILY MEDICINE

## 2020-10-08 PROCEDURE — 90662 IIV NO PRSV INCREASED AG IM: CPT | Performed by: FAMILY MEDICINE

## 2020-10-08 RX ORDER — FAMOTIDINE 20 MG
1000 TABLET ORAL DAILY
Qty: 100 CAPSULE | Refills: 3 | Status: SHIPPED | OUTPATIENT
Start: 2020-10-08 | End: 2021-11-01

## 2020-10-08 ASSESSMENT — PAIN SCALES - GENERAL: PAINLEVEL: NO PAIN (0)

## 2020-10-08 ASSESSMENT — MIFFLIN-ST. JEOR: SCORE: 1016.23

## 2020-10-08 NOTE — PATIENT INSTRUCTIONS
Patient Education   Personalized Prevention Plan  You are due for the preventive services outlined below.  Your care team is available to assist you in scheduling these services.  If you have already completed any of these items, please share that information with your care team to update in your medical record.  Health Maintenance Due   Topic Date Due     Discuss Advance Care Planning  1950     Flu Vaccine (1) 09/01/2020     FALL RISK ASSESSMENT  10/21/2020           At [facility], we strive to deliver an exceptional experience to you, every time we see you. If you receive a survey, please complete it as we do value your feedback.  If you have MyChart, you can expect to receive results automatically within 24 hours of their completion.  Your provider will send a note interpreting your results as well.   If you do not have MyChart, you should receive your results in about a week by mail.    Your care team:                            Family Medicine Internal Medicine   MD Alfredo Morgan, MD Stefanie Burris, MD Jael Krishnamurthy PA-C  Ana Rosa Akers, APRN CNP    Binh Barnes, MD Pediatrics   Jose Bryson, PA-C  Priscilla Holman, CNP MD Julianna Rodriguez APRN CNP   MD Naila Champion MD Deborah Mielke, MD Kim Thein, APRN CNP  Joann Bales, PAKIRAN Tony, CNP  MD Wanda Osorio MD Angela Wermerskirchen, MD      Clinic hours: Monday - Thursday 7 am-7 pm; Fridays 7 am-5 pm.   Urgent care: Monday - Friday 11 am-9 pm; Saturday and Sunday 9 am-5 pm.    Clinic: [phone]       [facility]: Monday - Thursday 8 am - 7 pm; Friday 8 am - 6 pm  [facility]: [phone]     Use www.[email or web address] for 24/7 diagnosis and treatment of dozens of conditions.  ]  Patient Education     Vitamin D  Does this test have other  Patient needs a new set of compression socks ordered through [facility]. She needs a size small/full length. Patent states the number on her socks is [id number].   [facility] [phone]  Fax [phone]   names?  25-hydroxyvitamin D (25-high-DROX-ee-VIE-tuh-min D), 25(OH)D  What is this test?  Vitamin D is mainly found in fortified dairy foods, juice, breakfast cereal, and certain fish. This vitamin plays many roles in the body. But because it helps the body absorb calcium from foods and supplements, it's particularly important for bone health. Vitamin D has many additional roles in the body.  Vitamin D comes in several forms. When ultraviolet light, such as sunlight, hits your skin, it creates vitamin D3. D2 is used to fortify dairy foods. Both of these are further processed by your liver and kidneys into a form your body can use. Most tests for vitamin D check the level of a form circulating in the body called 25-hydroxyvitamin D, also called 25(OH)D.   Why do I need this test?  You may need this test if your healthcare provider wants to check your vitamin D levels to find out if you have any risks to bone health. These might be:    Low calcium    Soft bones caused by low vitamin D or problems using it (osteomalacia)    Osteopenia    Osteoporosis    Rickets, in children  You may also need this test if you are at risk for low vitamin D levels. Risks include:    Being an older adult    Having difficulty absorbing fat from your diet    Having chronic kidney disease    Have dark skin pigmentation    Being a  baby  Vitamin D has many effects in the body. You may need this test to help your healthcare provider diagnose or treat:    Problems with the parathyroid gland    Cancer    Autoimmune diseases, such as multiple sclerosis and Crohn's disease    Psoriasis    Asthma    Weakness or falls    What other tests might I have along with this test?  A healthcare provider may also want to check your parathyroid hormone levels and your calcium levels.   What do my test results mean?  Test results may vary depending on your age, gender, health history, the method used for the test, and other things. Your test results  may not mean you have a problem. Ask your healthcare provider what your test results mean for you.   Children and adults need more than 30 nanograms per milliliter (ng/ml) of vitamin D. The optimal level of 25(OH)D is usually between 30 and 60 ng/mL. Recommended daily amounts range from 400 to 800 international units (IU) per day based on your age.  Levels lower than normal can mean you are:    Not making enough vitamin D on your own    Not getting enough vitamin D in your diet    Not absorbing vitamin D from your food as you should  Lower levels may also mean that your body is not converting the vitamin as it should. This might be because of kidney or liver disease.  Above-normal levels may be a sign that you're taking too much in supplement form.   How is this test done?  The test is done with a blood sample. A needle is used to draw blood from a vein in your arm or hand.   Does this test pose any risks?  Having a blood test with a needle carries some risks. These include bleeding, infection, bruising, and feeling lightheaded. When the needle pricks your arm or hand, you may feel a slight sting or pain. Afterward, the site may be sore.   What might affect my test results?  The amount of time you spend in the sunlight, your diet, and whether you take vitamin D in supplement form can affect your vitamin D levels. Ask your healthcare provider if any health conditions you have or medicines you take could affect your results.  How do I get ready for this test?  Tell your healthcare provider if you take vitamin D supplements. Be sure your healthcare provider knows about all medicines, herbs, vitamins, and supplements you are taking. This includes medicines that don't need a prescription and any illicit drugs you may use.     4896-5333 [employer]. [address] [place] [address], [place] [address]. All rights reserved. This information is not intended as a substitute for professional medical care. Always follow  your healthcare professional's instructions.

## 2020-10-08 NOTE — PROGRESS NOTES
"  SUBJECTIVE:   Noreen Florence is a 70 year old female who presents for Preventive Visit.    Patient has been advised of split billing requirements and indicates understanding: Yes  Are you in the first 12 months of your Medicare Part B coverage?  No    Physical Health:    In general, how would you rate your overall physical health? good    Outside of work, how many days during the week do you exercise? none    Outside of work, approximately how many minutes a day do you exercise?less than 15 minutes    If you drink alcohol do you typically have >3 drinks per day or >7 drinks per week? No    Do you usually eat at least 4 servings of fruit and vegetables a day, include whole grains & fiber and avoid regularly eating high fat or \"junk\" foods? Yes    Do you have any problems taking medications regularly?  No    Do you have any side effects from medications? none    Needs assistance for the following daily activities: no assistance needed    Which of the following safety concerns are present in your home?  lack of grab bars in the bathroom     Hearing impairment: No, just have ringing in ear    In the past 6 months, have you been bothered by leaking of urine? yes    Mental Health:    In general, how would you rate your overall mental or emotional health? good  PHQ-2 Score:      Do you feel safe in your environment? Yes    Have you ever done Advance Care Planning? (For example, a Health Directive, POLST, or a discussion with a medical provider or your loved ones about your wishes): No, advance care planning information given to patient to review.  Patient declined advance care planning discussion at this time.    Additional concerns to address?  YES, refill medications.    Fall risk:  Fallen 2 or more times in the past year?: No  Any fall with injury in the past year?: No    Cognitive Screenin) Repeat 3 items (Leader, Season, Table)    2) Clock draw: NORMAL  3) 3 item recall: Recalls 3 objects  Results: NORMAL clock, 1-2 " items recalled: COGNITIVE IMPAIRMENT LESS LIKELY    Mini-CogTM Copyright GUIDO Cárdenas. Licensed by the author for use in [facility]; reprinted with permission (anthony@[facility]). All rights reserved.      Do you have sleep apnea, excessive snoring or daytime drowsiness?: no        Hyperlipidemia Follow-Up      Are you regularly taking any medication or supplement to lower your cholesterol?   No    Are you having muscle aches or other side effects that you think could be caused by your cholesterol lowering medication?  No    Hypertension Follow-up      Do you check your blood pressure regularly outside of the clinic? No     Are you following a low salt diet? No    Are your blood pressures ever more than 140 on the top number (systolic) OR more   than 90 on the bottom number (diastolic), for example 140/90? No    Chronic Pain Follow-Up    Where in your body do you have pain? Multiple joints due to arthritis. Feeling more fatigued over the past few months.   How has your pain affected your ability to work? Not applicable  Which of these pain treatments have you tried since your last clinic visit? Stretching  How well are you sleeping? Fair  How has your mood been since your last visit? About the same  Have you had a significant life event? Health Concerns  Other aggravating factors: sedentary lifestyle  Taking medication as directed? Yes    No flowsheet data found.  No flowsheet data found.  No flowsheet data found.  Encounter-Level CSA:    There are no encounter-level csa.     Patient-Level CSA:    There are no patient-level csa.         Reviewed and updated as needed this visit by clinical staff  Tobacco  Allergies  Meds   Med Hx  Surg Hx  Fam Hx  Soc Hx        Reviewed and updated as needed this visit by Provider                Social History     Tobacco Use     Smoking status: Never Smoker     Smokeless tobacco: Never Used   Substance Use Topics     Alcohol use: No                           Current  providers sharing in care for this patient include:   Patient Care Team:  Jia Castro MD as PCP - General (Family Practice)  Jia Castro MD as PCP (Family Practice)  Jia Castro MD as Assigned PCP  Emi Morales MD as MD (Urology)  Luis Cabrera MD as Resident (Radiology)  Bri Morales MD as MD (Urology)    The following health maintenance items are reviewed in Epic and correct as of today:  Health Maintenance   Topic Date Due     ADVANCE CARE PLANNING  1950     INFLUENZA VACCINE (1) 09/01/2020     FALL RISK ASSESSMENT  10/21/2020     MAMMO SCREENING  05/24/2021     EYE EXAM  07/13/2021     MEDICARE ANNUAL WELLNESS VISIT  10/08/2021     DTAP/TDAP/TD IMMUNIZATION (3 - Td) 07/03/2022     LIPID  07/05/2024     COLORECTAL CANCER SCREENING  04/05/2027     DEXA  Completed     HEPATITIS C SCREENING  Completed     PHQ-2  Completed     Pneumococcal Vaccine: 65+ Years  Completed     ZOSTER IMMUNIZATION  Completed     Pneumococcal Vaccine: Pediatrics (0 to 5 Years) and At-Risk Patients (6 to 64 Years)  Aged Out     IPV IMMUNIZATION  Aged Out     MENINGITIS IMMUNIZATION  Aged Out     HEPATITIS B IMMUNIZATION  Aged Out     Lab work is in process  Labs reviewed in EPIC  BP Readings from Last 3 Encounters:   10/08/20 132/84   09/09/20 114/70   08/14/20 120/72    Wt Readings from Last 3 Encounters:   10/08/20 51.7 kg (114 lb)   09/09/20 51.7 kg (114 lb)   07/28/20 51.7 kg (114 lb)                  Patient Active Problem List   Diagnosis     Osteopenia     Hayfever     Hypertension, goal below 150/90     Polyarthritis, inflammatory (H)     GERD (gastroesophageal reflux disease)     Hyperlipidemia with target LDL less than 130     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     High risk medication use     Microscopic hematuria     Pterygium of right eye     Sicca syndrome (H)     Angiomyolipoma of right kidney     History of Helicobacter pylori infection      Pre-diabetes     Pain in joint, ankle and foot, left     Past Surgical History:   Procedure Laterality Date     COLONOSCOPY       COLONOSCOPY WITH CO2 INSUFFLATION N/A 4/5/2017    Procedure: COLONOSCOPY WITH CO2 INSUFFLATION;  Surgeon: Duane, William Charles, MD;  Location: [facility]     COMBINED ESOPHAGOSCOPY, GASTROSCOPY, DUODENOSCOPY (EGD) WITH CO2 INSUFFLATION N/A 2/13/2019    Procedure: COMBINED ESOPHAGOSCOPY, GASTROSCOPY, DUODENOSCOPY (EGD) WITH CO2 INSUFFLATION;  Surgeon: Sly De Santiago MD;  Location: [facility]     COMBINED REPAIR PTOSIS WITH BLEPHAROPLASTY BILATERAL Bilateral 1/6/2017    Procedure: COMBINED REPAIR PTOSIS WITH BLEPHAROPLASTY BILATERAL;  Surgeon: Carly Norman MD;  Location: [facility]     ESOPHAGOSCOPY, GASTROSCOPY, DUODENOSCOPY (EGD), COMBINED N/A 1/5/2016    Procedure: COMBINED ESOPHAGOSCOPY, GASTROSCOPY, DUODENOSCOPY (EGD), BIOPSY SINGLE OR MULTIPLE;  Surgeon: Duane, William Charles, MD;  Location: [facility]     ESOPHAGOSCOPY, GASTROSCOPY, DUODENOSCOPY (EGD), COMBINED N/A 2/13/2019    Procedure: Combined Esophagoscopy, Gastroscopy, Duodenoscopy (Egd), Biopsy Single Or Multiple;  Surgeon: Sly De Santiago MD;  Location: [facility]     REPAIR ENTROPION BILATERAL Bilateral 1/6/2017    Procedure: REPAIR ENTROPION BILATERAL;  Surgeon: Carly Norman MD;  Location: [facility]     REPAIR PTOSIS Bilateral 01/2017       Social History     Tobacco Use     Smoking status: Never Smoker     Smokeless tobacco: Never Used   Substance Use Topics     Alcohol use: No     Family History   Problem Relation Age of Onset     Diabetes Mother      Cancer No family hx of      Hypertension No family hx of      Cerebrovascular Disease No family hx of      Thyroid Disease No family hx of      Glaucoma No family hx of      Macular Degeneration No family hx of          Current Outpatient Medications   Medication Sig Dispense Refill     cetirizine (ZYRTEC) 10 MG tablet TAKE 1 TABLET(10 MG) BY MOUTH DAILY 90 tablet  3     chlorhexidine (PERIDEX) 0.12 % solution SWISH AND SPIT 15 ML BY MOUTH TWICE DAILY 473 mL 3     fluticasone (FLONASE) 50 MCG/ACT nasal spray SHAKE WELL AND USE 1 OR 2 SPRAYS IN EACH NOSTRIL DAILY 48 g 2     hydrocortisone (ANUSOL-HC) 2.5 % cream Place rectally 2 times daily 30 g 3     latanoprost (XALATAN) 0.005 % ophthalmic solution INSTILL 1 DROP IN BOTH EYES AT BEDTIME 7.5 mL 4     leucovorin (WELLCOVORIN) 5 MG tablet TK 1 T PO 12 H AFTER METHOTREXATE ONE TIME EACH WEEK  3     losartan-hydrochlorothiazide (HYZAAR) 50-12.5 MG tablet TAKE 1 TABLET BY MOUTH DAILY 90 tablet 3     methotrexate 2.5 MG tablet Take 6 tablets (15 mg) by mouth once a week 1 tablet 0     methylcellulose (CITRUCEL) powder Take 2 g by mouth daily 180 g 3     metoprolol tartrate (LOPRESSOR) 25 MG tablet TAKE 1 TABLET(25 MG) BY MOUTH AT BEDTIME 90 tablet 1     omeprazole (PRILOSEC) 40 MG DR capsule TAKE 1 CAPSULE(40 MG) BY MOUTH DAILY 30 TO 60 MINUTES BEFORE A MEAL AS NEEDED 90 capsule 1     order for DME Knee roller. Will need for 3 months. Ankle fracture non weight bearing. Unable to use crutches. 1 Device 0     pravastatin (PRAVACHOL) 20 MG tablet TAKE 1 TABLET(20 MG) BY MOUTH DAILY 90 tablet 3     SF 1.1 % GEL topical gel APPLY TO THE AFFECTED AREA TWICE DAILY 392 g 1     Sodium Fluoride 1.1 % PSTE Apply 1 Application to affected area 2 times daily 112 Bottle 3     triamcinolone (KENALOG) 0.1 % external cream APPLY EXTERNALLY TO THE AFFECTED AREA TWICE DAILY 80 g 3     Vitamin D, Cholecalciferol, 25 MCG (1000 UT) CAPS Take 1,000 Units by mouth daily 100 capsule 3     No Known Allergies  Recent Labs   Lab Test 10/04/19  0814 07/05/19  0927 04/26/19  1407 06/21/18  0953 04/16/18  1610 06/21/17  0850 06/21/17  0850 10/27/16  0939 10/27/16  0939   A1C  --   --  5.9* 5.9*  --   --   --   --  5.9   LDL  --  Cannot estimate LDL when triglyceride exceeds 400 mg/dL  149*  --  88  --   --  143*  --   --    HDL  --  31*  --  36*  --   --  53  --  "  --    TRIG  --  407*  --  250*  --   --  174*  --   --    ALT 50  --  53*  --  31  --   --    < >  --    CR 0.51*  --  0.55 0.47* 0.43*   < >  --    < > 0.55   GFRESTIMATED >90  --  >90 >90 >90   < >  --    < > >90  Non  GFR Calc     GFRESTBLACK >90  --  >90 >90 >90   < >  --    < > >90   GFR Calc     POTASSIUM 3.6  --  3.5 3.9 3.6   < >  --    < > 3.7   TSH  --   --  1.26  --   --   --   --   --   --     < > = values in this interval not displayed.      Pneumonia Vaccine:Adults age 65+ who received Pneumovax (PPSV23) at 65 years or older: Should be given PCV13 > 1 year after their most recent PPSV23  Mammogram Screening: Mammogram Screening: Patient over age 50, mutual decision to screen reflected in health maintenance.    ROS:  Constitutional, HEENT, cardiovascular, pulmonary, gi and gu systems are negative, except as otherwise noted.    OBJECTIVE:   BP (!) 146/90 (BP Location: Left arm, Patient Position: Sitting, Cuff Size: Adult Regular)   Pulse 76   Temp 98  F (36.7  C) (Tympanic)   Resp 16   Ht 1.6 m (5' 3\")   Wt 51.7 kg (114 lb)   LMP 06/21/2002 (Approximate)   SpO2 98%   BMI 20.19 kg/m   Estimated body mass index is 20.19 kg/m  as calculated from the following:    Height as of this encounter: 1.6 m (5' 3\").    Weight as of this encounter: 51.7 kg (114 lb).  EXAM:   GENERAL APPEARANCE: healthy, alert and no distress  EYES: Eyes grossly normal to inspection, PERRL and conjunctivae and sclerae normal  HENT: ear canals and TM's normal, nose and mouth without ulcers or lesions, oropharynx clear and oral mucous membranes moist  NECK: no adenopathy, no asymmetry, masses, or scars and thyroid normal to palpation  RESP: lungs clear to auscultation - no rales, rhonchi or wheezes  CV: regular rates and rhythm, normal S1 S2, no S3 or S4, no murmur, click or rub, no peripheral edema and peripheral pulses strong  ABDOMEN: soft, nontender, no hepatosplenomegaly, no masses and bowel " "sounds normal  MS: inflammatory arthritis changes in joints of hands and feet.   SKIN: no suspicious lesions or rashes  NEURO: Normal strength and tone, sensory exam grossly normal, mentation intact and speech normal  PSYCH: mentation appears normal and affect normal/bright     Diagnostic Test Results:  Labs reviewed in Epic  No results found for this or any previous visit (from the past 24 hour(s)).    ASSESSMENT / PLAN:       ICD-10-CM    1. Encounter for Medicare annual wellness exam  Z00.00    2. Hyperlipidemia with target LDL less than 130  E78.5 Lipid panel reflex to direct LDL Fasting     ALT   3. Hypertension, goal below 150/90 - well controlled on medications  I10 Basic metabolic panel     Hemoglobin     Albumin Random Urine Quantitative with Creat Ratio   4. Polyarthritis, inflammatory (H)  M06.4 Managed by rheumatology with methotrexate. Has persistent pain in hands and knees.    5. Sicca syndrome (H)  M35.00 stable   6. High risk medication use  Z79.899 Methotrexate monitored by specialist   7. Vitamin D deficiency with osteopenia on DEXA, need to optimize vitamin D levels.  E55.9 Vitamin D, Cholecalciferol, 25 MCG (1000 UT) CAPS     Vitamin D Deficiency   8. Chronic fatigue  R53.82 TSH with free T4 reflex   9. Elevated blood sugar  R73.9 Hemoglobin A1c       Patient has been advised of split billing requirements and indicates understanding: No    COUNSELING:  Reviewed preventive health counseling, as reflected in patient instructions       Regular exercise       Healthy diet/nutrition       Vision screening       Hearing screening       Dental care       Fall risk prevention       Osteoporosis Prevention/Bone Health    Estimated body mass index is 20.19 kg/m  as calculated from the following:    Height as of this encounter: 1.6 m (5' 3\").    Weight as of this encounter: 51.7 kg (114 lb).        She reports that she has never smoked. She has never used smokeless tobacco.    Appropriate preventive " services were discussed with this patient, including applicable screening as appropriate for cardiovascular disease, diabetes, osteopenia/osteoporosis, and glaucoma.  As appropriate for age/gender, discussed screening for colorectal cancer, prostate cancer, breast cancer, and cervical cancer. Checklist reviewing preventive services available has been given to the patient.    Reviewed patients plan of care and provided an AVS. The Basic Care Plan (routine screening as documented in Health Maintenance) for Noreen meets the Care Plan requirement. This Care Plan has been established and reviewed with the Patient.    Counseling Resources:  ATP IV Guidelines  Pooled Cohorts Equation Calculator  Breast Cancer Risk Calculator  BRCA-Related Cancer Risk Assessment: FHS-7 Tool  FRAX Risk Assessment  ICSI Preventive Guidelines  Dietary Guidelines for Americans, 2010  USDA's MyPlate  ASA Prophylaxis  Lung CA Screening    Jia Castro MD  [facility]

## 2020-10-09 ENCOUNTER — ANCILLARY PROCEDURE (OUTPATIENT)
Dept: MAMMOGRAPHY | Facility: CLINIC | Age: 70
End: 2020-10-09
Attending: FAMILY MEDICINE
Payer: MEDICARE

## 2020-10-09 DIAGNOSIS — Z12.31 SCREENING MAMMOGRAM FOR HIGH-RISK PATIENT: ICD-10-CM

## 2020-10-09 PROCEDURE — 77067 SCR MAMMO BI INCL CAD: CPT | Mod: TC | Performed by: RADIOLOGY

## 2020-10-09 PROCEDURE — 77063 BREAST TOMOSYNTHESIS BI: CPT | Mod: TC | Performed by: RADIOLOGY

## 2020-10-09 NOTE — RESULT ENCOUNTER NOTE
Dear Noreen    Your test results are attached. I am happy to let you know that they are stable.    The blood sugar is normal and you do not have diabetes. The kidneys are healthy. The thyroid test is normal. The vitamin D test is normal.     The cholesterol is in good range for you, but your risk of heart disease in the next 10 years is a little high at 11%. Continue to take the pravastatin to lower this risk.     Please contact me by [employer] if you have any questions about your labs or management. You may also call my office number [phone] for any questions.     Jia Castro MD

## 2020-10-12 NOTE — RESULT ENCOUNTER NOTE
Dear Noreen Florence,    I am happy to let you know that your mammogram was normal. You may schedule a follow up mammogram in 1-2 years depending on your risk factors and family history. Please let me know if you have any questions about your results. You should be receiving a letter from the radiologist.    You may call me at [phone] with any questions or send me a [employer] message.     Jia Castro MD

## 2020-10-13 DIAGNOSIS — K64.4 EXTERNAL HEMORRHOIDS: ICD-10-CM

## 2020-10-13 NOTE — TELEPHONE ENCOUNTER
Patient contacted writer to state she forgot to request a refill of hydrocortisone cream at her office visit on 10/8/20.    Melissa Behl BSN, RN, PHN, CCM  Primary Care Clinical RN Care Coordinator  [facility]   [phone]

## 2020-10-14 RX ORDER — HYDROCORTISONE 2.5 %
CREAM (GRAM) TOPICAL
Qty: 30 G | Refills: 3 | Status: SHIPPED | OUTPATIENT
Start: 2020-10-14 | End: 2021-02-22

## 2020-10-14 NOTE — TELEPHONE ENCOUNTER
Requested Prescriptions   Pending Prescriptions Disp Refills     hydrocortisone 2.5 % cream 30 g 3     Sig: Place rectally 2 times daily       There is no refill protocol information for this order        Routing refill request to provider for review/approval because:  Drug not on the [facility] refill protocol       Jaky Driver RN, BSN, PHN

## 2020-10-29 ENCOUNTER — TELEPHONE (OUTPATIENT)
Dept: PODIATRY | Facility: CLINIC | Age: 70
End: 2020-10-29

## 2020-10-29 NOTE — TELEPHONE ENCOUNTER
Consent to communicate on file with daughterBetty.     Phone call to daughter. She states patient doesn't remember what she is supposed to do with her ankle brace after wearing it for 6 weeks. She did go to physical therapy but no recent visits. She is doing well and almost back to normal. Recommended she gradually wean out of the brace over the next 3 to 5 days. She can take it off for a few hours at a time and gradually increase the hours she doesn't wear it. If she has any problems, she can let us know. She verbalized understanding.     CARMEN Jade RN

## 2020-10-29 NOTE — TELEPHONE ENCOUNTER
Pt daughter left message stating her mother has some questions about a brace and would like a return call.    [phone]

## 2020-12-08 PROBLEM — M25.572 PAIN IN JOINT, ANKLE AND FOOT, LEFT: Status: RESOLVED | Noted: 2020-09-01 | Resolved: 2020-12-08

## 2020-12-08 NOTE — PROGRESS NOTES
Discharge Note    Progress reporting period is from initial evaluation date (please see noted date below) to Sep 15, 2020.  Linked Episodes   Type: Episode: Status: Noted: Resolved: Last update: Updated by:   PHYSICAL THERAPY L ankle 9/1/2020 Active 9/1/2020  9/15/2020  1:35 PM Isaac Angel, PT      Comments:       Noreen failed to follow up and current status is unknown.  Please see information below for last relevant information on current status.  Patient seen for 3 visits.    SUBJECTIVE  Subjective changes noted by patient:  Noreen had appointment with MD last week. Pt was instructed to wear lace up ankle brace for 6 weeks. Has been able to walk for up to 1 hour without difficulty. Continues to noticed increased swelling in foot by the end of the day.   .  Current pain level is 0/10.     Previous pain level was  5/10.   Changes in function:  Yes (See Goal flowsheet attached for changes in current functional level)  Adverse reaction to treatment or activity: None    OBJECTIVE  Changes noted in objective findings: L ankle passive DF=5 deg, PF=35 deg     ASSESSMENT/PLAN  Diagnosis: S/P L ankle fracture   Updated problem list and treatment plan:   Pain - HEP  Decreased ROM/flexibility - HEP  Decreased strength - HEP  Impaired muscle performance - HEP  Impaired gait - HEP  Impaired balance - HEP  STG/LTGs have been met or progress has been made towards goals:  Yes, please see goal flowsheet for most current information  Assessment of Progress: current status is unknown.    Last current status: Pt is progressing as expected   Self Management Plans:  HEP  I have re-evaluated this patient and find that the nature, scope, duration and intensity of the therapy is appropriate for the medical condition of the patient.  Noreen continues to require the following intervention to meet STG and LTG's:  HEP.    Recommendations:  Discharge with current home program.  Patient to follow up with MD as needed.    Please refer to the daily  flowsheet for treatment today, total treatment time and time spent performing 1:1 timed codes.

## 2021-01-09 DIAGNOSIS — I10 HYPERTENSION, GOAL BELOW 150/90: ICD-10-CM

## 2021-01-09 DIAGNOSIS — K21.00 GASTROESOPHAGEAL REFLUX DISEASE WITH ESOPHAGITIS: ICD-10-CM

## 2021-01-12 RX ORDER — METOPROLOL TARTRATE 25 MG/1
TABLET, FILM COATED ORAL
Qty: 90 TABLET | Refills: 1 | Status: SHIPPED | OUTPATIENT
Start: 2021-01-12 | End: 2021-04-22

## 2021-01-12 RX ORDER — OMEPRAZOLE 40 MG/1
CAPSULE, DELAYED RELEASE ORAL
Qty: 90 CAPSULE | Refills: 1 | Status: SHIPPED | OUTPATIENT
Start: 2021-01-12 | End: 2021-04-22

## 2021-01-12 NOTE — TELEPHONE ENCOUNTER
RN CC received a voicemail from patient's daughter Betty requesting refill of omeprazole.  RN CC left a message for Tram (consent to communicate on file) informing her the prescription has been refilled.    Melissa Behl BSN, RN, PHN, CCM  Primary Care Clinical RN Care Coordinator  [facility]   [phone]

## 2021-01-29 DIAGNOSIS — L30.9 DERMATITIS: ICD-10-CM

## 2021-01-29 RX ORDER — TRIAMCINOLONE ACETONIDE 1 MG/G
CREAM TOPICAL
Qty: 80 G | Refills: 3 | Status: SHIPPED | OUTPATIENT
Start: 2021-01-29 | End: 2021-06-03

## 2021-01-29 NOTE — TELEPHONE ENCOUNTER
Prescription approved per [facility] Refill Protocol.        Jaky Driver RN, BSN, PHN

## 2021-01-29 NOTE — TELEPHONE ENCOUNTER
Last Written Prescription Date:  7/22/2020  Last Fill Quantity: 80g,  # refills: 3   Last office visit: 10/8/2020 with prescribing provider:  chayito   Future Office Visit:   Next 5 appointments (look out 90 days)    Feb 08, 2021  2:00 PM  Return Visit with Emi Morales MD  [facility] ([facility] ) [address] [place] [id number] [phone]

## 2021-02-08 ENCOUNTER — TELEPHONE (OUTPATIENT)
Dept: UROLOGY | Facility: CLINIC | Age: 71
End: 2021-02-08

## 2021-02-08 DIAGNOSIS — D17.71 ANGIOMYOLIPOMA OF RIGHT KIDNEY: Primary | ICD-10-CM

## 2021-02-16 ENCOUNTER — PATIENT OUTREACH (OUTPATIENT)
Dept: CARE COORDINATION | Facility: CLINIC | Age: 71
End: 2021-02-16

## 2021-02-16 NOTE — PROGRESS NOTES
Clinic Care Coordination Contact    Received a call from patients daughter Betty who is on her consent to communicate. Asking when and how patient can get her COVID19 vaccine.     Appears patient does not meet age requirement.     Left message for patients daughter to call back on their VM. Writers direct line given, [phone].    Plan:   Will await daughters return call.      ETELVINA Garcia RN Clinic Care Coordinator       Phone: [phone]

## 2021-02-21 DIAGNOSIS — K64.4 EXTERNAL HEMORRHOIDS: ICD-10-CM

## 2021-02-22 RX ORDER — HYDROCORTISONE 2.5 %
CREAM (GRAM) TOPICAL
Qty: 30 G | Refills: 3 | Status: SHIPPED | OUTPATIENT
Start: 2021-02-22 | End: 2021-06-03

## 2021-02-24 ENCOUNTER — OFFICE VISIT (OUTPATIENT)
Dept: URGENT CARE | Facility: URGENT CARE | Age: 71
End: 2021-02-24
Payer: MEDICARE

## 2021-02-24 VITALS
RESPIRATION RATE: 14 BRPM | TEMPERATURE: 98.4 F | HEART RATE: 84 BPM | WEIGHT: 121 LBS | BODY MASS INDEX: 21.43 KG/M2 | OXYGEN SATURATION: 96 % | SYSTOLIC BLOOD PRESSURE: 143 MMHG | DIASTOLIC BLOOD PRESSURE: 92 MMHG

## 2021-02-24 DIAGNOSIS — R42 DIZZINESS: Primary | ICD-10-CM

## 2021-02-24 DIAGNOSIS — R42 VERTIGO: ICD-10-CM

## 2021-02-24 PROCEDURE — 99213 OFFICE O/P EST LOW 20 MIN: CPT | Performed by: NURSE PRACTITIONER

## 2021-02-24 RX ORDER — MECLIZINE HYDROCHLORIDE 25 MG/1
25 TABLET ORAL 3 TIMES DAILY PRN
Qty: 21 TABLET | Refills: 0 | Status: SHIPPED | OUTPATIENT
Start: 2021-02-24 | End: 2021-03-03

## 2021-02-24 ASSESSMENT — ENCOUNTER SYMPTOMS
HEADACHES: 0
VOMITING: 0
FEVER: 0
SORE THROAT: 0
NAUSEA: 1
COUGH: 0
SHORTNESS OF BREATH: 0
DIZZINESS: 1
RHINORRHEA: 0
DIARRHEA: 0
CHILLS: 0

## 2021-02-24 NOTE — PROGRESS NOTES
ASSESSMENT:      ICD-10-CM    1. Dizziness  R42 meclizine (ANTIVERT) 25 MG tablet   2. Vertigo  R42 meclizine (ANTIVERT) 25 MG tablet        Medical Decision Making:    Differential Diagnosis:  Vertigo, dehydration, heart condition.     PLAN:  I discussed various causes of dizziness  The patient is likely having vertigo, she has a feeling like the room is moving, nausea and ringing in the ears. She denies dehydration, headache, chest pain, heavy menstrual bleeding or any other symptoms. Meclizine is ordered and advise to go to ER if symptoms are worsening.   A follow up with PCP is also advised  Patient agrees with plan          SUBJECTIVE:   Noreen Florence is a 71 year old female presenting with a chief complaint of   Chief Complaint   Patient presents with     Dizziness     Feeling dizzines and weakness waking up this morning. No other symptoms, never happened before.       She is an established patient of [facility].      Dizziness/Vertigo    Onset: today morning upon waking up     Description:   Do you feel like you are going to faint: no  Or just unsteady/off balance: YES  Does it feel like the surroundings (bed, room) are moving: YES  Have you passed out or fallen: no  History of head trauma: no  Do certain movements/positions of the head make it worse: turning head to the right and turning head to the left  Does staying in a fixed position give relief: YES  Is it worse with activity or movement: YES    Accompanying Signs & Symptoms:       Difficulty speaking (aphasia): no  Off balance (ataxia): no  Speech problems(Dysarthria): no  Ringing in ears (Tinnitus): YES  Ear pain: no  Hearing Loss: no  Rapid eye movement (Nystagmus): no  Double vision (Diplopia): no  Fatigue: no Rapid/irregular (Palpitations): YES  Nausea, vomiting: no  Weakness in arms or legs: no  Staggering gait: no  Falls: no  Black tarry stool (Melena): no  Rectal bleeding: no  Heavy menstrual bleeding: no       Precipitating and/or Alleviating  factors:    Any increase in anxiety: no  Any new BP medications: no  Alcohol/drugabuse/withdrawl: no    Progression of Symptoms:  intermittent    Therapies tried and outcome: none tried        Review of Systems   Constitutional: Negative for chills and fever.   HENT: Negative for congestion, ear pain, rhinorrhea and sore throat.    Respiratory: Negative for cough and shortness of breath.    Gastrointestinal: Positive for nausea. Negative for diarrhea and vomiting.   Neurological: Positive for dizziness. Negative for headaches.   All other systems reviewed and are negative.      Past Medical History:   Diagnosis Date     GERD (gastroesophageal reflux disease) 3/20/2015     Glaucoma (increased eye pressure)      Hyperlipidemia LDL goal < 130 8/3/2015     Hypertension      Hypertension, goal below 150/90 11/28/2014     Rheumatoid arthritis, adult (H)      Family History   Problem Relation Age of Onset     Diabetes Mother      Cancer No family hx of      Hypertension No family hx of      Cerebrovascular Disease No family hx of      Thyroid Disease No family hx of      Glaucoma No family hx of      Macular Degeneration No family hx of      Current Outpatient Medications   Medication Sig Dispense Refill     cetirizine (ZYRTEC) 10 MG tablet TAKE 1 TABLET(10 MG) BY MOUTH DAILY 90 tablet 3     chlorhexidine (PERIDEX) 0.12 % solution SWISH AND SPIT 15 ML BY MOUTH TWICE DAILY 473 mL 3     fluticasone (FLONASE) 50 MCG/ACT nasal spray SHAKE WELL AND USE 1 OR 2 SPRAYS IN EACH NOSTRIL DAILY 48 g 2     hydrocortisone 2.5 % cream Place rectally 2 times daily 30 g 3     latanoprost (XALATAN) 0.005 % ophthalmic solution INSTILL 1 DROP IN BOTH EYES AT BEDTIME 7.5 mL 4     leucovorin (WELLCOVORIN) 5 MG tablet TK 1 T PO 12 H AFTER METHOTREXATE ONE TIME EACH WEEK  3     losartan-hydrochlorothiazide (HYZAAR) 50-12.5 MG tablet TAKE 1 TABLET BY MOUTH DAILY 90 tablet 3     meclizine (ANTIVERT) 25 MG tablet Take 1 tablet (25 mg) by mouth 3  times daily as needed for dizziness 21 tablet 0     methotrexate 2.5 MG tablet Take 6 tablets (15 mg) by mouth once a week 1 tablet 0     methylcellulose (CITRUCEL) powder Take 2 g by mouth daily 180 g 3     metoprolol tartrate (LOPRESSOR) 25 MG tablet TAKE 1 TABLET(25 MG) BY MOUTH AT BEDTIME 90 tablet 1     omeprazole (PRILOSEC) 40 MG DR capsule TAKE 1 CAPSULE(40 MG) BY MOUTH DAILY 30 TO 60 MINUTES BEFORE A MEAL AS NEEDED 90 capsule 1     pravastatin (PRAVACHOL) 20 MG tablet TAKE 1 TABLET(20 MG) BY MOUTH DAILY 90 tablet 3     SF 1.1 % GEL topical gel APPLY TO THE AFFECTED AREA TWICE DAILY 392 g 1     Sodium Fluoride 1.1 % PSTE Apply 1 Application to affected area 2 times daily 112 Bottle 3     triamcinolone (KENALOG) 0.1 % external cream APPLY EXTERNALLY TO THE AFFECTED AREA TWICE DAILY 80 g 3     Vitamin D, Cholecalciferol, 25 MCG (1000 UT) CAPS Take 1,000 Units by mouth daily 100 capsule 3     order for DME Knee roller. Will need for 3 months. Ankle fracture non weight bearing. Unable to use crutches. 1 Device 0     Social History     Tobacco Use     Smoking status: Never Smoker     Smokeless tobacco: Never Used   Substance Use Topics     Alcohol use: No       OBJECTIVE  BP (!) 143/92 (BP Location: Left arm, Patient Position: Sitting, Cuff Size: Adult Regular)   Pulse 84   Temp 98.4  F (36.9  C) (Tympanic)   Resp 14   Wt 54.9 kg (121 lb)   LMP 06/21/2002 (Approximate)   SpO2 96%   Breastfeeding No   BMI 21.43 kg/m      Physical Exam  Vitals signs and nursing note reviewed.   Constitutional:       General: She is not in acute distress.     Appearance: She is well-developed. She is not diaphoretic.   HENT:      Head: Normocephalic and atraumatic.      Right Ear: Tympanic membrane and external ear normal.      Left Ear: Tympanic membrane and external ear normal.   Eyes:      Pupils: Pupils are equal, round, and reactive to light.   Neck:      Musculoskeletal: Normal range of motion and neck supple.    Pulmonary:      Effort: Pulmonary effort is normal. No respiratory distress.      Breath sounds: Normal breath sounds.   Lymphadenopathy:      Cervical: No cervical adenopathy.   Skin:     General: Skin is warm and dry.   Neurological:      General: No focal deficit present.      Mental Status: She is alert.      Cranial Nerves: No cranial nerve deficit.      Comments: NEURO:  equal  strength, neg Romberg, DTR II/IV bilaterally (UE and LE), finger to nose normal, CN intact, ambulates without difficulty.  no focal deficits noted.     Psychiatric:         Mood and Affect: Mood normal.             Patient Instructions       Patient Education     Inner Ear Problems: Causes of Dizziness (Vertigo)        Benign positional vertigo (BPV)   This is the most common cause of vertigo. BPV is also called benign positional paroxysmal vertigo (BPPV). It happens when crystals in the ear canals shift into the wrong place. Vertigo usually occurs when you move your head in a certain way. This can happen when turning in bed, bending, or looking up. Because BPV comes on quickly, you should think about if you are safe to drive or do other tasks that need your full attention.   BPV:    Causes vertigo that lasts for seconds. Vertigo can occur several times a day, depending on body position.    Doesn t cause hearing loss.    Often goes away on its own. But it may go away sooner with treatment.  Infection or inflammation  Sometimes the semicircular canals swell and send incorrect balance signals. This problem may be caused by a viral infection. Depending on the cause, your hearing can be affected (labyrinthitis). Or your hearing can remain normal (neuronitis).   Infection or inflammation:    Causes vertigo that lasts for hours or days. The first episode is usually the worst.    Can cause hearing loss.    Often goes away on its own. But it may go away sooner with treatment.  You may need vestibular rehabilitation if you have balance  problems that don't go away.   Meniere s disease  This condition is uncommon. It happens when there is too much fluid in the ear canals. This causes increased pressure and swelling. It affects balance and hearing signals.   Meniere s disease may:    Cause vertigo that last for hours    Cause hearing problems that come and go. The problems are usually in one ear and get worse over time.    Cause buzzing or ringing in the ears (tinnitus)    Cause a feeling of fullness or pressure in the ear    Cause any of these lasting symptoms: vertigo, hearing loss, tinnitus, or ear fullness  Other causes of vertigo  Vertigo can also be caused by:     A head injury    Certain medicines    Migraines    Brain problems, such as a stroke or bleeding in the brain    [employer] last reviewed this educational content on 2/1/2020 2000-2020 [employer]. [address] [place] [address], [place] [address]. All rights reserved. This information is not intended as a substitute for professional medical care. Always follow your healthcare professional's instructions.

## 2021-03-04 ENCOUNTER — PATIENT OUTREACH (OUTPATIENT)
Dept: NURSING | Facility: CLINIC | Age: 71
End: 2021-03-04
Payer: MEDICARE

## 2021-03-04 NOTE — PROGRESS NOTES
Clinic Care Coordination Contact    Tram, patients daughter who is also on her consent to communicate calling about patients upcoming appointment.     Patient wanted to be sure her appointment is in person, CC RN confirmed her appointment is in person.     Patient not enrolled in care coordination. No proactive out reach will be done after this encounter.     Fatimah Nicole RN, BSN, PHN Care Coordinator  [facility]   Phone: [phone]

## 2021-03-10 DIAGNOSIS — H40.003 GLAUCOMA SUSPECT OF BOTH EYES: ICD-10-CM

## 2021-03-11 RX ORDER — LATANOPROST 50 UG/ML
SOLUTION/ DROPS OPHTHALMIC
Qty: 7.5 ML | Refills: 4 | Status: SHIPPED | OUTPATIENT
Start: 2021-03-11 | End: 2021-09-10

## 2021-03-11 NOTE — TELEPHONE ENCOUNTER
Routing refill request to provider for review/approval because:  Drug not on the [facility] refill protocol     Deepali Yao RN, [facility] Triage

## 2021-03-14 ENCOUNTER — OFFICE VISIT (OUTPATIENT)
Dept: URGENT CARE | Facility: URGENT CARE | Age: 71
End: 2021-03-14
Payer: MEDICARE

## 2021-03-14 VITALS
WEIGHT: 120.25 LBS | BODY MASS INDEX: 21.3 KG/M2 | OXYGEN SATURATION: 99 % | DIASTOLIC BLOOD PRESSURE: 91 MMHG | SYSTOLIC BLOOD PRESSURE: 162 MMHG | TEMPERATURE: 98.3 F | HEART RATE: 79 BPM

## 2021-03-14 DIAGNOSIS — M54.6 ACUTE LEFT-SIDED THORACIC BACK PAIN: Primary | ICD-10-CM

## 2021-03-14 PROCEDURE — 99213 OFFICE O/P EST LOW 20 MIN: CPT | Mod: 25 | Performed by: STUDENT IN AN ORGANIZED HEALTH CARE EDUCATION/TRAINING PROGRAM

## 2021-03-14 PROCEDURE — 93000 ELECTROCARDIOGRAM COMPLETE: CPT | Performed by: STUDENT IN AN ORGANIZED HEALTH CARE EDUCATION/TRAINING PROGRAM

## 2021-03-14 RX ORDER — NAPROXEN 500 MG/1
500 TABLET ORAL 2 TIMES DAILY WITH MEALS
Qty: 14 TABLET | Refills: 0 | Status: SHIPPED | OUTPATIENT
Start: 2021-03-14 | End: 2021-03-22

## 2021-03-14 NOTE — PROGRESS NOTES
Assessment & Plan     Acute left-sided thoracic back pain  EKG normal sinus rhythm without concern for ischemia and no tachycardia or S1Q3T3 pattern. She has no other risk factors for MI or PE however there is pleurisy. I believe this to be a musculoskeletal injury and conservative management should suffice.   - EKG 12-lead complete w/read - Clinics  - naproxen (NAPROSYN) 500 MG tablet; Take 1 tablet (500 mg) by mouth 2 times daily (with meals)    No follow-ups on file.    Venkata Purcell MD  [facility] CHRISASHA Sorto is a 71 year old who presents for the following health issues     HPI   This morning pt woke up with 10/10 sharp back pain which got a bit better and is now 7/10. Nothing seems to make it worse and she has not tried any medications to make it better. However later in the interview she did state that deep breaths seem to make her pain worse. She was moving a heavy table 2 days ago. She had a similar episode 1 year ago working in the garden where she had low back muscular pain.     No recent travel. No prolonged sitting. No history of malignancy.     No fevers or chills, No palpitations, No SOB or chest pain. She did not wake up because of the pain. Her pain is not different with position or movement. No exertional component.     Review of Systems   Constitutional, HEENT, cardiovascular, pulmonary, gi and gu systems are negative, except as otherwise noted.      Objective    BP (!) 162/91 (BP Location: Left arm, Patient Position: Chair, Cuff Size: Adult Regular)   Pulse 79   Temp 98.3  F (36.8  C) (Tympanic)   Wt 54.5 kg (120 lb 4 oz)   LMP 06/21/2002 (Approximate)   SpO2 99%   Breastfeeding No   BMI 21.30 kg/m    Body mass index is 21.3 kg/m .  Physical Exam   GENERAL: healthy, alert and no distress  NECK: no adenopathy, no asymmetry, masses, or scars and thyroid normal to palpation  RESP: lungs clear to auscultation - no rales, rhonchi or  wheezes  CV: regular rate and rhythm, normal S1 S2, no S3 or S4, no murmur, click or rub, no peripheral edema and peripheral pulses strong  ABDOMEN: soft, nontender, no hepatosplenomegaly, no masses and bowel sounds normal  MS: no gross musculoskeletal defects noted, no edema  SKIN: no suspicious lesions or rashes  NEURO: Normal strength and tone, mentation intact and speech normal  PSYCH: mentation appears normal, affect normal/bright    EKG - Reviewed and interpreted by me appears normal, NSR, normal axis, normal intervals, no acute ST/T changes c/w ischemia, no LVH by voltage criteria

## 2021-03-22 ENCOUNTER — OFFICE VISIT (OUTPATIENT)
Dept: FAMILY MEDICINE | Facility: CLINIC | Age: 71
End: 2021-03-22
Payer: MEDICARE

## 2021-03-22 VITALS
TEMPERATURE: 98.3 F | OXYGEN SATURATION: 97 % | HEIGHT: 63 IN | DIASTOLIC BLOOD PRESSURE: 83 MMHG | BODY MASS INDEX: 21.58 KG/M2 | WEIGHT: 121.8 LBS | HEART RATE: 93 BPM | SYSTOLIC BLOOD PRESSURE: 123 MMHG

## 2021-03-22 DIAGNOSIS — K21.00 GASTROESOPHAGEAL REFLUX DISEASE WITH ESOPHAGITIS, UNSPECIFIED WHETHER HEMORRHAGE: Primary | ICD-10-CM

## 2021-03-22 DIAGNOSIS — E78.5 HYPERLIPIDEMIA WITH TARGET LDL LESS THAN 130: ICD-10-CM

## 2021-03-22 DIAGNOSIS — M06.4 POLYARTHRITIS, INFLAMMATORY (H): ICD-10-CM

## 2021-03-22 DIAGNOSIS — R25.2 LEG CRAMPS: ICD-10-CM

## 2021-03-22 DIAGNOSIS — M35.00 SICCA SYNDROME (H): ICD-10-CM

## 2021-03-22 DIAGNOSIS — I10 HYPERTENSION, GOAL BELOW 150/90: ICD-10-CM

## 2021-03-22 PROCEDURE — 99214 OFFICE O/P EST MOD 30 MIN: CPT | Performed by: FAMILY MEDICINE

## 2021-03-22 RX ORDER — MULTIVITAMIN WITH IRON
1 TABLET ORAL DAILY PRN
Qty: 30 TABLET | Refills: 3 | Status: SHIPPED | OUTPATIENT
Start: 2021-03-22 | End: 2021-07-07

## 2021-03-22 ASSESSMENT — PAIN SCALES - GENERAL: PAINLEVEL: NO PAIN (0)

## 2021-03-22 ASSESSMENT — MIFFLIN-ST. JEOR: SCORE: 1046.61

## 2021-03-22 NOTE — PROGRESS NOTES
Assessment & Plan     Gastroesophageal reflux disease with esophagitis, unspecified whether hemorrhage  Discussed lifestyle changes in addition to medication. Less eating late at night, raising head of bed. May be having intermittent morning cough due to reflux.     Leg cramps  Has normal hemoglobin and occasional cramps in legs at night. May try magnesium sulfate for this. Can cause lose stools  - magnesium 250 MG tablet; Take 1 tablet (250 mg) by mouth daily as needed (leg cramps)    Sicca syndrome (H)  Managed by rheumatology. Dry itchy eyes recently can also be aggravated by tree pollen in air and dryer blowing air.     Polyarthritis, inflammatory (H)  Stable     Hypertension, goal below 150/90  Well controlled on medications     Hyperlipidemia with target LDL less than 130  Stable     Episode of vertigo resolved and only one time episode.                CONSULTATION/REFERRAL to rheumatology as scheduled.   FUTURE LABS:       - Schedule fasting labs in 6 months  FUTURE APPOINTMENTS:       - Follow-up for annual visit or as needed  Regular exercise  See Patient Instructions    No follow-ups on file.    Jia Castro MD  [facility] NADJA Sorto is a 71 year old who presents for the following health issues     HPI     GERD/Heartburn  Onset/Duration: follow up  Description: Patient complains of acid reflux  Intensity: moderate  Progression of Symptoms: same  Accompanying Signs & Symptoms:  Does it feel like food gets stuck or trouble swallowing: YES  Nausea: no  Vomiting (bloody?): no  Abdominal Pain: no  Black-Tarry stools: no  Bloody stools: no  History:  Previous similar episodes: YES  Previous ulcers: no  Precipitating factors:   Caffeine use: YES  Alcohol use: no  NSAID/Aspirin use: no  Tobacco use: no  Worse with no particular food or drink. Patient complains worst when she wakes up.   Alleviating factors: None  Therapies tried and outcome:             Lifestyle  "changes: None            Medications: Omeprazole (Prilosec)    Musculoskeletal problem/pain- low back pain   Onset/Duration: 2 weeks, patient had episode for two weeks, reports it is a lot better now and no longer having symptoms but would like to know what may have caused this.  Description  Location: leg - bilateral  Joint Swelling: no  Redness: no  Pain: no  Warmth: no  Intensity:  severe  Progression of Symptoms:  intermittent  Accompanying signs and symptoms:   Fevers: no  Numbness/tingling/weakness: YES- weakness in legs that lasted 1-2 days and then better.   History  Trauma to the area: no  Recent illness:  no  Previous similar problem: no  Previous evaluation:  no  Precipitating or alleviating factors:  Aggravating factors include: none  Therapies tried and outcome: nothing    Hyperlipidemia Follow-Up      Are you regularly taking any medication or supplement to lower your cholesterol?   No    Are you having muscle aches or other side effects that you think could be caused by your cholesterol lowering medication?  No    Hypertension Follow-up      Do you check your blood pressure regularly outside of the clinic? Yes     Are you following a low salt diet? Yes    Are your blood pressures ever more than 140 on the top number (systolic) OR more   than 90 on the bottom number (diastolic), for example 140/90? No        Review of Systems   Constitutional, HEENT, cardiovascular, pulmonary, gi and gu systems are negative, except as otherwise noted.      Objective    /83 (BP Location: Right arm, Patient Position: Sitting, Cuff Size: Adult Regular)   Pulse 93   Temp 98.3  F (36.8  C) (Oral)   Ht 1.6 m (5' 3\")   Wt 55.2 kg (121 lb 12.8 oz)   LMP 06/21/2002 (Approximate)   SpO2 97%   BMI 21.58 kg/m    Body mass index is 21.58 kg/m .  Physical Exam   GENERAL: healthy, alert and no distress  EYES: Eyes grossly normal to inspection, PERRL and conjunctivae and sclerae normal  HENT: ear canals and TM's normal, " nose and mouth without ulcers or lesions  NECK: no adenopathy, no asymmetry, masses, or scars and thyroid normal to palpation  RESP: lungs clear to auscultation - no rales, rhonchi or wheezes  CV: regular rate and rhythm, normal S1 S2, no S3 or S4, no murmur, click or rub, no peripheral edema and peripheral pulses strong  ABDOMEN: soft, nontender, no hepatosplenomegaly, no masses and bowel sounds normal  MS: no gross musculoskeletal defects noted, no edema, inflammatory arthritis changes in both hands and nail clubbing, white lines in nails. Gait stable.   SKIN: no suspicious lesions or rashes, some hyperemia.   NEURO: Normal strength and tone, mentation intact and speech normal  PSYCH: mentation appears normal, affect normal/bright    Office Visit on 10/08/2020   Component Date Value Ref Range Status     Cholesterol 10/08/2020 184  <200 mg/dL Final     Triglycerides 10/08/2020 255* <150 mg/dL Final    Comment: Borderline high:  150-199 mg/dl  High:             200-499 mg/dl  Very high:       >499 mg/dl  Fasting specimen       HDL Cholesterol 10/08/2020 41* >49 mg/dL Final     LDL Cholesterol Calculated 10/08/2020 92  <100 mg/dL Final    Desirable:       <100 mg/dl     Non HDL Cholesterol 10/08/2020 143* <130 mg/dL Final    Comment: Above Desirable:  130-159 mg/dl  Borderline high:  160-189 mg/dl  High:             190-219 mg/dl  Very high:       >219 mg/dl       Hemoglobin A1C 10/08/2020 5.6  0 - 5.6 % Final    Comment: Normal <5.7% Prediabetes 5.7-6.4%  Diabetes 6.5% or higher - adopted from ADA   consensus guidelines.       Sodium 10/08/2020 142  133 - 144 mmol/L Final     Potassium 10/08/2020 3.7  3.4 - 5.3 mmol/L Final     Chloride 10/08/2020 106  94 - 109 mmol/L Final     Carbon Dioxide 10/08/2020 31  20 - 32 mmol/L Final     Anion Gap 10/08/2020 5  3 - 14 mmol/L Final     Glucose 10/08/2020 101* 70 - 99 mg/dL Final    Fasting specimen     Urea Nitrogen 10/08/2020 12  7 - 30 mg/dL Final     Creatinine  10/08/2020 0.41* 0.52 - 1.04 mg/dL Final     GFR Estimate 10/08/2020 >90  >60 mL/min/[1.73_m2] Final    Comment: Non  GFR Calc  Starting 12/18/2018, serum creatinine based estimated GFR (eGFR) will be   calculated using the Chronic Kidney Disease Epidemiology Collaboration   (CKD-EPI) equation.       GFR Estimate If Black 10/08/2020 >90  >60 mL/min/[1.73_m2] Final    Comment:  GFR Calc  Starting 12/18/2018, serum creatinine based estimated GFR (eGFR) will be   calculated using the Chronic Kidney Disease Epidemiology Collaboration   (CKD-EPI) equation.       Calcium 10/08/2020 9.4  8.5 - 10.1 mg/dL Final     Hemoglobin 10/08/2020 14.4  11.7 - 15.7 g/dL Final     ALT 10/08/2020 44  0 - 50 U/L Final     Vitamin D Deficiency screening 10/08/2020 54  20 - 75 ug/L Final    Comment: Season, race, dietary intake, and treatment affect the concentration of   25-hydroxy-Vitamin D. Values may decrease during winter months and increase   during summer months. Values 20-29 ug/L may indicate Vitamin D insufficiency   and values <20 ug/L may indicate Vitamin D deficiency.  Vitamin D determination is routinely performed by an immunoassay specific for   25 hydroxyvitamin D3.  If an individual is on vitamin D2 (ergocalciferol)   supplementation, please specify 25 OH vitamin D2 and D3 level determination by   LCMSMS test [id number].       TSH 10/08/2020 2.23  0.40 - 4.00 mU/L Final     Creatinine Urine 10/08/2020 125  mg/dL Final     Albumin Urine mg/L 10/08/2020 32  mg/L Final     Albumin Urine mg/g Cr 10/08/2020 25.68* 0 - 25 mg/g Cr Final

## 2021-03-22 NOTE — PATIENT INSTRUCTIONS
At [facility], we strive to deliver an exceptional experience to you, every time we see you. If you receive a survey, please complete it as we do value your feedback.  If you have MyChart, you can expect to receive results automatically within 24 hours of their completion.  Your provider will send a note interpreting your results as well.   If you do not have MyChart, you should receive your results in about a week by mail.    Your care team:                            Family Medicine Internal Medicine   MD Alfredo Morgan MD Shantel Branch-Fleming, MD Srinivasa Vaka, MD Katya Belousova, PAShahbazC  Ana Rosa Akers, APRN CNP    Binh Barnes, MD Pediatrics   Jose Bryson, PAShahbazC  Priscilla Holman, CNP MD Julianna Rodriguez APRN CNP   MD Naila Champion MD Deborah Mielke, MD Ashli Monge, APRN [facility]      Clinic hours: Monday - Thursday 7 am-6 pm; Fridays 7 am-5 pm.   Urgent care: Monday - Friday 11 am-9 pm; Saturday and Sunday 9 am-5 pm.    Clinic: [phone]       [facility]: Monday - Thursday 8 am - 7 pm; Friday 8 am - 6 pm  [facility]: [phone]     Use [email or web address] for 24/7 diagnosis and treatment of dozens of conditions.    Patient Education     Tips to Control Acid Reflux    To control acid reflux, you ll need to make some basic diet and lifestyle changes. The simple steps outlined below may be all you ll need to ease discomfort.   Watch what you eat    Don't have fatty foods or spicy foods.    Eat fewer acidic foods, such as citrus and tomato-based foods. These can increase symptoms.    Limit drinking alcohol, caffeine, and fizzy beverages. All increase acid reflux.    Try limiting chocolate, peppermint, and spearmint. These can make acid reflux worse in some people.    Watch when you eat    Don't lie down for 3 hours after eating.    Don't snack before going to bed.    Raise  your head  Raising your head and upper body by 4 to 6 inches helps limit reflux when you re lying down. Put blocks under the head of your bed frame or a wedge under your mattress to raise it.   Other changes    Lose weight, if you need to    Don t exercise near bedtime    Don't wear tight-fitting clothes    Limit aspirin and ibuprofen    Stop smoking    [employer] last reviewed this educational content on 6/1/2019 2000-2020 [employer]. All rights reserved. This information is not intended as a substitute for professional medical care. Always follow your healthcare professional's instructions.           Patient Education     Leg Cramps  A muscle cramp or spasm is a strong contraction of the muscle fibers. It is also called a charley horse. This may occur in the foot, calf, or thigh at night when the legs are elevated. If the spasm is prolonged, it can become very painful. This may be caused by sleeping in an uncomfortable position, muscle fatigue, poor muscle tone from lack of exercise and stretching, dehydration, electrolyte imbalance, diabetes, alcohol use, and certain medicine.  Home care    Drink plenty of fluids during the day to prevent dehydration.    Stretch your legs before bedtime.    Eat a diet high in potassium. These foods include fresh fruit, such as bananas, oranges, cantaloupe, and honeydew melon. It also includes apple, prune, orange, grape and pineapple juices. Other foods high in potassium are white, red, and corbett beans, baked potatoes, raw spinach, cod, flounder, halibut, salmon, and scallops.    Talk with your healthcare provider about taking mineral and vitamin supplements that contain magnesium and vitamin B-12 if you are not already taking these. Other prescription medicines may also be used.    Stay away from stimulants such as caffeine, nicotine, and decongestants.  How to relieve an acute leg cramp    For mild pain, getting out of the bed and walking may help. Some people  find relief with heat and massage. You can apply heat with a warm shower, bath, or compress. Some people feel better with a cold packs. You can make an ice pack by filling a plastic bag that seals at the top with ice cubes and then wrapping it with a thin towel. Try both and use the method that feels best for 15 to 20 minutes at a time.    For severe pain, stretching the muscle that is in spasm may quickly relieve the pain.    When the spasm is in your foot, your toes may curl up or down. To stretch the muscle in spasm, bend your toes in the opposite direction. If the spasm pulls your toes up, bend them down. If the spasm pulls them down, bend them up.    When the spasm is in your calf, bend the ankle so the foot points upward toward your knee.    When the spasm is in your thigh, bend or straighten the knee and hip until you feel relief.  Follow-up care  Follow up with your healthcare provider, or as advised.  When to seek medical advice  Call your healthcare provider right away if any of these occur:    Walking makes your pain worse and rest makes it better    You develop weakness in the affected leg    Pain or frequency of spasms increases and is not controlled by the above measures  [employer] last reviewed this educational content on 5/1/2018 2000-2020 [employer]. All rights reserved. This information is not intended as a substitute for professional medical care. Always follow your healthcare professional's instructions.

## 2021-04-02 ENCOUNTER — OFFICE VISIT (OUTPATIENT)
Dept: UROLOGY | Facility: CLINIC | Age: 71
End: 2021-04-02
Payer: MEDICARE

## 2021-04-02 ENCOUNTER — ANCILLARY PROCEDURE (OUTPATIENT)
Dept: ULTRASOUND IMAGING | Facility: CLINIC | Age: 71
End: 2021-04-02
Attending: UROLOGY
Payer: MEDICARE

## 2021-04-02 VITALS — SYSTOLIC BLOOD PRESSURE: 121 MMHG | OXYGEN SATURATION: 94 % | HEART RATE: 94 BPM | DIASTOLIC BLOOD PRESSURE: 80 MMHG

## 2021-04-02 DIAGNOSIS — D17.71 ANGIOMYOLIPOMA OF RIGHT KIDNEY: Primary | ICD-10-CM

## 2021-04-02 DIAGNOSIS — D17.71 ANGIOMYOLIPOMA OF RIGHT KIDNEY: ICD-10-CM

## 2021-04-02 DIAGNOSIS — R31.29 MICROSCOPIC HEMATURIA: ICD-10-CM

## 2021-04-02 LAB
ALBUMIN UR-MCNC: NEGATIVE MG/DL
APPEARANCE UR: CLEAR
BILIRUB UR QL STRIP: NEGATIVE
COLOR UR AUTO: NORMAL
GLUCOSE UR STRIP-MCNC: NEGATIVE MG/DL
HGB UR QL STRIP: NEGATIVE
KETONES UR STRIP-MCNC: NEGATIVE MG/DL
LEUKOCYTE ESTERASE UR QL STRIP: NEGATIVE
NITRATE UR QL: NEGATIVE
PH UR STRIP: 6.5 PH (ref 5–7)
SOURCE: NORMAL
SP GR UR STRIP: 1 (ref 1–1.03)
UROBILINOGEN UR STRIP-MCNC: NORMAL MG/DL (ref 0–2)

## 2021-04-02 PROCEDURE — 76770 US EXAM ABDO BACK WALL COMP: CPT | Mod: GC | Performed by: STUDENT IN AN ORGANIZED HEALTH CARE EDUCATION/TRAINING PROGRAM

## 2021-04-02 PROCEDURE — 81003 URINALYSIS AUTO W/O SCOPE: CPT | Performed by: PHYSICIAN ASSISTANT

## 2021-04-02 PROCEDURE — 99213 OFFICE O/P EST LOW 20 MIN: CPT | Performed by: PHYSICIAN ASSISTANT

## 2021-04-02 ASSESSMENT — PAIN SCALES - GENERAL: PAINLEVEL: NO PAIN (0)

## 2021-04-02 NOTE — PATIENT INSTRUCTIONS
UROLOGY CLINIC VISIT PATIENT INSTRUCTIONS    Follow up in 2 years with a repeat renal ultrasound    If you have any issues, questions or concerns in the meantime, do not hesitate to contact us at [phone] or via [employer].     It was a pleasure meeting with you today.  Thank you for allowing me and my team the privilege of caring for you today.  YOU are the reason we are here, and I truly hope we provided you with the excellent service you deserve.  Please let us know if there is anything else we can do for you so that we can be sure you are leaving completely satisfied with your care experience.

## 2021-04-02 NOTE — PROGRESS NOTES
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 Ultrasound today shows interval enlargement to 1.5 cm, though this is still considered small. Per [employer], AMLs < 2 cm can be monitored every 3-4 years with renal ultrasound. UA today completely negative without microhematuria.   -Renal ultrasound in 3 years. Return sooner with any recurrence of hematuria.     Haley Dugan PA-C  Department of Urology     HPI: Noreen Florence is a 71 year old female with a history of microscopic hematuria s/p negative urologic workup with CT, cytology, and cystoscopy.  She was found to have a very small AML (1.2-1.3 cm).  She was seen by IR and they recommended yearly ultrasound to monitor. Last visit with Dr. Morales on 1/14/19 at which time it was slightly decreased to 1.0 cm.    She returns today and has been doing well. She denies any gross hematuria. Had ultrasound done beforehand.    PEx  /80 (BP Location: Left arm, Patient Position: Sitting, Cuff Size: Adult Regular)   Pulse 94   LMP 06/21/2002 (Approximate)   SpO2 94%   GEN: well-appearing female in NAD  RESP: no increased respiratory effort    LABS:  UA today completely negative    IMAGING:  US RENAL COMPLETE, 4/2/2021   FINDINGS:  Right kidney: Measures 12.2 cm in length. Parenchyma is of normal  thickness and echogenicity. Focal cortical based hyperechoic round  lesion at the medial aspect of the lower pole of the right kidney  adjacent to the renal hilum measuring 1.4 x 1.5 x 1.3 cm, previously  measured 1.0 x 1.0 x 0.9 cm. No internal blood flow by color Doppler  No focal mass. No hydronephrosis.     Left kidney: Measures 12.0 cm in length. Parenchyma is of normal  thickness and echogenicity. No focal mass. No hydronephrosis.      Bladder: Partially distended and unremarkable                                                                   IMPRESSION:  1.  Increased size of right renal angiomyolipoma now measuring up to  1.5 cm, previously measured up to 1.0 cm on 1/14/2019.      CT urogram 6/15/16:  Impression:  1. 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 This  abuts the renal collecting system and may be the source of  microhematuria although significant bleeding is uncommon in lesions of  this size. No other abnormality in the urinary collecting system.    2. Other incidental findings as above.

## 2021-04-02 NOTE — NURSING NOTE
Noreen Florence's goals for this visit include:   Chief Complaint   Patient presents with     RECHECK     1 year follow up hematuria and kidney mass       She requests these members of her care team be copied on today's visit information: PCP    PCP: Jia Castro    Referring Provider:  No referring provider defined for this encounter.    /80 (BP Location: Left arm, Patient Position: Sitting, Cuff Size: Adult Regular)   Pulse 94   LMP 06/21/2002 (Approximate)   SpO2 94%     Do you need any medication refills at today's visit? No    Umm Snow LPN

## 2021-04-22 DIAGNOSIS — K21.00 GASTROESOPHAGEAL REFLUX DISEASE WITH ESOPHAGITIS: ICD-10-CM

## 2021-04-22 DIAGNOSIS — I10 HYPERTENSION, GOAL BELOW 150/90: ICD-10-CM

## 2021-04-22 RX ORDER — OMEPRAZOLE 40 MG/1
CAPSULE, DELAYED RELEASE ORAL
Qty: 90 CAPSULE | Refills: 0 | Status: SHIPPED | OUTPATIENT
Start: 2021-04-22 | End: 2021-06-18

## 2021-04-22 RX ORDER — METOPROLOL TARTRATE 25 MG/1
TABLET, FILM COATED ORAL
Qty: 90 TABLET | Refills: 0 | Status: SHIPPED | OUTPATIENT
Start: 2021-04-22 | End: 2021-06-18

## 2021-05-11 DIAGNOSIS — J31.0 CHRONIC RHINITIS: ICD-10-CM

## 2021-05-11 RX ORDER — CETIRIZINE HYDROCHLORIDE 10 MG/1
TABLET ORAL
Qty: 90 TABLET | Refills: 3 | Status: SHIPPED | OUTPATIENT
Start: 2021-05-11 | End: 2022-03-24

## 2021-05-11 NOTE — TELEPHONE ENCOUNTER
Routing refill request to provider for review/approval because:  Patient is > 64 years old so RN unable to refill this medication per protocol.    Routing to provider to advise.  Lynette David BSN, RN

## 2021-06-02 DIAGNOSIS — I10 HYPERTENSION, GOAL BELOW 150/90: ICD-10-CM

## 2021-06-02 DIAGNOSIS — L30.9 DERMATITIS: ICD-10-CM

## 2021-06-02 DIAGNOSIS — K64.4 EXTERNAL HEMORRHOIDS: ICD-10-CM

## 2021-06-03 RX ORDER — LOSARTAN POTASSIUM AND HYDROCHLOROTHIAZIDE 12.5; 5 MG/1; MG/1
1 TABLET ORAL DAILY
Qty: 90 TABLET | Refills: 3 | OUTPATIENT
Start: 2021-06-03

## 2021-06-03 RX ORDER — HYDROCORTISONE 2.5% 25 MG/G
CREAM TOPICAL
Qty: 30 G | Refills: 1 | Status: SHIPPED | OUTPATIENT
Start: 2021-06-03 | End: 2021-06-23

## 2021-06-03 RX ORDER — TRIAMCINOLONE ACETONIDE 1 MG/G
CREAM TOPICAL
Qty: 80 G | Refills: 0 | Status: SHIPPED | OUTPATIENT
Start: 2021-06-03 | End: 2021-07-07

## 2021-06-03 NOTE — TELEPHONE ENCOUNTER
Routing refill request to provider for review/approval because:  Drug not active on patient's medication list  Salud QUINONESN, RN

## 2021-06-15 DIAGNOSIS — I10 HYPERTENSION, GOAL BELOW 150/90: ICD-10-CM

## 2021-06-16 RX ORDER — LOSARTAN POTASSIUM AND HYDROCHLOROTHIAZIDE 12.5; 5 MG/1; MG/1
1 TABLET ORAL DAILY
Qty: 90 TABLET | Refills: 3 | Status: SHIPPED | OUTPATIENT
Start: 2021-06-16 | End: 2021-12-17

## 2021-06-16 NOTE — TELEPHONE ENCOUNTER
"Routing refill request to provider for review/approval because:  Requested Prescriptions   Pending Prescriptions Disp Refills    losartan-hydrochlorothiazide (HYZAAR) 50-12.5 MG tablet [Pharmacy Med Name: LOSARTAN/HCTZ 50/12.5MG TABLETS] 90 tablet 3     Sig: TAKE 1 TABLET BY MOUTH DAILY       Angiotensin-II Receptors Failed - 6/15/2021 12:48 PM        Failed - Normal serum creatinine on file in past 12 months     Recent Labs   Lab Test 10/08/20  0900   CR 0.41*       Ok to refill medication if creatinine is low          Passed - Last blood pressure under 140/90 in past 12 months     BP Readings from Last 3 Encounters:   04/02/21 121/80   03/22/21 123/83   03/14/21 (!) 162/91                 Passed - Recent (12 mo) or future (30 days) visit within the authorizing provider's specialty     Patient has had an office visit with the authorizing provider or a provider within the authorizing providers department within the previous 12 mos or has a future within next 30 days. See \"Patient Info\" tab in inbasket, or \"Choose Columns\" in Meds & Orders section of the refill encounter.              Passed - Medication is active on med list        Passed - Patient is age 18 or older        Passed - No active pregnancy on record        Passed - Normal serum potassium on file in past 12 months     Recent Labs   Lab Test 10/08/20  0900   POTASSIUM 3.7                    Passed - No positive pregnancy test in past 12 months       Diuretics (Including Combos) Protocol Failed - 6/15/2021 12:48 PM        Failed - Normal serum creatinine on file in past 12 months     Recent Labs   Lab Test 10/08/20  0900   CR 0.41*              Passed - Blood pressure under 140/90 in past 12 months     BP Readings from Last 3 Encounters:   04/02/21 121/80   03/22/21 123/83   03/14/21 (!) 162/91                 Passed - Recent (12 mo) or future (30 days) visit within the authorizing provider's specialty     Patient has had an office visit with the " "authorizing provider or a provider within the authorizing providers department within the previous 12 mos or has a future within next 30 days. See \"Patient Info\" tab in inbasket, or \"Choose Columns\" in Meds & Orders section of the refill encounter.              Passed - Medication is active on med list        Passed - Patient is age 18 or older        Passed - No active pregancy on record        Passed - Normal serum potassium on file in past 12 months     Recent Labs   Lab Test 10/08/20  0900   POTASSIUM 3.7                    Passed - Normal serum sodium on file in past 12 months     Recent Labs   Lab Test 10/08/20  0900                 Passed - No positive pregnancy test in past 12 months           Deepali MAIN, RN          "

## 2021-06-18 ENCOUNTER — OFFICE VISIT (OUTPATIENT)
Dept: FAMILY MEDICINE | Facility: CLINIC | Age: 71
End: 2021-06-18
Payer: MEDICARE

## 2021-06-18 VITALS
SYSTOLIC BLOOD PRESSURE: 137 MMHG | DIASTOLIC BLOOD PRESSURE: 83 MMHG | BODY MASS INDEX: 20.91 KG/M2 | HEART RATE: 68 BPM | HEIGHT: 63 IN | OXYGEN SATURATION: 97 % | TEMPERATURE: 98.5 F | WEIGHT: 118 LBS

## 2021-06-18 DIAGNOSIS — M35.00 SICCA SYNDROME (H): ICD-10-CM

## 2021-06-18 DIAGNOSIS — K21.00 GASTROESOPHAGEAL REFLUX DISEASE WITH ESOPHAGITIS, UNSPECIFIED WHETHER HEMORRHAGE: ICD-10-CM

## 2021-06-18 DIAGNOSIS — M06.4 POLYARTHRITIS, INFLAMMATORY (H): ICD-10-CM

## 2021-06-18 DIAGNOSIS — Z79.899 HIGH RISK MEDICATION USE: ICD-10-CM

## 2021-06-18 DIAGNOSIS — E78.2 MIXED HYPERLIPIDEMIA: ICD-10-CM

## 2021-06-18 DIAGNOSIS — K04.7 DENTAL INFECTION: ICD-10-CM

## 2021-06-18 DIAGNOSIS — E78.5 HYPERLIPIDEMIA WITH TARGET LDL LESS THAN 130: Primary | ICD-10-CM

## 2021-06-18 DIAGNOSIS — I10 HYPERTENSION, GOAL BELOW 150/90: ICD-10-CM

## 2021-06-18 PROCEDURE — 99214 OFFICE O/P EST MOD 30 MIN: CPT | Performed by: FAMILY MEDICINE

## 2021-06-18 RX ORDER — PRAVASTATIN SODIUM 20 MG
20 TABLET ORAL DAILY
Qty: 90 TABLET | Refills: 3 | Status: SHIPPED | OUTPATIENT
Start: 2021-06-18 | End: 2022-03-01

## 2021-06-18 RX ORDER — METOPROLOL TARTRATE 25 MG/1
25 TABLET, FILM COATED ORAL AT BEDTIME
Qty: 90 TABLET | Refills: 3 | Status: SHIPPED | OUTPATIENT
Start: 2021-06-18 | End: 2022-05-10

## 2021-06-18 RX ORDER — OMEPRAZOLE 40 MG/1
40 CAPSULE, DELAYED RELEASE ORAL DAILY
Qty: 90 CAPSULE | Refills: 1 | Status: SHIPPED | OUTPATIENT
Start: 2021-06-18 | End: 2021-11-29

## 2021-06-18 ASSESSMENT — MIFFLIN-ST. JEOR: SCORE: 1029.37

## 2021-06-18 ASSESSMENT — PAIN SCALES - GENERAL: PAINLEVEL: NO PAIN (0)

## 2021-06-18 NOTE — PROGRESS NOTES
Assessment & Plan     Hyperlipidemia with target LDL less than 130  Stable on current medication     Hypertension, goal below 150/90  Refill metoprolol. Only taking at night takes lisinopril in am.   - metoprolol tartrate (LOPRESSOR) 25 MG tablet; Take 1 tablet (25 mg) by mouth At Bedtime    Polyarthritis, inflammatory (H)  Stable symptoms on methotrexate with low inflammatory markers. Follow up with rheumatology every 4 months as recommended.     Sicca syndrome (H)  Eye follow up every 6 months    Gastroesophageal reflux disease with esophagitis, unspecified whether hemorrhage  Symptoms relieved with daily use of proton pump inhibitor   - omeprazole (PRILOSEC) 40 MG DR capsule; Take 1 capsule (40 mg) by mouth daily    High risk medication use  Methotrexate with stable labs    Dental infection  Recurrent tooth abscess. Requests back up antibiotics to have on hand for recurrent infection for when she can't get into see her provider quickly  - amoxicillin-clavulanate (AUGMENTIN) 875-125 MG tablet; Take 1 tablet by mouth 2 times daily    Mixed hyperlipidemia  Stable   - pravastatin (PRAVACHOL) 20 MG tablet; Take 1 tablet (20 mg) by mouth daily             CONSULTATION/REFERRAL to rheumatology  FUTURE LABS:       - Schedule fasting labs in 6 months  FUTURE APPOINTMENTS:       - Follow-up visit in 3 months or sooner if any questions or concerns.   Regular exercise  See Patient Instructions    Return in about 3 months (around 9/18/2021) for Follow up, with me, in person.    Jia Castro MD  [facility] is a 71 year old who presents for the following health issues   HPI       Hyperlipidemia Follow-Up      Are you regularly taking any medication or supplement to lower your cholesterol?   Yes- pravastatin 20    Are you having muscle aches or other side effects that you think could be caused by your cholesterol lowering medication?  No    Hypertension  "Follow-up      Do you check your blood pressure regularly outside of the clinic? Yes     Are you following a low salt diet? Yes    Are your blood pressures ever more than 140 on the top number (systolic) OR more   than 90 on the bottom number (diastolic), for example 140/90? No    Chronic Pain Follow-Up    Where in your body do you have pain? Hands mostly due to arthritis. Better now back on methotrexate. Managed by rheumtology  How has your pain affected your ability to work? Not applicable  Which of these pain treatments have you tried since your last clinic visit? Rest  How well are you sleeping? Good  How has your mood been since your last visit? About the same  Have you had a significant life event? Health Concerns  Other aggravating factors: none  Taking medication as directed? Yes    No flowsheet data found.  No flowsheet data found.  No flowsheet data found.  Encounter-Level CSA:    There are no encounter-level csa.     Patient-Level CSA:    There are no patient-level csa.       GERD follow up with some persistent symptoms. Last endoscopy 2019.     Recurrent dental infections and sometimes can't get in quickly. Requests antibiotics at home to bridge between symptoms and appointment.     Review of Systems   Constitutional, HEENT, cardiovascular, pulmonary, gi and gu systems are negative, except as otherwise noted.      Objective    /83 (BP Location: Left arm, Patient Position: Sitting, Cuff Size: Adult Regular)   Pulse 68   Temp 98.5  F (36.9  C) (Tympanic)   Ht 1.6 m (5' 3\")   Wt 53.5 kg (118 lb)   LMP 06/21/2002 (Approximate)   SpO2 97%   BMI 20.90 kg/m    Body mass index is 20.9 kg/m .  Physical Exam   GENERAL APPEARANCE: healthy, alert and no distress  EYES: Eyes grossly normal to inspection, PERRL and conjunctivae and sclerae normal  HENT: ear canals and TM's normal, nose and mouth without ulcers or lesions, oropharynx clear and oral mucous membranes moist  NECK: no adenopathy, no asymmetry, " masses, or scars and thyroid normal to palpation  RESP: lungs clear to auscultation - no rales, rhonchi or wheezes  CV: regular rates and rhythm, normal S1 S2, no S3 or S4, no murmur, click or rub, no peripheral edema and peripheral pulses strong  ABDOMEN: soft, nontender, no hepatosplenomegaly, no masses and bowel sounds normal  MS: arthritic changes in hand and finger joints mixed osteoarthritis and rheumatoid arthritis changes. Dystrophic nails.   SKIN: no suspicious lesions or rashes  NEURO: Normal strength and tone, sensory exam grossly normal, mentation intact and speech normal  PSYCH: mentation appears normal and affect normal/bright     Office Visit on 04/02/2021   Component Date Value Ref Range Status     Color Urine 04/02/2021 Straw   Final     Appearance Urine 04/02/2021 Clear   Final     Glucose Urine 04/02/2021 Negative  NEG^Negative mg/dL Final     Bilirubin Urine 04/02/2021 Negative  NEG^Negative Final     Ketones Urine 04/02/2021 Negative  NEG^Negative mg/dL Final     Specific Gravity Urine 04/02/2021 1.003  1.003 - 1.035 Final     Blood Urine 04/02/2021 Negative  NEG^Negative Final     pH Urine 04/02/2021 6.5  5.0 - 7.0 pH Final     Protein Albumin Urine 04/02/2021 Negative  NEG^Negative mg/dL Final     Urobilinogen mg/dL 04/02/2021 Normal  0.0 - 2.0 mg/dL Final     Nitrite Urine 04/02/2021 Negative  NEG^Negative Final     Leukocyte Esterase Urine 04/02/2021 Negative  NEG^Negative Final     Source 04/02/2021 Midstream Urine   Final

## 2021-06-18 NOTE — PATIENT INSTRUCTIONS
At [facility], we strive to deliver an exceptional experience to you, every time we see you. If you receive a survey, please complete it as we do value your feedback.  If you have MyChart, you can expect to receive results automatically within 24 hours of their completion.  Your provider will send a note interpreting your results as well.   If you do not have MyChart, you should receive your results in about a week by mail.    Your care team:                            Family Medicine Internal Medicine   MD Alfredo Morgan MD Shantel Branch-Fleming, MD Srinivasa Vaka, MD Katya Belousova, PAShahbazC  Ana Rosa Akers, APRN CNP    Binh Barnes, MD Pediatrics   Jose Bryson, PAKIRAN Holman, CNP MD Julianna Rodriguez APRN CNP   MD Naila Champion MD Deborah Mielke, MD Ashli Monge, APRN [facility]      Clinic hours: Monday - Thursday 7 am-6 pm; Fridays 7 am-5 pm.   Urgent care: Monday - Friday 10 am- 8 pm; Saturday and Sunday 9 am-5 pm.    Clinic: [phone]       [facility]: Monday - Thursday 8 am - 7 pm; Friday 8 am - 6 pm  [facility]: [phone]     Use www.[email or web address].org for 24/7 diagnosis and treatment of dozens of conditions.  Patient Education     Dental Abscess     An abscess is a pocket of fluid (pus) at the tip of a tooth root in your jawbone. It's caused by an infection at the root of the tooth. It can cause pain and swelling of the gum, cheek, or jaw. Pain may spread from the tooth to your ear. Or pain may spread to the part of your jaw on the same side. If the abscess isn t treated, it looks like a bubble or swelling on the gum near the tooth. The pressure that builds in this swelling causes pain. More serious infections make your face swell.  An abscess can occur when bacteria enter the tooth through a crack in the tooth, a cavity, a gum infection, or a combination of these.  The pulp inside the tooth gets infected. Then bacteria can spread down the roots to the tip. If the bacteria are not stopped, they can harm the bone and soft tissue. Then an abscess can form.  Symptoms can include pain, redness, or swelling of the gums, and fever.  Home care  Follow these guidelines when caring for yourself at home:    Don't have hot and cold foods and drinks. Your tooth may be sensitive to changes in temperature. Don t chew on the side of the infected tooth.    If your tooth is chipped or cracked, or if there is a large open cavity, put clove oil right on the tooth to ease pain. You can buy clove oil at pharmacies. Some pharmacies carry an over-the-counter toothache kit. This has a paste that you can put on the exposed tooth to make it less sensitive.    Put a cold pack on your jaw over the sore area to help ease pain.    You may use over-the-counter medicine to ease pain, unless another medicine was prescribed. If you have chronic liver or kidney disease or if you've had a stomach ulcer or GI bleeding, talk with your healthcare provider before using acetaminophen or ibuprofen.    An antibiotic will be prescribed. Take it until finished, even if you are feeling better after a few days.  Follow-up care  Follow up with your dentist or an oral surgeon, or as advised. Once an infection occurs in a tooth, it will be a problem until the infection is drained. This is done through surgery or a root canal. Or you may need to have your tooth pulled.  Call 911  Call 911 if any of these occur:    Abnormal drowsiness    Headache or stiff neck    Weakness or fainting    Trouble swallowing, breathing, or opening your mouth    Swollen eyelids  When to get medical advice  Call your healthcare provider right away if any of these occur:    Your face gets more swollen or red    Pain gets worse or spreads to your neck    Fever of 100.4 F (38.0 C) or higher, or as directed by your provider    Pus drains from the  tooth  StayWell last reviewed this educational content on 12/1/2019 2000-2021 [employer]. All rights reserved. This information is not intended as a substitute for professional medical care. Always follow your healthcare professional's instructions.           Patient Education     GERD (Adult)    The esophagus is a tube that carries food from the mouth to the stomach. A valve (the LES, lower esophageal sphincter) at the lower end of the esophagus prevents stomach acid from flowing upward. When this valve doesn't work properly, stomach contents may repeatedly flow back up (reflux) into the esophagus. This is called gastroesophageal reflux disease (GERD). GERD can irritate the esophagus. It can cause problems with pain, swallowing or breathing. In severe cases, GERD can cause recurrent pneumonia (from aspiration or breathing in particles) or other serious problems.  Symptoms of reflux include burning, pressure or sharp pain in the upper abdomen or mid to lower chest. The pain can spread to the neck, back, or shoulder. There may be belching, an acid taste in the back of the throat, chronic cough, or sore throat, or hoarseness. GERD symptoms often occur during the day after a big meal. They can also occur at night when lying down.   Home care  Lifestyle changes can help reduce symptoms. If needed, your healthcare provider may prescribe medicines. Symptoms often improve with treatment, but if treatment is stopped, the symptoms often return after a few months. So most persons with GERD will need to continue treatment or get treatment on and off.  Lifestyle changes    Limit or avoid fatty, fried, and spicy foods, as well as coffee, chocolate, mint, and foods with high acid content such as tomatoes and citrus fruit and juices (orange, grapefruit, lemon).    Don t eat large meals, especially at night. Frequent, smaller meals are best. Don't lie down right after eating. And don t eat anything 3 hours before  "going to bed.    Don't drink alcohol or smoke. As much as possible, stay away from second hand smoke.    If you are overweight, losing weight will reduce symptoms.     Don't wear tight clothing around your stomach area.    If your symptoms occur during sleep, use a foam wedge to elevate your upper body (not just your head.) Or, place 4\" blocks under the head of your bed. Or use 2 bed risers under your bedframe.  Medicines  If needed, medicines can help relieve the symptoms of GERD and prevent damage to the esophagus. Discuss a medicine plan with your healthcare provider. This may include one or more of the following medicines:    Antacids to help neutralize the normal acids in your stomach.    Acid blockers (Histamine or H2 blockers) to decrease acid production.    Acid inhibitors (proton pump inhibitors PPIs) to decrease acid production in a different way than the blockers. They may work better, but can take a little longer to take effect.  Take an antacid 30 to 60 minutes after eating and at bedtime, but not at the same time as an acid blocker.  Try not to take medicines such as ibuprofen and aspirin. If you are taking aspirin for your heart or other medical reasons, talk to your healthcare provider about stopping it.  Follow-up care  Follow up with your healthcare provider or as advised by our staff.  When to seek medical advice  Call your healthcare provider if any of the following occur:    Stomach pain gets worse or moves to the lower right abdomen (appendix area)    Chest pain appears or gets worse, or spreads to the back, neck, shoulder, or arm    An over-the-counter trial of medicine doesn't relieve your symptoms    Weight loss that can't be explained    Trouble or pain swallowing    Frequent vomiting (can t keep down liquids)    Blood in the stool or vomit (red or black in color)    Feeling weak or dizzy    Fever of 100.4 F (38 C) or higher, or as directed by your healthcare provider  StayWell last " reviewed this educational content on 3/1/2018    6614-3153 [employer]. All rights reserved. This information is not intended as a substitute for professional medical care. Always follow your healthcare professional's instructions.

## 2021-06-23 ENCOUNTER — NURSE TRIAGE (OUTPATIENT)
Dept: NURSING | Facility: CLINIC | Age: 71
End: 2021-06-23

## 2021-06-23 ENCOUNTER — OFFICE VISIT (OUTPATIENT)
Dept: URGENT CARE | Facility: URGENT CARE | Age: 71
End: 2021-06-23
Payer: MEDICARE

## 2021-06-23 VITALS
HEART RATE: 86 BPM | OXYGEN SATURATION: 97 % | DIASTOLIC BLOOD PRESSURE: 81 MMHG | BODY MASS INDEX: 20.73 KG/M2 | RESPIRATION RATE: 16 BRPM | WEIGHT: 117 LBS | SYSTOLIC BLOOD PRESSURE: 149 MMHG | TEMPERATURE: 100 F

## 2021-06-23 DIAGNOSIS — R30.0 DYSURIA: ICD-10-CM

## 2021-06-23 DIAGNOSIS — N30.00 ACUTE CYSTITIS WITHOUT HEMATURIA: Primary | ICD-10-CM

## 2021-06-23 LAB
ALBUMIN UR-MCNC: NEGATIVE MG/DL
APPEARANCE UR: CLEAR
BACTERIA #/AREA URNS HPF: ABNORMAL /HPF
BILIRUB UR QL STRIP: NEGATIVE
COLOR UR AUTO: YELLOW
GLUCOSE UR STRIP-MCNC: NEGATIVE MG/DL
HGB UR QL STRIP: ABNORMAL
KETONES UR STRIP-MCNC: NEGATIVE MG/DL
LEUKOCYTE ESTERASE UR QL STRIP: ABNORMAL
NITRATE UR QL: NEGATIVE
NON-SQ EPI CELLS #/AREA URNS LPF: ABNORMAL /LPF
PH UR STRIP: 6 PH (ref 5–7)
RBC #/AREA URNS AUTO: ABNORMAL /HPF
SOURCE: ABNORMAL
SP GR UR STRIP: 1.01 (ref 1–1.03)
UROBILINOGEN UR STRIP-ACNC: 0.2 EU/DL (ref 0.2–1)
WBC #/AREA URNS AUTO: ABNORMAL /HPF

## 2021-06-23 PROCEDURE — 99213 OFFICE O/P EST LOW 20 MIN: CPT | Performed by: NURSE PRACTITIONER

## 2021-06-23 PROCEDURE — 81001 URINALYSIS AUTO W/SCOPE: CPT | Performed by: NURSE PRACTITIONER

## 2021-06-23 RX ORDER — SULFAMETHOXAZOLE/TRIMETHOPRIM 800-160 MG
1 TABLET ORAL 2 TIMES DAILY
Qty: 14 TABLET | Refills: 0 | Status: CANCELLED | OUTPATIENT
Start: 2021-06-23 | End: 2021-06-30

## 2021-06-23 RX ORDER — AMOXICILLIN 500 MG/1
1000 CAPSULE ORAL 2 TIMES DAILY
Qty: 20 CAPSULE | Refills: 0 | Status: SHIPPED | OUTPATIENT
Start: 2021-06-23 | End: 2021-07-03

## 2021-06-23 ASSESSMENT — ENCOUNTER SYMPTOMS
ABDOMINAL PAIN: 0
CHILLS: 0
VOMITING: 0
DYSURIA: 0
FLANK PAIN: 1
NAUSEA: 0
FEVER: 0

## 2021-06-23 NOTE — PROGRESS NOTES
SUBJECTIVE:   Noreen Florence is a 71 year old female presenting with a chief complaint of   Chief Complaint   Patient presents with     Back Pain     Right-sided lower to mid back pain that started this morning, no pelvic pain or pressure, no burning upon urination     Fever     Fever that started this morning, the patient does not have a cough, headache, or any other URI sx at this time       She is an established patient of [facility].    UTI    Onset of symptoms was 1 day.  Course of illness is same  Severity mild  Current and associated symptoms right sided flank pain   Treatment and measures tried none, currently finished 4 day course for dental care  Predisposing factors include recent antibiotic use  Patient denies rigors, flank pain, temperature > 101 degrees F., vomiting, vaginal discharge, vaginal odor and vaginal itching      Review of Systems   Constitutional: Negative for chills and fever.   Gastrointestinal: Negative for abdominal pain, nausea and vomiting.   Genitourinary: Positive for flank pain. Negative for dysuria, pelvic pain, urgency, vaginal bleeding and vaginal discharge.       Past Medical History:   Diagnosis Date     GERD (gastroesophageal reflux disease) 3/20/2015     Glaucoma (increased eye pressure)      Hyperlipidemia LDL goal < 130 8/3/2015     Hypertension      Hypertension, goal below 150/90 11/28/2014     Rheumatoid arthritis, adult (H)      Family History   Problem Relation Age of Onset     Diabetes Mother      Cancer No family hx of      Hypertension No family hx of      Cerebrovascular Disease No family hx of      Thyroid Disease No family hx of      Glaucoma No family hx of      Macular Degeneration No family hx of      Current Outpatient Medications   Medication Sig Dispense Refill     amoxicillin (AMOXIL) 500 MG capsule Take 2 capsules (1,000 mg) by mouth 2 times daily for 10 days 20 capsule 0     cetirizine (ZYRTEC) 10 MG tablet TAKE 1 TABLET(10 MG) BY MOUTH DAILY 90 tablet 3      chlorhexidine (PERIDEX) 0.12 % solution SWISH AND SPIT 15 ML BY MOUTH TWICE DAILY 473 mL 3     fluticasone (FLONASE) 50 MCG/ACT nasal spray SHAKE WELL AND USE 1 OR 2 SPRAYS IN EACH NOSTRIL DAILY 48 g 2     latanoprost (XALATAN) 0.005 % ophthalmic solution INSTILL 1 DROP IN BOTH EYES AT BEDTIME 7.5 mL 4     leucovorin (WELLCOVORIN) 5 MG tablet TK 1 T PO 12 H AFTER METHOTREXATE ONE TIME EACH WEEK  3     losartan-hydrochlorothiazide (HYZAAR) 50-12.5 MG tablet TAKE 1 TABLET BY MOUTH DAILY 90 tablet 3     magnesium 250 MG tablet Take 1 tablet (250 mg) by mouth daily as needed (leg cramps) 30 tablet 3     methotrexate 2.5 MG tablet TAKE 6 TABLETS BY MOUTH ONCE WEEKLY WITH SUPPER       methotrexate 2.5 MG tablet Take 6 tablets (15 mg) by mouth once a week 1 tablet 0     methylcellulose (CITRUCEL) powder Take 2 g by mouth daily 180 g 3     metoprolol tartrate (LOPRESSOR) 25 MG tablet Take 1 tablet (25 mg) by mouth At Bedtime 90 tablet 3     omeprazole (PRILOSEC) 40 MG DR capsule Take 1 capsule (40 mg) by mouth daily 90 capsule 1     pravastatin (PRAVACHOL) 20 MG tablet Take 1 tablet (20 mg) by mouth daily 90 tablet 3     SF 1.1 % GEL topical gel APPLY TO THE AFFECTED AREA TWICE DAILY 392 g 1     Sodium Fluoride 1.1 % PSTE Apply 1 Application to affected area 2 times daily 112 Bottle 3     triamcinolone (KENALOG) 0.1 % external cream APPLY EXTERNALLY TO THE AFFECTED AREA TWICE DAILY 80 g 0     Vitamin D, Cholecalciferol, 25 MCG (1000 UT) CAPS Take 1,000 Units by mouth daily 100 capsule 3     amoxicillin-clavulanate (AUGMENTIN) 875-125 MG tablet Take 1 tablet by mouth 2 times daily (Patient not taking: Reported on 6/23/2021) 14 tablet 1     Social History     Tobacco Use     Smoking status: Never Smoker     Smokeless tobacco: Never Used   Substance Use Topics     Alcohol use: No       OBJECTIVE  BP (!) 149/81 (BP Location: Left arm, Patient Position: Sitting, Cuff Size: Adult Small)   Pulse 86   Temp 100  F (37.8  C)  (Tympanic)   Resp 16   Wt 53.1 kg (117 lb)   LMP 06/21/2002 (Approximate)   SpO2 97%   Breastfeeding No   BMI 20.73 kg/m      Physical Exam  Constitutional:       Appearance: Normal appearance.   Cardiovascular:      Rate and Rhythm: Normal rate and regular rhythm.      Pulses: Normal pulses.      Heart sounds: Normal heart sounds.   Pulmonary:      Effort: Pulmonary effort is normal.      Breath sounds: Normal breath sounds.   Abdominal:      Palpations: Abdomen is soft.      Tenderness: There is abdominal tenderness. There is no right CVA tenderness or left CVA tenderness.      Comments: Supra-pubic pressure with palpation   Skin:     General: Skin is warm.      Capillary Refill: Capillary refill takes less than 2 seconds.   Neurological:      Mental Status: She is alert and oriented to person, place, and time.   Psychiatric:         Behavior: Behavior normal.         Labs:  Results for orders placed or performed in visit on 06/23/21 (from the past 24 hour(s))   *UA reflex to Microscopic and Culture ([facility] (except [place] and [place])    Specimen: Midstream Urine   Result Value Ref Range    Color Urine Yellow     Appearance Urine Clear     Glucose Urine Negative NEG^Negative mg/dL    Bilirubin Urine Negative NEG^Negative    Ketones Urine Negative NEG^Negative mg/dL    Specific Gravity Urine 1.015 1.003 - 1.035    Blood Urine Trace (A) NEG^Negative    pH Urine 6.0 5.0 - 7.0 pH    Protein Albumin Urine Negative NEG^Negative mg/dL    Urobilinogen Urine 0.2 0.2 - 1.0 EU/dL    Nitrite Urine Negative NEG^Negative    Leukocyte Esterase Urine Small (A) NEG^Negative    Source Midstream Urine    Urine Microscopic   Result Value Ref Range    WBC Urine 0 - 5 OTO5^0 - 5 /HPF    RBC Urine O - 2 OTO2^O - 2 /HPF    Squamous Epithelial /LPF Urine Few FEW^Few /LPF    Bacteria Urine Few (A) NEG^Negative /HPF     ASSESSMENT:    ICD-10-CM    1. Acute cystitis without hematuria  N30.00 amoxicillin  (AMOXIL) 500 MG capsule   2. Dysuria  R30.0 *UA reflex to Microscopic and Culture ([facility] (except [place] and [place])     Urine Microscopic        Medical Decision Making:    Differential Diagnosis:  UTI: UTI, Pyelonephritis and Interstitial Cystitis    Serious Comorbid Conditions:  Adult:  HTN, hyperlipidemia    PLAN: Discussed with patient causes and treatment options with oral antibiotics. Process for urine culture discussed and signs and symptoms of pyelonephritis mentioned.    Advised importance of increased water intake and completion of treatment course. Also discussed avoiding citrus or cranberry juice and consider the use of Pyridium OTC as needed.     Patient advised to follow up with PCP is symptoms worsen or do not improve as discussed in 3 days. Patient agreed to the plan of care with no further questions or concerns.     Brett Carroll APRN, CNP      Patient Instructions     Patient Education     Bladder Infection, Female (Adult)     Urine normally doesn't have any germs (bacteria) in it. But bacteria can get into the urinary tract from the skin around the rectum. Or they can travel in the blood from other parts of the body. Once they are in your urinary tract, they can cause infection in these areas:    The urethra (urethritis)    The bladder (cystitis)    The kidneys (pyelonephritis)  The most common place for an infection is in the bladder. This is called a bladder infection. This is one of the most common infections in women. Most bladder infections are easily treated. They are not serious unless the infection spreads to the kidney.  The terms bladder infection, UTI, and cystitis are often used to describe the same thing. But they are not always the same. Cystitis is an inflammation of the bladder. The most common cause of cystitis is an infection.  Symptoms  The infection causes inflammation in the urethra and bladder. This causes many of the symptoms. The most common  symptoms of a bladder infection are:    Pain or burning when urinating    Having to urinate more often than normal    Urgent need to urinate    Only a small amount of urine comes out    Blood in urine    Belly (abdominal) discomfort. This is often in the lower belly above the pubic bone.    Cloudy urine    Strong- or bad-smelling urine    Unable to urinate (urinary retention)    Unable to hold urine in (urinary incontinence)    Fever    Loss of appetite    Confusion (in older adults)  Causes  Bladder infections are not contagious. You can't get one from someone else, from a toilet seat, or from sharing a bath.  The most common cause of bladder infections is bacteria from the bowels. The bacteria get onto the skin around the opening of the urethra. From there, they can get into the urine. Then they travel up to the bladder, causing inflammation and infection. This often happens because of:    Wiping incorrectly after urinating. Always wipe from front to back.    Bowel incontinence    Pregnancy    Procedures such as having a catheter put in    Older age    Not emptying your bladder. This can give bacteria a chance to grow in your urine.    Fluid loss (dehydration)    Constipation    Having sex    Using a diaphragm for birth control   Treatment  Bladder infections are diagnosed by a urine test and urine culture. They are treated with antibiotics. They often clear up quickly without problems. Treatment helps prevent a more serious kidney infection.  Medicines  Medicines can help in the treatment of a bladder infection:    Take antibiotics until they are used up, even if you feel better. It's important to finish them to make sure the infection has cleared.    You can use acetaminophen or ibuprofen for pain, fever, or discomfort, unless another medicine was prescribed. If you have long-term (chronic) liver or kidney disease, talk with your healthcare provider before using these medicines. Also talk with your provider if  you've ever had a stomach ulcer or GI (gastrointestinal) bleeding, or are taking blood-thinner medicines.    If you are given phenazopydridine to reduce burning with urination, it will make your urine a bright orange color. This can stain clothing.  Care and prevention  These self-care steps can help prevent future infections:    Drink plenty of fluids. This helps to prevent dehydration and flush out your bladder. Do this unless you must restrict fluids for other health reasons, or your healthcare provider told you not to.    Clean yourself correctly after going to the bathroom. Wipe from front to back after using the toilet. This helps prevent the spread of bacteria.    Urinate more often. Don't try to hold urine in for a long time.    Wear loose-fitting clothes and cotton underwear. Don't wear tight-fitting pants.    Improve your diet and prevent constipation. Eat more fresh fruits and vegetables, and fiber. Eat less junk foods and fatty foods.    Don't have sex until your symptoms are gone.    Don't have caffeine, alcohol, and spicy foods. These can irritate your bladder.    Urinate right after you have sex to flush out your bladder.    If you use birth control pills and have frequent bladder infections, discuss it with your healthcare provider.  Follow-up care  Call your healthcare provider if all symptoms are not gone after 3 days of treatment. This is especially important if you have repeat infections.  If a culture was done, you will be told if your treatment needs to be changed. If directed, you can call to find out the results.  If X-rays were done, you will be told if the results will affect your treatment.  Call 911  Call 911 if any of the following occur:    Trouble breathing    Hard to wake up or confusion    Fainting (loss of consciousness)    Fast heart rate  When to get medical advice  Call your healthcare provider right away if any of these occur:    Fever of 100.4 F (38.0 C) or higher, or as  directed by your healthcare provider    Symptoms are not better after 3 days of treatment    Back or belly pain that gets worse    Repeated vomiting, or unable to keep medicine down    Weakness or dizziness    Vaginal discharge    Pain, redness, or swelling in the outer vaginal area (labia)  [employer] last reviewed this educational content on 11/1/2019 2000-2021 [employer]. All rights reserved. This information is not intended as a substitute for professional medical care. Always follow your healthcare professional's instructions.

## 2021-06-23 NOTE — PATIENT INSTRUCTIONS

## 2021-06-24 NOTE — TELEPHONE ENCOUNTER
Anoop calling from [facility] pharmacy.  Says they received a prescription for amoxicillin and the directions are for 2 capsules twice daily for 10 days but quantity is only for 20 capsules, not 40.      Message to  clinic team to clarify prescription.    Esme Winston RN  Triage Nurse Advisor    Reason for Disposition    [1] Caller has URGENT medication question about med that PCP or specialist prescribed AND [2] triager unable to answer question    Protocols used: MEDICATION QUESTION CALL-A-AH

## 2021-06-24 NOTE — TELEPHONE ENCOUNTER
Called [facility].  Janki checked with provider.  Provider brought onto line and confirms SIG is correct and capsules dispensed should be #40.    8:03 pm called [facility] back and advised quantity of Amoxicillin should be #40.    Esme Winston RN  Triage Nurse Advisor

## 2021-06-25 NOTE — RESULT ENCOUNTER NOTE
Dear Noreen    Your test results are attached. I am happy to let you know that they are stable.    The urinalysis is normal except for trace blood. You have had this in the past. You do not have signs of a urinary tract infection.     Please contact me by [employer] if you have any questions about your labs or management. You may also call my office number [phone] for any questions.     Jia Castro MD

## 2021-07-06 DIAGNOSIS — L30.9 DERMATITIS: ICD-10-CM

## 2021-07-06 DIAGNOSIS — R25.2 LEG CRAMPS: ICD-10-CM

## 2021-07-07 RX ORDER — TRIAMCINOLONE ACETONIDE 1 MG/G
CREAM TOPICAL
Qty: 80 G | Refills: 0 | Status: SHIPPED | OUTPATIENT
Start: 2021-07-07 | End: 2021-08-24

## 2021-07-07 RX ORDER — MULTIVITAMIN WITH IRON
TABLET ORAL
Qty: 30 TABLET | Refills: 3 | Status: SHIPPED | OUTPATIENT
Start: 2021-07-07 | End: 2022-01-19

## 2021-07-07 NOTE — TELEPHONE ENCOUNTER
Routing refill request to provider for review/approval because:  Drug not on the [facility] refill protocol   Salud Burrell BSN, RN

## 2021-08-22 DIAGNOSIS — L30.9 DERMATITIS: ICD-10-CM

## 2021-08-24 RX ORDER — TRIAMCINOLONE ACETONIDE 1 MG/G
CREAM TOPICAL
Qty: 80 G | Refills: 0 | Status: SHIPPED | OUTPATIENT
Start: 2021-08-24 | End: 2021-09-10

## 2021-09-10 ENCOUNTER — OFFICE VISIT (OUTPATIENT)
Dept: OPTOMETRY | Facility: CLINIC | Age: 71
End: 2021-09-10
Payer: MEDICARE

## 2021-09-10 DIAGNOSIS — H11.002 PTERYGIUM, LEFT: ICD-10-CM

## 2021-09-10 DIAGNOSIS — H40.013 OPEN ANGLE WITH BORDERLINE FINDINGS AND LOW GLAUCOMA RISK IN BOTH EYES: Primary | ICD-10-CM

## 2021-09-10 DIAGNOSIS — H52.4 PRESBYOPIA: ICD-10-CM

## 2021-09-10 DIAGNOSIS — H25.13 SENILE NUCLEAR SCLEROSIS, BILATERAL: ICD-10-CM

## 2021-09-10 PROCEDURE — 92004 COMPRE OPH EXAM NEW PT 1/>: CPT | Performed by: OPTOMETRIST

## 2021-09-10 PROCEDURE — 92133 CPTRZD OPH DX IMG PST SGM ON: CPT | Performed by: OPTOMETRIST

## 2021-09-10 PROCEDURE — 92015 DETERMINE REFRACTIVE STATE: CPT | Mod: GY | Performed by: OPTOMETRIST

## 2021-09-10 RX ORDER — LATANOPROST 50 UG/ML
SOLUTION/ DROPS OPHTHALMIC
Qty: 7.5 ML | Refills: 4 | Status: SHIPPED | OUTPATIENT
Start: 2021-09-10 | End: 2022-03-24

## 2021-09-10 ASSESSMENT — TONOMETRY
OD_IOP_MMHG: 12
OS_IOP_MMHG: 11
IOP_METHOD: TONOPEN
OD_IOP_MMHG: 14
OS_IOP_MMHG: 14
OD_IOP_MMHG: 14
IOP_METHOD: APPLANATION
OS_IOP_MMHG: 15

## 2021-09-10 ASSESSMENT — PACHYMETRY
OS_CT(UM): 503
OD_CT(UM): 505

## 2021-09-10 ASSESSMENT — VISUAL ACUITY
OD_SC+: +2
METHOD: SNELLEN - LINEAR
OS_SC: 20/25
OD_SC: 20/30

## 2021-09-10 ASSESSMENT — SLIT LAMP EXAM - LIDS
COMMENTS: NORMAL
COMMENTS: NORMAL

## 2021-09-10 ASSESSMENT — REFRACTION_MANIFEST
OS_AXIS: 056
OD_CYLINDER: +0.75
OS_CYLINDER: +0.75
OD_SPHERE: -0.50
OS_ADD: +2.75
OD_AXIS: 023
OS_SPHERE: PLANO
OD_ADD: +2.75

## 2021-09-10 ASSESSMENT — EXTERNAL EXAM - RIGHT EYE: OD_EXAM: NORMAL

## 2021-09-10 ASSESSMENT — EXTERNAL EXAM - LEFT EYE: OS_EXAM: NORMAL

## 2021-09-10 ASSESSMENT — CUP TO DISC RATIO
OD_RATIO: 0.6
OS_RATIO: 0.6

## 2021-09-10 ASSESSMENT — CONF VISUAL FIELD
OS_NORMAL: 1
OD_NORMAL: 1
METHOD: COUNTING FINGERS

## 2021-09-10 NOTE — PROGRESS NOTES
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 No indication of progression since last exam. Large ONH with healthy rim tissue  Plan: OCT Optic Nerve RNFL Optovue OU (both eyes)        Discussed findings with patient. Suspect that cupping is most likely physiological, but with well-controlled IOP and stable RNFL findings, will resume taking latanoprost nightly in both eyes.  Follow up in 1 year with repeat RNFL OCT. If changes are noted, will plan on seeing patient sooner for additional testing.     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 Return to clinic if vision changes are noted.     (H11.002) Pterygium, left  Comment: Not impacting vision, both nasal and temporal in left eye  Plan: Monitor only. Regular lubrication was encouraged.     (H52.4) Presbyopia  Plan: Discussed findings with patient. New spectacle prescription dispensed to patient. Patient is welcome to return to clinic with prolonged adaptation difficulties.       Complete documentation of historical and exam elements from today's encounter can  be found in the full encounter summary report (not reduplicated in this progress  note). I personally obtained the chief complaint(s) and history of present illness. I  confirmed and edited as necessary the review of systems, past medical/surgical  history, family history, social history, and examination findings as documented by  others; and I examined the patient myself. I personally reviewed the relevant tests,  images, and reports as documented above. I formulated and edited as necessary the  assessment and plan and discussed the findings and management plan with the  patient and family.    Prabhakar Banerjee, OD

## 2021-09-10 NOTE — NURSING NOTE
Chief Complaints and History of Present Illnesses   Patient presents with     Glaucoma       Chief Complaint(s) and History of Present Illness(es)     Glaucoma     Laterality: both eyes              Comments     Patient there for annual Glaucoma exam. Latanoprost drops, each eye at bedtime, last does 10:00pm.  Has RX glasses, only wears them for reading fine print. Eyes feel dry at times, uses Systaine 3x a day.   Floaters in left eye, has had them for several years.   No Pain, No Flashes  Patient's Rheumatologist, Dr. Bazan @ [facility], would like the notes from today's exam.  Fax # [phone]                James Westbrook, Ophthalmic Assistant

## 2021-09-26 ENCOUNTER — HEALTH MAINTENANCE LETTER (OUTPATIENT)
Age: 71
End: 2021-09-26

## 2021-10-07 NOTE — PROGRESS NOTES
"SUBJECTIVE:   Noreen Florence is a 71 year old female who presents for Preventive Visit.      Patient has been advised of split billing requirements and indicates understanding: No   Are you in the first 12 months of your Medicare coverage?  No    Healthy Habits:     In general, how would you rate your overall health?  Good    Frequency of exercise:  4-5 days/week    Duration of exercise:  45-60 minutes    Do you usually eat at least 4 servings of fruit and vegetables a day, include whole grains    & fiber and avoid regularly eating high fat or \"junk\" foods?  Yes    Taking medications regularly:  Yes    Medication side effects:  Muscle aches    Ability to successfully perform activities of daily living:  No assistance needed    Home Safety:  No safety concerns identified    Hearing Impairment:  No hearing concerns    In the past 6 months, have you been bothered by leaking of urine? Yes    In general, how would you rate your overall mental or emotional health?  Good      PHQ-2 Total Score: 3    Additional concerns today:  Yes    Do you feel safe in your environment? Yes    Have you ever done Advance Care Planning? (For example, a Health Directive, POLST, or a discussion with a medical provider or your loved ones about your wishes): Yes, advance care planning is on file.       Fall risk  Fallen 2 or more times in the past year?: (P) No  Any fall with injury in the past year?: (P) Yes    Cognitive Screening   1) Repeat 3 items (Leader, Season, Table)    2) Clock draw: NORMAL  3) 3 item recall: Recalls 3 objects  Results: 3 items recalled: COGNITIVE IMPAIRMENT LESS LIKELY    Mini-CogTM Copyright GUIDO Cárdenas. Licensed by the author for use in [facility]; reprinted with permission (anthony@.[facility]). All rights reserved.      Do you have sleep apnea, excessive snoring or daytime drowsiness?: no    Reviewed and updated as needed this visit by clinical staff  Tobacco   Meds   Med Hx  Surg Hx  Fam Hx  Soc Hx    "     Reviewed and updated as needed this visit by Provider                Social History     Tobacco Use     Smoking status: Never Smoker     Smokeless tobacco: Never Used   Substance Use Topics     Alcohol use: No     If you drink alcohol do you typically have >3 drinks per day or >7 drinks per week? Yes      Alcohol Use 10/18/2021   Prescreen: >3 drinks/day or >7 drinks/week? No   Prescreen: >3 drinks/day or >7 drinks/week? -           Hyperlipidemia Follow-Up      Are you regularly taking any medication or supplement to lower your cholesterol?   Yes- pravastatin 20    Are you having muscle aches or other side effects that you think could be caused by your cholesterol lowering medication?  No      Current providers sharing in care for this patient include:   Patient Care Team:  Jia Castro MD as PCP - General (Family Practice)  Jia Castro MD as PCP (Family Practice)  Jia Castro MD as Assigned PCP  Emi Morales MD as MD (Urology)  Luis Cabrera MD as Resident (Radiology)  Bri Morales MD as MD (Urology)  Laverne Mayfield DPM, Podiatry/Foot and Ankle Surgery as Assigned Musculoskeletal Provider  Rylie Marques [facility] as Pharmacist (Pharmacist)  Prabhakar Banerjee OD as Assigned Surgical Provider    The following health maintenance items are reviewed in Epic and correct as of today:  Health Maintenance Due   Topic Date Due     FALL RISK ASSESSMENT  10/08/2021     Lab work is in process  Labs reviewed in EPIC  BP Readings from Last 3 Encounters:   10/18/21 138/84   06/23/21 (!) 149/81   06/18/21 137/83    Wt Readings from Last 3 Encounters:   10/18/21 54.2 kg (119 lb 6.4 oz)   06/23/21 53.1 kg (117 lb)   06/18/21 53.5 kg (118 lb)                  Patient Active Problem List   Diagnosis     Osteopenia     Hayfever     Hypertension, goal below 150/90     Polyarthritis, inflammatory (H)     GERD (gastroesophageal reflux disease)     Hyperlipidemia with target  LDL less than 130     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     High risk medication use     Microscopic hematuria     Pterygium of right eye     Sicca syndrome (H)     Angiomyolipoma of right kidney     History of Helicobacter pylori infection     Pre-diabetes     NAFLD (nonalcoholic fatty liver disease)     Elevated LFTs     Past Surgical History:   Procedure Laterality Date     COLONOSCOPY       COLONOSCOPY WITH CO2 INSUFFLATION N/A 4/5/2017    Procedure: COLONOSCOPY WITH CO2 INSUFFLATION;  Surgeon: Duane, William Charles, MD;  Location: [facility] OR     COMBINED ESOPHAGOSCOPY, GASTROSCOPY, DUODENOSCOPY (EGD) WITH CO2 INSUFFLATION N/A 2/13/2019    Procedure: COMBINED ESOPHAGOSCOPY, GASTROSCOPY, DUODENOSCOPY (EGD) WITH CO2 INSUFFLATION;  Surgeon: Sly De Santiago MD;  Location: [facility] OR     COMBINED REPAIR PTOSIS WITH BLEPHAROPLASTY BILATERAL Bilateral 1/6/2017    Procedure: COMBINED REPAIR PTOSIS WITH BLEPHAROPLASTY BILATERAL;  Surgeon: Carly Norman MD;  Location: [facility]     ESOPHAGOSCOPY, GASTROSCOPY, DUODENOSCOPY (EGD), COMBINED N/A 1/5/2016    Procedure: COMBINED ESOPHAGOSCOPY, GASTROSCOPY, DUODENOSCOPY (EGD), BIOPSY SINGLE OR MULTIPLE;  Surgeon: Duane, William Charles, MD;  Location: [facility] OR     ESOPHAGOSCOPY, GASTROSCOPY, DUODENOSCOPY (EGD), COMBINED N/A 2/13/2019    Procedure: Combined Esophagoscopy, Gastroscopy, Duodenoscopy (Egd), Biopsy Single Or Multiple;  Surgeon: Sly De Santiago MD;  Location: [facility]     REPAIR ENTROPION BILATERAL Bilateral 1/6/2017    Procedure: REPAIR ENTROPION BILATERAL;  Surgeon: Carly Norman MD;  Location: [facility]     REPAIR PTOSIS Bilateral 01/2017       Social History     Tobacco Use     Smoking status: Never Smoker     Smokeless tobacco: Never Used   Substance Use Topics     Alcohol use: No     Family History   Problem Relation Age of Onset     Diabetes Mother      Cancer No family hx of      Hypertension No family hx of      Cerebrovascular  Disease No family hx of      Thyroid Disease No family hx of      Glaucoma No family hx of      Macular Degeneration No family hx of          Current Outpatient Medications   Medication Sig Dispense Refill     amoxicillin-clavulanate (AUGMENTIN) 875-125 MG tablet Take 1 tablet by mouth 2 times daily 14 tablet 1     cetirizine (ZYRTEC) 10 MG tablet TAKE 1 TABLET(10 MG) BY MOUTH DAILY 90 tablet 3     chlorhexidine (PERIDEX) 0.12 % solution SWISH AND SPIT 15 ML BY MOUTH TWICE DAILY 473 mL 3     fluticasone (FLONASE) 50 MCG/ACT nasal spray SHAKE WELL AND USE 1 OR 2 SPRAYS IN EACH NOSTRIL DAILY 48 g 2     hydrocortisone, Perianal, (HYDROCORTISONE) 2.5 % cream Place rectally 2 times daily as needed for hemorrhoids 30 g 3     latanoprost (XALATAN) 0.005 % ophthalmic solution INSTILL 1 DROP IN BOTH EYES AT BEDTIME 7.5 mL 4     leucovorin (WELLCOVORIN) 5 MG tablet TK 1 T PO 12 H AFTER METHOTREXATE ONE TIME EACH WEEK  3     losartan-hydrochlorothiazide (HYZAAR) 50-12.5 MG tablet TAKE 1 TABLET BY MOUTH DAILY 90 tablet 3     magnesium 250 MG tablet TAKE 1 TABLET(250 MG) BY MOUTH DAILY AS NEEDED FOR LEG CRAMPS 30 tablet 3     methotrexate 2.5 MG tablet Take 6 tablets (15 mg) by mouth once a week 1 tablet 0     methylcellulose (CITRUCEL) powder Take 2 g by mouth daily 180 g 3     metoprolol tartrate (LOPRESSOR) 25 MG tablet Take 1 tablet (25 mg) by mouth At Bedtime 90 tablet 3     omeprazole (PRILOSEC) 40 MG DR capsule Take 1 capsule (40 mg) by mouth daily 90 capsule 1     pravastatin (PRAVACHOL) 20 MG tablet Take 1 tablet (20 mg) by mouth daily 90 tablet 3     SF 1.1 % GEL topical gel APPLY TO THE AFFECTED AREA TWICE DAILY 392 g 1     Sodium Fluoride 1.1 % PSTE Apply 1 Application to affected area 2 times daily 112 Bottle 3     triamcinolone (KENALOG) 0.1 % external cream APPLY EXTERNALLY TO THE AFFECTED AREA TWICE DAILY 80 g 0     Vitamin D, Cholecalciferol, 25 MCG (1000 UT) CAPS Take 1,000 Units by mouth daily 100 capsule 3      No Known Allergies  Recent Labs   Lab Test 10/08/20  0900 10/04/19  0814 07/05/19  0927 04/26/19  1407 04/26/19  1407 06/21/18  0953 04/16/18  1610   A1C 5.6  --   --   --  5.9* 5.9*  --    LDL 92  --  Cannot estimate LDL when triglyceride exceeds 400 mg/dL  149*  --   --  88  --    HDL 41*  --  31*  --   --  36*  --    TRIG 255*  --  407*  --   --  250*  --    ALT 44 50  --   --  53*  --    < >   CR 0.41* 0.51*  --    < > 0.55 0.47*   < >   GFRESTIMATED >90 >90  --    < > >90 >90   < >   GFRESTBLACK >90 >90  --    < > >90 >90   < >   POTASSIUM 3.7 3.6  --    < > 3.5 3.9   < >   TSH 2.23  --   --   --  1.26  --   --     < > = values in this interval not displayed.      Pneumonia Vaccine:Adults age 65+ who received Pneumovax (PPSV23) at 65 years or older: Should be given PCV13 > 1 year after their most recent PPSV23  Mammogram Screening: Mammogram Screening: Recommended mammography every 1-2 years with patient discussion and risk factor consideration    FHS-7:   Breast CA Risk Assessment (FHS-7) 10/12/2021   Did any of your first-degree relatives have breast or ovarian cancer? No   Did any of your relatives have bilateral breast cancer? No   Did any man in your family have breast cancer? No   Did any woman in your family have breast and ovarian cancer? No   Did any woman in your family have breast cancer before age 50 y? No   Do you have 2 or more relatives with breast and/or ovarian cancer? No   Do you have 2 or more relatives with breast and/or bowel cancer? No         Pertinent mammograms are reviewed under the imaging tab.    Review of Systems   Breasts:  Negative for tenderness and discharge.   Genitourinary: Negative for pelvic pain, vaginal bleeding and vaginal discharge.   Musculoskeletal: Positive for joint swelling and myalgias.   Neurological: Positive for weakness.     Constitutional, HEENT, cardiovascular, pulmonary, gi and gu systems are negative, except as otherwise noted.    OBJECTIVE:   BP  "138/84   Pulse 75   Temp 98.1  F (36.7  C) (Tympanic)   Ht 1.54 m (5' 0.63\")   Wt 54.2 kg (119 lb 6.4 oz)   LMP 06/21/2002 (Approximate)   SpO2 97%   BMI 22.84 kg/m   Estimated body mass index is 22.84 kg/m  as calculated from the following:    Height as of this encounter: 1.54 m (5' 0.63\").    Weight as of this encounter: 54.2 kg (119 lb 6.4 oz).  Physical Exam  GENERAL APPEARANCE: healthy, alert and no distress  EYES: Eyes grossly normal to inspection, PERRL and conjunctivae and sclerae normal  HENT: ear canals and TM's normal, nose and mouth without ulcers or lesions, oropharynx clear and oral mucous membranes moist  NECK: no adenopathy, no asymmetry, masses, or scars and thyroid normal to palpation  RESP: lungs clear to auscultation - no rales, rhonchi or wheezes  CV: regular rates and rhythm, normal S1 S2, no S3 or S4, no murmur, click or rub, no peripheral edema and peripheral pulses strong  ABDOMEN: soft, nontender, no hepatosplenomegaly, no masses and bowel sounds normal  MS: no musculoskeletal defects are noted and gait is age appropriate without ataxia, osteoarthritis changes in hands with heberden's nodes   SKIN: no suspicious lesions or rashes  NEURO: Normal strength and tone, sensory exam grossly normal, mentation intact and speech normal  PSYCH: mentation appears normal and affect normal/bright     Diagnostic Test Results:  Labs reviewed in Epic  No results found for this or any previous visit (from the past 24 hour(s)).    ASSESSMENT / PLAN:       ICD-10-CM    1. Routine general medical examination at a health care facility  Z00.00    2. NAFLD (nonalcoholic fatty liver disease)  K76.0 Per ultrasound results done by outside provider   3. Elevated LFTs  R79.89 Stable and low level elevations.    4. Tinnitus, bilateral  H93.13 Otolaryngology Referral   5. Bilateral sensorineural hearing loss  H90.3 Adult Audiology Referral     Otolaryngology Referral   6. External hemorrhoids - medication refill " "requested K64.4 hydrocortisone, Perianal, (HYDROCORTISONE) 2.5 % cream   7. High priority for 2019-nCoV vaccine  Z23 COVID-19,PF,PFIZER booster due to age and high risk   8. Hyperlipidemia with target LDL less than 130  E78.5 Lipid panel reflex to direct LDL Fasting   9. Screening for diabetes mellitus  Z13.1 Glucose       Patient has been advised of split billing requirements and indicates understanding: No  COUNSELING:  Reviewed preventive health counseling, as reflected in patient instructions       Regular exercise       Healthy diet/nutrition       Vision screening       Hearing screening       Osteoporosis prevention/bone health    Estimated body mass index is 22.84 kg/m  as calculated from the following:    Height as of this encounter: 1.54 m (5' 0.63\").    Weight as of this encounter: 54.2 kg (119 lb 6.4 oz).        She reports that she has never smoked. She has never used smokeless tobacco.      Appropriate preventive services were discussed with this patient, including applicable screening as appropriate for cardiovascular disease, diabetes, osteopenia/osteoporosis, and glaucoma.  As appropriate for age/gender, discussed screening for colorectal cancer, prostate cancer, breast cancer, and cervical cancer. Checklist reviewing preventive services available has been given to the patient.    Reviewed patients plan of care and provided an AVS. The Basic Care Plan (routine screening as documented in Health Maintenance) for Noreen meets the Care Plan requirement. This Care Plan has been established and reviewed with the Patient.    Counseling Resources:  ATP IV Guidelines  Pooled Cohorts Equation Calculator  Breast Cancer Risk Calculator  Breast Cancer: Medication to Reduce Risk  FRAX Risk Assessment  ICSI Preventive Guidelines  Dietary Guidelines for Americans, 2010  [employer] MyPlate  ASA Prophylaxis  Lung CA Screening    Jia Castro MD  [facility] " Risks:  Answers for HPI/ROS submitted by the patient on 10/18/2021  If you checked off any problems, how difficult have these problems made it for you to do your work, take care of things at home, or get along with other people?: Not difficult at all  PHQ9 TOTAL SCORE: 2

## 2021-10-07 NOTE — PATIENT INSTRUCTIONS
Preventive Health Recommendations    See your health care provider every year to    Review health changes.     Discuss preventive care.      Review your medicines if your doctor has prescribed any.      You no longer need a yearly Pap test unless you've had an abnormal Pap test in the past 10 years. If you have vaginal symptoms, such as bleeding or discharge, be sure to talk with your provider about a Pap test.      Every 1 to 2 years, have a mammogram.  If you are over 69, talk with your health care provider about whether or not you want to continue having screening mammograms.      Every 10 years, have a colonoscopy. Or, have a yearly FIT test (stool test). These exams will check for colon cancer.       Have a cholesterol test every 5 years, or more often if your doctor advises it.       Have a diabetes test (fasting glucose) every three years. If you are at risk for diabetes, you should have this test more often.       At age 65, have a bone density scan (DEXA) to check for osteoporosis (brittle bone disease).    Shots:    Get a flu shot each year.    Get a tetanus shot every 10 years.    Talk to your doctor about your pneumonia vaccines. There are now two you should receive - Pneumovax (PPSV 23) and Prevnar (PCV 13).    Talk to your pharmacist about the shingles vaccine.    Talk to your doctor about the hepatitis B vaccine.    Nutrition:     Eat at least 5 servings of fruits and vegetables each day.      Eat whole-grain bread, whole-wheat pasta and brown rice instead of white grains and rice.      Get adequate about Calcium and Vitamin D.     Lifestyle    Exercise at least 150 minutes a week (30 minutes a day, 5 days a week). This will help you control your weight and prevent disease.      Limit alcohol to one drink per day.      No smoking.       Wear sunscreen to prevent skin cancer.       See your dentist twice a year for an exam and cleaning.      See your eye doctor every 1 to 2 years to screen for  conditions such as glaucoma, macular degeneration, cataracts, etc.    Personalized Prevention Plan  You are due for the preventive services outlined below.  Your care team is available to assist you in scheduling these services.  If you have already completed any of these items, please share that information with your care team to update in your medical record.    Health Maintenance Due   Topic Date Due     COVID-19 Vaccine (3 - Pfizer risk 3-dose series) 04/23/2021     Flu Vaccine (1) 09/01/2021     Annual Wellness Visit  10/08/2021     FALL RISK ASSESSMENT  10/08/2021   At [facility], we strive to deliver an exceptional experience to you, every time we see you. If you receive a survey, please complete it as we do value your feedback.  If you have MyChart, you can expect to receive results automatically within 24 hours of their completion.  Your provider will send a note interpreting your results as well.   If you do not have MyChart, you should receive your results in about a week by mail.    Your care team:                            Family Medicine Internal Medicine   MD Alfredo Morgan MD Shantel Branch-Fleming, MD Srinivasa Vaka, MD Katya Belousova, PAKIRAN Akers, APRN CNP    Binh Barnes MD Pediatrics   Jose Bryson, PAKIRAN Holman, CNP MD Julianna Rodriguez APRN CNP   MD Naila Champion MD Deborah Mielke, MD Kim Thein, APRN [facility]      Clinic hours: Monday - Thursday 7 am-6 pm; Fridays 7 am-5 pm.   Urgent care: Monday - Friday 10 am- 8 pm; Saturday and Sunday 9 am-5 pm.    Clinic: [phone]       [facility]: Monday - Thursday 8 am - 7 pm; Friday 8 am - 6 pm  [facility]: [phone]     Use www.oncare.org for 24/7 diagnosis and treatment of dozens of conditions.    Patient Education     Tinnitus (Ringing in the Ears)     Treatment may include maskers  and hearing aids.   Tinnitus is the term for a noise in your ear not caused by an outside sound. The noise might be a ringing, clicking, hiss, or roar. It can vary in pitch. It may be soft or very loud. For some people, this is a minor problem. But for others, the noise can make it hard to hear, work, and even sleep. When tinnitus can't be cured, treatments may help.  What causes tinnitus?  Loud noises, hearing loss, and earwax can cause tinnitus. So can certain medicines. Large amounts of aspirin or caffeine are sometimes to blame. In many cases, the exact cause of tinnitus is not known.  How is tinnitus treated?  Finding and removing the cause is the best way to treat tinnitus. So your healthcare provider may refer you to an ear, nose, and throat doctor (ENT or otolaryngologist). Your hearing may also be checked by a hearing specialist (audiologist). If you have hearing loss, wearing a hearing aid may help your tinnitus. When the cause can't be found, the tinnitus itself may be treated. Some of the treatments are listed below. Your healthcare provider can tell you more about them:    Maskers. These are small devices that look like hearing aids. They have a pleasant sound that helps cover up the ringing in your ears. Hearing aids and maskers are sometimes used together.    Medicines that treat anxiety and depression. These may ease tinnitus in some people.    Hypnosis or relaxation therapy. This may help head noise seem less severe.    Tinnitus retraining therapy. This combines counseling and maskers. Both can help take your mind off the tinnitus.  To learn more    American Speech-Hearing-Language Association [phone] www.xavier.org    American Tinnitus Association [phone] www.mirna.org    National [place] on Deafness and other Communication Disorders 704-016-9726 www.nidcd.nih.gov/    [employer] last reviewed this educational content on 9/1/2019 2000-2021 [employer]. All rights reserved.  This information is not intended as a substitute for professional medical care. Always follow your healthcare professional's instructions.

## 2021-10-12 ENCOUNTER — ANCILLARY PROCEDURE (OUTPATIENT)
Dept: MAMMOGRAPHY | Facility: CLINIC | Age: 71
End: 2021-10-12
Attending: FAMILY MEDICINE
Payer: MEDICARE

## 2021-10-12 DIAGNOSIS — Z12.31 VISIT FOR SCREENING MAMMOGRAM: ICD-10-CM

## 2021-10-12 PROCEDURE — 77063 BREAST TOMOSYNTHESIS BI: CPT | Mod: TC | Performed by: RADIOLOGY

## 2021-10-12 PROCEDURE — 77067 SCR MAMMO BI INCL CAD: CPT | Mod: TC | Performed by: RADIOLOGY

## 2021-10-13 NOTE — RESULT ENCOUNTER NOTE
Dear Noreen Florence,    I am happy to let you know that your mammogram was normal. You may schedule a follow up mammogram in 1-2 years depending on your risk factors and family history. Please let me know if you have any questions about your results. You should be receiving a letter from the radiologist.    You may call me at [phone] with any questions or send me a EadBox message.     Jia Castro MD

## 2021-10-18 ENCOUNTER — OFFICE VISIT (OUTPATIENT)
Dept: FAMILY MEDICINE | Facility: CLINIC | Age: 71
End: 2021-10-18
Payer: MEDICARE

## 2021-10-18 VITALS
TEMPERATURE: 98.1 F | BODY MASS INDEX: 22.54 KG/M2 | DIASTOLIC BLOOD PRESSURE: 84 MMHG | SYSTOLIC BLOOD PRESSURE: 138 MMHG | WEIGHT: 119.4 LBS | HEIGHT: 61 IN | OXYGEN SATURATION: 97 % | HEART RATE: 75 BPM

## 2021-10-18 DIAGNOSIS — H90.3 BILATERAL SENSORINEURAL HEARING LOSS: ICD-10-CM

## 2021-10-18 DIAGNOSIS — Z00.00 ROUTINE GENERAL MEDICAL EXAMINATION AT A HEALTH CARE FACILITY: Primary | ICD-10-CM

## 2021-10-18 DIAGNOSIS — H93.13 TINNITUS, BILATERAL: ICD-10-CM

## 2021-10-18 DIAGNOSIS — K64.4 EXTERNAL HEMORRHOIDS: ICD-10-CM

## 2021-10-18 DIAGNOSIS — K76.0 NAFLD (NONALCOHOLIC FATTY LIVER DISEASE): ICD-10-CM

## 2021-10-18 DIAGNOSIS — Z13.1 SCREENING FOR DIABETES MELLITUS: ICD-10-CM

## 2021-10-18 DIAGNOSIS — R79.89 ELEVATED LFTS: ICD-10-CM

## 2021-10-18 DIAGNOSIS — Z23 HIGH PRIORITY FOR 2019-NCOV VACCINE: ICD-10-CM

## 2021-10-18 DIAGNOSIS — E78.5 HYPERLIPIDEMIA WITH TARGET LDL LESS THAN 130: ICD-10-CM

## 2021-10-18 PROCEDURE — 91300 COVID-19,PF,PFIZER (12+ YRS): CPT | Performed by: FAMILY MEDICINE

## 2021-10-18 PROCEDURE — 90662 IIV NO PRSV INCREASED AG IM: CPT | Performed by: FAMILY MEDICINE

## 2021-10-18 PROCEDURE — G0439 PPPS, SUBSEQ VISIT: HCPCS | Performed by: FAMILY MEDICINE

## 2021-10-18 PROCEDURE — G0008 ADMIN INFLUENZA VIRUS VAC: HCPCS | Performed by: FAMILY MEDICINE

## 2021-10-18 PROCEDURE — 0004A COVID-19,PF,PFIZER (12+ YRS): CPT | Performed by: FAMILY MEDICINE

## 2021-10-18 RX ORDER — HYDROCORTISONE 25 MG/G
CREAM TOPICAL 2 TIMES DAILY PRN
Qty: 30 G | Refills: 3 | Status: SHIPPED | OUTPATIENT
Start: 2021-10-18 | End: 2022-03-24

## 2021-10-18 ASSESSMENT — ENCOUNTER SYMPTOMS
MYALGIAS: 1
WEAKNESS: 1
JOINT SWELLING: 1

## 2021-10-18 ASSESSMENT — MIFFLIN-ST. JEOR: SCORE: 998.09

## 2021-10-18 ASSESSMENT — PATIENT HEALTH QUESTIONNAIRE - PHQ9
10. IF YOU CHECKED OFF ANY PROBLEMS, HOW DIFFICULT HAVE THESE PROBLEMS MADE IT FOR YOU TO DO YOUR WORK, TAKE CARE OF THINGS AT HOME, OR GET ALONG WITH OTHER PEOPLE: NOT DIFFICULT AT ALL
SUM OF ALL RESPONSES TO PHQ QUESTIONS 1-9: 2
SUM OF ALL RESPONSES TO PHQ QUESTIONS 1-9: 2

## 2021-10-18 ASSESSMENT — ACTIVITIES OF DAILY LIVING (ADL): CURRENT_FUNCTION: NO ASSISTANCE NEEDED

## 2021-10-18 NOTE — NURSING NOTE
Prior to immunization administration, verified patients identity using patient s name and date of birth. Please see Immunization Activity for additional information.     Screening Questionnaire for Adult Immunization    Are you sick today?   No   Do you have allergies to medications, food, a vaccine component or latex?   No   Have you ever had a serious reaction after receiving a vaccination?   No   Do you have a long-term health problem with heart, lung, kidney, or metabolic disease (e.g., diabetes), asthma, a blood disorder, no spleen, complement component deficiency, a cochlear implant, or a spinal fluid leak?  Are you on long-term aspirin therapy?   No   Do you have cancer, leukemia, HIV/AIDS, or any other immune system problem?   No   Do you have a parent, brother, or sister with an immune system problem?   No   In the past 3 months, have you taken medications that affect  your immune system, such as prednisone, other steroids, or anticancer drugs; drugs for the treatment of rheumatoid arthritis, Crohn s disease, or psoriasis; or have you had radiation treatments?   No   Have you had a seizure, or a brain or other nervous system problem?   No   During the past year, have you received a transfusion of blood or blood    products, or been given immune (gamma) globulin or antiviral drug?   No   For women: Are you pregnant or is there a chance you could become       pregnant during the next month?   No   Have you received any vaccinations in the past 4 weeks?   No     Immunization questionnaire answers were all negative.        Per orders of Dr. Castro, injection of Flu & Covid given by Sun Roberson. Patient instructed to remain in clinic for 15 minutes afterwards, and to report any adverse reaction to me immediately.       Screening performed by Sun Roberson on 10/18/2021 at 3:02 PM.

## 2021-10-19 ASSESSMENT — PATIENT HEALTH QUESTIONNAIRE - PHQ9: SUM OF ALL RESPONSES TO PHQ QUESTIONS 1-9: 2

## 2021-10-30 DIAGNOSIS — E55.9 VITAMIN D DEFICIENCY: ICD-10-CM

## 2021-10-31 ENCOUNTER — LAB (OUTPATIENT)
Dept: LAB | Facility: CLINIC | Age: 71
End: 2021-10-31
Payer: MEDICARE

## 2021-10-31 DIAGNOSIS — E78.5 HYPERLIPIDEMIA WITH TARGET LDL LESS THAN 130: ICD-10-CM

## 2021-10-31 DIAGNOSIS — Z13.1 SCREENING FOR DIABETES MELLITUS: ICD-10-CM

## 2021-10-31 PROCEDURE — 80061 LIPID PANEL: CPT

## 2021-10-31 PROCEDURE — 82947 ASSAY GLUCOSE BLOOD QUANT: CPT

## 2021-10-31 PROCEDURE — 36415 COLL VENOUS BLD VENIPUNCTURE: CPT

## 2021-10-31 PROCEDURE — 83721 ASSAY OF BLOOD LIPOPROTEIN: CPT

## 2021-11-01 LAB
CHOLEST SERPL-MCNC: 207 MG/DL
FASTING STATUS PATIENT QL REPORTED: YES
FASTING STATUS PATIENT QL REPORTED: YES
GLUCOSE BLD-MCNC: 118 MG/DL (ref 70–99)
HDLC SERPL-MCNC: 37 MG/DL
LDLC SERPL CALC-MCNC: 131 MG/DL
LDLC SERPL CALC-MCNC: ABNORMAL MG/DL
NONHDLC SERPL-MCNC: 170 MG/DL
TRIGL SERPL-MCNC: 419 MG/DL

## 2021-11-01 RX ORDER — UBIDECARENONE 100 MG
CAPSULE ORAL
Qty: 100 CAPSULE | Refills: 3 | Status: SHIPPED | OUTPATIENT
Start: 2021-11-01 | End: 2022-03-24

## 2021-11-01 NOTE — TELEPHONE ENCOUNTER
Prescription approved per [facility] Refill Protocol.  Need to keep upcoming appointment for further refills  Laurie Belcher RN

## 2021-11-02 NOTE — RESULT ENCOUNTER NOTE
Dear Noreen    Your test results are attached. I am happy to let you know that they are stable.    The blood sugar is normal and you do not have diabetes. The kidneys are healthy. The cholesterol is a little high but you are on pravastatin and this will protect your heart. Recheck in 1 year.     Please contact me by [employer] if you have any questions about your labs or management. You may also call my office number [phone] for any questions.     Jia Castro MD

## 2021-11-04 DIAGNOSIS — L30.9 DERMATITIS: ICD-10-CM

## 2021-11-04 DIAGNOSIS — E55.9 VITAMIN D DEFICIENCY: ICD-10-CM

## 2021-11-04 RX ORDER — UBIDECARENONE 100 MG
CAPSULE ORAL
Qty: 100 CAPSULE | Refills: 3 | OUTPATIENT
Start: 2021-11-04

## 2021-11-04 RX ORDER — TRIAMCINOLONE ACETONIDE 1 MG/G
CREAM TOPICAL
Qty: 80 G | Refills: 0 | Status: SHIPPED | OUTPATIENT
Start: 2021-11-04 | End: 2022-01-19

## 2021-12-17 ENCOUNTER — OFFICE VISIT (OUTPATIENT)
Dept: FAMILY MEDICINE | Facility: CLINIC | Age: 71
End: 2021-12-17
Payer: MEDICARE

## 2021-12-17 VITALS
WEIGHT: 120.8 LBS | HEART RATE: 72 BPM | BODY MASS INDEX: 23.1 KG/M2 | DIASTOLIC BLOOD PRESSURE: 88 MMHG | TEMPERATURE: 98.7 F | OXYGEN SATURATION: 97 % | SYSTOLIC BLOOD PRESSURE: 144 MMHG

## 2021-12-17 DIAGNOSIS — J30.1 HAYFEVER: ICD-10-CM

## 2021-12-17 DIAGNOSIS — K21.00 GASTROESOPHAGEAL REFLUX DISEASE WITH ESOPHAGITIS WITHOUT HEMORRHAGE: Primary | ICD-10-CM

## 2021-12-17 DIAGNOSIS — K05.10 GINGIVITIS: ICD-10-CM

## 2021-12-17 DIAGNOSIS — I10 HYPERTENSION GOAL BP (BLOOD PRESSURE) < 140/90: ICD-10-CM

## 2021-12-17 DIAGNOSIS — M06.4 POLYARTHRITIS, INFLAMMATORY (H): ICD-10-CM

## 2021-12-17 DIAGNOSIS — R05.9 COUGH: ICD-10-CM

## 2021-12-17 DIAGNOSIS — E78.5 HYPERLIPIDEMIA WITH TARGET LDL LESS THAN 130: ICD-10-CM

## 2021-12-17 PROCEDURE — 99214 OFFICE O/P EST MOD 30 MIN: CPT | Performed by: FAMILY MEDICINE

## 2021-12-17 RX ORDER — CHLORHEXIDINE GLUCONATE ORAL RINSE 1.2 MG/ML
SOLUTION DENTAL
Qty: 473 ML | Refills: 3 | Status: SHIPPED | OUTPATIENT
Start: 2021-12-17 | End: 2022-03-24

## 2021-12-17 RX ORDER — OMEPRAZOLE 40 MG/1
40 CAPSULE, DELAYED RELEASE ORAL DAILY
Qty: 90 CAPSULE | Refills: 3 | Status: SHIPPED | OUTPATIENT
Start: 2021-12-17 | End: 2023-02-02

## 2021-12-17 RX ORDER — FLUTICASONE PROPIONATE 50 MCG
SPRAY, SUSPENSION (ML) NASAL
Qty: 48 G | Refills: 2 | Status: SHIPPED | OUTPATIENT
Start: 2021-12-17 | End: 2022-03-24

## 2021-12-17 RX ORDER — LOSARTAN POTASSIUM AND HYDROCHLOROTHIAZIDE 12.5; 5 MG/1; MG/1
1 TABLET ORAL DAILY
Qty: 90 TABLET | Refills: 3 | Status: SHIPPED | OUTPATIENT
Start: 2021-12-17 | End: 2023-02-02

## 2021-12-17 RX ORDER — CODEINE PHOSPHATE AND GUAIFENESIN 10; 100 MG/5ML; MG/5ML
1-2 SOLUTION ORAL
Qty: 120 ML | Refills: 1 | Status: SHIPPED | OUTPATIENT
Start: 2021-12-17 | End: 2022-03-24

## 2021-12-17 NOTE — PROGRESS NOTES
Assessment & Plan     Gastroesophageal reflux disease with esophagitis without hemorrhage  Recommend higher dose of omeprazole for now and repeat upper endoscopy. May have this done after January 1st if more convenient. Follow up with gastroenterology as needed.   - Adult Gastro Ref - Procedure Only; Future  - omeprazole (PRILOSEC) 40 MG DR capsule; Take 1 capsule (40 mg) by mouth daily    Hypertension goal BP (blood pressure) < 140/90  A little high today and will recheck. Lower at home  - losartan-hydrochlorothiazide (HYZAAR) 50-12.5 MG tablet; Take 1 tablet by mouth daily    Gingivitis  Requests refill of mouth solution. Follow up with dentist  - chlorhexidine (PERIDEX) 0.12 % solution; Swish and spit 15 ml twice a day as needed.    Hayfever  Refill requested  - fluticasone (FLONASE) 50 MCG/ACT nasal spray; SHAKE WELL AND USE 1 OR 2 SPRAYS IN EACH NOSTRIL DAILY    Cough  Uses occasionally when cough bothers her at night. Refilled.   - guaiFENesin-codeine (ROBITUSSIN AC) 100-10 MG/5ML solution; Take 5-10 mLs by mouth nightly as needed for cough    Hyperlipidemia with target LDL less than 130  Stable     Polyarthritis, inflammatory (H)  Well controlled on medications and follow up with rheumatology in 4 months as scheduled.                FUTURE LABS:       - Schedule fasting labs in a year, then see provider  FUTURE APPOINTMENTS:       - Follow-up visit in 10 months or as needed.   See Patient Instructions    No follow-ups on file.    Jia Castro MD  [facility] NADJA Sorto is a 71 year old who presents for the following health issues     HPI     Hyperlipidemia Follow-Up      Are you regularly taking any medication or supplement to lower your cholesterol?   Yes- pravastatin 20    Are you having muscle aches or other side effects that you think could be caused by your cholesterol lowering medication?  No    Hypertension Follow-up      Do you check your blood pressure  regularly outside of the clinic? Yes     Are you following a low salt diet? Yes    Are your blood pressures ever more than 140 on the top number (systolic) OR more   than 90 on the bottom number (diastolic), for example 140/90? No    GERD symptoms with reflux that comes and goes. Taking omeprazole 20 mg over the counter as directed and is still having symptoms. Last endoscopy 4 years ago and feels that she needs another one.     Inflammatory arthritis taking methotrexate and symptoms stable on this. Monitored by rheumatology and follow up scheduled in March. No current concerns.      How many servings of fruits and vegetables do you eat daily?  2-3    On average, how many sweetened beverages do you drink each day (Examples: soda, juice, sweet tea, etc.  Do NOT count diet or artificially sweetened beverages)?   0    How many days per week do you exercise enough to make your heart beat faster? 6    How many minutes a day do you exercise enough to make your heart beat faster? 30 - 60    How many days per week do you miss taking your medication? 0    Reviewed HM and will be due for Wellness visit next October, otherwise up to date.     Review of Systems   Constitutional, HEENT, cardiovascular, pulmonary, gi and gu systems are negative, except as otherwise noted.      Objective    BP (!) 144/88 (BP Location: Left arm, Patient Position: Sitting, Cuff Size: Adult Regular)   Pulse 72   Temp 98.7  F (37.1  C) (Tympanic)   Wt 54.8 kg (120 lb 12.8 oz)   LMP 06/21/2002 (Approximate)   SpO2 97%   BMI 23.10 kg/m    Body mass index is 23.1 kg/m .  Physical Exam   GENERAL APPEARANCE: healthy, alert and no distress  EYES: Eyes grossly normal to inspection, PERRL and conjunctivae and sclerae normal  HENT: ear canals and TM's normal, nose and mouth without ulcers or lesions, oropharynx clear and oral mucous membranes moist  NECK: no adenopathy, no asymmetry, masses, or scars and thyroid normal to palpation  RESP: lungs clear to  auscultation - no rales, rhonchi or wheezes  CV: regular rates and rhythm, normal S1 S2, no S3 or S4, no murmur, click or rub, no peripheral edema and peripheral pulses strong  ABDOMEN: soft, nontender, no hepatosplenomegaly, no masses and bowel sounds normal  MS: no musculoskeletal defects are noted and gait is age appropriate without ataxia  SKIN: no suspicious lesions or rashes  NEURO: Normal strength and tone, sensory exam grossly normal, mentation intact and speech normal  PSYCH: mentation appears normal and affect normal/bright but somewhat anxious.     Lab on 10/31/2021   Component Date Value Ref Range Status     Glucose 10/31/2021 118* 70 - 99 mg/dL Final     Patient Fasting > 8hrs? 10/31/2021 Yes   Final     Cholesterol 10/31/2021 207* <200 mg/dL Final     Triglycerides 10/31/2021 419* <150 mg/dL Final     Direct Measure HDL 10/31/2021 37* >=50 mg/dL Final     LDL Cholesterol Calculated 10/31/2021    Final    Cannot estimate LDL when triglyceride exceeds 400 mg/dL     Non HDL Cholesterol 10/31/2021 170* <130 mg/dL Final     Patient Fasting > 8hrs? 10/31/2021 Yes   Final     LDL Cholesterol Direct 10/31/2021 131* <100 mg/dL Final    Age 0-19 years:  Desirable: 0-110 mg/dL   Borderline high: 110-129 mg/dL   High: >= 130 mg/dL    Age 20 years and older:  Desirable: <100mg/dL  Above desirable: 100-129 mg/dL   Borderline high: 130-159 mg/dL   High: 160-189 mg/dL   Very high: >= 190 mg/dL               There are no preventive care reminders to display for this patient.

## 2021-12-18 ENCOUNTER — TELEPHONE (OUTPATIENT)
Dept: FAMILY MEDICINE | Facility: CLINIC | Age: 71
End: 2021-12-18
Payer: MEDICARE

## 2021-12-18 NOTE — PATIENT INSTRUCTIONS
Patient Education     GERD (Adult)    The esophagus is a tube that carries food from the mouth to the stomach. A valve (the LES, lower esophageal sphincter) at the lower end of the esophagus prevents stomach acid from flowing upward. When this valve doesn't work properly, stomach contents may repeatedly flow back up (reflux) into the esophagus. This is called gastroesophageal reflux disease (GERD). GERD can irritate the esophagus. It can cause problems with pain, swallowing or breathing. In severe cases, GERD can cause recurrent pneumonia (from aspiration or breathing in particles) or other serious problems.  Symptoms of reflux include burning, pressure or sharp pain in the upper abdomen or mid to lower chest. The pain can spread to the neck, back, or shoulder. There may be belching, an acid taste in the back of the throat, chronic cough, or sore throat, or hoarseness. GERD symptoms often occur during the day after a big meal. They can also occur at night when lying down.   Home care  Lifestyle changes can help reduce symptoms. If needed, your healthcare provider may prescribe medicines. Symptoms often improve with treatment, but if treatment is stopped, the symptoms often return after a few months. So most persons with GERD will need to continue treatment or get treatment on and off.  Lifestyle changes    Limit or avoid fatty, fried, and spicy foods, as well as coffee, chocolate, mint, and foods with high acid content such as tomatoes and citrus fruit and juices (orange, grapefruit, lemon).    Don t eat large meals, especially at night. Frequent, smaller meals are best. Don't lie down right after eating. And don t eat anything 3 hours before going to bed.    Don't drink alcohol or smoke. As much as possible, stay away from second hand smoke.    If you are overweight, losing weight will reduce symptoms.     Don't wear tight clothing around your stomach area.    If your symptoms occur during sleep, use a foam wedge  "to elevate your upper body (not just your head.) Or, place 4\" blocks under the head of your bed. Or use 2 bed risers under your bedframe.  Medicines  If needed, medicines can help relieve the symptoms of GERD and prevent damage to the esophagus. Discuss a medicine plan with your healthcare provider. This may include one or more of the following medicines:    Antacids to help neutralize the normal acids in your stomach.    Acid blockers (Histamine or H2 blockers) to decrease acid production.    Acid inhibitors (proton pump inhibitors PPIs) to decrease acid production in a different way than the blockers. They may work better, but can take a little longer to take effect.  Take an antacid 30 to 60 minutes after eating and at bedtime, but not at the same time as an acid blocker.  Try not to take medicines such as ibuprofen and aspirin. If you are taking aspirin for your heart or other medical reasons, talk to your healthcare provider about stopping it.  Follow-up care  Follow up with your healthcare provider or as advised by our staff.  When to seek medical advice  Call your healthcare provider if any of the following occur:    Stomach pain gets worse or moves to the lower right abdomen (appendix area)    Chest pain appears or gets worse, or spreads to the back, neck, shoulder, or arm    An over-the-counter trial of medicine doesn't relieve your symptoms    Weight loss that can't be explained    Trouble or pain swallowing    Frequent vomiting (can t keep down liquids)    Blood in the stool or vomit (red or black in color)    Feeling weak or dizzy    Fever of 100.4 F (38 C) or higher, or as directed by your healthcare provider  [employer] last reviewed this educational content on 3/1/2018    2310-2714 [employer]. All rights reserved. This information is not intended as a substitute for professional medical care. Always follow your healthcare professional's instructions.           "

## 2021-12-18 NOTE — TELEPHONE ENCOUNTER
Plan does not cover this medication. Please call plan at [phone] to initiate prior authorization or call/fax pharmacy to change medication at  [phone]. Patient ID # is [id number].            Rik Faajuan  [facility]

## 2022-01-13 ENCOUNTER — TELEPHONE (OUTPATIENT)
Dept: GASTROENTEROLOGY | Facility: CLINIC | Age: 72
End: 2022-01-13
Payer: MEDICARE

## 2022-01-18 DIAGNOSIS — R25.2 LEG CRAMPS: ICD-10-CM

## 2022-01-18 DIAGNOSIS — L30.9 DERMATITIS: ICD-10-CM

## 2022-01-19 RX ORDER — TRIAMCINOLONE ACETONIDE 1 MG/G
CREAM TOPICAL
Qty: 80 G | Refills: 0 | Status: SHIPPED | OUTPATIENT
Start: 2022-01-19 | End: 2022-03-01

## 2022-01-19 RX ORDER — MULTIVITAMIN WITH IRON
TABLET ORAL
Qty: 30 TABLET | Refills: 3 | Status: SHIPPED | OUTPATIENT
Start: 2022-01-19 | End: 2022-09-24

## 2022-01-19 NOTE — TELEPHONE ENCOUNTER
Routing refill request to provider for review/approval because:  Drug not on the [facility] refill protocol       Marlin Ibarra RN  [facility]

## 2022-01-20 ENCOUNTER — TELEPHONE (OUTPATIENT)
Dept: GASTROENTEROLOGY | Facility: CLINIC | Age: 72
End: 2022-01-20
Payer: MEDICARE

## 2022-01-20 NOTE — TELEPHONE ENCOUNTER
Screening Questions  Blue=prep questions Red=location Green=sedation   1. Are you active on mychart? Y    2. What insurance is in the chart? MEDICARE     3.  Ordering/Referring Provider: Jia Castro MD    4. BMI 23.4, If greater than 40 review exclusion criteria also will need EXTENDED PREP    5.  Respiratory Screening (If yes to any of the following HOSPITAL setting only):     Do you use daily home oxygen? N  Do you have mod to severe Obstructive Sleep Apnea? N (can be seen at [facility] or hospital setting)    Do you have Pulmonary Hypertension? N   Do you have UNCONTROLLED asthma? N    6. Have you had a heart or lung transplant? N  (If yes, please review exclusion criteria)    7. Are you currently on dialysis?N  (If yes, schedule in HOSPITAL setting only)(If yes, please send Golytely prep)    8. Do you have chronic kidney disease? N (If yes, please send Golytely prep)    9. Have you had a stroke or Transient ischemic attack (TIA) within 6 months? N (If yes, do not schedule at [facility])    10. In the past 6 months, have you had any heart related issues including cardiomyopathy or heart attack? N (If yes, please review exclusion criteria)           If yes, did it require cardiac stenting or other implantable device?  (If yes, please review exclusion criteria)      11. Do you have any implantable devices in your body (pacemaker, defib, LVAD)? N (If yes, schedule at [facility])    12. Do you take nitroglycerin? If yes, how often? N (if yes, schedule at HOSPITAL setting)    13. Are you currently taking any blood thinners?N (If yes- inform patient to follow up with PCP or provider for follow up instructions)     14. Are you a diabetic? N (If yes, please send Golytely prep)    15. (Females) Are you currently pregnant? N  If yes, how many weeks?      16. Are you taking any prescription pain medications on a routine schedule? N If yes, MAC sedation and patient will need EXTENDED PREP.    17. Do you have any chemical dependencies  such as alcohol, street drugs, or methadone? N If yes, MAC sedation     18. Do you have any history of post-traumatic stress syndrome, severe anxiety or history of psychosis? N  If yes, MAC sedation.     19. Do you transfer independently? Y    20.  Do you have any issues with constipation?    If yes, pt will need EXTENDED PREP     21. Preferred Pharmacy for Pre Prescription [facility]    Scheduling Details    Which Colonoscopy Prep was Sent?:   Type of Procedure Scheduled: EGD  Surgeon: KEVIN  Date of Procedure: 2/7  Location:   Caller (Please ask for phone number if not scheduled by patient):       Sedation Type: CS  Conscious Sedation- Needs  for 6 hours after the procedure  MAC/General-Needs  for 24 hours after procedure    Pre-op Required at [facility] [place] and OR for MAC sedation: N  (if yes advise patient they will need a pre-op prior to procedure)      Informed patient they will need an adult  Y  Cannot take any type of public or medical transportation alone    Pre-Procedure Covid test to be completed at [facility] or Externally: 2/4    Confirmed Nurse will call to complete assessment Y    Additional comments:  ([facility] PATIENTS NEED EXTENDED PREP)

## 2022-01-27 DIAGNOSIS — Z11.59 ENCOUNTER FOR SCREENING FOR OTHER VIRAL DISEASES: Primary | ICD-10-CM

## 2022-02-03 ASSESSMENT — MIFFLIN-ST. JEOR: SCORE: 1000.82

## 2022-02-04 ENCOUNTER — LAB (OUTPATIENT)
Dept: URGENT CARE | Facility: URGENT CARE | Age: 72
End: 2022-02-04
Attending: SURGERY
Payer: MEDICARE

## 2022-02-04 DIAGNOSIS — Z11.59 ENCOUNTER FOR SCREENING FOR OTHER VIRAL DISEASES: ICD-10-CM

## 2022-02-04 PROCEDURE — U0005 INFEC AGEN DETEC AMPLI PROBE: HCPCS

## 2022-02-04 PROCEDURE — U0003 INFECTIOUS AGENT DETECTION BY NUCLEIC ACID (DNA OR RNA); SEVERE ACUTE RESPIRATORY SYNDROME CORONAVIRUS 2 (SARS-COV-2) (CORONAVIRUS DISEASE [COVID-19]), AMPLIFIED PROBE TECHNIQUE, MAKING USE OF HIGH THROUGHPUT TECHNOLOGIES AS DESCRIBED BY CMS-2020-01-R: HCPCS

## 2022-02-06 LAB — SARS-COV-2 RNA RESP QL NAA+PROBE: NEGATIVE

## 2022-02-07 ENCOUNTER — HOSPITAL ENCOUNTER (OUTPATIENT)
Facility: AMBULATORY SURGERY CENTER | Age: 72
Discharge: HOME | End: 2022-02-07
Attending: SURGERY | Admitting: SURGERY
Payer: MEDICARE

## 2022-02-07 VITALS
DIASTOLIC BLOOD PRESSURE: 73 MMHG | SYSTOLIC BLOOD PRESSURE: 105 MMHG | BODY MASS INDEX: 22.66 KG/M2 | RESPIRATION RATE: 18 BRPM | HEIGHT: 61 IN | HEART RATE: 78 BPM | WEIGHT: 120 LBS | OXYGEN SATURATION: 97 %

## 2022-02-07 PROCEDURE — 43239 EGD BIOPSY SINGLE/MULTIPLE: CPT | Performed by: SURGERY

## 2022-02-07 PROCEDURE — G8907 PT DOC NO EVENTS ON DISCHARG: HCPCS

## 2022-02-07 PROCEDURE — G8918 PT W/O PREOP ORDER IV AB PRO: HCPCS

## 2022-02-07 PROCEDURE — 43239 EGD BIOPSY SINGLE/MULTIPLE: CPT

## 2022-02-07 RX ORDER — LIDOCAINE 40 MG/G
CREAM TOPICAL
Status: DISCONTINUED | OUTPATIENT
Start: 2022-02-07 | End: 2022-02-08 | Stop reason: HOSPADM

## 2022-02-07 RX ORDER — NALOXONE HYDROCHLORIDE 0.4 MG/ML
0.2 INJECTION, SOLUTION INTRAMUSCULAR; INTRAVENOUS; SUBCUTANEOUS
Status: DISCONTINUED | OUTPATIENT
Start: 2022-02-07 | End: 2022-02-08 | Stop reason: HOSPADM

## 2022-02-07 RX ORDER — ONDANSETRON 4 MG/1
4 TABLET, ORALLY DISINTEGRATING ORAL EVERY 6 HOURS PRN
Status: DISCONTINUED | OUTPATIENT
Start: 2022-02-07 | End: 2022-02-08 | Stop reason: HOSPADM

## 2022-02-07 RX ORDER — ONDANSETRON 2 MG/ML
4 INJECTION INTRAMUSCULAR; INTRAVENOUS EVERY 6 HOURS PRN
Status: DISCONTINUED | OUTPATIENT
Start: 2022-02-07 | End: 2022-02-08 | Stop reason: HOSPADM

## 2022-02-07 RX ORDER — PROCHLORPERAZINE MALEATE 5 MG
5 TABLET ORAL EVERY 6 HOURS PRN
Status: DISCONTINUED | OUTPATIENT
Start: 2022-02-07 | End: 2022-02-08 | Stop reason: HOSPADM

## 2022-02-07 RX ORDER — FLUMAZENIL 0.1 MG/ML
0.2 INJECTION, SOLUTION INTRAVENOUS
Status: ACTIVE | OUTPATIENT
Start: 2022-02-07 | End: 2022-02-07

## 2022-02-07 RX ORDER — FENTANYL CITRATE 50 UG/ML
INJECTION, SOLUTION INTRAMUSCULAR; INTRAVENOUS PRN
Status: DISCONTINUED | OUTPATIENT
Start: 2022-02-07 | End: 2022-02-07 | Stop reason: HOSPADM

## 2022-02-07 RX ORDER — NALOXONE HYDROCHLORIDE 0.4 MG/ML
0.4 INJECTION, SOLUTION INTRAMUSCULAR; INTRAVENOUS; SUBCUTANEOUS
Status: DISCONTINUED | OUTPATIENT
Start: 2022-02-07 | End: 2022-02-08 | Stop reason: HOSPADM

## 2022-02-07 NOTE — H&P
ENDOSCOPY PRE-SEDATION H&P FOR OUTPATIENT PROCEDURES    Noreen Florence  [id number]  1950    Procedure:   EGD with possible biopsy possible dilatation    Pre-procedure diagnosis: reflux    Past medical history:   Past Medical History:   Diagnosis Date     GERD (gastroesophageal reflux disease) 3/20/2015     Glaucoma (increased eye pressure)      Hyperlipidemia LDL goal < 130 8/3/2015     Hypertension      Hypertension, goal below 150/90 11/28/2014     Rheumatoid arthritis, adult (H)        Past surgical history:   Past Surgical History:   Procedure Laterality Date     COLONOSCOPY       COLONOSCOPY WITH CO2 INSUFFLATION N/A 4/5/2017    Procedure: COLONOSCOPY WITH CO2 INSUFFLATION;  Surgeon: Duane, William Charles, MD;  Location: [facility] OR     COMBINED ESOPHAGOSCOPY, GASTROSCOPY, DUODENOSCOPY (EGD) WITH CO2 INSUFFLATION N/A 2/13/2019    Procedure: COMBINED ESOPHAGOSCOPY, GASTROSCOPY, DUODENOSCOPY (EGD) WITH CO2 INSUFFLATION;  Surgeon: Sly De Santiago MD;  Location: [facility] OR     COMBINED REPAIR PTOSIS WITH BLEPHAROPLASTY BILATERAL Bilateral 1/6/2017    Procedure: COMBINED REPAIR PTOSIS WITH BLEPHAROPLASTY BILATERAL;  Surgeon: Carly Norman MD;  Location: [facility]     ESOPHAGOSCOPY, GASTROSCOPY, DUODENOSCOPY (EGD), COMBINED N/A 1/5/2016    Procedure: COMBINED ESOPHAGOSCOPY, GASTROSCOPY, DUODENOSCOPY (EGD), BIOPSY SINGLE OR MULTIPLE;  Surgeon: Duane, William Charles, MD;  Location: [facility] OR     ESOPHAGOSCOPY, GASTROSCOPY, DUODENOSCOPY (EGD), COMBINED N/A 2/13/2019    Procedure: Combined Esophagoscopy, Gastroscopy, Duodenoscopy (Egd), Biopsy Single Or Multiple;  Surgeon: Sly De Santiago MD;  Location: [facility] OR     REPAIR ENTROPION BILATERAL Bilateral 1/6/2017    Procedure: REPAIR ENTROPION BILATERAL;  Surgeon: Carly Norman MD;  Location: [facility]     REPAIR PTOSIS Bilateral 01/2017       Current Outpatient Medications   Medication     cetirizine (ZYRTEC) 10 MG tablet     chlorhexidine (PERIDEX) 0.12 %  solution     Cholecalciferol (D3-1000) 25 MCG (1000 UT) CAPS     fluticasone (FLONASE) 50 MCG/ACT nasal spray     hydrocortisone, Perianal, (HYDROCORTISONE) 2.5 % cream     latanoprost (XALATAN) 0.005 % ophthalmic solution     losartan-hydrochlorothiazide (HYZAAR) 50-12.5 MG tablet     magnesium 250 MG tablet     methotrexate 2.5 MG tablet     methylcellulose (CITRUCEL) powder     metoprolol tartrate (LOPRESSOR) 25 MG tablet     omeprazole (PRILOSEC) 40 MG DR capsule     omeprazole (PRILOSEC) 40 MG DR capsule     pravastatin (PRAVACHOL) 20 MG tablet     SF 1.1 % GEL topical gel     Sodium Fluoride 1.1 % PSTE     triamcinolone (KENALOG) 0.1 % external cream     guaiFENesin-codeine (ROBITUSSIN AC) 100-10 MG/5ML solution     leucovorin (WELLCOVORIN) 5 MG tablet     Current Facility-Administered Medications   Medication     lidocaine (LMX4) kit     lidocaine 1 % 0.1-1 mL     sodium chloride (PF) 0.9% PF flush 3 mL     sodium chloride (PF) 0.9% PF flush 3 mL     Facility-Administered Medications Ordered in Other Encounters   Medication     gadobutrol (GADAVIST) injection 7.5 mL       No Known Allergies    History of Anesthesia/Sedation Problems: no    Physical Exam:    Mental status: alert  Heart: Normal  Lung: Normal  Assessment of patient's airway: Normal  Other as pertinent for procedure: None     ASA Score: See Provation note    Mallampati score:  II - Faucial pillars and soft palate may be seen, but uvula is masked by the base of the tongue    Assessment/Plan:     The patient is an appropriate candidate to receive sedation.    Informed consent was discussed with the patient/family, including the risks, benefits, potential complications and any alternative options associated with sedation.    Patient assessment completed just prior to sedation and while under constant observation by the provider. Condition determined to be adequate for proceeding with sedation.    The specific risks for the procedure were discussed  with the patient at the time of informed consent and include but are not limited to perforation which could require surgery, missing significant neoplasm or lesion, hemorrhage and adverse sedative complication.      Luis Aceves MD

## 2022-02-08 LAB
PATH REPORT.COMMENTS IMP SPEC: NORMAL
PATH REPORT.COMMENTS IMP SPEC: NORMAL
PATH REPORT.FINAL DX SPEC: NORMAL
PATH REPORT.GROSS SPEC: NORMAL
PATH REPORT.MICROSCOPIC SPEC OTHER STN: NORMAL
PATH REPORT.RELEVANT HX SPEC: NORMAL
PHOTO IMAGE: NORMAL

## 2022-02-08 PROCEDURE — 88305 TISSUE EXAM BY PATHOLOGIST: CPT | Performed by: PATHOLOGY

## 2022-02-17 DIAGNOSIS — L30.9 DERMATITIS: ICD-10-CM

## 2022-02-18 NOTE — TELEPHONE ENCOUNTER
Routing to provider to review and advise.     Writer was unable to approve script.     Bri Chavis RN, BSN  [facility]

## 2022-02-21 RX ORDER — TRIAMCINOLONE ACETONIDE 1 MG/G
CREAM TOPICAL
Qty: 80 G | Refills: 0 | OUTPATIENT
Start: 2022-02-21

## 2022-03-24 ENCOUNTER — OFFICE VISIT (OUTPATIENT)
Dept: URGENT CARE | Facility: URGENT CARE | Age: 72
End: 2022-03-24
Payer: MEDICARE

## 2022-03-24 VITALS
DIASTOLIC BLOOD PRESSURE: 89 MMHG | HEART RATE: 103 BPM | TEMPERATURE: 98.4 F | RESPIRATION RATE: 16 BRPM | SYSTOLIC BLOOD PRESSURE: 154 MMHG

## 2022-03-24 DIAGNOSIS — R10.84 ABDOMINAL PAIN, GENERALIZED: Primary | ICD-10-CM

## 2022-03-24 DIAGNOSIS — R11.2 NAUSEA AND VOMITING, INTRACTABILITY OF VOMITING NOT SPECIFIED, UNSPECIFIED VOMITING TYPE: ICD-10-CM

## 2022-03-24 DIAGNOSIS — R14.0 ABDOMINAL BLOATING: ICD-10-CM

## 2022-03-24 PROCEDURE — 99214 OFFICE O/P EST MOD 30 MIN: CPT | Performed by: PHYSICIAN ASSISTANT

## 2022-03-24 ASSESSMENT — ENCOUNTER SYMPTOMS
NAUSEA: 1
HEMATURIA: 0
HEARTBURN: 0
HEMATOCHEZIA: 0
SHORTNESS OF BREATH: 0
FLANK PAIN: 0
FEVER: 0
CARDIOVASCULAR NEGATIVE: 1
RESPIRATORY NEGATIVE: 1
PALPITATIONS: 0
DIARRHEA: 0
CHEST TIGHTNESS: 0
CONSTIPATION: 0
CHILLS: 0
ABDOMINAL DISTENTION: 1
WHEEZING: 0
VOMITING: 1
COUGH: 0
ABDOMINAL PAIN: 1
FREQUENCY: 0
DYSURIA: 0
FATIGUE: 0

## 2022-03-24 ASSESSMENT — PAIN SCALES - GENERAL: PAINLEVEL: NO PAIN (0)

## 2022-03-24 NOTE — PROGRESS NOTES
Subjective   Noreen is a 72 year old who presents for the following health issues   HPI   Abdominal/Flank Pain  Onset/Duration: last night  Description:   Character: dull ache  Location: generalized  Radiation: None  Intensity: moderate  Progression of Symptoms:  same  Accompanying Signs & Symptoms:  Fever/Chills: no  Gas/Bloating: Yes  Nausea: Yes  Vomitting: Yes  Diarrhea: no  Constipation: no, last BM today, no blood present  Dysuria or Hematuria: No dysuria, urinary frequency, urgency or hematuria.  No vaginal d/c, bleeding, rashes and irritation.  No new partners.   History:   Trauma: no  Previous similar pain: no  Previous tests done: none  Precipitating factors:   Does the pain change with:     Food: Yes    Bowel Movement: no    Urination: no   Other factors:  no  Therapies tried and outcome: fluids, advil, tylenol with minimal relief     Patient Active Problem List   Diagnosis     Osteopenia     Hayfever     Hypertension goal BP (blood pressure) < 140/90     Polyarthritis, inflammatory (H)     GERD (gastroesophageal reflux disease)     Hyperlipidemia with target LDL less than 130     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     High risk medication use     Microscopic hematuria     Pterygium of right eye     Sicca syndrome (H)     Angiomyolipoma of right kidney     History of Helicobacter pylori infection     Pre-diabetes     NAFLD (nonalcoholic fatty liver disease)     Elevated LFTs     Current Outpatient Medications   Medication     leucovorin (WELLCOVORIN) 5 MG tablet     losartan-hydrochlorothiazide (HYZAAR) 50-12.5 MG tablet     magnesium 250 MG tablet     methotrexate 2.5 MG tablet     methylcellulose (CITRUCEL) powder     metoprolol tartrate (LOPRESSOR) 25 MG tablet     omeprazole (PRILOSEC) 40 MG DR capsule     pravastatin (PRAVACHOL) 20 MG tablet     No current facility-administered medications for this visit.     Facility-Administered Medications Ordered in Other Visits   Medication      gadobutrol (GADAVIST) injection 7.5 mL      No Known Allergies    Review of Systems   Constitutional: Negative for chills, fatigue and fever.   Respiratory: Negative.  Negative for cough, chest tightness, shortness of breath and wheezing.    Cardiovascular: Negative.  Negative for chest pain, palpitations and peripheral edema.   Gastrointestinal: Positive for abdominal distention, abdominal pain, nausea and vomiting. Negative for constipation, diarrhea, heartburn and hematochezia.   Genitourinary: Negative for dysuria, flank pain, frequency, hematuria, pelvic pain, urgency, vaginal bleeding and vaginal discharge.   All other systems reviewed and are negative.           Objective    BP (!) 154/89   Pulse 103   Temp 98.4  F (36.9  C) (Tympanic)   Resp 16   LMP 06/21/2002 (Approximate)   There is no height or weight on file to calculate BMI.  Physical Exam  Vitals and nursing note reviewed.   Constitutional:       General: She is not in acute distress.     Appearance: Normal appearance. She is well-developed and normal weight. She is not ill-appearing.   Cardiovascular:      Rate and Rhythm: Normal rate and regular rhythm.      Pulses: Normal pulses.      Heart sounds: Normal heart sounds, S1 normal and S2 normal. No murmur heard.    No friction rub. No gallop.   Pulmonary:      Effort: Pulmonary effort is normal. No accessory muscle usage or respiratory distress.      Breath sounds: Normal breath sounds and air entry. No decreased breath sounds, wheezing, rhonchi or rales.   Abdominal:      General: Abdomen is flat. Bowel sounds are normal.      Palpations: Abdomen is soft. There is no hepatomegaly, splenomegaly or mass.      Tenderness: There is no abdominal tenderness. There is no right CVA tenderness, left CVA tenderness, guarding or rebound. Negative signs include Dill's sign, Rovsing's sign, McBurney's sign, psoas sign and obturator sign.      Hernia: No hernia is present.   Genitourinary:     Comments:  Declined pelvic exam.  Skin:     General: Skin is warm and dry.   Neurological:      Mental Status: She is alert and oriented to person, place, and time.   Psychiatric:         Mood and Affect: Mood normal.         Behavior: Behavior normal.         Thought Content: Thought content normal.         Judgment: Judgment normal.          Assessment/Plan:  Abdominal pain, generalized:  Along with n/v, abdominal bloating.  H&P is concerning for cholecystitis vs bowel obstruction vs gasteroenteritis.  Due to the severity of her pain, recommend further evaluation and management in the ER.  Will most likely need further workup with labs and/or imaging.  Patient has declined transportation via ambulance and will have family drive her/him.  Understands risks and benefits of ambulance transfer and s/he has declined.  Call 911 if worsening symptoms.  S/he plans to go to [facility].  S/he left in stable condition with AVS in hand.  F/u with PCP after ER visit.     Nausea and vomiting, intractability of vomiting not specified, unspecified vomiting type    Abdominal bloating        Parisa See MOSES Salcedo

## 2022-04-07 ENCOUNTER — OFFICE VISIT (OUTPATIENT)
Dept: URGENT CARE | Facility: URGENT CARE | Age: 72
End: 2022-04-07
Payer: MEDICARE

## 2022-04-07 VITALS
WEIGHT: 119.8 LBS | SYSTOLIC BLOOD PRESSURE: 133 MMHG | BODY MASS INDEX: 22.91 KG/M2 | TEMPERATURE: 98.5 F | HEART RATE: 79 BPM | DIASTOLIC BLOOD PRESSURE: 84 MMHG | OXYGEN SATURATION: 94 %

## 2022-04-07 DIAGNOSIS — S46.812A STRAIN OF LEFT TRAPEZIUS MUSCLE, INITIAL ENCOUNTER: Primary | ICD-10-CM

## 2022-04-07 PROCEDURE — 99213 OFFICE O/P EST LOW 20 MIN: CPT | Performed by: PHYSICIAN ASSISTANT

## 2022-04-07 RX ORDER — CYCLOBENZAPRINE HCL 5 MG
5 TABLET ORAL 3 TIMES DAILY PRN
Qty: 20 TABLET | Refills: 0 | Status: SHIPPED | OUTPATIENT
Start: 2022-04-07 | End: 2022-05-10

## 2022-04-07 ASSESSMENT — ENCOUNTER SYMPTOMS
RHINORRHEA: 0
HEADACHES: 0
VOMITING: 0
BACK PAIN: 1
WEAKNESS: 0
PALPITATIONS: 0
CHILLS: 0
LIGHT-HEADEDNESS: 0
JOINT SWELLING: 0
EYES NEGATIVE: 1
MYALGIAS: 0
WOUND: 0
SORE THROAT: 0
NECK PAIN: 0
ARTHRALGIAS: 0
NECK STIFFNESS: 0
RESPIRATORY NEGATIVE: 1
FEVER: 0
DIARRHEA: 0
SHORTNESS OF BREATH: 0
CARDIOVASCULAR NEGATIVE: 1
COUGH: 0
ENDOCRINE NEGATIVE: 1
DIZZINESS: 0
ALLERGIC/IMMUNOLOGIC NEGATIVE: 1
NAUSEA: 0

## 2022-04-07 NOTE — PROGRESS NOTES
Chief Complaint:    Chief Complaint   Patient presents with     Back Pain     back pain started 5 days ago when pt was doing home exercise, pain is a little bit better but pt is still having pain she is worried         Medical Decision Making:    Vital signs reviewed by Indra Sarah PA-C  /84   Pulse 79   Temp 98.5  F (36.9  C) (Axillary)   Wt 54.3 kg (119 lb 12.8 oz)   LMP 06/21/2002 (Approximate)   SpO2 94%   BMI 22.91 kg/m      Differential Diagnosis:  Muscle strain, herniated disc     ASSESSMENT:     1. Strain of left trapezius muscle, initial encounter         PLAN:     Patient is in no acute distress.  Neuro exam was benign.  Rx for Flexeril and Voltaren cream.  Ice and or heat to the area.  Stay active.  Patient instructed to follow up with PCP in 1 week if symptoms are not improving.  Sooner if symptoms worsen.  Worrisome symptoms discussed with instructions to go to the ED.  Patient verbalized understanding and agreed with this plan.    Labs:     No results found for any visits on 04/07/22.    Current Meds:    Current Outpatient Medications:      cyclobenzaprine (FLEXERIL) 5 MG tablet, Take 1 tablet (5 mg) by mouth 3 times daily as needed for muscle spasms, Disp: 20 tablet, Rfl: 0     diclofenac (VOLTAREN) 1 % topical gel, Apply 4 g topically 4 times daily, Disp: 50 g, Rfl: 0     leucovorin (WELLCOVORIN) 5 MG tablet, TK 1 T PO 12 H AFTER METHOTREXATE ONE TIME EACH WEEK, Disp: , Rfl: 3     losartan-hydrochlorothiazide (HYZAAR) 50-12.5 MG tablet, Take 1 tablet by mouth daily, Disp: 90 tablet, Rfl: 3     magnesium 250 MG tablet, TAKE 1 TABLET(250 MG) BY MOUTH DAILY AS NEEDED FOR LEG CRAMPS, Disp: 30 tablet, Rfl: 3     metoprolol tartrate (LOPRESSOR) 25 MG tablet, Take 1 tablet (25 mg) by mouth At Bedtime, Disp: 90 tablet, Rfl: 3     omeprazole (PRILOSEC) 40 MG DR capsule, Take 1 capsule (40 mg) by mouth daily, Disp: 90 capsule, Rfl: 3     pravastatin (PRAVACHOL) 20 MG tablet, Take 1 tablet (20  mg) by mouth daily, Disp: 90 tablet, Rfl: 3     methotrexate 2.5 MG tablet, Take 6 tablets (15 mg) by mouth once a week (Patient not taking: Reported on 4/7/2022), Disp: 1 tablet, Rfl: 0     methylcellulose (CITRUCEL) powder, Take 2 g by mouth daily (Patient not taking: Reported on 4/7/2022), Disp: 180 g, Rfl: 3  No current facility-administered medications for this visit.    Facility-Administered Medications Ordered in Other Visits:      gadobutrol (GADAVIST) injection 7.5 mL, 7.5 mL, Intravenous, Once, Palomo Terrell MD    Allergies:  No Known Allergies    SUBJECTIVE    HPI: Noreen Florence is an 72 year old female who presents for evaluation and treatment of L sided mid back pain.  Symptoms started 5 days ago while she was exercising.  The pain is in the L mid back and does not radiate. The pain worsens with movement.  She states that the pain has improved, but has not resolved.  She denies any numbness, tingling, or weakness in the extremities.      ROS:      Review of Systems   Constitutional: Negative for chills and fever.   HENT: Negative for congestion, ear pain, rhinorrhea and sore throat.    Eyes: Negative.    Respiratory: Negative.  Negative for cough and shortness of breath.    Cardiovascular: Negative.  Negative for chest pain and palpitations.   Gastrointestinal: Negative for diarrhea, nausea and vomiting.   Endocrine: Negative.    Genitourinary: Negative.    Musculoskeletal: Positive for back pain. Negative for arthralgias, joint swelling, myalgias, neck pain and neck stiffness.   Skin: Negative.  Negative for rash and wound.   Allergic/Immunologic: Negative.  Negative for immunocompromised state.   Neurological: Negative for dizziness, weakness, light-headedness and headaches.        Family History   Family History   Problem Relation Age of Onset     Diabetes Mother      Cancer No family hx of      Hypertension No family hx of      Cerebrovascular Disease No family hx of      Thyroid Disease No  family hx of      Glaucoma No family hx of      Macular Degeneration No family hx of        Social History  Social History     Socioeconomic History     Marital status:      Spouse name: Not on file     Number of children: Not on file     Years of education: Not on file     Highest education level: Not on file   Occupational History     Not on file   Tobacco Use     Smoking status: Never Smoker     Smokeless tobacco: Never Used   Substance and Sexual Activity     Alcohol use: No     Drug use: No     Sexual activity: Not Currently     Partners: Male     Birth control/protection: None   Other Topics Concern     Parent/sibling w/ CABG, MI or angioplasty before 65F 55M? No   Social History Narrative     Not on file     Social Determinants of Health     Financial Resource Strain: Not on file   Food Insecurity: Not on file   Transportation Needs: Not on file   Physical Activity: Not on file   Stress: Not on file   Social Connections: Not on file   Intimate Partner Violence: Not on file   Housing Stability: Not on file        Surgical History:  Past Surgical History:   Procedure Laterality Date     COLONOSCOPY       COLONOSCOPY WITH CO2 INSUFFLATION N/A 4/5/2017    Procedure: COLONOSCOPY WITH CO2 INSUFFLATION;  Surgeon: Duane, William Charles, MD;  Location:  OR     COMBINED ESOPHAGOSCOPY, GASTROSCOPY, DUODENOSCOPY (EGD) WITH CO2 INSUFFLATION N/A 2/13/2019    Procedure: COMBINED ESOPHAGOSCOPY, GASTROSCOPY, DUODENOSCOPY (EGD) WITH CO2 INSUFFLATION;  Surgeon: Sly De Santiago MD;  Location: [facility]     COMBINED ESOPHAGOSCOPY, GASTROSCOPY, DUODENOSCOPY (EGD) WITH CO2 INSUFFLATION N/A 2/7/2022    Procedure: ESOPHAGOGASTRODUODENOSCOPY, WITH CO2 INSUFFLATION;  Surgeon: Luis Aceves MD;  Location:  OR     COMBINED REPAIR PTOSIS WITH BLEPHAROPLASTY BILATERAL Bilateral 1/6/2017    Procedure: COMBINED REPAIR PTOSIS WITH BLEPHAROPLASTY BILATERAL;  Surgeon: Carly Norman MD;  Location: [facility]      ESOPHAGOSCOPY, GASTROSCOPY, DUODENOSCOPY (EGD), COMBINED N/A 1/5/2016    Procedure: COMBINED ESOPHAGOSCOPY, GASTROSCOPY, DUODENOSCOPY (EGD), BIOPSY SINGLE OR MULTIPLE;  Surgeon: Duane, William Charles, MD;  Location: [facility]     ESOPHAGOSCOPY, GASTROSCOPY, DUODENOSCOPY (EGD), COMBINED N/A 2/13/2019    Procedure: Combined Esophagoscopy, Gastroscopy, Duodenoscopy (Egd), Biopsy Single Or Multiple;  Surgeon: Sly De Santiago MD;  Location: [facility]     ESOPHAGOSCOPY, GASTROSCOPY, DUODENOSCOPY (EGD), COMBINED N/A 2/7/2022    Procedure: ESOPHAGOGASTRODUODENOSCOPY, WITH BIOPSY;  Surgeon: uLis Aceves MD;  Location: [facility]     REPAIR ENTROPION BILATERAL Bilateral 1/6/2017    Procedure: REPAIR ENTROPION BILATERAL;  Surgeon: Carly Norman MD;  Location: [facility]     REPAIR PTOSIS Bilateral 01/2017        Problem List:  Patient Active Problem List   Diagnosis     Osteopenia     Hayfever     Hypertension goal BP (blood pressure) < 140/90     Polyarthritis, inflammatory (H)     GERD (gastroesophageal reflux disease)     Hyperlipidemia with target LDL less than 130     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     High risk medication use     Microscopic hematuria     Pterygium of right eye     Sicca syndrome (H)     Angiomyolipoma of right kidney     History of Helicobacter pylori infection     Pre-diabetes     NAFLD (nonalcoholic fatty liver disease)     Elevated LFTs           OBJECTIVE:     Vital signs noted and reviewed by Indra Sarah PA-C  /84   Pulse 79   Temp 98.5  F (36.9  C) (Axillary)   Wt 54.3 kg (119 lb 12.8 oz)   LMP 06/21/2002 (Approximate)   SpO2 94%   BMI 22.91 kg/m       PEFR:    Physical Exam  Vitals and nursing note reviewed.   Constitutional:       General: She is not in acute distress.     Appearance: She is well-developed. She is not ill-appearing, toxic-appearing or diaphoretic.   HENT:      Head: Normocephalic and atraumatic.      Right Ear: Tympanic membrane and  external ear normal. No drainage, swelling or tenderness. Tympanic membrane is not perforated, erythematous, retracted or bulging.      Left Ear: Tympanic membrane and external ear normal. No drainage, swelling or tenderness. Tympanic membrane is not perforated, erythematous, retracted or bulging.      Nose: No mucosal edema, congestion or rhinorrhea.      Right Sinus: No maxillary sinus tenderness or frontal sinus tenderness.      Left Sinus: No maxillary sinus tenderness or frontal sinus tenderness.      Mouth/Throat:      Pharynx: No pharyngeal swelling, oropharyngeal exudate, posterior oropharyngeal erythema or uvula swelling.      Tonsils: No tonsillar abscesses.   Eyes:      Pupils: Pupils are equal, round, and reactive to light.   Neck:      Trachea: Trachea normal.   Cardiovascular:      Rate and Rhythm: Normal rate and regular rhythm.      Heart sounds: Normal heart sounds, S1 normal and S2 normal. No murmur heard.    No friction rub. No gallop.   Pulmonary:      Effort: Pulmonary effort is normal. No respiratory distress.      Breath sounds: Normal breath sounds. No decreased breath sounds, wheezing, rhonchi or rales.   Abdominal:      General: Bowel sounds are normal. There is no distension.      Palpations: Abdomen is soft. Abdomen is not rigid. There is no mass.      Tenderness: There is no abdominal tenderness. There is no guarding or rebound.   Musculoskeletal:      Cervical back: Full passive range of motion without pain, normal range of motion and neck supple.      Thoracic back: Tenderness present. No swelling, edema, deformity, signs of trauma, spasms or bony tenderness. Normal range of motion.        Back:    Lymphadenopathy:      Cervical: No cervical adenopathy.   Skin:     General: Skin is warm and dry.   Neurological:      Mental Status: She is alert and oriented to person, place, and time.      Cranial Nerves: No cranial nerve deficit.      Sensory: Sensation is intact.      Motor: Motor  function is intact. No weakness, atrophy or abnormal muscle tone.      Coordination: Coordination is intact. Coordination normal.      Gait: Gait is intact. Gait normal.      Deep Tendon Reflexes: Reflexes are normal and symmetric.   Psychiatric:         Behavior: Behavior normal. Behavior is cooperative.         Thought Content: Thought content normal.         Judgment: Judgment normal.             Indra Sarah PA-C  4/7/2022, 11:55 AM

## 2022-04-07 NOTE — PATIENT INSTRUCTIONS
Patient Education     Treating Strains and Sprains    Strains and sprains happen when muscles or other soft tissues near your bones stretch or tear. These injuries can cause bruising, swelling, and pain. To ease your discomfort and speed the healing of your strain or sprain, follow the tips below. Remember, a strain or sprain can take 6 to 8 weeks to heal.  Important Note: Do not give aspirin to children or teens without discussing it with your healthcare provider first.  Ice first, heat later    Use ice for the first 24 to 48 hours after injury. Ice helps prevent swelling and reduce pain. Ice the injury for no more than 20 minutes at a time and allow at least 20 minutes between icing sessions.    Apply heat after the first 72 hours, once the swelling has gone down. Heat relaxes muscles and increases blood flow. Soak the injured area in warm water or use a heating pad set on low for no more than 15 minutes at a time.  Wrap and elevate    Wrap an injured limb firmly with an elastic bandage. This provides support and helps prevent swelling. Don t wear an elastic bandage overnight. Watch for tingling, numbness, or increased pain. Remove the bandage immediately if any of these occurs.    Elevate the injured area to help reduce swelling and throbbing. It s best to raise an injured limb above the level of your heart.  Medicines    Over-the-counter medicines such as acetaminophen or ibuprofen can help reduce pain. Some also help reduce swelling.    Take medicine only as directed.    Rest the area even if medicines are controlling the pain.  Rest    Rest the injured area by not using it for 24 hours.    When you re ready, return slowly to your normal activities. Rest the injured area often.    Don t use or walk on an injured limb if it hurts.  [employer] last reviewed this educational content on 1/1/2018 2000-2021 [employer]. All rights reserved. This information is not intended as a substitute for  professional medical care. Always follow your healthcare professional's instructions.

## 2022-04-28 ENCOUNTER — TELEPHONE (OUTPATIENT)
Dept: FAMILY MEDICINE | Facility: CLINIC | Age: 72
End: 2022-04-28
Payer: MEDICARE

## 2022-04-28 NOTE — TELEPHONE ENCOUNTER
Pharmacy is requesting refill on med that I am not seeing on med list:    Cetirizine 10 mg tablets

## 2022-04-28 NOTE — TELEPHONE ENCOUNTER
Medication requested was discontinued because therapy was completed on 3/24/22.    Gabbi Preston RN  [facility]

## 2022-05-10 ENCOUNTER — TELEPHONE (OUTPATIENT)
Dept: FAMILY MEDICINE | Facility: CLINIC | Age: 72
End: 2022-05-10

## 2022-05-10 ENCOUNTER — OFFICE VISIT (OUTPATIENT)
Dept: FAMILY MEDICINE | Facility: CLINIC | Age: 72
End: 2022-05-10
Payer: MEDICARE

## 2022-05-10 VITALS
DIASTOLIC BLOOD PRESSURE: 89 MMHG | TEMPERATURE: 97.2 F | BODY MASS INDEX: 22.66 KG/M2 | WEIGHT: 120 LBS | OXYGEN SATURATION: 96 % | SYSTOLIC BLOOD PRESSURE: 132 MMHG | HEART RATE: 71 BPM | RESPIRATION RATE: 16 BRPM | HEIGHT: 61 IN

## 2022-05-10 DIAGNOSIS — M06.4 POLYARTHRITIS, INFLAMMATORY (H): ICD-10-CM

## 2022-05-10 DIAGNOSIS — S46.812D STRAIN OF LEFT TRAPEZIUS MUSCLE, SUBSEQUENT ENCOUNTER: Primary | ICD-10-CM

## 2022-05-10 DIAGNOSIS — E55.9 VITAMIN D DEFICIENCY: ICD-10-CM

## 2022-05-10 DIAGNOSIS — R73.03 PRE-DIABETES: ICD-10-CM

## 2022-05-10 DIAGNOSIS — H93.13 TINNITUS, BILATERAL: ICD-10-CM

## 2022-05-10 DIAGNOSIS — R79.89 ELEVATED LFTS: ICD-10-CM

## 2022-05-10 DIAGNOSIS — I10 HYPERTENSION, GOAL BELOW 150/90: ICD-10-CM

## 2022-05-10 DIAGNOSIS — Z23 HIGH PRIORITY FOR 2019-NCOV VACCINE: ICD-10-CM

## 2022-05-10 DIAGNOSIS — S46.812D STRAIN OF LEFT TRAPEZIUS MUSCLE, SUBSEQUENT ENCOUNTER: ICD-10-CM

## 2022-05-10 DIAGNOSIS — E78.2 MIXED HYPERLIPIDEMIA: ICD-10-CM

## 2022-05-10 DIAGNOSIS — K05.10 GINGIVITIS: ICD-10-CM

## 2022-05-10 DIAGNOSIS — J30.1 HAYFEVER: ICD-10-CM

## 2022-05-10 DIAGNOSIS — Z00.00 ENCOUNTER FOR MEDICARE ANNUAL WELLNESS EXAM: Primary | ICD-10-CM

## 2022-05-10 DIAGNOSIS — K76.0 NAFLD (NONALCOHOLIC FATTY LIVER DISEASE): ICD-10-CM

## 2022-05-10 DIAGNOSIS — K21.00 GASTROESOPHAGEAL REFLUX DISEASE WITH ESOPHAGITIS WITHOUT HEMORRHAGE: ICD-10-CM

## 2022-05-10 LAB
ALBUMIN SERPL-MCNC: 3.8 G/DL (ref 3.4–5)
ALP SERPL-CCNC: 70 U/L (ref 40–150)
ALT SERPL W P-5'-P-CCNC: 60 U/L (ref 0–50)
ANION GAP SERPL CALCULATED.3IONS-SCNC: 7 MMOL/L (ref 3–14)
AST SERPL W P-5'-P-CCNC: 40 U/L (ref 0–45)
BASOPHILS # BLD AUTO: 0 10E3/UL (ref 0–0.2)
BASOPHILS NFR BLD AUTO: 1 %
BILIRUB SERPL-MCNC: 0.4 MG/DL (ref 0.2–1.3)
BUN SERPL-MCNC: 15 MG/DL (ref 7–30)
CALCIUM SERPL-MCNC: 9.7 MG/DL (ref 8.5–10.1)
CHLORIDE BLD-SCNC: 104 MMOL/L (ref 94–109)
CHOLEST SERPL-MCNC: 194 MG/DL
CO2 SERPL-SCNC: 28 MMOL/L (ref 20–32)
CREAT SERPL-MCNC: 0.54 MG/DL (ref 0.52–1.04)
CREAT UR-MCNC: 104 MG/DL
DEPRECATED CALCIDIOL+CALCIFEROL SERPL-MC: 40 UG/L (ref 20–75)
EOSINOPHIL # BLD AUTO: 0.2 10E3/UL (ref 0–0.7)
EOSINOPHIL NFR BLD AUTO: 3 %
ERYTHROCYTE [DISTWIDTH] IN BLOOD BY AUTOMATED COUNT: 13.6 % (ref 10–15)
FASTING STATUS PATIENT QL REPORTED: YES
GFR SERPL CREATININE-BSD FRML MDRD: >90 ML/MIN/1.73M2
GLUCOSE BLD-MCNC: 120 MG/DL (ref 70–99)
HCT VFR BLD AUTO: 43.4 % (ref 35–47)
HDLC SERPL-MCNC: 38 MG/DL
HGB BLD-MCNC: 14.5 G/DL (ref 11.7–15.7)
IMM GRANULOCYTES # BLD: 0 10E3/UL
IMM GRANULOCYTES NFR BLD: 0 %
LDLC SERPL CALC-MCNC: 87 MG/DL
LYMPHOCYTES # BLD AUTO: 2.3 10E3/UL (ref 0.8–5.3)
LYMPHOCYTES NFR BLD AUTO: 43 %
MCH RBC QN AUTO: 28.4 PG (ref 26.5–33)
MCHC RBC AUTO-ENTMCNC: 33.4 G/DL (ref 31.5–36.5)
MCV RBC AUTO: 85 FL (ref 78–100)
MICROALBUMIN UR-MCNC: 12 MG/L
MICROALBUMIN/CREAT UR: 11.54 MG/G CR (ref 0–25)
MONOCYTES # BLD AUTO: 0.6 10E3/UL (ref 0–1.3)
MONOCYTES NFR BLD AUTO: 11 %
NEUTROPHILS # BLD AUTO: 2.3 10E3/UL (ref 1.6–8.3)
NEUTROPHILS NFR BLD AUTO: 42 %
NONHDLC SERPL-MCNC: 156 MG/DL
PLATELET # BLD AUTO: 185 10E3/UL (ref 150–450)
POTASSIUM BLD-SCNC: 3.9 MMOL/L (ref 3.4–5.3)
PROT SERPL-MCNC: 7.6 G/DL (ref 6.8–8.8)
RBC # BLD AUTO: 5.1 10E6/UL (ref 3.8–5.2)
SODIUM SERPL-SCNC: 139 MMOL/L (ref 133–144)
TRIGL SERPL-MCNC: 347 MG/DL
WBC # BLD AUTO: 5.4 10E3/UL (ref 4–11)

## 2022-05-10 PROCEDURE — 80053 COMPREHEN METABOLIC PANEL: CPT | Performed by: PREVENTIVE MEDICINE

## 2022-05-10 PROCEDURE — 82043 UR ALBUMIN QUANTITATIVE: CPT | Performed by: PREVENTIVE MEDICINE

## 2022-05-10 PROCEDURE — 85025 COMPLETE CBC W/AUTO DIFF WBC: CPT | Performed by: PREVENTIVE MEDICINE

## 2022-05-10 PROCEDURE — 91305 COVID-19,PF,PFIZER (12+ YRS): CPT | Performed by: PREVENTIVE MEDICINE

## 2022-05-10 PROCEDURE — 0054A COVID-19,PF,PFIZER (12+ YRS): CPT | Performed by: PREVENTIVE MEDICINE

## 2022-05-10 PROCEDURE — 36415 COLL VENOUS BLD VENIPUNCTURE: CPT | Performed by: PREVENTIVE MEDICINE

## 2022-05-10 PROCEDURE — 99207 PR ANNUAL WELLNESS VISIT, PPS, SUBSEQUENT STAT: CPT | Performed by: PREVENTIVE MEDICINE

## 2022-05-10 PROCEDURE — 82306 VITAMIN D 25 HYDROXY: CPT | Performed by: PREVENTIVE MEDICINE

## 2022-05-10 PROCEDURE — 80061 LIPID PANEL: CPT | Performed by: PREVENTIVE MEDICINE

## 2022-05-10 PROCEDURE — 83036 HEMOGLOBIN GLYCOSYLATED A1C: CPT | Performed by: PREVENTIVE MEDICINE

## 2022-05-10 PROCEDURE — 99214 OFFICE O/P EST MOD 30 MIN: CPT | Mod: 25 | Performed by: PREVENTIVE MEDICINE

## 2022-05-10 RX ORDER — FLUTICASONE PROPIONATE 50 MCG
1-2 SPRAY, SUSPENSION (ML) NASAL DAILY
Qty: 16 G | Refills: 1 | Status: SHIPPED | OUTPATIENT
Start: 2022-05-10 | End: 2022-10-21

## 2022-05-10 RX ORDER — CETIRIZINE HYDROCHLORIDE 10 MG/1
TABLET ORAL
COMMUNITY
Start: 2022-03-30 | End: 2022-05-10

## 2022-05-10 RX ORDER — METHOCARBAMOL 500 MG/1
500 TABLET, FILM COATED ORAL 2 TIMES DAILY PRN
Qty: 30 TABLET | Refills: 0 | Status: SHIPPED | OUTPATIENT
Start: 2022-05-10 | End: 2022-05-10

## 2022-05-10 RX ORDER — CHLORHEXIDINE GLUCONATE ORAL RINSE 1.2 MG/ML
SOLUTION DENTAL
COMMUNITY
Start: 2022-03-28 | End: 2022-05-10

## 2022-05-10 RX ORDER — FLUTICASONE PROPIONATE 50 MCG
1-2 SPRAY, SUSPENSION (ML) NASAL DAILY
COMMUNITY
Start: 2022-03-12 | End: 2022-05-10

## 2022-05-10 RX ORDER — TIZANIDINE 2 MG/1
2 TABLET ORAL 2 TIMES DAILY PRN
Qty: 30 TABLET | Refills: 0 | Status: SHIPPED | OUTPATIENT
Start: 2022-05-10 | End: 2022-09-24

## 2022-05-10 RX ORDER — METOPROLOL TARTRATE 25 MG/1
25 TABLET, FILM COATED ORAL AT BEDTIME
Qty: 90 TABLET | Refills: 3 | Status: SHIPPED | OUTPATIENT
Start: 2022-05-10 | End: 2023-03-24

## 2022-05-10 RX ORDER — HYDROCORTISONE 2.5 %
2.5 CREAM (GRAM) TOPICAL 2 TIMES DAILY
COMMUNITY
End: 2022-09-24

## 2022-05-10 RX ORDER — MELOXICAM 7.5 MG/1
TABLET ORAL
COMMUNITY
Start: 2022-04-19 | End: 2022-09-24

## 2022-05-10 RX ORDER — NAPROXEN 500 MG/1
500 TABLET ORAL 2 TIMES DAILY PRN
Qty: 30 TABLET | Refills: 0 | Status: SHIPPED | OUTPATIENT
Start: 2022-05-10 | End: 2022-09-24

## 2022-05-10 RX ORDER — CHLORHEXIDINE GLUCONATE ORAL RINSE 1.2 MG/ML
SOLUTION DENTAL
Qty: 473 ML | Refills: 1 | Status: SHIPPED | OUTPATIENT
Start: 2022-05-10 | End: 2022-08-05

## 2022-05-10 RX ORDER — MELOXICAM 7.5 MG/1
TABLET ORAL
COMMUNITY
Start: 2022-03-07 | End: 2024-06-25

## 2022-05-10 RX ORDER — CETIRIZINE HYDROCHLORIDE 10 MG/1
10 TABLET ORAL DAILY
Qty: 90 TABLET | Refills: 1 | Status: SHIPPED | OUTPATIENT
Start: 2022-05-10 | End: 2022-11-03

## 2022-05-10 ASSESSMENT — PAIN SCALES - GENERAL: PAINLEVEL: NO PAIN (0)

## 2022-05-10 NOTE — PROGRESS NOTES
SUBJECTIVE:   CC: Noreen Florence is an 72 year old woman who presents for preventive health visit.       Patient has been advised of split billing requirements and indicates understanding: Yes  HPI    Cognitive screening and clock draw was normal 10/21.     (Incomplete) History of Present Illness:  How many servings of fruits and vegetables do you eat daily?: 2-3  On average, how many sweetened beverages do you drink each day (Examples: soda, juice, sweet tea, etc. Do NOT count diet or artificially sweetened beverages)?: 0  How many minutes a day do you exercise enough to make your heart beat faster?: 30 to 60  How many days a week do you exercise enough to make your heart beat faster?: 7  How many days per week do you miss taking your medication?: 0      Hyperlipidemia Follow-Up      Are you regularly taking any medication or supplement to lower your cholesterol?   Yes- statin     Are you having muscle aches or other side effects that you think could be caused by your cholesterol lowering medication?  No    Hypertension Follow-up      Do you check your blood pressure regularly outside of the clinic? No     Are you following a low salt diet? Yes    Are your blood pressures ever more than 140 on the top number (systolic) OR more   than 90 on the bottom number (diastolic), for example 140/90? No    Polyarthritis:  -followed by Rheumatology outside of [place]  -last seen 3/7/22  -records through Care everywhere reviewed  -The methotrexate was stopped in July 2021 due to elevated liver enzymes.  Ultrasound of the liver in September 2021 revealed fatty liver.  In August 2021 her CK was 170 ALT elevated at 56 AST 42.  -seen by them every 6 months     Fatty liver:  -some exercise  -no Tylenol  -no abdominal pain  - no emesis     Tinnitus:  Long standing history of tinnitus+ this is becoming more, would like a referral     Would like refills on Muscle relaxer and naprosyn, flexeril was not covered by her insurance, would  like alternative.       Today's PHQ-2 Score:   PHQ-2 ( 1999 [employer]) 5/10/2022   Q1: Little interest or pleasure in doing things -   Q2: Feeling down, depressed or hopeless -   PHQ-2 Score -   PHQ-2 Total Score (12-17 Years)- Positive if 3 or more points; Administer PHQ-A if positive -   Q1: Little interest or pleasure in doing things Not at all   Q2: Feeling down, depressed or hopeless Not at all   PHQ-2 Score 0       Abuse: Current or Past (Physical, Sexual or Emotional) - NOT APPLICABLE  Do you feel safe in your environment? NOT APPLICABLE        Social History     Tobacco Use     Smoking status: Never Smoker     Smokeless tobacco: Never Used   Substance Use Topics     Alcohol use: No     If you drink alcohol do you typically have >3 drinks per day or >7 drinks per week? No    Alcohol Use 10/18/2021   Prescreen: >3 drinks/day or >7 drinks/week? No   Prescreen: >3 drinks/day or >7 drinks/week? -       Reviewed orders with patient.  Reviewed health maintenance and updated orders accordingly - Yes  Lab work is in process  Labs reviewed in EPIC  BP Readings from Last 3 Encounters:   05/10/22 132/89   04/07/22 133/84   03/24/22 (!) 154/89    Wt Readings from Last 3 Encounters:   05/10/22 54.4 kg (120 lb)   04/07/22 54.3 kg (119 lb 12.8 oz)   02/03/22 54.4 kg (120 lb)                  Patient Active Problem List   Diagnosis     Osteopenia     Hayfever     Hypertension goal BP (blood pressure) < 140/90     Polyarthritis, inflammatory (H)     GERD (gastroesophageal reflux disease)     Hyperlipidemia with target LDL less than 130     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     High risk medication use     Microscopic hematuria     Pterygium of right eye     Sicca syndrome (H)     Angiomyolipoma of right kidney     History of Helicobacter pylori infection     Pre-diabetes     NAFLD (nonalcoholic fatty liver disease)     Elevated LFTs     Past Surgical History:   Procedure Laterality Date     COLONOSCOPY        COLONOSCOPY WITH CO2 INSUFFLATION N/A 4/5/2017    Procedure: COLONOSCOPY WITH CO2 INSUFFLATION;  Surgeon: Duane, William Charles, MD;  Location: [facility]     COMBINED ESOPHAGOSCOPY, GASTROSCOPY, DUODENOSCOPY (EGD) WITH CO2 INSUFFLATION N/A 2/13/2019    Procedure: COMBINED ESOPHAGOSCOPY, GASTROSCOPY, DUODENOSCOPY (EGD) WITH CO2 INSUFFLATION;  Surgeon: Sly De Santiago MD;  Location: [facility]     COMBINED ESOPHAGOSCOPY, GASTROSCOPY, DUODENOSCOPY (EGD) WITH CO2 INSUFFLATION N/A 2/7/2022    Procedure: ESOPHAGOGASTRODUODENOSCOPY, WITH CO2 INSUFFLATION;  Surgeon: Luis Aceves MD;  Location: [facility]     COMBINED REPAIR PTOSIS WITH BLEPHAROPLASTY BILATERAL Bilateral 1/6/2017    Procedure: COMBINED REPAIR PTOSIS WITH BLEPHAROPLASTY BILATERAL;  Surgeon: Carly Norman MD;  Location: [facility]     ESOPHAGOSCOPY, GASTROSCOPY, DUODENOSCOPY (EGD), COMBINED N/A 1/5/2016    Procedure: COMBINED ESOPHAGOSCOPY, GASTROSCOPY, DUODENOSCOPY (EGD), BIOPSY SINGLE OR MULTIPLE;  Surgeon: Duane, William Charles, MD;  [facility]: [facility]     ESOPHAGOSCOPY, GASTROSCOPY, DUODENOSCOPY (EGD), COMBINED N/A 2/13/2019    Procedure: Combined Esophagoscopy, Gastroscopy, Duodenoscopy (Egd), Biopsy Single Or Multiple;  Surgeon: Sly De Santiago MD;  [facility]     ESOPHAGOSCOPY, GASTROSCOPY, DUODENOSCOPY (EGD), COMBINED N/A 2/7/2022    Procedure: ESOPHAGOGASTRODUODENOSCOPY, WITH BIOPSY;  Surgeon: Luis Aceves MD;  [facility]     REPAIR ENTROPION BILATERAL Bilateral 1/6/2017    Procedure: REPAIR ENTROPION BILATERAL;  Surgeon: Carly Norman MD;  Location: [facility]     REPAIR PTOSIS Bilateral 01/2017       Social History     Tobacco Use     Smoking status: Never Smoker     Smokeless tobacco: Never Used   Substance Use Topics     Alcohol use: No     Family History   Problem Relation Age of Onset     Diabetes Mother      Cancer No family hx of      Hypertension No family hx of      Cerebrovascular Disease No family  hx of      Thyroid Disease No family hx of      Glaucoma No family hx of      Macular Degeneration No family hx of          Current Outpatient Medications   Medication Sig Dispense Refill     cetirizine (ZYRTEC) 10 MG tablet Take 1 tablet (10 mg) by mouth daily 90 tablet 1     chlorhexidine (PERIDEX) 0.12 % solution SWISH AND SPIT 15 ML BY MOUTH TWICE DAILY AS NEEDED 473 mL 1     cholecalciferol (VITAMIN D3) 25 mcg (1000 units) capsule Take 1,000 Units by mouth       diclofenac (VOLTAREN) 1 % topical gel Apply 4 g topically 4 times daily 50 g 0     fluticasone (FLONASE) 50 MCG/ACT nasal spray Spray 1-2 sprays into both nostrils daily 16 g 1     hydrocortisone 2.5 % cream Apply 2.5 g topically 2 times daily       losartan-hydrochlorothiazide (HYZAAR) 50-12.5 MG tablet Take 1 tablet by mouth daily 90 tablet 3     meloxicam (MOBIC) 7.5 MG tablet 15 MG/D WITH FOOD IN AM FOR 2 WEEKS AND THEN STAY ON 7.5 MG DAILY WITH FOOD       methocarbamol (ROBAXIN) 500 MG tablet Take 1 tablet (500 mg) by mouth 2 times daily as needed for muscle spasms 30 tablet 0     metoprolol tartrate (LOPRESSOR) 25 MG tablet Take 1 tablet (25 mg) by mouth At Bedtime 90 tablet 3     naproxen (NAPROSYN) 500 MG tablet Take 1 tablet (500 mg) by mouth 2 times daily as needed for moderate pain 30 tablet 0     omeprazole (PRILOSEC) 40 MG DR capsule Take 1 capsule (40 mg) by mouth daily 90 capsule 3     pravastatin (PRAVACHOL) 20 MG tablet Take 1 tablet (20 mg) by mouth daily 90 tablet 3     magnesium 250 MG tablet TAKE 1 TABLET(250 MG) BY MOUTH DAILY AS NEEDED FOR LEG CRAMPS (Patient not taking: Reported on 5/10/2022) 30 tablet 3     meloxicam (MOBIC) 7.5 MG tablet  (Patient not taking: Reported on 5/10/2022)       methylcellulose (CITRUCEL) powder Take 2 g by mouth daily (Patient not taking: No sig reported) 180 g 3     No Known Allergies    Breast Cancer Screening:        History of abnormal Pap smear: NO - age 67 - see link Cervical Cytology Screening  Guidelines  PAP / HPV 4/21/2014   PAP (Historical) NIL     Reviewed and updated as needed this visit by clinical staff   Tobacco  Allergies  Meds  Problems  Med Hx  Surg Hx  Fam Hx  Soc   Hx          Reviewed and updated as needed this visit by Provider   Tobacco  Allergies  Meds  Problems  Med Hx  Surg Hx  Fam Hx           Past Medical History:   Diagnosis Date     GERD (gastroesophageal reflux disease) 3/20/2015     Glaucoma (increased eye pressure)      Hyperlipidemia LDL goal < 130 8/3/2015     Hypertension      Hypertension, goal below 150/90 11/28/2014     Rheumatoid arthritis, adult (H)       Past Surgical History:   Procedure Laterality Date     COLONOSCOPY       COLONOSCOPY WITH CO2 INSUFFLATION N/A 4/5/2017    Procedure: COLONOSCOPY WITH CO2 INSUFFLATION;  Surgeon: Duane, William Charles, MD;  [facility]: [facility]     COMBINED ESOPHAGOSCOPY, GASTROSCOPY, DUODENOSCOPY (EGD) WITH CO2 INSUFFLATION N/A 2/13/2019    Procedure: COMBINED ESOPHAGOSCOPY, GASTROSCOPY, DUODENOSCOPY (EGD) WITH CO2 INSUFFLATION;  Surgeon: Sly De Santiago MD;  [facility]     COMBINED ESOPHAGOSCOPY, GASTROSCOPY, DUODENOSCOPY (EGD) WITH CO2 INSUFFLATION N/A 2/7/2022    Procedure: ESOPHAGOGASTRODUODENOSCOPY, WITH CO2 INSUFFLATION;  Surgeon: Luis Aceves MD;  [facility]     COMBINED REPAIR PTOSIS WITH BLEPHAROPLASTY BILATERAL Bilateral 1/6/2017    Procedure: COMBINED REPAIR PTOSIS WITH BLEPHAROPLASTY BILATERAL;  Surgeon: Carly Norman MD;  Location: [facility]     ESOPHAGOSCOPY, GASTROSCOPY, DUODENOSCOPY (EGD), COMBINED N/A 1/5/2016    Procedure: COMBINED ESOPHAGOSCOPY, GASTROSCOPY, DUODENOSCOPY (EGD), BIOPSY SINGLE OR MULTIPLE;  Surgeon: Duane, William Charles, MD;  [facility]     ESOPHAGOSCOPY, GASTROSCOPY, DUODENOSCOPY (EGD), COMBINED N/A 2/13/2019    Procedure: Combined Esophagoscopy, Gastroscopy, Duodenoscopy (Egd), Biopsy Single Or Multiple;  Surgeon: Sly De Santiago MD;   "Location: [facility]     ESOPHAGOSCOPY, GASTROSCOPY, DUODENOSCOPY (EGD), COMBINED N/A 2/7/2022    Procedure: ESOPHAGOGASTRODUODENOSCOPY, WITH BIOPSY;  Surgeon: Luis Aceves MD;  [facility]     REPAIR ENTROPION BILATERAL Bilateral 1/6/2017    Procedure: REPAIR ENTROPION BILATERAL;  Surgeon: Carly Norman MD;  Location: [facility]     REPAIR PTOSIS Bilateral 01/2017       Review of Systems  CONSTITUTIONAL: NEGATIVE for fever, chills, change in weight  INTEGUMENTARY/SKIN: NEGATIVE for worrisome rashes, moles or lesions  EYES: NEGATIVE for vision changes or irritation  ENT: NEGATIVE for ear, mouth and throat problems  RESP: NEGATIVE for significant cough or SOB  BREAST: NEGATIVE for masses, tenderness or discharge  CV: NEGATIVE for chest pain, palpitations or peripheral edema  GI: NEGATIVE for nausea, abdominal pain, heartburn, or change in bowel habits  : NEGATIVE for unusual urinary or vaginal symptoms. No vaginal bleeding.  NEURO: NEGATIVE for weakness, dizziness or paresthesias  HEME/ALLERGY/IMMUNE: NEGATIVE for bleeding problems  PSYCHIATRIC: NEGATIVE for changes in mood or affect      OBJECTIVE:   /89 (BP Location: Left arm, Patient Position: Sitting, Cuff Size: Adult Regular)   Pulse 71   Temp 97.2  F (36.2  C) (Tympanic)   Resp 16   Ht 1.55 m (5' 1.02\")   Wt 54.4 kg (120 lb)   LMP 06/21/2002 (Approximate)   SpO2 96%   BMI 22.66 kg/m    Physical Exam  GENERAL APPEARANCE: healthy, alert and no distress  EYES: Eyes grossly normal to inspection and conjunctivae and sclerae normal  HENT: Intact TMs  NECK: no adenopathy and trachea midline and normal to palpation  RESP: lungs clear to auscultation - no rales, rhonchi or wheezes  CV: regular rates and rhythm, normal S1 S2, no S3 or S4 and no murmur, click or rub  ABDOMEN: soft, non-tender and no rebound or guarding   MS: extremities normal- no gross deformities noted and peripheral pulses normal  SKIN: no suspicious lesions or rashes  NEURO: " Normal strength and tone, mentation intact and speech normal  PSYCH: mentation appears normal      Diagnostic Test Results:  Labs reviewed in Epic  No results found for this or any previous visit (from the past 24 hour(s)).    ASSESSMENT/PLAN:   Noreen was seen today for physical and imm/inj.    Diagnoses and all orders for this visit:    Encounter for Medicare annual wellness exam  -preventive guidelines reviewed    Polyarthritis, inflammatory (H)  -     CBC with Platelets & Differential; Future  -     Comprehensive metabolic panel; Future  -followed by Rheumatology outside of [place], notes through [facility] Everywhere reviewed     Hypertension, goal below 150/90  -     metoprolol tartrate (LOPRESSOR) 25 MG tablet; Take 1 tablet (25 mg) by mouth At Bedtime  -     Albumin Random Urine Quantitative with Creat Ratio; Future  -at goal  -continue current medication     Mixed hyperlipidemia  -     Lipid panel reflex to direct LDL Non-fasting; Future    Gastroesophageal reflux disease with esophagitis without hemorrhage  -continue with Proton Pump inhibitor as needed    Vitamin D deficiency  -     Vitamin D Deficiency; Future    NAFLD (nonalcoholic fatty liver disease)  -     CBC with Platelets & Differential; Future  -     Comprehensive metabolic panel; Future  -if liver tests are continuing to increase then refer to Hepatology and stop statin  -US of the liver was done 9/13/21 and showed fatty liver   -CT abdomen done 3/24/22:    IMPRESSION:     1.  No acute findings.   2.  Fatty infiltration of the liver.   3.  Degenerative anterolisthesis of L4 on L5.       Elevated LFTs  -     CBC with Platelets & Differential; Future  -     Comprehensive metabolic panel; Future    Tinnitus, bilateral  -     Otolaryngology Referral; Future    High priority for 2019-nCoV vaccine  -     COVID-19,PF,PFIZER (12+ Yrs GRAY LABEL)    Strain of left trapezius muscle, subsequent encounter  -     diclofenac (VOLTAREN) 1 % topical gel; Apply 4 g  "topically 4 times daily    -     methocarbamol (ROBAXIN) 500 MG tablet; Take 1 tablet (500 mg) by mouth 2 times daily as needed for muscle spasms, sedation side effect reviewed  -     naproxen (NAPROSYN) 500 MG tablet; Take 1 tablet (500 mg) by mouth 2 times daily as needed for moderate pain  Hayfever  -     cetirizine (ZYRTEC) 10 MG tablet; Take 1 tablet (10 mg) by mouth daily  -     fluticasone (FLONASE) 50 MCG/ACT nasal spray; Spray 1-2 sprays into both nostrils daily    Gingivitis  -     chlorhexidine (PERIDEX) 0.12 % solution; SWISH AND SPIT 15 ML BY MOUTH TWICE DAILY AS NEEDED        COUNSELING:  Reviewed preventive health counseling, as reflected in patient instructions       Regular exercise       Healthy diet/nutrition       Vision screening    Estimated body mass index is 22.66 kg/m  as calculated from the following:    Height as of this encounter: 1.55 m (5' 1.02\").    Weight as of this encounter: 54.4 kg (120 lb).    Weight management plan noted, stable and monitoring    She reports that she has never smoked. She has never used smokeless tobacco.      Counseling Resources:  ATP IV Guidelines  Pooled Cohorts Equation Calculator  Breast Cancer Risk Calculator  BRCA-Related Cancer Risk Assessment: FHS-7 Tool  FRAX Risk Assessment  ICSI Preventive Guidelines  Dietary Guidelines for Americans, 2010  USDA's MyPlate  ASA Prophylaxis  Lung CA Screening    Jud Morales MD MPH    [facility]  "

## 2022-05-10 NOTE — RESULT ENCOUNTER NOTE
Noreen,     Basic blood count is not showing anemia or infection, other labs are pending.     Please do not hesitate to call us at [phone] if you have any questions or concerns.    Thank you,    Jud Morales MD MPH

## 2022-05-10 NOTE — TELEPHONE ENCOUNTER
Plan does not cover methocarbamol (ROBAXIN) 500 MG tablet.  Please call [phone] to initiate Prior Auth or switch to alternative medication.    Insurance type and ID number: ID# [id number]      Additional Information:     Michelle Grewal

## 2022-05-10 NOTE — RESULT ENCOUNTER NOTE
Noreen,     Vitamin D levels are normal.  Urine sample is not showing any abnormal protein.    Here are your cholesterol results:    Your LDL is: Lab Results       Component                Value               Date                       LDL                      87                  05/10/2022                 LDL                      131                 10/31/2021                 LDL                      92                  10/08/2020          Your LDL goal is to be less than 130  Your HDL is: Lab Results       Component                Value               Date                       HDL                      38                  05/10/2022                 HDL                      41                  10/08/2020           Goal HDL is Greater than 40 (for men) or 50 (for women).  Your Triglycerides are: Lab Results       Component                Value               Date                       TRIG                     347                 05/10/2022                 TRIG                     255                 10/08/2020           Goal TRIGLYCERIDES are less than 150.         Here are some ways to improve your cholesterol in addition to the medication:    Try to get at least 45 minutes of aerobic exercise 5-6 days a week  Maintain a healthy body weight  Eat less saturated fats  Buy lean cuts of meat, reduce your portions of red meat or substitute poultry or fish  Avoid fried or fast foods that are high in fat  Eat more fruits and vegetables    Electrolytes and kidney function are normal.    Glucose is in a prediabetic range. Prediabetes means your blood sugar is high and you are increased risk for developing overt diabetes in the future.   Be sure to monitor your intake of things like bread, pasta, rice, starchy foods (ie: potatoes), sugary beverages (ie: soda, juice-even the natural kind) and alcohol.  I recommend your plate be 1/2 vegetables, 1/4 protein, and 1/4 carbohydrate.      We will recheck your A1c in about 6 months.        Liver function tests are better than last check.     Please do not hesitate to call us at [phone] if you have any questions or concerns.    Thank you,    Jud Morales MD MPH

## 2022-05-11 LAB — HBA1C MFR BLD: 6.5 % (ref 0–5.6)

## 2022-05-12 NOTE — RESULT ENCOUNTER NOTE
Noreen,     Three month glucose number is right at a diabetic range.   Be sure to monitor your intake of things like bread, pasta, rice, starchy foods (ie: potatoes), sugary beverages (ie: soda, juice-even the natural kind) and alcohol.  I recommend your plate be 1/2 vegetables, 1/4 protein, and 1/4 carbohydrate.  See choosemyplate.gov for more ideas.    We will recheck your A1c in about 6 months.       Please do not hesitate to call us at [phone] if you have any questions or concerns.    Thank you,    Jud Morales MD MPH

## 2022-05-15 ENCOUNTER — OFFICE VISIT (OUTPATIENT)
Dept: URGENT CARE | Facility: URGENT CARE | Age: 72
End: 2022-05-15
Payer: MEDICARE

## 2022-05-15 VITALS
SYSTOLIC BLOOD PRESSURE: 130 MMHG | OXYGEN SATURATION: 97 % | TEMPERATURE: 97.8 F | DIASTOLIC BLOOD PRESSURE: 80 MMHG | HEART RATE: 84 BPM | HEIGHT: 63 IN | WEIGHT: 121.2 LBS | BODY MASS INDEX: 21.48 KG/M2

## 2022-05-15 DIAGNOSIS — R07.89 LEFT CHEST PRESSURE: Primary | ICD-10-CM

## 2022-05-15 PROCEDURE — 99213 OFFICE O/P EST LOW 20 MIN: CPT | Performed by: FAMILY MEDICINE

## 2022-05-15 PROCEDURE — 93000 ELECTROCARDIOGRAM COMPLETE: CPT | Performed by: FAMILY MEDICINE

## 2022-05-15 NOTE — PROGRESS NOTES
"  Assessment & Plan     Left chest pressure  Normal EKG.  Discussed with patient could be musculoskeletal since it comes and goes, lasts for seconds.  Advised with supportive care.  Increase fluid intake, advised with stretches.  Advised patient to follow-up with her primary care physician within the next week.  Go to the ER if symptoms worsening  - EKG 12-lead complete w/read - Clinics     Work on weight loss  Regular exercise    No follow-ups on file.    Sarkis Espinoza MD  [facility] CHRISASHA Sorto is a 72 year old who presents for the following health issues:  History of hypertension, patient reports for the past week she has been noticing pressure, more muscle spasm at the left side of her chest area.  She reports it comes for a few seconds.  Does not have sharp pain or chest pain when she is active.  She has been working outside in the yard.  Denies cough, denies shortness of breath, denies wheezing.  Patient does not smoke.  No lower extremity edema, no calf tenderness.  No history of travel.        Review of Systems   Constitutional, HEENT, cardiovascular, pulmonary, GI, , musculoskeletal, neuro, skin, endocrine and psych systems are negative, except as otherwise noted.      Objective    /80   Pulse 84   Temp 97.8  F (36.6  C) (Tympanic)   Ht 1.6 m (5' 3\")   Wt 55 kg (121 lb 3.2 oz)   LMP 06/21/2002 (Approximate)   SpO2 97%   BMI 21.47 kg/m    Body mass index is 21.47 kg/m .  Physical Exam   GENERAL: healthy, alert and no distress  NECK: no adenopathy, no asymmetry, masses, or scars and thyroid normal to palpation  RESP: lungs clear to auscultation - no rales, rhonchi or wheezes  CV: regular rate and rhythm, normal S1 S2, no S3 or S4, no murmur, click or rub, no peripheral edema and peripheral pulses strong  ABDOMEN: soft, nontender, no hepatosplenomegaly, no masses and bowel sounds normal  MS: no gross musculoskeletal defects noted, no edema  PSYCH: " mentation appears normal, affect normal/bright  No chest wall tenderness with palpation.    Orders Placed This Encounter   Procedures     EKG 12-lead complete w/read - Clinics       Sarkis Espinoza MD

## 2022-05-24 ENCOUNTER — TELEPHONE (OUTPATIENT)
Dept: FAMILY MEDICINE | Facility: CLINIC | Age: 72
End: 2022-05-24
Payer: MEDICARE

## 2022-05-24 DIAGNOSIS — L30.9 DERMATITIS: ICD-10-CM

## 2022-05-24 RX ORDER — TRIAMCINOLONE ACETONIDE 1 MG/G
CREAM TOPICAL
Qty: 80 G | Refills: 0 | Status: SHIPPED | OUTPATIENT
Start: 2022-05-24 | End: 2022-07-07

## 2022-05-24 NOTE — TELEPHONE ENCOUNTER
Patients yann is calling along with patient about getting a medication refilled. Not seeing it on patients med list, medication is triamcinolone 0.1%, but they state she has been using this creamThey would like this sent to [facility]. Please advise.  Neena [facility]  2nd Floor  Primary Care

## 2022-06-02 ENCOUNTER — TELEPHONE (OUTPATIENT)
Dept: FAMILY MEDICINE | Facility: CLINIC | Age: 72
End: 2022-06-02
Payer: MEDICARE

## 2022-06-02 NOTE — TELEPHONE ENCOUNTER
These are eye drops prescribed for increased pressure in the eye by an EYE specialist. She needs to get refills from her eye doctor, and not request refills from primary care.    Thank you,  Jud Morales MD MPH

## 2022-06-02 NOTE — TELEPHONE ENCOUNTER
Med discontinued at 3/24 urgent care visit, therapy completed. To provider to review and advise if appropriate to refill.  Margy Salas RN  [facility]

## 2022-06-02 NOTE — TELEPHONE ENCOUNTER
Pharmacy requesting refill on discontinued med:    latanoprost (XALATAN) 0.005 % ophthalmic solution (Discontinued) 7.5 mL 4 3/11/2021 9/10/2021

## 2022-06-03 DIAGNOSIS — H40.013 OPEN ANGLE WITH BORDERLINE FINDINGS AND LOW GLAUCOMA RISK IN BOTH EYES: Primary | ICD-10-CM

## 2022-06-03 RX ORDER — LATANOPROST 50 UG/ML
SOLUTION/ DROPS OPHTHALMIC
Qty: 7.5 ML | Refills: 1 | Status: SHIPPED | OUTPATIENT
Start: 2022-06-03 | End: 2022-10-26

## 2022-06-03 NOTE — PROGRESS NOTES
Refill request for latanoprost received from Dr Morales's office.  Last appointment 9/2021, next appointment 9/2022. Medication refilled as requested.  Berkley Ruby RN

## 2022-06-11 ENCOUNTER — OFFICE VISIT (OUTPATIENT)
Dept: URGENT CARE | Facility: URGENT CARE | Age: 72
End: 2022-06-11

## 2022-06-11 ENCOUNTER — ANCILLARY PROCEDURE (OUTPATIENT)
Dept: GENERAL RADIOLOGY | Facility: CLINIC | Age: 72
End: 2022-06-11
Attending: PHYSICIAN ASSISTANT
Payer: MEDICARE

## 2022-06-11 VITALS
DIASTOLIC BLOOD PRESSURE: 87 MMHG | BODY MASS INDEX: 21.12 KG/M2 | SYSTOLIC BLOOD PRESSURE: 157 MMHG | RESPIRATION RATE: 22 BRPM | TEMPERATURE: 98.2 F | HEART RATE: 69 BPM | WEIGHT: 119.2 LBS | OXYGEN SATURATION: 100 %

## 2022-06-11 DIAGNOSIS — S99.921A FOOT INJURY, RIGHT, INITIAL ENCOUNTER: ICD-10-CM

## 2022-06-11 DIAGNOSIS — M06.9 RHEUMATOID ARTHRITIS, INVOLVING UNSPECIFIED SITE, UNSPECIFIED WHETHER RHEUMATOID FACTOR PRESENT (H): ICD-10-CM

## 2022-06-11 DIAGNOSIS — S90.31XA CONTUSION OF RIGHT FOOT, INITIAL ENCOUNTER: Primary | ICD-10-CM

## 2022-06-11 PROCEDURE — 73630 X-RAY EXAM OF FOOT: CPT | Mod: TC | Performed by: RADIOLOGY

## 2022-06-11 PROCEDURE — 99214 OFFICE O/P EST MOD 30 MIN: CPT | Performed by: PHYSICIAN ASSISTANT

## 2022-06-11 RX ORDER — IBUPROFEN 600 MG/1
600 TABLET, FILM COATED ORAL EVERY 8 HOURS PRN
Qty: 30 TABLET | Refills: 0 | Status: SHIPPED | OUTPATIENT
Start: 2022-06-11 | End: 2022-06-11 | Stop reason: ALTCHOICE

## 2022-06-11 ASSESSMENT — PAIN SCALES - GENERAL: PAINLEVEL: EXTREME PAIN (9)

## 2022-06-11 NOTE — PROGRESS NOTES
Chief Complaint   Patient presents with     Foot Pain     Right foot pain-was doing garden yesterday when foot start to hurt and painful      Xray-I see some arthritic changes, no obvious fracture.    Results for orders placed or performed in visit on 06/11/22   XR Foot Right G/E 3 Views     Status: None    Narrative    EXAM: XR FOOT RIGHT G/E 3 VIEWS  LOCATION: [facility]  DATE/TIME: 6/11/2022 2:51 PM    INDICATION: Lateral right foot pain.  COMPARISON: None.      Impression    IMPRESSION: Right foot negative for fracture or joint malalignment. Mild degenerative arthritic changes throughout the midfoot and forefoot. Small Achilles calcaneal enthesophyte.           ASSESSMENT:    ICD-10-CM    1. Contusion of right foot, initial encounter  S90.31XA    2. Foot injury, right, initial encounter  S99.921A XR Foot Right G/E 3 Views     Ankle/Foot Bracing Supplies Order for DME - ONLY FOR DME     Orthopedic  Referral     DISCONTINUED: ibuprofen (ADVIL/MOTRIN) 600 MG tablet   3. Rheumatoid arthritis, involving unspecified site, unspecified whether rheumatoid factor present (H)  M06.9              PLAN: Right foot contusion from using shovel.  Vital signs stable other than mildly elevated blood pressure..  OTC Tylenol as needed.  Initially prescribed  ibuprofen 600 mg but then realized she is on both Mobic and naproxen for her rheumatoid arthritis and so I canceled it.  Ice, elevate, post operative shoe, see ortho   Viviana Garcia PA-C        SUBJECTIVE:  Noreen Florence is an 72 year old female who presents with right foot pain for the last couple of days after using the shovel in her garden.  History of rheumatoid arthritis.  No numbness or tingling.  Hurts on the top lateral foot.  No fever.  Past Medical History:   Diagnosis Date     GERD (gastroesophageal reflux disease) 3/20/2015     Glaucoma (increased eye pressure)      Hyperlipidemia LDL goal < 130 8/3/2015     Hypertension       Hypertension, goal below 150/90 11/28/2014     Rheumatoid arthritis, adult (H)      History   Smoking Status     Never Smoker   Smokeless Tobacco     Never Used       ROS:  GEN no fevers  SKIN no erythema  Musculoskeletal:  See HPI.      OBJECTIVE:  Blood pressure (!) 157/87, pulse 69, temperature 98.2  F (36.8  C), temperature source Tympanic, resp. rate 22, weight 54.1 kg (119 lb 3.2 oz), last menstrual period 06/21/2002, SpO2 100 %, not currently breastfeeding.  Patient is alert and NAD.  EYES: conjunctiva clear  Ankle/foot Exam (right):  Inspection:swelling lateral dorsal mid foot  Palpation:tender dorsal lateral mid foot and bottom lateral midfoot  Cap refill intact.    Good doralis pedis.  Neurovascularly Intact Distally.         LION ArevaloC

## 2022-06-20 NOTE — TELEPHONE ENCOUNTER
DIAGNOSIS: Foot injury, right   APPOINTMENT DATE: 06/22/2022   NOTES STATUS DETAILS   OFFICE NOTE from referring provider Internal 06/11/2022 Dr Dias [facility] urgent care   OFFICE NOTE from other specialist N/A    DISCHARGE SUMMARY from hospital N/A    DISCHARGE REPORT from the ER N/A    OPERATIVE REPORT N/A    EMG report N/A    MEDICATION LIST N/A    MRI N/A    DEXA (osteoporosis/bone health) N/A    CT SCAN N/A    XRAYS (IMAGES & REPORTS) Internal 06/11/2022 RT foot

## 2022-06-22 ENCOUNTER — OFFICE VISIT (OUTPATIENT)
Dept: PODIATRY | Facility: CLINIC | Age: 72
End: 2022-06-22
Attending: PHYSICIAN ASSISTANT
Payer: MEDICARE

## 2022-06-22 ENCOUNTER — PRE VISIT (OUTPATIENT)
Dept: PODIATRY | Facility: CLINIC | Age: 72
End: 2022-06-22
Payer: MEDICARE

## 2022-06-22 VITALS — BODY MASS INDEX: 21.08 KG/M2 | WEIGHT: 119 LBS

## 2022-06-22 DIAGNOSIS — M76.71 PERONEUS LONGUS TENDINITIS, RIGHT: ICD-10-CM

## 2022-06-22 DIAGNOSIS — Q66.70 CONGENITAL CAVUS FOOT: Primary | ICD-10-CM

## 2022-06-22 PROBLEM — M79.671 HEEL PAIN, CHRONIC, RIGHT: Status: ACTIVE | Noted: 2022-03-07

## 2022-06-22 PROBLEM — G89.29 HEEL PAIN, CHRONIC, RIGHT: Status: ACTIVE | Noted: 2022-03-07

## 2022-06-22 PROCEDURE — 99203 OFFICE O/P NEW LOW 30 MIN: CPT | Performed by: PODIATRIST

## 2022-06-22 NOTE — PATIENT INSTRUCTIONS
We wish you continued good healing. If you have any questions or concerns, please do not hesitate to contact us at  [phone]    [facility] (secure e-mail communication and access to your chart) to send a message or to make an appointment.    Please remember to call and schedule a follow up appointment if one was recommended at your earliest convenience.     PODIATRY CLINIC HOURS  TELEPHONE NUMBER    Dr. Yuri JACOBSENPLEI [facility]        Clinics:  [facility] CMA   Tuesday 1PM-6PM  [place]  Wednesday 745AM-330PM  [place]  Thursday/Friday 745AM-230PM  [facility] [place] APPOINTMENTS  [phone]    [place] APPOINTMENTS  [phone]        If you need a medication refill, please contact us you may need lab work and/or a follow up visit prior to your refill (i.e. Antifungal medications).  If MRI needed please call Imaging at [phone] or [phone]  HOW DO I GET MY KNEE SCOOTER? Knee scooters can be picked up at ANY Medical Supply stores with your knee scooter Prescription.  OR  Bring your signed prescription to an [facility] showroom.

## 2022-06-22 NOTE — PROGRESS NOTES
Subjective:    Pt is seen today in consult from Ashli Garcia.  Points to plantar right foot.  Has had this for 3 weeks.  Pain aggravated by activity and relieved by rest.  Was doing some gardening and using a shovel a few times before this happened.  Denies ecchymosis erythema weakness or increased deformity.  She has rheumatoid arthritis.    ROS: See above      No Known Allergies    Current Outpatient Medications   Medication Sig Dispense Refill     acetaminophen-codeine (TYLENOL #3) 300-30 MG tablet TAKE 1 TABLET BY MOUTH EVERY 4 TO 6 HOURS AS NEEDED FOR PAIN       cetirizine (ZYRTEC) 10 MG tablet Take 1 tablet (10 mg) by mouth daily 90 tablet 1     chlorhexidine (PERIDEX) 0.12 % solution SWISH AND SPIT 15 ML BY MOUTH TWICE DAILY AS NEEDED 473 mL 1     cholecalciferol (VITAMIN D3) 25 mcg (1000 units) capsule Take 1,000 Units by mouth       diclofenac (VOLTAREN) 1 % topical gel Apply 4 g topically 4 times daily 50 g 0     fluticasone (FLONASE) 50 MCG/ACT nasal spray Spray 1-2 sprays into both nostrils daily 16 g 1     hydrocortisone 2.5 % cream Apply 2.5 g topically 2 times daily       latanoprost (XALATAN) 0.005 % ophthalmic solution INSTILL 1 DROP IN BOTH EYES AT BEDTIME 7.5 mL 1     losartan-hydrochlorothiazide (HYZAAR) 50-12.5 MG tablet Take 1 tablet by mouth daily 90 tablet 3     magnesium 250 MG tablet TAKE 1 TABLET(250 MG) BY MOUTH DAILY AS NEEDED FOR LEG CRAMPS 30 tablet 3     meloxicam (MOBIC) 7.5 MG tablet 15 MG/D WITH FOOD IN AM FOR 2 WEEKS AND THEN STAY ON 7.5 MG DAILY WITH FOOD       meloxicam (MOBIC) 7.5 MG tablet        methylcellulose (CITRUCEL) powder Take 2 g by mouth daily 180 g 3     metoprolol tartrate (LOPRESSOR) 25 MG tablet Take 1 tablet (25 mg) by mouth At Bedtime 90 tablet 3     naproxen (NAPROSYN) 500 MG tablet Take 1 tablet (500 mg) by mouth 2 times daily as needed for moderate pain 30 tablet 0     omeprazole (PRILOSEC) 40 MG DR capsule Take 1 capsule (40 mg) by mouth daily 90 capsule 3      pravastatin (PRAVACHOL) 20 MG tablet Take 1 tablet (20 mg) by mouth daily 90 tablet 3     tiZANidine (ZANAFLEX) 2 MG tablet Take 1 tablet (2 mg) by mouth 2 times daily as needed for muscle spasms Will make you sleepy 30 tablet 0     triamcinolone (KENALOG) 0.1 % external cream APPLY EXTERNALLY TO THE AFFECTED AREA TWICE DAILY 80 g 0       Patient Active Problem List   Diagnosis     Osteopenia     Hayfever     Hypertension goal BP (blood pressure) < 140/90     Polyarthritis, inflammatory (H)     GERD (gastroesophageal reflux disease)     Hyperlipidemia with target LDL less than 130     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     High risk medication use     Microscopic hematuria     Pterygium of right eye     Sicca syndrome (H)     Angiomyolipoma of right kidney     History of Helicobacter pylori infection     Pre-diabetes     NAFLD (nonalcoholic fatty liver disease)     Elevated LFTs     Heel pain, chronic, right       Past Medical History:   Diagnosis Date     GERD (gastroesophageal reflux disease) 3/20/2015     Glaucoma (increased eye pressure)      Hyperlipidemia LDL goal < 130 8/3/2015     Hypertension      Hypertension, goal below 150/90 11/28/2014     Rheumatoid arthritis, adult (H)        Past Surgical History:   Procedure Laterality Date     COLONOSCOPY       COLONOSCOPY WITH CO2 INSUFFLATION N/A 4/5/2017    Procedure: COLONOSCOPY WITH CO2 INSUFFLATION;  Surgeon: Duane, William Charles, MD;  Location: [facility]     COMBINED ESOPHAGOSCOPY, GASTROSCOPY, DUODENOSCOPY (EGD) WITH CO2 INSUFFLATION N/A 2/13/2019    Procedure: COMBINED ESOPHAGOSCOPY, GASTROSCOPY, DUODENOSCOPY (EGD) WITH CO2 INSUFFLATION;  Surgeon: Sly De Santiago MD;  Location: [facility]     COMBINED ESOPHAGOSCOPY, GASTROSCOPY, DUODENOSCOPY (EGD) WITH CO2 INSUFFLATION N/A 2/7/2022    Procedure: ESOPHAGOGASTRODUODENOSCOPY, WITH CO2 INSUFFLATION;  Surgeon: Lius Aceves MD;  Location: [facility]     COMBINED REPAIR PTOSIS WITH  BLEPHAROPLASTY BILATERAL Bilateral 1/6/2017    Procedure: COMBINED REPAIR PTOSIS WITH BLEPHAROPLASTY BILATERAL;  Surgeon: Carly Norman MD;  Location: [facility]     ESOPHAGOSCOPY, GASTROSCOPY, DUODENOSCOPY (EGD), COMBINED N/A 1/5/2016    Procedure: COMBINED ESOPHAGOSCOPY, GASTROSCOPY, DUODENOSCOPY (EGD), BIOPSY SINGLE OR MULTIPLE;  Surgeon: Duane, William Charles, MD;  Location: [facility]     ESOPHAGOSCOPY, GASTROSCOPY, DUODENOSCOPY (EGD), COMBINED N/A 2/13/2019    Procedure: Combined Esophagoscopy, Gastroscopy, Duodenoscopy (Egd), Biopsy Single Or Multiple;  Surgeon: Sly De Santiago MD;  Location: [facility]     ESOPHAGOSCOPY, GASTROSCOPY, DUODENOSCOPY (EGD), COMBINED N/A 2/7/2022    Procedure: ESOPHAGOGASTRODUODENOSCOPY, WITH BIOPSY;  Surgeon: Luis Aceves MD;  Location: [facility]     REPAIR ENTROPION BILATERAL Bilateral 1/6/2017    Procedure: REPAIR ENTROPION BILATERAL;  Surgeon: Carly Norman MD;  Location: [facility]     REPAIR PTOSIS Bilateral 01/2017       Family History   Problem Relation Age of Onset     Diabetes Mother      Cancer No family hx of      Hypertension No family hx of      Cerebrovascular Disease No family hx of      Thyroid Disease No family hx of      Glaucoma No family hx of      Macular Degeneration No family hx of        Social History     Tobacco Use     Smoking status: Never Smoker     Smokeless tobacco: Never Used   Substance Use Topics     Alcohol use: No             O:  Wt 54 kg (119 lb)   LMP 06/21/2002 (Approximate)   BMI 21.08 kg/m  .     Constitutional/ general:  Pt is in no apparent distress, appears well-nourished.  Cooperative with history and physical exam.     Psych:  The patient answered questions appropriately.  Normal affect.  Seems to have reasonable expectations, in terms of treatment.     Lungs:  Non labored breathing, non labored speech. No cough.  No audible wheezing. Even, quiet breathing.       Vascular:  Pedal pulses are palpable bilaterally for both  the DP and PT arteries.  CFT < 3 sec.  No edema.  Pedal hair growth noted.     Neuro:  Alert and oriented x 3. Coordinated gait.  Light touch sensation is intact     Derm: Normal texture and turgor.  No erythema, ecchymosis, or cyanosis.  No open lesions.     Musculoskeletal:    Lower extremity muscle strength is normal.  Patient is ambulatory without an assistive device or brace.     Cavus foot with weightbearing bilateral.  No forefoot or rear foot deformities noted.  MS 5/5 all compartments.  Normal ROM all fore foot and rearfoot joints with no pain or crepitus.  No equinus.  Pain right foot plantar central arch area of peroneus longus tendon.  Also pain where this tendon courses around the cuboid.  Positive pain stressing peroneus longus.  No pain stressing peroneus brevis.   No pain medial band of plantar fascia and arch.  No masses.  No erythema edema or ecchymosis.    Radiographic Exam:  X-Ray Findings:  I personally reviewed the films, normal    A:  Cavus foot with peroneus longus tendinitis    Plan:  X-rays from past personally reviewed.  Discussed with patient she has pain in her peroneus longus tendon.  We discussed course of this tendon.  Discussed how cavus foot causes more pressure on this tendon.  Certainly shoveling could have irritated this.  Avoid activities that bother this and we discussed what bothers this.  Ice twice daily.  Discussed resting tendon with ankle brace and patient in agreement.  We dispensed 1 today.  Return to the clinic in 1 week to ensure this is getting better.  If not improve may consider CAM Walker.  Thank you for allowing me participate in the care of this patient.        Yuri Knowles, MALGORZATA, FACFAS

## 2022-06-22 NOTE — LETTER
6/22/2022         RE: Noreen Florence  [address]  [place] [address]        Dear Colleague,    Thank you for referring your patient, Noreen Florence, to the [facility]. Please see a copy of my visit note below.    Subjective:    Pt is seen today in consult from Ashli Garcia.  Points to plantar right foot.  Has had this for 3 weeks.  Pain aggravated by activity and relieved by rest.  Was doing some gardening and using a shovel a few times before this happened.  Denies ecchymosis erythema weakness or increased deformity.  She has rheumatoid arthritis.    ROS: See above      No Known Allergies    Current Outpatient Medications   Medication Sig Dispense Refill     acetaminophen-codeine (TYLENOL #3) 300-30 MG tablet TAKE 1 TABLET BY MOUTH EVERY 4 TO 6 HOURS AS NEEDED FOR PAIN       cetirizine (ZYRTEC) 10 MG tablet Take 1 tablet (10 mg) by mouth daily 90 tablet 1     chlorhexidine (PERIDEX) 0.12 % solution SWISH AND SPIT 15 ML BY MOUTH TWICE DAILY AS NEEDED 473 mL 1     cholecalciferol (VITAMIN D3) 25 mcg (1000 units) capsule Take 1,000 Units by mouth       diclofenac (VOLTAREN) 1 % topical gel Apply 4 g topically 4 times daily 50 g 0     fluticasone (FLONASE) 50 MCG/ACT nasal spray Spray 1-2 sprays into both nostrils daily 16 g 1     hydrocortisone 2.5 % cream Apply 2.5 g topically 2 times daily       latanoprost (XALATAN) 0.005 % ophthalmic solution INSTILL 1 DROP IN BOTH EYES AT BEDTIME 7.5 mL 1     losartan-hydrochlorothiazide (HYZAAR) 50-12.5 MG tablet Take 1 tablet by mouth daily 90 tablet 3     magnesium 250 MG tablet TAKE 1 TABLET(250 MG) BY MOUTH DAILY AS NEEDED FOR LEG CRAMPS 30 tablet 3     meloxicam (MOBIC) 7.5 MG tablet 15 MG/D WITH FOOD IN AM FOR 2 WEEKS AND THEN STAY ON 7.5 MG DAILY WITH FOOD       meloxicam (MOBIC) 7.5 MG tablet        methylcellulose (CITRUCEL) powder Take 2 g by mouth daily 180 g 3     metoprolol tartrate (LOPRESSOR) 25 MG tablet Take 1 tablet (25 mg) by mouth At  Bedtime 90 tablet 3     naproxen (NAPROSYN) 500 MG tablet Take 1 tablet (500 mg) by mouth 2 times daily as needed for moderate pain 30 tablet 0     omeprazole (PRILOSEC) 40 MG DR capsule Take 1 capsule (40 mg) by mouth daily 90 capsule 3     pravastatin (PRAVACHOL) 20 MG tablet Take 1 tablet (20 mg) by mouth daily 90 tablet 3     tiZANidine (ZANAFLEX) 2 MG tablet Take 1 tablet (2 mg) by mouth 2 times daily as needed for muscle spasms Will make you sleepy 30 tablet 0     triamcinolone (KENALOG) 0.1 % external cream APPLY EXTERNALLY TO THE AFFECTED AREA TWICE DAILY 80 g 0       Patient Active Problem List   Diagnosis     Osteopenia     Hayfever     Hypertension goal BP (blood pressure) < 140/90     Polyarthritis, inflammatory (H)     GERD (gastroesophageal reflux disease)     Hyperlipidemia with target LDL less than 130     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     High risk medication use     Microscopic hematuria     Pterygium of right eye     Sicca syndrome (H)     Angiomyolipoma of right kidney     History of Helicobacter pylori infection     Pre-diabetes     NAFLD (nonalcoholic fatty liver disease)     Elevated LFTs     Heel pain, chronic, right       Past Medical History:   Diagnosis Date     GERD (gastroesophageal reflux disease) 3/20/2015     Glaucoma (increased eye pressure)      Hyperlipidemia LDL goal < 130 8/3/2015     Hypertension      Hypertension, goal below 150/90 11/28/2014     Rheumatoid arthritis, adult (H)        Past Surgical History:   Procedure Laterality Date     COLONOSCOPY       COLONOSCOPY WITH CO2 INSUFFLATION N/A 4/5/2017    Procedure: COLONOSCOPY WITH CO2 INSUFFLATION;  Surgeon: Duane, William Charles, MD;  Location:  OR     COMBINED ESOPHAGOSCOPY, GASTROSCOPY, DUODENOSCOPY (EGD) WITH CO2 INSUFFLATION N/A 2/13/2019    Procedure: COMBINED ESOPHAGOSCOPY, GASTROSCOPY, DUODENOSCOPY (EGD) WITH CO2 INSUFFLATION;  Surgeon: Sly De Santiago MD;  Location:  OR      COMBINED ESOPHAGOSCOPY, GASTROSCOPY, DUODENOSCOPY (EGD) WITH CO2 INSUFFLATION N/A 2/7/2022    Procedure: ESOPHAGOGASTRODUODENOSCOPY, WITH CO2 INSUFFLATION;  Surgeon: Luis Aceves MD;  Location: [facility]     COMBINED REPAIR PTOSIS WITH BLEPHAROPLASTY BILATERAL Bilateral 1/6/2017    Procedure: COMBINED REPAIR PTOSIS WITH BLEPHAROPLASTY BILATERAL;  Surgeon: Carly Norman MD;  Location: [facility]     ESOPHAGOSCOPY, GASTROSCOPY, DUODENOSCOPY (EGD), COMBINED N/A 1/5/2016    Procedure: COMBINED ESOPHAGOSCOPY, GASTROSCOPY, DUODENOSCOPY (EGD), BIOPSY SINGLE OR MULTIPLE;  Surgeon: Duane, William Charles, MD;  Location: [facility]     ESOPHAGOSCOPY, GASTROSCOPY, DUODENOSCOPY (EGD), COMBINED N/A 2/13/2019    Procedure: Combined Esophagoscopy, Gastroscopy, Duodenoscopy (Egd), Biopsy Single Or Multiple;  Surgeon: Sly De Santiago MD;  Location: [facility]     ESOPHAGOSCOPY, GASTROSCOPY, DUODENOSCOPY (EGD), COMBINED N/A 2/7/2022    Procedure: ESOPHAGOGASTRODUODENOSCOPY, WITH BIOPSY;  Surgeon: Luis Aceves MD;  Location: [facility]     REPAIR ENTROPION BILATERAL Bilateral 1/6/2017    Procedure: REPAIR ENTROPION BILATERAL;  Surgeon: Carly Norman MD;  Location: [facility]     REPAIR PTOSIS Bilateral 01/2017       Family History   Problem Relation Age of Onset     Diabetes Mother      Cancer No family hx of      Hypertension No family hx of      Cerebrovascular Disease No family hx of      Thyroid Disease No family hx of      Glaucoma No family hx of      Macular Degeneration No family hx of        Social History     Tobacco Use     Smoking status: Never Smoker     Smokeless tobacco: Never Used   Substance Use Topics     Alcohol use: No             O:  Wt 54 kg (119 lb)   LMP 06/21/2002 (Approximate)   BMI 21.08 kg/m  .     Constitutional/ general:  Pt is in no apparent distress, appears well-nourished.  Cooperative with history and physical exam.     Psych:  The patient answered questions appropriately.  Normal  affect.  Seems to have reasonable expectations, in terms of treatment.     Lungs:  Non labored breathing, non labored speech. No cough.  No audible wheezing. Even, quiet breathing.       Vascular:  Pedal pulses are palpable bilaterally for both the DP and PT arteries.  CFT < 3 sec.  No edema.  Pedal hair growth noted.     Neuro:  Alert and oriented x 3. Coordinated gait.  Light touch sensation is intact     Derm: Normal texture and turgor.  No erythema, ecchymosis, or cyanosis.  No open lesions.     Musculoskeletal:    Lower extremity muscle strength is normal.  Patient is ambulatory without an assistive device or brace.     Cavus foot with weightbearing bilateral.  No forefoot or rear foot deformities noted.  MS 5/5 all compartments.  Normal ROM all fore foot and rearfoot joints with no pain or crepitus.  No equinus.  Pain right foot plantar central arch area of peroneus longus tendon.  Also pain where this tendon courses around the cuboid.  Positive pain stressing peroneus longus.  No pain stressing peroneus brevis.   No pain medial band of plantar fascia and arch.  No masses.  No erythema edema or ecchymosis.    Radiographic Exam:  X-Ray Findings:  I personally reviewed the films, normal    A:  Cavus foot with peroneus longus tendinitis    Plan:  X-rays from past personally reviewed.  Discussed with patient she has pain in her peroneus longus tendon.  We discussed course of this tendon.  Discussed how cavus foot causes more pressure on this tendon.  Certainly shoveling could have irritated this.  Avoid activities that bother this and we discussed what bothers this.  Ice twice daily.  Discussed resting tendon with ankle brace and patient in agreement.  We dispensed 1 today.  Return to the clinic in 1 week to ensure this is getting better.  If not improve may consider CAM Walker.  Thank you for allowing me participate in the care of this patient.        Yuri Knowles, MALGORZATA, FACFAS             Again, thank you for  allowing me to participate in the care of your patient.        Sincerely,        Yuri Knowles DPM

## 2022-07-13 ENCOUNTER — OFFICE VISIT (OUTPATIENT)
Dept: PODIATRY | Facility: CLINIC | Age: 72
End: 2022-07-13
Payer: MEDICARE

## 2022-07-13 VITALS — HEART RATE: 77 BPM | OXYGEN SATURATION: 98 %

## 2022-07-13 DIAGNOSIS — M76.71 PERONEUS LONGUS TENDINITIS, RIGHT: ICD-10-CM

## 2022-07-13 DIAGNOSIS — Q66.70 CONGENITAL CAVUS FOOT: Primary | ICD-10-CM

## 2022-07-13 PROCEDURE — 99213 OFFICE O/P EST LOW 20 MIN: CPT | Performed by: PODIATRIST

## 2022-07-13 NOTE — LETTER
7/13/2022         RE: Noreen Florence  [address]  [place] [address]        Dear Colleague,    Thank you for referring your patient, Noreen Florence, to the [facility]. Please see a copy of my visit note below.    Subjective:    6/22/22   Pt is seen today in consult from Ashli Garcia.  Points to plantar right foot.  Has had this for 3 weeks.  Pain aggravated by activity and relieved by rest.  Was doing some gardening and using a shovel a few times before this happened.  Denies ecchymosis erythema weakness or increased deformity.  She has rheumatoid arthritis.    7/13/22 patient returns for right plantar lateral foot pain.  Has been wearing ankle brace since last visit.  He is now feeling 50% better.  Denies new edema ecchymosis or weakness.    ROS: See above      No Known Allergies    Current Outpatient Medications   Medication Sig Dispense Refill     triamcinolone (KENALOG) 0.1 % external cream APPLY TOPICALLY TO THE AFFECTED AREA TWICE DAILY 80 g 0     acetaminophen-codeine (TYLENOL #3) 300-30 MG tablet TAKE 1 TABLET BY MOUTH EVERY 4 TO 6 HOURS AS NEEDED FOR PAIN       cetirizine (ZYRTEC) 10 MG tablet Take 1 tablet (10 mg) by mouth daily 90 tablet 1     chlorhexidine (PERIDEX) 0.12 % solution SWISH AND SPIT 15 ML BY MOUTH TWICE DAILY AS NEEDED 473 mL 1     cholecalciferol (VITAMIN D3) 25 mcg (1000 units) capsule Take 1,000 Units by mouth       diclofenac (VOLTAREN) 1 % topical gel Apply 4 g topically 4 times daily 50 g 0     fluticasone (FLONASE) 50 MCG/ACT nasal spray Spray 1-2 sprays into both nostrils daily 16 g 1     hydrocortisone 2.5 % cream Apply 2.5 g topically 2 times daily       latanoprost (XALATAN) 0.005 % ophthalmic solution INSTILL 1 DROP IN BOTH EYES AT BEDTIME 7.5 mL 1     losartan-hydrochlorothiazide (HYZAAR) 50-12.5 MG tablet Take 1 tablet by mouth daily 90 tablet 3     magnesium 250 MG tablet TAKE 1 TABLET(250 MG) BY MOUTH DAILY AS NEEDED FOR LEG CRAMPS 30 tablet 3      meloxicam (MOBIC) 7.5 MG tablet 15 MG/D WITH FOOD IN AM FOR 2 WEEKS AND THEN STAY ON 7.5 MG DAILY WITH FOOD       meloxicam (MOBIC) 7.5 MG tablet        methylcellulose (CITRUCEL) powder Take 2 g by mouth daily 180 g 3     metoprolol tartrate (LOPRESSOR) 25 MG tablet Take 1 tablet (25 mg) by mouth At Bedtime 90 tablet 3     naproxen (NAPROSYN) 500 MG tablet Take 1 tablet (500 mg) by mouth 2 times daily as needed for moderate pain 30 tablet 0     omeprazole (PRILOSEC) 40 MG DR capsule Take 1 capsule (40 mg) by mouth daily 90 capsule 3     pravastatin (PRAVACHOL) 20 MG tablet Take 1 tablet (20 mg) by mouth daily 90 tablet 3     tiZANidine (ZANAFLEX) 2 MG tablet Take 1 tablet (2 mg) by mouth 2 times daily as needed for muscle spasms Will make you sleepy 30 tablet 0       Patient Active Problem List   Diagnosis     Osteopenia     Hayfever     Hypertension goal BP (blood pressure) < 140/90     Polyarthritis, inflammatory (H)     GERD (gastroesophageal reflux disease)     Hyperlipidemia with target LDL less than 130     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     High risk medication use     Microscopic hematuria     Pterygium of right eye     Sicca syndrome (H)     Angiomyolipoma of right kidney     History of Helicobacter pylori infection     Pre-diabetes     NAFLD (nonalcoholic fatty liver disease)     Elevated LFTs     Heel pain, chronic, right       Past Medical History:   Diagnosis Date     GERD (gastroesophageal reflux disease) 3/20/2015     Glaucoma (increased eye pressure)      Hyperlipidemia LDL goal < 130 8/3/2015     Hypertension      Hypertension, goal below 150/90 11/28/2014     Rheumatoid arthritis, adult (H)        Past Surgical History:   Procedure Laterality Date     COLONOSCOPY       COLONOSCOPY WITH CO2 INSUFFLATION N/A 4/5/2017    Procedure: COLONOSCOPY WITH CO2 INSUFFLATION;  Surgeon: Duane, William Charles, MD;  Location: [facility]     COMBINED ESOPHAGOSCOPY, GASTROSCOPY, DUODENOSCOPY (EGD)  WITH CO2 INSUFFLATION N/A 2/13/2019    Procedure: COMBINED ESOPHAGOSCOPY, GASTROSCOPY, DUODENOSCOPY (EGD) WITH CO2 INSUFFLATION;  Surgeon: Sly De Santiago MD;  Location: [facility]     COMBINED ESOPHAGOSCOPY, GASTROSCOPY, DUODENOSCOPY (EGD) WITH CO2 INSUFFLATION N/A 2/7/2022    Procedure: ESOPHAGOGASTRODUODENOSCOPY, WITH CO2 INSUFFLATION;  Surgeon: Luis Aceves MD;  Location: [facility]     COMBINED REPAIR PTOSIS WITH BLEPHAROPLASTY BILATERAL Bilateral 1/6/2017    Procedure: COMBINED REPAIR PTOSIS WITH BLEPHAROPLASTY BILATERAL;  Surgeon: Carly Norman MD;  Location: [facility]     ESOPHAGOSCOPY, GASTROSCOPY, DUODENOSCOPY (EGD), COMBINED N/A 1/5/2016    Procedure: COMBINED ESOPHAGOSCOPY, GASTROSCOPY, DUODENOSCOPY (EGD), BIOPSY SINGLE OR MULTIPLE;  Surgeon: Duane, William Charles, MD;  Location: [facility]     ESOPHAGOSCOPY, GASTROSCOPY, DUODENOSCOPY (EGD), COMBINED N/A 2/13/2019    Procedure: Combined Esophagoscopy, Gastroscopy, Duodenoscopy (Egd), Biopsy Single Or Multiple;  Surgeon: Sly De Santiago MD;  Location: [facility]     ESOPHAGOSCOPY, GASTROSCOPY, DUODENOSCOPY (EGD), COMBINED N/A 2/7/2022    Procedure: ESOPHAGOGASTRODUODENOSCOPY, WITH BIOPSY;  Surgeon: Luis Aceves MD;  Location: [facility]     REPAIR ENTROPION BILATERAL Bilateral 1/6/2017    Procedure: REPAIR ENTROPION BILATERAL;  Surgeon: Carly Norman MD;  Location: [facility]     REPAIR PTOSIS Bilateral 01/2017       Family History   Problem Relation Age of Onset     Diabetes Mother      Cancer No family hx of      Hypertension No family hx of      Cerebrovascular Disease No family hx of      Thyroid Disease No family hx of      Glaucoma No family hx of      Macular Degeneration No family hx of        Social History     Tobacco Use     Smoking status: Never Smoker     Smokeless tobacco: Never Used   Substance Use Topics     Alcohol use: No             O:  LMP 06/21/2002 (Approximate) .     Constitutional/ general:  Pt is in no  apparent distress, appears well-nourished.  Cooperative with history and physical exam.     Psych:  The patient answered questions appropriately.  Normal affect.  Seems to have reasonable expectations, in terms of treatment.     Lungs:  Non labored breathing, non labored speech. No cough.  No audible wheezing. Even, quiet breathing.       Vascular:  Pedal pulses are palpable bilaterally for both the DP and PT arteries.  CFT < 3 sec.  No edema.  Pedal hair growth noted.     Neuro:  Alert and oriented x 3. Coordinated gait.  Light touch sensation is intact     Derm: Normal texture and turgor.  No erythema, ecchymosis, or cyanosis.  No open lesions.     Musculoskeletal:    Lower extremity muscle strength is normal.  Patient is ambulatory without an assistive device or brace.     Cavus foot with weightbearing bilateral.  No forefoot or rear foot deformities noted.  MS 5/5 all compartments.  Normal ROM all fore foot and rearfoot joints with no pain or crepitus.  No equinus.  Pain right foot plantar central lateral arch area of peroneus longus tendon.  Also pain where this tendon courses around the cuboid.  Today no pain stressing peroneus longus.  No pain stressing peroneus brevis.   No pain medial band of plantar fascia and arch.  No masses.  No erythema edema or ecchymosis.    Radiographic Exam:  X-Ray Findings:  I personally reviewed the films, normal    A:  Cavus foot with peroneus longus tendinitis    Plan:  Patient improved.  Discussed tendons can be slow to heal and she should be patient.  50% improvement is good progress.  Continue ankle brace and avoiding activities that bother this.  Patient to take ice.  She is already taking Mobic.  Return to clinic in 3 weeks.      Yuri Knowles DPM, FACFAS             Again, thank you for allowing me to participate in the care of your patient.        Sincerely,        Yuri Knowles DPM

## 2022-07-13 NOTE — PROGRESS NOTES
Subjective:    6/22/22   Pt is seen today in consult from Ashli Garcia.  Points to plantar right foot.  Has had this for 3 weeks.  Pain aggravated by activity and relieved by rest.  Was doing some gardening and using a shovel a few times before this happened.  Denies ecchymosis erythema weakness or increased deformity.  She has rheumatoid arthritis.    7/13/22 patient returns for right plantar lateral foot pain.  Has been wearing ankle brace since last visit.  He is now feeling 50% better.  Denies new edema ecchymosis or weakness.    ROS: See above      No Known Allergies    Current Outpatient Medications   Medication Sig Dispense Refill     triamcinolone (KENALOG) 0.1 % external cream APPLY TOPICALLY TO THE AFFECTED AREA TWICE DAILY 80 g 0     acetaminophen-codeine (TYLENOL #3) 300-30 MG tablet TAKE 1 TABLET BY MOUTH EVERY 4 TO 6 HOURS AS NEEDED FOR PAIN       cetirizine (ZYRTEC) 10 MG tablet Take 1 tablet (10 mg) by mouth daily 90 tablet 1     chlorhexidine (PERIDEX) 0.12 % solution SWISH AND SPIT 15 ML BY MOUTH TWICE DAILY AS NEEDED 473 mL 1     cholecalciferol (VITAMIN D3) 25 mcg (1000 units) capsule Take 1,000 Units by mouth       diclofenac (VOLTAREN) 1 % topical gel Apply 4 g topically 4 times daily 50 g 0     fluticasone (FLONASE) 50 MCG/ACT nasal spray Spray 1-2 sprays into both nostrils daily 16 g 1     hydrocortisone 2.5 % cream Apply 2.5 g topically 2 times daily       latanoprost (XALATAN) 0.005 % ophthalmic solution INSTILL 1 DROP IN BOTH EYES AT BEDTIME 7.5 mL 1     losartan-hydrochlorothiazide (HYZAAR) 50-12.5 MG tablet Take 1 tablet by mouth daily 90 tablet 3     magnesium 250 MG tablet TAKE 1 TABLET(250 MG) BY MOUTH DAILY AS NEEDED FOR LEG CRAMPS 30 tablet 3     meloxicam (MOBIC) 7.5 MG tablet 15 MG/D WITH FOOD IN AM FOR 2 WEEKS AND THEN STAY ON 7.5 MG DAILY WITH FOOD       meloxicam (MOBIC) 7.5 MG tablet        methylcellulose (CITRUCEL) powder Take 2 g by mouth daily 180 g 3     metoprolol tartrate  (LOPRESSOR) 25 MG tablet Take 1 tablet (25 mg) by mouth At Bedtime 90 tablet 3     naproxen (NAPROSYN) 500 MG tablet Take 1 tablet (500 mg) by mouth 2 times daily as needed for moderate pain 30 tablet 0     omeprazole (PRILOSEC) 40 MG DR capsule Take 1 capsule (40 mg) by mouth daily 90 capsule 3     pravastatin (PRAVACHOL) 20 MG tablet Take 1 tablet (20 mg) by mouth daily 90 tablet 3     tiZANidine (ZANAFLEX) 2 MG tablet Take 1 tablet (2 mg) by mouth 2 times daily as needed for muscle spasms Will make you sleepy 30 tablet 0       Patient Active Problem List   Diagnosis     Osteopenia     Hayfever     Hypertension goal BP (blood pressure) < 140/90     Polyarthritis, inflammatory (H)     GERD (gastroesophageal reflux disease)     Hyperlipidemia with target LDL less than 130     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     High risk medication use     Microscopic hematuria     Pterygium of right eye     Sicca syndrome (H)     Angiomyolipoma of right kidney     History of Helicobacter pylori infection     Pre-diabetes     NAFLD (nonalcoholic fatty liver disease)     Elevated LFTs     Heel pain, chronic, right       Past Medical History:   Diagnosis Date     GERD (gastroesophageal reflux disease) 3/20/2015     Glaucoma (increased eye pressure)      Hyperlipidemia LDL goal < 130 8/3/2015     Hypertension      Hypertension, goal below 150/90 11/28/2014     Rheumatoid arthritis, adult (H)        Past Surgical History:   Procedure Laterality Date     COLONOSCOPY       COLONOSCOPY WITH CO2 INSUFFLATION N/A 4/5/2017    Procedure: COLONOSCOPY WITH CO2 INSUFFLATION;  Surgeon: Duane, William Charles, MD;  Location:  [facility]     COMBINED ESOPHAGOSCOPY, GASTROSCOPY, DUODENOSCOPY (EGD) WITH CO2 INSUFFLATION N/A 2/13/2019    Procedure: COMBINED ESOPHAGOSCOPY, GASTROSCOPY, DUODENOSCOPY (EGD) WITH CO2 INSUFFLATION;  Surgeon: Sly De Santiago MD;  Location:  OR     COMBINED ESOPHAGOSCOPY, GASTROSCOPY, DUODENOSCOPY  (EGD) WITH CO2 INSUFFLATION N/A 2/7/2022    Procedure: ESOPHAGOGASTRODUODENOSCOPY, WITH CO2 INSUFFLATION;  Surgeon: Luis Aceves MD;  [facility]: [facility]     COMBINED REPAIR PTOSIS WITH BLEPHAROPLASTY BILATERAL Bilateral 1/6/2017    Procedure: COMBINED REPAIR PTOSIS WITH BLEPHAROPLASTY BILATERAL;  Surgeon: Carly Norman MD;  Location: [facility]     ESOPHAGOSCOPY, GASTROSCOPY, DUODENOSCOPY (EGD), COMBINED N/A 1/5/2016    Procedure: COMBINED ESOPHAGOSCOPY, GASTROSCOPY, DUODENOSCOPY (EGD), BIOPSY SINGLE OR MULTIPLE;  Surgeon: Duane, William Charles, MD;  Location: [facility]     ESOPHAGOSCOPY, GASTROSCOPY, DUODENOSCOPY (EGD), COMBINED N/A 2/13/2019    Procedure: Combined Esophagoscopy, Gastroscopy, Duodenoscopy (Egd), Biopsy Single Or Multiple;  Surgeon: Sly De Santiago MD;  [facility]: [facility]     ESOPHAGOSCOPY, GASTROSCOPY, DUODENOSCOPY (EGD), COMBINED N/A 2/7/2022    Procedure: ESOPHAGOGASTRODUODENOSCOPY, WITH BIOPSY;  Surgeon: Luis Aceves MD;  Location: [facility]     REPAIR ENTROPION BILATERAL Bilateral 1/6/2017    Procedure: REPAIR ENTROPION BILATERAL;  Surgeon: Carly Norman MD;  Location: [facility]     REPAIR PTOSIS Bilateral 01/2017       Family History   Problem Relation Age of Onset     Diabetes Mother      Cancer No family hx of      Hypertension No family hx of      Cerebrovascular Disease No family hx of      Thyroid Disease No family hx of      Glaucoma No family hx of      Macular Degeneration No family hx of        Social History     Tobacco Use     Smoking status: Never Smoker     Smokeless tobacco: Never Used   Substance Use Topics     Alcohol use: No             O:  LMP 06/21/2002 (Approximate) .     Constitutional/ general:  Pt is in no apparent distress, appears well-nourished.  Cooperative with history and physical exam.     Psych:  The patient answered questions appropriately.  Normal affect.  Seems to have reasonable expectations, in terms of treatment.     Lungs:  Non  labored breathing, non labored speech. No cough.  No audible wheezing. Even, quiet breathing.       Vascular:  Pedal pulses are palpable bilaterally for both the DP and PT arteries.  CFT < 3 sec.  No edema.  Pedal hair growth noted.     Neuro:  Alert and oriented x 3. Coordinated gait.  Light touch sensation is intact     Derm: Normal texture and turgor.  No erythema, ecchymosis, or cyanosis.  No open lesions.     Musculoskeletal:    Lower extremity muscle strength is normal.  Patient is ambulatory without an assistive device or brace.     Cavus foot with weightbearing bilateral.  No forefoot or rear foot deformities noted.  MS 5/5 all compartments.  Normal ROM all fore foot and rearfoot joints with no pain or crepitus.  No equinus.  Pain right foot plantar central lateral arch area of peroneus longus tendon.  Also pain where this tendon courses around the cuboid.  Today no pain stressing peroneus longus.  No pain stressing peroneus brevis.   No pain medial band of plantar fascia and arch.  No masses.  No erythema edema or ecchymosis.    Radiographic Exam:  X-Ray Findings:  I personally reviewed the films, normal    A:  Cavus foot with peroneus longus tendinitis    Plan:  Patient improved.  Discussed tendons can be slow to heal and she should be patient.  50% improvement is good progress.  Continue ankle brace and avoiding activities that bother this.  Patient to take ice.  She is already taking Mobic.  Return to clinic in 3 weeks.      Yuri Knowles, MALGORZATA, FACFAS

## 2022-07-13 NOTE — PATIENT INSTRUCTIONS
We wish you continued good healing. If you have any questions or concerns, please do not hesitate to contact us at  [phone]    [employer] (secure e-mail communication and access to your chart) to send a message or to make an appointment.    Please remember to call and schedule a follow up appointment if one was recommended at your earliest convenience.     PODIATRY CLINIC HOURS  TELEPHONE NUMBER    Dr. Yuri JACOBSENPLEI [facility]        Clinics:  [facility] Gates CMA   Tuesday 1PM-6PM  [place]  Wednesday 745AM-330PM  [place]  Thursday/Friday 745AM-230PM  [place] [facility] APPOINT[facility]  [phone]    [place] APPOINTMENTS  [phone]        If you need a medication refill, please contact us you may need lab work and/or a follow up visit prior to your refill (i.e. Antifungal medications).  If MRI needed please call Imaging at [phone] or [phone]  HOW DO I GET MY KNEE SCOOTER? Knee scooters can be picked up at ANY Medical Supply stores with your knee scooter Prescription.  OR  Bring your signed prescription to an [facility] showroom.

## 2022-07-19 ENCOUNTER — OFFICE VISIT (OUTPATIENT)
Dept: AUDIOLOGY | Facility: CLINIC | Age: 72
End: 2022-07-19
Payer: MEDICARE

## 2022-07-19 ENCOUNTER — OFFICE VISIT (OUTPATIENT)
Dept: OTOLARYNGOLOGY | Facility: CLINIC | Age: 72
End: 2022-07-19
Attending: PREVENTIVE MEDICINE
Payer: MEDICARE

## 2022-07-19 VITALS — HEART RATE: 75 BPM | SYSTOLIC BLOOD PRESSURE: 161 MMHG | DIASTOLIC BLOOD PRESSURE: 96 MMHG

## 2022-07-19 DIAGNOSIS — H90.3 SENSORINEURAL HEARING LOSS (SNHL) OF BOTH EARS: Primary | ICD-10-CM

## 2022-07-19 DIAGNOSIS — H93.13 TINNITUS, BILATERAL: ICD-10-CM

## 2022-07-19 PROCEDURE — 99203 OFFICE O/P NEW LOW 30 MIN: CPT | Performed by: OTOLARYNGOLOGY

## 2022-07-19 PROCEDURE — 92557 COMPREHENSIVE HEARING TEST: CPT | Performed by: AUDIOLOGIST

## 2022-07-19 PROCEDURE — 92550 TYMPANOMETRY & REFLEX THRESH: CPT | Performed by: AUDIOLOGIST

## 2022-07-19 ASSESSMENT — ENCOUNTER SYMPTOMS
SHORTNESS OF BREATH: 0
BLURRED VISION: 0
HEMOPTYSIS: 0
SORE THROAT: 0
STRIDOR: 0
DOUBLE VISION: 0
VOMITING: 0
CONSTITUTIONAL NEGATIVE: 1
COUGH: 0
DIZZINESS: 0
HEADACHES: 0
TINGLING: 0
TREMORS: 0
PHOTOPHOBIA: 0
NAUSEA: 0
SINUS PAIN: 0
HEARTBURN: 0
SPUTUM PRODUCTION: 0
BRUISES/BLEEDS EASILY: 0

## 2022-07-19 NOTE — PROGRESS NOTES
HPI    This pleasant patient is having tinnitus, bilateral, constant, high pitches for years. Denies any aural fullness, pressure, drainage, pain, dizziness, vertigo or balance issues. No hx of vision issues, weakness, numbness or tingling. Noise exposure hx is positive. No environmental allergies.  F/U testing. Longstanding tinnitus bilaterally. Previous results from 11/08/2016 revealed normal sloping to moderate SNHL in right and normal sloping to mild SNHL in the left. Accompanied by Hebrew .  Results: WNL sloping to mild SNHL bilaterally. 100% word rec. bilaterally. Tymps WNL. Present 1 kHz ipsi/contra reflexes bilaterally.    ENT Problem List  GERD (gastroesophageal reflux disease)    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    Pterygium of right eye    Sicca syndrome (H)    Osteopenia    Hayfever    Hypertension goal BP (blood pressure) < 140/90    Polyarthritis, inflammatory (H)    Hyperlipidemia with target LDL less than 130    High risk medication use    Microscopic hematuria    Angiomyolipoma of right kidney    History of Helicobacter pylori infection    Pre-diabetes    NAFLD (nonalcoholic fatty liver disease)    Elevated LFTs    Heel pain, chronic, right      Outpatient Medications    acetaminophen-codeine (TYLENOL #3) 300-30 MG tablet    cetirizine (ZYRTEC) 10 MG tablet    chlorhexidine (PERIDEX) 0.12 % solution    cholecalciferol (VITAMIN D3) 25 mcg (1000 units) capsule    diclofenac (VOLTAREN) 1 % topical gel    fluticasone (FLONASE) 50 MCG/ACT nasal spray    hydrocortisone 2.5 % cream    latanoprost (XALATAN) 0.005 % ophthalmic solution    losartan-hydrochlorothiazide (HYZAAR) 50-12.5 MG tablet    magnesium 250 MG tablet    meloxicam (MOBIC) 7.5 MG tablet    meloxicam (MOBIC) 7.5 MG tablet    methylcellulose (CITRUCEL) powder    metoprolol tartrate (LOPRESSOR) 25 MG tablet    naproxen (NAPROSYN) 500 MG tablet    omeprazole (PRILOSEC) 40 MG DR capsule    pravastatin (PRAVACHOL)  20 MG tablet    tiZANidine (ZANAFLEX) 2 MG tablet    triamcinolone (KENALOG) 0.1 % external cream      Review of Systems   Constitutional: Negative.    HENT: Positive for hearing loss and tinnitus. Negative for congestion, ear discharge, ear pain, nosebleeds, sinus pain and sore throat.    Eyes: Negative for blurred vision, double vision and photophobia.   Respiratory: Negative for cough, hemoptysis, sputum production, shortness of breath and stridor.    Gastrointestinal: Negative for heartburn, nausea and vomiting.   Skin: Negative.    Neurological: Negative for dizziness, tingling, tremors and headaches.   Endo/Heme/Allergies: Negative for environmental allergies. Does not bruise/bleed easily.         Physical Exam  Vitals reviewed.   Constitutional:       Appearance: Normal appearance.   HENT:      Head: Normocephalic and atraumatic.      Jaw: There is normal jaw occlusion.      Right Ear: Tympanic membrane, ear canal and external ear normal. Decreased hearing noted. No middle ear effusion. There is no impacted cerumen.      Left Ear: Tympanic membrane, ear canal and external ear normal. Decreased hearing noted.  No middle ear effusion. There is no impacted cerumen.      Nose: Nose normal. No mucosal edema, congestion or rhinorrhea.      Right Turbinates: Not enlarged or swollen.      Left Turbinates: Not enlarged or swollen.      Mouth/Throat:      Mouth: Mucous membranes are moist.      Pharynx: Oropharynx is clear. Uvula midline.   Eyes:      Extraocular Movements: Extraocular movements intact.      Pupils: Pupils are equal, round, and reactive to light.   Neurological:      Mental Status: She is alert.       A/P  This pleasant patient is accompanied with a Syriac  and is having sloping to mild SNHL bilaterally. 100% word rec. bilaterally. Tymps WNL. Present 1 kHz ipsi/contra reflexes bilaterally. Options were discussed. The patient's questions were answered. F/u in a year with a hearing test.  In the meantime, hearing protection, good hydration, tinntus management strategies were reviewed.

## 2022-07-19 NOTE — PROGRESS NOTES
Chief Complaint   Patient presents with     Consult     Bilateral Tinnitus. Onset 10 years, Had seen an ENT about 6 years ago.

## 2022-07-19 NOTE — PROGRESS NOTES
AUDIOLOGY REPORT    SUMMARY: Audiology visit completed. See audiogram for results.    RECOMMENDATIONS: Follow-up with ENT.    Sunny Marquez  Doctor of Audiology  [place] License # [id number]

## 2022-07-19 NOTE — LETTER
7/19/2022         RE: Noreen Florence  [address]  [place] [address]        Dear Colleague,    Thank you for referring your patient, Noreen Florence, to the [facility]. Please see a copy of my visit note below.    Chief Complaint   Patient presents with     Consult     Bilateral Tinnitus. Onset 10 years, Had seen an ENT about 6 years ago.          HPI    This pleasant patient is having tinnitus, bilateral, constant, high pitches for years. Denies any aural fullness, pressure, drainage, pain, dizziness, vertigo or balance issues. No hx of vision issues, weakness, numbness or tingling. Noise exposure hx is positive. No environmental allergies.  F/U testing. Longstanding tinnitus bilaterally. Previous results from 11/08/2016 revealed normal sloping to moderate SNHL in right and normal sloping to mild SNHL in the left. Accompanied by Macedonian .  Results: WNL sloping to mild SNHL bilaterally. 100% word rec. bilaterally. Tymps WNL. Present 1 kHz ipsi/contra reflexes bilaterally.    ENT Problem List  GERD (gastroesophageal reflux disease)    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    Pterygium of right eye    Sicca syndrome (H)    Osteopenia    Hayfever    Hypertension goal BP (blood pressure) < 140/90    Polyarthritis, inflammatory (H)    Hyperlipidemia with target LDL less than 130    High risk medication use    Microscopic hematuria    Angiomyolipoma of right kidney    History of Helicobacter pylori infection    Pre-diabetes    NAFLD (nonalcoholic fatty liver disease)    Elevated LFTs    Heel pain, chronic, right      Outpatient Medications    acetaminophen-codeine (TYLENOL #3) 300-30 MG tablet    cetirizine (ZYRTEC) 10 MG tablet    chlorhexidine (PERIDEX) 0.12 % solution    cholecalciferol (VITAMIN D3) 25 mcg (1000 units) capsule    diclofenac (VOLTAREN) 1 % topical gel    fluticasone (FLONASE) 50 MCG/ACT nasal spray    hydrocortisone 2.5 % cream    latanoprost  (XALATAN) 0.005 % ophthalmic solution    losartan-hydrochlorothiazide (HYZAAR) 50-12.5 MG tablet    magnesium 250 MG tablet    meloxicam (MOBIC) 7.5 MG tablet    meloxicam (MOBIC) 7.5 MG tablet    methylcellulose (CITRUCEL) powder    metoprolol tartrate (LOPRESSOR) 25 MG tablet    naproxen (NAPROSYN) 500 MG tablet    omeprazole (PRILOSEC) 40 MG DR capsule    pravastatin (PRAVACHOL) 20 MG tablet    tiZANidine (ZANAFLEX) 2 MG tablet    triamcinolone (KENALOG) 0.1 % external cream      Review of Systems   Constitutional: Negative.    HENT: Positive for hearing loss and tinnitus. Negative for congestion, ear discharge, ear pain, nosebleeds, sinus pain and sore throat.    Eyes: Negative for blurred vision, double vision and photophobia.   Respiratory: Negative for cough, hemoptysis, sputum production, shortness of breath and stridor.    Gastrointestinal: Negative for heartburn, nausea and vomiting.   Skin: Negative.    Neurological: Negative for dizziness, tingling, tremors and headaches.   Endo/Heme/Allergies: Negative for environmental allergies. Does not bruise/bleed easily.         Physical Exam  Vitals reviewed.   Constitutional:       Appearance: Normal appearance.   HENT:      Head: Normocephalic and atraumatic.      Jaw: There is normal jaw occlusion.      Right Ear: Tympanic membrane, ear canal and external ear normal. Decreased hearing noted. No middle ear effusion. There is no impacted cerumen.      Left Ear: Tympanic membrane, ear canal and external ear normal. Decreased hearing noted.  No middle ear effusion. There is no impacted cerumen.      Nose: Nose normal. No mucosal edema, congestion or rhinorrhea.      Right Turbinates: Not enlarged or swollen.      Left Turbinates: Not enlarged or swollen.      Mouth/Throat:      Mouth: Mucous membranes are moist.      Pharynx: Oropharynx is clear. Uvula midline.   Eyes:      Extraocular Movements: Extraocular movements intact.      Pupils: Pupils are equal, round,  and reactive to light.   Neurological:      Mental Status: She is alert.       A/P  This pleasant patient is accompanied with a Spanish  and is having sloping to mild SNHL bilaterally. 100% word rec. bilaterally. Tymps WNL. Present 1 kHz ipsi/contra reflexes bilaterally. Options were discussed. The patient's questions were answered. F/u in a year with a hearing test. In the meantime, hearing protection, good hydration, tinntus management strategies were reviewed.        Again, thank you for allowing me to participate in the care of your patient.        Sincerely,        Melania Escobar MD

## 2022-07-19 NOTE — NURSING NOTE
Noreen Florence's chief complaint for this visit includes:  Chief Complaint   Patient presents with     Consult     Bilateral Tinnitus. Onset 10 years, Had seen an ENT about 6 years ago.      PCP: Jud Morales    Referring Provider:  Jud Morales MD  [address]  [place]  [place] [address]    BP (!) 161/96   Pulse 75   LMP 06/21/2002 (Approximate)   Data Unavailable      No Known Allergies      Do you need any medication refills at today's visit?

## 2022-08-04 DIAGNOSIS — L30.9 DERMATITIS: ICD-10-CM

## 2022-08-04 DIAGNOSIS — K05.10 GINGIVITIS: ICD-10-CM

## 2022-08-05 RX ORDER — TRIAMCINOLONE ACETONIDE 1 MG/G
CREAM TOPICAL
Qty: 80 G | Refills: 0 | Status: SHIPPED | OUTPATIENT
Start: 2022-08-05 | End: 2022-09-24

## 2022-08-05 RX ORDER — CHLORHEXIDINE GLUCONATE ORAL RINSE 1.2 MG/ML
SOLUTION DENTAL
Qty: 473 ML | Refills: 1 | Status: SHIPPED | OUTPATIENT
Start: 2022-08-05 | End: 2022-10-21

## 2022-08-05 NOTE — TELEPHONE ENCOUNTER
"Routing refill request to provider for review/approval because:  Drug chlorhexidine (PERIDEX) 0.12 % solution not on the [facility] refill protocol       Requested Prescriptions   Pending Prescriptions Disp Refills     chlorhexidine (PERIDEX) 0.12 % solution [Pharmacy Med Name: CHLORHEXIDINE 0.12% ORAL RINSE] 473 mL 1     Sig: SWISH AND SPIT 15 ML BY MOUTH TWICE DAILY AS NEEDED       There is no refill protocol information for this order        triamcinolone (KENALOG) 0.1 % external cream [Pharmacy Med Name: TRIAMCINOLONE 0.1% CREAM 80GM] 80 g 0     Sig: APPLY TOPICALLY TO THE AFFECTED AREA TWICE DAILY       Topical Steroids and Nonsteroidals Protocol Passed - 8/4/2022  3:11 PM        Passed - Patient is age 6 or older        Passed - Authorizing prescriber's most recent note related to this medication read.     If refill request is for ophthalmic use, please forward request to provider for approval.          Passed - High potency steroid not ordered        Passed - Recent (12 mo) or future (30 days) visit within the authorizing provider's specialty     Patient has had an office visit with the authorizing provider or a provider within the authorizing providers department within the previous 12 mos or has a future within next 30 days. See \"Patient Info\" tab in inbasket, or \"Choose Columns\" in Meds & Orders section of the refill encounter.              Passed - Medication is active on med list           Lety Branch RN    "

## 2022-08-10 ENCOUNTER — OFFICE VISIT (OUTPATIENT)
Dept: PODIATRY | Facility: CLINIC | Age: 72
End: 2022-08-10
Payer: MEDICARE

## 2022-08-10 VITALS — SYSTOLIC BLOOD PRESSURE: 158 MMHG | HEART RATE: 78 BPM | DIASTOLIC BLOOD PRESSURE: 86 MMHG

## 2022-08-10 DIAGNOSIS — M76.71 PERONEUS LONGUS TENDINITIS, RIGHT: ICD-10-CM

## 2022-08-10 DIAGNOSIS — Q66.70 CONGENITAL CAVUS FOOT: Primary | ICD-10-CM

## 2022-08-10 PROCEDURE — 99213 OFFICE O/P EST LOW 20 MIN: CPT | Performed by: PODIATRIST

## 2022-08-10 NOTE — PATIENT INSTRUCTIONS
We wish you continued good healing. If you have any questions or concerns, please do not hesitate to contact us at  [phone]    [employer] (secure e-mail communication and access to your chart) to send a message or to make an appointment.    Please remember to call and schedule a follow up appointment if one was recommended at your earliest convenience.     PODIATRY CLINIC HOURS  TELEPHONE NUMBER    Dr. Yuri JACOBSENPLEI [facility]        Clinics:  [facility] CMA   Tuesday 1PM-6PM  [place]  Wednesday 745AM-330PM  [place]  Thursday/Friday 745AM-230PM  [place] [facility] APPOINTMENTS  [phone]    [place] APPOINTMENTS  [phone]        If you need a medication refill, please contact us you may need lab work and/or a follow up visit prior to your refill (i.e. Antifungal medications).  If MRI needed please call Imaging at [phone] or [phone]  HOW DO I GET MY KNEE SCOOTER? Knee scooters can be picked up at ANY Medical Supply stores with your knee scooter Prescription.  OR  Bring your signed prescription to an [facility] Medical Equipment showroom.

## 2022-08-10 NOTE — PROGRESS NOTES
Subjective:    6/22/22   Pt is seen today in consult from Ashli Garcia.  Points to plantar right foot.  Has had this for 3 weeks.  Pain aggravated by activity and relieved by rest.  Was doing some gardening and using a shovel a few times before this happened.  Denies ecchymosis erythema weakness or increased deformity.  She has rheumatoid arthritis.    7/13/22 patient returns for right plantar lateral foot pain.  Has been wearing ankle brace since last visit.  He is now feeling 50% better.  Denies new edema ecchymosis or weakness.    8/10/22   patient returns for right plantar lateral foot pain.  Since last visit has seen no improvement.  Wearing ankle brace.  Denies edema or ecchymosis.    ROS: See above      No Known Allergies    Current Outpatient Medications   Medication Sig Dispense Refill     acetaminophen-codeine (TYLENOL #3) 300-30 MG tablet TAKE 1 TABLET BY MOUTH EVERY 4 TO 6 HOURS AS NEEDED FOR PAIN       cetirizine (ZYRTEC) 10 MG tablet Take 1 tablet (10 mg) by mouth daily 90 tablet 1     chlorhexidine (PERIDEX) 0.12 % solution SWISH AND SPIT 15 ML BY MOUTH TWICE DAILY AS NEEDED 473 mL 1     cholecalciferol (VITAMIN D3) 25 mcg (1000 units) capsule Take 1,000 Units by mouth       diclofenac (VOLTAREN) 1 % topical gel Apply 4 g topically 4 times daily 50 g 0     fluticasone (FLONASE) 50 MCG/ACT nasal spray Spray 1-2 sprays into both nostrils daily 16 g 1     hydrocortisone 2.5 % cream Apply 2.5 g topically 2 times daily       latanoprost (XALATAN) 0.005 % ophthalmic solution INSTILL 1 DROP IN BOTH EYES AT BEDTIME 7.5 mL 1     losartan-hydrochlorothiazide (HYZAAR) 50-12.5 MG tablet Take 1 tablet by mouth daily 90 tablet 3     magnesium 250 MG tablet TAKE 1 TABLET(250 MG) BY MOUTH DAILY AS NEEDED FOR LEG CRAMPS 30 tablet 3     meloxicam (MOBIC) 7.5 MG tablet 15 MG/D WITH FOOD IN AM FOR 2 WEEKS AND THEN STAY ON 7.5 MG DAILY WITH FOOD       meloxicam (MOBIC) 7.5 MG tablet        methylcellulose (CITRUCEL) powder  Take 2 g by mouth daily 180 g 3     metoprolol tartrate (LOPRESSOR) 25 MG tablet Take 1 tablet (25 mg) by mouth At Bedtime 90 tablet 3     naproxen (NAPROSYN) 500 MG tablet Take 1 tablet (500 mg) by mouth 2 times daily as needed for moderate pain 30 tablet 0     omeprazole (PRILOSEC) 40 MG DR capsule Take 1 capsule (40 mg) by mouth daily 90 capsule 3     pravastatin (PRAVACHOL) 20 MG tablet Take 1 tablet (20 mg) by mouth daily 90 tablet 3     tiZANidine (ZANAFLEX) 2 MG tablet Take 1 tablet (2 mg) by mouth 2 times daily as needed for muscle spasms Will make you sleepy 30 tablet 0     triamcinolone (KENALOG) 0.1 % external cream APPLY TOPICALLY TO THE AFFECTED AREA TWICE DAILY 80 g 0       Patient Active Problem List   Diagnosis     Osteopenia     Hayfever     Hypertension goal BP (blood pressure) < 140/90     Polyarthritis, inflammatory (H)     GERD (gastroesophageal reflux disease)     Hyperlipidemia with target LDL less than 130     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     High risk medication use     Microscopic hematuria     Pterygium of right eye     Sicca syndrome (H)     Angiomyolipoma of right kidney     History of Helicobacter pylori infection     Pre-diabetes     NAFLD (nonalcoholic fatty liver disease)     Elevated LFTs     Heel pain, chronic, right       Past Medical History:   Diagnosis Date     GERD (gastroesophageal reflux disease) 3/20/2015     Glaucoma (increased eye pressure)      Hyperlipidemia LDL goal < 130 8/3/2015     Hypertension      Hypertension, goal below 150/90 11/28/2014     Rheumatoid arthritis, adult (H)        Past Surgical History:   Procedure Laterality Date     COLONOSCOPY       COLONOSCOPY WITH CO2 INSUFFLATION N/A 4/5/2017    Procedure: COLONOSCOPY WITH CO2 INSUFFLATION;  Surgeon: Duane, William Charles, MD;  Location: [facility]     COMBINED ESOPHAGOSCOPY, GASTROSCOPY, DUODENOSCOPY (EGD) WITH CO2 INSUFFLATION N/A 2/13/2019    Procedure: COMBINED ESOPHAGOSCOPY,  GASTROSCOPY, DUODENOSCOPY (EGD) WITH CO2 INSUFFLATION;  Surgeon: Sly De Santiago MD;  Location: [facility]     COMBINED ESOPHAGOSCOPY, GASTROSCOPY, DUODENOSCOPY (EGD) WITH CO2 INSUFFLATION N/A 2/7/2022    Procedure: ESOPHAGOGASTRODUODENOSCOPY, WITH CO2 INSUFFLATION;  Surgeon: Luis Aceves MD;  Location: [facility]     COMBINED REPAIR PTOSIS WITH BLEPHAROPLASTY BILATERAL Bilateral 1/6/2017    Procedure: COMBINED REPAIR PTOSIS WITH BLEPHAROPLASTY BILATERAL;  Surgeon: Carly Norman MD;  Location:  [facility]     ESOPHAGOSCOPY, GASTROSCOPY, DUODENOSCOPY (EGD), COMBINED N/A 1/5/2016    Procedure: COMBINED ESOPHAGOSCOPY, GASTROSCOPY, DUODENOSCOPY (EGD), BIOPSY SINGLE OR MULTIPLE;  Surgeon: Duane, William Charles, MD;  Location: [facility]     ESOPHAGOSCOPY, GASTROSCOPY, DUODENOSCOPY (EGD), COMBINED N/A 2/13/2019    Procedure: Combined Esophagoscopy, Gastroscopy, Duodenoscopy (Egd), Biopsy Single Or Multiple;  Surgeon: Sly De Santiago MD;  Location: [facility]     ESOPHAGOSCOPY, GASTROSCOPY, DUODENOSCOPY (EGD), COMBINED N/A 2/7/2022    Procedure: ESOPHAGOGASTRODUODENOSCOPY, WITH BIOPSY;  Surgeon: Luis Aceves MD;  Location: [facility]     REPAIR ENTROPION BILATERAL Bilateral 1/6/2017    Procedure: REPAIR ENTROPION BILATERAL;  Surgeon: Carly Norman MD;  Location: [facility]     REPAIR PTOSIS Bilateral 01/2017       Family History   Problem Relation Age of Onset     Diabetes Mother      Cancer No family hx of      Hypertension No family hx of      Cerebrovascular Disease No family hx of      Thyroid Disease No family hx of      Glaucoma No family hx of      Macular Degeneration No family hx of        Social History     Tobacco Use     Smoking status: Never Smoker     Smokeless tobacco: Never Used   Substance Use Topics     Alcohol use: No             O:  LMP 06/21/2002 (Approximate) .     Constitutional/ general:  Pt is in no apparent distress, appears well-nourished.  Cooperative with history and  physical exam.     Psych:  The patient answered questions appropriately.  Normal affect.  Seems to have reasonable expectations, in terms of treatment.     Lungs:  Non labored breathing, non labored speech. No cough.  No audible wheezing. Even, quiet breathing.       Vascular:  Pedal pulses are palpable bilaterally for both the DP and PT arteries.  CFT < 3 sec.  No edema.  Pedal hair growth noted.     Neuro:  Alert and oriented x 3. Coordinated gait.  Light touch sensation is intact     Derm: Normal texture and turgor.  No erythema, ecchymosis, or cyanosis.  No open lesions.     Musculoskeletal:    Lower extremity muscle strength is normal.  Patient is ambulatory without an assistive device or brace.     Cavus foot with weightbearing bilateral.  No forefoot or rear foot deformities noted.  MS 5/5 all compartments.  Normal ROM all fore foot and rearfoot joints with no pain or crepitus.  No equinus.  Right foot no pain plantar medial or central.  No pain on plantar fascia.  Pain right foot plantar lateral area of peroneus longus tendon.  Also pain where this tendon courses around the cuboid and somewhat lateral calcaneus.  Positive pain stressing peroneus longus.  Slight pain stressing peroneus brevis.   No pain medial band of plantar fascia and arch.  No masses.  No erythema edema or ecchymosis.    Radiographic Exam:  X-Ray Findings:  I personally reviewed the films, normal    A:  Cavus foot with peroneus longus tendinitis    Plan:  Discussed further evaluation of peroneal tendons with MRI.  Patient in agreement.  We placed order.  She will call have this done.  Discussed additional offloading with cam walker.  Patient in agreement.  Will dispense cam walker today.  Patient to wear this at all times while walking.  When not walking patient will take this off and do ROM to prevent blood clot and joint stiffness.  Patient will not sleep with this on.  Return to the clinic in 2 weeks to discuss MRI results and see if  CHARLENE Edwards has been helpful      Yuri Knowles, MALGORZATA, FACFAS

## 2022-08-10 NOTE — LETTER
8/10/2022         RE: Noreen Florence  [address]  [place] [address]        Dear Colleague,    Thank you for referring your patient, Noreen Florence, to the [facility]. Please see a copy of my visit note below.    Subjective:    6/22/22   Pt is seen today in consult from Ashli Garcia.  Points to plantar right foot.  Has had this for 3 weeks.  Pain aggravated by activity and relieved by rest.  Was doing some gardening and using a shovel a few times before this happened.  Denies ecchymosis erythema weakness or increased deformity.  She has rheumatoid arthritis.    7/13/22 patient returns for right plantar lateral foot pain.  Has been wearing ankle brace since last visit.  He is now feeling 50% better.  Denies new edema ecchymosis or weakness.    8/10/22   patient returns for right plantar lateral foot pain.  Since last visit has seen no improvement.  Wearing ankle brace.  Denies edema or ecchymosis.    ROS: See above      No Known Allergies    Current Outpatient Medications   Medication Sig Dispense Refill     acetaminophen-codeine (TYLENOL #3) 300-30 MG tablet TAKE 1 TABLET BY MOUTH EVERY 4 TO 6 HOURS AS NEEDED FOR PAIN       cetirizine (ZYRTEC) 10 MG tablet Take 1 tablet (10 mg) by mouth daily 90 tablet 1     chlorhexidine (PERIDEX) 0.12 % solution SWISH AND SPIT 15 ML BY MOUTH TWICE DAILY AS NEEDED 473 mL 1     cholecalciferol (VITAMIN D3) 25 mcg (1000 units) capsule Take 1,000 Units by mouth       diclofenac (VOLTAREN) 1 % topical gel Apply 4 g topically 4 times daily 50 g 0     fluticasone (FLONASE) 50 MCG/ACT nasal spray Spray 1-2 sprays into both nostrils daily 16 g 1     hydrocortisone 2.5 % cream Apply 2.5 g topically 2 times daily       latanoprost (XALATAN) 0.005 % ophthalmic solution INSTILL 1 DROP IN BOTH EYES AT BEDTIME 7.5 mL 1     losartan-hydrochlorothiazide (HYZAAR) 50-12.5 MG tablet Take 1 tablet by mouth daily 90 tablet 3     magnesium 250 MG tablet TAKE 1 TABLET(250 MG) BY  MOUTH DAILY AS NEEDED FOR LEG CRAMPS 30 tablet 3     meloxicam (MOBIC) 7.5 MG tablet 15 MG/D WITH FOOD IN AM FOR 2 WEEKS AND THEN STAY ON 7.5 MG DAILY WITH FOOD       meloxicam (MOBIC) 7.5 MG tablet        methylcellulose (CITRUCEL) powder Take 2 g by mouth daily 180 g 3     metoprolol tartrate (LOPRESSOR) 25 MG tablet Take 1 tablet (25 mg) by mouth At Bedtime 90 tablet 3     naproxen (NAPROSYN) 500 MG tablet Take 1 tablet (500 mg) by mouth 2 times daily as needed for moderate pain 30 tablet 0     omeprazole (PRILOSEC) 40 MG DR capsule Take 1 capsule (40 mg) by mouth daily 90 capsule 3     pravastatin (PRAVACHOL) 20 MG tablet Take 1 tablet (20 mg) by mouth daily 90 tablet 3     tiZANidine (ZANAFLEX) 2 MG tablet Take 1 tablet (2 mg) by mouth 2 times daily as needed for muscle spasms Will make you sleepy 30 tablet 0     triamcinolone (KENALOG) 0.1 % external cream APPLY TOPICALLY TO THE AFFECTED AREA TWICE DAILY 80 g 0       Patient Active Problem List   Diagnosis     Osteopenia     Hayfever     Hypertension goal BP (blood pressure) < 140/90     Polyarthritis, inflammatory (H)     GERD (gastroesophageal reflux disease)     Hyperlipidemia with target LDL less than 130     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     High risk medication use     Microscopic hematuria     Pterygium of right eye     Sicca syndrome (H)     Angiomyolipoma of right kidney     History of Helicobacter pylori infection     Pre-diabetes     NAFLD (nonalcoholic fatty liver disease)     Elevated LFTs     Heel pain, chronic, right       Past Medical History:   Diagnosis Date     GERD (gastroesophageal reflux disease) 3/20/2015     Glaucoma (increased eye pressure)      Hyperlipidemia LDL goal < 130 8/3/2015     Hypertension      Hypertension, goal below 150/90 11/28/2014     Rheumatoid arthritis, adult (H)        Past Surgical History:   Procedure Laterality Date     COLONOSCOPY       COLONOSCOPY WITH CO2 INSUFFLATION N/A 4/5/2017     Procedure: COLONOSCOPY WITH CO2 INSUFFLATION;  Surgeon: Duane, William Charles, MD;  [facility]: [facility]     COMBINED ESOPHAGOSCOPY, GASTROSCOPY, DUODENOSCOPY (EGD) WITH CO2 INSUFFLATION N/A 2/13/2019    Procedure: COMBINED ESOPHAGOSCOPY, GASTROSCOPY, DUODENOSCOPY (EGD) WITH CO2 INSUFFLATION;  Surgeon: Sly De Santiago MD;  [facility]     COMBINED ESOPHAGOSCOPY, GASTROSCOPY, DUODENOSCOPY (EGD) WITH CO2 INSUFFLATION N/A 2/7/2022    Procedure: ESOPHAGOGASTRODUODENOSCOPY, WITH CO2 INSUFFLATION;  Surgeon: Luis Aceves MD;  [facility]     COMBINED REPAIR PTOSIS WITH BLEPHAROPLASTY BILATERAL Bilateral 1/6/2017    Procedure: COMBINED REPAIR PTOSIS WITH BLEPHAROPLASTY BILATERAL;  Surgeon: Carly Norman MD;  Location: [facility]     ESOPHAGOSCOPY, GASTROSCOPY, DUODENOSCOPY (EGD), COMBINED N/A 1/5/2016    Procedure: COMBINED ESOPHAGOSCOPY, GASTROSCOPY, DUODENOSCOPY (EGD), BIOPSY SINGLE OR MULTIPLE;  Surgeon: Duane, William Charles, MD;  [facility]     ESOPHAGOSCOPY, GASTROSCOPY, DUODENOSCOPY (EGD), COMBINED N/A 2/13/2019    Procedure: Combined Esophagoscopy, Gastroscopy, Duodenoscopy (Egd), Biopsy Single Or Multiple;  Surgeon: Sly De Santiago MD;  [facility]     ESOPHAGOSCOPY, GASTROSCOPY, DUODENOSCOPY (EGD), COMBINED N/A 2/7/2022    Procedure: ESOPHAGOGASTRODUODENOSCOPY, WITH BIOPSY;  Surgeon: Luis Aceves MD;  [facility]     REPAIR ENTROPION BILATERAL Bilateral 1/6/2017    Procedure: REPAIR ENTROPION BILATERAL;  Surgeon: Carly Norman MD;  Location: [facility]     REPAIR PTOSIS Bilateral 01/2017       Family History   Problem Relation Age of Onset     Diabetes Mother      Cancer No family hx of      Hypertension No family hx of      Cerebrovascular Disease No family hx of      Thyroid Disease No family hx of      Glaucoma No family hx of      Macular Degeneration No family hx of        Social History     Tobacco Use     Smoking status: Never Smoker      Smokeless tobacco: Never Used   Substance Use Topics     Alcohol use: No             O:  LMP 06/21/2002 (Approximate) .     Constitutional/ general:  Pt is in no apparent distress, appears well-nourished.  Cooperative with history and physical exam.     Psych:  The patient answered questions appropriately.  Normal affect.  Seems to have reasonable expectations, in terms of treatment.     Lungs:  Non labored breathing, non labored speech. No cough.  No audible wheezing. Even, quiet breathing.       Vascular:  Pedal pulses are palpable bilaterally for both the DP and PT arteries.  CFT < 3 sec.  No edema.  Pedal hair growth noted.     Neuro:  Alert and oriented x 3. Coordinated gait.  Light touch sensation is intact     Derm: Normal texture and turgor.  No erythema, ecchymosis, or cyanosis.  No open lesions.     Musculoskeletal:    Lower extremity muscle strength is normal.  Patient is ambulatory without an assistive device or brace.     Cavus foot with weightbearing bilateral.  No forefoot or rear foot deformities noted.  MS 5/5 all compartments.  Normal ROM all fore foot and rearfoot joints with no pain or crepitus.  No equinus.  Right foot no pain plantar medial or central.  No pain on plantar fascia.  Pain right foot plantar lateral area of peroneus longus tendon.  Also pain where this tendon courses around the cuboid and somewhat lateral calcaneus.  Positive pain stressing peroneus longus.  Slight pain stressing peroneus brevis.   No pain medial band of plantar fascia and arch.  No masses.  No erythema edema or ecchymosis.    Radiographic Exam:  X-Ray Findings:  I personally reviewed the films, normal    A:  Cavus foot with peroneus longus tendinitis    Plan:  Discussed further evaluation of peroneal tendons with MRI.  Patient in agreement.  We placed order.  She will call have this done.  Discussed additional offloading with cam walker.  Patient in agreement.  Will dispense cam walker today.  Patient to wear this  at all times while walking.  When not walking patient will take this off and do ROM to prevent blood clot and joint stiffness.  Patient will not sleep with this on.  Return to the clinic in 2 weeks to discuss MRI results and see if CAM Walker has been helpful      Yuri Knowles DPM, FACFAS             Again, thank you for allowing me to participate in the care of your patient.        Sincerely,        Yuri Knowles DPM

## 2022-08-11 ENCOUNTER — ALLIED HEALTH/NURSE VISIT (OUTPATIENT)
Dept: FAMILY MEDICINE | Facility: CLINIC | Age: 72
End: 2022-08-11
Payer: MEDICARE

## 2022-08-11 DIAGNOSIS — Z23 NEED FOR VACCINATION: Primary | ICD-10-CM

## 2022-08-11 PROCEDURE — 99207 PR NO CHARGE NURSE ONLY: CPT

## 2022-08-11 PROCEDURE — 90471 IMMUNIZATION ADMIN: CPT

## 2022-08-11 PROCEDURE — 90715 TDAP VACCINE 7 YRS/> IM: CPT

## 2022-08-16 ENCOUNTER — ANCILLARY PROCEDURE (OUTPATIENT)
Dept: MRI IMAGING | Facility: CLINIC | Age: 72
End: 2022-08-16
Attending: PODIATRIST
Payer: MEDICARE

## 2022-08-16 DIAGNOSIS — M76.71 PERONEUS LONGUS TENDINITIS, RIGHT: ICD-10-CM

## 2022-08-16 PROCEDURE — G1010 CDSM STANSON: HCPCS | Performed by: RADIOLOGY

## 2022-08-16 PROCEDURE — 73721 MRI JNT OF LWR EXTRE W/O DYE: CPT | Mod: RT | Performed by: RADIOLOGY

## 2022-08-31 ENCOUNTER — OFFICE VISIT (OUTPATIENT)
Dept: PODIATRY | Facility: CLINIC | Age: 72
End: 2022-08-31
Payer: MEDICARE

## 2022-08-31 VITALS — SYSTOLIC BLOOD PRESSURE: 130 MMHG | DIASTOLIC BLOOD PRESSURE: 74 MMHG | HEART RATE: 73 BPM

## 2022-08-31 DIAGNOSIS — M76.71 PERONEUS LONGUS TENDINITIS, RIGHT: Primary | ICD-10-CM

## 2022-08-31 DIAGNOSIS — Q66.70 CONGENITAL CAVUS FOOT: ICD-10-CM

## 2022-08-31 PROCEDURE — 99213 OFFICE O/P EST LOW 20 MIN: CPT | Performed by: PODIATRIST

## 2022-08-31 NOTE — PROGRESS NOTES
Subjective:    6/22/22   Pt is seen today in consult from Ashli Garcia.  Points to plantar right foot.  Has had this for 3 weeks.  Pain aggravated by activity and relieved by rest.  Was doing some gardening and using a shovel a few times before this happened.  Denies ecchymosis erythema weakness or increased deformity.  She has rheumatoid arthritis.    7/13/22 patient returns for right plantar lateral foot pain.  Has been wearing ankle brace since last visit.  He is now feeling 50% better.  Denies new edema ecchymosis or weakness.    8/10/22   patient returns for right plantar lateral foot pain.  Since last visit has seen no improvement.  Wearing ankle brace.  Denies edema or ecchymosis.    8/31/22   patient returns for right foot pain.  Gave patient cam walker last visit.  She started wearing this and now feeling 80% better.  She is here for reevaluation and to discuss MRI results.    ROS: See above      No Known Allergies    Current Outpatient Medications   Medication Sig Dispense Refill     acetaminophen-codeine (TYLENOL #3) 300-30 MG tablet TAKE 1 TABLET BY MOUTH EVERY 4 TO 6 HOURS AS NEEDED FOR PAIN       cetirizine (ZYRTEC) 10 MG tablet Take 1 tablet (10 mg) by mouth daily 90 tablet 1     chlorhexidine (PERIDEX) 0.12 % solution SWISH AND SPIT 15 ML BY MOUTH TWICE DAILY AS NEEDED 473 mL 1     cholecalciferol (VITAMIN D3) 25 mcg (1000 units) capsule Take 1,000 Units by mouth       diclofenac (VOLTAREN) 1 % topical gel Apply 4 g topically 4 times daily 50 g 0     fluticasone (FLONASE) 50 MCG/ACT nasal spray Spray 1-2 sprays into both nostrils daily 16 g 1     hydrocortisone 2.5 % cream Apply 2.5 g topically 2 times daily       latanoprost (XALATAN) 0.005 % ophthalmic solution INSTILL 1 DROP IN BOTH EYES AT BEDTIME 7.5 mL 1     losartan-hydrochlorothiazide (HYZAAR) 50-12.5 MG tablet Take 1 tablet by mouth daily 90 tablet 3     magnesium 250 MG tablet TAKE 1 TABLET(250 MG) BY MOUTH DAILY AS NEEDED FOR LEG CRAMPS 30  tablet 3     meloxicam (MOBIC) 7.5 MG tablet 15 MG/D WITH FOOD IN AM FOR 2 WEEKS AND THEN STAY ON 7.5 MG DAILY WITH FOOD       meloxicam (MOBIC) 7.5 MG tablet        methylcellulose (CITRUCEL) powder Take 2 g by mouth daily 180 g 3     metoprolol tartrate (LOPRESSOR) 25 MG tablet Take 1 tablet (25 mg) by mouth At Bedtime 90 tablet 3     naproxen (NAPROSYN) 500 MG tablet Take 1 tablet (500 mg) by mouth 2 times daily as needed for moderate pain 30 tablet 0     omeprazole (PRILOSEC) 40 MG DR capsule Take 1 capsule (40 mg) by mouth daily 90 capsule 3     pravastatin (PRAVACHOL) 20 MG tablet Take 1 tablet (20 mg) by mouth daily 90 tablet 3     tiZANidine (ZANAFLEX) 2 MG tablet Take 1 tablet (2 mg) by mouth 2 times daily as needed for muscle spasms Will make you sleepy 30 tablet 0     triamcinolone (KENALOG) 0.1 % external cream APPLY TOPICALLY TO THE AFFECTED AREA TWICE DAILY 80 g 0       Patient Active Problem List   Diagnosis     Osteopenia     Hayfever     Hypertension goal BP (blood pressure) < 140/90     Polyarthritis, inflammatory (H)     GERD (gastroesophageal reflux disease)     Hyperlipidemia with target LDL less than 130     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     High risk medication use     Microscopic hematuria     Pterygium of right eye     Sicca syndrome (H)     Angiomyolipoma of right kidney     History of Helicobacter pylori infection     Pre-diabetes     NAFLD (nonalcoholic fatty liver disease)     Elevated LFTs     Heel pain, chronic, right       Past Medical History:   Diagnosis Date     GERD (gastroesophageal reflux disease) 3/20/2015     Glaucoma (increased eye pressure)      Hyperlipidemia LDL goal < 130 8/3/2015     Hypertension      Hypertension, goal below 150/90 11/28/2014     Rheumatoid arthritis, adult (H)        Past Surgical History:   Procedure Laterality Date     COLONOSCOPY       COLONOSCOPY WITH CO2 INSUFFLATION N/A 4/5/2017    Procedure: COLONOSCOPY WITH CO2 INSUFFLATION;   Surgeon: Duane, William Charles, MD;  Location: [facility]     COMBINED ESOPHAGOSCOPY, GASTROSCOPY, DUODENOSCOPY (EGD) WITH CO2 INSUFFLATION N/A 2/13/2019    Procedure: COMBINED ESOPHAGOSCOPY, GASTROSCOPY, DUODENOSCOPY (EGD) WITH CO2 INSUFFLATION;  Surgeon: Sly De Santiago MD;  Location: [facility]     COMBINED ESOPHAGOSCOPY, GASTROSCOPY, DUODENOSCOPY (EGD) WITH CO2 INSUFFLATION N/A 2/7/2022    Procedure: ESOPHAGOGASTRODUODENOSCOPY, WITH CO2 INSUFFLATION;  Surgeon: Luis Aceves MD;  Location: [facility]     COMBINED REPAIR PTOSIS WITH BLEPHAROPLASTY BILATERAL Bilateral 1/6/2017    Procedure: COMBINED REPAIR PTOSIS WITH BLEPHAROPLASTY BILATERAL;  Surgeon: Carly Norman MD;  Location: [facility]     ESOPHAGOSCOPY, GASTROSCOPY, DUODENOSCOPY (EGD), COMBINED N/A 1/5/2016    Procedure: COMBINED ESOPHAGOSCOPY, GASTROSCOPY, DUODENOSCOPY (EGD), BIOPSY SINGLE OR MULTIPLE;  Surgeon: Duane, William Charles, MD;  Location: [facility]     ESOPHAGOSCOPY, GASTROSCOPY, DUODENOSCOPY (EGD), COMBINED N/A 2/13/2019    Procedure: Combined Esophagoscopy, Gastroscopy, Duodenoscopy (Egd), Biopsy Single Or Multiple;  Surgeon: Sly De Santiago MD;  [facility]: [facility]     ESOPHAGOSCOPY, GASTROSCOPY, DUODENOSCOPY (EGD), COMBINED N/A 2/7/2022    Procedure: ESOPHAGOGASTRODUODENOSCOPY, WITH BIOPSY;  Surgeon: Luis Aceves MD;  [facility]: [facility]     REPAIR ENTROPION BILATERAL Bilateral 1/6/2017    Procedure: REPAIR ENTROPION BILATERAL;  Surgeon: Carly Norman MD;  Location: [facility]     REPAIR PTOSIS Bilateral 01/2017       Family History   Problem Relation Age of Onset     Diabetes Mother      Cancer No family hx of      Hypertension No family hx of      Cerebrovascular Disease No family hx of      Thyroid Disease No family hx of      Glaucoma No family hx of      Macular Degeneration No family hx of        Social History     Tobacco Use     Smoking status: Never Smoker     Smokeless tobacco: Never Used   Substance Use Topics      Alcohol use: No             O:  /74   Pulse 73   LMP 06/21/2002 (Approximate) .     Constitutional/ general:  Pt is in no apparent distress, appears well-nourished.  Cooperative with history and physical exam.     Psych:  The patient answered questions appropriately.  Normal affect.  Seems to have reasonable expectations, in terms of treatment.     Lungs:  Non labored breathing, non labored speech. No cough.  No audible wheezing. Even, quiet breathing.       Vascular:  Pedal pulses are palpable bilaterally for both the DP and PT arteries.  CFT < 3 sec.  No edema.  Pedal hair growth noted.     Neuro:  Alert and oriented x 3. Coordinated gait.  Light touch sensation is intact     Derm: Normal texture and turgor.  No erythema, ecchymosis, or cyanosis.  No open lesions.     Musculoskeletal:    Lower extremity muscle strength is normal.  Patient is ambulatory without an assistive device or brace.     Cavus foot with weightbearing bilateral.  No forefoot or rear foot deformities noted.  MS 5/5 all compartments.  Normal ROM all fore foot and rearfoot joints with no pain or crepitus.  No equinus.  Right foot no pain plantar medial or central.  No pain on plantar fascia.  Pain right foot plantar lateral area of peroneus longus tendon.  Also pain where this tendon courses around the cuboid and somewhat lateral calcaneus.  Today no pain stressing peroneus longus.  Slight pain stressing peroneus brevis.   No pain medial band of plantar fascia and arch.  No masses.  No erythema edema or ecchymosis.    Radiographic Exam:   Narrative & Impression   EXAM: MR ANKLE RIGHT W/O CONTRAST  LOCATION: [facility]  DATE/TIME: 8/16/2022 6:20 PM     INDICATION: Right rear foot MRI to evaluate peroneal tendons.  COMPARISON: None.  TECHNIQUE: Unenhanced.     FINDINGS:      TENDONS:   -Peroneal: There is mild peroneus longus tendinopathy, with a markedly edematous os peroneum. (Series 6, image 9). Peroneus brevis  is intact. There is mild to moderate peroneal tenosynovitis.  -Medial: Posterior tibialis tendon is intact. No tendinopathy or tenosynovitis. Flexor digitorum longus and flexor hallucis longus tendons are intact. There is mild flexor digitorum longus and flexor hallucis longus tenosynovitis.  -Anterior: Anterior tibialis, extensor hallucis longus, and extensor digitorum longus tendons are normal. No tenosynovitis.  -Achilles: Moderate Achilles tendinosis with some low-grade interstitial longitudinal partial tearing. No high-grade Achilles tear is seen. There is mild Achilles paratenonitis. There is insertional enthesopathy at the calcaneal tuberosity.     LIGAMENTS:   -Anterior talofibular ligament: There is some thinning of the anterior talofibular ligament which relates to chronic partial tearing.   -Calcaneofibular ligament: Intact.   -Posterior talofibular ligament: Intact.  -Syndesmotic inferior tibiofibular ligaments: Intact.  -Deltoid ligament complex: Intact.  -Spring ligament complex: Intact.     JOINTS AND BONES:   -No fracture, bone contusion or osteochondral lesion. No joint effusion or synovitis.     SOFT TISSUES:  -Plantar fascia: Intact. No acute fasciitis or tear.  -Sinus tarsi and tarsal tunnel: Normal.  -Muscles: Normal.                                                                      IMPRESSION:  1.  Mild peroneus longus tendinopathy, with a markedly edematous os peroneum. 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 There is mild to moderate peroneal tenosynovitis. No peroneal tear is seen.  2.  Moderate Achilles tendinosis with some low-grade interstitial longitudinal partial tearing. No high-grade Achilles tear is seen. There is mild Achilles paratenonitis as well as insertional enthesopathy at the calcaneal tuberosity.         A:  Cavus foot with peroneus longus tendinitis/os peroneum edema    Plan:  Discussed MRI results with patient.  She is significantly better.  Continue  wearing CAM Walker as needed and avoid activities that irritate this.  Discussed followed by this.  She is typically barefoot around the house.  Discussed in the future good supportive shoe would be helpful and I made suggestions on good how shoe.  We will try orthotics outside of house.  Will ice this area twice a day until resolved.  If this returns in the future she may use the cam walker as needed.  Briefly discussed surgery.  This would resolve removal of os peroneum.  RTC as needed.      Yuri Knowles, MALGORZATA, FACFAS

## 2022-08-31 NOTE — PATIENT INSTRUCTIONS
We wish you continued good healing. If you have any questions or concerns, please do not hesitate to contact us at  [phone]    [employer] (secure e-mail communication and access to your chart) to send a message or to make an appointment.    Please remember to call and schedule a follow up appointment if one was recommended at your earliest convenience.     PODIATRY CLINIC HOURS  TELEPHONE NUMBER    Dr. Yuri JACOBSENPLEI [facility]        Clinics:  Balwinder Gates CMA   Tuesday 1PM-6PM  [place]  Wednesday 745AM-330PM  [place]  Thursday/Friday 745AM-230PM  [place] APPOINTMENTS  [phone]    [place] APPOINTMENTS  [phone]        If you need a medication refill, please contact us you may need lab work and/or a follow up visit prior to your refill (i.e. Antifungal medications).  If MRI needed please call Imaging at [phone] or [phone]  HOW DO I GET MY KNEE SCOOTER? Knee scooters can be picked up at ANY Medical Supply stores with your knee scooter Prescription.  OR  Bring your signed prescription to an [facility] showroom.

## 2022-08-31 NOTE — LETTER
8/31/2022         RE: Noreen Florence  [address]  [place] [address]        Dear Colleague,    Thank you for referring your patient, Noreen Florence, to the [facility]. Please see a copy of my visit note below.    Subjective:    6/22/22   Pt is seen today in consult from Ashli Garcia.  Points to plantar right foot.  Has had this for 3 weeks.  Pain aggravated by activity and relieved by rest.  Was doing some gardening and using a shovel a few times before this happened.  Denies ecchymosis erythema weakness or increased deformity.  She has rheumatoid arthritis.    7/13/22 patient returns for right plantar lateral foot pain.  Has been wearing ankle brace since last visit.  He is now feeling 50% better.  Denies new edema ecchymosis or weakness.    8/10/22   patient returns for right plantar lateral foot pain.  Since last visit has seen no improvement.  Wearing ankle brace.  Denies edema or ecchymosis.    8/31/22   patient returns for right foot pain.  Gave patient cam walker last visit.  She started wearing this and now feeling 80% better.  She is here for reevaluation and to discuss MRI results.    ROS: See above      No Known Allergies    Current Outpatient Medications   Medication Sig Dispense Refill     acetaminophen-codeine (TYLENOL #3) 300-30 MG tablet TAKE 1 TABLET BY MOUTH EVERY 4 TO 6 HOURS AS NEEDED FOR PAIN       cetirizine (ZYRTEC) 10 MG tablet Take 1 tablet (10 mg) by mouth daily 90 tablet 1     chlorhexidine (PERIDEX) 0.12 % solution SWISH AND SPIT 15 ML BY MOUTH TWICE DAILY AS NEEDED 473 mL 1     cholecalciferol (VITAMIN D3) 25 mcg (1000 units) capsule Take 1,000 Units by mouth       diclofenac (VOLTAREN) 1 % topical gel Apply 4 g topically 4 times daily 50 g 0     fluticasone (FLONASE) 50 MCG/ACT nasal spray Spray 1-2 sprays into both nostrils daily 16 g 1     hydrocortisone 2.5 % cream Apply 2.5 g topically 2 times daily       latanoprost (XALATAN) 0.005 % ophthalmic solution  INSTILL 1 DROP IN BOTH EYES AT BEDTIME 7.5 mL 1     losartan-hydrochlorothiazide (HYZAAR) 50-12.5 MG tablet Take 1 tablet by mouth daily 90 tablet 3     magnesium 250 MG tablet TAKE 1 TABLET(250 MG) BY MOUTH DAILY AS NEEDED FOR LEG CRAMPS 30 tablet 3     meloxicam (MOBIC) 7.5 MG tablet 15 MG/D WITH FOOD IN AM FOR 2 WEEKS AND THEN STAY ON 7.5 MG DAILY WITH FOOD       meloxicam (MOBIC) 7.5 MG tablet        methylcellulose (CITRUCEL) powder Take 2 g by mouth daily 180 g 3     metoprolol tartrate (LOPRESSOR) 25 MG tablet Take 1 tablet (25 mg) by mouth At Bedtime 90 tablet 3     naproxen (NAPROSYN) 500 MG tablet Take 1 tablet (500 mg) by mouth 2 times daily as needed for moderate pain 30 tablet 0     omeprazole (PRILOSEC) 40 MG DR capsule Take 1 capsule (40 mg) by mouth daily 90 capsule 3     pravastatin (PRAVACHOL) 20 MG tablet Take 1 tablet (20 mg) by mouth daily 90 tablet 3     tiZANidine (ZANAFLEX) 2 MG tablet Take 1 tablet (2 mg) by mouth 2 times daily as needed for muscle spasms Will make you sleepy 30 tablet 0     triamcinolone (KENALOG) 0.1 % external cream APPLY TOPICALLY TO THE AFFECTED AREA TWICE DAILY 80 g 0       Patient Active Problem List   Diagnosis     Osteopenia     Hayfever     Hypertension goal BP (blood pressure) < 140/90     Polyarthritis, inflammatory (H)     GERD (gastroesophageal reflux disease)     Hyperlipidemia with target LDL less than 130     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     High risk medication use     Microscopic hematuria     Pterygium of right eye     Sicca syndrome (H)     Angiomyolipoma of right kidney     History of Helicobacter pylori infection     Pre-diabetes     NAFLD (nonalcoholic fatty liver disease)     Elevated LFTs     Heel pain, chronic, right       Past Medical History:   Diagnosis Date     GERD (gastroesophageal reflux disease) 3/20/2015     Glaucoma (increased eye pressure)      Hyperlipidemia LDL goal < 130 8/3/2015     Hypertension       Hypertension, goal below 150/90 11/28/2014     Rheumatoid arthritis, adult (H)        Past Surgical History:   Procedure Laterality Date     COLONOSCOPY       COLONOSCOPY WITH CO2 INSUFFLATION N/A 4/5/2017    Procedure: COLONOSCOPY WITH CO2 INSUFFLATION;  Surgeon: Duane, William Charles, MD;  Location: [facility]     COMBINED ESOPHAGOSCOPY, GASTROSCOPY, DUODENOSCOPY (EGD) WITH CO2 INSUFFLATION N/A 2/13/2019    Procedure: COMBINED ESOPHAGOSCOPY, GASTROSCOPY, DUODENOSCOPY (EGD) WITH CO2 INSUFFLATION;  Surgeon: Sly De Santiago MD;  Location: [facility]     COMBINED ESOPHAGOSCOPY, GASTROSCOPY, DUODENOSCOPY (EGD) WITH CO2 INSUFFLATION N/A 2/7/2022    Procedure: ESOPHAGOGASTRODUODENOSCOPY, WITH CO2 INSUFFLATION;  Surgeon: Luis Aceves MD;  Location: [facility]     COMBINED REPAIR PTOSIS WITH BLEPHAROPLASTY BILATERAL Bilateral 1/6/2017    Procedure: COMBINED REPAIR PTOSIS WITH BLEPHAROPLASTY BILATERAL;  Surgeon: Carly Norman MD;  [facility]     ESOPHAGOSCOPY, GASTROSCOPY, DUODENOSCOPY (EGD), COMBINED N/A 1/5/2016    Procedure: COMBINED ESOPHAGOSCOPY, GASTROSCOPY, DUODENOSCOPY (EGD), BIOPSY SINGLE OR MULTIPLE;  Surgeon: Duane, William Charles, MD;  Location: [facility]     ESOPHAGOSCOPY, GASTROSCOPY, DUODENOSCOPY (EGD), COMBINED N/A 2/13/2019    Procedure: Combined Esophagoscopy, Gastroscopy, Duodenoscopy (Egd), Biopsy Single Or Multiple;  Surgeon: Sly De Santiago MD;  Location: [facility]     ESOPHAGOSCOPY, GASTROSCOPY, DUODENOSCOPY (EGD), COMBINED N/A 2/7/2022    Procedure: ESOPHAGOGASTRODUODENOSCOPY, WITH BIOPSY;  Surgeon: Luis Aceves MD;  Location: [facility]     REPAIR ENTROPION BILATERAL Bilateral 1/6/2017    Procedure: REPAIR ENTROPION BILATERAL;  Surgeon: Carly Norman MD;  Location: [facility]     REPAIR PTOSIS Bilateral 01/2017       Family History   Problem Relation Age of Onset     Diabetes Mother      Cancer No family hx of      Hypertension No family hx of      Cerebrovascular  Disease No family hx of      Thyroid Disease No family hx of      Glaucoma No family hx of      Macular Degeneration No family hx of        Social History     Tobacco Use     Smoking status: Never Smoker     Smokeless tobacco: Never Used   Substance Use Topics     Alcohol use: No             O:  /74   Pulse 73   LMP 06/21/2002 (Approximate) .     Constitutional/ general:  Pt is in no apparent distress, appears well-nourished.  Cooperative with history and physical exam.     Psych:  The patient answered questions appropriately.  Normal affect.  Seems to have reasonable expectations, in terms of treatment.     Lungs:  Non labored breathing, non labored speech. No cough.  No audible wheezing. Even, quiet breathing.       Vascular:  Pedal pulses are palpable bilaterally for both the DP and PT arteries.  CFT < 3 sec.  No edema.  Pedal hair growth noted.     Neuro:  Alert and oriented x 3. Coordinated gait.  Light touch sensation is intact     Derm: Normal texture and turgor.  No erythema, ecchymosis, or cyanosis.  No open lesions.     Musculoskeletal:    Lower extremity muscle strength is normal.  Patient is ambulatory without an assistive device or brace.     Cavus foot with weightbearing bilateral.  No forefoot or rear foot deformities noted.  MS 5/5 all compartments.  Normal ROM all fore foot and rearfoot joints with no pain or crepitus.  No equinus.  Right foot no pain plantar medial or central.  No pain on plantar fascia.  Pain right foot plantar lateral area of peroneus longus tendon.  Also pain where this tendon courses around the cuboid and somewhat lateral calcaneus.  Today no pain stressing peroneus longus.  Slight pain stressing peroneus brevis.   No pain medial band of plantar fascia and arch.  No masses.  No erythema edema or ecchymosis.    Radiographic Exam:   Narrative & Impression   EXAM: MR ANKLE RIGHT W/O CONTRAST  LOCATION: [facility]  DATE/TIME: 8/16/2022 6:20  PM     INDICATION: Right rear foot MRI to evaluate peroneal tendons.  COMPARISON: None.  TECHNIQUE: Unenhanced.     FINDINGS:      TENDONS:   -Peroneal: There is mild peroneus longus tendinopathy, with a markedly edematous os peroneum. (Series 6, image 9). Peroneus brevis is intact. There is mild to moderate peroneal tenosynovitis.  -Medial: Posterior tibialis tendon is intact. No tendinopathy or tenosynovitis. Flexor digitorum longus and flexor hallucis longus tendons are intact. There is mild flexor digitorum longus and flexor hallucis longus tenosynovitis.  -Anterior: Anterior tibialis, extensor hallucis longus, and extensor digitorum longus tendons are normal. No tenosynovitis.  -Achilles: Moderate Achilles tendinosis with some low-grade interstitial longitudinal partial tearing. No high-grade Achilles tear is seen. There is mild Achilles paratenonitis. There is insertional enthesopathy at the calcaneal tuberosity.     LIGAMENTS:   -Anterior talofibular ligament: There is some thinning of the anterior talofibular ligament which relates to chronic partial tearing.   -Calcaneofibular ligament: Intact.   -Posterior talofibular ligament: Intact.  -Syndesmotic inferior tibiofibular ligaments: Intact.  -Deltoid ligament complex: Intact.  -Spring ligament complex: Intact.     JOINTS AND BONES:   -No fracture, bone contusion or osteochondral lesion. No joint effusion or synovitis.     SOFT TISSUES:  -Plantar fascia: Intact. No acute fasciitis or tear.  -Sinus tarsi and tarsal tunnel: Normal.  -Muscles: Normal.                                                                      IMPRESSION:  1.  Mild peroneus longus tendinopathy, with a markedly edematous os peroneum. 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 There is mild to moderate peroneal tenosynovitis. No peroneal tear is seen.  2.  Moderate Achilles tendinosis with some low-grade interstitial longitudinal partial tearing. No high-grade Achilles tear  is seen. There is mild Achilles paratenonitis as well as insertional enthesopathy at the calcaneal tuberosity.         A:  Cavus foot with peroneus longus tendinitis/os peroneum edema    Plan:  Discussed MRI results with patient.  She is significantly better.  Continue wearing CAM Walker as needed and avoid activities that irritate this.  Discussed followed by this.  She is typically barefoot around the house.  Discussed in the future good supportive shoe would be helpful and I made suggestions on good how shoe.  We will try orthotics outside of house.  Will ice this area twice a day until resolved.  If this returns in the future she may use the cam walker as needed.  Briefly discussed surgery.  This would resolve removal of os peroneum.  RTC as needed.      Yuri Knowles DPM, FACFAS             Again, thank you for allowing me to participate in the care of your patient.        Sincerely,        Yuri Knowles DPM

## 2022-09-07 ENCOUNTER — OFFICE VISIT (OUTPATIENT)
Dept: URGENT CARE | Facility: URGENT CARE | Age: 72
End: 2022-09-07
Payer: MEDICARE

## 2022-09-07 VITALS
OXYGEN SATURATION: 96 % | DIASTOLIC BLOOD PRESSURE: 70 MMHG | WEIGHT: 116.4 LBS | TEMPERATURE: 97.8 F | SYSTOLIC BLOOD PRESSURE: 110 MMHG | HEART RATE: 87 BPM | BODY MASS INDEX: 20.62 KG/M2

## 2022-09-07 DIAGNOSIS — M25.512 LEFT SHOULDER PAIN, UNSPECIFIED CHRONICITY: Primary | ICD-10-CM

## 2022-09-07 DIAGNOSIS — I10 HYPERTENSION GOAL BP (BLOOD PRESSURE) < 140/90: ICD-10-CM

## 2022-09-07 PROCEDURE — 99214 OFFICE O/P EST MOD 30 MIN: CPT | Performed by: PHYSICIAN ASSISTANT

## 2022-09-07 ASSESSMENT — ENCOUNTER SYMPTOMS
NECK STIFFNESS: 0
JOINT SWELLING: 0
NECK PAIN: 0
ARTHRALGIAS: 1
RESPIRATORY NEGATIVE: 1
COUGH: 0
DIZZINESS: 0
CHILLS: 0
SORE THROAT: 0
BACK PAIN: 0
DIARRHEA: 0
NAUSEA: 0
RHINORRHEA: 0
CARDIOVASCULAR NEGATIVE: 1
ENDOCRINE NEGATIVE: 1
FEVER: 0
LIGHT-HEADEDNESS: 0
EYES NEGATIVE: 1
HEADACHES: 0
ALLERGIC/IMMUNOLOGIC NEGATIVE: 1
MYALGIAS: 0
VOMITING: 0
SHORTNESS OF BREATH: 0
WOUND: 0
PALPITATIONS: 0
WEAKNESS: 0

## 2022-09-07 NOTE — PROGRESS NOTES
Chief Complaint:     Chief Complaint   Patient presents with     Arm Pain     Pt complains of left pain, pt was seen on 08/16/2022 for chronic left shoulder pain. Pain started Tuesday night, severe 10/10 pain.           ASSESSMENT     1. Left shoulder pain, unspecified chronicity         PLAN    Unclear etiology at this time.  Severe pain resolved last night.    Order placed for follow up with orthopedic specialist.    RICE discussed  Recommended rest and avoidance of activities which cause pain or swelling.  Pain relief: Acetaminophen and or Ibuprofen with food.  Patient is hypertensive in clinic today.  Second BP reading was also above goal at 110/70.  Patient instructed to follow up with PCP in the next week for BP recheck if BP remains above goal.    Patient verbalized understanding, and agrees with this plan.       HPI: Noreen Florence is an 72 year old female who presents today for ongoing L shoulder pain.  Patient was evaluated at another healthcare system on 8/16 and XR of the L shoulder was negative for any acute fractures but did show some degenerative changes.  Last night she had a short episode of severe L shoulder pain and this has resolved.  No numbness, tingling, or dysfunction of the L arm.  No chest pain, palpitations, SOB or dizziness.      Patient denies any numbness, tingling, or dysfunction of the L arm.      ROS:      Review of Systems   Constitutional: Negative for chills and fever.   HENT: Negative for congestion, ear pain, rhinorrhea and sore throat.    Eyes: Negative.    Respiratory: Negative.  Negative for cough and shortness of breath.    Cardiovascular: Negative.  Negative for chest pain and palpitations.   Gastrointestinal: Negative for diarrhea, nausea and vomiting.   Endocrine: Negative.    Genitourinary: Negative.    Musculoskeletal: Positive for arthralgias. Negative for back pain, joint swelling, myalgias, neck pain and neck stiffness.   Skin: Negative.  Negative for rash and wound.    Allergic/Immunologic: Negative.  Negative for immunocompromised state.   Neurological: Negative for dizziness, weakness, light-headedness and headaches.        Problem history  Patient Active Problem List   Diagnosis     Osteopenia     Hayfever     Hypertension goal BP (blood pressure) < 140/90     Polyarthritis, inflammatory (H)     GERD (gastroesophageal reflux disease)     Hyperlipidemia with target LDL less than 130     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     High risk medication use     Microscopic hematuria     Pterygium of right eye     Sicca syndrome (H)     Angiomyolipoma of right kidney     History of Helicobacter pylori infection     Pre-diabetes     NAFLD (nonalcoholic fatty liver disease)     Elevated LFTs     Heel pain, chronic, right        Allergies  No Known Allergies     Smoking History  History   Smoking Status     Never Smoker   Smokeless Tobacco     Never Used        Current Meds    Current Outpatient Medications:      acetaminophen-codeine (TYLENOL #3) 300-30 MG tablet, TAKE 1 TABLET BY MOUTH EVERY 4 TO 6 HOURS AS NEEDED FOR PAIN, Disp: , Rfl:      cetirizine (ZYRTEC) 10 MG tablet, Take 1 tablet (10 mg) by mouth daily, Disp: 90 tablet, Rfl: 1     chlorhexidine (PERIDEX) 0.12 % solution, SWISH AND SPIT 15 ML BY MOUTH TWICE DAILY AS NEEDED, Disp: 473 mL, Rfl: 1     cholecalciferol (VITAMIN D3) 25 mcg (1000 units) capsule, Take 1,000 Units by mouth, Disp: , Rfl:      diclofenac (VOLTAREN) 1 % topical gel, Apply 4 g topically 4 times daily, Disp: 50 g, Rfl: 0     fluticasone (FLONASE) 50 MCG/ACT nasal spray, Spray 1-2 sprays into both nostrils daily, Disp: 16 g, Rfl: 1     hydrocortisone 2.5 % cream, Apply 2.5 g topically 2 times daily, Disp: , Rfl:      latanoprost (XALATAN) 0.005 % ophthalmic solution, INSTILL 1 DROP IN BOTH EYES AT BEDTIME, Disp: 7.5 mL, Rfl: 1     losartan-hydrochlorothiazide (HYZAAR) 50-12.5 MG tablet, Take 1 tablet by mouth daily, Disp: 90 tablet, Rfl: 3      magnesium 250 MG tablet, TAKE 1 TABLET(250 MG) BY MOUTH DAILY AS NEEDED FOR LEG CRAMPS, Disp: 30 tablet, Rfl: 3     meloxicam (MOBIC) 7.5 MG tablet, 15 MG/D WITH FOOD IN AM FOR 2 WEEKS AND THEN STAY ON 7.5 MG DAILY WITH FOOD, Disp: , Rfl:      meloxicam (MOBIC) 7.5 MG tablet, , Disp: , Rfl:      methylcellulose (CITRUCEL) powder, Take 2 g by mouth daily, Disp: 180 g, Rfl: 3     metoprolol tartrate (LOPRESSOR) 25 MG tablet, Take 1 tablet (25 mg) by mouth At Bedtime, Disp: 90 tablet, Rfl: 3     naproxen (NAPROSYN) 500 MG tablet, Take 1 tablet (500 mg) by mouth 2 times daily as needed for moderate pain, Disp: 30 tablet, Rfl: 0     omeprazole (PRILOSEC) 40 MG DR capsule, Take 1 capsule (40 mg) by mouth daily, Disp: 90 capsule, Rfl: 3     pravastatin (PRAVACHOL) 20 MG tablet, Take 1 tablet (20 mg) by mouth daily, Disp: 90 tablet, Rfl: 3     tiZANidine (ZANAFLEX) 2 MG tablet, Take 1 tablet (2 mg) by mouth 2 times daily as needed for muscle spasms Will make you sleepy, Disp: 30 tablet, Rfl: 0     triamcinolone (KENALOG) 0.1 % external cream, APPLY TOPICALLY TO THE AFFECTED AREA TWICE DAILY, Disp: 80 g, Rfl: 0  No current facility-administered medications for this visit.    Facility-Administered Medications Ordered in Other Visits:      gadobutrol (GADAVIST) injection 7.5 mL, 7.5 mL, Intravenous, Once, Palomo Terrell MD        Vital signs reviewed by Indra Sarah PA-C  BP (!) 153/83 (BP Location: Right arm, Patient Position: Sitting, Cuff Size: Adult Regular)   Pulse 87   Temp 97.8  F (36.6  C) (Tympanic)   Wt 52.8 kg (116 lb 6.4 oz)   LMP 06/21/2002 (Approximate)   SpO2 96%   BMI 20.62 kg/m      Physical Exam     Physical Exam  Vitals and nursing note reviewed.   Constitutional:       General: She is not in acute distress.     Appearance: She is well-developed. She is not ill-appearing, toxic-appearing or diaphoretic.   HENT:      Head: Normocephalic and atraumatic.      Right Ear: Tympanic membrane and  external ear normal. No drainage, swelling or tenderness. Tympanic membrane is not perforated, erythematous, retracted or bulging.      Left Ear: Tympanic membrane and external ear normal. No drainage, swelling or tenderness. Tympanic membrane is not perforated, erythematous, retracted or bulging.      Nose: No mucosal edema, congestion or rhinorrhea.      Right Sinus: No maxillary sinus tenderness or frontal sinus tenderness.      Left Sinus: No maxillary sinus tenderness or frontal sinus tenderness.      Mouth/Throat:      Pharynx: No pharyngeal swelling, oropharyngeal exudate, posterior oropharyngeal erythema or uvula swelling.      Tonsils: No tonsillar abscesses.   Eyes:      Pupils: Pupils are equal, round, and reactive to light.   Neck:      Trachea: Trachea normal.   Cardiovascular:      Rate and Rhythm: Normal rate and regular rhythm.      Heart sounds: Normal heart sounds, S1 normal and S2 normal. No murmur heard.    No friction rub. No gallop.   Pulmonary:      Effort: Pulmonary effort is normal. No respiratory distress.      Breath sounds: Normal breath sounds. No decreased breath sounds, wheezing, rhonchi or rales.   Abdominal:      General: Bowel sounds are normal. There is no distension.      Palpations: Abdomen is soft. Abdomen is not rigid. There is no mass.      Tenderness: There is no abdominal tenderness. There is no guarding or rebound.   Musculoskeletal:      Left shoulder: No swelling, deformity, effusion, tenderness, bony tenderness or crepitus. Normal range of motion. Normal strength. Normal pulse.      Cervical back: Full passive range of motion without pain, normal range of motion and neck supple.   Lymphadenopathy:      Cervical: No cervical adenopathy.   Skin:     General: Skin is warm and dry.   Neurological:      Mental Status: She is alert and oriented to person, place, and time.      Cranial Nerves: No cranial nerve deficit.      Deep Tendon Reflexes: Reflexes are normal and  symmetric.   Psychiatric:         Behavior: Behavior normal. Behavior is cooperative.         Thought Content: Thought content normal.         Judgment: Judgment normal.             Indra Sarah PA-C  9/7/2022, 1:14 PM

## 2022-09-09 ENCOUNTER — OFFICE VISIT (OUTPATIENT)
Dept: OPTOMETRY | Facility: CLINIC | Age: 72
End: 2022-09-09
Payer: MEDICARE

## 2022-09-09 ENCOUNTER — TELEPHONE (OUTPATIENT)
Dept: ORTHOPEDICS | Facility: CLINIC | Age: 72
End: 2022-09-09

## 2022-09-09 DIAGNOSIS — H52.4 PRESBYOPIA: ICD-10-CM

## 2022-09-09 DIAGNOSIS — H40.013 OPEN ANGLE WITH BORDERLINE FINDINGS AND LOW GLAUCOMA RISK IN BOTH EYES: Primary | ICD-10-CM

## 2022-09-09 DIAGNOSIS — H25.13 SENILE NUCLEAR SCLEROSIS, BILATERAL: ICD-10-CM

## 2022-09-09 DIAGNOSIS — H11.002 PTERYGIUM, LEFT: ICD-10-CM

## 2022-09-09 PROCEDURE — 92015 DETERMINE REFRACTIVE STATE: CPT | Mod: GY | Performed by: OPTOMETRIST

## 2022-09-09 PROCEDURE — 92133 CPTRZD OPH DX IMG PST SGM ON: CPT | Performed by: OPTOMETRIST

## 2022-09-09 PROCEDURE — 92014 COMPRE OPH EXAM EST PT 1/>: CPT | Performed by: OPTOMETRIST

## 2022-09-09 ASSESSMENT — VISUAL ACUITY
OS_SC: 20/25
OD_SC+: -2
OD_SC: 20/20
OS_SC+: -3
METHOD: SNELLEN - LINEAR

## 2022-09-09 ASSESSMENT — REFRACTION_MANIFEST
OS_AXIS: 056
OD_CYLINDER: SPHERE
OS_ADD: +2.50
OS_CYLINDER: +0.75
OD_ADD: +2.50
OS_SPHERE: +0.50
OD_SPHERE: +0.50

## 2022-09-09 ASSESSMENT — SLIT LAMP EXAM - LIDS
COMMENTS: NORMAL
COMMENTS: NORMAL

## 2022-09-09 ASSESSMENT — TONOMETRY
OS_IOP_MMHG: 14
OD_IOP_MMHG: 14
IOP_METHOD: APPLANATION
OS_IOP_MMHG: 16

## 2022-09-09 ASSESSMENT — CONF VISUAL FIELD
OD_NORMAL: 1
OS_NORMAL: 1
METHOD: COUNTING FINGERS

## 2022-09-09 ASSESSMENT — EXTERNAL EXAM - RIGHT EYE: OD_EXAM: NORMAL

## 2022-09-09 ASSESSMENT — CUP TO DISC RATIO
OS_RATIO: 0.6
OD_RATIO: 0.6

## 2022-09-09 ASSESSMENT — EXTERNAL EXAM - LEFT EYE: OS_EXAM: NORMAL

## 2022-09-09 NOTE — TELEPHONE ENCOUNTER
DIAGNOSIS: Left shoulder pain, unspecified chronicity [M25.512]   APPOINTMENT DATE: 09/16/2022   NOTES STATUS DETAILS   OFFICE NOTE from referring provider Internal 09/07/2022 [facility]    OFFICE NOTE from other specialist N/A    DISCHARGE SUMMARY from hospital N/A    DISCHARGE REPORT from the ER N/A    OPERATIVE REPORT N/A    EMG report N/A    MEDICATION LIST N/A    MRI N/A    DEXA (osteoporosis/bone health) N/A    CT SCAN N/A    XRAYS (IMAGES & REPORTS) Received 08/16/2022 LFT shoulder     Images in PACS

## 2022-09-09 NOTE — PROGRESS NOTES
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 No indication of progression since last exam. Large ONH with healthy rim tissue  Plan: Discussed findings with patient. Suspect that cupping is most likely physiological, but with well-controlled IOP and stable RNFL findings, will resume taking latanoprost nightly in both eyes.  Follow up in 1 year with repeat RNFL OCT. If changes are noted, will plan on seeing patient sooner for additional testing.     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 Return to clinic if vision changes are noted.     (H11.002) Pterygium, left  Comment: Not impacting vision, both nasal and temporal in left eye  Plan: Monitor only. Regular lubrication was encouraged.     (H52.4) Presbyopia  Plan: Discussed findings with patient. New spectacle prescription dispensed to patient. Patient is welcome to return to clinic with prolonged adaptation difficulties.       Complete documentation of historical and exam elements from today's encounter can  be found in the full encounter summary report (not reduplicated in this progress  note). I personally obtained the chief complaint(s) and history of present illness. I  confirmed and edited as necessary the review of systems, past medical/surgical  history, family history, social history, and examination findings as documented by  others; and I examined the patient myself. I personally reviewed the relevant tests,  images, and reports as documented above. I formulated and edited as necessary the  assessment and plan and discussed the findings and management plan with the  patient and family.    Prabhakar Banerjee OD

## 2022-09-09 NOTE — NURSING NOTE
Chief Complaints and History of Present Illnesses   Patient presents with     Glaucoma       Glaucoma follow up each eye.      Chief Complaint(s) and History of Present Illness(es)     Glaucoma     Laterality: both eyes    Compliance with Treatment: always    Comments:   Glaucoma follow up each eye.              Comments     Patient denies changes in vision either eye.  No eye pain, blurred vision, light sensitivity either eye.  Eye meds: latanaprost each eye 11pm each night, systane each eye TID  EZIO Brown 9/9/2022 1:20 PM

## 2022-09-09 NOTE — TELEPHONE ENCOUNTER
9/9 Called and left voicemail.     We need to reschedule appointment on 9/16 From 3:00 to hold time at 3:20 p.m.     Provided phone number [phone] to reschedule.     Radha gutierrez Procedure   Orthopedics, Podiatry, Sports Medicine, ENT/Eye Specialties  [facility]   [phone]

## 2022-09-14 NOTE — PROGRESS NOTES
Noreen Florence  :  1950  DOS: 2022  MRN: [id number]    Sports Medicine Clinic Visit    PCP: Jud Morales    Noreen Florence is a 72 year old Left hand dominant female who is seen in consultation at the request of  Indra Sarah PA-C presenting with left shoulder pain.    Acute on chronic left shoulder pain, recently went to the ED on 22 for severe pain after gardening. Given recommendation for NSAIDs and avoiding exacerbating activities. Denies neurogenic symptoms. Pain occurs with yard work and repetitive motions of shoulder abduction and overhead motions. Pain is located over the anterior and posterior left shoulder. Denies any current medications or treatments. Notes no decrease in ROM that she can see. Recent images: 2022.    Work history: retired    Review of Systems  Musculoskeletal: as above  Remainder of review of systems is negative including constitutional, CV, pulmonary, GI, Skin and Neurologic except as noted in HPI or medical history.    Past Medical History:   Diagnosis Date     GERD (gastroesophageal reflux disease) 3/20/2015     Glaucoma (increased eye pressure)      Hyperlipidemia LDL goal < 130 8/3/2015     Hypertension      Hypertension, goal below 150/90 2014     Rheumatoid arthritis, adult (H)      Past Surgical History:   Procedure Laterality Date     COLONOSCOPY       COLONOSCOPY WITH CO2 INSUFFLATION N/A 2017    Procedure: COLONOSCOPY WITH CO2 INSUFFLATION;  Surgeon: Duane, William Charles, MD;  Location:  OR     COMBINED ESOPHAGOSCOPY, GASTROSCOPY, DUODENOSCOPY (EGD) WITH CO2 INSUFFLATION N/A 2019    Procedure: COMBINED ESOPHAGOSCOPY, GASTROSCOPY, DUODENOSCOPY (EGD) WITH CO2 INSUFFLATION;  Surgeon: Sly De Santiago MD;  Location:  OR     COMBINED ESOPHAGOSCOPY, GASTROSCOPY, DUODENOSCOPY (EGD) WITH CO2 INSUFFLATION N/A 2022    Procedure: ESOPHAGOGASTRODUODENOSCOPY, WITH CO2 INSUFFLATION;  Surgeon: Luis Aceves MD;  Location:  OR      COMBINED REPAIR PTOSIS WITH BLEPHAROPLASTY BILATERAL Bilateral 1/6/2017    Procedure: COMBINED REPAIR PTOSIS WITH BLEPHAROPLASTY BILATERAL;  Surgeon: Carly Norman MD;  Location: [facility]     ESOPHAGOSCOPY, GASTROSCOPY, DUODENOSCOPY (EGD), COMBINED N/A 1/5/2016    Procedure: COMBINED ESOPHAGOSCOPY, GASTROSCOPY, DUODENOSCOPY (EGD), BIOPSY SINGLE OR MULTIPLE;  Surgeon: Duane, William Charles, MD;  Location: [facility] OR     ESOPHAGOSCOPY, GASTROSCOPY, DUODENOSCOPY (EGD), COMBINED N/A 2/13/2019    Procedure: Combined Esophagoscopy, Gastroscopy, Duodenoscopy (Egd), Biopsy Single Or Multiple;  Surgeon: Sly De Santiago MD;  Location: [facility] OR     ESOPHAGOSCOPY, GASTROSCOPY, DUODENOSCOPY (EGD), COMBINED N/A 2/7/2022    Procedure: ESOPHAGOGASTRODUODENOSCOPY, WITH BIOPSY;  Surgeon: Luis Aceves MD;  Location: [facility] OR     REPAIR ENTROPION BILATERAL Bilateral 1/6/2017    Procedure: REPAIR ENTROPION BILATERAL;  Surgeon: Carly Norman MD;  Location: [facility]     REPAIR PTOSIS Bilateral 01/2017     Family History   Problem Relation Age of Onset     Diabetes Mother      Cancer No family hx of      Hypertension No family hx of      Cerebrovascular Disease No family hx of      Thyroid Disease No family hx of      Glaucoma No family hx of      Macular Degeneration No family hx of        Objective  /70   Wt 52.8 kg (116 lb 6.4 oz)   LMP 06/21/2002 (Approximate)   BMI 20.62 kg/m        General: healthy, alert and in no distress      HEENT: no scleral icterus or conjunctival erythema     Skin: no suspicious lesions or rash. No jaundice.     CV: regular rhythm by palpation, 2+ distal pulses, no pedal edema      Resp: normal respiratory effort without conversational dyspnea     Psych: normal mood and affect      Gait: nonantalgic, appropriate coordination and balance     Neuro: normal light touch sensory exam of the extremities. Motor strength as noted below. Negative Spurling's bilaterally.     MSK L Shoulder:  No gross deformity, abnormality, or asymmetry on inspection. TTP over the posterolateral shoulder. NTTP over the coronoid process, scapular spine, periscapular muscles, trapezius muscle. ROM full and painless. Strength 5/5 in BUE. +Fuentes, +Neers, +[place], -[place], -Scarf, -Speeds, -Yergeson.     Radiology  I personally reviewed the relevant imaging, including XR L Shoulder (8/16/22) and agree with the interpretation of mild GH and AC OA.     Left shoulder xray  [facility]   8/16/2022    Indication:   Chronic left shoulder pain.     Technique:   Three views.     Comparison:   None.     Findings/Impression:   No fracture or dislocation. Mild glenohumeral osteoarthritis with no joint space narrowing and small inferior humeral head osteophytes. Mild AC joint degenerative change.       Dictated by Shayan Bland MD @ Aug 16 2022  4:38PM      Large Joint Injection/Arthocentesis: L subacromial bursa    Date/Time: 9/16/2022 4:10 AM  Performed by: Isaac Zhou DO  Authorized by: Isaac Zhou DO     Indications:  Pain  Needle Size:  25 G  Guidance: landmark guided    Approach:  Anterolateral  Location:  Shoulder      Site:  L subacromial bursa  Medications:  40 mg methylPREDNISolone 40 MG/ML; 2 mL lidocaine 1 %; 2 mL bupivacaine HCl (PF) 0.25 %; 40 mg triamcinolone 40 MG/ML  Outcome:  Tolerated well, no immediate complications  Procedure discussed: discussed risks, benefits, and alternatives    Consent Given by:  Patient  Prep: patient was prepped and draped in usual sterile fashion          Assessment:  1. Left shoulder pain, unspecified chronicity        Plan:  Noreen Florence is a 72 year old female that presents with left shoulder pain. History and physical exam appear most consistent with rotator cuff tendinopathy in the setting of glenohumeral osteoarthritis and AC OA. Discussed the nature of the condition and treatment options and mutually agreed upon the following plan:    - Medications: May use NSAIDs PRN for pain  control. May also try Voltaren gel PRN.   - Therapy: Discussed PT vs HEP for scapular stabilization exercises. Patient elected to utilize HEP, exercises given to patient.   - Modalities: Ice, heat, massage PRN.   - Injections: Left shoulder subacromial bursa corticosteroid injection performed today in clinic without complications. Patient tolerated well.   - Activity: Encouraged to remain active and participate in regular activities as symptoms allow.   - Follow up as needed in the future. Patient has clinic contact information for questions or concerns.       Isaac Zhou DO  [facility] - Sports Medicine  Department of Orthopedic Surgery       Disclaimer: This note consists of symbols derived from keyboarding, dictation and/or voice recognition software. As a result, there may be errors in the script that have gone undetected. Please consider this when interpreting information found in this chart.

## 2022-09-16 ENCOUNTER — PRE VISIT (OUTPATIENT)
Dept: ORTHOPEDICS | Facility: CLINIC | Age: 72
End: 2022-09-16

## 2022-09-16 ENCOUNTER — OFFICE VISIT (OUTPATIENT)
Dept: ORTHOPEDICS | Facility: CLINIC | Age: 72
End: 2022-09-16
Attending: PHYSICIAN ASSISTANT
Payer: MEDICARE

## 2022-09-16 VITALS — DIASTOLIC BLOOD PRESSURE: 70 MMHG | WEIGHT: 116.4 LBS | BODY MASS INDEX: 20.62 KG/M2 | SYSTOLIC BLOOD PRESSURE: 110 MMHG

## 2022-09-16 DIAGNOSIS — M25.512 LEFT SHOULDER PAIN, UNSPECIFIED CHRONICITY: ICD-10-CM

## 2022-09-16 DIAGNOSIS — M67.912 TENDINOPATHY OF LEFT ROTATOR CUFF: Primary | ICD-10-CM

## 2022-09-16 PROCEDURE — 99202 OFFICE O/P NEW SF 15 MIN: CPT | Mod: 25 | Performed by: STUDENT IN AN ORGANIZED HEALTH CARE EDUCATION/TRAINING PROGRAM

## 2022-09-16 PROCEDURE — 20610 DRAIN/INJ JOINT/BURSA W/O US: CPT | Mod: LT | Performed by: STUDENT IN AN ORGANIZED HEALTH CARE EDUCATION/TRAINING PROGRAM

## 2022-09-16 RX ADMIN — METHYLPREDNISOLONE ACETATE 40 MG: 40 INJECTION, SUSPENSION INTRA-ARTICULAR; INTRALESIONAL; INTRAMUSCULAR; SOFT TISSUE at 04:10

## 2022-09-16 RX ADMIN — LIDOCAINE HYDROCHLORIDE 2 ML: 10 INJECTION, SOLUTION INFILTRATION; PERINEURAL at 04:10

## 2022-09-16 RX ADMIN — BUPIVACAINE HYDROCHLORIDE 2 ML: 2.5 INJECTION, SOLUTION EPIDURAL; INFILTRATION; INTRACAUDAL at 04:10

## 2022-09-16 RX ADMIN — TRIAMCINOLONE ACETONIDE 40 MG: 40 INJECTION, SUSPENSION INTRA-ARTICULAR; INTRAMUSCULAR at 04:10

## 2022-09-16 ASSESSMENT — PAIN SCALES - GENERAL: PAINLEVEL: SEVERE PAIN (7)

## 2022-09-16 NOTE — LETTER
2022         RE: Noreen Florence  [address]  [place] [address]        Dear Colleague,    Thank you for referring your patient, Noreen Florence, to the [facility] [place]. Please see a copy of my visit note below.    Noreen Florence  :  1950  DOS: 2022  MRN: [id number]    Sports Medicine Clinic Visit    PCP: Jud Morales    Noreen Florence is a 72 year old Left hand dominant female who is seen in consultation at the request of  Indra Sarah PA-C presenting with left shoulder pain.    Acute on chronic left shoulder pain, recently went to the ED on 22 for severe pain after gardening. Given recommendation for NSAIDs and avoiding exacerbating activities. Denies neurogenic symptoms. Pain occurs with yard work and repetitive motions of shoulder abduction and overhead motions. Pain is located over the anterior and posterior left shoulder. Denies any current medications or treatments. Notes no decrease in ROM that she can see. Recent images: 2022.    Work history: retired    Review of Systems  Musculoskeletal: as above  Remainder of review of systems is negative including constitutional, CV, pulmonary, GI, Skin and Neurologic except as noted in HPI or medical history.    Past Medical History:   Diagnosis Date     GERD (gastroesophageal reflux disease) 3/20/2015     Glaucoma (increased eye pressure)      Hyperlipidemia LDL goal < 130 8/3/2015     Hypertension      Hypertension, goal below 150/90 2014     Rheumatoid arthritis, adult (H)      Past Surgical History:   Procedure Laterality Date     COLONOSCOPY       COLONOSCOPY WITH CO2 INSUFFLATION N/A 2017    Procedure: COLONOSCOPY WITH CO2 INSUFFLATION;  Surgeon: Duane, William Charles, MD;  Location: [facility] OR     COMBINED ESOPHAGOSCOPY, GASTROSCOPY, DUODENOSCOPY (EGD) WITH CO2 INSUFFLATION N/A 2019    Procedure: COMBINED ESOPHAGOSCOPY, GASTROSCOPY, DUODENOSCOPY (EGD) WITH CO2 INSUFFLATION;  Surgeon:  Sly De Santiago MD;  Location: [facility] OR     COMBINED ESOPHAGOSCOPY, GASTROSCOPY, DUODENOSCOPY (EGD) WITH CO2 INSUFFLATION N/A 2/7/2022    Procedure: ESOPHAGOGASTRODUODENOSCOPY, WITH CO2 INSUFFLATION;  Surgeon: Luis Aceves MD;  Location: [facility] OR     COMBINED REPAIR PTOSIS WITH BLEPHAROPLASTY BILATERAL Bilateral 1/6/2017    Procedure: COMBINED REPAIR PTOSIS WITH BLEPHAROPLASTY BILATERAL;  Surgeon: Carly Norman MD;  Location: [facility]     ESOPHAGOSCOPY, GASTROSCOPY, DUODENOSCOPY (EGD), COMBINED N/A 1/5/2016    Procedure: COMBINED ESOPHAGOSCOPY, GASTROSCOPY, DUODENOSCOPY (EGD), BIOPSY SINGLE OR MULTIPLE;  Surgeon: Duane, William Charles, MD;  Location: [facility]     ESOPHAGOSCOPY, GASTROSCOPY, DUODENOSCOPY (EGD), COMBINED N/A 2/13/2019    Procedure: Combined Esophagoscopy, Gastroscopy, Duodenoscopy (Egd), Biopsy Single Or Multiple;  Surgeon: Sly De Santiago MD;  Location: [facility]     ESOPHAGOSCOPY, GASTROSCOPY, DUODENOSCOPY (EGD), COMBINED N/A 2/7/2022    Procedure: ESOPHAGOGASTRODUODENOSCOPY, WITH BIOPSY;  Surgeon: Luis Aceves MD;  Location: [facility]     REPAIR ENTROPION BILATERAL Bilateral 1/6/2017    Procedure: REPAIR ENTROPION BILATERAL;  Surgeon: Carly Norman MD;  Location: [facility]     REPAIR PTOSIS Bilateral 01/2017     Family History   Problem Relation Age of Onset     Diabetes Mother      Cancer No family hx of      Hypertension No family hx of      Cerebrovascular Disease No family hx of      Thyroid Disease No family hx of      Glaucoma No family hx of      Macular Degeneration No family hx of        Objective  /70   Wt 52.8 kg (116 lb 6.4 oz)   LMP 06/21/2002 (Approximate)   BMI 20.62 kg/m        General: healthy, alert and in no distress      HEENT: no scleral icterus or conjunctival erythema     Skin: no suspicious lesions or rash. No jaundice.     CV: regular rhythm by palpation, 2+ distal pulses, no pedal edema      Resp: normal respiratory effort without  conversational dyspnea     Psych: normal mood and affect      Gait: nonantalgic, appropriate coordination and balance     Neuro: normal light touch sensory exam of the extremities. Motor strength as noted below. Negative Spurling's bilaterally.     MSK L Shoulder: No gross deformity, abnormality, or asymmetry on inspection. TTP over the posterolateral shoulder. NTTP over the coronoid process, scapular spine, periscapular muscles, trapezius muscle. ROM full and painless. Strength 5/5 in BUE. +Fuentes, +Neers, +Empty Can, -Dade, -Scarf, -Speeds, -Yergeson.     Radiology  I personally reviewed the relevant imaging, including XR L Shoulder (8/16/22) and agree with the interpretation of mild GH and AC OA.     Left shoulder xray  [facility]   8/16/2022    Indication:   Chronic left shoulder pain.     Technique:   Three views.     Comparison:   None.     Findings/Impression:   No fracture or dislocation. Mild glenohumeral osteoarthritis with no joint space narrowing and small inferior humeral head osteophytes. Mild AC joint degenerative change.       Dictated by Shayan Bland MD @ Aug 16 2022  4:38PM      Large Joint Injection/Arthocentesis: L subacromial bursa    Date/Time: 9/16/2022 4:10 AM  Performed by: sIaac Zhou DO  Authorized by: Isaac Zhou DO     Indications:  Pain  Needle Size:  25 G  Guidance: landmark guided    Approach:  Anterolateral  Location:  Shoulder      Site:  L subacromial bursa  Medications:  40 mg methylPREDNISolone 40 MG/ML; 2 mL lidocaine 1 %; 2 mL bupivacaine HCl (PF) 0.25 %; 40 mg triamcinolone 40 MG/ML  Outcome:  Tolerated well, no immediate complications  Procedure discussed: discussed risks, benefits, and alternatives    Consent Given by:  Patient  Prep: patient was prepped and draped in usual sterile fashion          Assessment:  1. Left shoulder pain, unspecified chronicity        Plan:  Noreen Florence is a 72 year old female that presents with left shoulder pain. History and physical  exam appear most consistent with rotator cuff tendinopathy in the setting of glenohumeral osteoarthritis and AC OA. Discussed the nature of the condition and treatment options and mutually agreed upon the following plan:    - Medications: May use NSAIDs PRN for pain control. May also try Voltaren gel PRN.   - Therapy: Discussed PT vs HEP for scapular stabilization exercises. Patient elected to utilize HEP, exercises given to patient.   - Modalities: Ice, heat, massage PRN.   - Injections: Left shoulder subacromial bursa corticosteroid injection performed today in clinic without complications. Patient tolerated well.   - Activity: Encouraged to remain active and participate in regular activities as symptoms allow.   - Follow up as needed in the future. Patient has clinic contact information for questions or concerns.       Isaac Zhou DO  [facility] - Sports Medicine  Department of Orthopedic Surgery       Disclaimer: This note consists of symbols derived from keyboarding, dictation and/or voice recognition software. As a result, there may be errors in the script that have gone undetected. Please consider this when interpreting information found in this chart.        Again, thank you for allowing me to participate in the care of your patient.        Sincerely,        Isaac Zhou DO

## 2022-09-17 RX ORDER — LIDOCAINE HYDROCHLORIDE 10 MG/ML
2 INJECTION, SOLUTION INFILTRATION; PERINEURAL
Status: DISCONTINUED | OUTPATIENT
Start: 2022-09-16 | End: 2023-07-14

## 2022-09-17 RX ORDER — TRIAMCINOLONE ACETONIDE 40 MG/ML
40 INJECTION, SUSPENSION INTRA-ARTICULAR; INTRAMUSCULAR
Status: DISCONTINUED | OUTPATIENT
Start: 2022-09-16 | End: 2023-07-14

## 2022-09-17 RX ORDER — BUPIVACAINE HYDROCHLORIDE 2.5 MG/ML
2 INJECTION, SOLUTION EPIDURAL; INFILTRATION; INTRACAUDAL
Status: DISCONTINUED | OUTPATIENT
Start: 2022-09-16 | End: 2023-07-14

## 2022-09-17 RX ORDER — METHYLPREDNISOLONE ACETATE 40 MG/ML
40 INJECTION, SUSPENSION INTRA-ARTICULAR; INTRALESIONAL; INTRAMUSCULAR; SOFT TISSUE
Status: DISCONTINUED | OUTPATIENT
Start: 2022-09-16 | End: 2023-07-14

## 2022-09-24 ENCOUNTER — OFFICE VISIT (OUTPATIENT)
Dept: URGENT CARE | Facility: URGENT CARE | Age: 72
End: 2022-09-24
Payer: MEDICARE

## 2022-09-24 VITALS
SYSTOLIC BLOOD PRESSURE: 129 MMHG | BODY MASS INDEX: 20.42 KG/M2 | TEMPERATURE: 98.7 F | OXYGEN SATURATION: 98 % | WEIGHT: 115.25 LBS | RESPIRATION RATE: 16 BRPM | HEART RATE: 73 BPM | DIASTOLIC BLOOD PRESSURE: 79 MMHG

## 2022-09-24 DIAGNOSIS — R25.2 BILATERAL LEG CRAMPS: Primary | ICD-10-CM

## 2022-09-24 PROCEDURE — 84443 ASSAY THYROID STIM HORMONE: CPT | Performed by: PHYSICIAN ASSISTANT

## 2022-09-24 PROCEDURE — 99213 OFFICE O/P EST LOW 20 MIN: CPT | Performed by: PHYSICIAN ASSISTANT

## 2022-09-24 PROCEDURE — 36415 COLL VENOUS BLD VENIPUNCTURE: CPT | Performed by: PHYSICIAN ASSISTANT

## 2022-09-24 PROCEDURE — 80048 BASIC METABOLIC PNL TOTAL CA: CPT | Performed by: PHYSICIAN ASSISTANT

## 2022-09-24 PROCEDURE — 83735 ASSAY OF MAGNESIUM: CPT | Performed by: PHYSICIAN ASSISTANT

## 2022-09-24 RX ORDER — MULTIVITAMIN WITH IRON
1 TABLET ORAL DAILY PRN
Qty: 30 TABLET | Refills: 1 | Status: SHIPPED | OUTPATIENT
Start: 2022-09-24 | End: 2022-10-21

## 2022-09-24 ASSESSMENT — ENCOUNTER SYMPTOMS
CONSTITUTIONAL NEGATIVE: 1
COLOR CHANGE: 0
CHILLS: 0
NECK STIFFNESS: 0
JOINT SWELLING: 0
SHORTNESS OF BREATH: 0
FEVER: 0
CARDIOVASCULAR NEGATIVE: 1
RESPIRATORY NEGATIVE: 1
ARTHRALGIAS: 1
FATIGUE: 0
PALPITATIONS: 0
BACK PAIN: 0
NECK PAIN: 0
COUGH: 0
MYALGIAS: 1
WOUND: 0
WHEEZING: 0

## 2022-09-24 NOTE — PROGRESS NOTES
Subjective   Noreen is a 72 year old, presenting for the following health issues:  Urgent Care (Urgent care visit for cramps. ) and Cramping (Cramping in both feet and calves for about three weeks. The patient thought it would resolve on its own but it has not and has actually gotten worse. The patient was prescribed Magnesium before for this but hasn't had it for a while so thinks she may need to start taking it again.)    HPI   Musculoskeletal problem/pain  Onset/Duration: 3weeks  Description  Location: bilateral calf and feet  Joint Swelling: no  Redness: no  Pain: YES, describes muscle cramps when stretching and walking  Warmth: no  Intensity:  moderate  Progression of Symptoms:  same  Accompanying signs and symptoms:   Fevers: no  Numbness/tingling/weakness: no  History  Trauma to the area: no   Recent illness:  no  Previous similar problem: Yes, was on magnesium in the past with good relief.  Currently on losartan-HCTZ  Previous evaluation:  no  Precipitating or alleviating factors:  Aggravating factors include: lifting, overuse  Therapies tried and outcome: rest/inactivity, ice, immobilization, with minimal relief  Patient Active Problem List   Diagnosis     Osteopenia     Hayfever     Hypertension goal BP (blood pressure) < 140/90     Polyarthritis, inflammatory (H)     GERD (gastroesophageal reflux disease)     Hyperlipidemia with target LDL less than 130     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     High risk medication use     Microscopic hematuria     Pterygium of right eye     Sicca syndrome (H)     Angiomyolipoma of right kidney     History of Helicobacter pylori infection     Pre-diabetes     NAFLD (nonalcoholic fatty liver disease)     Elevated LFTs     Heel pain, chronic, right     Current Outpatient Medications   Medication     acetaminophen-codeine (TYLENOL #3) 300-30 MG tablet     cetirizine (ZYRTEC) 10 MG tablet     chlorhexidine (PERIDEX) 0.12 % solution     cholecalciferol (VITAMIN  D3) 25 mcg (1000 units) capsule     fluticasone (FLONASE) 50 MCG/ACT nasal spray     latanoprost (XALATAN) 0.005 % ophthalmic solution     losartan-hydrochlorothiazide (HYZAAR) 50-12.5 MG tablet     magnesium 250 MG tablet     meloxicam (MOBIC) 7.5 MG tablet     metoprolol tartrate (LOPRESSOR) 25 MG tablet     omeprazole (PRILOSEC) 40 MG DR capsule     pravastatin (PRAVACHOL) 20 MG tablet        No Known Allergies    Review of Systems   Constitutional: Negative.  Negative for chills, fatigue and fever.   Respiratory: Negative.  Negative for cough, shortness of breath and wheezing.    Cardiovascular: Negative.  Negative for chest pain, palpitations and peripheral edema.   Genitourinary: Negative.    Musculoskeletal: Positive for arthralgias and myalgias. Negative for back pain, gait problem, joint swelling, neck pain and neck stiffness.   Skin: Negative for color change, pallor, rash and wound.   All other systems reviewed and are negative.           Objective    /79 (BP Location: Left arm, Patient Position: Sitting, Cuff Size: Adult Regular)   Pulse 73   Temp 98.7  F (37.1  C) (Tympanic)   Resp 16   Wt 52.3 kg (115 lb 4 oz)   LMP 06/21/2002 (Approximate)   SpO2 98%   Breastfeeding No   BMI 20.42 kg/m    Body mass index is 20.42 kg/m .  Physical Exam  Vitals and nursing note reviewed.   Constitutional:       General: She is not in acute distress.     Appearance: Normal appearance. She is normal weight. She is not ill-appearing.   Musculoskeletal:         General: No swelling or tenderness. Normal range of motion.      Right lower leg: Normal. No swelling, deformity, lacerations, tenderness or bony tenderness. No edema.      Left lower leg: Normal. No swelling, deformity, lacerations, tenderness or bony tenderness. No edema.      Right foot: Normal. Normal range of motion and normal capillary refill. No swelling, deformity, tenderness or bony tenderness. Normal pulse.      Left foot: Normal. Normal range  of motion and normal capillary refill. No swelling, deformity, tenderness or bony tenderness. Normal pulse.   Skin:     General: Skin is warm.      Capillary Refill: Capillary refill takes less than 2 seconds.   Neurological:      General: No focal deficit present.      Mental Status: She is alert and oriented to person, place, and time.      Sensory: Sensation is intact.      Motor: Motor function is intact.      Gait: Gait is intact.      Deep Tendon Reflexes: Reflexes are normal and symmetric.   Psychiatric:         Mood and Affect: Mood normal.         Behavior: Behavior normal.         Thought Content: Thought content normal.         Judgment: Judgment normal.            Assessment/Plan:  Bilateral leg cramps:  ?electrolyte imbalance vs neuropathy vs from statin use.  Will check labs below and refill her magnesium. Recommend RICE and tylenol/ibuprofen prn pain. Increase high potassium foods.  Recheck in clinic if symptoms worsen or if symptoms do not improve.   -     Basic metabolic panel  (Ca, Cl, CO2, Creat, Gluc, K, Na, BUN); Future  -     Magnesium; Future  -     TSH with free T4 reflex; Future  -     TSH with free T4 reflex  -     Basic metabolic panel  (Ca, Cl, CO2, Creat, Gluc, K, Na, BUN)  -     Magnesium  -     magnesium 250 MG tablet; Take 1 tablet (250 mg) by mouth daily as needed (leg cramps)        Parisa Salcedo PA-C

## 2022-09-25 LAB
ANION GAP SERPL CALCULATED.3IONS-SCNC: 16 MMOL/L (ref 7–15)
BUN SERPL-MCNC: 14.4 MG/DL (ref 8–23)
CALCIUM SERPL-MCNC: 9.4 MG/DL (ref 8.8–10.2)
CHLORIDE SERPL-SCNC: 97 MMOL/L (ref 98–107)
CREAT SERPL-MCNC: 0.56 MG/DL (ref 0.51–0.95)
DEPRECATED HCO3 PLAS-SCNC: 23 MMOL/L (ref 22–29)
GFR SERPL CREATININE-BSD FRML MDRD: >90 ML/MIN/1.73M2
GLUCOSE SERPL-MCNC: 122 MG/DL (ref 70–99)
MAGNESIUM SERPL-MCNC: 2.2 MG/DL (ref 1.7–2.3)
POTASSIUM SERPL-SCNC: 3.5 MMOL/L (ref 3.4–5.3)
SODIUM SERPL-SCNC: 136 MMOL/L (ref 136–145)
TSH SERPL DL<=0.005 MIU/L-ACNC: 1.26 UIU/ML (ref 0.3–4.2)

## 2022-09-30 ENCOUNTER — APPOINTMENT (OUTPATIENT)
Dept: OPTOMETRY | Facility: CLINIC | Age: 72
End: 2022-09-30
Payer: MEDICARE

## 2022-09-30 PROCEDURE — 92341 FIT SPECTACLES BIFOCAL: CPT | Performed by: OPTOMETRIST

## 2022-10-21 ENCOUNTER — OFFICE VISIT (OUTPATIENT)
Dept: FAMILY MEDICINE | Facility: CLINIC | Age: 72
End: 2022-10-21
Payer: MEDICARE

## 2022-10-21 VITALS
HEART RATE: 75 BPM | SYSTOLIC BLOOD PRESSURE: 120 MMHG | TEMPERATURE: 97.8 F | OXYGEN SATURATION: 96 % | RESPIRATION RATE: 14 BRPM | BODY MASS INDEX: 21.94 KG/M2 | DIASTOLIC BLOOD PRESSURE: 70 MMHG | HEIGHT: 61 IN | WEIGHT: 116.2 LBS

## 2022-10-21 DIAGNOSIS — R73.03 PRE-DIABETES: ICD-10-CM

## 2022-10-21 DIAGNOSIS — Z23 HIGH PRIORITY FOR 2019-NCOV VACCINE: ICD-10-CM

## 2022-10-21 DIAGNOSIS — R25.2 BILATERAL LEG CRAMPS: ICD-10-CM

## 2022-10-21 DIAGNOSIS — M06.4 POLYARTHRITIS, INFLAMMATORY (H): ICD-10-CM

## 2022-10-21 DIAGNOSIS — K76.0 NAFLD (NONALCOHOLIC FATTY LIVER DISEASE): ICD-10-CM

## 2022-10-21 DIAGNOSIS — Z00.00 ENCOUNTER FOR MEDICARE ANNUAL WELLNESS EXAM: Primary | ICD-10-CM

## 2022-10-21 DIAGNOSIS — I10 HYPERTENSION, GOAL BELOW 150/90: ICD-10-CM

## 2022-10-21 DIAGNOSIS — E78.2 MIXED HYPERLIPIDEMIA: ICD-10-CM

## 2022-10-21 DIAGNOSIS — Z23 NEED FOR PROPHYLACTIC VACCINATION AND INOCULATION AGAINST INFLUENZA: ICD-10-CM

## 2022-10-21 DIAGNOSIS — M35.00 SICCA SYNDROME (H): ICD-10-CM

## 2022-10-21 DIAGNOSIS — K05.10 GINGIVITIS: ICD-10-CM

## 2022-10-21 DIAGNOSIS — J30.1 HAYFEVER: ICD-10-CM

## 2022-10-21 LAB
ALBUMIN SERPL-MCNC: 4 G/DL (ref 3.4–5)
ALP SERPL-CCNC: 66 U/L (ref 40–150)
ALT SERPL W P-5'-P-CCNC: 50 U/L (ref 0–50)
ANION GAP SERPL CALCULATED.3IONS-SCNC: 5 MMOL/L (ref 3–14)
AST SERPL W P-5'-P-CCNC: 34 U/L (ref 0–45)
BILIRUB SERPL-MCNC: 0.5 MG/DL (ref 0.2–1.3)
BUN SERPL-MCNC: 14 MG/DL (ref 7–30)
CALCIUM SERPL-MCNC: 9.8 MG/DL (ref 8.5–10.1)
CHLORIDE BLD-SCNC: 105 MMOL/L (ref 94–109)
CO2 SERPL-SCNC: 30 MMOL/L (ref 20–32)
CREAT SERPL-MCNC: 0.57 MG/DL (ref 0.52–1.04)
GFR SERPL CREATININE-BSD FRML MDRD: >90 ML/MIN/1.73M2
GLUCOSE BLD-MCNC: 103 MG/DL (ref 70–99)
HBA1C MFR BLD: 6.2 % (ref 0–5.6)
POTASSIUM BLD-SCNC: 3.7 MMOL/L (ref 3.4–5.3)
PROT SERPL-MCNC: 7.7 G/DL (ref 6.8–8.8)
SODIUM SERPL-SCNC: 140 MMOL/L (ref 133–144)

## 2022-10-21 PROCEDURE — G0439 PPPS, SUBSEQ VISIT: HCPCS | Performed by: PREVENTIVE MEDICINE

## 2022-10-21 PROCEDURE — 80053 COMPREHEN METABOLIC PANEL: CPT | Performed by: PREVENTIVE MEDICINE

## 2022-10-21 PROCEDURE — 0124A COVID-19,PF,PFIZER BOOSTER BIVALENT: CPT | Performed by: PREVENTIVE MEDICINE

## 2022-10-21 PROCEDURE — 83036 HEMOGLOBIN GLYCOSYLATED A1C: CPT | Performed by: PREVENTIVE MEDICINE

## 2022-10-21 PROCEDURE — 90662 IIV NO PRSV INCREASED AG IM: CPT | Performed by: PREVENTIVE MEDICINE

## 2022-10-21 PROCEDURE — 91312 COVID-19,PF,PFIZER BOOSTER BIVALENT: CPT | Performed by: PREVENTIVE MEDICINE

## 2022-10-21 PROCEDURE — 99214 OFFICE O/P EST MOD 30 MIN: CPT | Mod: 25 | Performed by: PREVENTIVE MEDICINE

## 2022-10-21 PROCEDURE — G0008 ADMIN INFLUENZA VIRUS VAC: HCPCS | Mod: 59 | Performed by: PREVENTIVE MEDICINE

## 2022-10-21 PROCEDURE — 36415 COLL VENOUS BLD VENIPUNCTURE: CPT | Performed by: PREVENTIVE MEDICINE

## 2022-10-21 RX ORDER — FLUTICASONE PROPIONATE 50 MCG
1-2 SPRAY, SUSPENSION (ML) NASAL DAILY
Qty: 16 G | Refills: 1 | Status: SHIPPED | OUTPATIENT
Start: 2022-10-21 | End: 2023-01-03

## 2022-10-21 RX ORDER — MULTIVITAMIN WITH IRON
1 TABLET ORAL DAILY PRN
Qty: 30 TABLET | Refills: 1 | Status: SHIPPED | OUTPATIENT
Start: 2022-10-21 | End: 2023-01-24

## 2022-10-21 RX ORDER — CHLORHEXIDINE GLUCONATE ORAL RINSE 1.2 MG/ML
SOLUTION DENTAL
Qty: 473 ML | Refills: 1 | Status: SHIPPED | OUTPATIENT
Start: 2022-10-21 | End: 2023-01-05

## 2022-10-21 ASSESSMENT — ENCOUNTER SYMPTOMS
ABDOMINAL PAIN: 0
COUGH: 0
FEVER: 0
DIARRHEA: 0
PARESTHESIAS: 0
CONSTIPATION: 0
BREAST MASS: 0
WEAKNESS: 0
EYE PAIN: 0
HEARTBURN: 0
PALPITATIONS: 0
JOINT SWELLING: 0
NAUSEA: 0
DYSURIA: 0
FREQUENCY: 0
SORE THROAT: 0
SHORTNESS OF BREATH: 0
CHILLS: 0
MYALGIAS: 0
DIZZINESS: 0
HEMATURIA: 0
ARTHRALGIAS: 1
HEADACHES: 0
HEMATOCHEZIA: 0
NERVOUS/ANXIOUS: 0

## 2022-10-21 ASSESSMENT — ACTIVITIES OF DAILY LIVING (ADL): CURRENT_FUNCTION: NO ASSISTANCE NEEDED

## 2022-10-21 ASSESSMENT — PAIN SCALES - GENERAL: PAINLEVEL: NO PAIN (0)

## 2022-10-21 NOTE — RESULT ENCOUNTER NOTE
Noreen,     Three month glucose number is improved from last check and in a pre diabetes range.    Prediabetes means your blood sugar is high and you are increased risk for developing overt diabetes in the future.   Be sure to monitor your intake of things like bread, pasta, rice, starchy foods (ie: potatoes), sugary beverages (ie: soda, juice-even the natural kind) and alcohol.  I recommend your plate be 1/2 vegetables, 1/4 protein, and 1/4 carbohydrate.      Other labs are pending.    Please do not hesitate to call us at [phone] if you have any questions or concerns.    Thank you,    Jud Morales MD MPH

## 2022-10-21 NOTE — PROGRESS NOTES
"SUBJECTIVE:   Noreen is a 72 year old who presents for Preventive Visit.    Patient has been advised of split billing requirements and indicates understanding: Yes     Are you in the first 12 months of your Medicare coverage?  No    Healthy Habits:     In general, how would you rate your overall health?  Good    Frequency of exercise:  4-5 days/week    Duration of exercise:  30-45 minutes    Do you usually eat at least 4 servings of fruit and vegetables a day, include whole grains    & fiber and avoid regularly eating high fat or \"junk\" foods?  Yes    Taking medications regularly:  Yes    Medication side effects:  None    Ability to successfully perform activities of daily living:  No assistance needed    Home Safety:  No safety concerns identified    Hearing Impairment:  No hearing concerns    In the past 6 months, have you been bothered by leaking of urine?  No    In general, how would you rate your overall mental or emotional health?  Good      PHQ-2 Total Score: 0    Additional concerns today:  Yes    Do you feel safe in your environment? Yes    Have you ever done Advance Care Planning? (For example, a Health Directive, POLST, or a discussion with a medical provider or your loved ones about your wishes): No, advance care planning information given to patient to review.  Patient declined advance care planning discussion at this time.      Fall risk  Fallen 2 or more times in the past year?: No  Any fall with injury in the past year?: No    Cognitive Screening   1) Repeat 3 items (Leader, Season, Table)    2) Clock draw: NORMAL  3) 3 item recall: Recalls 2 objects   Results: NORMAL clock, 1-2 items recalled: COGNITIVE IMPAIRMENT LESS LIKELY    Mini-CogTM Copyright GUIDO Cárdenas. Licensed by the author for use in [facility]; reprinted with permission (anthony@.[email or web address]). All rights reserved.      Do you have sleep apnea, excessive snoring or daytime drowsiness?: no    Reviewed and updated as needed this visit by " clinical staff   Tobacco  Allergies  Meds  Problems  Med Hx  Surg Hx  Fam Hx  Soc   Hx        Reviewed and updated as needed this visit by Provider   Tobacco  Allergies  Meds  Problems  Med Hx  Surg Hx  Fam Hx         Social History     Tobacco Use     Smoking status: Never     Smokeless tobacco: Never   Substance Use Topics     Alcohol use: No     If you drink alcohol do you typically have >3 drinks per day or >7 drinks per week? No    Alcohol Use 10/21/2022   Prescreen: >3 drinks/day or >7 drinks/week? No   Prescreen: >3 drinks/day or >7 drinks/week? -         Hyperlipidemia Follow-Up       Are you regularly taking any medication or supplement to lower your cholesterol?   Yes- statin     Are you having muscle aches or other side effects that you think could be caused by your cholesterol lowering medication?  No     Hypertension Follow-up       Do you check your blood pressure regularly outside of the clinic? No     Are you following a low salt diet? Yes    Are your blood pressures ever more than 140 on the top number (systolic) OR more       than 90 on the bottom number (diastolic), for example 140/90? No   Does walking and gardening  No chest pain  No dizziness  No pedal edema    Polyarthritis:  -followed by Rheumatology outside of [place]  -records through Care everywhere reviewed  -The methotrexate was stopped in July 2021 due to elevated liver enzymes.  Ultrasound of the liver in September 2021 revealed fatty liver.  In August 2021 her CK was 170 ALT elevated at 56 AST 42.  -seen by them every 6 months      Fatty liver:  -some exercise  -no Tylenol  -no abdominal pain  - no emesis      Would like refills on Magnesium     Mammogram scheduled  Colonoscopy UTD  Vaccines: Covid booster, Flu     Current providers sharing in care for this patient include:   Patient Care Team:  Jud Morales MD as PCP - General (Family Medicine)  Jia Castro MD (Inactive) as PCP (Family Practice)  Carmen  Emi JAVED MD as MD (Urology)  Luis Cabrera MD as Resident (Radiology)  Bri Morales MD as MD (Urology)  Rylie Marques [facility] as Pharmacist (Pharmacist)  Jud Morales MD as Assigned PCP  Yuri Knowles, DPM as Assigned Musculoskeletal Provider  Prabahkar Banerjee OD as Assigned Surgical Provider  Isaac Zhou DO as Assigned Neuroscience Provider    The following health maintenance items are reviewed in Epic and correct as of today:  Health Maintenance   Topic Date Due     URINE DRUG SCREEN  Never done     HEPATITIS B IMMUNIZATION (1 of 3 - 3-dose series) Never done     COVID-19 Vaccine (5 - Booster for Pfizer series) 07/05/2022     INFLUENZA VACCINE (1) 09/01/2022     ANNUAL REVIEW OF HM ORDERS  12/18/2022     EYE EXAM  09/09/2023     MAMMO SCREENING  10/12/2023     MEDICARE ANNUAL WELLNESS VISIT  10/21/2023     FALL RISK ASSESSMENT  10/21/2023     COLORECTAL CANCER SCREENING  04/05/2027     LIPID  05/10/2027     ADVANCE CARE PLANNING  10/21/2027     DEXA  03/18/2031     DTAP/TDAP/TD IMMUNIZATION (5 - Td or Tdap) 08/11/2032     HEPATITIS C SCREENING  Completed     PHQ-2 (once per calendar year)  Completed     Pneumococcal Vaccine: 65+ Years  Completed     ZOSTER IMMUNIZATION  Completed     IPV IMMUNIZATION  Aged Out     MENINGITIS IMMUNIZATION  Aged Out     Lab work is in process  Labs reviewed in EPIC  BP Readings from Last 3 Encounters:   10/21/22 120/70   09/24/22 129/79   09/16/22 110/70    Wt Readings from Last 3 Encounters:   10/21/22 52.7 kg (116 lb 3.2 oz)   09/24/22 52.3 kg (115 lb 4 oz)   09/16/22 52.8 kg (116 lb 6.4 oz)                  Patient Active Problem List   Diagnosis     Osteopenia     Hayfever     Hypertension goal BP (blood pressure) < 140/90     Polyarthritis, inflammatory (H)     GERD (gastroesophageal reflux disease)     Hyperlipidemia with target LDL less than 130     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     High risk medication use      Microscopic hematuria     Pterygium of right eye     Sicca syndrome (H)     Angiomyolipoma of right kidney     History of Helicobacter pylori infection     Pre-diabetes     NAFLD (nonalcoholic fatty liver disease)     Elevated LFTs     Heel pain, chronic, right     Past Surgical History:   Procedure Laterality Date     COLONOSCOPY       COLONOSCOPY WITH CO2 INSUFFLATION N/A 4/5/2017    Procedure: COLONOSCOPY WITH CO2 INSUFFLATION;  Surgeon: Duane, William Charles, MD;  Location: [facility] OR     COMBINED ESOPHAGOSCOPY, GASTROSCOPY, DUODENOSCOPY (EGD) WITH CO2 INSUFFLATION N/A 2/13/2019    Procedure: COMBINED ESOPHAGOSCOPY, GASTROSCOPY, DUODENOSCOPY (EGD) WITH CO2 INSUFFLATION;  Surgeon: Sly De Santiago MD;  Location: [facility]     COMBINED ESOPHAGOSCOPY, GASTROSCOPY, DUODENOSCOPY (EGD) WITH CO2 INSUFFLATION N/A 2/7/2022    Procedure: ESOPHAGOGASTRODUODENOSCOPY, WITH CO2 INSUFFLATION;  Surgeon: Luis Aceves MD;  Location: [facility]     COMBINED REPAIR PTOSIS WITH BLEPHAROPLASTY BILATERAL Bilateral 1/6/2017    Procedure: COMBINED REPAIR PTOSIS WITH BLEPHAROPLASTY BILATERAL;  Surgeon: Carly Norman MD;  Location: [facility]     ESOPHAGOSCOPY, GASTROSCOPY, DUODENOSCOPY (EGD), COMBINED N/A 1/5/2016    Procedure: COMBINED ESOPHAGOSCOPY, GASTROSCOPY, DUODENOSCOPY (EGD), BIOPSY SINGLE OR MULTIPLE;  Surgeon: Duane, William Charles, MD;  Location: [facility]     ESOPHAGOSCOPY, GASTROSCOPY, DUODENOSCOPY (EGD), COMBINED N/A 2/13/2019    Procedure: Combined Esophagoscopy, Gastroscopy, Duodenoscopy (Egd), Biopsy Single Or Multiple;  Surgeon: Sly De Santiago MD;  Location: [facility]     ESOPHAGOSCOPY, GASTROSCOPY, DUODENOSCOPY (EGD), COMBINED N/A 2/7/2022    Procedure: ESOPHAGOGASTRODUODENOSCOPY, WITH BIOPSY;  Surgeon: Luis Aceves MD;  Location: [facility]     REPAIR ENTROPION BILATERAL Bilateral 1/6/2017    Procedure: REPAIR ENTROPION BILATERAL;  Surgeon: Carly Norman MD;  Location: [facility]     REPAIR PTOSIS  Bilateral 01/2017       Social History     Tobacco Use     Smoking status: Never     Smokeless tobacco: Never   Substance Use Topics     Alcohol use: No     Family History   Problem Relation Age of Onset     Diabetes Mother      Cancer No family hx of      Hypertension No family hx of      Cerebrovascular Disease No family hx of      Thyroid Disease No family hx of      Glaucoma No family hx of      Macular Degeneration No family hx of          Current Outpatient Medications   Medication Sig Dispense Refill     acetaminophen-codeine (TYLENOL #3) 300-30 MG tablet TAKE 1 TABLET BY MOUTH EVERY 4 TO 6 HOURS AS NEEDED FOR PAIN       cetirizine (ZYRTEC) 10 MG tablet Take 1 tablet (10 mg) by mouth daily 90 tablet 1     chlorhexidine (PERIDEX) 0.12 % solution SWISH AND SPIT 15 ML BY MOUTH TWICE DAILY AS NEEDED Strength: 0.12 % 473 mL 1     cholecalciferol (VITAMIN D3) 25 mcg (1000 units) capsule Take 1,000 Units by mouth       fluticasone (FLONASE) 50 MCG/ACT nasal spray Spray 1-2 sprays into both nostrils daily 16 g 1     latanoprost (XALATAN) 0.005 % ophthalmic solution INSTILL 1 DROP IN BOTH EYES AT BEDTIME 7.5 mL 1     losartan-hydrochlorothiazide (HYZAAR) 50-12.5 MG tablet Take 1 tablet by mouth daily 90 tablet 3     magnesium 250 MG tablet Take 1 tablet (250 mg) by mouth daily as needed (leg cramps) 30 tablet 1     meloxicam (MOBIC) 7.5 MG tablet 15 MG/D WITH FOOD IN AM FOR 2 WEEKS AND THEN STAY ON 7.5 MG DAILY WITH FOOD       methylcellulose (CITRUCEL) powder Take 0.3 g (1.05 teaspoonful) by mouth daily 454 g 1     metoprolol tartrate (LOPRESSOR) 25 MG tablet Take 1 tablet (25 mg) by mouth At Bedtime 90 tablet 3     omeprazole (PRILOSEC) 40 MG DR capsule Take 1 capsule (40 mg) by mouth daily 90 capsule 3     pravastatin (PRAVACHOL) 20 MG tablet Take 1 tablet (20 mg) by mouth daily 90 tablet 3     No Known Allergies        Review of Systems   Constitutional: Negative for chills and fever.   HENT: Negative for  "congestion, ear pain, hearing loss and sore throat.    Eyes: Negative for pain and visual disturbance.   Respiratory: Negative for cough and shortness of breath.    Cardiovascular: Negative for chest pain, palpitations and peripheral edema.   Gastrointestinal: Negative for abdominal pain, constipation, diarrhea, heartburn, hematochezia and nausea.   Breasts:  Negative for tenderness, breast mass and discharge.   Genitourinary: Negative for dysuria, frequency, genital sores, hematuria, pelvic pain, urgency, vaginal bleeding and vaginal discharge.   Musculoskeletal: Positive for arthralgias. Negative for joint swelling and myalgias.   Skin: Negative for rash.   Neurological: Negative for dizziness, weakness, headaches and paresthesias.   Psychiatric/Behavioral: Negative for mood changes. The patient is not nervous/anxious.        OBJECTIVE:   /70   Pulse 75   Temp 97.8  F (36.6  C) (Oral)   Resp 14   Ht 1.54 m (5' 0.63\")   Wt 52.7 kg (116 lb 3.2 oz)   LMP 06/21/2002 (Approximate)   SpO2 96%   BMI 22.22 kg/m   Estimated body mass index is 22.22 kg/m  as calculated from the following:    Height as of this encounter: 1.54 m (5' 0.63\").    Weight as of this encounter: 52.7 kg (116 lb 3.2 oz).  Physical Exam  GENERAL APPEARANCE: healthy, alert and no distress  EYES: Eyes grossly normal to inspection and conjunctivae and sclerae normal  NECK: no adenopathy and trachea midline and normal to palpation  RESP: lungs clear to auscultation - no rales, rhonchi or wheezes  CV: regular rates and rhythm, normal S1 S2, no S3 or S4 and no murmur, click or rub  ABDOMEN: soft, non-tender and no rebound or guarding   MS: extremities normal- no gross deformities noted and peripheral pulses normal  SKIN: no suspicious lesions or rashes  NEURO: Normal strength and tone, mentation intact and speech normal  PSYCH: mentation appears normal      Diagnostic Test Results:  Labs reviewed in Epic  No results found for this or any " previous visit (from the past 24 hour(s)).    ASSESSMENT / PLAN:   Noreen was seen today for physical, imm/inj, imm/inj and imm/inj.    Diagnoses and all orders for this visit:    Encounter for Medicare annual wellness exam  -preventive guidelines reviewed  -mammogram is scheduled  -colonoscopy up to date, last done 2/2018    Hypertension, goal below 150/90  -     Comprehensive metabolic panel (BMP + Alb, Alk Phos, ALT, AST, Total. Bili, TP); Future  -at goal, continue current medication     Polyarthritis, inflammatory (H)  -     Comprehensive metabolic panel (BMP + Alb, Alk Phos, ALT, AST, Total. Bili, TP); Future  [facility]     Sicca syndrome (H)  -     methylcellulose (CITRUCEL) powder; Take 0.3 g (1.05 teaspoonful) by mouth daily  -followed by Rheumatology     NAFLD (nonalcoholic fatty liver disease)  -     Comprehensive metabolic panel (BMP + Alb, Alk Phos, ALT, AST, Total. Bili, TP); Future    Pre-diabetes  -     Hemoglobin A1c; Future  -recheck labs today  -Last HbA1C was right at a diabetic range at 6.5     Lab Results   Component Value Date    A1C 6.5 05/10/2022    A1C 5.6 10/08/2020    A1C 5.9 04/26/2019    A1C 5.9 06/21/2018    A1C 5.9 10/27/2016         Mixed hyperlipidemia  -     Comprehensive metabolic panel (BMP + Alb, Alk Phos, ALT, AST, Total. Bili, TP); Future  -continue statin     Gingivitis  -     chlorhexidine (PERIDEX) 0.12 % solution; SWISH AND SPIT 15 ML BY MOUTH TWICE DAILY AS NEEDED Strength: 0.12 %    Bilateral leg cramps  -     magnesium 250 MG tablet; Take 1 tablet (250 mg) by mouth daily as needed (leg cramps)  -levels normal 9/22    Hayfever  -     fluticasone (FLONASE) 50 MCG/ACT nasal spray; Spray 1-2 sprays into both nostrils daily    Need for prophylactic vaccination and inoculation against influenza  -     INFLUENZA, QUAD, HIGH DOSE, PF, 65YR + (FLUZONE HD)    High priority for 2019-nCoV vaccine  -     COVID-19,PF,PFIZER BOOSTER BIVALENT  "12+Yrs          COUNSELING:  Reviewed preventive health counseling, as reflected in patient instructions       Regular exercise       Healthy diet/nutrition       Vision screening       Colon cancer screening    Estimated body mass index is 22.22 kg/m  as calculated from the following:    Height as of this encounter: 1.54 m (5' 0.63\").    Weight as of this encounter: 52.7 kg (116 lb 3.2 oz).    Weight management plan noted, stable and monitoring    She reports that she has never smoked. She has never used smokeless tobacco.      Appropriate preventive services were discussed with this patient, including applicable screening as appropriate for cardiovascular disease, diabetes, osteopenia/osteoporosis, and glaucoma.  As appropriate for age/gender, discussed screening for colorectal cancer, prostate cancer, breast cancer, and cervical cancer. Checklist reviewing preventive services available has been given to the patient.    Reviewed patients plan of care and provided an AVS. The Basic Care Plan (routine screening as documented in Health Maintenance) for Noreen meets the Care Plan requirement. This Care Plan has been established and reviewed with the Patient.    Counseling Resources:  ATP IV Guidelines  Pooled Cohorts Equation Calculator  Breast Cancer Risk Calculator  Breast Cancer: Medication to Reduce Risk  FRAX Risk Assessment  ICSI Preventive Guidelines  Dietary Guidelines for Americans, 2010  [employer]'s MyPlate  ASA Prophylaxis  Lung CA Screening    Jud Morales MD MPH    [facility]    Identified Health Risks:  "

## 2022-10-23 DIAGNOSIS — H40.013 OPEN ANGLE WITH BORDERLINE FINDINGS AND LOW GLAUCOMA RISK IN BOTH EYES: ICD-10-CM

## 2022-10-25 NOTE — RESULT ENCOUNTER NOTE
Noreen,     Electrolytes, glucose, kidney function and liver function tests are normal.     Please do not hesitate to call us at [phone] if you have any questions or concerns.    Thank you,    Jud Morales MD MPH

## 2022-10-26 RX ORDER — LATANOPROST 50 UG/ML
1 SOLUTION/ DROPS OPHTHALMIC AT BEDTIME
Qty: 7.5 ML | Refills: 6 | Status: SHIPPED | OUTPATIENT
Start: 2022-10-26 | End: 2023-12-26

## 2022-10-26 NOTE — TELEPHONE ENCOUNTER
LATANOPROST 0.005% OPHTH SOLN 2.5ML   Last Written Prescription Date:   6/3/2022  Last Fill Quantity: 7.5,   # refills: 1  Last Office Visit :  9/9/2022  Future Office visit:   9/8/2023   Prabhakar Banerjee, OD  Optometry     Assessment/Plan  (H40.013) Open angle with borderline findings and low glaucoma risk in both eyes  (primary encounter diagnosis)  Comment: Patient takes latanoprost at bedtime in both eyes. No indication of progression since last exam. Large ONH with healthy rim tissue  Plan: Discussed findings with patient. Suspect that cupping is most likely physiological, but with well-controlled IOP and stable RNFL findings, will resume taking latanoprost nightly in both eyes.  Follow up in 1 year with repeat RNFL OCT. If changes are noted, will plan on seeing patient sooner for additional testing.     7.5 mL, 6 Refills sent to evelio Garcia RN  Central Triage Red Flags/Med Refills

## 2022-10-27 ENCOUNTER — TELEPHONE (OUTPATIENT)
Dept: FAMILY MEDICINE | Facility: CLINIC | Age: 72
End: 2022-10-27

## 2022-10-28 NOTE — TELEPHONE ENCOUNTER
There is no prescription medication for a fiber supplement. She can try Metamucil over the counter if that is more cost effective for her.  Thank you,  Jud Morales MD MPH

## 2022-10-28 NOTE — TELEPHONE ENCOUNTER
Called and spoke to the patient and gave her Dr Morales's message. Patient understands.  Vera Batista MA  [facility]  2nd Floor  Primary Care

## 2022-11-02 DIAGNOSIS — J30.1 HAYFEVER: ICD-10-CM

## 2022-11-03 RX ORDER — CETIRIZINE HYDROCHLORIDE 10 MG/1
TABLET ORAL
Qty: 90 TABLET | Refills: 1 | Status: SHIPPED | OUTPATIENT
Start: 2022-11-03 | End: 2023-04-25

## 2022-11-08 ENCOUNTER — ANCILLARY PROCEDURE (OUTPATIENT)
Dept: MAMMOGRAPHY | Facility: CLINIC | Age: 72
End: 2022-11-08
Attending: PREVENTIVE MEDICINE
Payer: MEDICARE

## 2022-11-08 DIAGNOSIS — Z12.31 VISIT FOR SCREENING MAMMOGRAM: ICD-10-CM

## 2022-11-08 PROCEDURE — 77067 SCR MAMMO BI INCL CAD: CPT | Mod: TC | Performed by: RADIOLOGY

## 2022-11-08 PROCEDURE — 77063 BREAST TOMOSYNTHESIS BI: CPT | Mod: TC | Performed by: RADIOLOGY

## 2022-11-16 ENCOUNTER — OFFICE VISIT (OUTPATIENT)
Dept: PODIATRY | Facility: CLINIC | Age: 72
End: 2022-11-16
Payer: MEDICARE

## 2022-11-16 VITALS — HEART RATE: 82 BPM | SYSTOLIC BLOOD PRESSURE: 150 MMHG | DIASTOLIC BLOOD PRESSURE: 74 MMHG

## 2022-11-16 DIAGNOSIS — M76.61 ACHILLES TENDINITIS OF RIGHT LOWER EXTREMITY: ICD-10-CM

## 2022-11-16 DIAGNOSIS — Q66.70 CONGENITAL CAVUS FOOT: ICD-10-CM

## 2022-11-16 DIAGNOSIS — L60.0 INGROWING NAIL: ICD-10-CM

## 2022-11-16 DIAGNOSIS — M76.71 PERONEUS LONGUS TENDINITIS, RIGHT: Primary | ICD-10-CM

## 2022-11-16 PROCEDURE — 11730 AVULSION NAIL PLATE SIMPLE 1: CPT | Mod: T5 | Performed by: PODIATRIST

## 2022-11-16 PROCEDURE — 99213 OFFICE O/P EST LOW 20 MIN: CPT | Mod: 25 | Performed by: PODIATRIST

## 2022-11-16 NOTE — PROGRESS NOTES
Subjective:    6/22/22   Pt is seen today in consult from Ashli Garcia.  Points to plantar right foot.  Has had this for 3 weeks.  Pain aggravated by activity and relieved by rest.  Was doing some gardening and using a shovel a few times before this happened.  Denies ecchymosis erythema weakness or increased deformity.  She has rheumatoid arthritis.    7/13/22 patient returns for right plantar lateral foot pain.  Has been wearing ankle brace since last visit.  He is now feeling 50% better.  Denies new edema ecchymosis or weakness.    8/10/22   patient returns for right plantar lateral foot pain.  Since last visit has seen no improvement.  Wearing ankle brace.  Denies edema or ecchymosis.    8/31/22   patient returns for right foot pain.  Gave patient cam walker last visit.  She started wearing this and now feeling 80% better.  She is here for reevaluation and to discuss MRI results.    11/16/22 patient returns for right lateral foot pain.  She states that generally she is getting better.  Occasionally wears ankle brace and she would like a new one.  Has no pain and points to the insertion of her Achilles tendon.  Also having ingrown nail and points to her right hallux medial border.  She has tried to trim this back to relieve it.  Denies purulence or odor.         ROS: See above      No Known Allergies    Current Outpatient Medications   Medication Sig Dispense Refill     acetaminophen-codeine (TYLENOL #3) 300-30 MG tablet TAKE 1 TABLET BY MOUTH EVERY 4 TO 6 HOURS AS NEEDED FOR PAIN       cetirizine (ZYRTEC) 10 MG tablet TAKE 1 TABLET(10 MG) BY MOUTH DAILY 90 tablet 1     chlorhexidine (PERIDEX) 0.12 % solution SWISH AND SPIT 15 ML BY MOUTH TWICE DAILY AS NEEDED Strength: 0.12 % 473 mL 1     cholecalciferol (VITAMIN D3) 25 mcg (1000 units) capsule Take 1,000 Units by mouth       fluticasone (FLONASE) 50 MCG/ACT nasal spray Spray 1-2 sprays into both nostrils daily 16 g 1     latanoprost (XALATAN) 0.005 % ophthalmic  solution Place 1 drop into both eyes At Bedtime 7.5 mL 6     losartan-hydrochlorothiazide (HYZAAR) 50-12.5 MG tablet Take 1 tablet by mouth daily 90 tablet 3     magnesium 250 MG tablet Take 1 tablet (250 mg) by mouth daily as needed (leg cramps) 30 tablet 1     meloxicam (MOBIC) 7.5 MG tablet 15 MG/D WITH FOOD IN AM FOR 2 WEEKS AND THEN STAY ON 7.5 MG DAILY WITH FOOD       methylcellulose (CITRUCEL) powder Take 0.3 g (1.05 teaspoonful) by mouth daily 454 g 1     metoprolol tartrate (LOPRESSOR) 25 MG tablet Take 1 tablet (25 mg) by mouth At Bedtime 90 tablet 3     omeprazole (PRILOSEC) 40 MG DR capsule Take 1 capsule (40 mg) by mouth daily 90 capsule 3     pravastatin (PRAVACHOL) 20 MG tablet Take 1 tablet (20 mg) by mouth daily 90 tablet 3       Patient Active Problem List   Diagnosis     Osteopenia     Hayfever     Hypertension goal BP (blood pressure) < 140/90     Polyarthritis, inflammatory (H)     GERD (gastroesophageal reflux disease)     Hyperlipidemia with target LDL less than 130     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     High risk medication use     Microscopic hematuria     Pterygium of right eye     Sicca syndrome (H)     Angiomyolipoma of right kidney     History of Helicobacter pylori infection     Pre-diabetes     NAFLD (nonalcoholic fatty liver disease)     Elevated LFTs     Heel pain, chronic, right       Past Medical History:   Diagnosis Date     GERD (gastroesophageal reflux disease) 3/20/2015     Glaucoma (increased eye pressure)      Hyperlipidemia LDL goal < 130 8/3/2015     Hypertension      Hypertension, goal below 150/90 11/28/2014     Rheumatoid arthritis, adult (H)        Past Surgical History:   Procedure Laterality Date     COLONOSCOPY       COLONOSCOPY WITH CO2 INSUFFLATION N/A 4/5/2017    Procedure: COLONOSCOPY WITH CO2 INSUFFLATION;  Surgeon: Duane, William Charles, MD;  Location: [facility]     COMBINED ESOPHAGOSCOPY, GASTROSCOPY, DUODENOSCOPY (EGD) WITH CO2 INSUFFLATION  N/A 2/13/2019    Procedure: COMBINED ESOPHAGOSCOPY, GASTROSCOPY, DUODENOSCOPY (EGD) WITH CO2 INSUFFLATION;  Surgeon: Sly De Santiago MD;  Location: [facility]     COMBINED ESOPHAGOSCOPY, GASTROSCOPY, DUODENOSCOPY (EGD) WITH CO2 INSUFFLATION N/A 2/7/2022    Procedure: ESOPHAGOGASTRODUODENOSCOPY, WITH CO2 INSUFFLATION;  Surgeon: Luis Aceves MD;  Location: [facility]     COMBINED REPAIR PTOSIS WITH BLEPHAROPLASTY BILATERAL Bilateral 1/6/2017    Procedure: COMBINED REPAIR PTOSIS WITH BLEPHAROPLASTY BILATERAL;  Surgeon: Carly Norman MD;  [facility]: [facility]     ESOPHAGOSCOPY, GASTROSCOPY, DUODENOSCOPY (EGD), COMBINED N/A 1/5/2016    Procedure: COMBINED ESOPHAGOSCOPY, GASTROSCOPY, DUODENOSCOPY (EGD), BIOPSY SINGLE OR MULTIPLE;  Surgeon: Duane, William Charles, MD;  Location: [facility]     ESOPHAGOSCOPY, GASTROSCOPY, DUODENOSCOPY (EGD), COMBINED N/A 2/13/2019    Procedure: Combined Esophagoscopy, Gastroscopy, Duodenoscopy (Egd), Biopsy Single Or Multiple;  Surgeon: Sly De Santiago MD;  Location: [facility]     ESOPHAGOSCOPY, GASTROSCOPY, DUODENOSCOPY (EGD), COMBINED N/A 2/7/2022    Procedure: ESOPHAGOGASTRODUODENOSCOPY, WITH BIOPSY;  Surgeon: Luis Aceves MD;  Location: [facility]     REPAIR ENTROPION BILATERAL Bilateral 1/6/2017    Procedure: REPAIR ENTROPION BILATERAL;  Surgeon: Carly Norman MD;  Location: [facility]     REPAIR PTOSIS Bilateral 01/2017       Family History   Problem Relation Age of Onset     Diabetes Mother      Cancer No family hx of      Hypertension No family hx of      Cerebrovascular Disease No family hx of      Thyroid Disease No family hx of      Glaucoma No family hx of      Macular Degeneration No family hx of        Social History     Tobacco Use     Smoking status: Never     Smokeless tobacco: Never   Substance Use Topics     Alcohol use: No             O:  BP (!) 150/74   Pulse 82   LMP 06/21/2002 (Approximate) .     Constitutional/ general:  Pt is in no apparent  distress, appears well-nourished.  Cooperative with history and physical exam.     Psych:  The patient answered questions appropriately.  Normal affect.  Seems to have reasonable expectations, in terms of treatment.     Lungs:  Non labored breathing, non labored speech. No cough.  No audible wheezing. Even, quiet breathing.       Vascular:  Pedal pulses are palpable bilaterally for both the DP and PT arteries.  CFT < 3 sec.  No edema.  Pedal hair growth noted.     Neuro:  Alert and oriented x 3. Coordinated gait.  Light touch sensation is intact     Derm: Normal texture and turgor.  No erythema, ecchymosis, or cyanosis.  No open lesions.  Right hallux medial border has slight erythema and nail ingrown.  No purulence or odor.    Musculoskeletal:    Lower extremity muscle strength is normal.  Patient is ambulatory without an assistive device or brace.     Cavus foot with weightbearing bilateral.  No forefoot or rear foot deformities noted.  MS 5/5 all compartments.  Normal ROM all fore foot and rearfoot joints with no pain or crepitus.  No equinus.   Slight pain peroneal tendon lateral calcaneus.  Slight pain with stressing peroneus longus.  Slight discomfort at Achilles insertion centrally.    Radiographic Exam:   Narrative & Impression   EXAM: MR ANKLE RIGHT W/O CONTRAST  LOCATION: [facility]  DATE/TIME: 8/16/2022 6:20 PM     INDICATION: Right rear foot MRI to evaluate peroneal tendons.  COMPARISON: None.  TECHNIQUE: Unenhanced.     FINDINGS:      TENDONS:   -Peroneal: There is mild peroneus longus tendinopathy, with a markedly edematous os peroneum. (Series 6, image 9). Peroneus brevis is intact. There is mild to moderate peroneal tenosynovitis.  -Medial: Posterior tibialis tendon is intact. No tendinopathy or tenosynovitis. Flexor digitorum longus and flexor hallucis longus tendons are intact. There is mild flexor digitorum longus and flexor hallucis longus tenosynovitis.  -Anterior: Anterior  tibialis, extensor hallucis longus, and extensor digitorum longus tendons are normal. No tenosynovitis.  -Achilles: Moderate Achilles tendinosis with some low-grade interstitial longitudinal partial tearing. No high-grade Achilles tear is seen. There is mild Achilles paratenonitis. There is insertional enthesopathy at the calcaneal tuberosity.     LIGAMENTS:   -Anterior talofibular ligament: There is some thinning of the anterior talofibular ligament which relates to chronic partial tearing.   -Calcaneofibular ligament: Intact.   -Posterior talofibular ligament: Intact.  -Syndesmotic inferior tibiofibular ligaments: Intact.  -Deltoid ligament complex: Intact.  -Spring ligament complex: Intact.     JOINTS AND BONES:   -No fracture, bone contusion or osteochondral lesion. No joint effusion or synovitis.     SOFT TISSUES:  -Plantar fascia: Intact. No acute fasciitis or tear.  -Sinus tarsi and tarsal tunnel: Normal.  -Muscles: Normal.                                                                      IMPRESSION:  1.  Mild peroneus longus tendinopathy, with a markedly edematous os peroneum. 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 There is mild to moderate peroneal tenosynovitis. No peroneal tear is seen.  2.  Moderate Achilles tendinosis with some low-grade interstitial longitudinal partial tearing. No high-grade Achilles tear is seen. There is mild Achilles paratenonitis as well as insertional enthesopathy at the calcaneal tuberosity.         A:  Cavus foot with peroneus longus tendinitis/os peroneum edema       Right hallux medial border ingrown nail       Right insertional Achilles tendinitis    Plan:  Patient to continue to use ankle brace as needed.  We dispensed a new 1 today.  Will refer to physical therapy for peroneal tendinitis and insertional Achilles tendinitis.  Discussed cause of ingrown nail.   Discussed etiology and treatment options with the pt.  Risks and benefits of partial temporary  nail removal were discussed in detail.  Obtained consent and blocked hallux using 3 cc of 1% Lidocaine plain into the right hallux.  Sterile prep and avulsed the medial border and placed dry sterile dressing.  Cappillary refill time wnl.  Pt tolerated procedure well.  Wound care instructions given and RTC PRN.        Yuri Knowles DPM, FACFAS

## 2022-11-16 NOTE — LETTER
11/16/2022         RE: Noreen Florence  [address]  [place] [address]        Dear Colleague,    Thank you for referring your patient, Noreen Florence, to the [facility]. Please see a copy of my visit note below.    Subjective:    6/22/22   Pt is seen today in consult from Ashli Garcia.  Points to plantar right foot.  Has had this for 3 weeks.  Pain aggravated by activity and relieved by rest.  Was doing some gardening and using a shovel a few times before this happened.  Denies ecchymosis erythema weakness or increased deformity.  She has rheumatoid arthritis.    7/13/22 patient returns for right plantar lateral foot pain.  Has been wearing ankle brace since last visit.  He is now feeling 50% better.  Denies new edema ecchymosis or weakness.    8/10/22   patient returns for right plantar lateral foot pain.  Since last visit has seen no improvement.  Wearing ankle brace.  Denies edema or ecchymosis.    8/31/22   patient returns for right foot pain.  Gave patient cam walker last visit.  She started wearing this and now feeling 80% better.  She is here for reevaluation and to discuss MRI results.    11/16/22 patient returns for right lateral foot pain.  She states that generally she is getting better.  Occasionally wears ankle brace and she would like a new one.  Has no pain and points to the insertion of her Achilles tendon.  Also having ingrown nail and points to her right hallux medial border.  She has tried to trim this back to relieve it.  Denies purulence or odor.         ROS: See above      No Known Allergies    Current Outpatient Medications   Medication Sig Dispense Refill     acetaminophen-codeine (TYLENOL #3) 300-30 MG tablet TAKE 1 TABLET BY MOUTH EVERY 4 TO 6 HOURS AS NEEDED FOR PAIN       cetirizine (ZYRTEC) 10 MG tablet TAKE 1 TABLET(10 MG) BY MOUTH DAILY 90 tablet 1     chlorhexidine (PERIDEX) 0.12 % solution SWISH AND SPIT 15 ML BY MOUTH TWICE DAILY AS NEEDED Strength: 0.12 %  473 mL 1     cholecalciferol (VITAMIN D3) 25 mcg (1000 units) capsule Take 1,000 Units by mouth       fluticasone (FLONASE) 50 MCG/ACT nasal spray Spray 1-2 sprays into both nostrils daily 16 g 1     latanoprost (XALATAN) 0.005 % ophthalmic solution Place 1 drop into both eyes At Bedtime 7.5 mL 6     losartan-hydrochlorothiazide (HYZAAR) 50-12.5 MG tablet Take 1 tablet by mouth daily 90 tablet 3     magnesium 250 MG tablet Take 1 tablet (250 mg) by mouth daily as needed (leg cramps) 30 tablet 1     meloxicam (MOBIC) 7.5 MG tablet 15 MG/D WITH FOOD IN AM FOR 2 WEEKS AND THEN STAY ON 7.5 MG DAILY WITH FOOD       methylcellulose (CITRUCEL) powder Take 0.3 g (1.05 teaspoonful) by mouth daily 454 g 1     metoprolol tartrate (LOPRESSOR) 25 MG tablet Take 1 tablet (25 mg) by mouth At Bedtime 90 tablet 3     omeprazole (PRILOSEC) 40 MG DR capsule Take 1 capsule (40 mg) by mouth daily 90 capsule 3     pravastatin (PRAVACHOL) 20 MG tablet Take 1 tablet (20 mg) by mouth daily 90 tablet 3       Patient Active Problem List   Diagnosis     Osteopenia     Hayfever     Hypertension goal BP (blood pressure) < 140/90     Polyarthritis, inflammatory (H)     GERD (gastroesophageal reflux disease)     Hyperlipidemia with target LDL less than 130     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     High risk medication use     Microscopic hematuria     Pterygium of right eye     Sicca syndrome (H)     Angiomyolipoma of right kidney     History of Helicobacter pylori infection     Pre-diabetes     NAFLD (nonalcoholic fatty liver disease)     Elevated LFTs     Heel pain, chronic, right       Past Medical History:   Diagnosis Date     GERD (gastroesophageal reflux disease) 3/20/2015     Glaucoma (increased eye pressure)      Hyperlipidemia LDL goal < 130 8/3/2015     Hypertension      Hypertension, goal below 150/90 11/28/2014     Rheumatoid arthritis, adult (H)        Past Surgical History:   Procedure Laterality Date     COLONOSCOPY        COLONOSCOPY WITH CO2 INSUFFLATION N/A 4/5/2017    Procedure: COLONOSCOPY WITH CO2 INSUFFLATION;  Surgeon: Duane, William Charles, MD;  Location: [facility]     COMBINED ESOPHAGOSCOPY, GASTROSCOPY, DUODENOSCOPY (EGD) WITH CO2 INSUFFLATION N/A 2/13/2019    Procedure: COMBINED ESOPHAGOSCOPY, GASTROSCOPY, DUODENOSCOPY (EGD) WITH CO2 INSUFFLATION;  Surgeon: Sly De Santiago MD;  Location: [facility]     COMBINED ESOPHAGOSCOPY, GASTROSCOPY, DUODENOSCOPY (EGD) WITH CO2 INSUFFLATION N/A 2/7/2022    Procedure: ESOPHAGOGASTRODUODENOSCOPY, WITH CO2 INSUFFLATION;  Surgeon: Luis Aceves MD;  Location: [facility]     COMBINED REPAIR PTOSIS WITH BLEPHAROPLASTY BILATERAL Bilateral 1/6/2017    Procedure: COMBINED REPAIR PTOSIS WITH BLEPHAROPLASTY BILATERAL;  Surgeon: Carly Norman MD;  Location: [facility]     ESOPHAGOSCOPY, GASTROSCOPY, DUODENOSCOPY (EGD), COMBINED N/A 1/5/2016    Procedure: COMBINED ESOPHAGOSCOPY, GASTROSCOPY, DUODENOSCOPY (EGD), BIOPSY SINGLE OR MULTIPLE;  Surgeon: Duane, William Charles, MD;  Location: [facility]     ESOPHAGOSCOPY, GASTROSCOPY, DUODENOSCOPY (EGD), COMBINED N/A 2/13/2019    Procedure: Combined Esophagoscopy, Gastroscopy, Duodenoscopy (Egd), Biopsy Single Or Multiple;  Surgeon: Sly De Santiago MD;  Location: [facility]     ESOPHAGOSCOPY, GASTROSCOPY, DUODENOSCOPY (EGD), COMBINED N/A 2/7/2022    Procedure: ESOPHAGOGASTRODUODENOSCOPY, WITH BIOPSY;  Surgeon: Luis Aceves MD;  Location: [facility]     REPAIR ENTROPION BILATERAL Bilateral 1/6/2017    Procedure: REPAIR ENTROPION BILATERAL;  Surgeon: Carly Norman MD;  Location: [facility]     REPAIR PTOSIS Bilateral 01/2017       Family History   Problem Relation Age of Onset     Diabetes Mother      Cancer No family hx of      Hypertension No family hx of      Cerebrovascular Disease No family hx of      Thyroid Disease No family hx of      Glaucoma No family hx of      Macular Degeneration No family hx of        Social History      Tobacco Use     Smoking status: Never     Smokeless tobacco: Never   Substance Use Topics     Alcohol use: No             O:  BP (!) 150/74   Pulse 82   LMP 06/21/2002 (Approximate) .     Constitutional/ general:  Pt is in no apparent distress, appears well-nourished.  Cooperative with history and physical exam.     Psych:  The patient answered questions appropriately.  Normal affect.  Seems to have reasonable expectations, in terms of treatment.     Lungs:  Non labored breathing, non labored speech. No cough.  No audible wheezing. Even, quiet breathing.       Vascular:  Pedal pulses are palpable bilaterally for both the DP and PT arteries.  CFT < 3 sec.  No edema.  Pedal hair growth noted.     Neuro:  Alert and oriented x 3. Coordinated gait.  Light touch sensation is intact     Derm: Normal texture and turgor.  No erythema, ecchymosis, or cyanosis.  No open lesions.  Right hallux medial border has slight erythema and nail ingrown.  No purulence or odor.    Musculoskeletal:    Lower extremity muscle strength is normal.  Patient is ambulatory without an assistive device or brace.     Cavus foot with weightbearing bilateral.  No forefoot or rear foot deformities noted.  MS 5/5 all compartments.  Normal ROM all fore foot and rearfoot joints with no pain or crepitus.  No equinus.   Slight pain peroneal tendon lateral calcaneus.  Slight pain with stressing peroneus longus.  Slight discomfort at Achilles insertion centrally.    Radiographic Exam:   Narrative & Impression   EXAM: MR ANKLE RIGHT W/O CONTRAST  LOCATION: [facility]  DATE/TIME: 8/16/2022 6:20 PM     INDICATION: Right rear foot MRI to evaluate peroneal tendons.  COMPARISON: None.  TECHNIQUE: Unenhanced.     FINDINGS:      TENDONS:   -Peroneal: There is mild peroneus longus tendinopathy, with a markedly edematous os peroneum. (Series 6, image 9). Peroneus brevis is intact. There is mild to moderate peroneal tenosynovitis.  -Medial:  Posterior tibialis tendon is intact. No tendinopathy or tenosynovitis. Flexor digitorum longus and flexor hallucis longus tendons are intact. There is mild flexor digitorum longus and flexor hallucis longus tenosynovitis.  -Anterior: Anterior tibialis, extensor hallucis longus, and extensor digitorum longus tendons are normal. No tenosynovitis.  -Achilles: Moderate Achilles tendinosis with some low-grade interstitial longitudinal partial tearing. No high-grade Achilles tear is seen. There is mild Achilles paratenonitis. There is insertional enthesopathy at the calcaneal tuberosity.     LIGAMENTS:   -Anterior talofibular ligament: There is some thinning of the anterior talofibular ligament which relates to chronic partial tearing.   -Calcaneofibular ligament: Intact.   -Posterior talofibular ligament: Intact.  -Syndesmotic inferior tibiofibular ligaments: Intact.  -Deltoid ligament complex: Intact.  -Spring ligament complex: Intact.     JOINTS AND BONES:   -No fracture, bone contusion or osteochondral lesion. No joint effusion or synovitis.     SOFT TISSUES:  -Plantar fascia: Intact. No acute fasciitis or tear.  -Sinus tarsi and tarsal tunnel: Normal.  -Muscles: Normal.                                                                      IMPRESSION:  1.  Mild peroneus longus tendinopathy, with a markedly edematous os peroneum. 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 There is mild to moderate peroneal tenosynovitis. No peroneal tear is seen.  2.  Moderate Achilles tendinosis with some low-grade interstitial longitudinal partial tearing. No high-grade Achilles tear is seen. There is mild Achilles paratenonitis as well as insertional enthesopathy at the calcaneal tuberosity.         A:  Cavus foot with peroneus longus tendinitis/os peroneum edema       Right hallux medial border ingrown nail       Right insertional Achilles tendinitis    Plan:  Patient to continue to use ankle brace as needed.  We  dispensed a new 1 today.  Will refer to physical therapy for peroneal tendinitis and insertional Achilles tendinitis.  Discussed cause of ingrown nail.   Discussed etiology and treatment options with the pt.  Risks and benefits of partial temporary nail removal were discussed in detail.  Obtained consent and blocked hallux using 3 cc of 1% Lidocaine plain into the right hallux.  Sterile prep and avulsed the medial border and placed dry sterile dressing.  Cappillary refill time wnl.  Pt tolerated procedure well.  Wound care instructions given and RTC PRN.        Yuri Knowles DPM, FACFAS             Again, thank you for allowing me to participate in the care of your patient.        Sincerely,        Yuri Knowles DPM

## 2022-12-02 NOTE — PROGRESS NOTES
Physical Therapy Initial Examination/Evaluation  December 6, 2022    Noreen Florecne is a 72 year old female referred to physical therapy by Yuri Knowles DPM for treatment of   Peroneus longus tendinitis, right   Achilles tendinitis of right lower extremity    with Precautions/Restrictions/MD instructions eval and treat.     Therapist Impression:   Patient has complaints of R ankle and foot pain that has been ongoing with insidious onset. Pain is worse with walking during and after. She was given an ankle brace and orthotics, rarely wears the brace now. X-ray shows mild degenerative arthritic changes throughout the midfoot and forefoot, small Achilles calcaneal enthesophyte and MRI demonstrates mild peroneus longus tendinopathy, findings can be seen with painful os peroneum syndrome, mild to moderate peroneal tenosynovitis, Moderate Achilles tendinosis with some low-grade interstitial longitudinal partial tearing, mild Achilles paratenonitis as well as insertional enthesopathy at the calcaneal tuberosity. Patient found to have impaired balance and painful muscular resistance. Patient given HEP with towel curls, ankle alphabet, and TB strengthening. Patient would benefit from skilled physical therapy to increase strength, decrease pain and increase balance to improve walking ability and return to prior level of function.    Subjective:  DOI/onset: 5 months ago  Acute Injury or Gradual Onset?: Gradual injury over time  Mechanism of Injury: unknown  Related PMH: L ankle fx 2020  Imaging: x-ray: Right foot negative for fracture or joint malalignment. Mild degenerative arthritic changes throughout the midfoot and forefoot. Small Achilles calcaneal enthesophyte  MRI: 1.  Mild peroneus longus tendinopathy, with a markedly edematous os peroneum. 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 There is mild to moderate peroneal tenosynovitis. No peroneal tear is seen.  2.  Moderate Achilles tendinosis with some  "low-grade interstitial longitudinal partial tearing. No high-grade Achilles tear is seen. There is mild Achilles paratenonitis as well as insertional enthesopathy at the calcaneal tuberosity.  Chief Complaint/Functional Limitations: R ankle pain and see below in therapy evaluation codes   Pain: rest 6 /10, activity 8/10 Location: Achilles insertion, lateral foot Frequency: Intermittent Described as: aching and dull Previous Treatment:Ankle brace, orthotics Effect of prior treatment: Good Alleviated by: Rest Progression of Symptoms: Unchanged Time of day when pain is worse: Activity related  Sleeping: No issues/uninterrupted   Occupation: retired  Job duties: household chores  Current HEP/exercise regimen: walking 30-40 mins   Patient's goals are see chief complaints \"To not have this bother me\"     Other pertinent PMH/Red Flags: High Blood Pressure   Barriers at home/work: None as reported by patient  Pertinent Surgical History: None  Medications: High blood pressure  General health as reported by patient: good  Return to MD:  PRN    ANKLE EVALUATION    Static Posture  No obvious findings    Dynamic Movement Screen  Single leg stance observations: Difficulty with balance eyes open R > L  Gait: No significant findings    Ankle Range of Motion  Ankle AROM Dorsiflexion Plantarflexion Inversion Eversion   Left WNL WNL WNL WNL   Right WNL WNL WNL WNL       Ankle Strength  Ankle MMT Dorsiflexion Plantarflexion Inversion Eversion   Left 5/5 5/5 5/5 5/5   Right 5/5 5/5 5/5 5/5     Provocation tests  Resisted (tedon) Left Right  PF/INV (post tib) Pain-free Pain  PF/EV (peroneals) Pain-free Pain  DF/INV (ant tib) Pain-free Pain-free  DF/EV (ext. dig) Pain-free Pain    Foot doming:  Painful     Special Tests:  Ligament testing: Negative    Palpation  Left: No tenderness to palpation  Right: No tenderness to palpation    Assessment/Plan:  Patient is a 72 year old female with right side ankle complaints.    Patient has the " following significant findings with corresponding treatment plan.                Diagnosis 1:  R ankle pain  Pain -  manual therapy and splint/taping/bracing/orthotics  Decreased strength - therapeutic exercise and therapeutic activities  Impaired muscle performance - neuro re-education  Decreased function - therapeutic activities    Therapy Evaluation Codes:   1) History comprised of:   Personal factors that impact the plan of care:      Time since onset of symptoms.    Comorbidity factors that impact the plan of care are:      High blood pressure.     Medications impacting care: High blood pressure.  2) Examination of Body Systems comprised of:   Body structures and functions that impact the plan of care:      Ankle.   Activity limitations that impact the plan of care are:      Stairs, Standing and Walking.  3) Clinical presentation characteristics are:   Stable/Uncomplicated.  4) Decision-Making    Low complexity using standardized patient assessment instrument and/or measureable assessment of functional outcome.  Cumulative Therapy Evaluation is: Low complexity.    Previous and current functional limitations:  (See Goal Flow Sheet for this information)    Short term and Long term goals: (See Goal Flow Sheet for this information)     Communication ability:  Patient appears to be able to clearly communicate and understand verbal and written communication and follow directions correctly.  Treatment Explanation - The following has been discussed with the patient:   RX ordered/plan of care  Anticipated outcomes  Possible risks and side effects  This patient would benefit from PT intervention to resume normal activities.   Rehab potential is good.    Frequency:  2 X a month, once daily  Duration:  for 3 months  Discharge Plan:  Achieve all LTG.  Independent in home treatment program.  Reach maximal therapeutic benefit.    Please refer to the daily flowsheet for treatment today, total treatment time and time spent  performing 1:1 timed codes.

## 2022-12-06 ENCOUNTER — THERAPY VISIT (OUTPATIENT)
Dept: PHYSICAL THERAPY | Facility: CLINIC | Age: 72
End: 2022-12-06
Attending: PODIATRIST
Payer: MEDICARE

## 2022-12-06 DIAGNOSIS — M76.61 ACHILLES TENDINITIS OF RIGHT LOWER EXTREMITY: ICD-10-CM

## 2022-12-06 DIAGNOSIS — M76.71 PERONEUS LONGUS TENDINITIS, RIGHT: ICD-10-CM

## 2022-12-06 PROCEDURE — 97161 PT EVAL LOW COMPLEX 20 MIN: CPT | Mod: GP

## 2022-12-06 PROCEDURE — 97110 THERAPEUTIC EXERCISES: CPT | Mod: GP

## 2022-12-06 NOTE — PROGRESS NOTES
[facility]    OUTPATIENT Physical Therapy ORTHOPEDIC EVALUATION  PLAN OF TREATMENT FOR OUTPATIENT REHABILITATION  (COMPLETE FOR INITIAL CLAIMS ONLY)  Patient's Last Name, First Name, M.I.  YOB: 1950  Noreen Florence    Provider s Name:  [facility]   Medical Record No.  [id number]   Start of Care Date:  12/06/22   Onset Date:   11/16/22 (Order date)   Treatment Diagnosis:  R ankle pain Medical Diagnosis:     Peroneus longus tendinitis, right  Achilles tendinitis of right lower extremity       Goals:     12/06/22 0500   Body Part   Goals listed below are for R ankle   Goal #1   Goal #1 ambulation   Previous Functional Level Minutes patient could walk   Performance Level 40, pain-free   Current Functional Level Minutes patient can walk   Performance Level 30, pain 8/10   STG Target Performance Minutes patient will be able to walk   Performance Level 30, pain 3/10   Rationale for safe community ambulation;to maintain proper body mechanics/posture while ambulating to avoid additional compensatory injury due to improper gait mechanics;to promote a healthy and active lifestyle   Due Date 01/06/23    LTG Target Performance Minutes patient will be able to  walk   Performance Level 30, pain-free   Rationale for safe community ambulation;to maintain proper body mechanics/posture while ambulating to avoid additional compensatory injury due to improper gait mechanics;to promote a healthy and active lifestyle   Due Date 02/06/23       Therapy Frequency:  2x/month  Predicted Duration of Therapy Intervention:  3 months    Larisa Calderon, DANIELLE                 I CERTIFY THE NEED FOR THESE SERVICES FURNISHED UNDER        THIS PLAN OF TREATMENT AND WHILE UNDER MY CARE     (Physician attestation of this document indicates review and certification of the therapy plan).                      Certification Date From:  12/06/22   Certification Date To:  02/06/23    Referring Provider:  Yuri Knowles    Initial Assessment        See Epic Evaluation SOC Date: 12/06/22

## 2022-12-08 ENCOUNTER — TELEPHONE (OUTPATIENT)
Dept: FAMILY MEDICINE | Facility: CLINIC | Age: 72
End: 2022-12-08
Payer: MEDICARE

## 2022-12-08 NOTE — TELEPHONE ENCOUNTER
Patient Quality Outreach    Patient is due for the following:   Hypertension -  BP check    Next Steps:   Patient has upcoming appointment, these items will be addressed at that time.    Type of outreach:    Chart review performed, no outreach needed.      Questions for provider review:    None     Lizzie Cates MA

## 2022-12-20 ENCOUNTER — THERAPY VISIT (OUTPATIENT)
Dept: PHYSICAL THERAPY | Facility: CLINIC | Age: 72
End: 2022-12-20
Payer: MEDICARE

## 2022-12-20 DIAGNOSIS — M79.671 HEEL PAIN, CHRONIC, RIGHT: Primary | ICD-10-CM

## 2022-12-20 DIAGNOSIS — G89.29 HEEL PAIN, CHRONIC, RIGHT: Primary | ICD-10-CM

## 2022-12-20 PROCEDURE — 97110 THERAPEUTIC EXERCISES: CPT | Mod: GP | Performed by: PHYSICAL THERAPIST

## 2022-12-20 NOTE — PROGRESS NOTES
Subjective:  HPI  Physical Exam                    Objective:  System    Physical Exam    General     ROS    Assessment/Plan:    PROGRESS  REPORT    Progress reporting period is from 12/6/2022 to 12/20/2022.       SUBJECTIVE  Subjective changes noted by patient:    Subjective: Better than before.  Can walk in any shoe now without pain    Current pain level is  1/10.     Previous pain level was  : 6/10.   Changes in function:  Yes (See Goal flowsheet attached for changes in current functional level)  Adverse reaction to treatment or activity: None    OBJECTIVE  Objective: No pain noted with ex or gait.  Normal ROM and strength.  DIfficulty with SLS     ASSESSMENT/PLAN  Updated problem list and treatment plan: Diagnosis 1:  R ankle pain  Pain -  manual therapy and splint/taping/bracing/orthotics  Decreased strength - therapeutic exercise and therapeutic activities  Impaired muscle performance - neuro re-education  Decreased function - therapeutic activities     STG/LTGs have been met or progress has been made towards goals:  Yes (See Goal flow sheet completed today.)  Assessment of Progress: The patient's condition is improving.  Self Management Plans:  Patient has been instructed in a home treatment program.    [facility] continues to require the following intervention to meet STG and LTG's:  PT intervention is no longer required to meet STG/LTG.    Recommendations:  Pt will continue with HEP x 1 month and F/U if sx persist.  If no sx, pt will be discharged.      Please refer to the daily flowsheet for treatment today, total treatment time and time spent performing 1:1 timed codes.

## 2022-12-20 NOTE — LETTER
[facility]  [address] [facility] [facility] [address]  [phone]    2022    Re: Noreen Florence   :   1950  MRN:  [id number]   REFERRING PHYSICIAN:   MALGORZATA Damico [facility]  Date of Initial Evaluation:  2022  Visits:  Rxs Used: 2  Reason for Referral:  Heel pain, chronic, right    PROGRESS  REPORT  Progress reporting period is from 2022 to 2022.       SUBJECTIVE  Subjective changes noted by patient:    Subjective: Better than before.  Can walk in any shoe now without pain    Current pain level is  1/10.     Previous pain level was  : 6/10.   Changes in function:  Yes (See Goal flowsheet attached for changes in current functional level)  Adverse reaction to treatment or activity: None    OBJECTIVE  Objective: No pain noted with ex or gait.  Normal ROM and strength.  DIfficulty with SLS     ASSESSMENT/PLAN  Updated problem list and treatment plan: Diagnosis 1:  R ankle pain  Pain -  manual therapy and splint/taping/bracing/orthotics  Decreased strength - therapeutic exercise and therapeutic activities  Impaired muscle performance - neuro re-education  Decreased function - therapeutic activities     STG/LTGs have been met or progress has been made towards goals:  Yes (See Goal flow sheet completed today.)  Assessment of Progress: The patient's condition is improving.  Self Management Plans:  Patient has been instructed in a home treatment program.    Noreen continues to require the following intervention to meet STG and LTG's:  PT intervention is no longer required to meet STG/LTG.    Recommendations:  Pt will continue with HEP x 1 month and F/U if sx persist.  If no sx, pt will be discharged.      Please refer to the daily flowsheet for treatment today, total treatment time and time spent performing 1:1 timed codes.    Re: Noreen BARRERA Ludivina   :   1950      Thank you for your  referral.    INQUIRIES  Therapist: Antony Hodgson PT   [address] [address]  Phone: [phone]  Fax: [phone]

## 2023-01-04 DIAGNOSIS — K05.10 GINGIVITIS: ICD-10-CM

## 2023-01-05 RX ORDER — CHLORHEXIDINE GLUCONATE ORAL RINSE 1.2 MG/ML
SOLUTION DENTAL
Qty: 473 ML | Refills: 1 | Status: SHIPPED | OUTPATIENT
Start: 2023-01-05 | End: 2023-04-26

## 2023-01-16 ENCOUNTER — PATIENT OUTREACH (OUTPATIENT)
Dept: GERIATRIC MEDICINE | Facility: CLINIC | Age: 73
End: 2023-01-16

## 2023-01-16 NOTE — PROGRESS NOTES
[facility] Care Coordination Contact    Member became effective with [facility] on 01/01/2023 with [employer] MSC+.  Previous Health Plan: [employer] MSC+  Previous Care System: [employer]  Previous care coordinators name and number: Gabbi Jenkins Type: N/A  Last MMIS Entry: Date 1/11/23 and Type Doc Change  MMIS visit date (and type) if different from above: 6/17/22 Refusal  Services Listed in MMIS:   UTF received: No: Requested on 1/16/23     Sandee Quezada  Care Management Specialist  [facility]  [phone]

## 2023-01-16 NOTE — LETTER
January 16, 2023    NOREEN WOLF  [address]  [place] [address]    Dear  Noreen,    Welcome to [facility] s MSC+ health program. My name is Norah Peguero RN. I am your MSC+ care coordinator. You are eligible for Care Coordination through [facility] MSC+ pla.    As your care coordinator, we ll:    Meet to go over your care coordination benefits    Talk about your physical and mental health care needs     Review your preventative care needs    Create a plan that meets your needs with the services you choose    What happens next?  I ll call you soon to introduce myself and tell you more about my role. We ll then plan time to go over your health and safety needs. Our goal is to keep you as healthy and independent as possible.    Soon, you will receive a new MSC+ member identification (ID) card from [facility]. When you receive it, please use this card along with your [place] [facility] card and Prescription Drug Coverage Program card. When you receive, it please use this card where you get your health services. If you have Medicare, you will need to show your Medicare card when you get health services.    The MSC+ care coordination program is voluntary and offered to you at no cost. If you wish to stop being in the care coordination program or have questions, call me at [phone]. If you reach my voicemail, leave a message and your phone number. TTY users, call the [place] [employer] at 360 or [phone] (cmbcdb-zj-wxjcje relay service).    Sincerely,      Norah Peguero RN    E-mail:  [email or web address]  Phone: [phone]    Care Manager  [facility]    [id number] accepted   [id number]       [id number] (07/2022)

## 2023-01-18 ENCOUNTER — PATIENT OUTREACH (OUTPATIENT)
Dept: GERIATRIC MEDICINE | Facility: CLINIC | Age: 73
End: 2023-01-18

## 2023-01-18 ENCOUNTER — OFFICE VISIT (OUTPATIENT)
Dept: URGENT CARE | Facility: URGENT CARE | Age: 73
End: 2023-01-18
Payer: MEDICARE

## 2023-01-18 VITALS
DIASTOLIC BLOOD PRESSURE: 82 MMHG | TEMPERATURE: 97.5 F | WEIGHT: 119 LBS | BODY MASS INDEX: 22.76 KG/M2 | SYSTOLIC BLOOD PRESSURE: 155 MMHG | HEART RATE: 82 BPM | OXYGEN SATURATION: 95 %

## 2023-01-18 DIAGNOSIS — K12.0 APHTHOUS ULCER: ICD-10-CM

## 2023-01-18 DIAGNOSIS — K13.0 LIP MASS: Primary | ICD-10-CM

## 2023-01-18 PROCEDURE — 99213 OFFICE O/P EST LOW 20 MIN: CPT | Performed by: PHYSICIAN ASSISTANT

## 2023-01-18 RX ORDER — TRIAMCINOLONE ACETONIDE 0.1 %
PASTE (GRAM) DENTAL 2 TIMES DAILY
Qty: 5 G | Refills: 0 | Status: SHIPPED | OUTPATIENT
Start: 2023-01-18 | End: 2023-01-25

## 2023-01-18 NOTE — PROGRESS NOTES
Chief Complaint   Patient presents with     Mouth Problem     Sore in mouth from biting her mouth while eating. Onset- 10 days        ASSESSMENT/PLAN:  Noreen was seen today for mouth problem.    Diagnoses and all orders for this visit:    Lip mass    Aphthous ulcer  -     triamcinolone (KENALOG) 0.1 % paste; Take by mouth 2 times daily for 7 days    Appears to have small aphthous ulcer on a small lump of the lower lip after biting it.  No signs of secondary bacterial infection.  Triamcinolone paste prescribed.  Follow-up with PCP if not improving in the next 7 days consider malignancy but this seems less likely given the recent trauma        Nam Bennett PA-C      SUBJECTIVE:  Noreen is a 72 year old female with a past medical history of Sjogren's syndrome, NAFLD, GERD among others who presents to urgent care with a mouth sore for 10 days.  Bitten the bottom lip while eating twice and developed a lump and it still painful there and is painful.   she otherwise feels well.    ROS: Pertinent ROS neg other than the symptoms noted above in the HPI.     OBJECTIVE:  BP (!) 155/82 (BP Location: Left arm, Patient Position: Sitting, Cuff Size: Adult Regular)   Pulse 82   Temp 97.5  F (36.4  C) (Tympanic)   Wt 54 kg (119 lb)   LMP 06/21/2002 (Approximate)   SpO2 95%   BMI 22.76 kg/m     GENERAL: healthy, alert and no distress  HENT: Soft tissue colored mass that slightly tender with the left lower lip with a small ulceration with yellow adherent exudate on it    DIAGNOSTICS    No results found for any visits on 01/18/23.     Current Outpatient Medications   Medication     acetaminophen-codeine (TYLENOL #3) 300-30 MG tablet     cetirizine (ZYRTEC) 10 MG tablet     chlorhexidine (PERIDEX) 0.12 % solution     cholecalciferol (VITAMIN D3) 25 mcg (1000 units) capsule     fluticasone (FLONASE) 50 MCG/ACT nasal spray     latanoprost (XALATAN) 0.005 % ophthalmic solution     losartan-hydrochlorothiazide (HYZAAR) 50-12.5 MG  tablet     magnesium 250 MG tablet     meloxicam (MOBIC) 7.5 MG tablet     methylcellulose (CITRUCEL) powder     metoprolol tartrate (LOPRESSOR) 25 MG tablet     omeprazole (PRILOSEC) 40 MG DR capsule     pravastatin (PRAVACHOL) 20 MG tablet     Current Facility-Administered Medications   Medication     2 mL bupivacaine (MARCAINE) preservative free injection 0.25% (10 mL vial)     lidocaine 1 % injection 2 mL     methylPREDNISolone (DEPO-MEDROL) injection 40 mg     triamcinolone (KENALOG-40) injection 40 mg     Facility-Administered Medications Ordered in Other Visits   Medication     gadobutrol (GADAVIST) injection 7.5 mL      Patient Active Problem List   Diagnosis     Osteopenia     Hayfever     Hypertension goal BP (blood pressure) < 140/90     Polyarthritis, inflammatory (H)     GERD (gastroesophageal reflux disease)     Hyperlipidemia with target LDL less than 130     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     High risk medication use     Microscopic hematuria     Pterygium of right eye     Sicca syndrome (H)     Angiomyolipoma of right kidney     History of Helicobacter pylori infection     Pre-diabetes     NAFLD (nonalcoholic fatty liver disease)     Elevated LFTs     Heel pain, chronic, right      Past Medical History:   Diagnosis Date     GERD (gastroesophageal reflux disease) 3/20/2015     Glaucoma (increased eye pressure)      Hyperlipidemia LDL goal < 130 8/3/2015     Hypertension      Hypertension, goal below 150/90 11/28/2014     Rheumatoid arthritis, adult (H)      Past Surgical History:   Procedure Laterality Date     COLONOSCOPY       COLONOSCOPY WITH CO2 INSUFFLATION N/A 4/5/2017    Procedure: COLONOSCOPY WITH CO2 INSUFFLATION;  Surgeon: Duane, William Charles, MD;  Location: [facility]     COMBINED ESOPHAGOSCOPY, GASTROSCOPY, DUODENOSCOPY (EGD) WITH CO2 INSUFFLATION N/A 2/13/2019    Procedure: COMBINED ESOPHAGOSCOPY, GASTROSCOPY, DUODENOSCOPY (EGD) WITH CO2 INSUFFLATION;  Surgeon: Jonnathan  Sly Perez MD;  Location: [facility]     COMBINED ESOPHAGOSCOPY, GASTROSCOPY, DUODENOSCOPY (EGD) WITH CO2 INSUFFLATION N/A 2/7/2022    Procedure: ESOPHAGOGASTRODUODENOSCOPY, WITH CO2 INSUFFLATION;  Surgeon: Luis Aceves MD;  Location: [facility]     COMBINED REPAIR PTOSIS WITH BLEPHAROPLASTY BILATERAL Bilateral 1/6/2017    Procedure: COMBINED REPAIR PTOSIS WITH BLEPHAROPLASTY BILATERAL;  Surgeon: Carly Norman MD;  Location:  [facility]     ESOPHAGOSCOPY, GASTROSCOPY, DUODENOSCOPY (EGD), COMBINED N/A 1/5/2016    Procedure: COMBINED ESOPHAGOSCOPY, GASTROSCOPY, DUODENOSCOPY (EGD), BIOPSY SINGLE OR MULTIPLE;  Surgeon: Duane, William Charles, MD;  Location: [facility]     ESOPHAGOSCOPY, GASTROSCOPY, DUODENOSCOPY (EGD), COMBINED N/A 2/13/2019    Procedure: Combined Esophagoscopy, Gastroscopy, Duodenoscopy (Egd), Biopsy Single Or Multiple;  Surgeon: Sly De Santiago MD;  Location: [facility]     ESOPHAGOSCOPY, GASTROSCOPY, DUODENOSCOPY (EGD), COMBINED N/A 2/7/2022    Procedure: ESOPHAGOGASTRODUODENOSCOPY, WITH BIOPSY;  Surgeon: Luis Aceves MD;  Location: [facility]     REPAIR ENTROPION BILATERAL Bilateral 1/6/2017    Procedure: REPAIR ENTROPION BILATERAL;  Surgeon: Carly Norman MD;  Location: [facility]     REPAIR PTOSIS Bilateral 01/2017     Family History   Problem Relation Age of Onset     Diabetes Mother      Cancer No family hx of      Hypertension No family hx of      Cerebrovascular Disease No family hx of      Thyroid Disease No family hx of      Glaucoma No family hx of      Macular Degeneration No family hx of      Social History     Tobacco Use     Smoking status: Never     Smokeless tobacco: Never   Substance Use Topics     Alcohol use: No              The plan of care was discussed with the patient. They understand and agree with the course of treatment prescribed. A printed summary was given including instructions and medications.  The use of Dragon/Quora dictation services may have been  used to construct the content in this note; any grammatical or spelling errors are non-intentional. Please contact the author of this note directly if you are in need of any clarification.

## 2023-01-18 NOTE — LETTER
January 20, 2023    NOREEN WOLF  [address]  [place] [address]        Dear Noreen:    As a member of [facility]'s MSC+ program, we offer a health risk assessment at no cost to you. I know you don't want to have the assessment right now. If you change your mind, please call me at the number below.    Who performs the health risk assessment?  A [facility] Care Coordinator performs the assessment. Our Care Coordinators can also help you understand your benefits. They can tell you about services to aid you at home, such as managing your care with your doctors if your health worsens.    Our Care Coordinators are here for you if you need:    Support for activities you used to do by yourself (including making meals, bathing and paying bills)    Equipment for bathroom or home safety    Help finding a new place to live    Information on staying healthy, preventing falls and immunizations    Questions?  If you have questions, or you would like to do he assessment, call me at [phone]. TTY users call [phone]. I'm here from 8am to 5pm. I may reach out to you again soon.       Sincerely,         Norah Peguero RN    E-mail:  [email or web address]  Phone: [phone]    Care Manager  [facility]      \<[id number] accepted  [id number]>    [id number] (21/2021)

## 2023-01-18 NOTE — PROGRESS NOTES
[facility] Care Coordination Contact      [facility] Health Plan or Product Change    CC received notification that member's health plan or health plan product changed from [employer] MSC+ to [employer] MSC+ effective 1/1/23.  Called member and introduced self as member s new CC. Confirmed with member that the welcome letter with writer's name and contact information has been received.       [facility] Refusal Telephone Assessment    CC offered face to face home assessment and member refused home visit HRA on 1/18/23.    ER visits: No  Hospitalizations: No  Health concerns: None reported.  Falls/Injuries: No  ADL/IADL Dependencies: None - reports independence.        Member currently receiving the following home care services: None    Member currently receiving the following community resources:  None  Informal support(s):  Family    Advanced Care Planning discussion, complete code section.    [facility] Health Plan sponsored benefits: Shared information re: Silver Sneakers/gym memberships, ASA, Calcium +D.    Follow-Up Plan: Member informed of future contact, plan to f/u with member with a 6 month telephone assessment and offer a home visit.  Contact information shared with member and family, encouraged member to call with any questions or concerns at any time.    This CC note routed to PCP.  Norah Peguero RN  [facility]  [phone]

## 2023-01-20 DIAGNOSIS — J30.1 HAYFEVER: ICD-10-CM

## 2023-01-20 DIAGNOSIS — R25.2 BILATERAL LEG CRAMPS: ICD-10-CM

## 2023-01-20 NOTE — PROGRESS NOTES
"[facility] Care Coordination Contact    Per CC, mailed client a \"Refusal of Home Visit\" letter.    Sandee Quezada  Care Management Specialist  [facility]  [phone]      "

## 2023-01-23 RX ORDER — CETIRIZINE HYDROCHLORIDE 10 MG/1
TABLET ORAL
Qty: 90 TABLET | Refills: 1 | OUTPATIENT
Start: 2023-01-23

## 2023-01-24 RX ORDER — MULTIVITAMIN WITH IRON
TABLET ORAL
Qty: 30 TABLET | Refills: 1 | Status: SHIPPED | OUTPATIENT
Start: 2023-01-24 | End: 2023-03-21

## 2023-01-25 NOTE — PROGRESS NOTES
Noreen Florence  :  1950  DOS: 2023  MRN: [id number]  PCP: Jud Morales  Sports Medicine Clinic Visit    Interval History:  Subacromial corticosteroid injection performed on 2022 provided a few months of relief. She notes an increase in left lateral shoulder pain over the past month. Painful with sleep due to left-sided side-lying. Pain with overuse of overhead and shoulder abduction activities. Denies any current treatments or medications.  She was satisfied with the results from the injection, but would like to defer an injection at this time due to decreasing the risk of multiple injections.  Interested in other treatment options going forward and will consider an injection in the future as needed.  No other concerns or questions today.      Initial Visit: 2022  HPI  Noreen Florence is a 72 year old Left hand dominant female who is seen in consultation at the request of  Indra Sarah PA-C presenting with left shoulder pain.    Acute on chronic left shoulder pain, recently went to the ED on 22 for severe pain after gardening. Given recommendation for NSAIDs and avoiding exacerbating activities. Denies neurogenic symptoms. Pain occurs with yard work and repetitive motions of shoulder abduction and overhead motions. Pain is located over the anterior and posterior left shoulder. Denies any current medications or treatments. Notes no decrease in ROM that she can see. Recent images: 2022.    Work history: retired    Review of Systems  Musculoskeletal: as above  Remainder of review of systems is negative including constitutional, CV, pulmonary, GI, Skin and Neurologic except as noted in HPI or medical history.    Past Medical History:   Diagnosis Date     GERD (gastroesophageal reflux disease) 3/20/2015     Glaucoma (increased eye pressure)      Hyperlipidemia LDL goal < 130 8/3/2015     Hypertension      Hypertension, goal below 150/90 2014     Rheumatoid arthritis, adult (H)       Past Surgical History:   Procedure Laterality Date     COLONOSCOPY       COLONOSCOPY WITH CO2 INSUFFLATION N/A 4/5/2017    Procedure: COLONOSCOPY WITH CO2 INSUFFLATION;  Surgeon: Duane, William Charles, MD;  [facility]: [facility]     COMBINED ESOPHAGOSCOPY, GASTROSCOPY, DUODENOSCOPY (EGD) WITH CO2 INSUFFLATION N/A 2/13/2019    Procedure: COMBINED ESOPHAGOSCOPY, GASTROSCOPY, DUODENOSCOPY (EGD) WITH CO2 INSUFFLATION;  Surgeon: Sly De Santiago MD;  [facility]     COMBINED ESOPHAGOSCOPY, GASTROSCOPY, DUODENOSCOPY (EGD) WITH CO2 INSUFFLATION N/A 2/7/2022    Procedure: ESOPHAGOGASTRODUODENOSCOPY, WITH CO2 INSUFFLATION;  Surgeon: Luis Aceves MD;  [facility]: [facility]     COMBINED REPAIR PTOSIS WITH BLEPHAROPLASTY BILATERAL Bilateral 1/6/2017    Procedure: COMBINED REPAIR PTOSIS WITH BLEPHAROPLASTY BILATERAL;  Surgeon: Carly Norman MD;  Location: [facility]     ESOPHAGOSCOPY, GASTROSCOPY, DUODENOSCOPY (EGD), COMBINED N/A 1/5/2016    Procedure: COMBINED ESOPHAGOSCOPY, GASTROSCOPY, DUODENOSCOPY (EGD), BIOPSY SINGLE OR MULTIPLE;  Surgeon: Duane, William Charles, MD;  [facility]: [facility]     ESOPHAGOSCOPY, GASTROSCOPY, DUODENOSCOPY (EGD), COMBINED N/A 2/13/2019    Procedure: Combined Esophagoscopy, Gastroscopy, Duodenoscopy (Egd), Biopsy Single Or Multiple;  Surgeon: Sly De Santiago MD;  [facility]     ESOPHAGOSCOPY, GASTROSCOPY, DUODENOSCOPY (EGD), COMBINED N/A 2/7/2022    Procedure: ESOPHAGOGASTRODUODENOSCOPY, WITH BIOPSY;  Surgeon: Luis Aceves MD;  [facility]     REPAIR ENTROPION BILATERAL Bilateral 1/6/2017    Procedure: REPAIR ENTROPION BILATERAL;  Surgeon: Carly Norman MD;  Location: [facility]     REPAIR PTOSIS Bilateral 01/2017     Family History   Problem Relation Age of Onset     Diabetes Mother      Cancer No family hx of      Hypertension No family hx of      Cerebrovascular Disease No family hx of      Thyroid Disease No family hx of      Glaucoma No family hx  of      Macular Degeneration No family hx of        Objective  BP (!) 147/89   Wt 54 kg (119 lb)   LMP 06/21/2002 (Approximate)   BMI 22.76 kg/m        General: healthy, alert and in no distress      HEENT: no scleral icterus or conjunctival erythema     Skin: no suspicious lesions or rash. No jaundice.     CV: regular rhythm by palpation, 2+ distal pulses, no pedal edema      Resp: normal respiratory effort without conversational dyspnea     Psych: normal mood and affect      Gait: nonantalgic, appropriate coordination and balance     Neuro: normal light touch sensory exam of the extremities. Motor strength as noted below. Negative Spurling's bilaterally.     MSK L Shoulder: No gross deformity, abnormality, or asymmetry on inspection. TTP over the posterolateral shoulder. NTTP over the coronoid process, periscapular muscles, trapezius muscle. ROM full and painless. Strength 5/5 in BUE. +Fuentes, +Neers, +Empty Can, -[place], -Scarf, -Speeds, -Yergeson.     Radiology  I previously independently reviewed the relevant imaging, including XR L Shoulder (8/16/22) and agree with the interpretation of mild GH and AC OA.       Assessment:  1. Tendinopathy of left rotator cuff    2. Shoulder impingement syndrome, left        Plan:  Noreen Florence is a 72 year old female that presents for follow-up of her left shoulder pain secondary to rotator cuff tendinopathy and shoulder impingement syndrome in the setting of mild glenohumeral osteoarthritis and AC OA.  Discussed the nature of the condition and treatment options and mutually agreed upon the following plan:    - Medications: Discussed pharmacologic treatment options, and recommend using NSAIDs or Tylenol as needed for pain control.  Also discussed topical treatment options including IcyHot, Biofreeze, Bengay, or Voltaren gel.  She will use Voltaren gel as needed up to 4 times per day over the left shoulder.  Prescription provided and sent to her preferred pharmacy.  -  Therapy: She has been utilizing her home exercise program, which helps but she is looking for next stages of treatment.  Referral has been placed for physical therapy and number given to patient to call for scheduling.  - Alternatives:  Interested in a referral for acupuncture, which was placed today and number given to the patient for scheduling.  - Modalities:  May use ice, heat, massage PRN.   - Injections:   Last subacromial corticosteroid injection provided approximately 3 months of relief.  Would like to space the injections out as much as possible.  No subacromial corticosteroid injection performed today.  Will consider in the future as needed.  - Activity:  Encouraged to remain active and participate in regular activities as symptoms allow.   - Follow up: In 3 months as needed for re-evaluation and update to treatment plan. Patient has clinic contact information for questions or concerns.       Isaac Zhou DO  [facility] - Sports Medicine  [facility] - Department of Orthopedic Surgery         Disclaimer: This note consists of symbols derived from keyboarding, dictation and/or voice recognition software. As a result, there may be errors in the script that have gone undetected. Please consider this when interpreting information found in this chart.

## 2023-01-27 ENCOUNTER — OFFICE VISIT (OUTPATIENT)
Dept: ORTHOPEDICS | Facility: CLINIC | Age: 73
End: 2023-01-27
Payer: MEDICARE

## 2023-01-27 VITALS — SYSTOLIC BLOOD PRESSURE: 147 MMHG | DIASTOLIC BLOOD PRESSURE: 89 MMHG | WEIGHT: 119 LBS | BODY MASS INDEX: 22.76 KG/M2

## 2023-01-27 DIAGNOSIS — M75.42 SHOULDER IMPINGEMENT SYNDROME, LEFT: ICD-10-CM

## 2023-01-27 DIAGNOSIS — M67.912 TENDINOPATHY OF LEFT ROTATOR CUFF: Primary | ICD-10-CM

## 2023-01-27 PROCEDURE — 99214 OFFICE O/P EST MOD 30 MIN: CPT | Performed by: STUDENT IN AN ORGANIZED HEALTH CARE EDUCATION/TRAINING PROGRAM

## 2023-01-27 ASSESSMENT — PAIN SCALES - GENERAL: PAINLEVEL: MODERATE PAIN (4)

## 2023-01-27 NOTE — PATIENT INSTRUCTIONS
Dominique Florence ,     You were seen today for your left shoulder pain secondary to rotator cuff tendinopathy, shoulder impingement syndrome.   You may use non-steroidal anti-inflammatory (NSAID) medications as needed for pain relief.  Ibuprofen, naproxen are good choices.  Tylenol is a good alternative.  You may try topical medications such as IcyHot, BioFreeze, Bengay, or Voltaren gel as well, which may help your symptoms.  Prescription for Voltaren gel has been sent to preferred pharmacy.  Recommend to apply a thin layer up to 4 times per day as needed for your painful left shoulder.  Discussed the benefits of Physical Therapy or Home Exercise Program for flexibility, strengthening, and stabilization.  Physical therapy referral placed today, please call [phone] to schedule.  Referral for an acupuncture appointment has been placed.  Please call [phone] to schedule your appointment.  Last subacromial corticosteroid injection provided approximately 3 months of relief.  Would like to space the injections out as much as possible.  No subacromial corticosteroid injection performed today.  Will consider in the future as needed.  Follow up in 3 months for re-evaluation and update to treatment plan.   Please call the clinic for any questions or concerns.    It was a pleasure seeing you today. Thank you for choosing [facility] for your care.     Sincerely,   Isaac Zhou DO  [facility] - Sports Medicine  [facility] - Department of Orthopedic Surgery

## 2023-01-27 NOTE — LETTER
2023         RE: Noreen Florence  [address]  [place] [address]        Dear Colleague,    Thank you for referring your patient, Noreen Florence, to the [facility] MEDICINE [facility] [place]. Please see a copy of my visit note below.    Noreen Florence  :  1950  DOS: 2023  MRN: [id number]  PCP: Jud Morales  Sports Medicine Clinic Visit    Interval History:  Subacromial corticosteroid injection performed on 2022 provided a few months of relief. She notes an increase in left lateral shoulder pain over the past month. Painful with sleep due to left-sided side-lying. Pain with overuse of overhead and shoulder abduction activities. Denies any current treatments or medications.  She was satisfied with the results from the injection, but would like to defer an injection at this time due to decreasing the risk of multiple injections.  Interested in other treatment options going forward and will consider an injection in the future as needed.  No other concerns or questions today.      Initial Visit: 2022  HPI  Noreen Florence is a 72 year old Left hand dominant female who is seen in consultation at the request of  Indra Sarah PA-C presenting with left shoulder pain.    Acute on chronic left shoulder pain, recently went to the ED on 22 for severe pain after gardening. Given recommendation for NSAIDs and avoiding exacerbating activities. Denies neurogenic symptoms. Pain occurs with yard work and repetitive motions of shoulder abduction and overhead motions. Pain is located over the anterior and posterior left shoulder. Denies any current medications or treatments. Notes no decrease in ROM that she can see. Recent images: 2022.    Work history: retired    Review of Systems  Musculoskeletal: as above  Remainder of review of systems is negative including constitutional, CV, pulmonary, GI, Skin and Neurologic except as noted in HPI or medical history.    Past Medical  History:   Diagnosis Date     GERD (gastroesophageal reflux disease) 3/20/2015     Glaucoma (increased eye pressure)      Hyperlipidemia LDL goal < 130 8/3/2015     Hypertension      Hypertension, goal below 150/90 11/28/2014     Rheumatoid arthritis, adult (H)      Past Surgical History:   Procedure Laterality Date     COLONOSCOPY       COLONOSCOPY WITH CO2 INSUFFLATION N/A 4/5/2017    Procedure: COLONOSCOPY WITH CO2 INSUFFLATION;  Surgeon: Duane, William Charles, MD;  [facility]     COMBINED ESOPHAGOSCOPY, GASTROSCOPY, DUODENOSCOPY (EGD) WITH CO2 INSUFFLATION N/A 2/13/2019    Procedure: COMBINED ESOPHAGOSCOPY, GASTROSCOPY, DUODENOSCOPY (EGD) WITH CO2 INSUFFLATION;  Surgeon: Sly De Santiago MD;  [facility]     COMBINED ESOPHAGOSCOPY, GASTROSCOPY, DUODENOSCOPY (EGD) WITH CO2 INSUFFLATION N/A 2/7/2022    Procedure: ESOPHAGOGASTRODUODENOSCOPY, WITH CO2 INSUFFLATION;  Surgeon: Luis Aceves MD;  [facility]     COMBINED REPAIR PTOSIS WITH BLEPHAROPLASTY BILATERAL Bilateral 1/6/2017    Procedure: COMBINED REPAIR PTOSIS WITH BLEPHAROPLASTY BILATERAL;  Surgeon: Carly Norman MD;  [facility]     ESOPHAGOSCOPY, GASTROSCOPY, DUODENOSCOPY (EGD), COMBINED N/A 1/5/2016    Procedure: COMBINED ESOPHAGOSCOPY, GASTROSCOPY, DUODENOSCOPY (EGD), BIOPSY SINGLE OR MULTIPLE;  Surgeon: Duane, William Charles, MD;  [facility]     ESOPHAGOSCOPY, GASTROSCOPY, DUODENOSCOPY (EGD), COMBINED N/A 2/13/2019    Procedure: Combined Esophagoscopy, Gastroscopy, Duodenoscopy (Egd), Biopsy Single Or Multiple;  Surgeon: Sly De Santiago MD;  [facility]     ESOPHAGOSCOPY, GASTROSCOPY, DUODENOSCOPY (EGD), COMBINED N/A 2/7/2022    Procedure: ESOPHAGOGASTRODUODENOSCOPY, WITH BIOPSY;  Surgeon: Luis Aceves MD;  [facility]: [facility]     REPAIR ENTROPION BILATERAL Bilateral 1/6/2017    Procedure: REPAIR ENTROPION BILATERAL;  Surgeon: Carly Norman MD;  Location: [facility]     REPAIR  PTOSIS Bilateral 01/2017     Family History   Problem Relation Age of Onset     Diabetes Mother      Cancer No family hx of      Hypertension No family hx of      Cerebrovascular Disease No family hx of      Thyroid Disease No family hx of      Glaucoma No family hx of      Macular Degeneration No family hx of        Objective  BP (!) 147/89   Wt 54 kg (119 lb)   LMP 06/21/2002 (Approximate)   BMI 22.76 kg/m        General: healthy, alert and in no distress      HEENT: no scleral icterus or conjunctival erythema     Skin: no suspicious lesions or rash. No jaundice.     CV: regular rhythm by palpation, 2+ distal pulses, no pedal edema      Resp: normal respiratory effort without conversational dyspnea     Psych: normal mood and affect      Gait: nonantalgic, appropriate coordination and balance     Neuro: normal light touch sensory exam of the extremities. Motor strength as noted below. Negative Spurling's bilaterally.     MSK L Shoulder: No gross deformity, abnormality, or asymmetry on inspection. TTP over the posterolateral shoulder. NTTP over the coronoid process, periscapular muscles, trapezius muscle. ROM full and painless. Strength 5/5 in BUE. +Fuentes, +Neers, +Empty Can, -[place], -Scarf, -Speeds, -Yergeson.     Radiology  I previously independently reviewed the relevant imaging, including XR L Shoulder (8/16/22) and agree with the interpretation of mild GH and AC OA.       Assessment:  1. Tendinopathy of left rotator cuff    2. Shoulder impingement syndrome, left        Plan:  Noreen Florence is a 72 year old female that presents for follow-up of her left shoulder pain secondary to rotator cuff tendinopathy and shoulder impingement syndrome in the setting of mild glenohumeral osteoarthritis and AC OA.  Discussed the nature of the condition and treatment options and mutually agreed upon the following plan:    - Medications: Discussed pharmacologic treatment options, and recommend using NSAIDs or Tylenol as  needed for pain control.  Also discussed topical treatment options including IcyHot, Biofreeze, Bengay, or Voltaren gel.  She will use Voltaren gel as needed up to 4 times per day over the left shoulder.  Prescription provided and sent to her preferred pharmacy.  - Therapy: She has been utilizing her home exercise program, which helps but she is looking for next stages of treatment.  Referral has been placed for physical therapy and number given to patient to call for scheduling.  - Alternatives:  Interested in a referral for acupuncture, which was placed today and number given to the patient for scheduling.  - Modalities:  May use ice, heat, massage PRN.   - Injections:   Last subacromial corticosteroid injection provided approximately 3 months of relief.  Would like to space the injections out as much as possible.  No subacromial corticosteroid injection performed today.  Will consider in the future as needed.  - Activity:  Encouraged to remain active and participate in regular activities as symptoms allow.   - Follow up: In 3 months as needed for re-evaluation and update to treatment plan. Patient has clinic contact information for questions or concerns.       Isaac Zhou DO  [facility] Sports Medicine  [facility] - Department of Orthopedic Surgery         Disclaimer: This note consists of symbols derived from keyboarding, dictation and/or voice recognition software. As a result, there may be errors in the script that have gone undetected. Please consider this when interpreting information found in this chart.        Again, thank you for allowing me to participate in the care of your patient.        Sincerely,        Isaac Zhou DO

## 2023-02-02 DIAGNOSIS — K21.00 GASTROESOPHAGEAL REFLUX DISEASE WITH ESOPHAGITIS WITHOUT HEMORRHAGE: ICD-10-CM

## 2023-02-02 DIAGNOSIS — I10 HYPERTENSION GOAL BP (BLOOD PRESSURE) < 140/90: ICD-10-CM

## 2023-02-02 RX ORDER — OMEPRAZOLE 40 MG/1
40 CAPSULE, DELAYED RELEASE ORAL DAILY
Qty: 90 CAPSULE | Refills: 0 | Status: SHIPPED | OUTPATIENT
Start: 2023-02-02 | End: 2023-04-26

## 2023-02-02 RX ORDER — LOSARTAN POTASSIUM AND HYDROCHLOROTHIAZIDE 12.5; 5 MG/1; MG/1
1 TABLET ORAL DAILY
Qty: 90 TABLET | Refills: 0 | Status: SHIPPED | OUTPATIENT
Start: 2023-02-02 | End: 2023-04-26

## 2023-02-08 NOTE — PROGRESS NOTES
Noreen Florence  :  1950  DOS: 2/10/2023  MRN: [id number]    Sports Medicine Clinic Procedure    [place] Guided Left Shoulder Subacromial Bursa Injection    Clinical History:  RTC tendinopathy with good response to prior SAB CSI    Diagnosis:   1. Tendinopathy of left rotator cuff      PROCEDURE  Large Joint Injection/Arthocentesis: L subacromial bursa    Date/Time: 2/10/2023 2:27 PM  Performed by: Isaac Zhou DO  Authorized by: Isaac Zhou DO     Indications:  Pain  Needle Size:  22 G  Guidance: landmark guided    Approach:  Anterolateral  Location:  Shoulder      Site:  L subacromial bursa  Medications:  40 mg triamcinolone 40 MG/ML; 2 mL bupivacaine (PF) 0.5 %; 2 mL lidocaine 1 %  Outcome:  Tolerated well, no immediate complications  Procedure discussed: discussed risks, benefits, and alternatives    Consent Given by:  Patient  Prep: patient was prepped and draped in usual sterile fashion      PROCEDURE  Left Shoulder Injection - Subacromial/Subdeltoid Bursa  The patient was informed of the risks and benefits of the procedure and alternatives were discussed. A written consent was signed by the patient. The injection site was prepped with chlorhexidine in sterile fashion. An injectate solution containing 2 mL of 1% lidocaine, 2 mL of 0.5% bupivacaine, and 1 mL of Kenalog (40 mg/mL) was drawn up into a 5 mL syringe. Injection was performed using sterile technique.  A 1.5-inch 22-gauge needle was used to enter the subacromial/subdeltoid bursa using a posterolateral approach. Injection performed successfully without difficulty or complications. The patient tolerated the procedure well.       Impression:  Successful Left intra-articular shoulder injection.    Plan:  Follow up as needed in the future for reevaluation and update to treatment plan.  Expectations and goals of CSI reviewed  Often 3-5 days for steroid effect, and can take up to three weeks for maximum effect  We discussed modified progressive  pain-free activity as tolerated  Do not overuse in first two weeks if feeling better due to concern for vulnerability while steroid is working  Supportive care reviewed  All questions were answered today  Contact us with additional questions or concerns  Signs and sx of concern reviewed      DO BRYAN Marquez [facility] - Sports Medicine  [facility] - Department of Orthopedic Surgery

## 2023-02-10 ENCOUNTER — PATIENT OUTREACH (OUTPATIENT)
Dept: GERIATRIC MEDICINE | Facility: CLINIC | Age: 73
End: 2023-02-10

## 2023-02-10 ENCOUNTER — OFFICE VISIT (OUTPATIENT)
Dept: ORTHOPEDICS | Facility: CLINIC | Age: 73
End: 2023-02-10
Payer: COMMERCIAL

## 2023-02-10 DIAGNOSIS — M67.912 TENDINOPATHY OF LEFT ROTATOR CUFF: Primary | ICD-10-CM

## 2023-02-10 PROCEDURE — 99207 PR DROP WITH A PROCEDURE: CPT | Mod: 25 | Performed by: STUDENT IN AN ORGANIZED HEALTH CARE EDUCATION/TRAINING PROGRAM

## 2023-02-10 PROCEDURE — 20610 DRAIN/INJ JOINT/BURSA W/O US: CPT | Mod: LT | Performed by: STUDENT IN AN ORGANIZED HEALTH CARE EDUCATION/TRAINING PROGRAM

## 2023-02-10 RX ORDER — LIDOCAINE HYDROCHLORIDE 10 MG/ML
2 INJECTION, SOLUTION INFILTRATION; PERINEURAL
Status: DISCONTINUED | OUTPATIENT
Start: 2023-02-10 | End: 2023-07-14

## 2023-02-10 RX ORDER — TRIAMCINOLONE ACETONIDE 40 MG/ML
40 INJECTION, SUSPENSION INTRA-ARTICULAR; INTRAMUSCULAR
Status: DISCONTINUED | OUTPATIENT
Start: 2023-02-10 | End: 2023-07-14

## 2023-02-10 RX ORDER — BUPIVACAINE HYDROCHLORIDE 5 MG/ML
2 INJECTION, SOLUTION EPIDURAL; INTRACAUDAL
Status: DISCONTINUED | OUTPATIENT
Start: 2023-02-10 | End: 2023-07-14

## 2023-02-10 RX ADMIN — LIDOCAINE HYDROCHLORIDE 2 ML: 10 INJECTION, SOLUTION INFILTRATION; PERINEURAL at 14:27

## 2023-02-10 RX ADMIN — BUPIVACAINE HYDROCHLORIDE 2 ML: 5 INJECTION, SOLUTION EPIDURAL; INTRACAUDAL at 14:27

## 2023-02-10 RX ADMIN — TRIAMCINOLONE ACETONIDE 40 MG: 40 INJECTION, SUSPENSION INTRA-ARTICULAR; INTRAMUSCULAR at 14:27

## 2023-02-10 NOTE — PROGRESS NOTES
[facility] Care Coordination Contact    Member changed health plan products from [facility] [employer] to [facility] effective 2/1/2023. CC to complete items on Product Change/ Health Plan Change check list including MMIS entry. [place] and other required tasks.    Sandee Quezada  Care Management Specialist  [facility]  [phone]

## 2023-02-10 NOTE — LETTER
2/10/2023         RE: Noreen Florence  [address]  [place] [address]        Dear Colleague,    Thank you for referring your patient, Noreen Florence, to the [facility] MEDIC[facility] CL[facility] [place]. Please see a copy of my visit note below.    Noreen Florence  :  1950  DOS: 2/10/2023  MRN: [id number]    Sports Medicine Clinic Procedure    [place] Guided Left Shoulder Subacromial Bursa Injection    Clinical History:  RTC tendinopathy with good response to prior SAB CSI    Diagnosis:   1. Tendinopathy of left rotator cuff      PROCEDURE  Large Joint Injection/Arthocentesis: L subacromial bursa    Date/Time: 2/10/2023 2:27 PM  Performed by: Isaac Zhou DO  Authorized by: Isaac Zhou DO     Indications:  Pain  Needle Size:  22 G  Guidance: landmark guided    Approach:  Anterolateral  Location:  Shoulder      Site:  L subacromial bursa  Medications:  40 mg triamcinolone 40 MG/ML; 2 mL bupivacaine (PF) 0.5 %; 2 mL lidocaine 1 %  Outcome:  Tolerated well, no immediate complications  Procedure discussed: discussed risks, benefits, and alternatives    Consent Given by:  Patient  Prep: patient was prepped and draped in usual sterile fashion      PROCEDURE  Left Shoulder Injection - Subacromial/Subdeltoid Bursa  The patient was informed of the risks and benefits of the procedure and alternatives were discussed. A written consent was signed by the patient. The injection site was prepped with chlorhexidine in sterile fashion. An injectate solution containing 2 mL of 1% lidocaine, 2 mL of 0.5% bupivacaine, and 1 mL of Kenalog (40 mg/mL) was drawn up into a 5 mL syringe. Injection was performed using sterile technique.  A 1.5-inch 22-gauge needle was used to enter the subacromial/subdeltoid bursa using a posterolateral approach. Injection performed successfully without difficulty or complications. The patient tolerated the procedure well.       Impression:  Successful Left intra-articular shoulder  injection.    Plan:  Follow up as needed in the future for reevaluation and update to treatment plan.  Expectations and goals of CSI reviewed  Often 3-5 days for steroid effect, and can take up to three weeks for maximum effect  We discussed modified progressive pain-free activity as tolerated  Do not overuse in first two weeks if feeling better due to concern for vulnerability while steroid is working  Supportive care reviewed  All questions were answered today  Contact us with additional questions or concerns  Signs and sx of concern reviewed      Isaac Zhou DO  [facility] Sports Medicine  [facility] - Department of Orthopedic Surgery             Again, thank you for allowing me to participate in the care of your patient.        Sincerely,        Isaac Zhou DO

## 2023-02-17 ENCOUNTER — PATIENT OUTREACH (OUTPATIENT)
Dept: GERIATRIC MEDICINE | Facility: CLINIC | Age: 73
End: 2023-02-17
Payer: COMMERCIAL

## 2023-02-17 NOTE — LETTER
February 17, 2023    NOREEN WOLF  [address]  [place] [address]        Dear Noreen:    As a member of [facility], we offer a health risk assessment at no cost to you. I know you don't want to have the assessment right now. If you change your mind, please call me at the number below.    Who performs the health risk assessment?  A [facility] Care Coordinator performs the assessment. Our Care Coordinators can also help you understand your benefits. They can tell you about services to aid you at home, such as managing your care with your doctors if your health worsens.    Our Care Coordinators are here for you if you need:    Support for activities you used to do by yourself (including making meals, bathing, and paying bills)    Equipment for bathroom or home safety    Help finding a new place to live    Information on staying healthy, preventing falls and immunizations    Questions?  If you have questions, or you would like to do the assessment, call me at [phone]. TTY users call [phone]. I'm here from 8am to 5pm. I may reach out to you again soon.      Sincerely,        Norah Peguero RN    E-mail:  [email or web address]  Phone: [phone]    Care Manager  [employer]    <[id number] accepted    [id number] (12/2021)  [id number]>

## 2023-02-17 NOTE — PROGRESS NOTES
[facility] Care Coordination Contact    Health Product Change/  [facility] Refusal Telephone Assessment    Member changed [facility]. CC contacted member to discuss product change and offered an assessment. Member refused home visit HRA on 1/17/23 .    ER visits: No  Hospitalizations: No  Health concerns: None reported.  Falls/Injuries: No  ADL/IADL Dependencies: Reports independence.     CC assisted member in contacted [employer] and [employer] re: an issue with her [employer] card. [employer] is to f/u with member when they have a resolution. (Member is reporting that she doesn't have any money loaded on her card but that she should as she is on [employer] and has diagnosed HTN).     Member currently receiving the following home care services: None   Member currently receiving the following community resources: None   Informal support(s):  Family    Advanced Care Planning discussion, complete code section.    [facility] sponsored benefits: Shared information re: Silver Sneakers/gym memberships, ASA, Calcium +D.    Follow-Up Plan: Member informed of future contact, plan to f/u with member with a 6 month telephone assessment and offer a home visit.  Contact information shared with member and family, encouraged member to call with any questions or concerns at any time.    This CC note routed to PCP.    Norah Peguero RN  [facility]  [phone]

## 2023-02-17 NOTE — PROGRESS NOTES
"[facility] Care Coordination Contact    Per CC, mailed client a \"Refusal of Home Visit\" letter.    Sandee Quezada  Care Management Specialist  [facility]  [phone]    "

## 2023-02-17 NOTE — PROGRESS NOTES
Encounter opened due to Regulatory [employer] Update to open FVP Program.    Sandee Quezada  Care Management Specialist  [facility]  [phone]

## 2023-02-17 NOTE — PROGRESS NOTES
Encounter opened due to Regulatory [employer] Update to close FVP Program.    Sandee Quezada  Care Management Specialist  [facility]  [phone]

## 2023-02-19 ENCOUNTER — OFFICE VISIT (OUTPATIENT)
Dept: URGENT CARE | Facility: URGENT CARE | Age: 73
End: 2023-02-19
Payer: COMMERCIAL

## 2023-02-19 VITALS
OXYGEN SATURATION: 95 % | DIASTOLIC BLOOD PRESSURE: 83 MMHG | WEIGHT: 117.7 LBS | SYSTOLIC BLOOD PRESSURE: 158 MMHG | BODY MASS INDEX: 22.51 KG/M2 | HEART RATE: 74 BPM | TEMPERATURE: 98.4 F

## 2023-02-19 DIAGNOSIS — N64.4 BREAST PAIN, LEFT: Primary | ICD-10-CM

## 2023-02-19 DIAGNOSIS — T14.8XXA MUSCLE STRAIN: ICD-10-CM

## 2023-02-19 PROCEDURE — 99213 OFFICE O/P EST LOW 20 MIN: CPT | Performed by: PHYSICIAN ASSISTANT

## 2023-02-19 NOTE — PROGRESS NOTES
Chief Complaint   Patient presents with     Breast Pain     Pt said left breast looks different noticed it today, when pt breath she feel pain in left breast noticed it this morning.              ASSESSMENT:     ICD-10-CM    1. Breast pain, left  N64.4       2. Muscle strain  T14.8XXA         Upon interview, patient's memory jogged of heavy lifting completed yesterday of laundry detergent. No concerns for a cardiovascular source of patient's symptoms due to focal tenderness that is able to be palpated on breast tissue. No mass, lesions, or change in pigmentation concerning for breast tissue abnormality, mass, or abscess.   Patient was additionally assessed by Tiff SINGH.     I independently interviewed and examined the patient. I reviewed and agree with above.     Ashli Garcia PA-C      PLAN: Encouraged patient to utilize over the counter Tylenol for pain relief. Patient previously prescribed Voltaren gel, encouraged use on spot of focal tenderness. If symptoms do not improve within 1 week, patient should follow up with PCP.    I have discussed clinical findings with patient.  Side effects of medications discussed.  Symptomatic care is discussed.  I have discussed the possibility of  worsening symptoms and indication to RTC or go to the ER if they occur.  All questions are answered, patient indicates understanding of these issues and is in agreement with plan.   Patient care instructions are discussed/given at the end of visit.   Lots of rest and fluids.      Viviana Garcia PA-C      SUBJECTIVE:  73-year-old female presents for 1 day of left-sided breast pain.  She woke up this morning and had a focal tenderness at the 9 o'clock position on her left breast.  No other associated symptoms, such as chest pain, palpitations, nausea, vomiting, headache, shortness of breath.  Patient has annual mammograms completed every October, last mammogram completed with no concerns.  No at home treatments tried.      No  "  Subjective     Patient ID: Venu Phillips is a 68 y.o. male.    Diabetes   He presents for his follow-up diabetic visit. He has type 2 diabetes mellitus. His disease course has been stable. There are no diabetic associated symptoms. Pertinent negatives for diabetes include no blurred vision, no chest pain, no fatigue, no weakness and no weight loss. Symptoms are stable.       Review of Systems   Constitutional: Negative for fatigue, unexpected weight change and weight loss.   HENT: Negative.    Eyes: Negative for blurred vision and visual disturbance.   Respiratory: Negative.  Negative for chest tightness and shortness of breath.    Cardiovascular: Negative for chest pain and palpitations.   Gastrointestinal: Negative.    Genitourinary: Negative.    Musculoskeletal: Negative.    Skin: Negative.    Neurological: Negative for weakness.   Psychiatric/Behavioral: Negative.        Objective     Vitals:    04/26/19 0829   Pulse: (!) 59   Resp: 16   Temp: 36.7 °C (98.1 °F)   SpO2: 97%   Weight: 100 kg (221 lb)   Height: 1.803 m (5' 11\")     Body mass index is 30.82 kg/m².    Physical Exam   Constitutional: He is oriented to person, place, and time. He appears well-developed and well-nourished.   HENT:   Head: Normocephalic.   Eyes: Pupils are equal, round, and reactive to light.   Neck: Normal range of motion.   Cardiovascular: Normal rate, regular rhythm and normal heart sounds.    Pulmonary/Chest: Effort normal and breath sounds normal.   Genitourinary: Prostate normal.   Musculoskeletal: Normal range of motion.   Neurological: He is alert and oriented to person, place, and time.   Skin: Skin is warm and dry.   Psychiatric: He has a normal mood and affect.   Vitals reviewed.      Assessment/Plan   Diagnoses and all orders for this visit:    DM type 2 with diabetic mixed hyperlipidemia (CMS/HCC) (HCC) (Primary)  -     CBC and Differential  -     Lipid panel  -     Comprehensive metabolic panel  -     Hemoglobin " A1c    Diabetes mellitus with coincident hypertension (CMS/[facility])  -     CBC and Differential  -     Lipid panel  -     Comprehensive metabolic panel  -     Hemoglobin A1c    Screening PSA (prostate specific antigen)  -     PSA    Advised to follow up in 3 months for management of diabetes.     IHuyen, am scribing for, and in the presence of, Mayank Acosta DO.    4/26/2019 8:45 AM    IMayank DO, personally performed the services described in this documentation as scribed by Huyen Chacon in my presence, and it is both accurate and complete.     Known Allergies    Past Medical History:   Diagnosis Date     GERD (gastroesophageal reflux disease) 3/20/2015     Glaucoma (increased eye pressure)      Hyperlipidemia LDL goal < 130 8/3/2015     Hypertension      Hypertension, goal below 150/90 11/28/2014     Rheumatoid arthritis, adult (H)        acetaminophen-codeine (TYLENOL #3) 300-30 MG tablet, TAKE 1 TABLET BY MOUTH EVERY 4 TO 6 HOURS AS NEEDED FOR PAIN  cetirizine (ZYRTEC) 10 MG tablet, TAKE 1 TABLET(10 MG) BY MOUTH DAILY  chlorhexidine (PERIDEX) 0.12 % solution, SWISH AND SPIT 15 ML BY MOUTH TWICE DAILY AS NEEDED  cholecalciferol (VITAMIN D3) 25 mcg (1000 units) capsule, Take 1,000 Units by mouth  diclofenac (VOLTAREN) 1 % topical gel, Apply 2 g topically 4 times daily as needed for moderate pain (4-6)  fluticasone (FLONASE) 50 MCG/ACT nasal spray, SHAKE LIQUID AND USE 1 TO 2 SPRAYS IN EACH NOSTRIL DAILY  latanoprost (XALATAN) 0.005 % ophthalmic solution, Place 1 drop into both eyes At Bedtime  losartan-hydrochlorothiazide (HYZAAR) 50-12.5 MG tablet, Take 1 tablet by mouth daily  magnesium 250 MG tablet, TAKE 1 TABLET BY MOUTH DAILY AS NEEDED FOR LEG CRAMPS  meloxicam (MOBIC) 7.5 MG tablet, 15 MG/D WITH FOOD IN AM FOR 2 WEEKS AND THEN STAY ON 7.5 MG DAILY WITH FOOD  methylcellulose (CITRUCEL) powder, Take 0.3 g (1.05 teaspoonful) by mouth daily  metoprolol tartrate (LOPRESSOR) 25 MG tablet, Take 1 tablet (25 mg) by mouth At Bedtime  omeprazole (PRILOSEC) 40 MG DR capsule, Take 1 capsule (40 mg) by mouth daily  pravastatin (PRAVACHOL) 20 MG tablet, Take 1 tablet (20 mg) by mouth daily    2 mL bupivacaine (MARCAINE) preservative free injection 0.25% (10 mL vial)  2 mL bupivacaine (MARCAINE) preservative free injection 0.5% (20 mL vial)  gadobutrol (GADAVIST) injection 7.5 mL  lidocaine 1 % injection 2 mL  lidocaine 1 % injection 2 mL  methylPREDNISolone (DEPO-MEDROL) injection 40 mg  triamcinolone (KENALOG-40) injection 40 mg  triamcinolone (KENALOG-40)  injection 40 mg        Social History     Tobacco Use     Smoking status: Never     Smokeless tobacco: Never   Substance Use Topics     Alcohol use: No       ROS:  CONSTITUTIONAL: Negative for fatigue or fever.  EYES: Negative for eye problems.  ENT: Neg for ST.  RESP: Neg for cough.  CV: Negative for chest pains.  GI: Negative for vomiting.  MUSCULOSKELETAL:  Pain on left breast just lateral to sternum. Negative for joint pains.  NEUROLOGIC: Negative for headaches.  SKIN: Negative for rash.  PSYCH: Normal mentation for age.    OBJECTIVE:  BP (!) 158/83 (BP Location: Left arm, Patient Position: Sitting, Cuff Size: Adult Small)   Pulse 74   Temp 98.4  F (36.9  C) (Tympanic)   Wt 53.4 kg (117 lb 11.2 oz)   LMP 06/21/2002 (Approximate)   SpO2 95%   BMI 22.51 kg/m    GENERAL APPEARANCE: Healthy, alert and no distress.  EYES:Conjunctiva/sclera clear.  RESP: Lungs clear to auscultation - no rales, rhonchi or wheezes  CV: Regular rate and rhythm, normal S1 S2, no murmur noted.  NEURO: Awake, alert    SKIN: No rashes or skin changes  MSK: Tenderness to palpation of left breast just lateral to sternum. No tenderness along sternum or ribs.      Viviana Garcia PA-C

## 2023-03-19 DIAGNOSIS — R25.2 BILATERAL LEG CRAMPS: ICD-10-CM

## 2023-03-21 RX ORDER — MULTIVITAMIN WITH IRON
TABLET ORAL
Qty: 30 TABLET | Refills: 1 | Status: SHIPPED | OUTPATIENT
Start: 2023-03-21 | End: 2023-04-26

## 2023-03-23 ENCOUNTER — TELEPHONE (OUTPATIENT)
Dept: UROLOGY | Facility: CLINIC | Age: 73
End: 2023-03-23
Payer: COMMERCIAL

## 2023-03-23 DIAGNOSIS — D17.71 ANGIOMYOLIPOMA OF RIGHT KIDNEY: Primary | ICD-10-CM

## 2023-03-23 NOTE — PROGRESS NOTES
Hello,   This patient is to follow up in April 2024.. She should see me back in 4/2024 with renal ultrasound prior to that appointment. If she has new urologic concerns, I'm happy to see her as scheduled.   Received message above orders placed.     Madeline Latham LPN on 3/23/2023 at 11:50 AM

## 2023-03-23 NOTE — TELEPHONE ENCOUNTER
"3/23 Per Haley Dugan's [facility] request: \" Please inform her that this appointment is not needed. She should see me back in 4/2024 with renal ultrasound prior to that appointment\"     Patient verbally understood and we rescheduled appointment on 3/24/2023 to 3/29/2024. Will [place] back with patient to schedule imaging orders at a closer date.     Sydney gutierrez Procedure   Dermatology, Surgery, Urology  [facility] [place]      ----- Message from Madeline Latham LPN sent at 3/23/2023 11:37 AM CDT -----  Regarding: RE: Needs ultrasound  Dominique Grimes,     Can you help me out with this patient please.     Thank you ,  Madeline Latham LPN on 3/23/2023 at 11:38 AM    ----- Message -----  From: Haley Dugan PA-C  Sent: 3/23/2023  10:46 AM CDT  To: [facility] [place]  Subject: Needs ultrasound                                 Hello,  This patient on my schedule tomorrow is not supposed to follow up until April 2024. Please inform her that this appointment is not needed. She should see me back in 4/2024 with renal ultrasound prior to that appointment. If she has new urologic concerns, I'm happy to see her as scheduled.   Thanks!  Haley Dugan PA-C          "

## 2023-04-23 DIAGNOSIS — J30.1 HAYFEVER: ICD-10-CM

## 2023-04-25 RX ORDER — CETIRIZINE HYDROCHLORIDE 10 MG/1
TABLET ORAL
Qty: 90 TABLET | Refills: 1 | Status: SHIPPED | OUTPATIENT
Start: 2023-04-25 | End: 2023-07-25

## 2023-04-26 ENCOUNTER — OFFICE VISIT (OUTPATIENT)
Dept: FAMILY MEDICINE | Facility: CLINIC | Age: 73
End: 2023-04-26
Payer: COMMERCIAL

## 2023-04-26 VITALS
SYSTOLIC BLOOD PRESSURE: 130 MMHG | HEART RATE: 80 BPM | DIASTOLIC BLOOD PRESSURE: 85 MMHG | OXYGEN SATURATION: 95 % | BODY MASS INDEX: 22.47 KG/M2 | TEMPERATURE: 98.8 F | HEIGHT: 61 IN | WEIGHT: 119 LBS | RESPIRATION RATE: 18 BRPM

## 2023-04-26 DIAGNOSIS — E78.2 MIXED HYPERLIPIDEMIA: ICD-10-CM

## 2023-04-26 DIAGNOSIS — L81.9 PIGMENTATION ABNORMALITY OF SKIN: ICD-10-CM

## 2023-04-26 DIAGNOSIS — R25.2 BILATERAL LEG CRAMPS: ICD-10-CM

## 2023-04-26 DIAGNOSIS — K05.10 GINGIVITIS: ICD-10-CM

## 2023-04-26 DIAGNOSIS — M35.00 SICCA SYNDROME (H): ICD-10-CM

## 2023-04-26 DIAGNOSIS — M06.4 POLYARTHRITIS, INFLAMMATORY (H): ICD-10-CM

## 2023-04-26 DIAGNOSIS — L30.9 DERMATITIS: ICD-10-CM

## 2023-04-26 DIAGNOSIS — R73.03 PRE-DIABETES: ICD-10-CM

## 2023-04-26 DIAGNOSIS — I10 HYPERTENSION, GOAL BELOW 150/90: Primary | ICD-10-CM

## 2023-04-26 DIAGNOSIS — I10 HYPERTENSION GOAL BP (BLOOD PRESSURE) < 140/90: ICD-10-CM

## 2023-04-26 LAB — HBA1C MFR BLD: 6.1 % (ref 0–5.6)

## 2023-04-26 PROCEDURE — 82570 ASSAY OF URINE CREATININE: CPT | Performed by: PREVENTIVE MEDICINE

## 2023-04-26 PROCEDURE — 82043 UR ALBUMIN QUANTITATIVE: CPT | Performed by: PREVENTIVE MEDICINE

## 2023-04-26 PROCEDURE — 80061 LIPID PANEL: CPT | Performed by: PREVENTIVE MEDICINE

## 2023-04-26 PROCEDURE — 99214 OFFICE O/P EST MOD 30 MIN: CPT | Performed by: PREVENTIVE MEDICINE

## 2023-04-26 PROCEDURE — 83036 HEMOGLOBIN GLYCOSYLATED A1C: CPT | Performed by: PREVENTIVE MEDICINE

## 2023-04-26 PROCEDURE — 80048 BASIC METABOLIC PNL TOTAL CA: CPT | Performed by: PREVENTIVE MEDICINE

## 2023-04-26 PROCEDURE — 36415 COLL VENOUS BLD VENIPUNCTURE: CPT | Performed by: PREVENTIVE MEDICINE

## 2023-04-26 RX ORDER — METOPROLOL TARTRATE 25 MG/1
25 TABLET, FILM COATED ORAL AT BEDTIME
Qty: 90 TABLET | Refills: 3 | Status: SHIPPED | OUTPATIENT
Start: 2023-04-26 | End: 2024-03-18

## 2023-04-26 RX ORDER — PRAVASTATIN SODIUM 20 MG
20 TABLET ORAL DAILY
Qty: 90 TABLET | Refills: 3 | Status: SHIPPED | OUTPATIENT
Start: 2023-04-26 | End: 2024-05-17

## 2023-04-26 RX ORDER — MULTIVITAMIN WITH IRON
TABLET ORAL
Qty: 90 TABLET | Refills: 1 | Status: SHIPPED | OUTPATIENT
Start: 2023-04-26 | End: 2023-12-20

## 2023-04-26 RX ORDER — CHLORHEXIDINE GLUCONATE ORAL RINSE 1.2 MG/ML
SOLUTION DENTAL
Qty: 473 ML | Refills: 1 | Status: SHIPPED | OUTPATIENT
Start: 2023-04-26 | End: 2023-09-19

## 2023-04-26 RX ORDER — LOSARTAN POTASSIUM AND HYDROCHLOROTHIAZIDE 12.5; 5 MG/1; MG/1
1 TABLET ORAL DAILY
Qty: 90 TABLET | Refills: 3 | Status: SHIPPED | OUTPATIENT
Start: 2023-04-26 | End: 2024-05-17

## 2023-04-26 RX ORDER — TRIAMCINOLONE ACETONIDE 1 MG/G
CREAM TOPICAL
Qty: 80 G | Refills: 0 | Status: SHIPPED | OUTPATIENT
Start: 2023-04-26 | End: 2023-06-01

## 2023-04-26 ASSESSMENT — PAIN SCALES - GENERAL: PAINLEVEL: NO PAIN (0)

## 2023-04-26 NOTE — RESULT ENCOUNTER NOTE
Noreen,     Three month glucose number has improved from last check and is in a pre diabetic range.    Prediabetes means your blood sugar is high and you are increased risk for developing overt diabetes in the future.   Be sure to monitor your intake of things like bread, pasta, rice, starchy foods (ie: potatoes), sugary beverages (ie: soda, juice-even the natural kind) and alcohol.  I recommend your plate be 1/2 vegetables, 1/4 protein, and 1/4 carbohydrate.      Other labs are pending.    Please do not hesitate to call us at [phone] if you have any questions or concerns.    Thank you,    Jud Morales MD MPH

## 2023-04-26 NOTE — PROGRESS NOTES
Assessment & Plan     Hypertension, goal below 150/90  - metoprolol tartrate (LOPRESSOR) 25 MG tablet  Dispense: 90 tablet; Refill: 3  - Basic metabolic panel  (Ca, Cl, CO2, Creat, Gluc, K, Na, BUN)  - Albumin Random Urine Quantitative with Creat Ratio    Mixed hyperlipidemia  - pravastatin (PRAVACHOL) 20 MG tablet  Dispense: 90 tablet; Refill: 3  - Lipid panel reflex to direct LDL Non-fasting    The 10-year ASCVD risk score (Roby DK, et al., 2019) is: 15.1%    Values used to calculate the score:      Age: 73 years      Sex: Female      Is Non- : No      Diabetic: No      Tobacco smoker: No      Systolic Blood Pressure: 130 mmHg      Is BP treated: Yes      HDL Cholesterol: 38 mg/dL      Total Cholesterol: 194 mg/dL    Pre-diabetes  -await labs   - Hemoglobin A1c    Lab Results   Component Value Date    A1C 6.2 10/21/2022    A1C 6.5 05/10/2022    A1C 5.6 10/08/2020    A1C 5.9 04/26/2019    A1C 5.9 06/21/2018    A1C 5.9 10/27/2016       Sicca syndrome (H)  -refill on eye drops provided   - carboxymethylcellulose-glycerin (REFRESH OPTIVE) 0.5-0.9 % ophthalmic solution  Dispense: 15 mL; Refill: 1    Polyarthritis, inflammatory (H)  -followed by Rheumatology with [facility]     Hypertension goal BP (blood pressure) < 140/90  -medication refilled  - losartan-hydrochlorothiazide (HYZAAR) 50-12.5 MG tablet  Dispense: 90 tablet; Refill: 3    Bilateral leg cramps  - magnesium 250 MG tablet  Dispense: 90 tablet; Refill: 1    Dermatitis  -use for a maximum of 2 weeks  -risk of atrophy and pigment change reviewed   - triamcinolone (KENALOG) 0.1 % external cream  Dispense: 80 g; Refill: 0    Gingivitis  -refills on medication provided   - chlorhexidine (PERIDEX) 0.12 % solution  Dispense: 473 mL; Refill: 1    Pigmentation abnormality of skin  -symptoms have been lifelong   - Adult Dermatology Referral      Ordering of each unique test  Prescription drug management  27 minutes spent by me on the date of  the encounter doing chart review, history and exam, documentation and further activities per the note         Jud Morales MD MPH    [facility] CHRIS Sorto is a 73 year old, presenting for the following health issues:  Pain, Diabetes, and Hypertension        4/26/2023     1:33 PM   Additional Questions   Roomed by rené flores   Accompanied by none         4/26/2023     1:33 PM   Patient Reported Additional Medications   Patient reports taking the following new medications none     Pain    History of Present Illness       Diabetes:   She presents for follow up of diabetes.  She is not checking blood glucose. She has no concerns regarding her diabetes at this time.  She is not experiencing numbness or burning in feet, excessive thirst, blurry vision, weight changes or redness, sores or blisters on feet.         Hyperlipidemia:  She presents for follow up of hyperlipidemia.  She is taking medication to lower cholesterol. She is not having myalgia or other side effects to statin medications.    Hypertension: She presents for follow up of hypertension.  She does not check blood pressure  regularly outside of the clinic. Outside blood pressures have been over 140/90. She follows a low salt diet.     She eats 2-3 servings of fruits and vegetables daily.She consumes 1 sweetened beverage(s) daily.She exercises with enough effort to increase her heart rate 30 to 60 minutes per day.  She exercises with enough effort to increase her heart rate 7 days per week.   She is taking medications regularly.     Sees Rheumatology through [facility], Q6 months follow up       Hyperlipidemia Follow-Up       Are you regularly taking any medication or supplement to lower your cholesterol?   Yes- statin     Are you having muscle aches or other side effects that you think could be caused by your cholesterol lowering medication?  No     Hypertension Follow-up       Do you check your blood pressure regularly  "outside of the clinic? No     Are you following a low salt diet? Yes    Are your blood pressures ever more than 140 on the top number (systolic) OR more       than 90 on the bottom number (diastolic), for example 140/90? No  Does walking and gardening  No chest pain  No dizziness  No pedal edema     Polyarthritis:  -followed by Rheumatology outside of [place]  -records through Care everywhere reviewed  -The methotrexate was stopped in July 2021 due to elevated liver enzymes.  Ultrasound of the liver in September 2021 revealed fatty liver.  In August 2021 her CK was 170 ALT elevated at 56 AST 42.  -seen by them every 6 months   -has been started on Sulfasalazine, tapered off Meloxicam       Fatty liver:  -some exercise  -no Tylenol  -no abdominal pain  - no emesis   -LFTS checked 3/23: Normal     Eye+ drops refills needed     Has had pigmentation of the cheeks most of her life. Requesting referral for DERM, understands this may be considered cosmetic       Review of Systems   Constitutional, HEENT, cardiovascular, pulmonary, gi and gu systems are negative, except as otherwise noted.      Objective    /85   Pulse 80   Temp 98.8  F (37.1  C) (Tympanic)   Resp 18   Ht 1.54 m (5' 0.63\")   Wt 54 kg (119 lb)   LMP 06/21/2002 (Approximate)   SpO2 95%   BMI 22.76 kg/m    Body mass index is 22.76 kg/m .  Physical Exam   GENERAL APPEARANCE: healthy, alert and no distress  EYES: Eyes grossly normal to inspection and conjunctivae and sclerae normal  NECK: no adenopathy and trachea midline and normal to palpation  RESP: lungs clear to auscultation - no rales, rhonchi or wheezes  CV: regular rates and rhythm, normal S1 S2  ABDOMEN: soft, non-tender and no rebound or guarding   MS: extremities normal- no gross deformities noted and peripheral pulses normal  SKIN: Hyperpigmented patch on both cheeks inferior to the eyes  NEURO: Normal strength and tone, mentation intact and speech normal  PSYCH: mentation appears " normal      No results found for this or any previous visit (from the past 24 hour(s)).

## 2023-04-27 LAB
ANION GAP SERPL CALCULATED.3IONS-SCNC: 6 MMOL/L (ref 3–14)
BUN SERPL-MCNC: 12 MG/DL (ref 7–30)
CALCIUM SERPL-MCNC: 9.8 MG/DL (ref 8.5–10.1)
CHLORIDE BLD-SCNC: 106 MMOL/L (ref 94–109)
CHOLEST SERPL-MCNC: 187 MG/DL
CO2 SERPL-SCNC: 30 MMOL/L (ref 20–32)
CREAT SERPL-MCNC: 0.41 MG/DL (ref 0.52–1.04)
FASTING STATUS PATIENT QL REPORTED: NO
GFR SERPL CREATININE-BSD FRML MDRD: >90 ML/MIN/1.73M2
GLUCOSE BLD-MCNC: 115 MG/DL (ref 70–99)
HDLC SERPL-MCNC: 47 MG/DL
LDLC SERPL CALC-MCNC: 80 MG/DL
NONHDLC SERPL-MCNC: 140 MG/DL
POTASSIUM BLD-SCNC: 3.6 MMOL/L (ref 3.4–5.3)
SODIUM SERPL-SCNC: 142 MMOL/L (ref 133–144)
TRIGL SERPL-MCNC: 302 MG/DL

## 2023-04-27 NOTE — RESULT ENCOUNTER NOTE
Noreen,    Electrolytes, glucose and kidney function are normal.  LDL cholesterol is at goal for you.  To improve triglycerides and HDL, avoid fried, fatty foods and get 150 minutes of exercise per week.      Please do not hesitate to call us at [phone] if you have any questions or concerns.    Thank you,    Jud Morales MD MPH

## 2023-04-28 LAB
CREAT UR-MCNC: 20 MG/DL
MICROALBUMIN UR-MCNC: 7 MG/L
MICROALBUMIN/CREAT UR: 35 MG/G CR (ref 0–25)

## 2023-04-28 NOTE — RESULT ENCOUNTER NOTE
Noreen,     Urine sample is showing a small amount of abnormal protein, we will recheck this in 6 months.  Avoid over the counter Advil, Aleve and Ibuprofen.     Please do not hesitate to call us at [phone] if you have any questions or concerns.    Thank you,    Jud Morales MD MPH

## 2023-05-31 ENCOUNTER — OFFICE VISIT (OUTPATIENT)
Dept: URGENT CARE | Facility: URGENT CARE | Age: 73
End: 2023-05-31
Payer: COMMERCIAL

## 2023-05-31 VITALS
OXYGEN SATURATION: 96 % | RESPIRATION RATE: 17 BRPM | BODY MASS INDEX: 22.17 KG/M2 | HEART RATE: 83 BPM | SYSTOLIC BLOOD PRESSURE: 136 MMHG | WEIGHT: 115.9 LBS | DIASTOLIC BLOOD PRESSURE: 79 MMHG | TEMPERATURE: 98.7 F

## 2023-05-31 DIAGNOSIS — M06.4 POLYARTHRITIS, INFLAMMATORY (H): Primary | ICD-10-CM

## 2023-05-31 DIAGNOSIS — K64.8 INTERNAL HEMORRHOID: ICD-10-CM

## 2023-05-31 PROCEDURE — 99214 OFFICE O/P EST MOD 30 MIN: CPT | Performed by: PHYSICIAN ASSISTANT

## 2023-05-31 RX ORDER — SULFASALAZINE 500 MG/1
TABLET, DELAYED RELEASE ORAL
COMMUNITY
Start: 2023-03-14 | End: 2023-10-09

## 2023-05-31 RX ORDER — HYDROCORTISONE ACETATE 25 MG/1
25 SUPPOSITORY RECTAL 2 TIMES DAILY
Qty: 14 SUPPOSITORY | Refills: 0 | Status: SHIPPED | OUTPATIENT
Start: 2023-05-31 | End: 2023-06-07

## 2023-05-31 RX ORDER — CELECOXIB 100 MG/1
100 CAPSULE ORAL 2 TIMES DAILY PRN
Qty: 60 CAPSULE | Refills: 0 | Status: SHIPPED | OUTPATIENT
Start: 2023-05-31 | End: 2023-06-28

## 2023-05-31 NOTE — PATIENT INSTRUCTIONS
Start Anusol and if not improving over 1-2 weeks follow up with PCP.     Heat for the next 2-3 days. Massage, stretch, range of motion, tennis ball massage,  Modify activity.  Mild pain is okay as long as it resolves after a minute or 2 of discontinuing the activity.  Decreased level activity if pain is moderate to severe or last longer than a few minutes after discontinuing.  Voltaren gel 4 times a day for the next 1 to 2 weeks can be effective.  Has minimal side effects but can take a few days to begin working and have to be diligent about application.    Celebrex as needed for breakthrough pain    Follow up with Rheumatologist    Follow up with PT

## 2023-05-31 NOTE — PROGRESS NOTES
Chief Complaint   Patient presents with     Rectal Problem     Pt noticed blood in stool last week, still having it in the morning, pt had blood in stool in the past and ws told to use hydrocortisone cream. Pt noticed a little bit of blood in stool this morning      Neck Pain     Pt feel like neck is strained for 10 days, feel a little bit better throughout the day but when she wake up in the morning she can feel neck strain.     Ankle Pain     Pt having pain on right heel area, pt seen specialist for it years ago pain went away but pt noticed pain again 2 weeks ago      Hip Pain     Pain in right hip area a for 2-3 weeks, walking is ok, when pt sit down she notice pain       ASSESSMENT/PLAN:  There are no diagnoses linked to this encounter.    Nam Bennett PA-C  {2021 E&M time:[id number]}    SUBJECTIVE:  Noreen is a 73 year old female who presents to urgent care with ***.    ROS: Pertinent ROS neg other than the symptoms noted above in the HPI.     OBJECTIVE:  /79 (BP Location: Left arm, Patient Position: Sitting, Cuff Size: Adult Small)   Pulse 83   Temp 98.7  F (37.1  C) (Tympanic)   Resp 17   Wt 52.6 kg (115 lb 14.4 oz)   LMP 06/21/2002 (Approximate)   SpO2 96%   BMI 22.17 kg/m     {Exam List (Optional):713599}    DIAGNOSTICS  {Diagnostic Test Results (Optional):893307}  No results found for any visits on 05/31/23.     Current Outpatient Medications   Medication     acetaminophen-codeine (TYLENOL #3) 300-30 MG tablet     carboxymethylcellulose-glycerin (REFRESH OPTIVE) 0.5-0.9 % ophthalmic solution     cetirizine (ZYRTEC) 10 MG tablet     chlorhexidine (PERIDEX) 0.12 % solution     cholecalciferol (VITAMIN D3) 25 mcg (1000 units) capsule     diclofenac (VOLTAREN) 1 % topical gel     fluticasone (FLONASE) 50 MCG/ACT nasal spray     latanoprost (XALATAN) 0.005 % ophthalmic solution     losartan-hydrochlorothiazide (HYZAAR) 50-12.5 MG tablet     magnesium 250 MG tablet     meloxicam (MOBIC) 7.5 MG tablet      omeprazole (PRILOSEC) 40 MG DR capsule     pravastatin (PRAVACHOL) 20 MG tablet     triamcinolone (KENALOG) 0.1 % external cream     methylcellulose (CITRUCEL) powder     metoprolol tartrate (LOPRESSOR) 25 MG tablet     Current Facility-Administered Medications   Medication     2 mL bupivacaine (MARCAINE) preservative free injection 0.25% (10 mL vial)     2 mL bupivacaine (MARCAINE) preservative free injection 0.5% (20 mL vial)     lidocaine 1 % injection 2 mL     lidocaine 1 % injection 2 mL     methylPREDNISolone (DEPO-MEDROL) injection 40 mg     triamcinolone (KENALOG-40) injection 40 mg     triamcinolone (KENALOG-40) injection 40 mg     Facility-Administered Medications Ordered in Other Visits   Medication     gadobutrol (GADAVIST) injection 7.5 mL      Patient Active Problem List   Diagnosis     Osteopenia     Hayfever     Hypertension goal BP (blood pressure) < 140/90     Polyarthritis, inflammatory (H)     GERD (gastroesophageal reflux disease)     Hyperlipidemia with target LDL less than 130     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     High risk medication use     Microscopic hematuria     Pterygium of right eye     Sicca syndrome (H)     Angiomyolipoma of right kidney     History of Helicobacter pylori infection     Pre-diabetes     NAFLD (nonalcoholic fatty liver disease)     Elevated LFTs     Heel pain, chronic, right      Past Medical History:   Diagnosis Date     GERD (gastroesophageal reflux disease) 3/20/2015     Glaucoma (increased eye pressure)      Hyperlipidemia LDL goal < 130 8/3/2015     Hypertension      Hypertension, goal below 150/90 11/28/2014     Rheumatoid arthritis, adult (H)      Past Surgical History:   Procedure Laterality Date     COLONOSCOPY       COLONOSCOPY WITH CO2 INSUFFLATION N/A 4/5/2017    Procedure: COLONOSCOPY WITH CO2 INSUFFLATION;  Surgeon: Duane, William Charles, MD;  Location: [facility]     COMBINED ESOPHAGOSCOPY, GASTROSCOPY, DUODENOSCOPY (EGD) WITH CO2  INSUFFLATION N/A 2/13/2019    Procedure: COMBINED ESOPHAGOSCOPY, GASTROSCOPY, DUODENOSCOPY (EGD) WITH CO2 INSUFFLATION;  Surgeon: Sly De Santiago MD;  Location: [facility]     COMBINED ESOPHAGOSCOPY, GASTROSCOPY, DUODENOSCOPY (EGD) WITH CO2 INSUFFLATION N/A 2/7/2022    Procedure: ESOPHAGOGASTRODUODENOSCOPY, WITH CO2 INSUFFLATION;  Surgeon: Luis Aceves MD;  Location: [facility]     COMBINED REPAIR PTOSIS WITH BLEPHAROPLASTY BILATERAL Bilateral 1/6/2017    Procedure: COMBINED REPAIR PTOSIS WITH BLEPHAROPLASTY BILATERAL;  Surgeon: Carly Norman MD;  [facility]     ESOPHAGOSCOPY, GASTROSCOPY, DUODENOSCOPY (EGD), COMBINED N/A 1/5/2016    Procedure: COMBINED ESOPHAGOSCOPY, GASTROSCOPY, DUODENOSCOPY (EGD), BIOPSY SINGLE OR MULTIPLE;  Surgeon: Duane, William Charles, MD;  Location: [facility]     ESOPHAGOSCOPY, GASTROSCOPY, DUODENOSCOPY (EGD), COMBINED N/A 2/13/2019    Procedure: Combined Esophagoscopy, Gastroscopy, Duodenoscopy (Egd), Biopsy Single Or Multiple;  Surgeon: Sly De Santiago MD;  Location: [facility]     ESOPHAGOSCOPY, GASTROSCOPY, DUODENOSCOPY (EGD), COMBINED N/A 2/7/2022    Procedure: ESOPHAGOGASTRODUODENOSCOPY, WITH BIOPSY;  Surgeon: Luis Aceves MD;  Location: [facility]     REPAIR ENTROPION BILATERAL Bilateral 1/6/2017    Procedure: REPAIR ENTROPION BILATERAL;  Surgeon: Carly Norman MD;  Location: [facility]     REPAIR PTOSIS Bilateral 01/2017     Family History   Problem Relation Age of Onset     Diabetes Mother      Cancer No family hx of      Hypertension No family hx of      Cerebrovascular Disease No family hx of      Thyroid Disease No family hx of      Glaucoma No family hx of      Macular Degeneration No family hx of      Social History     Tobacco Use     Smoking status: Never     Smokeless tobacco: Never   Vaping Use     Vaping status: Never Used     Passive vaping exposure: Yes   Substance Use Topics     Alcohol use: No        {AMBULATORY ATTESTATION  "(Optional):878158}  {Diagnosis or treatment significantly limited by social determinants (optional):862250::\"Diagnosis or treatment significantly limited by social determinants of health - ***\"}    The plan of care was discussed with the patient. They understand and agree with the course of treatment prescribed. A printed summary was given including instructions and medications.  The use of Dragon/[employer] services may have been used to construct the content in this note; any grammatical or spelling errors are non-intentional. Please contact the author of this note directly if you are in need of any clarification.   "

## 2023-05-31 NOTE — PROGRESS NOTES
Chief Complaint   Patient presents with     Rectal Problem     Pt noticed blood in stool last week, still having it in the morning, pt had blood in stool in the past and ws told to use hydrocortisone cream. Pt noticed a little bit of blood in stool this morning      Neck Pain     Pt feel like neck is strained for 10 days, feel a little bit better throughout the day but when she wake up in the morning she can feel neck strain.     Ankle Pain     Pt having pain on right heel area, pt seen specialist for it years ago pain went away but pt noticed pain again 2 weeks ago      Hip Pain     Pain in right hip area a for 2-3 weeks, walking is ok, when pt sit down she notice pain       ASSESSMENT/PLAN:  Noreen was seen today for rectal problem, neck pain, ankle pain and hip pain.    Diagnoses and all orders for this visit:    Polyarthritis, inflammatory (H)  -     diclofenac (VOLTAREN) 1 % topical gel; Apply 2 g topically 4 times daily for 21 days  -     celecoxib (CELEBREX) 100 MG capsule; Take 1 capsule (100 mg) by mouth 2 times daily as needed for moderate pain or pain  -     Physical Therapy Referral; Future    Internal hemorrhoid  -     hydrocortisone (ANUSOL-HC) 25 MG suppository; Place 1 suppository (25 mg) rectally 2 times daily for 7 days    Patient with history of hemorrhoids and history is also consistent with hemorrhoids today.  Stop cream and start suppository as above.    No red flag symptoms and her multijoint and muscle pain is possible worsening of her polyarthritis and may need to increase her sulfasalazine prescription.  Is trying to get in with a rheumatologist which I would encourage her to do.  Neck and back pain seems more muscular related and likely strain versus spasm.  Heat, range of motion, massage and follow-up with PT.  Heel pain is much more likely to be the polyarthritis.  Recommend Voltaren gel and Celebrex for breakthrough pain.    Nam Bennett PA-C      SUBJECTIVE:  Noreen is a 73 year old  female who presents to urgent care with multiple medical conditions.  Blood in stool x 1 week. Has had this in past and treated with hydrocortisone cream. Started this yesterday. No pain. No recent constipation or diarrhea or other changes in BMs.  Neck/occiput pain x 7 days. Worse in the AM. No trauma. No exacerbation or reliving symptoms  L hip pain/low back x 10 days. Fine while walking but hurts with going from sitting to standing and vice versa. No radiation.  No fevers, no chills, no saddle paraesthesias, no urinary symptoms, no extremity weakness  Heel pain x 2 weeks. Has known arthritis there. And has been on Mobic which they took her off and then put her on sulfasalazine 500 twice daily and did discuss potentially increasing this to 1000 mg twice a day    ROS: Pertinent ROS neg other than the symptoms noted above in the HPI.     OBJECTIVE:  /79 (BP Location: Left arm, Patient Position: Sitting, Cuff Size: Adult Small)   Pulse 83   Temp 98.7  F (37.1  C) (Tympanic)   Resp 17   Wt 52.6 kg (115 lb 14.4 oz)   LMP 06/21/2002 (Approximate)   SpO2 96%   BMI 22.17 kg/m     GENERAL: healthy, alert and no distress  EYES: Eyes grossly normal to inspection, PERRL and conjunctivae and sclerae normal  HENT: ear canals and TM's normal, nose and mouth without ulcers or lesions  MS: no gross musculoskeletal defects noted, no edema, mild diffuse right lower lumbar tenderness, no pain with hip extension or flexion or rotation.  Diffuse pain at the base of the right occiput with full range of motion of the neck  SKIN: no suspicious lesions or rashes  NEURO: Normal strength and tone, mentation intact and speech normal, cranial nerves II through XII intact, normal gait    DIAGNOSTICS    No results found for any visits on 05/31/23.     Current Outpatient Medications   Medication     acetaminophen-codeine (TYLENOL #3) 300-30 MG tablet     carboxymethylcellulose-glycerin (REFRESH OPTIVE) 0.5-0.9 % ophthalmic solution      cetirizine (ZYRTEC) 10 MG tablet     chlorhexidine (PERIDEX) 0.12 % solution     cholecalciferol (VITAMIN D3) 25 mcg (1000 units) capsule     diclofenac (VOLTAREN) 1 % topical gel     fluticasone (FLONASE) 50 MCG/ACT nasal spray     latanoprost (XALATAN) 0.005 % ophthalmic solution     losartan-hydrochlorothiazide (HYZAAR) 50-12.5 MG tablet     magnesium 250 MG tablet     meloxicam (MOBIC) 7.5 MG tablet     omeprazole (PRILOSEC) 40 MG DR capsule     pravastatin (PRAVACHOL) 20 MG tablet     triamcinolone (KENALOG) 0.1 % external cream     methylcellulose (CITRUCEL) powder     metoprolol tartrate (LOPRESSOR) 25 MG tablet     Current Facility-Administered Medications   Medication     2 mL bupivacaine (MARCAINE) preservative free injection 0.25% (10 mL vial)     2 mL bupivacaine (MARCAINE) preservative free injection 0.5% (20 mL vial)     lidocaine 1 % injection 2 mL     lidocaine 1 % injection 2 mL     methylPREDNISolone (DEPO-MEDROL) injection 40 mg     triamcinolone (KENALOG-40) injection 40 mg     triamcinolone (KENALOG-40) injection 40 mg     Facility-Administered Medications Ordered in Other Visits   Medication     gadobutrol (GADAVIST) injection 7.5 mL      Patient Active Problem List   Diagnosis     Osteopenia     Hayfever     Hypertension goal BP (blood pressure) < 140/90     Polyarthritis, inflammatory (H)     GERD (gastroesophageal reflux disease)     Hyperlipidemia with target LDL less than 130     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     High risk medication use     Microscopic hematuria     Pterygium of right eye     Sicca syndrome (H)     Angiomyolipoma of right kidney     History of Helicobacter pylori infection     Pre-diabetes     NAFLD (nonalcoholic fatty liver disease)     Elevated LFTs     Heel pain, chronic, right      Past Medical History:   Diagnosis Date     GERD (gastroesophageal reflux disease) 3/20/2015     Glaucoma (increased eye pressure)      Hyperlipidemia LDL goal < 130  8/3/2015     Hypertension      Hypertension, goal below 150/90 11/28/2014     Rheumatoid arthritis, adult (H)      Past Surgical History:   Procedure Laterality Date     COLONOSCOPY       COLONOSCOPY WITH CO2 INSUFFLATION N/A 4/5/2017    Procedure: COLONOSCOPY WITH CO2 INSUFFLATION;  Surgeon: Duane, William Charles, MD;  Location: [facility]     COMBINED ESOPHAGOSCOPY, GASTROSCOPY, DUODENOSCOPY (EGD) WITH CO2 INSUFFLATION N/A 2/13/2019    Procedure: COMBINED ESOPHAGOSCOPY, GASTROSCOPY, DUODENOSCOPY (EGD) WITH CO2 INSUFFLATION;  Surgeon: Sly De Santiago MD;  Location: [facility]     COMBINED ESOPHAGOSCOPY, GASTROSCOPY, DUODENOSCOPY (EGD) WITH CO2 INSUFFLATION N/A 2/7/2022    Procedure: ESOPHAGOGASTRODUODENOSCOPY, WITH CO2 INSUFFLATION;  Surgeon: Luis Aceves MD;  Location: [facility]     COMBINED REPAIR PTOSIS WITH BLEPHAROPLASTY BILATERAL Bilateral 1/6/2017    Procedure: COMBINED REPAIR PTOSIS WITH BLEPHAROPLASTY BILATERAL;  Surgeon: Carly Norman MD;  Location: [facility]     ESOPHAGOSCOPY, GASTROSCOPY, DUODENOSCOPY (EGD), COMBINED N/A 1/5/2016    Procedure: COMBINED ESOPHAGOSCOPY, GASTROSCOPY, DUODENOSCOPY (EGD), BIOPSY SINGLE OR MULTIPLE;  Surgeon: Duane, William Charles, MD;  Location: [facility]     ESOPHAGOSCOPY, GASTROSCOPY, DUODENOSCOPY (EGD), COMBINED N/A 2/13/2019    Procedure: Combined Esophagoscopy, Gastroscopy, Duodenoscopy (Egd), Biopsy Single Or Multiple;  Surgeon: Sly De Santiago MD;  Location: [facility]     ESOPHAGOSCOPY, GASTROSCOPY, DUODENOSCOPY (EGD), COMBINED N/A 2/7/2022    Procedure: ESOPHAGOGASTRODUODENOSCOPY, WITH BIOPSY;  Surgeon: Luis Aceves MD;  Location: [facility]     REPAIR ENTROPION BILATERAL Bilateral 1/6/2017    Procedure: REPAIR ENTROPION BILATERAL;  Surgeon: Carly Norman MD;  Location: [facility]     REPAIR PTOSIS Bilateral 01/2017     Family History   Problem Relation Age of Onset     Diabetes Mother      Cancer No family hx of      Hypertension No family hx of       Cerebrovascular Disease No family hx of      Thyroid Disease No family hx of      Glaucoma No family hx of      Macular Degeneration No family hx of      Social History     Tobacco Use     Smoking status: Never     Smokeless tobacco: Never   Vaping Use     Vaping status: Never Used     Passive vaping exposure: Yes   Substance Use Topics     Alcohol use: No              The plan of care was discussed with the patient. They understand and agree with the course of treatment prescribed. A printed summary was given including instructions and medications.  The use of Dragon/[employer] dictation services may have been used to construct the content in this note; any grammatical or spelling errors are non-intentional. Please contact the author of this note directly if you are in need of any clarification.

## 2023-06-06 ENCOUNTER — OFFICE VISIT (OUTPATIENT)
Dept: URGENT CARE | Facility: URGENT CARE | Age: 73
End: 2023-06-06
Payer: COMMERCIAL

## 2023-06-06 VITALS
RESPIRATION RATE: 16 BRPM | WEIGHT: 116.8 LBS | TEMPERATURE: 99.2 F | OXYGEN SATURATION: 96 % | BODY MASS INDEX: 22.34 KG/M2 | SYSTOLIC BLOOD PRESSURE: 166 MMHG | HEART RATE: 107 BPM | DIASTOLIC BLOOD PRESSURE: 92 MMHG

## 2023-06-06 DIAGNOSIS — R07.0 THROAT PAIN: ICD-10-CM

## 2023-06-06 DIAGNOSIS — J20.9 ACUTE BRONCHITIS WITH COEXISTING CONDITION REQUIRING PROPHYLACTIC TREATMENT: Primary | ICD-10-CM

## 2023-06-06 LAB
DEPRECATED S PYO AG THROAT QL EIA: NEGATIVE
GROUP A STREP BY PCR: NOT DETECTED

## 2023-06-06 PROCEDURE — 99214 OFFICE O/P EST MOD 30 MIN: CPT | Performed by: PHYSICIAN ASSISTANT

## 2023-06-06 PROCEDURE — 87651 STREP A DNA AMP PROBE: CPT | Performed by: PHYSICIAN ASSISTANT

## 2023-06-06 RX ORDER — BENZONATATE 200 MG/1
200 CAPSULE ORAL 3 TIMES DAILY PRN
Qty: 30 CAPSULE | Refills: 0 | Status: SHIPPED | OUTPATIENT
Start: 2023-06-06 | End: 2023-06-16

## 2023-06-06 RX ORDER — DEXTROMETHORPHAN POLISTIREX 30 MG/5ML
60 SUSPENSION ORAL 2 TIMES DAILY PRN
Qty: 148 ML | Refills: 0 | Status: SHIPPED | OUTPATIENT
Start: 2023-06-06 | End: 2023-07-11

## 2023-06-06 RX ORDER — AZITHROMYCIN 250 MG/1
TABLET, FILM COATED ORAL
Qty: 6 TABLET | Refills: 0 | Status: SHIPPED | OUTPATIENT
Start: 2023-06-06 | End: 2023-07-11

## 2023-06-06 ASSESSMENT — PAIN SCALES - GENERAL: PAINLEVEL: NO PAIN (0)

## 2023-06-06 ASSESSMENT — ENCOUNTER SYMPTOMS
FEVER: 0
CHILLS: 0
SORE THROAT: 1
FATIGUE: 0
SHORTNESS OF BREATH: 0
COUGH: 1
PALPITATIONS: 0
GASTROINTESTINAL NEGATIVE: 1
SINUS PAIN: 0
CARDIOVASCULAR NEGATIVE: 1
CONSTITUTIONAL NEGATIVE: 1
WHEEZING: 0
RHINORRHEA: 1
SINUS PRESSURE: 0

## 2023-06-06 NOTE — PROGRESS NOTES
Subjective   Noreen is a 73 year old, presenting for the following health issues:  Cough (Cough beginning two days ago) and Pharyngitis (Sore throat beginning last night. )  HPI   Acute Illness  Acute illness concerns:   Onset/Duration: 2days  Symptoms:  Fever: No  Chills/Sweats: No  Headache (location?): No  Sinus Pressure: No  Conjunctivitis:  No  Ear Pain: no  Rhinorrhea: YES  Congestion: YES  Sore Throat: YES  Cough: YES-productive of yellow sputum  Wheeze: No  Decreased Appetite: No  Nausea: No  Vomiting: No  Diarrhea: No  Dysuria/Freq.: No  Dysuria or Hematuria: No  Fatigue/Achiness: No  Sick/Strep Exposure: YES- at home  Therapies tried and outcome: rest,fluids with minimal relief    Patient Active Problem List   Diagnosis     Osteopenia     Hayfever     Hypertension goal BP (blood pressure) < 140/90     Polyarthritis, inflammatory (H)     GERD (gastroesophageal reflux disease)     Hyperlipidemia with target LDL less than 130     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     High risk medication use     Microscopic hematuria     Pterygium of right eye     Sicca syndrome (H)     Angiomyolipoma of right kidney     History of Helicobacter pylori infection     Pre-diabetes     NAFLD (nonalcoholic fatty liver disease)     Elevated LFTs     Heel pain, chronic, right     Current Outpatient Medications   Medication     acetaminophen-codeine (TYLENOL #3) 300-30 MG tablet     carboxymethylcellulose-glycerin (REFRESH OPTIVE) 0.5-0.9 % ophthalmic solution     celecoxib (CELEBREX) 100 MG capsule     cetirizine (ZYRTEC) 10 MG tablet     chlorhexidine (PERIDEX) 0.12 % solution     cholecalciferol (VITAMIN D3) 25 mcg (1000 units) capsule     diclofenac (VOLTAREN) 1 % topical gel     diclofenac (VOLTAREN) 1 % topical gel     fluticasone (FLONASE) 50 MCG/ACT nasal spray     hydrocortisone (ANUSOL-HC) 25 MG suppository     latanoprost (XALATAN) 0.005 % ophthalmic solution     losartan-hydrochlorothiazide (HYZAAR)  50-12.5 MG tablet     magnesium 250 MG tablet     meloxicam (MOBIC) 7.5 MG tablet     methylcellulose (CITRUCEL) powder     metoprolol tartrate (LOPRESSOR) 25 MG tablet     omeprazole (PRILOSEC) 40 MG DR capsule     pravastatin (PRAVACHOL) 20 MG tablet     sulfaSALAzine ER (AZULFIDINE EN) 500 MG EC tablet     triamcinolone (KENALOG) 0.1 % external cream       Facility-Administered Medications Ordered in Other Visits   Medication     gadobutrol (GADAVIST) injection 7.5 mL      No Known Allergies      Review of Systems   Constitutional: Negative.  Negative for chills, fatigue and fever.   HENT: Positive for congestion, rhinorrhea and sore throat. Negative for ear discharge, ear pain, hearing loss, sinus pressure and sinus pain.    Respiratory: Positive for cough. Negative for shortness of breath and wheezing.    Cardiovascular: Negative.  Negative for chest pain, palpitations and peripheral edema.   Gastrointestinal: Negative.    Allergic/Immunologic: Negative for environmental allergies.   All other systems reviewed and are negative.           Objective    BP (!) 166/92 (BP Location: Left arm, Patient Position: Sitting, Cuff Size: Adult Regular)   Pulse 107   Temp 99.2  F (37.3  C) (Tympanic)   Resp 16   Wt 53 kg (116 lb 12.8 oz)   LMP 06/21/2002 (Approximate)   SpO2 96%   BMI 22.34 kg/m    Body mass index is 22.34 kg/m .  Physical Exam  Vitals and nursing note reviewed.   Constitutional:       General: She is not in acute distress.     Appearance: Normal appearance. She is well-developed and normal weight. She is not ill-appearing.   HENT:      Head: Normocephalic and atraumatic.      Comments: TMs are intact without any erythema or bulging bilaterally.  Airway is patent.     Nose: Nose normal.      Mouth/Throat:      Lips: Pink.      Mouth: Mucous membranes are moist.      Pharynx: Oropharynx is clear. Uvula midline. No pharyngeal swelling, oropharyngeal exudate or posterior oropharyngeal erythema.       Tonsils: No tonsillar exudate.   Eyes:      General: No scleral icterus.     Extraocular Movements: Extraocular movements intact.      Conjunctiva/sclera: Conjunctivae normal.      Pupils: Pupils are equal, round, and reactive to light.   Neck:      Thyroid: No thyromegaly.   Cardiovascular:      Rate and Rhythm: Normal rate and regular rhythm.      Pulses: Normal pulses.      Heart sounds: Normal heart sounds, S1 normal and S2 normal. No murmur heard.     No friction rub. No gallop.   Pulmonary:      Effort: Pulmonary effort is normal. No accessory muscle usage, respiratory distress or retractions.      Breath sounds: Normal breath sounds and air entry. No stridor. No decreased breath sounds, wheezing, rhonchi or rales.   Musculoskeletal:      Cervical back: Normal range of motion and neck supple.   Lymphadenopathy:      Cervical: No cervical adenopathy.   Skin:     General: Skin is warm and dry.      Nails: There is no clubbing.   Neurological:      Mental Status: She is alert and oriented to person, place, and time.   Psychiatric:         Mood and Affect: Mood normal.         Behavior: Behavior normal.         Thought Content: Thought content normal.         Judgment: Judgment normal.       Results for orders placed or performed in visit on 06/06/23 (from the past 24 hour(s))   Streptococcus A Rapid Screen w/Reflex to PCR - Clinic Collect    Specimen: Throat; Swab   Result Value Ref Range    Group A Strep antigen Negative Negative       Assessment/Plan:  Acute bronchitis with coexisting condition requiring prophylactic treatment:  Will treat with zithromax X5days, tessalon perles, and delsym per patient request as needed for symptoms.  Recommend treatment with rest, fluids and chicken soup. Tylenol/ibuprofen prn fever/pain.  Recheck in clinic if symptoms worsen or if symptoms do not improve.  To the ER if he develops hemoptysis, chest pain, fevers>102, worsening shortness of breath/wheezing.    -     azithromycin  (ZITHROMAX) 250 MG tablet; 2 tablets the first day, then 1 tablet daily for the next 4 days  -     benzonatate (TESSALON) 200 MG capsule; Take 1 capsule (200 mg) by mouth 3 times daily as needed for cough  -     dextromethorphan (DELSYM) 30 MG/5ML liquid; Take 10 mLs (60 mg) by mouth 2 times daily as needed for cough    Throat pain: RST is negative, will send for strep PCR testing.  Recommend tylenol/ibuprofen prn pain/fever, warm salt water gargles, lozenges or cough drops.   -     Streptococcus A Rapid Screen w/Reflex to PCR - Clinic Collect  -     Group A Streptococcus PCR Throat Swab        Parisa Salcedo PA-C

## 2023-06-15 ENCOUNTER — ANCILLARY PROCEDURE (OUTPATIENT)
Dept: GENERAL RADIOLOGY | Facility: CLINIC | Age: 73
End: 2023-06-15
Payer: COMMERCIAL

## 2023-06-15 ENCOUNTER — OFFICE VISIT (OUTPATIENT)
Dept: URGENT CARE | Facility: URGENT CARE | Age: 73
End: 2023-06-15
Payer: COMMERCIAL

## 2023-06-15 VITALS
WEIGHT: 115.7 LBS | DIASTOLIC BLOOD PRESSURE: 73 MMHG | BODY MASS INDEX: 22.13 KG/M2 | SYSTOLIC BLOOD PRESSURE: 128 MMHG | TEMPERATURE: 98.3 F | HEART RATE: 85 BPM | OXYGEN SATURATION: 97 %

## 2023-06-15 DIAGNOSIS — R05.1 ACUTE COUGH: Primary | ICD-10-CM

## 2023-06-15 PROCEDURE — 71046 X-RAY EXAM CHEST 2 VIEWS: CPT | Mod: TC | Performed by: RADIOLOGY

## 2023-06-15 PROCEDURE — 99213 OFFICE O/P EST LOW 20 MIN: CPT

## 2023-06-15 RX ORDER — DEXTROMETHORPHAN POLISTIREX 30 MG/5ML
60 SUSPENSION ORAL 2 TIMES DAILY
Qty: 148 ML | Refills: 0 | Status: SHIPPED | OUTPATIENT
Start: 2023-06-15 | End: 2023-07-18

## 2023-06-15 RX ORDER — BENZONATATE 200 MG/1
200 CAPSULE ORAL 3 TIMES DAILY PRN
Qty: 21 CAPSULE | Refills: 0 | Status: SHIPPED | OUTPATIENT
Start: 2023-06-15 | End: 2023-07-18

## 2023-06-15 NOTE — PROGRESS NOTES
ASSESSMENT:  (R05.1) Acute cough  (primary encounter diagnosis)  Plan: XR Chest 2 Views, benzonatate (TESSALON) 200 MG        capsule, dextromethorphan (DELSYM) 30 MG/5ML         liquid    PLAN:  Informed the patient that the chest x-ray does not show any pneumonia per the radiologist report.  We discussed using a humidifier to help prevent breathing and dry air as dry air is more irritating to the nose and lung tissues.  Discussed the need to limit her exposure outside due to poor air quality.  Instructed her to take the cough medicine as prescribed.  We also discussed following up with her primary care provider should symptoms persist.  Patient acknowledged her understanding of the above plan.    The use of Dragon/[employer] services may have been used to construct the content in this note; any grammatical or spelling errors are non-intentional. Please contact the author of this note directly if you are in need of any clarification.      Sly Marte, APRN CNP      SUBJECTIVE:  Noreen Florence is a 73 year old female who presents to the clinic today with a chief complaint of cough  for 11 day(s).  Her cough is described as productive yellow.    The patient's symptoms are moderate and not changing over the course of time.  Associated symptoms include rhinorrhea and sore throat. The patient's symptoms are exacerbated by no particular triggers  Patient has been using cough medicine and Tessalon to improve symptoms.  She indicates she has ran out of Tessalon and it helped.  She would also like a refill of her liquid cough medicine.     ROS  Negative except noted above.     OBJECTIVE:  /73 (BP Location: Left arm, Patient Position: Sitting, Cuff Size: Adult Regular)   Pulse 85   Temp 98.3  F (36.8  C) (Tympanic)   Wt 52.5 kg (115 lb 11.2 oz)   LMP 06/21/2002 (Approximate)   SpO2 97%   BMI 22.13 kg/m    GENERAL APPEARANCE: healthy, alert and no distress  EYES: EOMI,  PERRL, conjunctiva clear  HENT:  nose and mouth without erythema, ulcers or lesions and oral mucous membranes moist, no erythema noted  NECK: supple, nontender, no lymphadenopathy  RESP: lungs clear to auscultation - no rales, rhonchi or wheezes  CV: regular rates and rhythm, normal S1 S2, no murmur noted  SKIN: no suspicious lesions or rashes    X-RAY: normal chest X-ray per radiologist report

## 2023-06-16 NOTE — PATIENT INSTRUCTIONS
Chest x-ray does not show any pneumonia per the radiologist report.  Use a humidifier to help prevent breathing in dry air as dry air is more irritating to the nose and lung tissues.  Limit your exposure outside due to the poor air quality.  Follow-up with your primary care provider should symptoms persist.

## 2023-06-28 ENCOUNTER — OFFICE VISIT (OUTPATIENT)
Dept: URGENT CARE | Facility: URGENT CARE | Age: 73
End: 2023-06-28
Payer: COMMERCIAL

## 2023-06-28 VITALS
OXYGEN SATURATION: 95 % | DIASTOLIC BLOOD PRESSURE: 82 MMHG | SYSTOLIC BLOOD PRESSURE: 164 MMHG | HEART RATE: 93 BPM | BODY MASS INDEX: 21.65 KG/M2 | TEMPERATURE: 98.6 F | WEIGHT: 113.2 LBS

## 2023-06-28 DIAGNOSIS — M25.512 CHRONIC LEFT SHOULDER PAIN: Primary | ICD-10-CM

## 2023-06-28 DIAGNOSIS — G89.29 CHRONIC LEFT SHOULDER PAIN: Primary | ICD-10-CM

## 2023-06-28 DIAGNOSIS — M06.4 POLYARTHRITIS, INFLAMMATORY (H): ICD-10-CM

## 2023-06-28 PROCEDURE — 99213 OFFICE O/P EST LOW 20 MIN: CPT | Performed by: PHYSICIAN ASSISTANT

## 2023-06-28 RX ORDER — CELECOXIB 100 MG/1
100 CAPSULE ORAL 2 TIMES DAILY PRN
Qty: 30 CAPSULE | Refills: 0 | Status: SHIPPED | OUTPATIENT
Start: 2023-06-28 | End: 2023-08-16

## 2023-06-28 ASSESSMENT — ENCOUNTER SYMPTOMS
BACK PAIN: 0
DIARRHEA: 0
COUGH: 0
ENDOCRINE NEGATIVE: 1
SHORTNESS OF BREATH: 0
LIGHT-HEADEDNESS: 0
NECK STIFFNESS: 0
JOINT SWELLING: 0
FEVER: 0
WOUND: 0
ALLERGIC/IMMUNOLOGIC NEGATIVE: 1
CHILLS: 0
WEAKNESS: 0
EYES NEGATIVE: 1
MYALGIAS: 0
VOMITING: 0
SORE THROAT: 0
HEADACHES: 0
NECK PAIN: 0
DIZZINESS: 0
NAUSEA: 0
ARTHRALGIAS: 1

## 2023-06-28 NOTE — PROGRESS NOTES
Chief Complaint:     Chief Complaint   Patient presents with     Shoulder Pain     Left shoulder pain 1 weeks, pain when moving arm, bruising 4-5 days ago, a little swollen. Pt has taken celecoxib 100mg pt takes everyday for the last couple of weeks not helping with pain. No known injury        ASSESSMENT     1. Chronic left shoulder pain    2. Polyarthritis, inflammatory (H)         PLAN    Rx for refill of Celebrex per patient request.  Consistent with chronic L shoulder pain exacerbation.  Follow up with ortho as scheduled.    Patient verbalized understanding, and agrees with this plan.    HPI: Noreen Florence is an 73 year old female who presents today for evaluation of L shoulder pain. This pain is consistent with her chronic L shoulder pain - exacerbated by above head movements and achy in nature. She follows with orthopedics. Reports adequate relief with her last injection, but her next appointment isn't until July 14th. She was rx Celebrex previously and reports acceptable relief with this, but she doesn't have enough pills to last her to her ortho appointment. Would like rx for Celebrex.    Patient denies any numbness, tingling, or dysfunction of the L arm. No trauma.     ROS:      Review of Systems   Constitutional: Negative for chills and fever.   HENT: Negative for congestion and sore throat.    Eyes: Negative.    Respiratory: Negative for cough and shortness of breath.    Gastrointestinal: Negative for diarrhea, nausea and vomiting.   Endocrine: Negative.    Genitourinary: Negative.    Musculoskeletal: Positive for arthralgias. Negative for back pain, joint swelling, myalgias, neck pain and neck stiffness.   Skin: Negative.  Negative for rash and wound.   Allergic/Immunologic: Negative.  Negative for immunocompromised state.   Neurological: Negative for dizziness, weakness, light-headedness and headaches.        Problem history  Patient Active Problem List   Diagnosis     Osteopenia     Hayfever      Hypertension goal BP (blood pressure) < 140/90     Polyarthritis, inflammatory (H)     GERD (gastroesophageal reflux disease)     Hyperlipidemia with target LDL less than 130     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     High risk medication use     Microscopic hematuria     Pterygium of right eye     Sicca syndrome (H)     Angiomyolipoma of right kidney     History of Helicobacter pylori infection     Pre-diabetes     NAFLD (nonalcoholic fatty liver disease)     Elevated LFTs     Heel pain, chronic, right        Allergies  No Known Allergies     Smoking History  History   Smoking Status     Never   Smokeless Tobacco     Never        Current Meds    Current Outpatient Medications:      acetaminophen-codeine (TYLENOL #3) 300-30 MG tablet, TAKE 1 TABLET BY MOUTH EVERY 4 TO 6 HOURS AS NEEDED FOR PAIN, Disp: , Rfl:      benzonatate (TESSALON) 200 MG capsule, Take 1 capsule (200 mg) by mouth 3 times daily as needed for cough, Disp: 21 capsule, Rfl: 0     carboxymethylcellulose-glycerin (REFRESH OPTIVE) 0.5-0.9 % ophthalmic solution, Place 1 drop into both eyes 3 times daily as needed for dry eyes, Disp: 15 mL, Rfl: 1     celecoxib (CELEBREX) 100 MG capsule, Take 1 capsule (100 mg) by mouth 2 times daily as needed for moderate pain or pain, Disp: 30 capsule, Rfl: 0     cetirizine (ZYRTEC) 10 MG tablet, TAKE 1 TABLET(10 MG) BY MOUTH DAILY, Disp: 90 tablet, Rfl: 1     chlorhexidine (PERIDEX) 0.12 % solution, SWISH AND SPIT 15 ML BY MOUTH TWICE DAILY AS NEEDED, Disp: 473 mL, Rfl: 1     cholecalciferol (VITAMIN D3) 25 mcg (1000 units) capsule, Take 1,000 Units by mouth, Disp: , Rfl:      dextromethorphan (DELSYM) 30 MG/5ML liquid, Take 10 mLs (60 mg) by mouth 2 times daily, Disp: 148 mL, Rfl: 0     diclofenac (VOLTAREN) 1 % topical gel, Apply 2 g topically 4 times daily as needed for moderate pain (4-6), Disp: 350 g, Rfl: 1     fluticasone (FLONASE) 50 MCG/ACT nasal spray, SHAKE LIQUID AND USE 1 TO 2 SPRAYS IN  EACH NOSTRIL DAILY, Disp: 16 g, Rfl: 5     latanoprost (XALATAN) 0.005 % ophthalmic solution, Place 1 drop into both eyes At Bedtime, Disp: 7.5 mL, Rfl: 6     losartan-hydrochlorothiazide (HYZAAR) 50-12.5 MG tablet, Take 1 tablet by mouth daily, Disp: 90 tablet, Rfl: 3     magnesium 250 MG tablet, TAKE 1 TABLET BY MOUTH DAILY AS NEEDED FOR LEG CRAMPS, Disp: 90 tablet, Rfl: 1     meloxicam (MOBIC) 7.5 MG tablet, 15 MG/D WITH FOOD IN AM FOR 2 WEEKS AND THEN STAY ON 7.5 MG DAILY WITH FOOD, Disp: , Rfl:      methylcellulose (CITRUCEL) powder, Take 0.3 g (1.05 teaspoonful) by mouth daily, Disp: 454 g, Rfl: 1     omeprazole (PRILOSEC) 40 MG DR capsule, TAKE 1 CAPSULE(40 MG) BY MOUTH DAILY, Disp: 90 capsule, Rfl: 0     pravastatin (PRAVACHOL) 20 MG tablet, Take 1 tablet (20 mg) by mouth daily, Disp: 90 tablet, Rfl: 3     sulfaSALAzine ER (AZULFIDINE EN) 500 MG EC tablet, two times a day with food, Disp: , Rfl:      triamcinolone (KENALOG) 0.1 % external cream, APPLY TOPICALLY TO THE AFFECTED AREA TWICE DAILY. MAXIMUM OF 2 WEEKS, Disp: 80 g, Rfl: 0     azithromycin (ZITHROMAX) 250 MG tablet, 2 tablets the first day, then 1 tablet daily for the next 4 days (Patient not taking: Reported on 6/15/2023), Disp: 6 tablet, Rfl: 0     dextromethorphan (DELSYM) 30 MG/5ML liquid, Take 10 mLs (60 mg) by mouth 2 times daily as needed for cough (Patient not taking: Reported on 6/15/2023), Disp: 148 mL, Rfl: 0     metoprolol tartrate (LOPRESSOR) 25 MG tablet, Take 1 tablet (25 mg) by mouth At Bedtime, Disp: 90 tablet, Rfl: 3    Current Facility-Administered Medications:      2 mL bupivacaine (MARCAINE) preservative free injection 0.25% (10 mL vial), 2 mL, , , Isaac Zhou, , 2 mL at 09/16/22 0410     2 mL bupivacaine (MARCAINE) preservative free injection 0.5% (20 mL vial), 2 mL, , , Isaac Zhou DO, 2 mL at 02/10/23 1427     lidocaine 1 % injection 2 mL, 2 mL, , , Isaac Zhou, , 2 mL at 02/10/23 1427     lidocaine 1 %  injection 2 mL, 2 mL, , , Isaac Zhou, DO, 2 mL at 09/16/22 0410     methylPREDNISolone (DEPO-MEDROL) injection 40 mg, 40 mg, , , Abelardo, Isaac, DO, 40 mg at 09/16/22 0410     triamcinolone (KENALOG-40) injection 40 mg, 40 mg, , , Abelardo, Isaac, DO, 40 mg at 02/10/23 1427     triamcinolone (KENALOG-40) injection 40 mg, 40 mg, , , Abelardo, Isaac, DO, 40 mg at 09/16/22 0410    Facility-Administered Medications Ordered in Other Visits:      gadobutrol (GADAVIST) injection 7.5 mL, 7.5 mL, Intravenous, Once, Palomo Terrell MD        Vital signs reviewed by Indra Sarah PA-C  BP (!) 164/82 (BP Location: Left arm, Patient Position: Sitting, Cuff Size: Adult Regular)   Pulse 93   Temp 98.6  F (37  C) (Tympanic)   Wt 51.3 kg (113 lb 3.2 oz)   LMP 06/21/2002 (Approximate)   SpO2 95%   BMI 21.65 kg/m      Physical Exam     Physical Exam  Vitals and nursing note reviewed.   Constitutional:       General: She is not in acute distress.     Appearance: She is well-developed. She is not ill-appearing, toxic-appearing or diaphoretic.   HENT:      Head: Normocephalic and atraumatic.      Right Ear: No drainage, swelling or tenderness. Tympanic membrane is not perforated, erythematous, retracted or bulging.      Left Ear: No drainage, swelling or tenderness. Tympanic membrane is not perforated, erythematous, retracted or bulging.      Nose: No mucosal edema.      Right Sinus: No maxillary sinus tenderness or frontal sinus tenderness.      Left Sinus: No maxillary sinus tenderness or frontal sinus tenderness.      Mouth/Throat:      Pharynx: No pharyngeal swelling or uvula swelling.      Tonsils: No tonsillar abscesses.   Neck:      Trachea: Trachea normal.   Cardiovascular:      Rate and Rhythm: Normal rate and regular rhythm.      Heart sounds: Normal heart sounds, S1 normal and S2 normal. No murmur heard.     No friction rub. No gallop.   Pulmonary:      Effort: Pulmonary effort is normal. No respiratory  distress.      Breath sounds: Normal breath sounds. No decreased breath sounds, wheezing, rhonchi or rales.   Abdominal:      Palpations: Abdomen is not rigid.   Musculoskeletal:      Right shoulder: Normal.      Left shoulder: Tenderness (over lateral aspect) present. No swelling or deformity. Normal range of motion.      Cervical back: Full passive range of motion without pain, normal range of motion and neck supple.   Lymphadenopathy:      Cervical: No cervical adenopathy.   Skin:     General: Skin is warm and dry.   Neurological:      Mental Status: She is alert and oriented to person, place, and time.      Cranial Nerves: No cranial nerve deficit.      Deep Tendon Reflexes: Reflexes are normal and symmetric.   Psychiatric:         Behavior: Behavior normal. Behavior is cooperative.         Thought Content: Thought content normal.         Judgment: Judgment normal.             Indra Sarah PA-C  6/28/2023, 6:53 PM

## 2023-07-07 NOTE — PROGRESS NOTES
Noreen Florence  :  1950  DOS: 2023  MRN: [id number]  PCP: Jud Morales  Sports Medicine Clinic Visit  Interim History - 2023  - Last seen in clinic on 2/10/2023 for chronic left shoulder pain.  - Left subacromial injection completed on 2/10/2023 provided quality relief for ~ 6 - 8 weeks.  Requesting repeat injection today in hopes of similar relief.  - Since the last visit has waxing & waning pain after her Meloxicam Rx ran out, originally prescribed by her Rheumatologist, who she sees in September.  She had tried Celebrex that was given by urgent care, which was not as helpful as meloxicam.  Requesting a temporary refill of meloxicam until she sees rheumatology again in September.  Endorses good relief from the medication.  Denies adverse effects.  Also requesting refill of Voltaren gel, which has been helpful.  - No interim injury.       Interval History: 2023  Subacromial corticosteroid injection performed on 2022 provided a few months of relief. She notes an increase in left lateral shoulder pain over the past month. Painful with sleep due to left-sided side-lying. Pain with overuse of overhead and shoulder abduction activities. Denies any current treatments or medications.  She was satisfied with the results from the injection, but would like to defer an injection at this time due to decreasing the risk of multiple injections.  Interested in other treatment options going forward and will consider an injection in the future as needed.  No other concerns or questions today.      Initial Visit: 2022  HPI  Noreen Florence is a 72 year old Left hand dominant female who is seen in consultation at the request of  Indra Sarah PA-C presenting with left shoulder pain.    Acute on chronic left shoulder pain, recently went to the ED on 22 for severe pain after gardening. Given recommendation for NSAIDs and avoiding exacerbating activities. Denies neurogenic symptoms.  Pain occurs with yard work and repetitive motions of shoulder abduction and overhead motions. Pain is located over the anterior and posterior left shoulder. Denies any current medications or treatments. Notes no decrease in ROM that she can see. Recent images: 8/16/2022.    Work history: retired    Review of Systems  Musculoskeletal: as above  Remainder of review of systems is negative including constitutional, CV, pulmonary, GI, Skin and Neurologic except as noted in HPI or medical history.    Past Medical History:   Diagnosis Date    GERD (gastroesophageal reflux disease) 3/20/2015    Glaucoma (increased eye pressure)     Hyperlipidemia LDL goal < 130 8/3/2015    Hypertension     Hypertension, goal below 150/90 11/28/2014    Rheumatoid arthritis, adult (H)      Past Surgical History:   Procedure Laterality Date    COLONOSCOPY      COLONOSCOPY WITH CO2 INSUFFLATION N/A 4/5/2017    Procedure: COLONOSCOPY WITH CO2 INSUFFLATION;  Surgeon: Duane, William Charles, MD;  Location: [facility]    COMBINED ESOPHAGOSCOPY, GASTROSCOPY, DUODENOSCOPY (EGD) WITH CO2 INSUFFLATION N/A 2/13/2019    Procedure: COMBINED ESOPHAGOSCOPY, GASTROSCOPY, DUODENOSCOPY (EGD) WITH CO2 INSUFFLATION;  Surgeon: Sly De Santiago MD;  Location: [facility]    COMBINED ESOPHAGOSCOPY, GASTROSCOPY, DUODENOSCOPY (EGD) WITH CO2 INSUFFLATION N/A 2/7/2022    Procedure: ESOPHAGOGASTRODUODENOSCOPY, WITH CO2 INSUFFLATION;  Surgeon: Luis Aceves MD;  Location: [facility]    COMBINED REPAIR PTOSIS WITH BLEPHAROPLASTY BILATERAL Bilateral 1/6/2017    Procedure: COMBINED REPAIR PTOSIS WITH BLEPHAROPLASTY BILATERAL;  Surgeon: Carly Norman MD;  Location: [facility]    ESOPHAGOSCOPY, GASTROSCOPY, DUODENOSCOPY (EGD), COMBINED N/A 1/5/2016    Procedure: COMBINED ESOPHAGOSCOPY, GASTROSCOPY, DUODENOSCOPY (EGD), BIOPSY SINGLE OR MULTIPLE;  Surgeon: Duane, William Charles, MD;  Location:  OR    ESOPHAGOSCOPY, GASTROSCOPY, DUODENOSCOPY (EGD), COMBINED N/A 2/13/2019  "   Procedure: Combined Esophagoscopy, Gastroscopy, Duodenoscopy (Egd), Biopsy Single Or Multiple;  Surgeon: Sly De Santiago MD;  Location: [facility]    ESOPHAGOSCOPY, GASTROSCOPY, DUODENOSCOPY (EGD), COMBINED N/A 2/7/2022    Procedure: ESOPHAGOGASTRODUODENOSCOPY, WITH BIOPSY;  Surgeon: Luis Aceves MD;  Location: [facility]    REPAIR ENTROPION BILATERAL Bilateral 1/6/2017    Procedure: REPAIR ENTROPION BILATERAL;  Surgeon: Carly Norman MD;  Location:  [facility]    REPAIR PTOSIS Bilateral 01/2017     Family History   Problem Relation Age of Onset    Diabetes Mother     Cancer No family hx of     Hypertension No family hx of     Cerebrovascular Disease No family hx of     Thyroid Disease No family hx of     Glaucoma No family hx of     Macular Degeneration No family hx of        Objective  BP (!) 147/80   Ht 1.539 m (5' 0.6\")   Wt 51.3 kg (113 lb)   LMP 06/21/2002 (Approximate)   BMI 21.63 kg/m      General: healthy, alert and in no distress    HEENT: no scleral icterus or conjunctival erythema   Skin: no suspicious lesions or rash. No jaundice.   CV: regular rhythm by palpation, 2+ distal pulses, no pedal edema    Resp: normal respiratory effort without conversational dyspnea   Psych: normal mood and affect    Gait: nonantalgic, appropriate coordination and balance   Neuro: normal light touch sensory exam of the extremities. Motor strength as noted below. Negative Spurling's bilaterally.     MSK L Shoulder: No gross deformity, abnormality, or asymmetry on inspection. TTP over the posterolateral shoulder, subacromial space. NTTP over the corocoid process, periscapular muscles, trapezius muscle. ROM full with some mild pain during shoulder abduction, flexion, IR/ER. Strength 5/5 in BUE. +Fuentes, +Neers, +Empty Can, -[place], -Scarf, -Speeds, -Yergeson.     Radiology  I previously independently reviewed the relevant imaging, including XR L Shoulder (8/16/22) and agree with the interpretation of mild " GH and AC OA.     Large Joint Injection/Arthocentesis: L subacromial bursa    Date/Time: 7/14/2023 1:35 PM    Performed by: Isaac Zhou DO  Authorized by: Isaac Zhou DO    Indications:  Pain  Needle Size:  22 G  Guidance: landmark guided    Approach:  Anterolateral  Location:  Shoulder      Site:  L subacromial bursa  Medications:  40 mg triamcinolone 40 MG/ML; 2 mL bupivacaine HCl (PF) 0.25 %; 2 mL lidocaine 1 %  Outcome:  Tolerated well, no immediate complications  Procedure discussed: discussed risks, benefits, and alternatives    Consent Given by:  Patient  Timeout: timeout called immediately prior to procedure    Prep: patient was prepped and draped in usual sterile fashion      PROCEDURE  Subacromial/Subdeltoid Bursa Injection - Left  The patient was informed of the risks and benefits of the procedure and alternatives were discussed. A written consent was signed by the patient. The injection site was prepped with chlorhexidine in sterile fashion. An injectate solution containing 2 mL of 1% lidocaine, 2 mL of 0.5% bupivacaine, and 1 mL of Kenalog (40 mg/mL) was drawn up into a 5 mL syringe. Injection was performed using sterile technique.  A 1.5-inch 22-gauge needle was used to enter the subacromial/subdeltoid bursa using a posterolateral approach, and injectate was injected successfully. After the injection, the area was cleaned and a bandage applied.     The patient tolerated the procedure well without complications.       Assessment:  1. Tendinopathy of left rotator cuff    2. Shoulder impingement syndrome, left        Plan:  Noreen Florence is a 72 year old female that presents for follow-up of her left shoulder pain secondary to rotator cuff tendinopathy and shoulder impingement syndrome in the setting of mild glenohumeral osteoarthritis and AC OA.  This is in the setting of chronic inflammatory polyarthritis.  Good results from subacromial bursa injections.  Discussed the nature of the condition and  treatment options and mutually agreed upon the following plan:    - Medications: Discussed pharmacologic treatment options, and recommend using NSAIDs or Tylenol as needed for pain control.  Also discussed topical treatment options including IcyHot, Biofreeze, Bengay, or Voltaren gel.  Voltaren gel to be used as needed up to 4 times per day over the left shoulder.  Refill provided and sent to her preferred pharmacy.   - Reviewed her request for meloxicam, as she feels like her pain has gotten worse since she has switched from this medication.  Upon chart review, rheumatology discontinued the meloxicam in March due to overall improvements in her pain and concern for GERD.  She has since tried Celebrex, which tends to be easier on the stomach and causes less reflux, however she reports that this was not significantly helpful for her pain and she verbally reports that she did not feel much stomach pain when she was on meloxicam and that this was much more helpful for pain control.  I provided a temporary prescription to be restarted on her meloxicam at this time, instructed not to be taking any either NSAIDs (ibuprofen, naproxen, Celebrex) while taking meloxicam, and that she can re-address the issue with her rheumatologist at her appointment in September, to consider if she should be on it going forward or not.  - Therapy: Myself and others have submitted referrals for physical therapy, which has not been attended.  Advised that if she does not want to do PT, she should continue with the home exercise program that was provided at our previous visits.  Reemphasized and reeducated today that this will be beneficial to hold off her symptoms for a longer period of time then just the injections alone.  - Modalities:  May use ice, heat, massage PRN.   - Injections:   Last subacromial corticosteroid injection provided significant relief for 2+ months.  Requesting a repeat injection today in hopes of similar relief of her  symptoms.  May also consider glenohumeral joint injection in the future if interested based on mild OA presents on radiographs and clinical exam.  - Activity:  Encouraged to remain active and participate in regular activities as symptoms allow.  Avoid or modify exacerbating activities is much as possible.  - Follow up: As needed in the future for re-evaluation and update to treatment plan. Patient has clinic contact information for questions or concerns.       Isaac Zhou DO, DELTA  [facility]  [facility] - Department of Orthopedic Surgery         Disclaimer: This note consists of symbols derived from keyboarding, dictation and/or voice recognition software. As a result, there may be errors in the script that have gone undetected. Please consider this when interpreting information found in this chart.

## 2023-07-11 ENCOUNTER — OFFICE VISIT (OUTPATIENT)
Dept: AUDIOLOGY | Facility: CLINIC | Age: 73
End: 2023-07-11
Payer: COMMERCIAL

## 2023-07-11 ENCOUNTER — OFFICE VISIT (OUTPATIENT)
Dept: OTOLARYNGOLOGY | Facility: CLINIC | Age: 73
End: 2023-07-11
Payer: COMMERCIAL

## 2023-07-11 VITALS — SYSTOLIC BLOOD PRESSURE: 130 MMHG | HEART RATE: 74 BPM | DIASTOLIC BLOOD PRESSURE: 85 MMHG

## 2023-07-11 DIAGNOSIS — H90.3 SENSORINEURAL HEARING LOSS (SNHL) OF BOTH EARS: Primary | ICD-10-CM

## 2023-07-11 DIAGNOSIS — H93.13 TINNITUS, BILATERAL: ICD-10-CM

## 2023-07-11 PROCEDURE — 99214 OFFICE O/P EST MOD 30 MIN: CPT | Performed by: OTOLARYNGOLOGY

## 2023-07-11 PROCEDURE — 92567 TYMPANOMETRY: CPT | Performed by: AUDIOLOGIST

## 2023-07-11 PROCEDURE — 92557 COMPREHENSIVE HEARING TEST: CPT | Performed by: AUDIOLOGIST

## 2023-07-11 NOTE — LETTER
7/11/2023         RE: Noreen Florence  [address]  [facility] [address]        Dear Colleague,    Thank you for referring your patient, Noreen Florence, to the [facility]. Please see a copy of my visit note below.    Noreen Florence's chief complaint for this visit includes:  Chief Complaint   Patient presents with     Follow Up     Bilat SNHL no change     PCP: Jud Morales    Referring Provider:  Jud Morales MD  [address]  [facility] [address]    /85   Pulse 74   LMP 06/21/2002 (Approximate)     HPI    This pleasant patient is having tinnitus, bilateral, constant, high pitches for years. Denies any aural fullness, pressure, drainage, pain, dizziness, vertigo or balance issues. No hx of vision issues, weakness, numbness or tingling. Noise exposure hx is positive. No environmental allergies.  F/U testing. Longstanding tinnitus bilaterally. Previous results from 11/08/2016 revealed normal sloping to moderate SNHL in right and normal sloping to mild SNHL in the left. Accompanied by Portuguese .  Results: WNL sloping to mild SNHL bilaterally. 100% word rec. bilaterally. Tymps WNL. Present 1 kHz ipsi/contra reflexes bilaterally.    ENT Problem List  GERD (gastroesophageal reflux disease)    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    Pterygium of right eye    Sicca syndrome (H)    Osteopenia    Hayfever    Hypertension goal BP (blood pressure) < 140/90    Polyarthritis, inflammatory (H)    Hyperlipidemia with target LDL less than 130    High risk medication use    Microscopic hematuria    Angiomyolipoma of right kidney    History of Helicobacter pylori infection    Pre-diabetes    NAFLD (nonalcoholic fatty liver disease)    Elevated LFTs    Heel pain, chronic, right      Outpatient Medications    acetaminophen-codeine (TYLENOL #3) 300-30 MG tablet    cetirizine (ZYRTEC) 10 MG tablet    chlorhexidine (PERIDEX) 0.12 % solution    cholecalciferol (VITAMIN D3)  25 mcg (1000 units) capsule    diclofenac (VOLTAREN) 1 % topical gel    fluticasone (FLONASE) 50 MCG/ACT nasal spray    hydrocortisone 2.5 % cream    latanoprost (XALATAN) 0.005 % ophthalmic solution    losartan-hydrochlorothiazide (HYZAAR) 50-12.5 MG tablet    magnesium 250 MG tablet    meloxicam (MOBIC) 7.5 MG tablet    meloxicam (MOBIC) 7.5 MG tablet    methylcellulose (CITRUCEL) powder    metoprolol tartrate (LOPRESSOR) 25 MG tablet    naproxen (NAPROSYN) 500 MG tablet    omeprazole (PRILOSEC) 40 MG DR capsule    pravastatin (PRAVACHOL) 20 MG tablet    tiZANidine (ZANAFLEX) 2 MG tablet    triamcinolone (KENALOG) 0.1 % external cream      Review of Systems   Constitutional: Negative.    HENT: Positive for hearing loss and tinnitus. Negative for congestion, ear discharge, ear pain, nosebleeds, sinus pain and sore throat.    Eyes: Negative for blurred vision, double vision and photophobia.   Respiratory: Negative for cough, hemoptysis, sputum production, shortness of breath and stridor.    Gastrointestinal: Negative for heartburn, nausea and vomiting.   Skin: Negative.    Neurological: Negative for dizziness, tingling, tremors and headaches.   Endo/Heme/Allergies: Negative for environmental allergies. Does not bruise/bleed easily.         Physical Exam  Vitals reviewed.   Constitutional:       Appearance: Normal appearance.   HENT:      Head: Normocephalic and atraumatic.      Jaw: There is normal jaw occlusion.      Right Ear: Tympanic membrane, ear canal and external ear normal. Decreased hearing noted. No middle ear effusion. There is no impacted cerumen.      Left Ear: Tympanic membrane, ear canal and external ear normal. Decreased hearing noted.  No middle ear effusion. There is no impacted cerumen.      Nose: Nose normal. No mucosal edema, congestion or rhinorrhea.      Right Turbinates: Not enlarged or swollen.      Left Turbinates: Not enlarged or swollen.      Mouth/Throat:      Mouth: Mucous membranes are  moist.      Pharynx: Oropharynx is clear. Uvula midline.   Eyes:      Extraocular Movements: Extraocular movements intact.      Pupils: Pupils are equal, round, and reactive to light.   Neurological:      Mental Status: She is alert.       A/P  This pleasant patient is accompanied with a Georgian  and is having sloping to mild SNHL bilaterally. 100% word rec. bilaterally. Tymps WNL. Present 1 kHz ipsi/contra reflexes bilaterally. Options were discussed. The patient's questions were answered. F/u in a year with a hearing test. In the meantime, hearing protection, good hydration, tinntus management strategies were reviewed.        Again, thank you for allowing me to participate in the care of your patient.        Sincerely,        Melania Escobar MD

## 2023-07-11 NOTE — NURSING NOTE
Noreen Florence's chief complaint for this visit includes:  Chief Complaint   Patient presents with     Follow Up     Bilat SNHL no change     PCP: Jud Morales    Referring Provider:  Jud Morales MD  [address]  [place] [id number]    /85   Pulse 74   LMP 06/21/2002 (Approximate)

## 2023-07-11 NOTE — PROGRESS NOTES
Noreen T Ludivina's chief complaint for this visit includes:  Chief Complaint   Patient presents with     Follow Up     Bilat SNHL no change     PCP: Jud Morales    Referring Provider:  Jud Morales MD  [address] [place] [address]    /85   Pulse 74   LMP 06/21/2002 (Approximate)     HPI    This pleasant patient is having tinnitus, bilateral, constant, high pitches for years. Denies any aural fullness, pressure, drainage, pain, dizziness, vertigo or balance issues. No hx of vision issues, weakness, numbness or tingling. Noise exposure hx is positive. No environmental allergies.  F/U testing. Longstanding tinnitus bilaterally. Previous results from 11/08/2016 revealed normal sloping to moderate SNHL in right and normal sloping to mild SNHL in the left. Accompanied by Lithuanian .  Results: WNL sloping to mild SNHL bilaterally. 100% word rec. bilaterally. Tymps WNL. Present 1 kHz ipsi/contra reflexes bilaterally.    ENT Problem List  GERD (gastroesophageal reflux disease)    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    Pterygium of right eye    Sicca syndrome (H)    Osteopenia    Hayfever    Hypertension goal BP (blood pressure) < 140/90    Polyarthritis, inflammatory (H)    Hyperlipidemia with target LDL less than 130    High risk medication use    Microscopic hematuria    Angiomyolipoma of right kidney    History of Helicobacter pylori infection    Pre-diabetes    NAFLD (nonalcoholic fatty liver disease)    Elevated LFTs    Heel pain, chronic, right      Outpatient Medications    acetaminophen-codeine (TYLENOL #3) 300-30 MG tablet    cetirizine (ZYRTEC) 10 MG tablet    chlorhexidine (PERIDEX) 0.12 % solution    cholecalciferol (VITAMIN D3) 25 mcg (1000 units) capsule    diclofenac (VOLTAREN) 1 % topical gel    fluticasone (FLONASE) 50 MCG/ACT nasal spray    hydrocortisone 2.5 % cream    latanoprost (XALATAN) 0.005 % ophthalmic solution    losartan-hydrochlorothiazide (HYZAAR)  50-12.5 MG tablet    magnesium 250 MG tablet    meloxicam (MOBIC) 7.5 MG tablet    meloxicam (MOBIC) 7.5 MG tablet    methylcellulose (CITRUCEL) powder    metoprolol tartrate (LOPRESSOR) 25 MG tablet    naproxen (NAPROSYN) 500 MG tablet    omeprazole (PRILOSEC) 40 MG DR capsule    pravastatin (PRAVACHOL) 20 MG tablet    tiZANidine (ZANAFLEX) 2 MG tablet    triamcinolone (KENALOG) 0.1 % external cream      Review of Systems   Constitutional: Negative.    HENT: Positive for hearing loss and tinnitus. Negative for congestion, ear discharge, ear pain, nosebleeds, sinus pain and sore throat.    Eyes: Negative for blurred vision, double vision and photophobia.   Respiratory: Negative for cough, hemoptysis, sputum production, shortness of breath and stridor.    Gastrointestinal: Negative for heartburn, nausea and vomiting.   Skin: Negative.    Neurological: Negative for dizziness, tingling, tremors and headaches.   Endo/Heme/Allergies: Negative for environmental allergies. Does not bruise/bleed easily.         Physical Exam  Vitals reviewed.   Constitutional:       Appearance: Normal appearance.   HENT:      Head: Normocephalic and atraumatic.      Jaw: There is normal jaw occlusion.      Right Ear: Tympanic membrane, ear canal and external ear normal. Decreased hearing noted. No middle ear effusion. There is no impacted cerumen.      Left Ear: Tympanic membrane, ear canal and external ear normal. Decreased hearing noted.  No middle ear effusion. There is no impacted cerumen.      Nose: Nose normal. No mucosal edema, congestion or rhinorrhea.      Right Turbinates: Not enlarged or swollen.      Left Turbinates: Not enlarged or swollen.      Mouth/Throat:      Mouth: Mucous membranes are moist.      Pharynx: Oropharynx is clear. Uvula midline.   Eyes:      Extraocular Movements: Extraocular movements intact.      Pupils: Pupils are equal, round, and reactive to light.   Neurological:      Mental Status: She is alert.        A/P  This pleasant patient is accompanied with a Maldivian  and is having sloping to mild SNHL bilaterally. 100% word rec. bilaterally. Tymps WNL. Present 1 kHz ipsi/contra reflexes bilaterally. Options were discussed. The patient's questions were answered. F/u in a year with a hearing test. In the meantime, hearing protection, good hydration, tinntus management strategies were reviewed.

## 2023-07-11 NOTE — PROGRESS NOTES
AUDIOLOGY REPORT    SUMMARY: Audiology visit completed. See audiogram for results.    RECOMMENDATIONS: Follow-up with ENT.    Sunny Marquez  Doctor of Audiology  [place] License # [id number]

## 2023-07-14 ENCOUNTER — OFFICE VISIT (OUTPATIENT)
Dept: ORTHOPEDICS | Facility: CLINIC | Age: 73
End: 2023-07-14
Payer: COMMERCIAL

## 2023-07-14 VITALS
WEIGHT: 113 LBS | BODY MASS INDEX: 21.34 KG/M2 | SYSTOLIC BLOOD PRESSURE: 147 MMHG | DIASTOLIC BLOOD PRESSURE: 80 MMHG | HEIGHT: 61 IN

## 2023-07-14 DIAGNOSIS — M67.912 TENDINOPATHY OF LEFT ROTATOR CUFF: Primary | ICD-10-CM

## 2023-07-14 DIAGNOSIS — M75.42 SHOULDER IMPINGEMENT SYNDROME, LEFT: ICD-10-CM

## 2023-07-14 PROCEDURE — 99214 OFFICE O/P EST MOD 30 MIN: CPT | Mod: 25 | Performed by: STUDENT IN AN ORGANIZED HEALTH CARE EDUCATION/TRAINING PROGRAM

## 2023-07-14 PROCEDURE — 20610 DRAIN/INJ JOINT/BURSA W/O US: CPT | Mod: LT | Performed by: STUDENT IN AN ORGANIZED HEALTH CARE EDUCATION/TRAINING PROGRAM

## 2023-07-14 RX ORDER — LIDOCAINE HYDROCHLORIDE 10 MG/ML
2 INJECTION, SOLUTION INFILTRATION; PERINEURAL
Status: SHIPPED | OUTPATIENT
Start: 2023-07-14

## 2023-07-14 RX ORDER — TRIAMCINOLONE ACETONIDE 40 MG/ML
40 INJECTION, SUSPENSION INTRA-ARTICULAR; INTRAMUSCULAR
Status: SHIPPED | OUTPATIENT
Start: 2023-07-14

## 2023-07-14 RX ORDER — BUPIVACAINE HYDROCHLORIDE 2.5 MG/ML
2 INJECTION, SOLUTION EPIDURAL; INFILTRATION; INTRACAUDAL
Status: SHIPPED | OUTPATIENT
Start: 2023-07-14

## 2023-07-14 RX ORDER — MELOXICAM 7.5 MG/1
7.5 TABLET ORAL DAILY
Qty: 30 TABLET | Refills: 1 | Status: SHIPPED | OUTPATIENT
Start: 2023-07-14 | End: 2023-12-22

## 2023-07-14 RX ADMIN — BUPIVACAINE HYDROCHLORIDE 2 ML: 2.5 INJECTION, SOLUTION EPIDURAL; INFILTRATION; INTRACAUDAL at 13:35

## 2023-07-14 RX ADMIN — LIDOCAINE HYDROCHLORIDE 2 ML: 10 INJECTION, SOLUTION INFILTRATION; PERINEURAL at 13:35

## 2023-07-14 RX ADMIN — TRIAMCINOLONE ACETONIDE 40 MG: 40 INJECTION, SUSPENSION INTRA-ARTICULAR; INTRAMUSCULAR at 13:35

## 2023-07-14 NOTE — LETTER
2023         RE: Noreen Florence  [address]  [place] [address]        Dear Colleague,    Thank you for referring your patient, Noreen Florence, to the [facility] [place]. Please see a copy of my visit note below.    Noreen Florence  :  1950  DOS: 2023  MRN: [id number]  PCP: Jud Morales  Sports Medicine Clinic Visit  Interim History - 2023  - Last seen in clinic on 2/10/2023 for chronic left shoulder pain.  - Left subacromial injection completed on 2/10/2023 provided quality relief for ~ 6 - 8 weeks.  Requesting repeat injection today in hopes of similar relief.  - Since the last visit has waxing & waning pain after her Meloxicam Rx ran out, originally prescribed by her Rheumatologist, who she sees in September.  She had tried Celebrex that was given by urgent care, which was not as helpful as meloxicam.  Requesting a temporary refill of meloxicam until she sees rheumatology again in September.  Endorses good relief from the medication.  Denies adverse effects.  Also requesting refill of Voltaren gel, which has been helpful.  - No interim injury.       Interval History: 2023  Subacromial corticosteroid injection performed on 2022 provided a few months of relief. She notes an increase in left lateral shoulder pain over the past month. Painful with sleep due to left-sided side-lying. Pain with overuse of overhead and shoulder abduction activities. Denies any current treatments or medications.  She was satisfied with the results from the injection, but would like to defer an injection at this time due to decreasing the risk of multiple injections.  Interested in other treatment options going forward and will consider an injection in the future as needed.  No other concerns or questions today.      Initial Visit: 2022  SAVITA Florence is a 72 year old Left hand dominant female who is seen in consultation at the request of  Indra  Humble Sarah PA-C presenting with left shoulder pain.    Acute on chronic left shoulder pain, recently went to the ED on 9/7/22 for severe pain after gardening. Given recommendation for NSAIDs and avoiding exacerbating activities. Denies neurogenic symptoms. Pain occurs with yard work and repetitive motions of shoulder abduction and overhead motions. Pain is located over the anterior and posterior left shoulder. Denies any current medications or treatments. Notes no decrease in ROM that she can see. Recent images: 8/16/2022.    Work history: retired    Review of Systems  Musculoskeletal: as above  Remainder of review of systems is negative including constitutional, CV, pulmonary, GI, Skin and Neurologic except as noted in HPI or medical history.    Past Medical History:   Diagnosis Date     GERD (gastroesophageal reflux disease) 3/20/2015     Glaucoma (increased eye pressure)      Hyperlipidemia LDL goal < 130 8/3/2015     Hypertension      Hypertension, goal below 150/90 11/28/2014     Rheumatoid arthritis, adult (H)      Past Surgical History:   Procedure Laterality Date     COLONOSCOPY       COLONOSCOPY WITH CO2 INSUFFLATION N/A 4/5/2017    Procedure: COLONOSCOPY WITH CO2 INSUFFLATION;  Surgeon: Duane, William Charles, MD;  Location: [facility]     COMBINED ESOPHAGOSCOPY, GASTROSCOPY, DUODENOSCOPY (EGD) WITH CO2 INSUFFLATION N/A 2/13/2019    Procedure: COMBINED ESOPHAGOSCOPY, GASTROSCOPY, DUODENOSCOPY (EGD) WITH CO2 INSUFFLATION;  Surgeon: Sly De Santiago MD;  Location: [facility]     COMBINED ESOPHAGOSCOPY, GASTROSCOPY, DUODENOSCOPY (EGD) WITH CO2 INSUFFLATION N/A 2/7/2022    Procedure: ESOPHAGOGASTRODUODENOSCOPY, WITH CO2 INSUFFLATION;  Surgeon: Luis Aceves MD;  Location: [facility]     COMBINED REPAIR PTOSIS WITH BLEPHAROPLASTY BILATERAL Bilateral 1/6/2017    Procedure: COMBINED REPAIR PTOSIS WITH BLEPHAROPLASTY BILATERAL;  Surgeon: Carly Norman MD;  Location: [facility]     ESOPHAGOSCOPY,  "GASTROSCOPY, DUODENOSCOPY (EGD), COMBINED N/A 1/5/2016    Procedure: COMBINED ESOPHAGOSCOPY, GASTROSCOPY, DUODENOSCOPY (EGD), BIOPSY SINGLE OR MULTIPLE;  Surgeon: Duane, William Charles, MD;  Location: [facility]     ESOPHAGOSCOPY, GASTROSCOPY, DUODENOSCOPY (EGD), COMBINED N/A 2/13/2019    Procedure: Combined Esophagoscopy, Gastroscopy, Duodenoscopy (Egd), Biopsy Single Or Multiple;  Surgeon: Sly De Santiago MD;  Location: [facility]     ESOPHAGOSCOPY, GASTROSCOPY, DUODENOSCOPY (EGD), COMBINED N/A 2/7/2022    Procedure: ESOPHAGOGASTRODUODENOSCOPY, WITH BIOPSY;  Surgeon: Luis Aceves MD;  Location: [facility]     REPAIR ENTROPION BILATERAL Bilateral 1/6/2017    Procedure: REPAIR ENTROPION BILATERAL;  Surgeon: Carly Norman MD;  Location: [facility]     REPAIR PTOSIS Bilateral 01/2017     Family History   Problem Relation Age of Onset     Diabetes Mother      Cancer No family hx of      Hypertension No family hx of      Cerebrovascular Disease No family hx of      Thyroid Disease No family hx of      Glaucoma No family hx of      Macular Degeneration No family hx of        Objective  BP (!) 147/80   Ht 1.539 m (5' 0.6\")   Wt 51.3 kg (113 lb)   LMP 06/21/2002 (Approximate)   BMI 21.63 kg/m      General: healthy, alert and in no distress    HEENT: no scleral icterus or conjunctival erythema   Skin: no suspicious lesions or rash. No jaundice.   CV: regular rhythm by palpation, 2+ distal pulses, no pedal edema    Resp: normal respiratory effort without conversational dyspnea   Psych: normal mood and affect    Gait: nonantalgic, appropriate coordination and balance   Neuro: normal light touch sensory exam of the extremities. Motor strength as noted below. Negative Spurling's bilaterally.     MSK L Shoulder: No gross deformity, abnormality, or asymmetry on inspection. TTP over the posterolateral shoulder, subacromial space. NTTP over the corocoid process, periscapular muscles, trapezius muscle. ROM full with " some mild pain during shoulder abduction, flexion, IR/ER. Strength 5/5 in BUE. +Fuentes, +Neers, +Empty Can, -Brodnax, -Scarf, -Speeds, -Yergeson.     Radiology  I previously independently reviewed the relevant imaging, including XR L Shoulder (8/16/22) and agree with the interpretation of mild GH and AC OA.     Large Joint Injection/Arthocentesis: L subacromial bursa    Date/Time: 7/14/2023 1:35 PM    Performed by: Isaac Zhou DO  Authorized by: Isaac Zhou DO    Indications:  Pain  Needle Size:  22 G  Guidance: landmark guided    Approach:  Anterolateral  Location:  Shoulder      Site:  L subacromial bursa  Medications:  40 mg triamcinolone 40 MG/ML; 2 mL bupivacaine HCl (PF) 0.25 %; 2 mL lidocaine 1 %  Outcome:  Tolerated well, no immediate complications  Procedure discussed: discussed risks, benefits, and alternatives    Consent Given by:  Patient  Timeout: timeout called immediately prior to procedure    Prep: patient was prepped and draped in usual sterile fashion      PROCEDURE  Subacromial/Subdeltoid Bursa Injection - Left  The patient was informed of the risks and benefits of the procedure and alternatives were discussed. A written consent was signed by the patient. The injection site was prepped with chlorhexidine in sterile fashion. An injectate solution containing 2 mL of 1% lidocaine, 2 mL of 0.5% bupivacaine, and 1 mL of Kenalog (40 mg/mL) was drawn up into a 5 mL syringe. Injection was performed using sterile technique.  A 1.5-inch 22-gauge needle was used to enter the subacromial/subdeltoid bursa using a posterolateral approach, and injectate was injected successfully. After the injection, the area was cleaned and a bandage applied.     The patient tolerated the procedure well without complications.       Assessment:  1. Tendinopathy of left rotator cuff    2. Shoulder impingement syndrome, left        Plan:  Noreen Florence is a 72 year old female that presents for follow-up of her left shoulder  pain secondary to rotator cuff tendinopathy and shoulder impingement syndrome in the setting of mild glenohumeral osteoarthritis and AC OA.  This is in the setting of chronic inflammatory polyarthritis.  Good results from subacromial bursa injections.  Discussed the nature of the condition and treatment options and mutually agreed upon the following plan:    - Medications: Discussed pharmacologic treatment options, and recommend using NSAIDs or Tylenol as needed for pain control.  Also discussed topical treatment options including IcyHot, Biofreeze, Bengay, or Voltaren gel.  Voltaren gel to be used as needed up to 4 times per day over the left shoulder.  Refill provided and sent to her preferred pharmacy.   - Reviewed her request for meloxicam, as she feels like her pain has gotten worse since she has switched from this medication.  Upon chart review, rheumatology discontinued the meloxicam in March due to overall improvements in her pain and concern for GERD.  She has since tried Celebrex, which tends to be easier on the stomach and causes less reflux, however she reports that this was not significantly helpful for her pain and she verbally reports that she did not feel much stomach pain when she was on meloxicam and that this was much more helpful for pain control.  I provided a temporary prescription to be restarted on her meloxicam at this time, instructed not to be taking any either NSAIDs (ibuprofen, naproxen, Celebrex) while taking meloxicam, and that she can re-address the issue with her rheumatologist at her appointment in September, to consider if she should be on it going forward or not.  - Therapy: Myself and others have submitted referrals for physical therapy, which has not been attended.  Advised that if she does not want to do PT, she should continue with the home exercise program that was provided at our previous visits.  Reemphasized and reeducated today that this will be beneficial to hold off her  symptoms for a longer period of time then just the injections alone.  - Modalities:  May use ice, heat, massage PRN.   - Injections:   Last subacromial corticosteroid injection provided significant relief for 2+ months.  Requesting a repeat injection today in hopes of similar relief of her symptoms.  May also consider glenohumeral joint injection in the future if interested based on mild OA presents on radiographs and clinical exam.  - Activity:  Encouraged to remain active and participate in regular activities as symptoms allow.  Avoid or modify exacerbating activities is much as possible.  - Follow up: As needed in the future for re-evaluation and update to treatment plan. Patient has clinic contact information for questions or concerns.       Isaac Zhou DO, CAQSM  [facility]  [facility] - Department of Orthopedic Surgery         Disclaimer: This note consists of symbols derived from keyboarding, dictation and/or voice recognition software. As a result, there may be errors in the script that have gone undetected. Please consider this when interpreting information found in this chart.          Again, thank you for allowing me to participate in the care of your patient.        Sincerely,        Isaac Zhou DO

## 2023-07-18 ENCOUNTER — OFFICE VISIT (OUTPATIENT)
Dept: FAMILY MEDICINE | Facility: CLINIC | Age: 73
End: 2023-07-18
Payer: COMMERCIAL

## 2023-07-18 VITALS
BODY MASS INDEX: 21.63 KG/M2 | OXYGEN SATURATION: 96 % | TEMPERATURE: 98.7 F | WEIGHT: 113 LBS | DIASTOLIC BLOOD PRESSURE: 85 MMHG | HEART RATE: 72 BPM | SYSTOLIC BLOOD PRESSURE: 140 MMHG

## 2023-07-18 DIAGNOSIS — M06.4 POLYARTHRITIS, INFLAMMATORY (H): ICD-10-CM

## 2023-07-18 DIAGNOSIS — M35.00 SICCA SYNDROME (H): ICD-10-CM

## 2023-07-18 DIAGNOSIS — R73.03 PRE-DIABETES: ICD-10-CM

## 2023-07-18 DIAGNOSIS — K05.10 GINGIVITIS: ICD-10-CM

## 2023-07-18 DIAGNOSIS — I10 HYPERTENSION GOAL BP (BLOOD PRESSURE) < 140/90: Primary | ICD-10-CM

## 2023-07-18 DIAGNOSIS — M75.42 IMPINGEMENT SYNDROME OF SHOULDER REGION, LEFT: ICD-10-CM

## 2023-07-18 DIAGNOSIS — Z23 HIGH PRIORITY FOR 2019-NCOV VACCINE: ICD-10-CM

## 2023-07-18 PROCEDURE — 0124A COVID-19 BIVALENT 12+ (PFIZER): CPT | Performed by: PREVENTIVE MEDICINE

## 2023-07-18 PROCEDURE — 99214 OFFICE O/P EST MOD 30 MIN: CPT | Mod: 25 | Performed by: PREVENTIVE MEDICINE

## 2023-07-18 PROCEDURE — 91312 COVID-19 BIVALENT 12+ (PFIZER): CPT | Performed by: PREVENTIVE MEDICINE

## 2023-07-18 RX ORDER — BENZONATATE 200 MG/1
200 CAPSULE ORAL 3 TIMES DAILY PRN
Qty: 30 CAPSULE | Refills: 3 | Status: SHIPPED | OUTPATIENT
Start: 2023-07-18 | End: 2023-12-22

## 2023-07-18 RX ORDER — SODIUM FLUORIDE 5 MG/ML
PASTE, DENTIFRICE DENTAL
Qty: 51 G | Refills: 1 | Status: SHIPPED | OUTPATIENT
Start: 2023-07-18

## 2023-07-18 RX ORDER — MULTIPLE VITAMINS W/ MINERALS TAB 9MG-400MCG
1 TAB ORAL DAILY
Qty: 90 TABLET | Refills: 3 | Status: SHIPPED | OUTPATIENT
Start: 2023-07-18 | End: 2024-06-25

## 2023-07-18 RX ORDER — DEXTROMETHORPHAN POLISTIREX 30 MG/5ML
60 SUSPENSION ORAL 2 TIMES DAILY
Qty: 148 ML | Refills: 3 | Status: SHIPPED | OUTPATIENT
Start: 2023-07-18 | End: 2023-12-22

## 2023-07-18 ASSESSMENT — PAIN SCALES - GENERAL: PAINLEVEL: NO PAIN (0)

## 2023-07-18 NOTE — PROGRESS NOTES
Assessment & Plan     Hypertension goal BP (blood pressure) < 140/90  -at goal  -continue current medication   - REVIEW OF HEALTH MAINTENANCE PROTOCOL ORDERS    Polyarthritis, inflammatory (H)  -followed by Rheumatology with [facility]   - multivitamin w/minerals (THERA-VIT-M) tablet  Dispense: 90 tablet; Refill: 3  -patient requested a script for Multi vitamins     Pre-diabetes  Lab Results   Component Value Date    A1C 6.1 04/26/2023    A1C 6.2 10/21/2022    A1C 6.5 05/10/2022    A1C 5.6 10/08/2020    A1C 5.9 04/26/2019    A1C 5.9 06/21/2018    A1C 5.9 10/27/2016       Impingement syndrome of shoulder region, left  -being managed by Sports medicine     Gingivitis  -refill on toothpaste provided   - sodium fluoride (SF 5000 PLUS) 1.1 % CREA  Dispense: 51 g; Refill: 1    Sicca syndrome (H)  -dryness of the mouth leads to coughing episodes   - dextromethorphan (DELSYM) 30 MG/5ML liquid  Dispense: 148 mL; Refill: 3  - benzonatate (TESSALON) 200 MG capsule  Dispense: 30 capsule; Refill: 3    High priority for 2019-nCoV vaccine  - COVID-19 BIVALENT 12+ (PFIZER)      Prescription drug management  25 minutes spent by me on the date of the encounter doing chart review, history and exam, documentation and further activities per the note     MED REC REQUIRED  Post Medication Reconciliation Status: patient was not discharged from an inpatient facility or TCU      Jud Morales MD MPH    [facility]    Weston Sorto is a 73 year old, presenting for the following health issues:  Hospital F/U and Imm/Inj (COVID-19 VACCINE)        4/26/2023     1:33 PM   Additional Questions   Roomed by rené flores   Accompanied by none     HPI     ED/UC Followup:    Facility:  [place]   Date of visit: 06/28/2023  Reason for visit: left shoulder pain   Current Status:     Patient was seen in urgent care for evaluation of shoulder pain.  Subsequently she also saw sports medicine on 7/14/2023.  Was diagnosed with  tendinopathy of the left rotator cuff and shoulder impingement, she did receive a subacromial/subdeltoid bursa injection  Has not done Physical therapy despite recommendations.       Is followed by Rhuematology outside of [place]  Sports Medicine refilled Meloxicam, this was stopped by Rheumatology     Anusol Suppository was not covered  Declined to use cream as was not helpful before    More hair is falling+  Would like MVI script  Dry mouth+ can ead to cough+          Hyperlipidemia Follow-Up       Are you regularly taking any medication or supplement to lower your cholesterol?   Yes- statin     Are you having muscle aches or other side effects that you think could be caused by your cholesterol lowering medication?  No     Hypertension Follow-up       Do you check your blood pressure regularly outside of the clinic? No     Are you following a low salt diet? Yes    Are your blood pressures ever more than 140 on the top number (systolic) OR more       than 90 on the bottom number (diastolic), for example 140/90? No    No chest pain  No dizziness  No pedal edema     Polyarthritis:  -followed by Rheumatology outside of [place], last seen 3/23  -records through Care everywhere reviewed  -The methotrexate was stopped in July 2021 due to elevated liver enzymes.  Ultrasound of the liver in September 2021 revealed fatty liver.  In August 2021 her CK was 170 ALT elevated at 56 AST 42.  -seen by them every 6 months   -has been started on Sulfasalazine, tapered off Meloxicam but joint pains flared up again        Fatty liver:  -some exercise  -no Tylenol  -no abdominal pain  - no emesis   -LFTS checked 3/23: Normal       Review of Systems   Constitutional, HEENT, cardiovascular, pulmonary, gi and gu systems are negative, except as otherwise noted.      Objective    BP (!) 140/85 (BP Location: Right arm, Patient Position: Sitting, Cuff Size: Adult Regular)   Pulse 72   Temp 98.7  F (37.1  C) (Oral)   Wt 51.3 kg (113 lb)    LMP 06/21/2002 (Approximate)   SpO2 96%   BMI 21.63 kg/m    Body mass index is 21.63 kg/m .  Physical Exam   GENERAL APPEARANCE: healthy, alert and no distress  EYES: Eyes grossly normal to inspection and conjunctivae and sclerae normal  RESP: lungs clear to auscultation - no rales, rhonchi or wheezes  CV: regular rates and rhythm, normal S1 S2  ABDOMEN: soft, non-tender and no rebound or guarding   MS: extremities normal- no gross deformities noted   SKIN: no suspicious lesions or rashes  NEURO: Normal strength and tone, mentation intact and speech normal  PSYCH: mentation appears normal      No results found for this or any previous visit (from the past 24 hour(s)).

## 2023-07-24 DIAGNOSIS — M35.00 SICCA SYNDROME (H): ICD-10-CM

## 2023-07-24 DIAGNOSIS — L30.9 DERMATITIS: ICD-10-CM

## 2023-07-24 DIAGNOSIS — J30.1 HAYFEVER: ICD-10-CM

## 2023-07-24 DIAGNOSIS — K21.00 GASTROESOPHAGEAL REFLUX DISEASE WITH ESOPHAGITIS WITHOUT HEMORRHAGE: ICD-10-CM

## 2023-07-25 RX ORDER — CETIRIZINE HYDROCHLORIDE 10 MG/1
TABLET ORAL
Qty: 90 TABLET | Refills: 1 | Status: SHIPPED | OUTPATIENT
Start: 2023-07-25 | End: 2024-04-17

## 2023-07-25 RX ORDER — CARBOXYMETHYLCELLULOSE SODIUM AND GLYCERIN 5; 9 MG/ML; MG/ML
SOLUTION/ DROPS OPHTHALMIC
Qty: 15 ML | Refills: 1 | Status: SHIPPED | OUTPATIENT
Start: 2023-07-25 | End: 2023-10-06

## 2023-07-25 RX ORDER — OMEPRAZOLE 40 MG/1
CAPSULE, DELAYED RELEASE ORAL
Qty: 90 CAPSULE | Refills: 2 | Status: SHIPPED | OUTPATIENT
Start: 2023-07-25 | End: 2024-05-29

## 2023-07-25 RX ORDER — TRIAMCINOLONE ACETONIDE 1 MG/G
CREAM TOPICAL
Qty: 80 G | Refills: 0 | Status: SHIPPED | OUTPATIENT
Start: 2023-07-25 | End: 2023-10-13

## 2023-08-15 DIAGNOSIS — M06.4 POLYARTHRITIS, INFLAMMATORY (H): ICD-10-CM

## 2023-08-16 RX ORDER — CELECOXIB 100 MG/1
CAPSULE ORAL
Qty: 30 CAPSULE | Refills: 0 | Status: SHIPPED | OUTPATIENT
Start: 2023-08-16 | End: 2023-10-04

## 2023-09-08 ENCOUNTER — OFFICE VISIT (OUTPATIENT)
Dept: OPHTHALMOLOGY | Facility: CLINIC | Age: 73
End: 2023-09-08
Payer: COMMERCIAL

## 2023-09-08 DIAGNOSIS — H11.002 PTERYGIUM, LEFT: ICD-10-CM

## 2023-09-08 DIAGNOSIS — H52.4 PRESBYOPIA: ICD-10-CM

## 2023-09-08 DIAGNOSIS — H25.13 SENILE NUCLEAR SCLEROSIS, BILATERAL: ICD-10-CM

## 2023-09-08 DIAGNOSIS — H40.013 OPEN ANGLE WITH BORDERLINE FINDINGS AND LOW GLAUCOMA RISK IN BOTH EYES: Primary | ICD-10-CM

## 2023-09-08 DIAGNOSIS — Z98.890 HISTORY OF BLEPHAROPLASTY: ICD-10-CM

## 2023-09-08 PROCEDURE — 92015 DETERMINE REFRACTIVE STATE: CPT | Performed by: OPTOMETRIST

## 2023-09-08 PROCEDURE — 92133 CPTRZD OPH DX IMG PST SGM ON: CPT | Performed by: OPTOMETRIST

## 2023-09-08 PROCEDURE — 92014 COMPRE OPH EXAM EST PT 1/>: CPT | Performed by: OPTOMETRIST

## 2023-09-08 ASSESSMENT — REFRACTION_MANIFEST
OS_AXIS: 065
OS_SPHERE: +0.50
OS_CYLINDER: +0.75
OS_ADD: +2.50
OD_ADD: +2.50
OD_CYLINDER: SPHERE
OD_SPHERE: +0.25

## 2023-09-08 ASSESSMENT — VISUAL ACUITY
METHOD: SNELLEN - LINEAR
OS_SC: 20/25
OD_SC+: -2
OS_SC+: -1
OD_SC: 20/20

## 2023-09-08 ASSESSMENT — CONF VISUAL FIELD
OS_SUPERIOR_NASAL_RESTRICTION: 0
OD_SUPERIOR_NASAL_RESTRICTION: 0
OS_SUPERIOR_TEMPORAL_RESTRICTION: 0
OS_INFERIOR_NASAL_RESTRICTION: 0
OS_NORMAL: 1
OS_INFERIOR_TEMPORAL_RESTRICTION: 0
OD_INFERIOR_NASAL_RESTRICTION: 0
METHOD: COUNTING FINGERS
OD_SUPERIOR_TEMPORAL_RESTRICTION: 0
OD_INFERIOR_TEMPORAL_RESTRICTION: 0
OD_NORMAL: 1

## 2023-09-08 ASSESSMENT — TONOMETRY
IOP_METHOD: TONOPEN
OD_IOP_MMHG: 12
OS_IOP_MMHG: 14
OD_IOP_MMHG: 14
OS_IOP_MMHG: 13

## 2023-09-08 ASSESSMENT — SLIT LAMP EXAM - LIDS: COMMENTS: NORMAL

## 2023-09-08 ASSESSMENT — CUP TO DISC RATIO
OS_RATIO: 0.6
OD_RATIO: 0.6

## 2023-09-08 ASSESSMENT — EXTERNAL EXAM - RIGHT EYE: OD_EXAM: NORMAL

## 2023-09-08 ASSESSMENT — EXTERNAL EXAM - LEFT EYE: OS_EXAM: NORMAL

## 2023-09-08 NOTE — NURSING NOTE
Chief Complaints and History of Present Illnesses   Patient presents with    COMPREHENSIVE EYE EXAM     Chief Complaint(s) and History of Present Illness(es)       COMPREHENSIVE EYE EXAM              Laterality: both eyes    Course: stable    Associated symptoms: Negative for eye pain, flashes and floaters    Treatments tried: no treatments              Comments    Noreen Florence is a(n) 73 year old female who presents for a comprehensive exam. Last eye exam was 1 year(s) ago. Since exam, vision is about the same. No lubricating drops. No flashes and floaters. No eye pain.     Lab Results       Component                Value               Date                       A1C                      6.1                 04/26/2023                 A1C                      6.2                 10/21/2022                 A1C                      6.5                 05/10/2022                 A1C                      5.6                 10/08/2020                 A1C                      5.9                 04/26/2019                 A1C                      5.9                 06/21/2018                 A1C                      5.9                 10/27/2016              Sadiq Salcedo COT 12:35 PM September 8, 2023

## 2023-09-11 ENCOUNTER — OFFICE VISIT (OUTPATIENT)
Dept: FAMILY MEDICINE | Facility: CLINIC | Age: 73
End: 2023-09-11
Payer: COMMERCIAL

## 2023-09-11 VITALS
HEIGHT: 61 IN | WEIGHT: 114.6 LBS | BODY MASS INDEX: 21.64 KG/M2 | TEMPERATURE: 97.5 F | HEART RATE: 85 BPM | RESPIRATION RATE: 19 BRPM | OXYGEN SATURATION: 98 % | SYSTOLIC BLOOD PRESSURE: 132 MMHG | DIASTOLIC BLOOD PRESSURE: 83 MMHG

## 2023-09-11 DIAGNOSIS — R73.03 PRE-DIABETES: ICD-10-CM

## 2023-09-11 DIAGNOSIS — I10 HYPERTENSION GOAL BP (BLOOD PRESSURE) < 140/90: ICD-10-CM

## 2023-09-11 DIAGNOSIS — M06.4 POLYARTHRITIS, INFLAMMATORY (H): Primary | ICD-10-CM

## 2023-09-11 LAB — HBA1C MFR BLD: 6.1 % (ref 0–5.6)

## 2023-09-11 PROCEDURE — 90662 IIV NO PRSV INCREASED AG IM: CPT | Performed by: FAMILY MEDICINE

## 2023-09-11 PROCEDURE — G0008 ADMIN INFLUENZA VIRUS VAC: HCPCS | Performed by: FAMILY MEDICINE

## 2023-09-11 PROCEDURE — 99213 OFFICE O/P EST LOW 20 MIN: CPT | Mod: 25 | Performed by: FAMILY MEDICINE

## 2023-09-11 PROCEDURE — 83036 HEMOGLOBIN GLYCOSYLATED A1C: CPT | Performed by: FAMILY MEDICINE

## 2023-09-11 PROCEDURE — 84550 ASSAY OF BLOOD/URIC ACID: CPT | Performed by: FAMILY MEDICINE

## 2023-09-11 PROCEDURE — 36415 COLL VENOUS BLD VENIPUNCTURE: CPT | Performed by: FAMILY MEDICINE

## 2023-09-11 ASSESSMENT — PAIN SCALES - GENERAL: PAINLEVEL: EXTREME PAIN (8)

## 2023-09-11 NOTE — PROGRESS NOTES
Assessment & Plan     Polyarthritis, inflammatory (H)  -Seen by rheumatologist on 9/5/2023 who suggested sulfasalazine for 3 months with meloxicam 7.5 mg as needed basis.  -Noreen stated that she has tried sulfasalazine in the past which did not help her. She preferred to get opinion from different rheumatologist.    - Uric acid; Future  - Adult Rheumatology  Referral; Future    Pre-diabetes  -Diet controlled.  - Hemoglobin A1c; Future    Hypertension goal BP (blood pressure) < 140/90  -At goal.             Eddie Montiel MD  [facility] NADJA Sorto is a 73 year old, presenting for the following health issues:  Back Pain and Arthritis        9/11/2023    10:38 AM   Additional Questions   Roomed by Farzaneh       History of Present Illness       Back Pain:  She presents for follow up of back pain. Patient's back pain is a chronic problem.  Location of back pain:  Right lower back  Description of back pain: sharp  Back pain spreads: nowhere    Since patient first noticed back pain, pain is: gradually worsening  Does back pain interfere with her job:  Not applicable       She eats 2-3 servings of fruits and vegetables daily.She consumes 0 sweetened beverage(s) daily.She exercises with enough effort to increase her heart rate 30 to 60 minutes per day.  She exercises with enough effort to increase her heart rate 7 days per week.   She is taking medications regularly.     Right ankle  Gout/ Single Inflamed Joint  Onset/Duration: one or two years ago  Description:   Location: foot - right  Joint Swelling: YES  Redness: No  Pain: YES  Intensity: Sometimes mild sometimes severe   Progression of Symptoms: worsening and constant  Accompanying Signs & Symptoms:  Fevers: No  History:   Trauma to the area: No  Previous history of gout: YES  Recent illness: No  Alcohol use: No  Diuretic use: No  Precipitating or alleviating factors: None  Therapies tried and outcome:  "Meloxicam - effective.         Review of Systems   Constitutional, HEENT, cardiovascular, pulmonary, gi and gu systems are negative, except as otherwise noted.      Objective    /83 (BP Location: Left arm, Patient Position: Sitting, Cuff Size: Adult Regular)   Pulse 85   Temp 97.5  F (36.4  C) (Oral)   Resp 19   Ht 1.539 m (5' 0.6\")   Wt 52 kg (114 lb 9.6 oz)   LMP 06/21/2002 (Approximate)   SpO2 98%   BMI 21.94 kg/m    Body mass index is 21.94 kg/m .  Physical Exam   GENERAL: healthy, alert and no distress  NECK: no adenopathy, no asymmetry, masses, or scars and thyroid normal to palpation  RESP: lungs clear to auscultation - no rales, rhonchi or wheezes  CV: regular rate and rhythm, normal S1 S2, no S3 or S4, no murmur, click or rub, no peripheral edema and peripheral pulses strong  ABDOMEN: soft, nontender, no hepatosplenomegaly, no masses and bowel sounds normal  MS: no gross musculoskeletal defects noted, no edema                      "

## 2023-09-12 ENCOUNTER — TELEPHONE (OUTPATIENT)
Dept: OPHTHALMOLOGY | Facility: CLINIC | Age: 73
End: 2023-09-12
Payer: COMMERCIAL

## 2023-09-12 LAB — URATE SERPL-MCNC: 3 MG/DL (ref 2.4–5.7)

## 2023-09-12 NOTE — TELEPHONE ENCOUNTER
Spoke with patient regarding scheduling for Next available with Dr. Fletcher -Per . Scheduled patit accordingly and added to wait-list. Sent reminder letter to confirmed address.-Per Patient

## 2023-09-13 NOTE — TELEPHONE ENCOUNTER
FUTURE VISIT INFORMATION      FUTURE VISIT INFORMATION:  Date: 12/4/23  Time: 2:23pm  [place] [facility] [place]  REFERRAL INFORMATION:  Referring provider:  Prabhakar Banerjee OD   Referring providers clinic:  [facility]  Reason for visit/diagnosis  2nd Opinion for Next available with Dr. Fletcher patient very concerned about asymmetry in her appearance     RECORDS REQUESTED FROM:       Clinic name Comments Records Status Imaging Status   [facility] OV/referral 9/8/23  Ov 7/13/20, 10/21/19- More in EPIC    BILATERAL UPPER LID BLEPHAROPLASTY WITH PTOSIS REPAIR, BILATERAL LOWER LID ENTROPION REPAIR  done 1/6/17 EPIC

## 2023-09-14 ENCOUNTER — PATIENT OUTREACH (OUTPATIENT)
Dept: GERIATRIC MEDICINE | Facility: CLINIC | Age: 73
End: 2023-09-14
Payer: COMMERCIAL

## 2023-09-14 NOTE — PROGRESS NOTES
[facility] Care Coordination Contact      [facility] Six-Month Telephone Assessment    6 month telephone assessment completed on 9/14/23.    ER visits: No  Hospitalizations: No  TCU stays: No  Significant health status changes: None reported.  Falls/Injuries: No  ADL/IADL changes: No  Changes in services: No, states she figured out the healthy savings plan and has been receiving her money every month. No additional questions.    Goals: See POC in chart for goal progress documentation.      Will see member in 6 months for an annual health risk assessment.   Encouraged member to call CC with any questions or concerns in the meantime.     Norah Peguero RN  [facility]  [phone]

## 2023-09-17 DIAGNOSIS — L30.9 DERMATITIS: ICD-10-CM

## 2023-09-17 DIAGNOSIS — K05.10 GINGIVITIS: ICD-10-CM

## 2023-09-19 RX ORDER — CHLORHEXIDINE GLUCONATE ORAL RINSE 1.2 MG/ML
SOLUTION DENTAL
Qty: 473 ML | Refills: 1 | Status: SHIPPED | OUTPATIENT
Start: 2023-09-19 | End: 2023-11-26

## 2023-09-19 RX ORDER — TRIAMCINOLONE ACETONIDE 1 MG/G
CREAM TOPICAL
Qty: 80 G | Refills: 0 | OUTPATIENT
Start: 2023-09-19

## 2023-09-21 ENCOUNTER — PATIENT OUTREACH (OUTPATIENT)
Dept: CARE COORDINATION | Facility: CLINIC | Age: 73
End: 2023-09-21
Payer: COMMERCIAL

## 2023-09-27 ENCOUNTER — OFFICE VISIT (OUTPATIENT)
Dept: DERMATOLOGY | Facility: CLINIC | Age: 73
End: 2023-09-27
Attending: PREVENTIVE MEDICINE
Payer: COMMERCIAL

## 2023-09-27 DIAGNOSIS — L81.9 PIGMENTATION ABNORMALITY OF SKIN: ICD-10-CM

## 2023-09-27 DIAGNOSIS — L72.0 MILIA: ICD-10-CM

## 2023-09-27 DIAGNOSIS — L81.4 LENTIGO: ICD-10-CM

## 2023-09-27 DIAGNOSIS — L64.9 ANDROGENETIC ALOPECIA: Primary | ICD-10-CM

## 2023-09-27 DIAGNOSIS — L70.0 ACNE VULGARIS: ICD-10-CM

## 2023-09-27 PROCEDURE — 99203 OFFICE O/P NEW LOW 30 MIN: CPT | Performed by: PHYSICIAN ASSISTANT

## 2023-09-27 RX ORDER — TRETINOIN 0.5 MG/G
CREAM TOPICAL
Qty: 45 G | Refills: 4 | Status: SHIPPED | OUTPATIENT
Start: 2023-09-27 | End: 2024-01-31

## 2023-09-27 NOTE — LETTER
9/27/2023         RE: Noreen Florence  [address]  [place] [address]        Dear Colleague,    Thank you for referring your patient, Noreen Florence, to the [facility]. Please see a copy of my visit note below.    Noreen Florence is an extremely pleasant 73 year old year old female patient here today for discoloration on face, milia, and hair shedding. She note hair loss started about one year ago. No new medication changes or illnesses, surgeries, etc. She notes more shedding in brush. She also brown freckling for 20 plus years on cheek. She also notes white head like bump on face  She has not tried anything to treat. Patient has no other skin complaints today.  Remainder of the HPI, Meds, PMH, Allergies, FH, and SH was reviewed in chart.    Past Medical History:   Diagnosis Date     GERD (gastroesophageal reflux disease) 3/20/2015     Glaucoma (increased eye pressure)      Hyperlipidemia LDL goal < 130 8/3/2015     Hypertension      Hypertension, goal below 150/90 11/28/2014     Rheumatoid arthritis, adult (H)        Past Surgical History:   Procedure Laterality Date     COLONOSCOPY       COLONOSCOPY WITH CO2 INSUFFLATION N/A 4/5/2017    Procedure: COLONOSCOPY WITH CO2 INSUFFLATION;  Surgeon: Duane, William Charles, MD;  Location: [facility]     COMBINED ESOPHAGOSCOPY, GASTROSCOPY, DUODENOSCOPY (EGD) WITH CO2 INSUFFLATION N/A 2/13/2019    Procedure: COMBINED ESOPHAGOSCOPY, GASTROSCOPY, DUODENOSCOPY (EGD) WITH CO2 INSUFFLATION;  Surgeon: Sly De Santiago MD;  Location: [facility]     COMBINED ESOPHAGOSCOPY, GASTROSCOPY, DUODENOSCOPY (EGD) WITH CO2 INSUFFLATION N/A 2/7/2022    Procedure: ESOPHAGOGASTRODUODENOSCOPY, WITH CO2 INSUFFLATION;  Surgeon: Luis Aceves MD;  Location: [facility]     COMBINED REPAIR PTOSIS WITH BLEPHAROPLASTY BILATERAL Bilateral 1/6/2017    Procedure: COMBINED REPAIR PTOSIS WITH BLEPHAROPLASTY BILATERAL;  Surgeon: Carly Norman MD;  Location: [facility]      ESOPHAGOSCOPY, GASTROSCOPY, DUODENOSCOPY (EGD), COMBINED N/A 1/5/2016    Procedure: COMBINED ESOPHAGOSCOPY, GASTROSCOPY, DUODENOSCOPY (EGD), BIOPSY SINGLE OR MULTIPLE;  Surgeon: Duane, William Charles, MD;  Location: [facility]     ESOPHAGOSCOPY, GASTROSCOPY, DUODENOSCOPY (EGD), COMBINED N/A 2/13/2019    Procedure: Combined Esophagoscopy, Gastroscopy, Duodenoscopy (Egd), Biopsy Single Or Multiple;  Surgeon: Sly De Santiago MD;  Location: [facility]     ESOPHAGOSCOPY, GASTROSCOPY, DUODENOSCOPY (EGD), COMBINED N/A 2/7/2022    Procedure: ESOPHAGOGASTRODUODENOSCOPY, WITH BIOPSY;  Surgeon: Luis Aceves MD;  Location: [facility]     REPAIR ENTROPION BILATERAL Bilateral 1/6/2017    Procedure: REPAIR ENTROPION BILATERAL;  Surgeon: Carly Norman MD;  Location: [facility]     REPAIR PTOSIS Bilateral 01/2017        Family History   Problem Relation Age of Onset     Diabetes Mother      Cancer No family hx of      Hypertension No family hx of      Cerebrovascular Disease No family hx of      Thyroid Disease No family hx of      Glaucoma No family hx of      Macular Degeneration No family hx of        Social History     Socioeconomic History     Marital status:      Spouse name: Not on file     Number of children: Not on file     Years of education: Not on file     Highest education level: Not on file   Occupational History     Not on file   Tobacco Use     Smoking status: Never     Passive exposure: Never     Smokeless tobacco: Never   Vaping Use     Vaping Use: Never used   Substance and Sexual Activity     Alcohol use: No     Drug use: No     Sexual activity: Not Currently     Partners: Male     Birth control/protection: None   Other Topics Concern     Parent/sibling w/ CABG, MI or angioplasty before 65F 55M? No   Social History Narrative     Not on file     Social Determinants of Health     Financial Resource Strain: Not on file   Food Insecurity: Not on file   Transportation Needs: Not on file   Physical  Activity: Not on file   Stress: Not on file   Social Connections: Not on file   Interpersonal Safety: Not on file   Housing Stability: Not on file       Outpatient Encounter Medications as of 9/27/2023   Medication Sig Dispense Refill     minoxidil (ROGAINE) 5 % external solution Apply to scalp once daily. 120 mL 3     tretinoin (RETIN-A) 0.05 % external cream Apply pea sized amount at bedtime. 45 g 4     acetaminophen-codeine (TYLENOL #3) 300-30 MG tablet TAKE 1 TABLET BY MOUTH EVERY 4 TO 6 HOURS AS NEEDED FOR PAIN       benzonatate (TESSALON) 200 MG capsule Take 1 capsule (200 mg) by mouth 3 times daily as needed for cough 30 capsule 3     celecoxib (CELEBREX) 100 MG capsule TAKE 1 CAPSULE(100 MG) BY MOUTH TWICE DAILY AS NEEDED FOR MODERATE PAIN OR PAIN 30 capsule 0     cetirizine (ZYRTEC) 10 MG tablet TAKE 1 TABLET(10 MG) BY MOUTH DAILY 90 tablet 1     chlorhexidine (PERIDEX) 0.12 % solution SWISH AND SPIT 15 ML BY MOUTH TWICE DAILY AS NEEDED 473 mL 1     cholecalciferol (VITAMIN D3) 25 mcg (1000 units) capsule Take 1,000 Units by mouth       dextromethorphan (DELSYM) 30 MG/5ML liquid Take 10 mLs (60 mg) by mouth 2 times daily 148 mL 3     diclofenac (VOLTAREN) 1 % topical gel Apply 2 g topically 4 times daily as needed for moderate pain 150 g 1     diclofenac (VOLTAREN) 1 % topical gel Apply 2 g topically 4 times daily as needed for moderate pain (4-6) 350 g 1     fluticasone (FLONASE) 50 MCG/ACT nasal spray SHAKE LIQUID AND USE 1 TO 2 SPRAYS IN EACH NOSTRIL DAILY 16 g 5     latanoprost (XALATAN) 0.005 % ophthalmic solution Place 1 drop into both eyes At Bedtime 7.5 mL 6     losartan-hydrochlorothiazide (HYZAAR) 50-12.5 MG tablet Take 1 tablet by mouth daily 90 tablet 3     magnesium 250 MG tablet TAKE 1 TABLET BY MOUTH DAILY AS NEEDED FOR LEG CRAMPS 90 tablet 1     meloxicam (MOBIC) 7.5 MG tablet Take 1 tablet (7.5 mg) by mouth daily for 30 days 30 tablet 1     meloxicam (MOBIC) 7.5 MG tablet 15 MG/D WITH FOOD  IN AM FOR 2 WEEKS AND THEN STAY ON 7.5 MG DAILY WITH FOOD       methylcellulose (CITRUCEL) powder Take 0.3 g (1.05 teaspoonful) by mouth daily 454 g 1     metoprolol tartrate (LOPRESSOR) 25 MG tablet Take 1 tablet (25 mg) by mouth At Bedtime 90 tablet 3     multivitamin w/minerals (THERA-VIT-M) tablet Take 1 tablet by mouth daily 90 tablet 3     omeprazole (PRILOSEC) 40 MG DR capsule TAKE 1 CAPSULE(40 MG) BY MOUTH DAILY 90 capsule 2     OPTIVE 0.5-0.9 % ophthalmic solution INSTILL 1 DROP IN BOTH EYES THREE TIMES DAILY AS NEEDED FOR DRY EYES 15 mL 1     pravastatin (PRAVACHOL) 20 MG tablet Take 1 tablet (20 mg) by mouth daily 90 tablet 3     sodium fluoride (SF 5000 PLUS) 1.1 % CREA Use twice a day as instructed 51 g 1     sulfaSALAzine ER (AZULFIDINE EN) 500 MG EC tablet two times a day with food       triamcinolone (KENALOG) 0.1 % external cream APPLY TOPICALLY TO THE AFFECTED AREA TWICE DAILY. MAXIMUM OF 2 WEEKS 80 g 0     Facility-Administered Encounter Medications as of 9/27/2023   Medication Dose Route Frequency Provider Last Rate Last Admin     2 mL bupivacaine (MARCAINE) preservative free injection 0.25% (10 mL vial)  2 mL   Isaac Zhou DO   2 mL at 07/14/23 1335     gadobutrol (GADAVIST) injection 7.5 mL  7.5 mL Intravenous Once Palomo Terrell MD         lidocaine 1 % injection 2 mL  2 mL   Isaac Zhou DO   2 mL at 07/14/23 1335     triamcinolone (KENALOG-40) injection 40 mg  40 mg   Isaac Zhou DO   40 mg at 07/14/23 1335             O:   NAD, WDWN, Alert & Oriented, Mood & Affect wnl, Vitals stable   Here today alone   LMP 06/21/2002 (Approximate)    General appearance normal   Vitals stable   Alert, oriented and in no acute distress      Brown macules on cheeks    White papules on lower half of face   Nonscarring hair loss       Eyes: Conjunctivae/lids:Normal     ENT: Lips, normal    MSK:Normal    Pulm: Breathing Normal    Neuro/Psych: Orientation:Alert and Orientedx3 ;  Mood/Affect:normal     A/P:  1.nonscarring hair loss favor androgenetic alopecia  Start rogaine 5% daily to scalp, sent in as prescription to see if insurance will cover.   2. Acne, milia, and ephelides/lentigo  Apply tretinoin to affected area at bedtime.   Apply daily moisturizers and sunscreen.   Recheck in 6 months.       Again, thank you for allowing me to participate in the care of your patient.        Sincerely,        Khloe Merrill PA-C

## 2023-09-27 NOTE — PATIENT INSTRUCTIONS
Routine for skin care:    La Roche Posay Halu B5 serum- apply dime size amount to face after cleansing 1 x day  La Roche Posay Vit C serum apply dime size amount to face 1 x a day ( morning or night)  Tretinoin ( Retin -A) -apply pea size amount to face at bedtime  Rogaine solution to scalp 1 x a day

## 2023-09-28 NOTE — PROGRESS NOTES
Noreen Florence is an extremely pleasant 73 year old year old female patient here today for discoloration on face, milia, and hair shedding. She note hair loss started about one year ago. No new medication changes or illnesses, surgeries, etc. She notes more shedding in brush. She also brown freckling for 20 plus years on cheek. She also notes white head like bump on face  She has not tried anything to treat. Patient has no other skin complaints today.  Remainder of the HPI, Meds, PMH, Allergies, FH, and SH was reviewed in chart.    Past Medical History:   Diagnosis Date    GERD (gastroesophageal reflux disease) 3/20/2015    Glaucoma (increased eye pressure)     Hyperlipidemia LDL goal < 130 8/3/2015    Hypertension     Hypertension, goal below 150/90 11/28/2014    Rheumatoid arthritis, adult (H)        Past Surgical History:   Procedure Laterality Date    COLONOSCOPY      COLONOSCOPY WITH CO2 INSUFFLATION N/A 4/5/2017    Procedure: COLONOSCOPY WITH CO2 INSUFFLATION;  Surgeon: Duane, William Charles, MD;  Location: [facility]    COMBINED ESOPHAGOSCOPY, GASTROSCOPY, DUODENOSCOPY (EGD) WITH CO2 INSUFFLATION N/A 2/13/2019    Procedure: COMBINED ESOPHAGOSCOPY, GASTROSCOPY, DUODENOSCOPY (EGD) WITH CO2 INSUFFLATION;  Surgeon: Sly De Santiago MD;  Location: [facility]    COMBINED ESOPHAGOSCOPY, GASTROSCOPY, DUODENOSCOPY (EGD) WITH CO2 INSUFFLATION N/A 2/7/2022    Procedure: ESOPHAGOGASTRODUODENOSCOPY, WITH CO2 INSUFFLATION;  Surgeon: Luis Aceves MD;  Location: [facility]    COMBINED REPAIR PTOSIS WITH BLEPHAROPLASTY BILATERAL Bilateral 1/6/2017    Procedure: COMBINED REPAIR PTOSIS WITH BLEPHAROPLASTY BILATERAL;  Surgeon: Carly Norman MD;  Location:  [facility]    ESOPHAGOSCOPY, GASTROSCOPY, DUODENOSCOPY (EGD), COMBINED N/A 1/5/2016    Procedure: COMBINED ESOPHAGOSCOPY, GASTROSCOPY, DUODENOSCOPY (EGD), BIOPSY SINGLE OR MULTIPLE;  Surgeon: Duane, William Charles, MD;  Location: [facility]    ESOPHAGOSCOPY, GASTROSCOPY,  DUODENOSCOPY (EGD), COMBINED N/A 2/13/2019    Procedure: Combined Esophagoscopy, Gastroscopy, Duodenoscopy (Egd), Biopsy Single Or Multiple;  Surgeon: Sly De Santiago MD;  Location: [facility]    ESOPHAGOSCOPY, GASTROSCOPY, DUODENOSCOPY (EGD), COMBINED N/A 2/7/2022    Procedure: ESOPHAGOGASTRODUODENOSCOPY, WITH BIOPSY;  Surgeon: Luis Aceves MD;  Location: [facility]    REPAIR ENTROPION BILATERAL Bilateral 1/6/2017    Procedure: REPAIR ENTROPION BILATERAL;  Surgeon: Carly Norman MD;  Location: [facility]    REPAIR PTOSIS Bilateral 01/2017        Family History   Problem Relation Age of Onset    Diabetes Mother     Cancer No family hx of     Hypertension No family hx of     Cerebrovascular Disease No family hx of     Thyroid Disease No family hx of     Glaucoma No family hx of     Macular Degeneration No family hx of        Social History     Socioeconomic History    Marital status:      Spouse name: Not on file    Number of children: Not on file    Years of education: Not on file    Highest education level: Not on file   Occupational History    Not on file   Tobacco Use    Smoking status: Never     Passive exposure: Never    Smokeless tobacco: Never   Vaping Use    Vaping Use: Never used   Substance and Sexual Activity    Alcohol use: No    Drug use: No    Sexual activity: Not Currently     Partners: Male     Birth control/protection: None   Other Topics Concern    Parent/sibling w/ CABG, MI or angioplasty before 65F 55M? No   Social History Narrative    Not on file     Social Determinants of Health     Financial Resource Strain: Not on file   Food Insecurity: Not on file   Transportation Needs: Not on file   Physical Activity: Not on file   Stress: Not on file   Social Connections: Not on file   Interpersonal Safety: Not on file   Housing Stability: Not on file       Outpatient Encounter Medications as of 9/27/2023   Medication Sig Dispense Refill    minoxidil (ROGAINE) 5 % external solution  Apply to scalp once daily. 120 mL 3    tretinoin (RETIN-A) 0.05 % external cream Apply pea sized amount at bedtime. 45 g 4    acetaminophen-codeine (TYLENOL #3) 300-30 MG tablet TAKE 1 TABLET BY MOUTH EVERY 4 TO 6 HOURS AS NEEDED FOR PAIN      benzonatate (TESSALON) 200 MG capsule Take 1 capsule (200 mg) by mouth 3 times daily as needed for cough 30 capsule 3    celecoxib (CELEBREX) 100 MG capsule TAKE 1 CAPSULE(100 MG) BY MOUTH TWICE DAILY AS NEEDED FOR MODERATE PAIN OR PAIN 30 capsule 0    cetirizine (ZYRTEC) 10 MG tablet TAKE 1 TABLET(10 MG) BY MOUTH DAILY 90 tablet 1    chlorhexidine (PERIDEX) 0.12 % solution SWISH AND SPIT 15 ML BY MOUTH TWICE DAILY AS NEEDED 473 mL 1    cholecalciferol (VITAMIN D3) 25 mcg (1000 units) capsule Take 1,000 Units by mouth      dextromethorphan (DELSYM) 30 MG/5ML liquid Take 10 mLs (60 mg) by mouth 2 times daily 148 mL 3    diclofenac (VOLTAREN) 1 % topical gel Apply 2 g topically 4 times daily as needed for moderate pain 150 g 1    diclofenac (VOLTAREN) 1 % topical gel Apply 2 g topically 4 times daily as needed for moderate pain (4-6) 350 g 1    fluticasone (FLONASE) 50 MCG/ACT nasal spray SHAKE LIQUID AND USE 1 TO 2 SPRAYS IN EACH NOSTRIL DAILY 16 g 5    latanoprost (XALATAN) 0.005 % ophthalmic solution Place 1 drop into both eyes At Bedtime 7.5 mL 6    losartan-hydrochlorothiazide (HYZAAR) 50-12.5 MG tablet Take 1 tablet by mouth daily 90 tablet 3    magnesium 250 MG tablet TAKE 1 TABLET BY MOUTH DAILY AS NEEDED FOR LEG CRAMPS 90 tablet 1    meloxicam (MOBIC) 7.5 MG tablet Take 1 tablet (7.5 mg) by mouth daily for 30 days 30 tablet 1    meloxicam (MOBIC) 7.5 MG tablet 15 MG/D WITH FOOD IN AM FOR 2 WEEKS AND THEN STAY ON 7.5 MG DAILY WITH FOOD      methylcellulose (CITRUCEL) powder Take 0.3 g (1.05 teaspoonful) by mouth daily 454 g 1    metoprolol tartrate (LOPRESSOR) 25 MG tablet Take 1 tablet (25 mg) by mouth At Bedtime 90 tablet 3    multivitamin w/minerals (THERA-VIT-M)  tablet Take 1 tablet by mouth daily 90 tablet 3    omeprazole (PRILOSEC) 40 MG DR capsule TAKE 1 CAPSULE(40 MG) BY MOUTH DAILY 90 capsule 2    OPTIVE 0.5-0.9 % ophthalmic solution INSTILL 1 DROP IN BOTH EYES THREE TIMES DAILY AS NEEDED FOR DRY EYES 15 mL 1    pravastatin (PRAVACHOL) 20 MG tablet Take 1 tablet (20 mg) by mouth daily 90 tablet 3    sodium fluoride (SF 5000 PLUS) 1.1 % CREA Use twice a day as instructed 51 g 1    sulfaSALAzine ER (AZULFIDINE EN) 500 MG EC tablet two times a day with food      triamcinolone (KENALOG) 0.1 % external cream APPLY TOPICALLY TO THE AFFECTED AREA TWICE DAILY. MAXIMUM OF 2 WEEKS 80 g 0     Facility-Administered Encounter Medications as of 9/27/2023   Medication Dose Route Frequency Provider Last Rate Last Admin    2 mL bupivacaine (MARCAINE) preservative free injection 0.25% (10 mL vial)  2 mL   Isaac Zhou, DO   2 mL at 07/14/23 1335    gadobutrol (GADAVIST) injection 7.5 mL  7.5 mL Intravenous Once Palomo Terrell MD        lidocaine 1 % injection 2 mL  2 mL   Isaac Zhou, DO   2 mL at 07/14/23 1335    triamcinolone (KENALOG-40) injection 40 mg  40 mg   Isaac Zhou, DO   40 mg at 07/14/23 1335             O:   NAD, WDWN, Alert & Oriented, Mood & Affect wnl, Vitals stable   Here today alone   LMP 06/21/2002 (Approximate)    General appearance normal   Vitals stable   Alert, oriented and in no acute distress      Brown macules on cheeks    White papules on lower half of face   Nonscarring hair loss       Eyes: Conjunctivae/lids:Normal     ENT: Lips, normal    MSK:Normal    Pulm: Breathing Normal    Neuro/Psych: Orientation:Alert and Orientedx3 ; Mood/Affect:normal     A/P:  1.nonscarring hair loss favor androgenetic alopecia  Start rogaine 5% daily to scalp, sent in as prescription to see if insurance will cover.   2. Acne, milia, and ephelides/lentigo  Apply tretinoin to affected area at bedtime.   Apply daily moisturizers and sunscreen.   Recheck in 6 months.

## 2023-10-03 DIAGNOSIS — M06.4 POLYARTHRITIS, INFLAMMATORY (H): ICD-10-CM

## 2023-10-04 RX ORDER — CELECOXIB 100 MG/1
CAPSULE ORAL
Qty: 30 CAPSULE | Refills: 0 | Status: SHIPPED | OUTPATIENT
Start: 2023-10-04 | End: 2024-04-26

## 2023-10-05 ENCOUNTER — PATIENT OUTREACH (OUTPATIENT)
Dept: CARE COORDINATION | Facility: CLINIC | Age: 73
End: 2023-10-05
Payer: COMMERCIAL

## 2023-10-09 ENCOUNTER — OFFICE VISIT (OUTPATIENT)
Dept: RHEUMATOLOGY | Facility: CLINIC | Age: 73
End: 2023-10-09
Payer: COMMERCIAL

## 2023-10-09 VITALS
HEART RATE: 89 BPM | OXYGEN SATURATION: 100 % | DIASTOLIC BLOOD PRESSURE: 87 MMHG | BODY MASS INDEX: 22.02 KG/M2 | SYSTOLIC BLOOD PRESSURE: 147 MMHG | WEIGHT: 115 LBS | TEMPERATURE: 98.3 F

## 2023-10-09 DIAGNOSIS — M06.4 POLYARTHRITIS, INFLAMMATORY (H): ICD-10-CM

## 2023-10-09 DIAGNOSIS — M35.00 SICCA SYNDROME (H): Primary | ICD-10-CM

## 2023-10-09 LAB
ALBUMIN SERPL BCG-MCNC: 4.8 G/DL (ref 3.5–5.2)
ALT SERPL W P-5'-P-CCNC: 24 U/L (ref 0–50)
AST SERPL W P-5'-P-CCNC: 36 U/L (ref 0–45)
CREAT SERPL-MCNC: 0.48 MG/DL (ref 0.51–0.95)
CRP SERPL-MCNC: <3 MG/L
EGFRCR SERPLBLD CKD-EPI 2021: >90 ML/MIN/1.73M2
ERYTHROCYTE [DISTWIDTH] IN BLOOD BY AUTOMATED COUNT: 13.7 % (ref 10–15)
ERYTHROCYTE [SEDIMENTATION RATE] IN BLOOD BY WESTERGREN METHOD: 7 MM/HR (ref 0–30)
HCT VFR BLD AUTO: 43.9 % (ref 35–47)
HGB BLD-MCNC: 14.6 G/DL (ref 11.7–15.7)
MCH RBC QN AUTO: 28.4 PG (ref 26.5–33)
MCHC RBC AUTO-ENTMCNC: 33.3 G/DL (ref 31.5–36.5)
MCV RBC AUTO: 85 FL (ref 78–100)
PLATELET # BLD AUTO: 233 10E3/UL (ref 150–450)
RBC # BLD AUTO: 5.14 10E6/UL (ref 3.8–5.2)
TOTAL PROTEIN SERUM FOR ELP: 7.3 G/DL (ref 6.4–8.3)
WBC # BLD AUTO: 6.1 10E3/UL (ref 4–11)

## 2023-10-09 PROCEDURE — 85027 COMPLETE CBC AUTOMATED: CPT | Performed by: NURSE PRACTITIONER

## 2023-10-09 PROCEDURE — 84165 PROTEIN E-PHORESIS SERUM: CPT

## 2023-10-09 PROCEDURE — 82565 ASSAY OF CREATININE: CPT | Performed by: NURSE PRACTITIONER

## 2023-10-09 PROCEDURE — 84155 ASSAY OF PROTEIN SERUM: CPT | Performed by: NURSE PRACTITIONER

## 2023-10-09 PROCEDURE — 82040 ASSAY OF SERUM ALBUMIN: CPT | Performed by: NURSE PRACTITIONER

## 2023-10-09 PROCEDURE — 86160 COMPLEMENT ANTIGEN: CPT | Performed by: NURSE PRACTITIONER

## 2023-10-09 PROCEDURE — 86431 RHEUMATOID FACTOR QUANT: CPT | Performed by: NURSE PRACTITIONER

## 2023-10-09 PROCEDURE — 84450 TRANSFERASE (AST) (SGOT): CPT | Performed by: NURSE PRACTITIONER

## 2023-10-09 PROCEDURE — 84460 ALANINE AMINO (ALT) (SGPT): CPT | Performed by: NURSE PRACTITIONER

## 2023-10-09 PROCEDURE — 86140 C-REACTIVE PROTEIN: CPT | Performed by: NURSE PRACTITIONER

## 2023-10-09 PROCEDURE — 36415 COLL VENOUS BLD VENIPUNCTURE: CPT | Performed by: NURSE PRACTITIONER

## 2023-10-09 PROCEDURE — 99205 OFFICE O/P NEW HI 60 MIN: CPT | Performed by: NURSE PRACTITIONER

## 2023-10-09 PROCEDURE — 85652 RBC SED RATE AUTOMATED: CPT | Performed by: NURSE PRACTITIONER

## 2023-10-09 RX ORDER — SULFASALAZINE 500 MG/1
TABLET, DELAYED RELEASE ORAL
Qty: 90 TABLET | Refills: 3 | Status: SHIPPED | OUTPATIENT
Start: 2023-10-09 | End: 2023-11-08

## 2023-10-09 ASSESSMENT — PAIN SCALES - GENERAL: PAINLEVEL: MODERATE PAIN (5)

## 2023-10-09 NOTE — Clinical Note
Pt's R achilles quite swollen, limiting function. Would like her to see you back.  Would Iontophoresis be an option? Would the steroid increase change of achilles rupture? Amanda

## 2023-10-09 NOTE — PROGRESS NOTES
"    Rheumatology Clinic Visit  Amanda WarnerPHIL  YOB: 1950    Age: 73 year old   MRN# [id number]       Date of Visit: 10/09/2023  Primary care provider: Jud Morales              Subjective:     Noreen is a 73 year old female presents today for consult for inflammatory arthritis and SICCA. Referred by Dr. Eddie Fallon.  Current treatment:  mg BID (9/23), Pilocarpine TID (9/23)       Consult 10/09/2023:   Here for second opinion . Meloxicam was very helpful for her, no pain,was her # 1 drug. Due to elevated LFTs and HTN was recommend she top this. She stopped this a month ago and now R achilles and R shoulder are problematic for her. In past methotrexate was beneficial but had to discontinue due to elevated LFTs', Was on SSZ in past, wasn't that helpful per her recollection however she is nor sure what dose.  Reports PT in past not helpful, saw podiatry and received a brace that is not helpful.  Now that off of meloxicam, is having a very hard time walking due to R heel pain, use to walk 45 min a day now less than 30 min a day. Has a hard time doing stairs.     -Stopped meloxicom a month ago after on for a long period of time, meloxicam worked well for her but not that she stopped has increase in pain of R achilles (with swelling) and R shoulder. R shoulder hurts, but not hard to move. In past L shoulder pain had cortisone injection. Last month knees are doing well.  Is tolerating SSZ. Hands ok.   -Started pilocarpine, is tolerating.  Is helping, has improved with dryness but still has.    ROS: On omperazole and stomach better. No infections, fevers, diarrhea, rashes.     Previous Rheumatology care at [facility] 3/17/2023  with Dr. Jesenia Bazan: \" My impression is this is a 73-year-old  female who had presented with features of inflammatory polyarthritis in the year 2000.   In 2009 her colonoscopy had revealed minimally active ileitis with mild neutrophilic " "inflammation and intraepithelial lymphocytosis of the terminal ileum and also the colon. They mention in the note that intraepithelial lymphocytosis could be seen by celiac as well.   She was initially started on methotrexate in October 2011 at 4 tablets/week. She flared when she went off methotrexate and leucovorin for 2 years. Methotrexate was restarted in December of 2014 and stopped in 6/2021 due to elevated liver enzymes. Her prednisone was discontinued in February 2017.  Per Ortho she has left shoulder impingement and rotator cuff tendinopathy and she has had 2 injections which have helped her a lot and the right foot and ankle pain have resolved on their own.  She presented with increasing joint pains in November 2022 and I started her on sulfasalazine 1 tablet twice a day and she was able to tolerate it well without any adverse effects. However, she stopped in 7/2023 due to a flare without Mobic 7.5 mg/d and feels that the SSZ no help.  She flared without mobic 7.5 mg/d and now back on mobic for the last 2 months and it is helping a lot.  I am not able to document any synovitis on exam today. I am recommending that she restart the sulfasalazine so that we can decrease the meloxicam frequency to every other day and she is agreeable to the plan.    She has worsening dry mouth and she has had longstanding dry eyes and dry mouth. Over-the-counter products are not helping and I am recommending a trial of pilocarpine and have gone over the risks and benefits in detail she understands and she is willing to try it.    She has had elevated liver enzymes on and off. She has fatty liver and she is on a statin.   She has had minimally elevated CK on and off but no muscle weakness on exam. This could be from her muscle cramps. \"    Past Rheum tx:  SSZ- not effective  Methotrexate-elevated LFT's      Social History  Lives [place], with daughter  Retired    FMH:  NO AI     Active Problem list:  Patient Active Problem " List    Diagnosis Date Noted    Heel pain, chronic, right 03/07/2022     Priority: Medium    NAFLD (nonalcoholic fatty liver disease) 10/18/2021     Priority: Medium    Elevated LFTs 10/18/2021     Priority: Medium    Pre-diabetes 11/08/2019     Priority: Medium    History of Helicobacter pylori infection 04/16/2018     Priority: Medium    Angiomyolipoma of right kidney 01/09/2017     Priority: Medium    Pterygium of right eye 09/21/2016     Priority: Medium    Sicca syndrome (H24) 08/30/2016     Priority: Medium    Microscopic hematuria 03/20/2016     Priority: Medium    High risk medication use 10/23/2015     Priority: Medium     Methotrexate      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 Provider to review and confirm.      GERD (gastroesophageal reflux disease) 03/20/2015     Priority: Medium    Polyarthritis, inflammatory (H) 02/16/2015     Priority: Medium    Hypertension goal BP (blood pressure) < 140/90 11/28/2014     Priority: Medium    Osteopenia 04/21/2014     Priority: Medium    Hayfever 04/21/2014     Priority: Medium            Objective:     Physical Exam  Blood Pressure (Abnormal) 147/87 (BP Location: Left arm, Patient Position: Sitting, Cuff Size: Adult Regular)   Pulse 89   Temperature 98.3  F (36.8  C) (Oral)   Weight 52.2 kg (115 lb)   Last Menstrual Period 06/21/2002 (Approximate)   Oxygen Saturation 100%   Breastfeeding No   Body Mass Index 22.02 kg/m    Body mass index is 22.02 kg/m .    Constitutional: Normal body habitus with out gross deformities. Well groomed.   Psych: nl judgement, orientation, memory, affect.  Eyes: wnl EOM, PERRLA, vision. Conjunctiva injected, dry  ENT: wnl external ears, nose, hearing, lips, teeth, gums, throat. No mucous membrane lesions. Moisture present but decreased, loss of papilae, with mild  groves of tongue.   Neck: no mass, thyroid enlargement, enlarged cervical lymph nodes.  Some what chipmunk appearance/ full parotid/ mandible area.   Resp: lungs clear to auscultation, nl work of breathing  CV: RRR, no murmurs, rubs or gallops, no edema    Skin: no nail pitting, alopecia, rash, nodules or lesions of exposed areas of face, neck, bilateral upper and lower extremity . Hyperpigmentation malar.   Neuro: Strength WNL of bilateral upper and lower extremity large muscle groups.     MSK- (T=tender to palpation, S=swelling)  TMJ: -T/S, FROM   Cervical spine:  LROM, 45 bl  Shoulders: -T/S, FROM , R crepitus ROM  Elbows: -T/S, FROM   Wrists: -T/S, slight LROM   Hands: Significant bony changes of PIP's, DIP's, and mild of CMCs. L2-4 PIP most significant changes.   Hips: FROM  Knees: -T/S, FROM , slight crepitus  Ankles: R achilles with moderate swelling, and TTP. L -T/S, bl FROM   Feet: -T/S, FROM . No dactylitis. Bony prominence 1st MTP's, 5 lateral and mid foot.       Labs:    Lab Results   Component Value Date    ALT 50 10/21/2022    AST 34 10/21/2022    CR 0.41 (L) 04/26/2023     3/01/22  -MAGGY, -SSA, -SSB,  Cr. .68, ALT 82,AST 78  2011  -RNP, -SMITH, -Hep C, -Hep b s ag, -HIV.    Imaging:   Narrative & Impression   EXAM: MR ANKLE RIGHT W/O CONTRAST  LOCATION: [facility]  DATE/TIME: 8/16/2022 6:20 PM     INDICATION: Right rear foot MRI to evaluate peroneal tendons.  COMPARISON: None.  TECHNIQUE: Unenhanced.     FINDINGS:      TENDONS:   -Peroneal: There is mild peroneus longus tendinopathy, with a markedly edematous os peroneum. (Series 6, image 9). Peroneus brevis is intact. There is mild to moderate peroneal tenosynovitis.  -Medial: Posterior tibialis tendon is intact. No tendinopathy or tenosynovitis. Flexor digitorum longus and flexor hallucis longus tendons are intact. There is mild flexor digitorum longus and flexor hallucis longus tenosynovitis.  -Anterior: Anterior tibialis,  extensor hallucis longus, and extensor digitorum longus tendons are normal. No tenosynovitis.  -Achilles: Moderate Achilles tendinosis with some low-grade interstitial longitudinal partial tearing. No high-grade Achilles tear is seen. There is mild Achilles paratenonitis. There is insertional enthesopathy at the calcaneal tuberosity.     LIGAMENTS:   -Anterior talofibular ligament: There is some thinning of the anterior talofibular ligament which relates to chronic partial tearing.   -Calcaneofibular ligament: Intact.   -Posterior talofibular ligament: Intact.  -Syndesmotic inferior tibiofibular ligaments: Intact.  -Deltoid ligament complex: Intact.  -Spring ligament complex: Intact.     JOINTS AND BONES:   -No fracture, bone contusion or osteochondral lesion. No joint effusion or synovitis.     SOFT TISSUES:  -Plantar fascia: Intact. No acute fasciitis or tear.  -Sinus tarsi and tarsal tunnel: Normal.  -Muscles: Normal.                                                                      IMPRESSION:  1.  Mild peroneus longus tendinopathy, with a markedly edematous os peroneum. 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 There is mild to moderate peroneal tenosynovitis. No peroneal tear is seen.  2.  Moderate Achilles tendinosis with some low-grade interstitial longitudinal partial tearing. No high-grade Achilles tear is seen. There is mild Achilles paratenonitis as well as insertional enthesopathy at the calcaneal tuberosity.                 Assessment and Plan:     1) Inflammatory polyarthropathy with OA (dx 2000):  Pt seen in 2000 by  Dr. Jesenia Bazan and presented with polyinflammatory arthritis with OA at that time, improved on methotrexate however discontinue due to elevated LFT's in NAFLD.   mg BID attempted and then stopped due to pt not perceiving benefit. Did mobic 7.5 mg daily, and for pt had great response to this, specifically it helped with her heel swelling. She has seen  podaitry and attempted bracing, PT, dicflonec gel and failed to improve. Currently pt has resumed  mg BID (9/23) and stopped mobic at that time due to NAFLD/ mild elevation in LFT's/ HTN.  Now R heel is swelling, painful and affecting ability to walk.   -Given past benefit with achilles swelling on methotrexate and mobic do feel SSZ appropriate to trial. Will check labs today and increase SSZ to 1 gm in am and 500 mg in pm with plan to repeat labs in 4 weeks and increase dose further at return visit.     2) SICCA: Improvement with pilocarpine.  Previous -SSA and -SSB. Will check SPEP, update RF, C3, C4. Will do parotid US and consider zeeshan bx. Consider plaquenil, although do not anticipate HCQ would offer much improvement for achilles swelling.   See addendum below, repeat US in 6 months?    3) R achilles pain and swelling: See above.     Pt instructions:       1) Increase sulfasalazine to 2 tabs in the am and 1 tab in the pm    2) Labs today and repeat in 4 weeks before you see me back     3) See me back in 4-5 weeks    4) See Dr. Knowles back to see if anything else can be done for your heel    5) Use dicflonec/ voltaren gel 4 times a day on heel.     6) Do not wear shoes with high heels.     Pt states understanding and agreement to plan of care, has no further questions.   84 minute spent on the date of the encounter doing chart review, history and exam, documentation and further activities per the note    Amanda Warner, CNP  Rheumatology, [facility]      Addendum:     Narrative & Impression  Parotid ultrasound     Comparisons: None.     HISTORY: Assess for Sjogren's related changes.Patient with SICCA     FINDINGS: The parotid glands are homogeneous, symmetric and  demonstrate the expected appearance overall. There is a round probable  cyst in the right parotid measuring 5 mm.                                                                      IMPRESSION: Probable cyst in the right parotid gland.  Otherwise  negative parotid ultrasound.     MUMTAZ LEON MD         SYSTEM ID:  [id number]

## 2023-10-09 NOTE — PATIENT INSTRUCTIONS
1) Increase sulfasalazine to 2 tabs in the am and 1 tab in the pm    2) Labs today and repeat in 4 weeks before you see me back     3) See me back in 4-5 weeks    4) See Dr. Knowles back to see if anything else can be done for your heel    5) Use dicflonec/ voltaren gel 4 times a day on heel.     6) Do not wear shoes with high heels.

## 2023-10-09 NOTE — NURSING NOTE
"Noreen Florence's goals for this visit include:   Chief Complaint   Patient presents with    Consult     Inflammatory polyarthritis   Sicca syndrome, per patient there has been no new changes to her medications      New Patient     Referred by: Eddie White MD  [facility]5/23 - see care everywhere       PCP: Jud Morales    Referring Provider:  Eddie Montiel MD  [address] [place] [address]      Initial BP (!) 147/87 (BP Location: Left arm, Patient Position: Sitting, Cuff Size: Adult Regular)   Pulse 89   Temp 98.3  F (36.8  C) (Oral)   Wt 52.2 kg (115 lb)   LMP 06/21/2002 (Approximate)   SpO2 100%   Breastfeeding No   BMI 22.02 kg/m   Estimated body mass index is 22.02 kg/m  as calculated from the following:    Height as of 9/11/23: 1.539 m (5' 0.6\").    Weight as of this encounter: 52.2 kg (115 lb).    Medication Reconciliation: complete    Do you need any medication refills at today's visit? none    PORFIRIO Snell  Rheumatology/Infectious disease  [facility]   [phone]      "

## 2023-10-10 LAB
ALBUMIN SERPL ELPH-MCNC: 4.6 G/DL (ref 3.7–5.1)
ALPHA1 GLOB SERPL ELPH-MCNC: 0.2 G/DL (ref 0.2–0.4)
ALPHA2 GLOB SERPL ELPH-MCNC: 0.6 G/DL (ref 0.5–0.9)
B-GLOBULIN SERPL ELPH-MCNC: 0.8 G/DL (ref 0.6–1)
C3 SERPL-MCNC: 166 MG/DL (ref 81–157)
C4 SERPL-MCNC: 35 MG/DL (ref 13–39)
GAMMA GLOB SERPL ELPH-MCNC: 1.1 G/DL (ref 0.7–1.6)
M PROTEIN SERPL ELPH-MCNC: 0 G/DL
PROT PATTERN SERPL ELPH-IMP: NORMAL
RHEUMATOID FACT SER NEPH-ACNC: 6 IU/ML

## 2023-10-13 ENCOUNTER — OFFICE VISIT (OUTPATIENT)
Dept: FAMILY MEDICINE | Facility: CLINIC | Age: 73
End: 2023-10-13
Payer: COMMERCIAL

## 2023-10-13 VITALS
RESPIRATION RATE: 14 BRPM | BODY MASS INDEX: 21.34 KG/M2 | DIASTOLIC BLOOD PRESSURE: 76 MMHG | HEART RATE: 80 BPM | TEMPERATURE: 98.3 F | OXYGEN SATURATION: 98 % | SYSTOLIC BLOOD PRESSURE: 129 MMHG | WEIGHT: 113 LBS | HEIGHT: 61 IN

## 2023-10-13 DIAGNOSIS — R73.03 PRE-DIABETES: ICD-10-CM

## 2023-10-13 DIAGNOSIS — I10 HYPERTENSION GOAL BP (BLOOD PRESSURE) < 140/90: Primary | ICD-10-CM

## 2023-10-13 DIAGNOSIS — M35.00 SICCA SYNDROME (H): ICD-10-CM

## 2023-10-13 DIAGNOSIS — Z23 HIGH PRIORITY FOR 2019-NCOV VACCINE: ICD-10-CM

## 2023-10-13 DIAGNOSIS — K12.0 ORAL APHTHOUS ULCER: ICD-10-CM

## 2023-10-13 DIAGNOSIS — R05.1 ACUTE COUGH: ICD-10-CM

## 2023-10-13 DIAGNOSIS — M06.4 POLYARTHRITIS, INFLAMMATORY (H): ICD-10-CM

## 2023-10-13 DIAGNOSIS — L30.9 DERMATITIS: ICD-10-CM

## 2023-10-13 DIAGNOSIS — E78.2 MIXED HYPERLIPIDEMIA: ICD-10-CM

## 2023-10-13 DIAGNOSIS — Z12.31 VISIT FOR SCREENING MAMMOGRAM: ICD-10-CM

## 2023-10-13 PROCEDURE — 91320 SARSCV2 VAC 30MCG TRS-SUC IM: CPT | Performed by: PREVENTIVE MEDICINE

## 2023-10-13 PROCEDURE — 90480 ADMN SARSCOV2 VAC 1/ONLY CMP: CPT | Performed by: PREVENTIVE MEDICINE

## 2023-10-13 PROCEDURE — 99214 OFFICE O/P EST MOD 30 MIN: CPT | Performed by: PREVENTIVE MEDICINE

## 2023-10-13 RX ORDER — TRIAMCINOLONE ACETONIDE 1 MG/G
CREAM TOPICAL
Qty: 30 G | Refills: 0 | Status: SHIPPED | OUTPATIENT
Start: 2023-10-13 | End: 2024-02-27

## 2023-10-13 RX ORDER — ALBUTEROL SULFATE 90 UG/1
2 AEROSOL, METERED RESPIRATORY (INHALATION) EVERY 6 HOURS PRN
Qty: 18 G | Refills: 0 | Status: SHIPPED | OUTPATIENT
Start: 2023-10-13 | End: 2023-11-07

## 2023-10-13 RX ORDER — TRIAMCINOLONE ACETONIDE 0.1 %
PASTE (GRAM) DENTAL 2 TIMES DAILY
Qty: 5 G | Refills: 0 | Status: SHIPPED | OUTPATIENT
Start: 2023-10-13 | End: 2024-02-16

## 2023-10-13 NOTE — PROGRESS NOTES
Assessment & Plan     Hypertension goal BP (blood pressure) < 140/90  -Blood pressure is at goal  -Continue losartan hydrochlorothiazide 50-12.5 mg daily  -Continue metoprolol 25 mg at bedtime    Sicca syndrome (H24)  -Followed by rheumatology  -On sulfasalazine    Polyarthritis, inflammatory (H)  -Per rheumatology    Mixed hyperlipidemia  -Continue pravastatin    LDL Cholesterol Calculated   Date Value Ref Range Status   04/26/2023 80 <=100 mg/dL Final   10/08/2020 92 <100 mg/dL Final     Comment:     Desirable:       <100 mg/dl     LDL Cholesterol Direct   Date Value Ref Range Status   10/31/2021 131 (H) <100 mg/dL Final     Comment:     Age 0-19 years:  Desirable: 0-110 mg/dL   Borderline high: 110-129 mg/dL   High: >= 130 mg/dL    Age 20 years and older:  Desirable: <100mg/dL  Above desirable: 100-129 mg/dL   Borderline high: 130-159 mg/dL   High: 160-189 mg/dL   Very high: >= 190 mg/dL         Pre-diabetes  Lab Results   Component Value Date    A1C 6.1 09/11/2023    A1C 6.1 04/26/2023    A1C 6.2 10/21/2022    A1C 6.5 05/10/2022    A1C 5.6 10/08/2020    A1C 5.9 04/26/2019    A1C 5.9 06/21/2018    A1C 5.9 10/27/2016     Prediabetes means your blood sugar is high and you are at increased risk for developing overt diabetes in the future.   Be sure to monitor your intake of things like bread, pasta, rice, starchy foods (ie: potatoes), sugary beverages (ie: soda, juice-even the natural kind) and alcohol.  I recommend your plate be 1/2 vegetables, 1/4 protein, and 1/4 carbohydrate.      High priority for 2019-nCoV vaccine  - COVID-19 12+ (2023-24) (PFIZER)    Visit for screening mammogram  - MA Screen Bilateral w/Claudy    Dermatitis  -Refill on topical steroid provided  -Patient states she uses intermittently for dry and itchy skin  -Explained in detail that prolonged use of topical steroids can lead to atrophy of the skin and pigment changes, patient expressed comprehension of this  - triamcinolone (KENALOG) 0.1 %  external cream  Dispense: 30 g; Refill: 0    Oral aphthous ulcer  -Refilled medication per patient request for intermittent oral lesions  -This medication has helped in the past  - triamcinolone (KENALOG) 0.1 % paste  Dispense: 5 g; Refill: 0    Acute cough  -Patient requested a refill on albuterol for cold induced cough, does not have any symptoms at this time  - albuterol (PROAIR HFA/PROVENTIL HFA/VENTOLIN HFA) 108 (90 Base) MCG/ACT inhaler  Dispense: 18 g; Refill: 0      Prescription drug management  31 minutes spent by me on the date of the encounter doing chart review, history and exam, documentation and further activities per the note         Jud Morales MD MPH    [facility]    Weston Sorto is a 73 year old, presenting for the following health issues:  Hypertension and Imm/Inj (COVID-19 VACCINE)        10/13/2023     1:45 PM   Additional Questions   Roomed by maile   Accompanied by self         10/13/2023     1:45 PM   Patient Reported Additional Medications   Patient reports taking the following new medications no       History of Present Illness       Hyperlipidemia:  She presents for follow up of hyperlipidemia.   She is taking medication to lower cholesterol. She is not having myalgia or other side effects to statin medications.    Hypertension: She presents for follow up of hypertension.  She does not check blood pressure  regularly outside of the clinic. Outpatient blood pressures have not been over 140/90. She follows a low salt diet.     She eats 2-3 servings of fruits and vegetables daily.She consumes 0 sweetened beverage(s) daily.She exercises with enough effort to increase her heart rate 30 to 60 minutes per day.  She exercises with enough effort to increase her heart rate 7 days per week.   She is taking medications regularly.     Had stopped Meloxicam  Seen by Rheumatology    Right ankle was swollen  Will be seeing Podiatry on 10/18/23  Wanted to know why US neck  "was ordered by Rheumatology    GERD:  -has had in the past  -has been on Omeprazole daily  -EGD normal 2/22  -no dysphagia or odynophagia  -no emesis  -no melena    Requesting refills on topical steroids and Kenalog in orabase for occasional fissures she may get in the mouth, none at this time     Also requesting a refill on Albuterol inhaler as this helps with cold induced cough in the winter months, no symptoms at this time,but would like to have a prescription on hand for the winter season          Review of Systems   Constitutional, HEENT, cardiovascular, pulmonary, gi and gu systems are negative, except as otherwise noted.      Objective    /76 (BP Location: Left arm, Patient Position: Sitting, Cuff Size: Adult Regular)   Pulse 80   Temp 98.3  F (36.8  C) (Oral)   Resp 14   Ht 1.537 m (5' 0.5\")   Wt 51.3 kg (113 lb)   LMP 06/21/2002 (Approximate)   SpO2 98%   BMI 21.71 kg/m    Body mass index is 21.71 kg/m .  Physical Exam   GENERAL APPEARANCE: healthy, alert and no distress  EYES: Eyes grossly normal to inspection and conjunctivae and sclerae normal  RESP: lungs clear to auscultation - no rales, rhonchi or wheezes  CV: regular rates and rhythm, normal S1 S2, no S3 or S4 and no murmur, click or rub  ABDOMEN: soft, non-tender and no rebound or guarding   MS: extremities normal- no gross deformities noted and peripheral pulses normal  SKIN: no suspicious lesions or rashes  NEURO: Normal strength and tone, mentation intact and speech normal  PSYCH: mentation appears normal      No results found for this or any previous visit (from the past 24 hour(s)).                "

## 2023-10-16 ENCOUNTER — ANCILLARY PROCEDURE (OUTPATIENT)
Dept: ULTRASOUND IMAGING | Facility: CLINIC | Age: 73
End: 2023-10-16
Attending: NURSE PRACTITIONER
Payer: COMMERCIAL

## 2023-10-16 DIAGNOSIS — M35.00 SICCA SYNDROME (H): ICD-10-CM

## 2023-10-16 DIAGNOSIS — M06.4 POLYARTHRITIS, INFLAMMATORY (H): ICD-10-CM

## 2023-10-16 PROCEDURE — 76536 US EXAM OF HEAD AND NECK: CPT

## 2023-10-18 ENCOUNTER — OFFICE VISIT (OUTPATIENT)
Dept: PODIATRY | Facility: CLINIC | Age: 73
End: 2023-10-18
Payer: COMMERCIAL

## 2023-10-18 VITALS — OXYGEN SATURATION: 97 % | HEART RATE: 87 BPM

## 2023-10-18 DIAGNOSIS — M76.61 ACHILLES TENDINITIS OF RIGHT LOWER EXTREMITY: Primary | ICD-10-CM

## 2023-10-18 DIAGNOSIS — L60.0 INGROWING NAIL: ICD-10-CM

## 2023-10-18 DIAGNOSIS — M06.4 POLYARTHRITIS, INFLAMMATORY (H): ICD-10-CM

## 2023-10-18 DIAGNOSIS — L84 CORNS AND CALLOSITIES: ICD-10-CM

## 2023-10-18 PROCEDURE — 11730 AVULSION NAIL PLATE SIMPLE 1: CPT | Mod: TA | Performed by: PODIATRIST

## 2023-10-18 PROCEDURE — 99214 OFFICE O/P EST MOD 30 MIN: CPT | Mod: 25 | Performed by: PODIATRIST

## 2023-10-18 NOTE — LETTER
10/18/2023         RE: Noreen Florence  [address]  [place] [address]        Dear Colleague,    Thank you for referring your patient, Noreen Florence, to the [facility]. Please see a copy of my visit note below.    Subjective:    Patient complains of pain in her right Achilles tendon.  Points to insertion.  Pain aggravated by activity and relieved by rest.  She has had this for a little over a month.  Was taking meloxicam in the past.  It worked very well for her and no pain while on this.  She had to go off of this pill.  Pain and swelling have returned.  She has worn a cam walker in the past but she states she does not want to wear this as too heavy.  She also complains of pain in her left hallux medial border.  Had temporary down in the contralateral hallux last year and worked well.  Also has callus left Sub fifth metatarsal head.       ROS: See above      No Known Allergies    Current Outpatient Medications   Medication Sig Dispense Refill     acetaminophen-codeine (TYLENOL #3) 300-30 MG tablet TAKE 1 TABLET BY MOUTH EVERY 4 TO 6 HOURS AS NEEDED FOR PAIN       albuterol (PROAIR HFA/PROVENTIL HFA/VENTOLIN HFA) 108 (90 Base) MCG/ACT inhaler Inhale 2 puffs into the lungs every 6 hours as needed for shortness of breath, wheezing or cough 18 g 0     benzonatate (TESSALON) 200 MG capsule Take 1 capsule (200 mg) by mouth 3 times daily as needed for cough 30 capsule 3     celecoxib (CELEBREX) 100 MG capsule TAKE 1 CAPSULE(100 MG) BY MOUTH TWICE DAILY AS NEEDED FOR MODERATE PAIN OR PAIN 30 capsule 0     cetirizine (ZYRTEC) 10 MG tablet TAKE 1 TABLET(10 MG) BY MOUTH DAILY 90 tablet 1     chlorhexidine (PERIDEX) 0.12 % solution SWISH AND SPIT 15 ML BY MOUTH TWICE DAILY AS NEEDED 473 mL 1     cholecalciferol (VITAMIN D3) 25 mcg (1000 units) capsule Take 1,000 Units by mouth       dextromethorphan (DELSYM) 30 MG/5ML liquid Take 10 mLs (60 mg) by mouth 2 times daily 148 mL 3     diclofenac  (VOLTAREN) 1 % topical gel Apply 2 g topically 4 times daily as needed for moderate pain 150 g 1     diclofenac (VOLTAREN) 1 % topical gel Apply 2 g topically 4 times daily as needed for moderate pain (4-6) 350 g 1     fluticasone (FLONASE) 50 MCG/ACT nasal spray SHAKE LIQUID AND USE 1 TO 2 SPRAYS IN EACH NOSTRIL DAILY 16 g 5     latanoprost (XALATAN) 0.005 % ophthalmic solution Place 1 drop into both eyes At Bedtime 7.5 mL 6     losartan-hydrochlorothiazide (HYZAAR) 50-12.5 MG tablet Take 1 tablet by mouth daily 90 tablet 3     magnesium 250 MG tablet TAKE 1 TABLET BY MOUTH DAILY AS NEEDED FOR LEG CRAMPS 90 tablet 1     meloxicam (MOBIC) 7.5 MG tablet Take 1 tablet (7.5 mg) by mouth daily for 30 days 30 tablet 1     meloxicam (MOBIC) 7.5 MG tablet 15 MG/D WITH FOOD IN AM FOR 2 WEEKS AND THEN STAY ON 7.5 MG DAILY WITH FOOD       methylcellulose (CITRUCEL) powder Take 0.3 g (1.05 teaspoonful) by mouth daily 454 g 1     metoprolol tartrate (LOPRESSOR) 25 MG tablet Take 1 tablet (25 mg) by mouth At Bedtime 90 tablet 3     minoxidil (ROGAINE) 5 % external solution Apply to scalp once daily. 120 mL 3     multivitamin w/minerals (THERA-VIT-M) tablet Take 1 tablet by mouth daily 90 tablet 3     omeprazole (PRILOSEC) 40 MG DR capsule TAKE 1 CAPSULE(40 MG) BY MOUTH DAILY 90 capsule 2     OPTIVE 0.5-0.9 % ophthalmic solution INSTILL 1 DROP IN BOTH EYES THREE TIMES DAILY AS NEEDED FOR DRY EYES 15 mL 1     pravastatin (PRAVACHOL) 20 MG tablet Take 1 tablet (20 mg) by mouth daily 90 tablet 3     sodium fluoride (SF 5000 PLUS) 1.1 % CREA Use twice a day as instructed 51 g 1     sulfaSALAzine ER (AZULFIDINE EN) 500 MG EC tablet 2 tablets in am, 1 tablet in pm 90 tablet 3     tretinoin (RETIN-A) 0.05 % external cream Apply pea sized amount at bedtime. 45 g 4     triamcinolone (KENALOG) 0.1 % external cream APPLY TOPICALLY TO THE AFFECTED AREA TWICE DAILY. MAXIMUM OF 2 WEEKS 30 g 0     triamcinolone (KENALOG) 0.1 % paste Take by  mouth 2 times daily 5 g 0       Patient Active Problem List   Diagnosis     Osteopenia     Hayfever     Hypertension goal BP (blood pressure) < 140/90     Polyarthritis, inflammatory (H)     GERD (gastroesophageal reflux disease)     Hyperlipidemia with target LDL less than 130     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     High risk medication use     Microscopic hematuria     Pterygium of right eye     Sicca syndrome (H24)     Angiomyolipoma of right kidney     History of Helicobacter pylori infection     Pre-diabetes     NAFLD (nonalcoholic fatty liver disease)     Elevated LFTs     Heel pain, chronic, right       Past Medical History:   Diagnosis Date     GERD (gastroesophageal reflux disease) 3/20/2015     Glaucoma (increased eye pressure)      Hyperlipidemia LDL goal < 130 8/3/2015     Hypertension      Hypertension, goal below 150/90 11/28/2014     Rheumatoid arthritis, adult (H)        Past Surgical History:   Procedure Laterality Date     COLONOSCOPY       COLONOSCOPY WITH CO2 INSUFFLATION N/A 4/5/2017    Procedure: COLONOSCOPY WITH CO2 INSUFFLATION;  Surgeon: Duane, William Charles, MD;  Location: [facility]     COMBINED ESOPHAGOSCOPY, GASTROSCOPY, DUODENOSCOPY (EGD) WITH CO2 INSUFFLATION N/A 2/13/2019    Procedure: COMBINED ESOPHAGOSCOPY, GASTROSCOPY, DUODENOSCOPY (EGD) WITH CO2 INSUFFLATION;  Surgeon: Sly De Santiago MD;  Location: [facility]     COMBINED ESOPHAGOSCOPY, GASTROSCOPY, DUODENOSCOPY (EGD) WITH CO2 INSUFFLATION N/A 2/7/2022    Procedure: ESOPHAGOGASTRODUODENOSCOPY, WITH CO2 INSUFFLATION;  Surgeon: Luis Aceves MD;  Location: [facility]     COMBINED REPAIR PTOSIS WITH BLEPHAROPLASTY BILATERAL Bilateral 1/6/2017    Procedure: COMBINED REPAIR PTOSIS WITH BLEPHAROPLASTY BILATERAL;  Surgeon: Carly Norman MD;  Location: [facility]     ESOPHAGOSCOPY, GASTROSCOPY, DUODENOSCOPY (EGD), COMBINED N/A 1/5/2016    Procedure: COMBINED ESOPHAGOSCOPY, GASTROSCOPY, DUODENOSCOPY (EGD),  BIOPSY SINGLE OR MULTIPLE;  Surgeon: Duane, William Charles, MD;  Location: [facility]     ESOPHAGOSCOPY, GASTROSCOPY, DUODENOSCOPY (EGD), COMBINED N/A 2/13/2019    Procedure: Combined Esophagoscopy, Gastroscopy, Duodenoscopy (Egd), Biopsy Single Or Multiple;  Surgeon: Sly De Santiago MD;  Location: [facility]     ESOPHAGOSCOPY, GASTROSCOPY, DUODENOSCOPY (EGD), COMBINED N/A 2/7/2022    Procedure: ESOPHAGOGASTRODUODENOSCOPY, WITH BIOPSY;  Surgeon: Luis Aceves MD;  Location: [facility]     REPAIR ENTROPION BILATERAL Bilateral 1/6/2017    Procedure: REPAIR ENTROPION BILATERAL;  Surgeon: Carly Norman MD;  Location: [facility]     REPAIR PTOSIS Bilateral 01/2017       Family History   Problem Relation Age of Onset     Diabetes Mother      Cancer No family hx of      Hypertension No family hx of      Cerebrovascular Disease No family hx of      Thyroid Disease No family hx of      Glaucoma No family hx of      Macular Degeneration No family hx of        Social History     Tobacco Use     Smoking status: Never     Passive exposure: Never     Smokeless tobacco: Never   Substance Use Topics     Alcohol use: No             O:  Pulse 87   LMP 06/21/2002 (Approximate)   SpO2 97% .     Constitutional/ general:  Pt is in no apparent distress, appears well-nourished.  Cooperative with history and physical exam.     Psych:  The patient answered questions appropriately.  Normal affect.  Seems to have reasonable expectations, in terms of treatment.     Lungs:  Non labored breathing, non labored speech. No cough.  No audible wheezing. Even, quiet breathing.       Vascular:  Pedal pulses are palpable bilaterally for both the DP and PT arteries.  CFT < 3 sec.  No edema.  Pedal hair growth noted.     Neuro:  Alert and oriented x 3. Coordinated gait.  Light touch sensation is intact     Derm: Normal texture and turgor.  No erythema, ecchymosis, or cyanosis.  No open lesions.  left hallux medial border has slight erythema and  nail ingrown.  No purulence or odor.    Musculoskeletal:    Lower extremity muscle strength is normal.  Patient is ambulatory without an assistive device or brace.     Cavus foot with weightbearing bilateral.  No forefoot or rear foot deformities noted.  MS 5/5 all compartments.  Normal ROM all fore foot and rearfoot joints with no pain or crepitus.  No equinus.    Slight discomfort at Achilles insertion centrally right foot.  Slight edema.  No erythema or ecchymosis.  No masses..    Radiographic Exam:   Narrative & Impression   EXAM: MR ANKLE RIGHT W/O CONTRAST  LOCATION: [facility]  DATE/TIME: 8/16/2022 6:20 PM     INDICATION: Right rear foot MRI to evaluate peroneal tendons.  COMPARISON: None.  TECHNIQUE: Unenhanced.     FINDINGS:      TENDONS:   -Peroneal: There is mild peroneus longus tendinopathy, with a markedly edematous os peroneum. (Series 6, image 9). Peroneus brevis is intact. There is mild to moderate peroneal tenosynovitis.  -Medial: Posterior tibialis tendon is intact. No tendinopathy or tenosynovitis. Flexor digitorum longus and flexor hallucis longus tendons are intact. There is mild flexor digitorum longus and flexor hallucis longus tenosynovitis.  -Anterior: Anterior tibialis, extensor hallucis longus, and extensor digitorum longus tendons are normal. No tenosynovitis.  -Achilles: Moderate Achilles tendinosis with some low-grade interstitial longitudinal partial tearing. No high-grade Achilles tear is seen. There is mild Achilles paratenonitis. There is insertional enthesopathy at the calcaneal tuberosity.     LIGAMENTS:   -Anterior talofibular ligament: There is some thinning of the anterior talofibular ligament which relates to chronic partial tearing.   -Calcaneofibular ligament: Intact.   -Posterior talofibular ligament: Intact.  -Syndesmotic inferior tibiofibular ligaments: Intact.  -Deltoid ligament complex: Intact.  -Spring ligament complex: Intact.     JOINTS AND  BONES:   -No fracture, bone contusion or osteochondral lesion. No joint effusion or synovitis.     SOFT TISSUES:  -Plantar fascia: Intact. No acute fasciitis or tear.  -Sinus tarsi and tarsal tunnel: Normal.  -Muscles: Normal.                                                                      IMPRESSION:  1.  Mild peroneus longus tendinopathy, with a markedly edematous os peroneum. 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 There is mild to moderate peroneal tenosynovitis. No peroneal tear is seen.  2.  Moderate Achilles tendinosis with some low-grade interstitial longitudinal partial tearing. No high-grade Achilles tear is seen. There is mild Achilles paratenonitis as well as insertional enthesopathy at the calcaneal tuberosity.         A: Rheumatoid arthritis       Right insertional Achilles tendinitis       Left hallux medial border ingrown nail       Callus    Plan:  Discussed treatment options for insertional Achilles tendinitis.  Discussed Tenex.  She would like to consider this.  We referred to sports medicine to discuss this.  Discussed cause of ingrown nail.   Discussed etiology and treatment options with the pt.  Risks and benefits of partial temporary nail removal were discussed in detail.  Obtained consent and blocked hallux using 3 cc of 1% Lidocaine plain into the left hallux.  Sterile prep and avulsed the medial border and placed dry sterile dressing.  Cappillary refill time wnl.  Pt tolerated procedure well.  Wound care instructions given.  Patient will keep callus down with a pumice stone.  Will refer to foot care nurse for future foot care.  RTC as needed.        Yuri Knowles DPM, FACFAS           Again, thank you for allowing me to participate in the care of your patient.        Sincerely,        Yuri Knowles DPM

## 2023-10-18 NOTE — PROGRESS NOTES
Subjective:    Patient complains of pain in her right Achilles tendon.  Points to insertion.  Pain aggravated by activity and relieved by rest.  She has had this for a little over a month.  Was taking meloxicam in the past.  It worked very well for her and no pain while on this.  She had to go off of this pill.  Pain and swelling have returned.  She has worn a cam walker in the past but she states she does not want to wear this as too heavy.  She also complains of pain in her left hallux medial border.  Had temporary down in the contralateral hallux last year and worked well.  Also has callus left Sub fifth metatarsal head.       ROS: See above      No Known Allergies    Current Outpatient Medications   Medication Sig Dispense Refill    acetaminophen-codeine (TYLENOL #3) 300-30 MG tablet TAKE 1 TABLET BY MOUTH EVERY 4 TO 6 HOURS AS NEEDED FOR PAIN      albuterol (PROAIR HFA/PROVENTIL HFA/VENTOLIN HFA) 108 (90 Base) MCG/ACT inhaler Inhale 2 puffs into the lungs every 6 hours as needed for shortness of breath, wheezing or cough 18 g 0    benzonatate (TESSALON) 200 MG capsule Take 1 capsule (200 mg) by mouth 3 times daily as needed for cough 30 capsule 3    celecoxib (CELEBREX) 100 MG capsule TAKE 1 CAPSULE(100 MG) BY MOUTH TWICE DAILY AS NEEDED FOR MODERATE PAIN OR PAIN 30 capsule 0    cetirizine (ZYRTEC) 10 MG tablet TAKE 1 TABLET(10 MG) BY MOUTH DAILY 90 tablet 1    chlorhexidine (PERIDEX) 0.12 % solution SWISH AND SPIT 15 ML BY MOUTH TWICE DAILY AS NEEDED 473 mL 1    cholecalciferol (VITAMIN D3) 25 mcg (1000 units) capsule Take 1,000 Units by mouth      dextromethorphan (DELSYM) 30 MG/5ML liquid Take 10 mLs (60 mg) by mouth 2 times daily 148 mL 3    diclofenac (VOLTAREN) 1 % topical gel Apply 2 g topically 4 times daily as needed for moderate pain 150 g 1    diclofenac (VOLTAREN) 1 % topical gel Apply 2 g topically 4 times daily as needed for moderate pain (4-6) 350 g 1    fluticasone (FLONASE) 50 MCG/ACT nasal  spray SHAKE LIQUID AND USE 1 TO 2 SPRAYS IN EACH NOSTRIL DAILY 16 g 5    latanoprost (XALATAN) 0.005 % ophthalmic solution Place 1 drop into both eyes At Bedtime 7.5 mL 6    losartan-hydrochlorothiazide (HYZAAR) 50-12.5 MG tablet Take 1 tablet by mouth daily 90 tablet 3    magnesium 250 MG tablet TAKE 1 TABLET BY MOUTH DAILY AS NEEDED FOR LEG CRAMPS 90 tablet 1    meloxicam (MOBIC) 7.5 MG tablet Take 1 tablet (7.5 mg) by mouth daily for 30 days 30 tablet 1    meloxicam (MOBIC) 7.5 MG tablet 15 MG/D WITH FOOD IN AM FOR 2 WEEKS AND THEN STAY ON 7.5 MG DAILY WITH FOOD      methylcellulose (CITRUCEL) powder Take 0.3 g (1.05 teaspoonful) by mouth daily 454 g 1    metoprolol tartrate (LOPRESSOR) 25 MG tablet Take 1 tablet (25 mg) by mouth At Bedtime 90 tablet 3    minoxidil (ROGAINE) 5 % external solution Apply to scalp once daily. 120 mL 3    multivitamin w/minerals (THERA-VIT-M) tablet Take 1 tablet by mouth daily 90 tablet 3    omeprazole (PRILOSEC) 40 MG DR capsule TAKE 1 CAPSULE(40 MG) BY MOUTH DAILY 90 capsule 2    OPTIVE 0.5-0.9 % ophthalmic solution INSTILL 1 DROP IN BOTH EYES THREE TIMES DAILY AS NEEDED FOR DRY EYES 15 mL 1    pravastatin (PRAVACHOL) 20 MG tablet Take 1 tablet (20 mg) by mouth daily 90 tablet 3    sodium fluoride (SF 5000 PLUS) 1.1 % CREA Use twice a day as instructed 51 g 1    sulfaSALAzine ER (AZULFIDINE EN) 500 MG EC tablet 2 tablets in am, 1 tablet in pm 90 tablet 3    tretinoin (RETIN-A) 0.05 % external cream Apply pea sized amount at bedtime. 45 g 4    triamcinolone (KENALOG) 0.1 % external cream APPLY TOPICALLY TO THE AFFECTED AREA TWICE DAILY. MAXIMUM OF 2 WEEKS 30 g 0    triamcinolone (KENALOG) 0.1 % paste Take by mouth 2 times daily 5 g 0       Patient Active Problem List   Diagnosis    Osteopenia    Hayfever    Hypertension goal BP (blood pressure) < 140/90    Polyarthritis, inflammatory (H)    GERD (gastroesophageal reflux disease)    Hyperlipidemia with target LDL less than 130     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    High risk medication use    Microscopic hematuria    Pterygium of right eye    Sicca syndrome (H24)    Angiomyolipoma of right kidney    History of Helicobacter pylori infection    Pre-diabetes    NAFLD (nonalcoholic fatty liver disease)    Elevated LFTs    Heel pain, chronic, right       Past Medical History:   Diagnosis Date    GERD (gastroesophageal reflux disease) 3/20/2015    Glaucoma (increased eye pressure)     Hyperlipidemia LDL goal < 130 8/3/2015    Hypertension     Hypertension, goal below 150/90 11/28/2014    Rheumatoid arthritis, adult (H)        Past Surgical History:   Procedure Laterality Date    COLONOSCOPY      COLONOSCOPY WITH CO2 INSUFFLATION N/A 4/5/2017    Procedure: COLONOSCOPY WITH CO2 INSUFFLATION;  Surgeon: Duane, William Charles, MD;  Location:  OR    COMBINED ESOPHAGOSCOPY, GASTROSCOPY, DUODENOSCOPY (EGD) WITH CO2 INSUFFLATION N/A 2/13/2019    Procedure: COMBINED ESOPHAGOSCOPY, GASTROSCOPY, DUODENOSCOPY (EGD) WITH CO2 INSUFFLATION;  Surgeon: Sly De Santiago MD;  Location:  OR    COMBINED ESOPHAGOSCOPY, GASTROSCOPY, DUODENOSCOPY (EGD) WITH CO2 INSUFFLATION N/A 2/7/2022    Procedure: ESOPHAGOGASTRODUODENOSCOPY, WITH CO2 INSUFFLATION;  Surgeon: Luis Aceves MD;  Location:  OR    COMBINED REPAIR PTOSIS WITH BLEPHAROPLASTY BILATERAL Bilateral 1/6/2017    Procedure: COMBINED REPAIR PTOSIS WITH BLEPHAROPLASTY BILATERAL;  Surgeon: Carly Norman MD;  [facility]    ESOPHAGOSCOPY, GASTROSCOPY, DUODENOSCOPY (EGD), COMBINED N/A 1/5/2016    Procedure: COMBINED ESOPHAGOSCOPY, GASTROSCOPY, DUODENOSCOPY (EGD), BIOPSY SINGLE OR MULTIPLE;  Surgeon: Duane, William Charles, MD;  Location:  OR    ESOPHAGOSCOPY, GASTROSCOPY, DUODENOSCOPY (EGD), COMBINED N/A 2/13/2019    Procedure: Combined Esophagoscopy, Gastroscopy, Duodenoscopy (Egd), Biopsy Single Or Multiple;  Surgeon: Sly De Santiago MD;  Location:   OR    ESOPHAGOSCOPY, GASTROSCOPY, DUODENOSCOPY (EGD), COMBINED N/A 2/7/2022    Procedure: ESOPHAGOGASTRODUODENOSCOPY, WITH BIOPSY;  Surgeon: Luis Aceves MD;  Location: [facility]    REPAIR ENTROPION BILATERAL Bilateral 1/6/2017    Procedure: REPAIR ENTROPION BILATERAL;  Surgeon: Carly Norman MD;  Location: [facility]    REPAIR PTOSIS Bilateral 01/2017       Family History   Problem Relation Age of Onset    Diabetes Mother     Cancer No family hx of     Hypertension No family hx of     Cerebrovascular Disease No family hx of     Thyroid Disease No family hx of     Glaucoma No family hx of     Macular Degeneration No family hx of        Social History     Tobacco Use    Smoking status: Never     Passive exposure: Never    Smokeless tobacco: Never   Substance Use Topics    Alcohol use: No             O:  Pulse 87   LMP 06/21/2002 (Approximate)   SpO2 97% .     Constitutional/ general:  Pt is in no apparent distress, appears well-nourished.  Cooperative with history and physical exam.     Psych:  The patient answered questions appropriately.  Normal affect.  Seems to have reasonable expectations, in terms of treatment.     Lungs:  Non labored breathing, non labored speech. No cough.  No audible wheezing. Even, quiet breathing.       Vascular:  Pedal pulses are palpable bilaterally for both the DP and PT arteries.  CFT < 3 sec.  No edema.  Pedal hair growth noted.     Neuro:  Alert and oriented x 3. Coordinated gait.  Light touch sensation is intact     Derm: Normal texture and turgor.  No erythema, ecchymosis, or cyanosis.  No open lesions.  left hallux medial border has slight erythema and nail ingrown.  No purulence or odor.    Musculoskeletal:    Lower extremity muscle strength is normal.  Patient is ambulatory without an assistive device or brace.     Cavus foot with weightbearing bilateral.  No forefoot or rear foot deformities noted.  MS 5/5 all compartments.  Normal ROM all fore foot and rearfoot joints  with no pain or crepitus.  No equinus.    Slight discomfort at Achilles insertion centrally right foot.  Slight edema.  No erythema or ecchymosis.  No masses..    Radiographic Exam:   Narrative & Impression   EXAM: MR ANKLE RIGHT W/O CONTRAST  LOCATION: [facility]  DATE/TIME: 8/16/2022 6:20 PM     INDICATION: Right rear foot MRI to evaluate peroneal tendons.  COMPARISON: None.  TECHNIQUE: Unenhanced.     FINDINGS:      TENDONS:   -Peroneal: There is mild peroneus longus tendinopathy, with a markedly edematous os peroneum. (Series 6, image 9). Peroneus brevis is intact. There is mild to moderate peroneal tenosynovitis.  -Medial: Posterior tibialis tendon is intact. No tendinopathy or tenosynovitis. Flexor digitorum longus and flexor hallucis longus tendons are intact. There is mild flexor digitorum longus and flexor hallucis longus tenosynovitis.  -Anterior: Anterior tibialis, extensor hallucis longus, and extensor digitorum longus tendons are normal. No tenosynovitis.  -Achilles: Moderate Achilles tendinosis with some low-grade interstitial longitudinal partial tearing. No high-grade Achilles tear is seen. There is mild Achilles paratenonitis. There is insertional enthesopathy at the calcaneal tuberosity.     LIGAMENTS:   -Anterior talofibular ligament: There is some thinning of the anterior talofibular ligament which relates to chronic partial tearing.   -Calcaneofibular ligament: Intact.   -Posterior talofibular ligament: Intact.  -Syndesmotic inferior tibiofibular ligaments: Intact.  -Deltoid ligament complex: Intact.  -Spring ligament complex: Intact.     JOINTS AND BONES:   -No fracture, bone contusion or osteochondral lesion. No joint effusion or synovitis.     SOFT TISSUES:  -Plantar fascia: Intact. No acute fasciitis or tear.  -Sinus tarsi and tarsal tunnel: Normal.  -Muscles: Normal.                                                                      IMPRESSION:  1.  Mild peroneus  longus tendinopathy, with a markedly edematous os peroneum. 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 There is mild to moderate peroneal tenosynovitis. No peroneal tear is seen.  2.  Moderate Achilles tendinosis with some low-grade interstitial longitudinal partial tearing. No high-grade Achilles tear is seen. There is mild Achilles paratenonitis as well as insertional enthesopathy at the calcaneal tuberosity.         A: Rheumatoid arthritis       Right insertional Achilles tendinitis       Left hallux medial border ingrown nail       Callus    Plan:  Discussed treatment options for insertional Achilles tendinitis.  Discussed Tenex.  She would like to consider this.  We referred to sports medicine to discuss this.  Discussed cause of ingrown nail.   Discussed etiology and treatment options with the pt.  Risks and benefits of partial temporary nail removal were discussed in detail.  Obtained consent and blocked hallux using 3 cc of 1% Lidocaine plain into the left hallux.  Sterile prep and avulsed the medial border and placed dry sterile dressing.  Cappillary refill time wnl.  Pt tolerated procedure well.  Wound care instructions given.  Patient will keep callus down with a pumice stone.  Will refer to foot care nurse for future foot care.  RTC as needed.        Yuri Knowles DPM, FACFAS

## 2023-10-18 NOTE — PATIENT INSTRUCTIONS
We wish you continued good healing. If you have any questions or concerns, please do not hesitate to contact us at  [phone]    [employer] (secure e-mail communication and access to your chart) to send a message or to make an appointment.    Please remember to call and schedule a follow up appointment if one was recommended at your earliest convenience.     PODIATRY CLINIC HOURS  TELEPHONE NUMBER    Dr. Yuri JACOBSENPLEI FACFAS        Clinics:  [place]  Tuesday 1PM-6PM  [place]  Wednesday 745AM-330PM  [place]  Monday 2nd,4th  830AM-4PM  Thursday/Friday 745AM-230PM     [place] APPOINTMENTS  [phone]    [place] APPOINTMENTS  [phone]          If you need a medication refill, please contact us you may need lab work and/or a follow up visit prior to your refill (i.e. Antifungal medications).  If MRI needed please call Imaging at [phone]   HOW DO I GET MY KNEE SCOOTER? Knee scooters can be picked up at ANY Medical Supply stores with your knee scooter Prescription.  OR  Bring your signed prescription to an [facility] showroom.   Set up an appointment for your custom Orthotics. Call any Orthotics locations call [phone]         INGROWN TOENAIL REMOVAL AFTERCARE     Go directly home and elevate the affected foot on one or two pillows for the remainder of the day/evening if possible. Your toe may stay numb anywhere from 2-8 hours.   Take Tylenol, ibuprofen or another anti-inflammatory as needed for pain.   That evening of the procedure,  you may remove the bandage.(you may soak it in warm soapy water ) After soaks, pat the area dry and then allow to airdry for a few minutes. Apply antibiotic ointment to the area and cover with  band-aid.  The following day. Find a shoe that is comfortable and minimizes the amount of rubbing on your toe, as this increases pain.  Dress with band-aids until no  longer draining, typically 3 days.  Watch for any signs and symptoms of infection such as: redness, red streaks going up the foot/leg, swelling, pus or foul odor. Fevers > 101   Please call with questions.    Dr. Yuri Knowles D.P.M [facility] Foot Care Referral List  [facility]  [phone]  [facility]  [phone]  [facility]  [facility]iatrist   Humble Manzano DPM  Location:  [place]   [address], [address], [place] [address]  Phone: [phone]    [facility]  Podiatrist  Arnold Rincon DPM  Locations:  [place]  [address], [address] [address]- [phone]  [place]  [address], [address], [place], [place] [id number]- [phone]  [place]  [address] [place] [address], [address], [place] [address]- [phone]    Foot & Ankle Physician [facility]  Podiatrist  Siddharth Porter DPM  Locations:  [place] [id number], [address], [place] [address]- [phone]  [place]  [address] [place] [address], [address], [place] [address]- [phone]      [facility]  Podiatrists  MALGORZATA Vasquez DPM  Location  [place] [address], [address], [place] [address]- [phone]    [facility]  Podiatrist  Johnny Fried, MARYM  Location  [address] [address]-  [phone]    Humble Navarro DPM  Podiatrist   Humble Navarro DPM  Location  [address] [address]-  [phone]  [address] [address]-  [phone]    [facility]  Podiatrists  MALGORZATA Navarro DPM Desiree Scholl, MARY  Locations  [address], [place] [address]-  [phone]  [address], [address], [place] [address]-  [phone]    [facility]  Podiatrists  Reginaldo Brambila, MALGORZATA Pleitez, MALGORZATA [place], MALGORZATA Quezada, MALGORZATA Lemus,  DPM  Chris Tirado, DPM  Charbel Tinoco, DPM  Jaleel Tran, DPM  Aaron Jenkins, DPM  Locations  [place]  [address], [address], [place] [address]- [phone]  [place]   [address], [place] [address]-  [phone]  [place]  [address], [place] [address]- [phone]  [place]  [address], [address], [place] [address]- [phone]  [place]  [address], [address], [place] [address]- [phone]    [facility]  Podiatrists  Vance Florence, DPBRYAN Jimenez, DPM  Nya Caraballo, DPM  Per Grace, DPM  Michel Moss, DPM  Kristen Lemus, DPM  Locations  [place]  [address], [address], [place] [address]-  [phone]  [place]  [address], [address], [place], [place] [address]- [phone]  [place]  [address] , [place], [place] [address]- [phone]    [facility]  Podiatrist   Izaiah Mccann, DPM  Location   [place]  [address], [address], [place] [address]-  [phone]    [facility]   Podiatrists  Tiffany Moreno, DPM  Trever Davidson, DPM  Location  [place]  [address], [address], [place], [place] [address]- [phone]    [facility]  Podiatrist   Antony Hou, DPM  Location  [place]  [address], [address], [place] [address]- -[phone]      [facility]  Podiatrist   Sly Garcia, DPM  Location  [place]   [address] [place] [address]- [phone]

## 2023-10-20 NOTE — PROGRESS NOTES
Chief Complaints and History of Present Illnesses   Patient presents with     Post Op (Ophthalmology) Both Eyes     BILATERAL UPPER LID BLEPHAROPLASTY WITH PTOSIS REPAIR, BILATERAL LOWER LID ENTROPION REPAIR 1/6/17     Noreen Florence is six months post  1.  Bilateral upper eyelid blepharoplasty.   2.  Bilateral upper eyelid ptosis repair by Green's muscle conjunctiva resection.   3.  Both lower eyelid entropion repair with retractor reinsertion.     She remains bothered by the asymmetric crease - it is higher nasally on the left than on the right. Otherwise healing well. No recurrent entropion.        Assessment & Plan     Noreen Florence is a 67 year old female with the following diagnoses:   1. Dermatochalasis of eyelids of both eyes    2. Myogenic ptosis, bilateral    3. Mechanical entropion, left       While I certainly see the asymmetry, we reviewed her pre post pictures, and I think it would be quite difficult to revise and make her lid crease better. One option would be filler to left upper lid nasally. I strongly feel she really looks good, and I strongly recommend observation.     She will f/u with me on an as needed basis. She will f/u with Dr. Levin in October.          Complete documentation of historical and exam elements from today's encounter can be found in the full encounter summary report (not reduplicated in this progress note). I personally obtained the chief complaint(s) and history of present illness.  I confirmed and edited as necessary the review of systems, past medical/surgical history, family history, social history, and examination findings as documented by others; and I examined the patient myself. I personally reviewed the relevant tests, images, and reports as documented above. I formulated and edited as necessary the assessment and plan and discussed the findings and management plan with the patient and family. - Carly Norman MD       Procedure H&P    Patient Profile-Procedures  Name Neri Bryan  Date of Birth 1945  MRN [id number]  Address   [address] [place] [id number] [place] [address]    Primary Phone Number [phone]  Secondary Phone Number    Tu Denise    Procedure(s):  Procedures: Colonoscopy  Primary contact name and number   Extended Emergency Contact Information  Primary Emergency Contact: Ambreen Bryan  Home Phone: [phone]  Work Phone: [phone]  Relation: Spouse    General Health  Weight   Vitals:    10/20/23 1320   Weight: 72.6 kg (160 lb)     BMI Body mass index is 23.63 kg/m².    Allergies  Allergies   Allergen Reactions    Lisinopril Unknown    Oxybutynin Unknown    Penicillins Unknown    Perfume Unknown     MUSK    Pollen Extracts Unknown       Past Medical History   Past Medical History:   Diagnosis Date    BPH (benign prostatic hyperplasia)     Colon polyp     CRD (chronic renal disease), stage IV (CMS/HCC)     Diverticulosis     GERD (gastroesophageal reflux disease)     Hyperlipidemia     Hypertension     Irritable bowel syndrome     Sarcoidosis        Provider assessment  Diagnosis: Colon Cancer Screening/Surveillance   Medication Reviewed - yes  Prior to Admission medications    Medication Sig Start Date End Date Taking? Authorizing Provider   Actos 30 mg tablet Take 1 tablet (30 mg) by mouth once daily. 3/12/21  Yes Historical Provider, MD   allopurinol (Zyloprim) 100 mg tablet  2/17/23  Yes Historical Provider, MD   amLODIPine (Norvasc) 5 mg tablet    Yes Historical Provider, MD   aspirin 81 mg EC tablet    Yes Historical Provider, MD   coenzyme Q-10 (Co Q-10) 200 mg capsule    Yes Historical Provider, MD   dicyclomine (Bentyl) 20 mg tablet Take 1 tablet (20 mg) by mouth 2 times a day. 3/12/21  Yes Historical Provider, MD   docosahexaenoic acid/epa (FISH OIL ORAL)    Yes Historical Provider, MD   empagliflozin (JARDIANCE ORAL) Take by mouth.   Yes Historical Provider, MD    ezetimibe (Zetia) 10 mg tablet  2/17/23  Yes Historical Provider, MD   fenofibrate (Triglide) 160 mg tablet  9/19/23  Yes Historical Provider, MD   ferrous sulfate 325 (65 Fe) MG EC tablet Do not crush, chew, or split.   Yes Historical Provider, MD   finasteride (Proscar) 5 mg tablet    Yes Historical Provider, MD   fish,saf,flx,brg oils/o3,6,9n2 (FISH-FLAX-BORAGE OIL ORAL) Take by mouth.   Yes Historical Provider, MD   glucosamine sulfate (GLUCOSAMINE ORAL)    Yes Historical Provider, MD   Lactobacillus acidophilus (PROBIOTIC ORAL) Take by mouth.   Yes Historical Provider, MD   loperamide (Imodium) 2 mg capsule    Yes Historical Provider, MD   magnesium oxide (Mag-Ox) 400 mg tablet    Yes Historical Provider, MD   melatonin 5 mg tablet    Yes Historical Provider, MD   metoprolol tartrate (Lopressor) 25 mg tablet Take 1 tablet (25 mg) by mouth once daily. 3/12/21  Yes Historical Provider, MD   MULTIVITAMIN ORAL    Yes Historical Provider, MD   nitroglycerin (Nitrostat) 0.4 mg SL tablet  9/22/09  Yes Historical Provider, MD   omeprazole (PriLOSEC) 20 mg DR capsule Take 1 capsule (20 mg) by mouth once daily.   Yes Historical Provider, MD   simethicone (Mylicon) 125 mg chewable tablet    Yes Historical Provider, MD   tamsulosin (Flomax) 0.4 mg 24 hr capsule    Yes Historical Provider, MD   traZODone (Desyrel) 50 mg tablet  10/3/23  Yes Historical Provider, MD   fluticasone (Flonase) 50 mcg/actuation nasal spray Administer 1 spray into each nostril 4 times a day. 11/23/22   Historical Provider, MD   HYDROcodone-acetaminophen (Norco)  mg tablet Take 1 tablet by mouth every 6 hours if needed. 5/16/23   Historical Provider, MD   lidocaine 4 % patch Place 1 patch on the skin once daily as needed for mild pain (1 - 3). 10/14/22   Historical Provider, MD   tiZANidine (Zanaflex) 4 mg tablet Take 1 tablet (4 mg) by mouth once daily at bedtime. 4/28/23   Historical Provider, MD   amLODIPine (Norvasc) 10 mg tablet   12/8/06 10/20/23  Historical Provider, MD   aspirin 325 mg tablet Take 1 tablet (325 mg) by mouth once daily. 12/8/06 10/20/23  Historical Provider, MD   atenolol (Tenormin) 25 mg tablet  12/8/06 10/20/23  Historical Provider, MD   dutasteride (Avodart) 0.5 mg capsule Take 1 capsule (0.5 mg) by mouth once daily. 9/22/09 10/20/23  Historical Provider, MD   esomeprazole (NexIUM) 20 mg DR capsule Do not open capsule.  10/20/23  Historical Provider, MD   Farxiga 10 mg Take 1 tablet (10 mg) by mouth once daily. 7/17/23 10/20/23  Historical Provider, MD   fesoterodine (Toviaz) 4 mg tablet extended release 24 hr Take by mouth. 10/19/09 10/20/23  Historical Provider, MD   glimepiride (Amaryl) 4 mg tablet Take 1 tablet (4 mg) by mouth 2 times a day. 3/12/21 10/20/23  Historical Provider, MD   metFORMIN (Glucophage) 500 mg tablet   10/20/23  Historical Provider, MD   naproxen (Naprosyn) 500 mg tablet Take 1 tablet (500 mg) by mouth 2 times a day with meals. 10/19/09 10/20/23  Historical Provider, MD   pravastatin (Pravachol) 40 mg tablet   10/20/23  Historical Provider, MD   prazosin (Minipress) 2 mg capsule Take 1 capsule (2 mg) by mouth once daily. 9/22/09 10/20/23  Historical Provider, MD   RANITIDINE HCL ORAL Take 300 mg by mouth once daily. 9/22/09 10/20/23  Historical Provider, MD   simvastatin (Zocor) 40 mg tablet Take 1 tablet (40 mg) by mouth once daily. 9/22/09 10/20/23  Historical Provider, MD   tolterodine LA (Detrol LA) 4 mg 24 hr capsule   10/20/23  Historical Provider, MD   traMADol (Ultram) 50 mg tablet Take 1 tablet (50 mg) by mouth once daily as needed.  10/20/23  Historical Provider, MD       Physical Exam  Vitals:    10/20/23 1320   BP: 122/74   Pulse: 58   Resp: (!) 2   Temp: 36.7 °C (98.1 °F)   SpO2: 96%        General: A&Ox3, NAD.  HEENT: AT/NC.   CV: RRR. No murmur.  Resp: CTA bilaterally. No wheezing, rhonchi or rales.   GI: Soft, NT/ND. BSx4.  Extrem: No edema. Pulses intact.  Skin: No Jaundice.    Neuro: No focal deficits.   Psych: Normal mood and affect.      Procedure Plan - pre-procedural (re)assesment completed by physician:  discharge/transfer patient when discharge criteria met    Jatinder Gunn MD  10/20/2023 1:32 PM

## 2023-11-03 ENCOUNTER — OFFICE VISIT (OUTPATIENT)
Dept: ORTHOPEDICS | Facility: CLINIC | Age: 73
End: 2023-11-03
Attending: PODIATRIST
Payer: COMMERCIAL

## 2023-11-03 VITALS — BODY MASS INDEX: 21.34 KG/M2 | WEIGHT: 113 LBS | HEIGHT: 61 IN

## 2023-11-03 DIAGNOSIS — M76.61 ACHILLES TENDINITIS OF RIGHT LOWER EXTREMITY: ICD-10-CM

## 2023-11-03 PROCEDURE — 99214 OFFICE O/P EST MOD 30 MIN: CPT | Performed by: FAMILY MEDICINE

## 2023-11-03 RX ORDER — NITROGLYCERIN 4 MG/G
1 OINTMENT RECTAL EVERY 12 HOURS
Qty: 30 G | Refills: 0 | Status: SHIPPED | OUTPATIENT
Start: 2023-11-03 | End: 2023-11-27

## 2023-11-03 ASSESSMENT — PAIN SCALES - GENERAL: PAINLEVEL: SEVERE PAIN (7)

## 2023-11-03 NOTE — PATIENT INSTRUCTIONS
# Right Achilles Tendinitis: Noreen Florence  was seen today for Achilles tendinitis. Symptoms had been going on for several years, worsening over the past few years. On examination there are positive findings of tenderness to palpation over the right Achilles tendon. Imaging findings showed irritated right Achilles tendon. Likely cause of patient's condition due to Achilles tendinopathy. She has tried physical therapy and CAM boot.  Discussed benefits and risks of Achilles tenotomy procedure given this she would like to move forward with this.  Counseled patient on nature of condition and treatment options.  Given this plan as below, follow-up 11/27/23 at 1 pm in [place]     Image Findings: reviewed right ankle x-rays and MRI  Treatment: Activities as tolerated, can continue home  tenotomy procedure, order for placed   Medications/Injections: Limited tylenol/ibuprofen for pain for 1-2 weeks, Topical Nitroglycerin gel, none  Follow-up: 11/27/23 at 1 pm at [facility]. Go to Radiology   [address] [place] [address] [id number], [place] [address]    Please call [phone]   Ask for my team if you have any questions or concerns    If you have not yet received the influenza vaccine but would like to get one, please call  [phone] or you can schedule via [employer]    It was great seeing you today!    Al Vieira MD, CAM

## 2023-11-03 NOTE — LETTER
11/3/2023         RE: Noreen Florence  [address]  [place] [address]        Dear Colleague,    Thank you for referring your patient, Noreen Florence, to the [facility]. Please see a copy of my visit note below.    ASSESSMENT & PLAN    Noreen was seen today for pain.    Diagnoses and all orders for this visit:    Achilles tendinitis of right lower extremity  -     Orthopedic  Referral  -     Cancel: US Tenotomy Achilles Right; Future  -     nitroGLYcerin (RECTIV) 0.4 % OINT rectal ointment; 1 inch (1.5 mg) by Peritendinous route every 12 hours  -     US Tenotomy Achilles Right; Future      This issue is acute on chronic and Worsening.    # Right Achilles Tendinitis: Noreen Florence  was seen today for Achilles tendinitis. Symptoms had been going on for several years, worsening over the past few years. On examination there are positive findings of tenderness to palpation over the right Achilles tendon. Imaging findings showed irritated right Achilles tendon. Likely cause of patient's condition due to Achilles tendinopathy. She has tried physical therapy and CAM boot.  Discussed benefits and risks of Achilles tenotomy procedure given this she would like to move forward with this.  Counseled patient on nature of condition and treatment options.  Given this plan as below, follow-up 11/27/23 at 1 pm in [place]     Image Findings: reviewed right ankle x-rays and MRI  Treatment: Activities as tolerated, can continue home  tenotomy procedure, order for placed   Medications/Injections: Limited tylenol/ibuprofen for pain for 1-2 weeks, Topical Nitroglycerin gel, none  Follow-up: 11/27/23 at 1 pm at [facility]. Go to Radiology   [address] [facility] [address], [place] [address]      Al Vieira MD  [facility]    -----  Chief Complaint   Patient presents with     Right Ankle - Pain       SUBJECTIVE  Noreen Florence is a/an 73 year old female who is  "seen in consultation at the request of  Yuri Knowles DPM for evaluation of right achilles pain.     The patient is seen by themselves.      Onset: Several years(s) ago. Reports insidious onset without acute precipitating event. Previously treating with Dr. Knowles, last OV 10/18/23. Ankle MRI 8/16/22  Location of Pain: right achilles   Worsened by: increased use, walking  Better with: Meloxicam  Treatments tried: voltaren, Meloxicam, boot   Associated symptoms: swelling    Orthopedic/Surgical history: YES - Chronic ankle pain   Social History/Occupation: Retired     No family history pertinent to patient's problem today.     REVIEW OF SYSTEMS:  Review of Systems  Constitutional, HEENT, cardiovascular, pulmonary, gi and gu systems are negative, except as otherwise noted.    OBJECTIVE:  Ht 1.537 m (5' 0.5\")   Wt 51.3 kg (113 lb)   LMP 06/21/2002 (Approximate)   BMI 21.71 kg/m     General: healthy, alert and in no distress  HEENT: no scleral icterus or conjunctival erythema  Skin: no suspicious lesions or rash. No jaundice.  CV: distal perfusion intact    Resp: normal respiratory effort without conversational dyspnea   Psych: normal mood and affect  Gait: normal steady gait with appropriate coordination and balance   Neuro: Normal light sensory exam of right lower extremity      Ortho Exam   RIGHT ANKLE  Inspection:    No swelling, redness, edema or ecchymosis is observed  Palpation:    Tender about the achilles tendon insertion. Remainder of bony and ligamentous landmarks are nontender.  Range of Motion:     Plantarflexion full / dorsiflexion limited by tightness / inversion full / eversion full  Strength:    full  Special Tests:    negative anterior drawer, negative talar tilt, negative valgus stress, negative forced external rotation/eversion, negative Ackerman sign, negative squeeze test.      RADIOLOGY:  I independently ordered, visualized and reviewed these images with the patient     EXAM: MR ANKLE RIGHT " W/O CONTRAST  LOCATION: [facility]  DATE/TIME: 8/16/2022 6:20 PM     INDICATION: Right rear foot MRI to evaluate peroneal tendons.  COMPARISON: None.  TECHNIQUE: Unenhanced.     FINDINGS:      TENDONS:   -Peroneal: There is mild peroneus longus tendinopathy, with a markedly edematous os peroneum. (Series 6, image 9). Peroneus brevis is intact. There is mild to moderate peroneal tenosynovitis.  -Medial: Posterior tibialis tendon is intact. No tendinopathy or tenosynovitis. Flexor digitorum longus and flexor hallucis longus tendons are intact. There is mild flexor digitorum longus and flexor hallucis longus tenosynovitis.  -Anterior: Anterior tibialis, extensor hallucis longus, and extensor digitorum longus tendons are normal. No tenosynovitis.  -Achilles: Moderate Achilles tendinosis with some low-grade interstitial longitudinal partial tearing. No high-grade Achilles tear is seen. There is mild Achilles paratenonitis. There is insertional enthesopathy at the calcaneal tuberosity.     LIGAMENTS:   -Anterior talofibular ligament: There is some thinning of the anterior talofibular ligament which relates to chronic partial tearing.   -Calcaneofibular ligament: Intact.   -Posterior talofibular ligament: Intact.  -Syndesmotic inferior tibiofibular ligaments: Intact.  -Deltoid ligament complex: Intact.  -Spring ligament complex: Intact.     JOINTS AND BONES:   -No fracture, bone contusion or osteochondral lesion. No joint effusion or synovitis.     SOFT TISSUES:  -Plantar fascia: Intact. No acute fasciitis or tear.  -Sinus tarsi and tarsal tunnel: Normal.  -Muscles: Normal.                                                                      IMPRESSION:  1.  Mild peroneus longus tendinopathy, with a markedly edematous os peroneum. 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 There is mild to moderate peroneal tenosynovitis. No peroneal tear is seen.  2.  Moderate Achilles tendinosis with  some low-grade interstitial longitudinal partial tearing. No high-grade Achilles tear is seen. There is mild Achilles paratenonitis as well as insertional enthesopathy at the calcaneal tuberosity.    Review of external notes as documented elsewhere in note  Review of the result(s) of each unique test - right x-rays and ankle MRI       Disclaimer: This note consists of symbols derived from keyboarding, dictation and/or voice recognition software. As a result, there may be errors in the script that have gone undetected. Please consider this when interpreting information found in this chart.      Again, thank you for allowing me to participate in the care of your patient.        Sincerely,        Al Vieira MD

## 2023-11-03 NOTE — PROGRESS NOTES
ASSESSMENT & PLAN    Noreen was seen today for pain.    Diagnoses and all orders for this visit:    Achilles tendinitis of right lower extremity  -     Orthopedic  Referral  -     Cancel: US Tenotomy Achilles Right; Future  -     nitroGLYcerin (RECTIV) 0.4 % OINT rectal ointment; 1 inch (1.5 mg) by Peritendinous route every 12 hours  -     US Tenotomy Achilles Right; Future      This issue is acute on chronic and Worsening.    # Right Achilles Tendinitis: Noreen Florence  was seen today for Achilles tendinitis. Symptoms had been going on for several years, worsening over the past few years. On examination there are positive findings of tenderness to palpation over the right Achilles tendon. Imaging findings showed irritated right Achilles tendon. Likely cause of patient's condition due to Achilles tendinopathy. She has tried physical therapy and CAM boot.  Discussed benefits and risks of Achilles tenotomy procedure given this she would like to move forward with this.  Counseled patient on nature of condition and treatment options.  Given this plan as below, follow-up 11/27/23 at 1 pm in [place]     Image Findings: reviewed right ankle x-rays and MRI  Treatment: Activities as tolerated, can continue home  tenotomy procedure, order for placed   Medications/Injections: Limited tylenol/ibuprofen for pain for 1-2 weeks, Topical Nitroglycerin gel, none  Follow-up: 11/27/23 at 1 pm at [place] [facility]. Go to Radiology   [address] [facility] [address], [place] [address]      Al Vieira MD  [facility]    -----  Chief Complaint   Patient presents with    Right Ankle - Pain       SUBJECTIVE  Noreen Florence is a/an 73 year old female who is seen in consultation at the request of  Yuri Knowles DPM for evaluation of right achilles pain.     The patient is seen by themselves.      Onset: Several years(s) ago. Reports insidious onset without acute precipitating event. Previously treating with  "Dr. Knowles, last OV 10/18/23. Ankle MRI 8/16/22  Location of Pain: right achilles   Worsened by: increased use, walking  Better with: Meloxicam  Treatments tried: voltaren, Meloxicam, boot   Associated symptoms: swelling    Orthopedic/Surgical history: YES - Chronic ankle pain   Social History/Occupation: Retired     No family history pertinent to patient's problem today.     REVIEW OF SYSTEMS:  Review of Systems  Constitutional, HEENT, cardiovascular, pulmonary, gi and gu systems are negative, except as otherwise noted.    OBJECTIVE:  Ht 1.537 m (5' 0.5\")   Wt 51.3 kg (113 lb)   LMP 06/21/2002 (Approximate)   BMI 21.71 kg/m     General: healthy, alert and in no distress  HEENT: no scleral icterus or conjunctival erythema  Skin: no suspicious lesions or rash. No jaundice.  CV: distal perfusion intact    Resp: normal respiratory effort without conversational dyspnea   Psych: normal mood and affect  Gait: normal steady gait with appropriate coordination and balance   Neuro: Normal light sensory exam of right lower extremity      Ortho Exam   RIGHT ANKLE  Inspection:    No swelling, redness, edema or ecchymosis is observed  Palpation:    Tender about the achilles tendon insertion. Remainder of bony and ligamentous landmarks are nontender.  Range of Motion:     Plantarflexion full / dorsiflexion limited by tightness / inversion full / eversion full  Strength:    full  Special Tests:    negative anterior drawer, negative talar tilt, negative valgus stress, negative forced external rotation/eversion, negative Ackerman sign, negative squeeze test.      RADIOLOGY:  I independently ordered, visualized and reviewed these images with the patient     EXAM: MR ANKLE RIGHT W/O CONTRAST  LOCATION: [facility]  DATE/TIME: 8/16/2022 6:20 PM     INDICATION: Right rear foot MRI to evaluate peroneal tendons.  COMPARISON: None.  TECHNIQUE: Unenhanced.     FINDINGS:      TENDONS:   -Peroneal: There is mild " peroneus longus tendinopathy, with a markedly edematous os peroneum. (Series 6, image 9). Peroneus brevis is intact. There is mild to moderate peroneal tenosynovitis.  -Medial: Posterior tibialis tendon is intact. No tendinopathy or tenosynovitis. Flexor digitorum longus and flexor hallucis longus tendons are intact. There is mild flexor digitorum longus and flexor hallucis longus tenosynovitis.  -Anterior: Anterior tibialis, extensor hallucis longus, and extensor digitorum longus tendons are normal. No tenosynovitis.  -Achilles: Moderate Achilles tendinosis with some low-grade interstitial longitudinal partial tearing. No high-grade Achilles tear is seen. There is mild Achilles paratenonitis. There is insertional enthesopathy at the calcaneal tuberosity.     LIGAMENTS:   -Anterior talofibular ligament: There is some thinning of the anterior talofibular ligament which relates to chronic partial tearing.   -Calcaneofibular ligament: Intact.   -Posterior talofibular ligament: Intact.  -Syndesmotic inferior tibiofibular ligaments: Intact.  -Deltoid ligament complex: Intact.  -Spring ligament complex: Intact.     JOINTS AND BONES:   -No fracture, bone contusion or osteochondral lesion. No joint effusion or synovitis.     SOFT TISSUES:  -Plantar fascia: Intact. No acute fasciitis or tear.  -Sinus tarsi and tarsal tunnel: Normal.  -Muscles: Normal.                                                                      IMPRESSION:  1.  Mild peroneus longus tendinopathy, with a markedly edematous os peroneum. 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 There is mild to moderate peroneal tenosynovitis. No peroneal tear is seen.  2.  Moderate Achilles tendinosis with some low-grade interstitial longitudinal partial tearing. No high-grade Achilles tear is seen. There is mild Achilles paratenonitis as well as insertional enthesopathy at the calcaneal tuberosity.    Review of external notes as documented elsewhere in  note  Review of the result(s) of each unique test - right x-rays and ankle MRI       Disclaimer: This note consists of symbols derived from keyboarding, dictation and/or voice recognition software. As a result, there may be errors in the script that have gone undetected. Please consider this when interpreting information found in this chart.

## 2023-11-06 DIAGNOSIS — R05.1 ACUTE COUGH: ICD-10-CM

## 2023-11-07 RX ORDER — ALBUTEROL SULFATE 90 UG/1
AEROSOL, METERED RESPIRATORY (INHALATION)
Qty: 18 G | Refills: 3 | Status: SHIPPED | OUTPATIENT
Start: 2023-11-07

## 2023-11-08 ENCOUNTER — VIRTUAL VISIT (OUTPATIENT)
Dept: RHEUMATOLOGY | Facility: CLINIC | Age: 73
End: 2023-11-08
Payer: COMMERCIAL

## 2023-11-08 ENCOUNTER — LAB (OUTPATIENT)
Dept: LAB | Facility: CLINIC | Age: 73
End: 2023-11-08
Payer: COMMERCIAL

## 2023-11-08 DIAGNOSIS — M06.4 POLYARTHRITIS, INFLAMMATORY (H): ICD-10-CM

## 2023-11-08 LAB
ALBUMIN SERPL BCG-MCNC: 4.4 G/DL (ref 3.5–5.2)
ALT SERPL W P-5'-P-CCNC: 22 U/L (ref 0–50)
AST SERPL W P-5'-P-CCNC: 35 U/L (ref 0–45)
CREAT SERPL-MCNC: 0.46 MG/DL (ref 0.51–0.95)
CRP SERPL-MCNC: <3 MG/L
EGFRCR SERPLBLD CKD-EPI 2021: >90 ML/MIN/1.73M2
ERYTHROCYTE [DISTWIDTH] IN BLOOD BY AUTOMATED COUNT: 13.4 % (ref 10–15)
ERYTHROCYTE [SEDIMENTATION RATE] IN BLOOD BY WESTERGREN METHOD: 1 MM/HR (ref 0–30)
HCT VFR BLD AUTO: 42.2 % (ref 35–47)
HGB BLD-MCNC: 14.1 G/DL (ref 11.7–15.7)
MCH RBC QN AUTO: 28.8 PG (ref 26.5–33)
MCHC RBC AUTO-ENTMCNC: 33.4 G/DL (ref 31.5–36.5)
MCV RBC AUTO: 86 FL (ref 78–100)
PLATELET # BLD AUTO: 203 10E3/UL (ref 150–450)
RBC # BLD AUTO: 4.9 10E6/UL (ref 3.8–5.2)
WBC # BLD AUTO: 5.4 10E3/UL (ref 4–11)

## 2023-11-08 PROCEDURE — 84450 TRANSFERASE (AST) (SGOT): CPT

## 2023-11-08 PROCEDURE — 85027 COMPLETE CBC AUTOMATED: CPT

## 2023-11-08 PROCEDURE — 99441 PR PHYSICIAN TELEPHONE EVALUATION 5-10 MIN: CPT | Mod: 95 | Performed by: NURSE PRACTITIONER

## 2023-11-08 PROCEDURE — 82565 ASSAY OF CREATININE: CPT

## 2023-11-08 PROCEDURE — 86140 C-REACTIVE PROTEIN: CPT

## 2023-11-08 PROCEDURE — 85652 RBC SED RATE AUTOMATED: CPT

## 2023-11-08 PROCEDURE — 82040 ASSAY OF SERUM ALBUMIN: CPT

## 2023-11-08 PROCEDURE — 84460 ALANINE AMINO (ALT) (SGPT): CPT

## 2023-11-08 PROCEDURE — 36415 COLL VENOUS BLD VENIPUNCTURE: CPT

## 2023-11-08 RX ORDER — SULFASALAZINE 500 MG/1
1000 TABLET, DELAYED RELEASE ORAL 2 TIMES DAILY
Qty: 360 TABLET | Refills: 3 | Status: SHIPPED | OUTPATIENT
Start: 2023-11-08 | End: 2024-07-15

## 2023-11-08 RX ORDER — PILOCARPINE HYDROCHLORIDE 5 MG/1
TABLET, FILM COATED ORAL
COMMUNITY
Start: 2023-10-27 | End: 2023-12-22

## 2023-11-08 NOTE — PROGRESS NOTES
Noreen Florence is a 73 year old year old who is being evaluated via a billable telephone visit.      What phone number would you like to be contacted at? [phone]  How would you like to obtain your AVS? Mail a copy  Are you currently in the state of [place]? Yes  Phone call duration: 9 minutes      Rheumatology Clinic Visit  Amanda Warner CNP      Noreen Florence  YOB: 1950    Age: 73 year old   MRN# [id number]       Date of Visit: 11/08/2023  Primary care provider: Jud Morales              Subjective:     Noreen is a 73 year old female presents today for consult for inflammatory arthritis and SICCA. Referred by Dr. Eddie Fallon.  Current treatment:  mg BID (9/23), Pilocarpine TID (9/23)     11/08/2023:   Is tolerating increase in SSZ, no SE. Reports she saw podiatry who is recommended surgery for her achilles and ankle.     Consult 10/09/2023:   Here for second opinion . Meloxicam was very helpful for her, no pain,was her # 1 drug. Due to elevated LFTs and HTN was recommend she top this. She stopped this a month ago and now R achilles and R shoulder are problematic for her. In past methotrexate was beneficial but had to discontinue due to elevated LFTs', Was on SSZ in past, wasn't that helpful per her recollection however she is nor sure what dose.  Reports PT in past not helpful, saw podiatry and received a brace that is not helpful.  Now that off of meloxicam, is having a very hard time walking due to R heel pain, use to walk 45 min a day now less than 30 min a day. Has a hard time doing stairs.     -Stopped meloxicom a month ago after on for a long period of time, meloxicam worked well for her but not that she stopped has increase in pain of R achilles (with swelling) and R shoulder. R shoulder hurts, but not hard to move. In past L shoulder pain had cortisone injection. Last month knees are doing well.  Is tolerating SSZ. Hands ok.   -Started pilocarpine, is tolerating.  Is helping, has  "improved with dryness but still has.    ROS: On omperazole and stomach better. No infections, fevers, diarrhea, rashes.     Previous Rheumatology care at [facility] 3/17/2023  with Dr. Jesenia Bazan: \" My impression is this is a 73-year-old  female who had presented with features of inflammatory polyarthritis in the year 2000.   In 2009 her colonoscopy had revealed minimally active ileitis with mild neutrophilic inflammation and intraepithelial lymphocytosis of the terminal ileum and also the colon. They mention in the note that intraepithelial lymphocytosis could be seen by celiac as well.   She was initially started on methotrexate in October 2011 at 4 tablets/week. She flared when she went off methotrexate and leucovorin for 2 years. Methotrexate was restarted in December of 2014 and stopped in 6/2021 due to elevated liver enzymes. Her prednisone was discontinued in February 2017.  Per Ortho she has left shoulder impingement and rotator cuff tendinopathy and she has had 2 injections which have helped her a lot and the right foot and ankle pain have resolved on their own.  She presented with increasing joint pains in November 2022 and I started her on sulfasalazine 1 tablet twice a day and she was able to tolerate it well without any adverse effects. However, she stopped in 7/2023 due to a flare without Mobic 7.5 mg/d and feels that the SSZ no help.  She flared without mobic 7.5 mg/d and now back on mobic for the last 2 months and it is helping a lot.  I am not able to document any synovitis on exam today. I am recommending that she restart the sulfasalazine so that we can decrease the meloxicam frequency to every other day and she is agreeable to the plan.    She has worsening dry mouth and she has had longstanding dry eyes and dry mouth. Over-the-counter products are not helping and I am recommending a trial of pilocarpine and have gone over the risks and benefits in detail she understands and she is " "willing to try it.    She has had elevated liver enzymes on and off. She has fatty liver and she is on a statin.   She has had minimally elevated CK on and off but no muscle weakness on exam. This could be from her muscle cramps. \"    Past Rheum tx:  SSZ- not effective  Methotrexate-elevated LFT's      Social History  Lives [place], with daughter  Retired    FMH:  NO AI     Active Problem list:  Patient Active Problem List    Diagnosis Date Noted    Heel pain, chronic, right 03/07/2022     Priority: Medium    NAFLD (nonalcoholic fatty liver disease) 10/18/2021     Priority: Medium    Elevated LFTs 10/18/2021     Priority: Medium    Pre-diabetes 11/08/2019     Priority: Medium    History of Helicobacter pylori infection 04/16/2018     Priority: Medium    Angiomyolipoma of right kidney 01/09/2017     Priority: Medium    Pterygium of right eye 09/21/2016     Priority: Medium    Sicca syndrome (H24) 08/30/2016     Priority: Medium    Microscopic hematuria 03/20/2016     Priority: Medium    High risk medication use 10/23/2015     Priority: Medium     Methotrexate      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 Provider to review and confirm.      GERD (gastroesophageal reflux disease) 03/20/2015     Priority: Medium    Polyarthritis, inflammatory (H) 02/16/2015     Priority: Medium    Hypertension goal BP (blood pressure) < 140/90 11/28/2014     Priority: Medium    Osteopenia 04/21/2014     Priority: Medium    Hayfever 04/21/2014     Priority: Medium            Objective:     Physical Exam  LMP 06/21/2002 (Approximate)   Breastfeeding No   There is no height or weight on file to calculate BMI.    Constitutional: Normal body habitus with out gross deformities. Well groomed.   Psych: nl judgement, orientation, memory, affect.        Labs:    Lab Results "   Component Value Date    ALT 24 10/09/2023    AST 36 10/09/2023    CR 0.48 (L) 10/09/2023     3/01/22  -MAGGY, -SSA, -SSB,  Cr. .68, ALT 82,AST 78  2011  -RNP, -SMITH, -Hep C, -Hep b s ag, -HIV.    Imaging:   Narrative & Impression   EXAM: MR ANKLE RIGHT W/O CONTRAST  LOCATION: [facility]  DATE/TIME: 8/16/2022 6:20 PM     INDICATION: Right rear foot MRI to evaluate peroneal tendons.  COMPARISON: None.  TECHNIQUE: Unenhanced.     FINDINGS:      TENDONS:   -Peroneal: There is mild peroneus longus tendinopathy, with a markedly edematous os peroneum. (Series 6, image 9). Peroneus brevis is intact. There is mild to moderate peroneal tenosynovitis.  -Medial: Posterior tibialis tendon is intact. No tendinopathy or tenosynovitis. Flexor digitorum longus and flexor hallucis longus tendons are intact. There is mild flexor digitorum longus and flexor hallucis longus tenosynovitis.  -Anterior: Anterior tibialis, extensor hallucis longus, and extensor digitorum longus tendons are normal. No tenosynovitis.  -Achilles: Moderate Achilles tendinosis with some low-grade interstitial longitudinal partial tearing. No high-grade Achilles tear is seen. There is mild Achilles paratenonitis. There is insertional enthesopathy at the calcaneal tuberosity.     LIGAMENTS:   -Anterior talofibular ligament: There is some thinning of the anterior talofibular ligament which relates to chronic partial tearing.   -Calcaneofibular ligament: Intact.   -Posterior talofibular ligament: Intact.  -Syndesmotic inferior tibiofibular ligaments: Intact.  -Deltoid ligament complex: Intact.  -Spring ligament complex: Intact.     JOINTS AND BONES:   -No fracture, bone contusion or osteochondral lesion. No joint effusion or synovitis.     SOFT TISSUES:  -Plantar fascia: Intact. No acute fasciitis or tear.  -Sinus tarsi and tarsal tunnel: Normal.  -Muscles: Normal.                                                                       IMPRESSION:  1.  Mild peroneus longus tendinopathy, with a markedly edematous os peroneum. 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 There is mild to moderate peroneal tenosynovitis. No peroneal tear is seen.  2.  Moderate Achilles tendinosis with some low-grade interstitial longitudinal partial tearing. No high-grade Achilles tear is seen. There is mild Achilles paratenonitis as well as insertional enthesopathy at the calcaneal tuberosity.                 Assessment and Plan:     1) Inflammatory polyarthropathy with OA (dx 2000):  Pt seen in 2000 by  Dr. Jesenia Bazan and presented with polyinflammatory arthritis with OA at that time, improved on methotrexate however discontinue due to elevated LFT's in NAFLD.   mg BID attempted and then stopped due to pt not perceiving benefit. Did mobic 7.5 mg daily, and for pt had great response to this, specifically it helped with her heel swelling. She has seen podaitry and attempted bracing, PT, dicflonec gel and failed to improve. Currently pt has resumed  mg BID (9/23) and stopped mobic at that time due to NAFLD/ mild elevation in LFT's/ HTN.  Now R heel is swelling, painful and affecting ability to walk.   -Given past benefit with achilles swelling on methotrexate and mobic do feel SSZ appropriate to trial. Will check labs today and increase SSZ to 1 gm in am and 500 mg in pm with plan to repeat labs in 4 weeks and increase dose further at return visit.   Previous toxicity labs WNL, updated today  CBC WNL other results pending.     2) SICCA: Improvement with pilocarpine.  Previous -SSA and -SSB. Will check SPEP, update RF, C3, C4. Parotid US showed homogenous parotids with 5 mm cyst. Consider repeat imaging of cyst in future and consider zeeshan bx. Consider plaquenil, although do not anticipate HCQ would offer much improvement for achilles swelling.       3) R achilles pain and swelling: Follow-up with podiatry and ortho    Pt  instructions:     1) Sulfasalazine 2 tabs twice a day.     2) Labs every 3 months     3) See me back feb 15th at 2:30     Pt states understanding and agreement to plan of care, has no further questions.       Amanda Warner, CNP  Rheumatology,  Health

## 2023-11-08 NOTE — PATIENT INSTRUCTIONS
1) Sulfasalazine 2 tabs twice a day.     2) Labs every 3 months     3) See me back feb 15th at 2:30

## 2023-11-17 ENCOUNTER — TELEPHONE (OUTPATIENT)
Dept: GENERAL RADIOLOGY | Facility: CLINIC | Age: 73
End: 2023-11-17
Payer: COMMERCIAL

## 2023-11-17 NOTE — TELEPHONE ENCOUNTER
Please arrive to [facility] Imaging Department by 12:30.  Please stop all aspirin, aspirin products and all non-steroidal anti-inflammatories (Motrin, Advil, Naprosyn, Naproxen, Aleve, Indocin, Mobic, Toradol, Voltaren, Arthrotec, Ibuprofen, Diclofenac) 7 days prior to the procedure.   If you take fish oil or vitamin E, please stop 5 days prior to the procedure.  For your safety, you should arrange to have someone drive you home after the procedure.    Infection control:  Do not shave within 12 inches of surgery site as it may create microscopic cuts in the skin.  Shower the night before and the morning of the procedure.  Using 2 ounces of soap, wash body from the neck to toes, let soap stand for one (1) minute, rinse and repeat one (1) time.  Dry off with a clean towel.  Do not use lotions, moisturizers, or creams on your skin after either shower.

## 2023-11-22 DIAGNOSIS — K05.10 GINGIVITIS: ICD-10-CM

## 2023-11-26 RX ORDER — CHLORHEXIDINE GLUCONATE ORAL RINSE 1.2 MG/ML
SOLUTION DENTAL
Qty: 473 ML | Refills: 1 | Status: SHIPPED | OUTPATIENT
Start: 2023-11-26 | End: 2024-01-29

## 2023-11-27 ENCOUNTER — HOSPITAL ENCOUNTER (OUTPATIENT)
Dept: ULTRASOUND IMAGING | Facility: CLINIC | Age: 73
Discharge: HOME | End: 2023-11-27
Attending: FAMILY MEDICINE | Admitting: FAMILY MEDICINE
Payer: COMMERCIAL

## 2023-11-27 VITALS
HEART RATE: 81 BPM | RESPIRATION RATE: 16 BRPM | OXYGEN SATURATION: 98 % | SYSTOLIC BLOOD PRESSURE: 143 MMHG | DIASTOLIC BLOOD PRESSURE: 67 MMHG | TEMPERATURE: 97.5 F

## 2023-11-27 DIAGNOSIS — M76.61 ACHILLES TENDINITIS OF RIGHT LOWER EXTREMITY: ICD-10-CM

## 2023-11-27 DIAGNOSIS — M79.671 HEEL PAIN, CHRONIC, RIGHT: Primary | ICD-10-CM

## 2023-11-27 DIAGNOSIS — G89.29 HEEL PAIN, CHRONIC, RIGHT: Primary | ICD-10-CM

## 2023-11-27 PROCEDURE — 27605 INCISION OF ACHILLES TENDON: CPT | Mod: RT | Performed by: FAMILY MEDICINE

## 2023-11-27 PROCEDURE — 272N000548 US TENOTOMY ACHILLES RIGHT

## 2023-11-27 PROCEDURE — 76942 ECHO GUIDE FOR BIOPSY: CPT | Mod: 26 | Performed by: FAMILY MEDICINE

## 2023-11-27 PROCEDURE — 250N000009 HC RX 250: Performed by: FAMILY MEDICINE

## 2023-11-27 PROCEDURE — 27605 INCISION OF ACHILLES TENDON: CPT | Mod: RT

## 2023-11-27 RX ORDER — HYDROCODONE BITARTRATE AND ACETAMINOPHEN 5; 325 MG/1; MG/1
1 TABLET ORAL EVERY 6 HOURS PRN
Status: DISCONTINUED | OUTPATIENT
Start: 2023-11-27 | End: 2023-11-27

## 2023-11-27 RX ORDER — HYDROCODONE BITARTRATE AND ACETAMINOPHEN 5; 325 MG/1; MG/1
1 TABLET ORAL EVERY 6 HOURS PRN
Qty: 10 TABLET | Refills: 0 | Status: SHIPPED | OUTPATIENT
Start: 2023-11-27 | End: 2023-11-30

## 2023-11-27 RX ORDER — NITROGLYCERIN 4 MG/G
1 OINTMENT RECTAL EVERY 12 HOURS
Qty: 30 G | Refills: 0 | Status: SHIPPED | OUTPATIENT
Start: 2023-11-27 | End: 2023-11-27

## 2023-11-27 RX ORDER — NITROGLYCERIN 4 MG/G
1 OINTMENT RECTAL EVERY 12 HOURS
Qty: 30 G | Refills: 0 | Status: SHIPPED | OUTPATIENT
Start: 2023-11-27 | End: 2023-12-22

## 2023-11-27 RX ADMIN — LIDOCAINE HYDROCHLORIDE 7 ML: 10 INJECTION, SOLUTION EPIDURAL; INFILTRATION; INTRACAUDAL; PERINEURAL at 13:15

## 2023-11-27 NOTE — PROGRESS NOTES
Patient here for R achilles tenotomy.  Fit with CAM walker to R foot.  Patient tolerated procedure well.  Minimal pain after procedure.  Small amount of bleeding under tegaderm and steri strips. Patient asking for gauze to pad heel.  Applied gauze with paper tape.  Instructed to remove gauze and leave tegaderm on later.    Patient asking for refill of RECTIV 0.4% ointment to use on shoulder.  Will pass message on to Dr Vieira.

## 2023-11-30 ENCOUNTER — OFFICE VISIT (OUTPATIENT)
Dept: URGENT CARE | Facility: URGENT CARE | Age: 73
End: 2023-11-30
Payer: COMMERCIAL

## 2023-11-30 VITALS
TEMPERATURE: 98.7 F | WEIGHT: 116 LBS | BODY MASS INDEX: 22.28 KG/M2 | OXYGEN SATURATION: 96 % | SYSTOLIC BLOOD PRESSURE: 146 MMHG | DIASTOLIC BLOOD PRESSURE: 85 MMHG | RESPIRATION RATE: 17 BRPM | HEART RATE: 85 BPM

## 2023-11-30 DIAGNOSIS — S90.521A BLISTER OF RIGHT ANKLE, INITIAL ENCOUNTER: Primary | ICD-10-CM

## 2023-11-30 PROCEDURE — 99213 OFFICE O/P EST LOW 20 MIN: CPT | Mod: 24 | Performed by: EMERGENCY MEDICINE

## 2023-11-30 NOTE — PATIENT INSTRUCTIONS
Blister is likely from rubbing the inside of the CAM boot. This does not appear infected or likely related to the procedure you had. Keep covered with bacitracin and the dressings we supplied to keep it from getting infected. If red, warm, swelling or increasing pain return or go to the ER.

## 2023-11-30 NOTE — PROGRESS NOTES
Assessment & Plan     Diagnosis:    ICD-10-CM    1. Blister of right ankle, initial encounter  S90.521A         Medical Decision Making:  Noreen Florence is a 73 year old female who presents for evaluation of a small blister above her surgical site (recent achilles tenotomy 11/27/2023). This appears to be a small blister that is likely from her CAM walker boot rubbing the area above her surgical site. The site itself appears well without infection. There is a bubble/blister but no drainage and does not appear to be an abscess/cellulitis. Would not stat oral antibiotics. Did place topical antibiotics in case this spontaneously ruptures and provided extra dressings to help keep the CAM boot from rubbing against the skin excessively. Discussed taking leg out of the boot 2-3 times daily to check for signs of infection; replace dressings BID. Follow up with orthopedics or PCP. Return if worse. .       Yordy Rose PA-C  [facility]    Subjective     Noreen Florence is a 73 year old female who presents to clinic today for the following health issues:  Chief Complaint   Patient presents with    ankle infection     Had some sort of tendon bone spure surgery in [place] on right ankle /foot and now showing signs of infection 11/27/23       HPI    Patient reports having a minor procedure on her right ankle 3 days ago, Achilles tenotomy.  Pain has been improving, she did note that yesterday she started noticing a blister above her surgical site, placed a bandage over this but only seem to make it worse.  Blister looks like it is filled with fluid, has not very red.  She denies any pain, redness around the site, red streaks going up the ankle, increased pain or swelling, fevers or other concerns.    Review of Systems    See HPI    Objective      Vitals: BP (!) 146/85   Pulse 85   Temp 98.7  F (37.1  C) (Tympanic)   Resp 17   Wt 52.6 kg (116 lb)   LMP 06/21/2002 (Approximate)   SpO2 96%   BMI 22.28 kg/m         Patient Vitals for the past 24 hrs:   BP Temp Temp src Pulse Resp SpO2 Weight   11/30/23 1302 (!) 146/85 98.7  F (37.1  C) Tympanic 85 17 96 % 52.6 kg (116 lb)       Vital signs reviewed by: Yordy Rose PA-C    Physical Exam   Constitutional: Patient is alert and cooperative. No acute distress.  Neurological: Alert and oriented x3. Strength and sensation are intact in the right ankle.   MSK/Skin: Small dime sized skin colored blister appears to be filled with serosal fluid a centimeter medial and superior to small surgical incision sites.  No erythema, warmth, purulence or areas of pointing. Negative Deutsch test; achilles intact.  Psychiatric:The patient has a normal mood and affect.       Yordy Rose PA-C, November 30, 2023

## 2023-12-03 ENCOUNTER — HEALTH MAINTENANCE LETTER (OUTPATIENT)
Age: 73
End: 2023-12-03

## 2023-12-04 ENCOUNTER — PRE VISIT (OUTPATIENT)
Dept: OPHTHALMOLOGY | Facility: CLINIC | Age: 73
End: 2023-12-04

## 2023-12-04 ENCOUNTER — OFFICE VISIT (OUTPATIENT)
Dept: OPHTHALMOLOGY | Facility: CLINIC | Age: 73
End: 2023-12-04
Attending: OPTOMETRIST
Payer: COMMERCIAL

## 2023-12-04 DIAGNOSIS — H02.831 DERMATOCHALASIS OF BOTH UPPER EYELIDS: Primary | ICD-10-CM

## 2023-12-04 DIAGNOSIS — H02.832 DERMATOCHALASIS OF BOTH LOWER EYELIDS: ICD-10-CM

## 2023-12-04 DIAGNOSIS — H02.835 DERMATOCHALASIS OF BOTH LOWER EYELIDS: ICD-10-CM

## 2023-12-04 DIAGNOSIS — H02.834 DERMATOCHALASIS OF BOTH UPPER EYELIDS: Primary | ICD-10-CM

## 2023-12-04 DIAGNOSIS — Z98.890 HISTORY OF BLEPHAROPLASTY: ICD-10-CM

## 2023-12-04 PROCEDURE — 99203 OFFICE O/P NEW LOW 30 MIN: CPT | Mod: GC | Performed by: OPHTHALMOLOGY

## 2023-12-04 PROCEDURE — 92285 EXTERNAL OCULAR PHOTOGRAPHY: CPT | Mod: GC | Performed by: OPHTHALMOLOGY

## 2023-12-04 ASSESSMENT — CONF VISUAL FIELD
OD_INFERIOR_NASAL_RESTRICTION: 0
OD_SUPERIOR_TEMPORAL_RESTRICTION: 0
OS_INFERIOR_TEMPORAL_RESTRICTION: 0
OS_NORMAL: 1
OD_INFERIOR_TEMPORAL_RESTRICTION: 0
OS_SUPERIOR_TEMPORAL_RESTRICTION: 0
OD_SUPERIOR_NASAL_RESTRICTION: 0
METHOD: COUNTING FINGERS
OS_SUPERIOR_NASAL_RESTRICTION: 0
OD_NORMAL: 1
OS_INFERIOR_NASAL_RESTRICTION: 0

## 2023-12-04 ASSESSMENT — TONOMETRY
OS_IOP_MMHG: 14
OD_IOP_MMHG: 14
IOP_METHOD: ICARE
OD_IOP_MMHG: 09
OS_IOP_MMHG: 09

## 2023-12-04 ASSESSMENT — VISUAL ACUITY
OD_CC: 20/20
OS_CC: 20/20
OD_CC+: -3
OS_CC+: -2
METHOD: SNELLEN - LINEAR

## 2023-12-04 ASSESSMENT — EXTERNAL EXAM - RIGHT EYE: OD_EXAM: NORMAL

## 2023-12-04 ASSESSMENT — EXTERNAL EXAM - LEFT EYE: OS_EXAM: NORMAL

## 2023-12-04 NOTE — PROGRESS NOTES
Chief Complaints and History of Present Illnesses   Patient presents with    Follow Up     Patient states RUL is lower than left. Patient sates RLL is more swollen. Patient states it has been like this since surgery in 2017.  Aimee JacksonYOKASTA December 4, 2023 1:51 PM      Chief Complaint(s) and History of Present Illness(es)     Follow Up    In both eyes.  Associated symptoms include Negative for eye pain,   headache, fatigue and floaters. Additional comments: Patient states RUL is   lower than left. Patient sates RLL is more swollen. Patient states it has   been like this since surgery in 2017.  Aimee JacksonYOKASTA December 4, 2023 1:51 PM           Lower lid evaluation  Vector: positive  Laxity: 1+ right eye 1+ left eye   Fat pads: 3+ herniation right eye 3+herniation left eye  Tear trough: 1+ right eye 1+left eye  Skin excess: 1+ right eye 1+left eye    Assessment & Plan     Noreen Florence is a 73 year old female with the following diagnoses:   1. Dermatochalasis of both upper eyelids    2. History of blepharoplasty    3. Dermatochalasis of both lower eyelids       S/p BULB, MMCR, bilateral LL entropion repair with retractor reinsertion (2017)    Lids in excellent position and well healed from surgery.  Patient bothered by UL asymmetry, less TPS right eye than left eye, and bilateral LL fullness and fat prolapse     Recommend cosmetic bilateral upper blepharoplasty skin only with crease forming sutures) and Bilateral lower lids blepharoplasty (TCFx, SP)          Gabriela Monroy MD  Oculoplastic Surgery Fellow    Attending Physician Attestation:  I have seen and examined this patient with the fellow .  I have confirmed and edited as necessary the chief complaint(s), history of present illness, review of systems, relevant history, and examination findings as documented by others.  I have personally reviewed the relevant tests, images, and reports as documented above.  I have confirmed and edited as necessary the assessment  and plan and agree with this note.    - Ag Fletcher MD 2:39 PM 12/4/2023    Today with Noreen Florence, I reviewed the indications, risks, benefits, and alternatives of the proposed surgical procedure including, but not limited to, failure obtain the desired result  and need for additional surgery, bleeding, infection, loss of vision, loss of the eye, and the remote possibility of permanent damage to any organ system or death with the use of anesthesia.  I provided multiple opportunities for the questions, answered all questions to the best of my ability, and confirmed that my answers and my discussion were understood.   - Ag Fletcher MD 2:40 PM 12/4/2023

## 2023-12-04 NOTE — NURSING NOTE
Chief Complaints and History of Present Illnesses   Patient presents with    Follow Up     Patient states RUL is lower than left. Patient sates RLL is more swollen. Patient states it has been like this since surgery in 2017.  YOKASTA Salazar December 4, 2023 1:51 PM      Chief Complaint(s) and History of Present Illness(es)       Follow Up              Laterality: both eyes    Associated symptoms: Negative for eye pain, headache, fatigue and floaters    Comments: Patient states RUL is lower than left. Patient sates RLL is more swollen. Patient states it has been like this since surgery in 2017.  YOKASTA Salazar December 4, 2023 1:51 PM

## 2023-12-04 NOTE — PATIENT INSTRUCTIONS
BLEPHAROPLASTY    Your eyes are often the first thing people notice about you and are an important aspect of your overall appearance. As we age, the tone and shape of our eyelids can loosen and sag. Heredity and sun exposure also contribute to this process. This excess, puffy or lax skin can make you appear more tired or appear older. Eyelid surgery or blepharoplasty (pronounced  ujpa-w-vh-plasty ) can give the eyes a more youthful look by removing excess skin, bulging fat, and lax muscle from the upper or lower eyelids. If the sagging upper eyelid skin obstructs peripheral vision, blepharoplasty can eliminate the obstruction and expand the visual field.     Upper Blepharoplasty     For the upper eyelids, excess skin and fat are removed through an incision hidden in the natural eyelid crease. If the lid is droopy (ptosis), the muscle that raises the upper eyelid can be tightened. The incision is then closed with fine sutures.     Lower Blepharoplasty     Fat in the lower eyelids can be removed or repositioned through an incision hidden on the inner surface of the eyelid.  If there is excessive skin in the lower lid, the skin can be removed through incision is made just below the lashes. Fat can be removed or repositioned through this incision, and the excess skin removed. The incision is then closed with fine sutures.     Upper and Lower Blepharoplasty     Upper and lower blepharoplasty can be performed together and also can be combined with other procedures such as eyebrow or forehead lift, midface lift, face lift, neck lift, or laser skin resurfacing.  The procedures are typically performed as an outpatient procedure and typically take 45 min to 1.5 hours to perform.  Most patients can return to normal activities within 1-2 weeks. Makeup may be worn to camouflage any bruising after one week.       Who Should Perform A Blepharoplasty?     When choosing a surgeon to perform blepharoplasty, look for a cosmetic and  reconstructive surgeon who specializes in the eyelids, orbit, and tear drain system. Dr. Fletcher s membership in the American Society of Ophthalmic Plastic and Reconstructive Surgery (ASOPRS) indicates he is not only a board certified ophthalmologist who knows the anatomy and structure of the eyelids and orbit, but also has had extensive training in ophthalmic plastic reconstructive and cosmetic surgery.

## 2023-12-08 ENCOUNTER — HOSPITAL ENCOUNTER (OUTPATIENT)
Facility: AMBULATORY SURGERY CENTER | Age: 73
End: 2023-12-08
Attending: OPHTHALMOLOGY

## 2023-12-08 ENCOUNTER — TELEPHONE (OUTPATIENT)
Dept: OPHTHALMOLOGY | Facility: CLINIC | Age: 73
End: 2023-12-08
Payer: COMMERCIAL

## 2023-12-08 PROBLEM — H02.832 DERMATOCHALASIS OF BOTH LOWER EYELIDS: Status: ACTIVE | Noted: 2023-12-04

## 2023-12-08 PROBLEM — H02.834 DERMATOCHALASIS OF BOTH UPPER EYELIDS: Status: ACTIVE | Noted: 2023-12-04

## 2023-12-08 PROBLEM — H02.831 DERMATOCHALASIS OF BOTH UPPER EYELIDS: Status: ACTIVE | Noted: 2023-12-04

## 2023-12-08 PROBLEM — H02.835 DERMATOCHALASIS OF BOTH LOWER EYELIDS: Status: ACTIVE | Noted: 2023-12-04

## 2023-12-08 NOTE — TELEPHONE ENCOUNTER
Patient is schedule for surgery with: Dr. Fletcher    Surgery Date: 1/5      Location[facility]    H&P: to be completed by Primary Care team - patient instructed to schedule. [facility]     Post-op:  1/5, in-person visit, with Dr. Monroy    Patient will receive a phone call from pre-admission nurses 1-2 days prior to surgery with arrival time and NPO instructions.    Patient aware times are subject to change up until day before surgery.     Patient questions/concerns: N/A     Surgery packet was sent via US mail on 12/11      Eugenia Bucio on 12/8/2023 at 12:57 PM

## 2023-12-12 NOTE — TELEPHONE ENCOUNTER
Patient called with questions regarding procedure. She was under the impression that it would be covered by in insurance, and when Jonatan called with the cosmetic quote pt was confused. Writer asked provide to please reach out to the patient. Dr Monroy did call the pt. See below    I called the patient back today. She was under the impression that the surgery would all be covered by insurance. As it will be cosmetic, she will cancel surgery at this time. She will plan to call us for repeat evaluation if interested in the future. I did let her know that BLLB is always cosmetic as well.     Surgery and post op have been cancelled at this time.   Eugenia Bucio on 12/12/2023 at 4:12 PM

## 2023-12-14 ENCOUNTER — OFFICE VISIT (OUTPATIENT)
Dept: ORTHOPEDICS | Facility: CLINIC | Age: 73
End: 2023-12-14
Payer: COMMERCIAL

## 2023-12-14 VITALS — HEIGHT: 61 IN | BODY MASS INDEX: 21.9 KG/M2 | WEIGHT: 116 LBS

## 2023-12-14 DIAGNOSIS — M76.61 ACHILLES TENDINITIS OF RIGHT LOWER EXTREMITY: Primary | ICD-10-CM

## 2023-12-14 PROCEDURE — 99212 OFFICE O/P EST SF 10 MIN: CPT | Performed by: FAMILY MEDICINE

## 2023-12-14 ASSESSMENT — PAIN SCALES - GENERAL: PAINLEVEL: NO PAIN (1)

## 2023-12-14 NOTE — PATIENT INSTRUCTIONS
# Right Achilles Tendinitis: Noreen Florence  was seen today for Achilles tendinitis. Symptoms had been going on for several years, worsening over the past few years. On examination there are positive findings of tenderness to palpation over the right Achilles tendon. Imaging findings showed irritated right Achilles tendon. Likely cause of patient's condition due to Achilles tendinopathy. She had a tenotomy procedure on 11/27/23 that helped her pain significantly. Counseled patient on nature of condition and treatment options.  Given this plan as below, follow-up as needed.     Image Findings: Reviewed right ankle x-rays and MRI  Treatment: Activities as tolerated, can continue home exercises  Medications/Injections: Limited tylenol/ibuprofen for pain for 1-2 weeks, Topical Nitroglycerin gel, none  Follow-up: as needed    Please call [phone]   Ask for my team if you have any questions or concerns    If you have not yet received the influenza vaccine but would like to get one, please call  [phone] or you can schedule via [employer]    It was great seeing you again today!    Al Vieira MD, CAM

## 2023-12-14 NOTE — LETTER
12/14/2023         RE: Noreen Florence  [address]  [place] [address]        Dear Colleague,    Thank you for referring your patient, Noreen Florence, to the [facility]. Please see a copy of my visit note below.    ASSESSMENT & PLAN    Noreen was seen today for follow up.    Diagnoses and all orders for this visit:    Achilles tendinitis of right lower extremity        # Right Achilles Tendinitis: Noreen Florence  was seen today for Achilles tendinitis. Symptoms had been going on for several years, worsening over the past few years. On examination there are positive findings of tenderness to palpation over the right Achilles tendon. Imaging findings showed irritated right Achilles tendon. Likely cause of patient's condition due to Achilles tendinopathy. She had a tenotomy procedure on 11/27/23 that helped her pain significantly. Counseled patient on nature of condition and treatment options.  Given this plan as below, follow-up as needed.     Image Findings: Reviewed right ankle x-rays and MRI  Treatment: Activities as tolerated, can continue home exercises  Medications/Injections: Limited tylenol/ibuprofen for pain for 1-2 weeks, Topical Nitroglycerin gel, none  Follow-up: as needed    -----    SUBJECTIVE:  Noreen Florence is a 73 year old female who is seen in follow-up for right ankle pain. Right ankle US guided tenotomy DOS 11/27/23, now 2.5 weeks out.  The patient is seen by themselves.    Since their last visit reports improving ankle pain.  They indicate that their current pain level is 1/10. They have tried Right ankle US guided tenotomy, voltaren, Meloxicam, boot.        Patient's past medical, surgical, social, and family histories were reviewed today and no changes are noted.    REVIEW OF SYSTEMS:  Constitutional: NEGATIVE for fever, chills, change in weight  Skin: NEGATIVE for worrisome rashes, moles or lesions  GI/: NEGATIVE for bowel or bladder changes  Neuro: NEGATIVE for  "weakness, dizziness or paresthesias    OBJECTIVE:  Ht 1.537 m (5' 0.5\")   Wt 52.6 kg (116 lb)   LMP 06/21/2002 (Approximate)   BMI 22.28 kg/m     General: healthy, alert and in no distress  HEENT: no scleral icterus or conjunctival erythema  Skin: no suspicious lesions or rash. No jaundice.  CV: regular rhythm by palpation, no pedal edema  Resp: normal respiratory effort without conversational dyspnea   Psych: normal mood and affect  Gait: normal steady gait with appropriate coordination and balance  Neuro: normal light touch sensory exam of the extremities.    MSK:    RIGHT ANKLE  Inspection:    No swelling, redness, edema or ecchymosis is observed  Palpation:    Very mild tenderness to palpation about the achilles tendon insertion. Remainder of bony and ligamentous landmarks are nontender.  Range of Motion:     Plantarflexion full / dorsiflexion limited by tightness / inversion full / eversion full  Strength:    full  Special Tests:    negative anterior drawer, negative talar tilt, negative valgus stress, negative forced external rotation/eversion, negative Ackerman sign, negative squeeze test.      Independent visualization of the below image:         Al Vieira MD, [facility]    Disclaimer: This note consists of symbols derived from keyboarding, dictation and/or voice recognition software. As a result, there may be errors in the script that have gone undetected. Please consider this when interpreting information found in this chart.      Again, thank you for allowing me to participate in the care of your patient.        Sincerely,        Al Vieira MD  "

## 2023-12-14 NOTE — PROGRESS NOTES
"ASSESSMENT & PLAN    Noreen was seen today for follow up.    Diagnoses and all orders for this visit:    Achilles tendinitis of right lower extremity        # Right Achilles Tendinitis: Noreen Florence  was seen today for Achilles tendinitis. Symptoms had been going on for several years, worsening over the past few years. On examination there are positive findings of tenderness to palpation over the right Achilles tendon. Imaging findings showed irritated right Achilles tendon. Likely cause of patient's condition due to Achilles tendinopathy. She had a tenotomy procedure on 11/27/23 that helped her pain significantly. Counseled patient on nature of condition and treatment options.  Given this plan as below, follow-up as needed.     Image Findings: Reviewed right ankle x-rays and MRI  Treatment: Activities as tolerated, can continue home exercises  Medications/Injections: Limited tylenol/ibuprofen for pain for 1-2 weeks, Topical Nitroglycerin gel, none  Follow-up: as needed    -----    SUBJECTIVE:  Noreen Florence is a 73 year old female who is seen in follow-up for right ankle pain. Right ankle US guided tenotomy DOS 11/27/23, now 2.5 weeks out.  The patient is seen by themselves.    Since their last visit reports improving ankle pain.  They indicate that their current pain level is 1/10. They have tried Right ankle US guided tenotomy, voltaren, Meloxicam, boot.        Patient's past medical, surgical, social, and family histories were reviewed today and no changes are noted.    REVIEW OF SYSTEMS:  Constitutional: NEGATIVE for fever, chills, change in weight  Skin: NEGATIVE for worrisome rashes, moles or lesions  GI/: NEGATIVE for bowel or bladder changes  Neuro: NEGATIVE for weakness, dizziness or paresthesias    OBJECTIVE:  Ht 1.537 m (5' 0.5\")   Wt 52.6 kg (116 lb)   LMP 06/21/2002 (Approximate)   BMI 22.28 kg/m     General: healthy, alert and in no distress  HEENT: no scleral icterus or conjunctival erythema  Skin: no " suspicious lesions or rash. No jaundice.  CV: regular rhythm by palpation, no pedal edema  Resp: normal respiratory effort without conversational dyspnea   Psych: normal mood and affect  Gait: normal steady gait with appropriate coordination and balance  Neuro: normal light touch sensory exam of the extremities.    MSK:    RIGHT ANKLE  Inspection:    No swelling, redness, edema or ecchymosis is observed  Palpation:    Very mild tenderness to palpation about the achilles tendon insertion. Remainder of bony and ligamentous landmarks are nontender.  Range of Motion:     Plantarflexion full / dorsiflexion limited by tightness / inversion full / eversion full  Strength:    full  Special Tests:    negative anterior drawer, negative talar tilt, negative valgus stress, negative forced external rotation/eversion, negative Ackerman sign, negative squeeze test.      Independent visualization of the below image:         Al Vieira MD, [facility]    Disclaimer: This note consists of symbols derived from keyboarding, dictation and/or voice recognition software. As a result, there may be errors in the script that have gone undetected. Please consider this when interpreting information found in this chart.

## 2023-12-20 DIAGNOSIS — R25.2 BILATERAL LEG CRAMPS: ICD-10-CM

## 2023-12-20 DIAGNOSIS — H40.013 OPEN ANGLE WITH BORDERLINE FINDINGS AND LOW GLAUCOMA RISK IN BOTH EYES: ICD-10-CM

## 2023-12-20 DIAGNOSIS — M35.00 SICCA SYNDROME (H): ICD-10-CM

## 2023-12-20 DIAGNOSIS — J30.1 HAYFEVER: ICD-10-CM

## 2023-12-20 RX ORDER — MULTIVITAMIN WITH IRON
TABLET ORAL
Qty: 90 TABLET | Refills: 1 | Status: SHIPPED | OUTPATIENT
Start: 2023-12-20 | End: 2024-06-25

## 2023-12-20 RX ORDER — FLUTICASONE PROPIONATE 50 MCG
SPRAY, SUSPENSION (ML) NASAL
Qty: 16 G | Refills: 3 | Status: SHIPPED | OUTPATIENT
Start: 2023-12-20 | End: 2023-12-22

## 2023-12-20 RX ORDER — CARBOXYMETHYLCELLULOSE SODIUM AND GLYCERIN 5; 9 MG/ML; MG/ML
SOLUTION/ DROPS OPHTHALMIC
Qty: 15 ML | Refills: 1 | Status: SHIPPED | OUTPATIENT
Start: 2023-12-20 | End: 2024-02-28

## 2023-12-22 ENCOUNTER — OFFICE VISIT (OUTPATIENT)
Dept: FAMILY MEDICINE | Facility: CLINIC | Age: 73
End: 2023-12-22
Payer: COMMERCIAL

## 2023-12-22 VITALS
SYSTOLIC BLOOD PRESSURE: 120 MMHG | RESPIRATION RATE: 12 BRPM | BODY MASS INDEX: 20.27 KG/M2 | HEART RATE: 76 BPM | OXYGEN SATURATION: 98 % | DIASTOLIC BLOOD PRESSURE: 70 MMHG | TEMPERATURE: 97.7 F | HEIGHT: 63 IN | WEIGHT: 114.4 LBS

## 2023-12-22 DIAGNOSIS — M35.00 SICCA SYNDROME (H): Primary | ICD-10-CM

## 2023-12-22 DIAGNOSIS — R68.89 COLD INTOLERANCE: ICD-10-CM

## 2023-12-22 DIAGNOSIS — M06.4 POLYARTHRITIS, INFLAMMATORY (H): ICD-10-CM

## 2023-12-22 DIAGNOSIS — I10 HYPERTENSION GOAL BP (BLOOD PRESSURE) < 140/90: ICD-10-CM

## 2023-12-22 DIAGNOSIS — R73.03 PRE-DIABETES: ICD-10-CM

## 2023-12-22 DIAGNOSIS — M76.61 ACHILLES TENDINITIS OF RIGHT LOWER EXTREMITY: ICD-10-CM

## 2023-12-22 DIAGNOSIS — Z23 ENCOUNTER FOR IMMUNIZATION: ICD-10-CM

## 2023-12-22 DIAGNOSIS — J30.1 HAYFEVER: ICD-10-CM

## 2023-12-22 PROCEDURE — 99214 OFFICE O/P EST MOD 30 MIN: CPT | Performed by: PREVENTIVE MEDICINE

## 2023-12-22 RX ORDER — DEXTROMETHORPHAN POLISTIREX 30 MG/5ML
60 SUSPENSION ORAL 2 TIMES DAILY
Qty: 148 ML | Refills: 3 | Status: SHIPPED | OUTPATIENT
Start: 2023-12-22 | End: 2024-04-12

## 2023-12-22 RX ORDER — RESPIRATORY SYNCYTIAL VIRUS VACCINE 120MCG/0.5
0.5 KIT INTRAMUSCULAR ONCE
Qty: 1 EACH | Refills: 0 | Status: SHIPPED | OUTPATIENT
Start: 2023-12-22 | End: 2023-12-22

## 2023-12-22 RX ORDER — NITROGLYCERIN 4 MG/G
1 OINTMENT RECTAL EVERY 12 HOURS
Qty: 30 G | Refills: 0 | Status: SHIPPED | OUTPATIENT
Start: 2023-12-22 | End: 2024-05-02

## 2023-12-22 RX ORDER — PILOCARPINE HYDROCHLORIDE 5 MG/1
5 TABLET, FILM COATED ORAL 3 TIMES DAILY
Qty: 270 TABLET | Refills: 0 | Status: SHIPPED | OUTPATIENT
Start: 2023-12-22

## 2023-12-22 RX ORDER — FLUTICASONE PROPIONATE 50 MCG
1-2 SPRAY, SUSPENSION (ML) NASAL DAILY
Qty: 48 G | Refills: 3 | Status: SHIPPED | OUTPATIENT
Start: 2023-12-22

## 2023-12-22 RX ORDER — BENZONATATE 200 MG/1
200 CAPSULE ORAL 3 TIMES DAILY PRN
Qty: 30 CAPSULE | Refills: 3 | Status: SHIPPED | OUTPATIENT
Start: 2023-12-22 | End: 2024-04-12

## 2023-12-22 NOTE — PROGRESS NOTES
Assessment & Plan     Sicca syndrome (H24)  -Previous rheumatology care through [facility]  -Patient will be establishing with rheumatology within [facility], has appointment scheduled for next year  -In the meantime needs refills for 90 days on pilocarpine for dry mouth, refills sent, further refills and management per rheumatology  - dextromethorphan (DELSYM) 30 MG/5ML liquid  Dispense: 148 mL; Refill: 3  - benzonatate (TESSALON) 200 MG capsule  Dispense: 30 capsule; Refill: 3  - pilocarpine (SALAGEN) 5 MG tablet  Dispense: 270 tablet; Refill: 0    Hypertension goal BP (blood pressure) < 140/90  -Blood pressure was rechecked and is within normal limits  - PRIMARY CARE FOLLOW-UP SCHEDULING    Polyarthritis, inflammatory (H)  -Per rheumatology    Pre-diabetes  -  Lab Results   Component Value Date    A1C 6.1 09/11/2023    A1C 6.1 04/26/2023    A1C 6.2 10/21/2022    A1C 6.5 05/10/2022    A1C 5.6 10/08/2020    A1C 5.9 04/26/2019    A1C 5.9 06/21/2018    A1C 5.9 10/27/2016     Be sure to monitor your intake of things like bread, pasta, rice, starchy foods (ie: potatoes), sugary beverages (ie: soda, juice-even the natural kind) and alcohol.  I recommend your plate be 1/2 vegetables, 1/4 protein, and 1/4 carbohydrate.      Cold intolerance  - TSH with free T4 reflex    Achilles tendinitis of right lower extremity  -Was seen by sports medicine for this  -Patient requesting refill on medication provided by sports medicine, one-time refill provided  -Initially, patient had declined physical therapy however, now would like to proceed with physical therapy, referral placed  - Physical Therapy Referral  - nitroGLYcerin (RECTIV) 0.4 % OINT rectal ointment  Dispense: 30 g; Refill: 0    Hayfever  -90-day refills on medication provided  - fluticasone (FLONASE) 50 MCG/ACT nasal spray  Dispense: 48 g; Refill: 3    Encounter for immunization  -Discussed that patient will have to get RSV vaccine at the pharmacy  - respiratory syncytial  "virus vaccine, bivalent (ABRYSVO) injection  Dispense: 1 each; Refill: 0      Ordering of each unique test  Prescription drug management  30 minutes spent by me on the date of the encounter doing chart review, history and exam, documentation and further activities per the note         Jud Morales MD MPH    [facility] PAULA Sorto is a 73 year old, presenting for the following health issues:  Pre-Op Exam        12/22/2023    11:19 AM   Additional Questions   Roomed by maile   Accompanied by self         12/22/2023    11:19 AM   Patient Reported Additional Medications   Patient reports taking the following new medications no       HPI       Was seen by [facility] for Dermatochalasis.  Has had Blepharoplasty done and was told that redo would be cosmetic and would have to self pay. Hence, patient cancelled the surgery and so no pre op needed today. If she changes her mind then will reschedule a pre op    Does not check blood pressure at home    Cold intolerance+ for 30 minutes more at bedtime, first started one week ago.  No fever  No urine changes     Right ankle inflammation, was seen by Sports Medicine, at that time declined physical therapy but would like to proceed with this now, requesting referral to Physical therapy.    Will be seeing Rheumatology at [facility] since switching from [facility], needs 90 day refills on Pilocarpine for better coverage per insurance.    Had questions about the RSV vaccine and where to get it.  Had questions about medicare wellness exam.      Review of Systems   Constitutional, HEENT, cardiovascular, pulmonary, gi and gu systems are negative, except as otherwise noted.      Objective    /70   Pulse 76   Temp 97.7  F (36.5  C) (Oral)   Resp 12   Ht 1.6 m (5' 3\")   Wt 51.9 kg (114 lb 6.4 oz)   LMP 06/21/2002 (Approximate)   SpO2 98%   BMI 20.27 kg/m    Body mass index is 20.27 kg/m .  Physical Exam   GENERAL APPEARANCE: healthy, alert and no " distress  EYES: Eyes grossly normal to inspection and conjunctivae and sclerae normal  RESP: lungs clear to auscultation - no rales, rhonchi or wheezes  CV: regular rates and rhythm, normal S1 S2  ABDOMEN: soft, non-tender and no rebound or guarding   SKIN: no suspicious lesions or rashes  NEURO: Normal strength and tone, mentation intact and speech normal  PSYCH: mentation appears normal      No results found for this or any previous visit (from the past 24 hour(s)).

## 2023-12-26 RX ORDER — LATANOPROST 50 UG/ML
1 SOLUTION/ DROPS OPHTHALMIC AT BEDTIME
Qty: 7.5 ML | Refills: 6 | Status: SHIPPED | OUTPATIENT
Start: 2023-12-26 | End: 2024-09-20

## 2024-01-02 ENCOUNTER — ANCILLARY ORDERS (OUTPATIENT)
Dept: FAMILY MEDICINE | Facility: CLINIC | Age: 74
End: 2024-01-02

## 2024-01-02 ENCOUNTER — ANCILLARY PROCEDURE (OUTPATIENT)
Dept: MAMMOGRAPHY | Facility: CLINIC | Age: 74
End: 2024-01-02
Attending: PREVENTIVE MEDICINE
Payer: COMMERCIAL

## 2024-01-02 DIAGNOSIS — R73.03 PRE-DIABETES: ICD-10-CM

## 2024-01-02 DIAGNOSIS — Z12.31 VISIT FOR SCREENING MAMMOGRAM: ICD-10-CM

## 2024-01-02 DIAGNOSIS — Z23 HIGH PRIORITY FOR 2019-NCOV VACCINE: ICD-10-CM

## 2024-01-02 DIAGNOSIS — R05.1 ACUTE COUGH: ICD-10-CM

## 2024-01-02 DIAGNOSIS — L30.9 DERMATITIS: ICD-10-CM

## 2024-01-02 DIAGNOSIS — M35.00 SICCA SYNDROME: ICD-10-CM

## 2024-01-02 DIAGNOSIS — K12.0 ORAL APHTHOUS ULCER: ICD-10-CM

## 2024-01-02 DIAGNOSIS — I10 HYPERTENSION GOAL BP (BLOOD PRESSURE) < 140/90: Primary | ICD-10-CM

## 2024-01-02 DIAGNOSIS — M06.4 POLYARTHRITIS, INFLAMMATORY (H): ICD-10-CM

## 2024-01-02 DIAGNOSIS — E78.2 MIXED HYPERLIPIDEMIA: ICD-10-CM

## 2024-01-02 PROCEDURE — 77063 BREAST TOMOSYNTHESIS BI: CPT | Mod: TC | Performed by: RADIOLOGY

## 2024-01-02 PROCEDURE — 77067 SCR MAMMO BI INCL CAD: CPT | Mod: TC | Performed by: RADIOLOGY

## 2024-01-12 ENCOUNTER — THERAPY VISIT (OUTPATIENT)
Dept: PHYSICAL THERAPY | Facility: CLINIC | Age: 74
End: 2024-01-12
Attending: PREVENTIVE MEDICINE
Payer: COMMERCIAL

## 2024-01-12 DIAGNOSIS — M76.61 ACHILLES TENDINITIS OF RIGHT LOWER EXTREMITY: ICD-10-CM

## 2024-01-12 DIAGNOSIS — M76.61 TENDONITIS, ACHILLES, RIGHT: Primary | ICD-10-CM

## 2024-01-12 PROCEDURE — 97140 MANUAL THERAPY 1/> REGIONS: CPT | Mod: GP | Performed by: PHYSICAL THERAPIST

## 2024-01-12 PROCEDURE — 97110 THERAPEUTIC EXERCISES: CPT | Mod: GP | Performed by: PHYSICAL THERAPIST

## 2024-01-12 PROCEDURE — 97161 PT EVAL LOW COMPLEX 20 MIN: CPT | Mod: GP | Performed by: PHYSICAL THERAPIST

## 2024-01-12 NOTE — PROGRESS NOTES
PHYSICAL THERAPY EVALUATION  Type of Visit: Evaluation    See electronic medical record for Abuse and Falls Screening details.    Subjective       Presenting condition or subjective complaint: Right Ankle/heel pain- Has had pain for long time  Date of onset: 12/22/23 (MD order date)    Relevant medical history: Arthritis; High blood pressure; History of fractures   Dates & types of surgery:      Prior diagnostic imaging/testing results: X-ray     Prior therapy history for the same diagnosis, illness or injury: Yes (2022)      Prior Level of Function  Transfers: Independent  Ambulation: Independent  ADL: Independent  IADL: Driving, Housekeeping, Laundry, Meal preparation    Living Environment  Social support: With family members   Type of home: House   Stairs to enter the home: Yes       Ramp: No   Stairs inside the home: Yes 2 Is there a railing: Yes   Help at home: None  Equipment owned:       Employment: No    Hobbies/Interests: walk up to 40 min/day    Patient goals for therapy: albe to walk and god down stairs without pain    Pain assessment: See objective evaluation for additional pain details     Objective   FOOT/ANKLE EVALUATION  PAIN: Pain Level at Rest: 4/10  Pain Level with Use: 8/10  Pain Location: achilles/heel  Pain Quality: Aching, Burning, and Nagging  Pain Frequency: constant  Pain is Worst: daytime  Pain is Exacerbated By: walking wihtout shoes, stairs (going down)  INTEGUMENTARY (edema, incisions):  R achilles inflammed- ~ 3 cm at insertion point  POSTURE:   GAIT:   Weightbearing Status: WBAT  Assistive Device(s): None  Gait Deviations: Antalgic  Stance time decreased  Stride length decreased  Umm decreased  Heel strike decreased R, Push off decreased R  BALANCE/PROPRIOCEPTION:  not assessed    ROM:   (Degrees) Left AROM Left PROM  Right AROM Right PROM   Ankle Dorsiflexion 12  8    Ankle Plantarflexion 60  60    Ankle Inversion 32  32    Ankle Eversion 16  12    Great Toe Flexion       Great  Toe Extension         STRENGTH: WNL  FLEXIBILITY:   SPECIAL TESTS:   FUNCTIONAL TESTS:   PALPATION:   + Tenderness at Location Left Right   Gastroc/Soleus  -   Achilles Tendon  +   Anterior Tibialis     Posterior Tibialis     Incisional     Deltoid Ligament     Plantar Fascia     Navicular     Medial Calcaneal     Peroneals     Anterior Talofibular Ligament     Posterior Talofibular Ligament     Calcaneofibular Ligament     Medial Malleolus     Lateral Malleolus     Anterior Tibiofibular Ligament     Posterior Tibiofibular Ligament       JOINT MOBILITY:     Assessment & Plan   CLINICAL IMPRESSIONS  Medical Diagnosis: Achilles tendinitis of right lower extremity    Treatment Diagnosis: R achilles tendonitis   Impression/Assessment: Patient is a 73 year old female with R achilles pain complaints.  The following significant findings have been identified: Pain, Decreased ROM/flexibility, Edema, Impaired gait, Impaired muscle performance, and Decreased activity tolerance. These impairments interfere with their ability to perform self care tasks, recreational activities, household chores, household mobility, and community mobility as compared to previous level of function.     Clinical Decision Making (Complexity):  Clinical Presentation: Stable/Uncomplicated  Clinical Presentation Rationale: based on medical and personal factors listed in PT evaluation  Clinical Decision Making (Complexity): Low complexity    PLAN OF CARE  Treatment Interventions:  Modalities: Cryotherapy, Iontophoresis, Ultrasound  Interventions: Manual Therapy, Neuromuscular Re-education, Therapeutic Activity, Therapeutic Exercise, Self-Care/Home Management    Long Term Goals     PT Goal 1  Goal Identifier: stairs  Goal Description: able to descend 6-8 steps in normal gait pattern with no pain R ankle  Rationale: to maximize safety and independence with performance of ADLs and functional tasks;to maximize safety and independence within the home;to  maximize safety and independence within the community  Goal Progress: 4/10PL when going down the stairs; sometimes does 1 step at a time due to pain in R ankle  Target Date: 03/08/24      Frequency of Treatment: 1x/week  Duration of Treatment: x8 weeks    Recommended Referrals to Other Professionals: Physical Therapy  Education Assessment:   Learner/Method: Demonstration;Pictures/Video    Risks and benefits of evaluation/treatment have been explained.   Patient/Family/caregiver agrees with Plan of Care.     Evaluation Time:     PT Eval, Low Complexity Minutes (92621): 16       Signing Clinician: Mikaela Earl, DANIELLE      [facility]                                                                                   OUTPATIENT PHYSICAL THERAPY      PLAN OF TREATMENT FOR OUTPATIENT REHABILITATION   Patient's Last Name, First Name, Noreen Barajas YOB: 1950   Provider's Name   [facility]   Medical Record No.  [id number]     Onset Date: 12/22/23 (MD order date)  Start of Care Date: 01/12/24     Medical Diagnosis:  Achilles tendinitis of right lower extremity      PT Treatment Diagnosis:  R achilles tendonitis Plan of Treatment  Frequency/Duration: 1x/week/ x8 weeks    Certification date from 01/12/24 to 03/08/24         See note for plan of treatment details and functional goals     Mikaela Earl, PT                         I CERTIFY THE NEED FOR THESE SERVICES FURNISHED UNDER        THIS PLAN OF TREATMENT AND WHILE UNDER MY CARE     (Physician attestation of this document indicates review and certification of the therapy plan).              Referring Provider:  Jud Morales MD    Initial Assessment  See Epic Evaluation- Start of Care Date: 01/12/24

## 2024-01-17 ENCOUNTER — PATIENT OUTREACH (OUTPATIENT)
Dept: GERIATRIC MEDICINE | Facility: CLINIC | Age: 74
End: 2024-01-17
Payer: COMMERCIAL

## 2024-01-17 NOTE — LETTER
January 18, 2024    NOREEN WOLF  [address]  [place] [address]        Dear Noreen:    As a member of [facility]'s [facility], we offer a health risk assessment at no cost to you. I know you don't want to have the assessment right now. If you change your mind, please call me at the number below.    Who performs the health risk assessment?  A [facility] Care Coordinator performs the assessment. Our Care Coordinators can also help you understand your benefits. They can tell you about services to aid you at home, such as managing your care with your doctors if your health worsens.    Our Care Coordinators are here for you if you need:  Support for activities you used to do by yourself (including making meals, bathing, and paying bills)  Equipment for bathroom or home safety  Help finding a new place to live  Information on staying healthy, preventing falls and immunizations    Questions?  If you have questions, or you would like to do the assessment, call me at [phone]. TTY users call [phone]. I'm here from 8am to 5pm. I may reach out to you again soon.      Sincerely,        Norah Peguero RN    E-mail:  [email or web address]  Phone: [phone]    Care Manager  [facility]    <[id number] accepted    [id number] (12/2021)  [id number]>

## 2024-01-17 NOTE — PROGRESS NOTES
[facility] Refusal Telephone Assessment    Member refused home visit HRA on 1/17/24.    ER visits: No  Hospitalizations: No  Health concerns: None reported.  Falls/Injuries: No  ADL/IADL Dependencies: Reports independence.        Member currently receiving the following home care services:  None   Member currently receiving the following community resources:  None  Informal support(s):  Family    Advanced Care Planning discussion, complete code section.    [facility] sponsored benefits: Shared information re: [employer]/gym memberships, ASA, Calcium +D.    Follow-Up Plan: Member informed of future contact, plan to f/u with member with a 6 month telephone assessment and offer a home visit.  Contact information shared with member and family, encouraged member to call with any questions or concerns at any time.    This CC note routed to PCP, Jud Morales.    Norah Peguero RN/BSN  [facility]  [phone]

## 2024-01-18 NOTE — PROGRESS NOTES
"[facility] Care Coordination Contact    Per CC, mailed client a \"Refusal of Home Visit\" letter.    Sandee Quezada  Care Management Specialist  [facility]  [phone]      "

## 2024-01-22 ENCOUNTER — THERAPY VISIT (OUTPATIENT)
Dept: PHYSICAL THERAPY | Facility: CLINIC | Age: 74
End: 2024-01-22
Payer: COMMERCIAL

## 2024-01-22 DIAGNOSIS — M76.61 TENDONITIS, ACHILLES, RIGHT: Primary | ICD-10-CM

## 2024-01-22 PROCEDURE — 97110 THERAPEUTIC EXERCISES: CPT | Mod: GP | Performed by: PHYSICAL THERAPIST

## 2024-01-22 PROCEDURE — 97035 APP MDLTY 1+ULTRASOUND EA 15: CPT | Mod: GP | Performed by: PHYSICAL THERAPIST

## 2024-01-22 PROCEDURE — 97140 MANUAL THERAPY 1/> REGIONS: CPT | Mod: GP | Performed by: PHYSICAL THERAPIST

## 2024-01-27 DIAGNOSIS — K05.10 GINGIVITIS: ICD-10-CM

## 2024-01-29 ENCOUNTER — THERAPY VISIT (OUTPATIENT)
Dept: PHYSICAL THERAPY | Facility: CLINIC | Age: 74
End: 2024-01-29
Payer: COMMERCIAL

## 2024-01-29 DIAGNOSIS — M76.61 TENDONITIS, ACHILLES, RIGHT: Primary | ICD-10-CM

## 2024-01-29 PROCEDURE — 97035 APP MDLTY 1+ULTRASOUND EA 15: CPT | Mod: GP | Performed by: PHYSICAL THERAPIST

## 2024-01-29 PROCEDURE — 97110 THERAPEUTIC EXERCISES: CPT | Mod: GP | Performed by: PHYSICAL THERAPIST

## 2024-01-29 RX ORDER — CHLORHEXIDINE GLUCONATE ORAL RINSE 1.2 MG/ML
SOLUTION DENTAL
Qty: 473 ML | Refills: 1 | Status: SHIPPED | OUTPATIENT
Start: 2024-01-29 | End: 2024-06-25

## 2024-01-31 ENCOUNTER — ANCILLARY PROCEDURE (OUTPATIENT)
Dept: ULTRASOUND IMAGING | Facility: CLINIC | Age: 74
End: 2024-01-31
Attending: PHYSICIAN ASSISTANT
Payer: COMMERCIAL

## 2024-01-31 DIAGNOSIS — D17.71 ANGIOMYOLIPOMA OF RIGHT KIDNEY: ICD-10-CM

## 2024-01-31 DIAGNOSIS — L70.0 ACNE VULGARIS: ICD-10-CM

## 2024-01-31 DIAGNOSIS — L30.9 DERMATITIS: ICD-10-CM

## 2024-01-31 PROCEDURE — 76770 US EXAM ABDO BACK WALL COMP: CPT | Performed by: RADIOLOGY

## 2024-01-31 RX ORDER — TRETINOIN 0.5 MG/G
CREAM TOPICAL
Qty: 45 G | Refills: 4 | Status: SHIPPED | OUTPATIENT
Start: 2024-01-31

## 2024-01-31 NOTE — TELEPHONE ENCOUNTER
Last Written Prescription Date:  9/27/2023  Last Fill Quantity: 45g,  # refills: 4   Last office visit: 9/27/2023 ; last virtual visit: Visit date not found with prescribing provider:     Future Office Visit:   Next 5 appointments (look out 90 days)      Feb 15, 2024  2:30 PM  (Arrive by 2:15 PM)  Return Visit with Amanda Warner NP  [facility] ([facility]) [address] [place] [id number] [phone]     Mar 27, 2024  1:00 PM  (Arrive by 12:45 PM)  Return Visit with Khloe Esquivel PA-C  [facility] ([facility] ) [address] [place] [id number] [phone]           Requested Prescriptions   Pending Prescriptions Disp Refills    tretinoin (RETIN-A) 0.05 % external cream 45 g 4     Sig: Apply pea sized amount at bedtime.       There is no refill protocol information for this order

## 2024-02-03 ENCOUNTER — OFFICE VISIT (OUTPATIENT)
Dept: URGENT CARE | Facility: URGENT CARE | Age: 74
End: 2024-02-03
Payer: COMMERCIAL

## 2024-02-03 VITALS
HEART RATE: 87 BPM | RESPIRATION RATE: 16 BRPM | WEIGHT: 112 LBS | DIASTOLIC BLOOD PRESSURE: 84 MMHG | TEMPERATURE: 98.3 F | OXYGEN SATURATION: 97 % | SYSTOLIC BLOOD PRESSURE: 137 MMHG | BODY MASS INDEX: 19.84 KG/M2
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
## 2024-02-03 DIAGNOSIS — S61.452A DOG BITE OF LEFT HAND, INITIAL ENCOUNTER: Primary | ICD-10-CM

## 2024-02-03 PROCEDURE — 99214 OFFICE O/P EST MOD 30 MIN: CPT | Performed by: PHYSICIAN ASSISTANT

## 2024-02-03 ASSESSMENT — ENCOUNTER SYMPTOMS
NECK PAIN: 0
FEVER: 0
COLOR CHANGE: 0
BACK PAIN: 1
CARDIOVASCULAR NEGATIVE: 1
PALPITATIONS: 0
FATIGUE: 0
CHILLS: 0
WOUND: 1
JOINT SWELLING: 1
ARTHRALGIAS: 1
NUMBNESS: 0
NECK STIFFNESS: 0
MYALGIAS: 1

## 2024-02-03 NOTE — PROGRESS NOTES
Subjective   Noreen is a 73 year old, presenting for the following health issues:  Insect Bites (Dog bite this morning on left thumb. )    HPI   Dog bite  Onset/Duration: today  Description  Location: L thumb  Joint Swelling: Yes  Redness: no  Pain: YES  Warmth: no  Intensity:  moderate  Progression of Symptoms:  same  Accompanying signs and symptoms:   Fevers: no  Numbness/tingling/weakness: no  History  Trauma to the area: Yes, sustained a dog bite to the L thumb after picking up a neighbor's dog today while out walking.  She does not know the status of the dog's vaccinations and does not seem to want to ask about it either.  She is UTD on her Tdap.     Recent illness:  no  Previous similar problem: no  Previous evaluation:  no  Precipitating or alleviating factors:  Aggravating factors include: pressure, movement, overuse  Therapies tried and outcome: rest/inactivity, antibiotic ointment with minimal relief    Patient Active Problem List   Diagnosis    Osteopenia    Hayfever    Hypertension goal BP (blood pressure) < 140/90    Polyarthritis, inflammatory (H)    GERD (gastroesophageal reflux disease)    Hyperlipidemia with target LDL less than 130    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    High risk medication use    Microscopic hematuria    Pterygium of right eye    Sicca syndrome (H24)    Angiomyolipoma of right kidney    History of Helicobacter pylori infection    Pre-diabetes    NAFLD (nonalcoholic fatty liver disease)    Elevated LFTs    Heel pain, chronic, right    Dermatochalasis of both upper eyelids    Dermatochalasis of both lower eyelids    Tendonitis, Achilles, right     Current Outpatient Medications   Medication    acetaminophen-codeine (TYLENOL #3) 300-30 MG tablet    benzonatate (TESSALON) 200 MG capsule    celecoxib (CELEBREX) 100 MG capsule    cetirizine (ZYRTEC) 10 MG tablet    chlorhexidine (PERIDEX) 0.12 % solution    cholecalciferol (VITAMIN D3) 25 mcg (1000 units) capsule     dextromethorphan (DELSYM) 30 MG/5ML liquid    diclofenac (VOLTAREN) 1 % topical gel    fluticasone (FLONASE) 50 MCG/ACT nasal spray    latanoprost (XALATAN) 0.005 % ophthalmic solution    losartan-hydrochlorothiazide (HYZAAR) 50-12.5 MG tablet    magnesium 250 MG tablet    meloxicam (MOBIC) 7.5 MG tablet    methylcellulose (CITRUCEL) powder    metoprolol tartrate (LOPRESSOR) 25 MG tablet    minoxidil (ROGAINE) 5 % external solution    multivitamin w/minerals (THERA-VIT-M) tablet    nitroGLYcerin (RECTIV) 0.4 % OINT rectal ointment    omeprazole (PRILOSEC) 40 MG DR capsule    OPTIVE 0.5-0.9 % ophthalmic solution    pilocarpine (SALAGEN) 5 MG tablet    pravastatin (PRAVACHOL) 20 MG tablet    sodium fluoride (SF 5000 PLUS) 1.1 % CREA    sulfaSALAzine ER (AZULFIDINE EN) 500 MG EC tablet    tretinoin (RETIN-A) 0.05 % external cream    triamcinolone (KENALOG) 0.1 % external cream    triamcinolone (KENALOG) 0.1 % paste    albuterol (PROAIR HFA/PROVENTIL HFA/VENTOLIN HFA) 108 (90 Base) MCG/ACT inhaler     Current Facility-Administered Medications   Medication    2 mL bupivacaine (MARCAINE) preservative free injection 0.25% (10 mL vial)    lidocaine 1 % injection 2 mL    triamcinolone (KENALOG-40) injection 40 mg     Facility-Administered Medications Ordered in Other Visits   Medication    gadobutrol (GADAVIST) injection 7.5 mL      No Known Allergies    Review of Systems   Constitutional:  Negative for chills, fatigue and fever.   Cardiovascular: Negative.  Negative for chest pain, palpitations and leg swelling.   Musculoskeletal:  Positive for arthralgias, back pain, joint swelling and myalgias. Negative for gait problem, neck pain and neck stiffness.   Skin:  Positive for wound. Negative for color change, pallor and rash.   Neurological:  Negative for numbness.   All other systems reviewed and are negative.          Objective    /84 (BP Location: Left arm, Patient Position: Sitting, Cuff Size: Adult Regular)    Pulse 87   Temp 98.3  F (36.8  C) (Tympanic)   Resp 16   Wt 50.8 kg (112 lb)   LMP 06/21/2002 (Approximate)   SpO2 97%   BMI 19.84 kg/m    Body mass index is 19.84 kg/m .  Physical Exam  Vitals and nursing note reviewed.   Constitutional:       General: She is not in acute distress.     Appearance: Normal appearance. She is well-developed and normal weight. She is not ill-appearing.   Musculoskeletal:      Right wrist: Normal. No swelling, deformity, effusion, lacerations, tenderness, bony tenderness or crepitus. Normal range of motion.      Left wrist: Normal.      Right hand: No swelling, deformity, lacerations, tenderness or bony tenderness. Normal range of motion. Normal strength. Normal strength of finger abduction and wrist extension. Normal sensation. There is no disruption of two-point discrimination. Normal capillary refill. Normal pulse.      Left hand: Swelling, deformity (puncture wound present over the L thumb.  no erythema or drainage) and tenderness present. No lacerations or bony tenderness. Normal range of motion. Normal strength. Normal sensation. There is no disruption of two-point discrimination. Normal capillary refill. Normal pulse.   Skin:     General: Skin is warm and dry.      Capillary Refill: Capillary refill takes less than 2 seconds.   Neurological:      Mental Status: She is alert and oriented to person, place, and time.      Sensory: Sensation is intact. No sensory deficit.      Motor: Motor function is intact.      Gait: Gait is intact. Gait normal.      Deep Tendon Reflexes: Reflexes are normal and symmetric.   Psychiatric:         Mood and Affect: Mood normal.         Behavior: Behavior normal.         Thought Content: Thought content normal.         Judgment: Judgment normal.             Assessment/Plan:  Dog bite of left hand, initial encounter:  Dog's vaccination status is unknown.  She is not in contact with the owner of the dog either and therefore, cannot monitor dog's  behavior. H&P is concerning for rabies.  She is UTD on her Tdap. Recommend further evaluation and management in the ER.  Will most likely need rabies vaccinations.  Call 911 if worsening symptoms.  S/he plans to go to [place] ER.  S/he left in stable condition with AVS in hand.  F/u with PCP after ER visit.         Parisa Salcedo PA-C

## 2024-02-03 NOTE — TELEPHONE ENCOUNTER
As we discussed there certainly is a spectrum from infection.  I would agree with you I think this is early at this time.  I would plan to initiate an antibiotic due to concern of early infection.  I would agree that I think close outpatient follow-up with a orthopedic surgeon/podiatrist and or wound care specialist is important.  We did discuss initially plan for imaging and laboratory studies I think this is fine as long as you are taking the antibiotics to hold on this.  However should you notice change, progression or worsening symptoms such as increasing pain, increasing redness, swelling, fever chills or other concerns please call return emergently for reevaluation.   Not sure if alternatives are appropriate. This was recommended by her dentist. Please have patient run the alternatives by her dentist and give us a call back. I will then order if appropriate.  Jia Castro MD

## 2024-02-07 ENCOUNTER — TELEPHONE (OUTPATIENT)
Dept: DERMATOLOGY | Facility: CLINIC | Age: 74
End: 2024-02-07
Payer: COMMERCIAL

## 2024-02-07 NOTE — TELEPHONE ENCOUNTER
Prior Authorization Specialty Medication Request    Medication/Dose: Tretinoin 0.05% cream  Diagnosis and ICD code (if different than what is on RX):    New/renewal/insurance change PA/secondary ins. PA:  Previously Tried and Failed:      Important Lab Values:   Rationale:     Insurance   Primary:   Insurance ID:      Secondary (if applicable):  Insurance ID:      Pharmacy Information (if different than what is on RX)  Name:  [facility]  Phone:  [phone]  Fax:[phone]

## 2024-02-08 ENCOUNTER — PATIENT OUTREACH (OUTPATIENT)
Dept: GERIATRIC MEDICINE | Facility: CLINIC | Age: 74
End: 2024-02-08
Payer: COMMERCIAL

## 2024-02-08 NOTE — PROGRESS NOTES
[facility] Care Coordination Contact  CC received notification of Emergency Room visit.  ER visit occurred on 2/3/24 and 2/6/24 at [facility]  with Dx of dog bite and rabies vaccine .    CC contacted member and reviewed discharge summary.  Member has a follow-up appointment with PCP: No: Offered Assistance with setting up a follow up appointment- member is following up with hospital for rabies series of vaccination. Had 2nd dose 2/6, is currently in [facility] and states she will receive the 3rd dose there on 2/10/24 and then will get the last dose when she returns home on 2/17/24. Instructed to contact this CC with any questions or concerns. CC aware of 2/10/24 and 2/17/24 scheduled ED visits and will not follow up unless member contact CC with questions/concerns.    Member has had a change in condition: No  New referrals placed: No  Home Visit Needed: No  Care plan reviewed and updated.  PCP notified of ED visit via EMR.    Norah Peguero RN/BSN  [facility]  [phone]

## 2024-02-15 ENCOUNTER — OFFICE VISIT (OUTPATIENT)
Dept: RHEUMATOLOGY | Facility: CLINIC | Age: 74
End: 2024-02-15
Payer: COMMERCIAL

## 2024-02-15 ENCOUNTER — LAB (OUTPATIENT)
Dept: LAB | Facility: CLINIC | Age: 74
End: 2024-02-15
Payer: COMMERCIAL

## 2024-02-15 VITALS
SYSTOLIC BLOOD PRESSURE: 143 MMHG | HEART RATE: 84 BPM | WEIGHT: 113 LBS | DIASTOLIC BLOOD PRESSURE: 86 MMHG | BODY MASS INDEX: 20.02 KG/M2 | OXYGEN SATURATION: 96 %

## 2024-02-15 DIAGNOSIS — M67.912 TENDINOPATHY OF LEFT ROTATOR CUFF: ICD-10-CM

## 2024-02-15 DIAGNOSIS — M06.4 POLYARTHRITIS, INFLAMMATORY (H): ICD-10-CM

## 2024-02-15 LAB
ALBUMIN SERPL BCG-MCNC: 4.7 G/DL (ref 3.5–5.2)
ALT SERPL W P-5'-P-CCNC: 16 U/L (ref 0–50)
AST SERPL W P-5'-P-CCNC: 34 U/L (ref 0–45)
CREAT SERPL-MCNC: 0.56 MG/DL (ref 0.51–0.95)
CRP SERPL-MCNC: <3 MG/L
EGFRCR SERPLBLD CKD-EPI 2021: >90 ML/MIN/1.73M2
ERYTHROCYTE [DISTWIDTH] IN BLOOD BY AUTOMATED COUNT: 13.2 % (ref 10–15)
ERYTHROCYTE [SEDIMENTATION RATE] IN BLOOD BY WESTERGREN METHOD: 11 MM/HR (ref 0–30)
HCT VFR BLD AUTO: 41.9 % (ref 35–47)
HGB BLD-MCNC: 13.7 G/DL (ref 11.7–15.7)
MCH RBC QN AUTO: 28.6 PG (ref 26.5–33)
MCHC RBC AUTO-ENTMCNC: 32.7 G/DL (ref 31.5–36.5)
MCV RBC AUTO: 88 FL (ref 78–100)
PLATELET # BLD AUTO: 237 10E3/UL (ref 150–450)
RBC # BLD AUTO: 4.79 10E6/UL (ref 3.8–5.2)
WBC # BLD AUTO: 6.3 10E3/UL (ref 4–11)

## 2024-02-15 PROCEDURE — 82565 ASSAY OF CREATININE: CPT

## 2024-02-15 PROCEDURE — 86140 C-REACTIVE PROTEIN: CPT

## 2024-02-15 PROCEDURE — 36415 COLL VENOUS BLD VENIPUNCTURE: CPT

## 2024-02-15 PROCEDURE — 99214 OFFICE O/P EST MOD 30 MIN: CPT | Performed by: NURSE PRACTITIONER

## 2024-02-15 PROCEDURE — 82040 ASSAY OF SERUM ALBUMIN: CPT

## 2024-02-15 PROCEDURE — 85652 RBC SED RATE AUTOMATED: CPT

## 2024-02-15 PROCEDURE — 85027 COMPLETE CBC AUTOMATED: CPT

## 2024-02-15 PROCEDURE — 84450 TRANSFERASE (AST) (SGOT): CPT

## 2024-02-15 PROCEDURE — 84460 ALANINE AMINO (ALT) (SGPT): CPT

## 2024-02-15 ASSESSMENT — PAIN SCALES - GENERAL: PAINLEVEL: NO PAIN (0)

## 2024-02-15 NOTE — PROGRESS NOTES
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 Current treatment:  mg BID (9/23), Pilocarpine TID (9/23)     11/08/2023:   Is tolerating increase in SSZ, no SE. 11/27/23 had L ankle surgery, slightly better still hurts. Shoulders better since starting SSZ.      Consult 10/09/2023:   Here for second opinion . Meloxicam was very helpful for her, no pain,was her # 1 drug. Due to elevated LFTs and HTN was recommend she top this. She stopped this a month ago and now R achilles and R shoulder are problematic for her. In past methotrexate was beneficial but had to discontinue due to elevated LFTs', Was on SSZ in past, wasn't that helpful per her recollection however she is nor sure what dose.  Reports PT in past not helpful, saw podiatry and received a brace that is not helpful.  Now that off of meloxicam, is having a very hard time walking due to R heel pain, use to walk 45 min a day now less than 30 min a day. Has a hard time doing stairs.     -Stopped meloxicom a month ago after on for a long period of time, meloxicam worked well for her but not that she stopped has increase in pain of R achilles (with swelling) and R shoulder. R shoulder hurts, but not hard to move. In past L shoulder pain had cortisone injection. Last month knees are doing well.  Is tolerating SSZ. Hands ok.   -Started pilocarpine, is tolerating.  Is helping, has improved with dryness but still has.    ROS: On omperazole and stomach better. No infections, fevers, diarrhea, rashes.     Previous Rheumatology care at [facility] 3/17/2023  with Dr. Jesenia Bazan: \" My impression is this is a 73-year-old  female who had presented with " "features of inflammatory polyarthritis in the year 2000.   In 2009 her colonoscopy had revealed minimally active ileitis with mild neutrophilic inflammation and intraepithelial lymphocytosis of the terminal ileum and also the colon. They mention in the note that intraepithelial lymphocytosis could be seen by celiac as well.   She was initially started on methotrexate in October 2011 at 4 tablets/week. She flared when she went off methotrexate and leucovorin for 2 years. Methotrexate was restarted in December of 2014 and stopped in 6/2021 due to elevated liver enzymes. Her prednisone was discontinued in February 2017.  Per Ortho she has left shoulder impingement and rotator cuff tendinopathy and she has had 2 injections which have helped her a lot and the right foot and ankle pain have resolved on their own.  She presented with increasing joint pains in November 2022 and I started her on sulfasalazine 1 tablet twice a day and she was able to tolerate it well without any adverse effects. However, she stopped in 7/2023 due to a flare without Mobic 7.5 mg/d and feels that the SSZ no help.  She flared without mobic 7.5 mg/d and now back on mobic for the last 2 months and it is helping a lot.  I am not able to document any synovitis on exam today. I am recommending that she restart the sulfasalazine so that we can decrease the meloxicam frequency to every other day and she is agreeable to the plan.    She has worsening dry mouth and she has had longstanding dry eyes and dry mouth. Over-the-counter products are not helping and I am recommending a trial of pilocarpine and have gone over the risks and benefits in detail she understands and she is willing to try it.    She has had elevated liver enzymes on and off. She has fatty liver and she is on a statin.   She has had minimally elevated CK on and off but no muscle weakness on exam. This could be from her muscle cramps. \"    Past Rheum tx:  SSZ- not " effective  Methotrexate-elevated LFT's      Social History  Lives [place], with daughter  Retired    FMH:  NO AI     Active Problem list:  Patient Active Problem List    Diagnosis Date Noted    Tendonitis, Achilles, right 01/12/2024     Priority: Medium    Dermatochalasis of both upper eyelids 12/04/2023     Priority: Medium    Dermatochalasis of both lower eyelids 12/04/2023     Priority: Medium    Heel pain, chronic, right 03/07/2022     Priority: Medium    NAFLD (nonalcoholic fatty liver disease) 10/18/2021     Priority: Medium    Elevated LFTs 10/18/2021     Priority: Medium    Pre-diabetes 11/08/2019     Priority: Medium    History of Helicobacter pylori infection 04/16/2018     Priority: Medium    Angiomyolipoma of right kidney 01/09/2017     Priority: Medium    Pterygium of right eye 09/21/2016     Priority: Medium    Sicca syndrome (H24) 08/30/2016     Priority: Medium    Microscopic hematuria 03/20/2016     Priority: Medium    High risk medication use 10/23/2015     Priority: Medium     Methotrexate      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 Provider to review and confirm.      GERD (gastroesophageal reflux disease) 03/20/2015     Priority: Medium    Polyarthritis, inflammatory (H) 02/16/2015     Priority: Medium    Hypertension goal BP (blood pressure) < 140/90 11/28/2014     Priority: Medium    Osteopenia 04/21/2014     Priority: Medium    Hayfever 04/21/2014     Priority: Medium            Objective:     Physical Exam  BP (!) 143/86 (BP Location: Left arm, Patient Position: Sitting, Cuff Size: Adult Regular)   Pulse 84   Wt 51.3 kg (113 lb)   LMP 06/21/2002 (Approximate)   SpO2 96%   BMI 20.02 kg/m    Body mass index is 20.02 kg/m .    Constitutional: Normal body habitus with out gross deformities. Well groomed.   Psych: nl  judgement, orientation, memory, affect.    Msk:  Elbows: -T/S, FROM   Wrists: -T/S, slight LROM   Hands: Significant bony changes of PIP's, DIP's, and mild of CMCs. L2-4 PIP most significant changes.     Labs:    Lab Results   Component Value Date    ALT 16 02/15/2024    AST 34 02/15/2024    CR 0.56 02/15/2024     3/01/22  -MAGGY, -SSA, -SSB,  Cr. .68, ALT 82,AST 78  2011  -RNP, -SMITH, -Hep C, -Hep b s ag, -HIV.    Imaging:   Narrative & Impression   EXAM: MR ANKLE RIGHT W/O CONTRAST  LOCATION: [facility]  DATE/TIME: 8/16/2022 6:20 PM     INDICATION: Right rear foot MRI to evaluate peroneal tendons.  COMPARISON: None.  TECHNIQUE: Unenhanced.     FINDINGS:      TENDONS:   -Peroneal: There is mild peroneus longus tendinopathy, with a markedly edematous os peroneum. (Series 6, image 9). Peroneus brevis is intact. There is mild to moderate peroneal tenosynovitis.  -Medial: Posterior tibialis tendon is intact. No tendinopathy or tenosynovitis. Flexor digitorum longus and flexor hallucis longus tendons are intact. There is mild flexor digitorum longus and flexor hallucis longus tenosynovitis.  -Anterior: Anterior tibialis, extensor hallucis longus, and extensor digitorum longus tendons are normal. No tenosynovitis.  -Achilles: Moderate Achilles tendinosis with some low-grade interstitial longitudinal partial tearing. No high-grade Achilles tear is seen. There is mild Achilles paratenonitis. There is insertional enthesopathy at the calcaneal tuberosity.     LIGAMENTS:   -Anterior talofibular ligament: There is some thinning of the anterior talofibular ligament which relates to chronic partial tearing.   -Calcaneofibular ligament: Intact.   -Posterior talofibular ligament: Intact.  -Syndesmotic inferior tibiofibular ligaments: Intact.  -Deltoid ligament complex: Intact.  -Spring ligament complex: Intact.     JOINTS AND BONES:   -No fracture, bone contusion or osteochondral lesion. No joint effusion or  synovitis.     SOFT TISSUES:  -Plantar fascia: Intact. No acute fasciitis or tear.  -Sinus tarsi and tarsal tunnel: Normal.  -Muscles: Normal.                                                                      IMPRESSION:  1.  Mild peroneus longus tendinopathy, with a markedly edematous os peroneum. 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 There is mild to moderate peroneal tenosynovitis. No peroneal tear is seen.  2.  Moderate Achilles tendinosis with some low-grade interstitial longitudinal partial tearing. No high-grade Achilles tear is seen. There is mild Achilles paratenonitis as well as insertional enthesopathy at the calcaneal tuberosity.                 Assessment and Plan:     1) Inflammatory polyarthropathy with OA (dx 2000):  Pt seen in 2000 by  Dr. Jesenia Bazan and presented with polyinflammatory arthritis with OA at that time, improved on methotrexate however discontinue due to elevated LFT's in NAFLD.   mg BID attempted and then stopped due to pt not perceiving benefit. Did mobic 7.5 mg daily, and for pt had great response to this, specifically it helped with her heel swelling. She has seen podaitry and attempted bracing, PT, dicflonec gel and failed to improve. Currently pt has resumed  mg BID (9/23) and stopped mobic at that time due to NAFLD/ mild elevation in LFT's/ HTN.  Now R heel is swelling, painful and affecting ability to walk.   -Given past benefit with achilles swelling on methotrexate and mobic do feel SSZ appropriate to trial.  Update:  Has had mild to moderate improvement of arthralgias at 500 mg BID. Continues with R heal pain. Toxicity labs updated and WNL. Will increase SSZ to 1 gm BID.     2) SICCA: Improvement with pilocarpine.  Previous -SSA and -SSB. SPEP,  RF, C4 normal, C3 slight elevated. Consider repeat in 1 year.  Parotid US showed homogenous parotids with 5 mm cyst. Consider repeat imaging of cyst in future and consider zeeshan bx.  Consider plaquenil.      3) R achilles pain and swelling: Follow-up with podiatry nd ortho    Pt instructions:     1) Sulfasalazine 2 tabs twice a day.     2) Labs every 3 months     3) See me back in 4-6 months     Pt states understanding and agreement to plan of care, has no further questions.       Amanda Warner, CNP  Rheumatology, [facility]

## 2024-02-16 DIAGNOSIS — K12.0 ORAL APHTHOUS ULCER: ICD-10-CM

## 2024-02-16 RX ORDER — TRIAMCINOLONE ACETONIDE 0.1 %
PASTE (GRAM) DENTAL
Qty: 5 G | Refills: 0 | Status: SHIPPED | OUTPATIENT
Start: 2024-02-16

## 2024-02-21 NOTE — TELEPHONE ENCOUNTER
Central Prior Authorization Team   Phone: [phone]    PA Initiation    Medication: Tretinoin 0.05% cream  Insurance Company: [employer] - Phone [phone] Fax [phone]  Pharmacy Filling the Rx: [id number] [address] [facility] [address] [facility] [address] [place]  Filling Pharmacy Phone: [phone]  Filling Pharmacy Fax:    Start Date: 2/21/2024

## 2024-02-21 NOTE — TELEPHONE ENCOUNTER
Prior Authorization Approval    Authorization Effective Date: 1/1/2024  Authorization Expiration Date: 9/26/2024  Medication: Tretinoin 0.05% cream  Approved Dose/Quantity:    Reference #:     Insurance Company: [employer] - Phone [phone] Fax [phone]  Expected CoPay:       CoPay Card Available:      Foundation Assistance Needed:    Which Pharmacy is filling the prescription (Not needed for infusion/clinic administered): [id number] - [facility] [address] [facility] AT [address] [place] [address]  Pharmacy Notified:  Yes  Patient Notified:  **Instructed pharmacy to notify patient when script is ready to /ship.**

## 2024-02-27 DIAGNOSIS — L30.9 DERMATITIS: ICD-10-CM

## 2024-02-27 DIAGNOSIS — M35.00 SICCA SYNDROME (H): ICD-10-CM

## 2024-02-27 RX ORDER — TRIAMCINOLONE ACETONIDE 1 MG/G
CREAM TOPICAL
Qty: 30 G | Refills: 0 | Status: SHIPPED | OUTPATIENT
Start: 2024-02-27 | End: 2024-06-25

## 2024-02-28 ENCOUNTER — TELEPHONE (OUTPATIENT)
Dept: OPHTHALMOLOGY | Facility: CLINIC | Age: 74
End: 2024-02-28
Payer: COMMERCIAL

## 2024-02-28 RX ORDER — CARBOXYMETHYLCELLULOSE SODIUM AND GLYCERIN 5; 9 MG/ML; MG/ML
SOLUTION/ DROPS OPHTHALMIC
Qty: 15 ML | Refills: 1 | Status: SHIPPED | OUTPATIENT
Start: 2024-02-28 | End: 2024-08-29

## 2024-02-28 NOTE — TELEPHONE ENCOUNTER
Health Call Center    Phone Message    May a detailed message be left on voicemail: yes     Reason for Call: Symptoms or Concerns     If patient has red-flag symptoms, warm transfer to triage line    Current symptom or concern: Floaters. Patient's daughter states the patient has been having black floaters for about a week and is worried.  Please call patient [phone].  Thank you.    Symptoms have been present for:  1 week(s)    Has patient previously been seen for this? No    By :     Date:     Are there any new or worsening symptoms? No    Action Taken: Message routed to:  [facility] Optometrist    Travel Screening: Not Applicable

## 2024-03-01 ENCOUNTER — OFFICE VISIT (OUTPATIENT)
Dept: OPHTHALMOLOGY | Facility: CLINIC | Age: 74
End: 2024-03-01
Payer: COMMERCIAL

## 2024-03-01 DIAGNOSIS — H43.813 POSTERIOR VITREOUS DEGENERATION, BILATERAL: Primary | ICD-10-CM

## 2024-03-01 PROCEDURE — 92012 INTRM OPH EXAM EST PATIENT: CPT | Performed by: OPTOMETRIST

## 2024-03-01 ASSESSMENT — EXTERNAL EXAM - RIGHT EYE: OD_EXAM: NORMAL

## 2024-03-01 ASSESSMENT — VISUAL ACUITY
OS_SC: 20/20
METHOD: SNELLEN - LINEAR
OD_SC: 20/20
OS_SC+: -1
OD_SC+: -1

## 2024-03-01 ASSESSMENT — TONOMETRY
OS_IOP_MMHG: 12
OS_IOP_MMHG: 14
OD_IOP_MMHG: 14
OD_IOP_MMHG: 12
IOP_METHOD: TONOPEN

## 2024-03-01 ASSESSMENT — CUP TO DISC RATIO
OS_RATIO: 0.6
OD_RATIO: 0.6

## 2024-03-01 ASSESSMENT — EXTERNAL EXAM - LEFT EYE: OS_EXAM: NORMAL

## 2024-03-01 NOTE — PROGRESS NOTES
Assessment/Plan  (H43.813) Posterior vitreous degeneration, bilateral  (primary encounter diagnosis)  Comment: OS longstanding, OD more recently   Plan: Discussed findings with patient. Reassured patient that retinal health looks good today. Return to clinic with new flashes/floaters/curtain over vision. Recommend vitreolysis evaluation in 3-4 months given impact of spots on her activities of daily living.       Complete documentation of historical and exam elements from today's encounter can  be found in the full encounter summary report (not reduplicated in this progress  note). I personally obtained the chief complaint(s) and history of present illness. I  confirmed and edited as necessary the review of systems, past medical/surgical  history, family history, social history, and examination findings as documented by  others; and I examined the patient myself. I personally reviewed the relevant tests,  images, and reports as documented above. I formulated and edited as necessary the  assessment and plan and discussed the findings and management plan with the  patient and family.    Prabhakar Banerjee, OD

## 2024-03-01 NOTE — NURSING NOTE
"Chief Complaints and History of Present Illnesses   Patient presents with    Floaters Right Eye     Chief Complaint(s) and History of Present Illness(es)       Floaters Right Eye               Comments    Pt states no change in VA since last visit  Pt states she has new floaters in the right eye \"like a spider\"  No change in there floaters left eye  No flashes, eye pain or redness    Clarissa Nevarez COT 12:19 PM March 1, 2024                        "

## 2024-03-04 ENCOUNTER — TELEPHONE (OUTPATIENT)
Dept: OPHTHALMOLOGY | Facility: CLINIC | Age: 74
End: 2024-03-04
Payer: COMMERCIAL

## 2024-03-04 NOTE — TELEPHONE ENCOUNTER
[facility] Call Center    Phone Message    May a detailed message be left on voicemail: yes     Reason for Call: Appointment Intake    Referring Provider Name: Dr. Prabhakar Banerjee  Diagnosis and/or Symptoms: Vitreolysis evaluation with Dr. Nevarez in 3-4 months.    Dx not listed in protocols. Writer unsure if this is for laser treatment. Please reach out to pt's daughter Tram to schedule. Thank you.    Action Taken: Message routed to:  [facility] EYE    Travel Screening: Not Applicable

## 2024-03-15 NOTE — PROGRESS NOTES
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 No indication of progression since last exam. Large ONH with healthy rim tissue  Plan: Discussed findings with patient. Suspect that cupping is most likely physiological, but with well-controlled IOP and stable RNFL findings, will resume taking latanoprost nightly in both eyes.  Follow up in 1 year with repeat RNFL OCT. If changes are noted, will plan on seeing patient sooner for additional testing.     (Z98.890) History of blepharoplasty  Comment: With [facility] in 2017. Patient reports significant cosmetic asymmetry since procedure and requests second opinion.   Plan: Plastics Eye Referral        Discussed findings with patient. Referral to Dr. Fletcher for evaluation was offered and accepted by patient. Although things appear to be structurally sound, patient is very concerned about asymmetry in her appearance.     (H25.13) Senile nuclear sclerosis, bilateral  Plan: Monitor.     (H11.002) Pterygium, left  Comment: Not impacting vision.   Plan: Monitor. Regular lubrication was recommended.     (H52.4) Presbyopia  Plan: REFRACTION [[id number]]        Discussed findings with patient. New spectacle prescription dispensed to patient. Patient is welcome to return to clinic with prolonged adaptation difficulties.     Complete documentation of historical and exam elements from today's encounter can  be found in the full encounter summary report (not reduplicated in this progress  note). I personally obtained the chief complaint(s) and history of present illness. I  confirmed and edited as necessary the review of systems, past medical/surgical  history, family history, social history, and examination findings as documented by  others; and I examined the patient myself. I personally reviewed the relevant tests,  images, and reports as  documented above. I formulated and edited as necessary the  assessment and plan and discussed the findings and management plan with the  patient and family.    Prabhakar Banerjee OD    [UNKNOWN]

## 2024-03-18 DIAGNOSIS — I10 HYPERTENSION, GOAL BELOW 150/90: ICD-10-CM

## 2024-03-18 RX ORDER — METOPROLOL TARTRATE 25 MG/1
25 TABLET, FILM COATED ORAL AT BEDTIME
Qty: 90 TABLET | Refills: 3 | Status: SHIPPED | OUTPATIENT
Start: 2024-03-18

## 2024-03-27 ENCOUNTER — OFFICE VISIT (OUTPATIENT)
Dept: DERMATOLOGY | Facility: CLINIC | Age: 74
End: 2024-03-27
Payer: COMMERCIAL

## 2024-03-27 DIAGNOSIS — L81.9 PIGMENTATION ABNORMALITY OF SKIN: Primary | ICD-10-CM

## 2024-03-27 DIAGNOSIS — L81.4 LENTIGO: ICD-10-CM

## 2024-03-27 DIAGNOSIS — L81.1 MELASMA: ICD-10-CM

## 2024-03-27 DIAGNOSIS — L64.9 ANDROGENETIC ALOPECIA: ICD-10-CM

## 2024-03-27 PROCEDURE — 99213 OFFICE O/P EST LOW 20 MIN: CPT | Performed by: PHYSICIAN ASSISTANT

## 2024-03-27 RX ORDER — FINASTERIDE 5 MG/1
5 TABLET, FILM COATED ORAL DAILY
Qty: 90 TABLET | Refills: 3 | Status: SHIPPED | OUTPATIENT
Start: 2024-03-27

## 2024-03-27 RX ORDER — HYDROQUINONE 40 MG/G
CREAM TOPICAL
Qty: 28.35 G | Refills: 4 | Status: SHIPPED | OUTPATIENT
Start: 2024-03-27

## 2024-03-27 NOTE — LETTER
3/27/2024         RE: Noreen Florence  [address]  [place] [address]        Dear Colleague,    Thank you for referring your patient, Noreen Florence, to the [facility]. Please see a copy of my visit note below.    Noreen Florence is an extremely pleasant 73 year old year old female patient here today to recheck discoloration and hair thinning. She notes not much change in discoloration on face, using tretinoin at bedtime. Hair loss continues, did not get minoxidil. Patient has no other skin complaints today.  Remainder of the HPI, Meds, PMH, Allergies, FH, and SH was reviewed in chart.    Past Medical History:   Diagnosis Date     GERD (gastroesophageal reflux disease) 3/20/2015     Glaucoma (increased eye pressure)      Hyperlipidemia LDL goal < 130 8/3/2015     Hypertension      Hypertension, goal below 150/90 11/28/2014     Rheumatoid arthritis, adult (H)        Past Surgical History:   Procedure Laterality Date     COLONOSCOPY       COLONOSCOPY WITH CO2 INSUFFLATION N/A 4/5/2017    Procedure: COLONOSCOPY WITH CO2 INSUFFLATION;  Surgeon: Duane, William Charles, MD;  Location: [facility]     COMBINED ESOPHAGOSCOPY, GASTROSCOPY, DUODENOSCOPY (EGD) WITH CO2 INSUFFLATION N/A 2/13/2019    Procedure: COMBINED ESOPHAGOSCOPY, GASTROSCOPY, DUODENOSCOPY (EGD) WITH CO2 INSUFFLATION;  Surgeon: Sly De Santiago MD;  Location: [facility]     COMBINED ESOPHAGOSCOPY, GASTROSCOPY, DUODENOSCOPY (EGD) WITH CO2 INSUFFLATION N/A 2/7/2022    Procedure: ESOPHAGOGASTRODUODENOSCOPY, WITH CO2 INSUFFLATION;  Surgeon: Luis Aceves MD;  Location: [facility]     COMBINED REPAIR PTOSIS WITH BLEPHAROPLASTY BILATERAL Bilateral 1/6/2017    Procedure: COMBINED REPAIR PTOSIS WITH BLEPHAROPLASTY BILATERAL;  Surgeon: Carly Norman MD;  Location: [facility]     ESOPHAGOSCOPY, GASTROSCOPY, DUODENOSCOPY (EGD), COMBINED N/A 1/5/2016    Procedure: COMBINED ESOPHAGOSCOPY, GASTROSCOPY, DUODENOSCOPY (EGD), BIOPSY SINGLE OR MULTIPLE;   Surgeon: Duane, William Charles, MD;  Location: [facility]     ESOPHAGOSCOPY, GASTROSCOPY, DUODENOSCOPY (EGD), COMBINED N/A 2/13/2019    Procedure: Combined Esophagoscopy, Gastroscopy, Duodenoscopy (Egd), Biopsy Single Or Multiple;  Surgeon: Sly De Santiago MD;  [facility]: [facility]     ESOPHAGOSCOPY, GASTROSCOPY, DUODENOSCOPY (EGD), COMBINED N/A 2/7/2022    Procedure: ESOPHAGOGASTRODUODENOSCOPY, WITH BIOPSY;  Surgeon: Luis Aceves MD;  Location: [facility]     REPAIR ENTROPION BILATERAL Bilateral 1/6/2017    Procedure: REPAIR ENTROPION BILATERAL;  Surgeon: Carly Norman MD;  [facility]: [facility]     REPAIR PTOSIS Bilateral 01/2017        Family History   Problem Relation Age of Onset     Diabetes Mother      Cancer No family hx of      Hypertension No family hx of      Cerebrovascular Disease No family hx of      Thyroid Disease No family hx of      Glaucoma No family hx of      Macular Degeneration No family hx of        Social History     Socioeconomic History     Marital status:      Spouse name: Not on file     Number of children: Not on file     Years of education: Not on file     Highest education level: Not on file   Occupational History     Not on file   Tobacco Use     Smoking status: Never     Passive exposure: Never     Smokeless tobacco: Never   Vaping Use     Vaping Use: Never used   Substance and Sexual Activity     Alcohol use: No     Drug use: No     Sexual activity: Not Currently     Partners: Male     Birth control/protection: None   Other Topics Concern     Parent/sibling w/ CABG, MI or angioplasty before 65F 55M? No   Social History Narrative     Not on file     Social Determinants of Health     Financial Resource Strain: Low Risk  (12/22/2023)    Financial Resource Strain      Within the past 12 months, have you or your family members you live with been unable to get utilities (heat, electricity) when it was really needed?: No   Food Insecurity: Low Risk  (12/22/2023)    Food  Insecurity      Within the past 12 months, did you worry that your food would run out before you got money to buy more?: No      Within the past 12 months, did the food you bought just not last and you didn t have money to get more?: No   Transportation Needs: Low Risk  (12/22/2023)    Transportation Needs      Within the past 12 months, has lack of transportation kept you from medical appointments, getting your medicines, non-medical meetings or appointments, work, or from getting things that you need?: No   Physical Activity: Not on file   Stress: Not on file   Social Connections: Not on file   Interpersonal Safety: Low Risk  (12/22/2023)    Interpersonal Safety      Do you feel physically and emotionally safe where you currently live?: Yes      Within the past 12 months, have you been hit, slapped, kicked or otherwise physically hurt by someone?: No      Within the past 12 months, have you been humiliated or emotionally abused in other ways by your partner or ex-partner?: No   Housing Stability: Low Risk  (12/22/2023)    Housing Stability      Do you have housing? : Yes      Are you worried about losing your housing?: No       Outpatient Encounter Medications as of 3/27/2024   Medication Sig Dispense Refill     finasteride (PROSCAR) 5 MG tablet Take 1 tablet (5 mg) by mouth daily 90 tablet 3     hydroquinone (JAVIER) 4 % external cream Apply daily to brown spots for 4-6 months. 28.35 g 4     minoxidil (ROGAINE) 5 % external solution Apply to scalp once daily. 120 mL 3     acetaminophen-codeine (TYLENOL #3) 300-30 MG tablet TAKE 1 TABLET BY MOUTH EVERY 4 TO 6 HOURS AS NEEDED FOR PAIN       albuterol (PROAIR HFA/PROVENTIL HFA/VENTOLIN HFA) 108 (90 Base) MCG/ACT inhaler INHALE 2 PUFFS INTO THE LUNGS EVERY 6 HOURS AS NEEDED FOR SHORTNESS OF BREATH OR WHEEZING OR COUGH (Patient not taking: Reported on 11/30/2023) 18 g 3     benzonatate (TESSALON) 200 MG capsule Take 1 capsule (200 mg) by mouth 3 times daily as needed  for cough 30 capsule 3     celecoxib (CELEBREX) 100 MG capsule TAKE 1 CAPSULE(100 MG) BY MOUTH TWICE DAILY AS NEEDED FOR MODERATE PAIN OR PAIN 30 capsule 0     cetirizine (ZYRTEC) 10 MG tablet TAKE 1 TABLET(10 MG) BY MOUTH DAILY 90 tablet 1     chlorhexidine (PERIDEX) 0.12 % solution SWISH AND SPIT 15 ML BY MOUTH TWICE DAILY AS NEEDED 473 mL 1     cholecalciferol (VITAMIN D3) 25 mcg (1000 units) capsule Take 1,000 Units by mouth       dextromethorphan (DELSYM) 30 MG/5ML liquid Take 10 mLs (60 mg) by mouth 2 times daily 148 mL 3     diclofenac (VOLTAREN) 1 % topical gel Apply 2 g topically 4 times daily as needed for moderate pain 350 g 1     fluticasone (FLONASE) 50 MCG/ACT nasal spray Spray 1-2 sprays into both nostrils daily 48 g 3     latanoprost (XALATAN) 0.005 % ophthalmic solution INSTILL 1 DROP IN BOTH EYES AT BEDTIME 7.5 mL 6     losartan-hydrochlorothiazide (HYZAAR) 50-12.5 MG tablet Take 1 tablet by mouth daily 90 tablet 3     magnesium 250 MG tablet TAKE 1 TABLET BY MOUTH DAILY AS NEEDED FOR LEG CRAMPS 90 tablet 1     meloxicam (MOBIC) 7.5 MG tablet 15 MG/D WITH FOOD IN AM FOR 2 WEEKS AND THEN STAY ON 7.5 MG DAILY WITH FOOD       methylcellulose (CITRUCEL) powder Take 0.3 g (1.05 teaspoonful) by mouth daily 454 g 1     metoprolol tartrate (LOPRESSOR) 25 MG tablet TAKE 1 TABLET(25 MG) BY MOUTH AT BEDTIME 90 tablet 3     multivitamin w/minerals (THERA-VIT-M) tablet Take 1 tablet by mouth daily 90 tablet 3     nitroGLYcerin (RECTIV) 0.4 % OINT rectal ointment 1 inch (1.5 mg) by Peritendinous route every 12 hours 30 g 0     omeprazole (PRILOSEC) 40 MG DR capsule TAKE 1 CAPSULE(40 MG) BY MOUTH DAILY 90 capsule 2     OPTIVE 0.5-0.9 % ophthalmic solution INSTILL 1 DROP IN BOTH EYES THREE TIMES DAILY AS NEEDED FOR DRY EYES 15 mL 1     pilocarpine (SALAGEN) 5 MG tablet Take 1 tablet (5 mg) by mouth 3 times daily 270 tablet 0     pravastatin (PRAVACHOL) 20 MG tablet Take 1 tablet (20 mg) by mouth daily 90 tablet 3      sodium fluoride (SF 5000 PLUS) 1.1 % CREA Use twice a day as instructed 51 g 1     sulfaSALAzine ER (AZULFIDINE EN) 500 MG EC tablet Take 2 tablets (1,000 mg) by mouth 2 times daily 360 tablet 3     tretinoin (RETIN-A) 0.05 % external cream Apply pea sized amount at bedtime. (Patient not taking: Reported on 3/27/2024) 45 g 4     triamcinolone (KENALOG) 0.1 % external cream APPLY TOPICALLY TO THE AFFECTED AREA TWICE DAILY. MAXIMUM OF 2 WEEKS 30 g 0     triamcinolone (KENALOG) 0.1 % paste APPLY TO MOUTH TWICE DAILY 5 g 0     [DISCONTINUED] minoxidil (ROGAINE) 5 % external solution Apply to scalp once daily. (Patient not taking: Reported on 3/27/2024) 120 mL 3     Facility-Administered Encounter Medications as of 3/27/2024   Medication Dose Route Frequency Provider Last Rate Last Admin     2 mL bupivacaine (MARCAINE) preservative free injection 0.25% (10 mL vial)  2 mL   Isaac Zhou, DO   2 mL at 07/14/23 1335     gadobutrol (GADAVIST) injection 7.5 mL  7.5 mL Intravenous Once Palomo Terrell MD         lidocaine 1 % injection 2 mL  2 mL   Isaac Zhou, DO   2 mL at 07/14/23 1335     triamcinolone (KENALOG-40) injection 40 mg  40 mg   Isaac Zhou, DO   40 mg at 07/14/23 1335             O:   NAD, WDWN, Alert & Oriented, Mood & Affect wnl, Vitals stable   Here today alone   LMP 06/21/2002 (Approximate)    General appearance normal   Vitals stable   Alert, oriented and in no acute distress      Brown macules on cheeks    Nonscarring hair loss       Eyes: Conjunctivae/lids:Normal     ENT: Lips, normal    MSK:Normal    Pulm: Breathing Normal    Neuro/Psych: Orientation:Alert and Orientedx3 ; Mood/Affect:normal     A/P:  1.nonscarring hair loss favor androgenetic alopecia  Start finasteride 5 mg daily.   Can try to see if insurance will cover rogaine 5% daily to scalp again, sent in as prescription to see if insurance will cover.   2.  ephelides/lentigo, melasma  Apply tretinoin to affected area at  bedtime.   [place] [facility] use [employer] [facility].   Recheck in 6 months.       Again, thank you for allowing me to participate in the care of your patient.        Sincerely,        Khloe Merrill PA-C

## 2024-03-29 NOTE — PROGRESS NOTES
Noreen Florence is an extremely pleasant 73 year old year old female patient here today to recheck discoloration and hair thinning. She notes not much change in discoloration on face, using tretinoin at bedtime. Hair loss continues, did not get minoxidil. Patient has no other skin complaints today.  Remainder of the HPI, Meds, PMH, Allergies, FH, and SH was reviewed in chart.    Past Medical History:   Diagnosis Date    GERD (gastroesophageal reflux disease) 3/20/2015    Glaucoma (increased eye pressure)     Hyperlipidemia LDL goal < 130 8/3/2015    Hypertension     Hypertension, goal below 150/90 11/28/2014    Rheumatoid arthritis, adult (H)        Past Surgical History:   Procedure Laterality Date    COLONOSCOPY      COLONOSCOPY WITH CO2 INSUFFLATION N/A 4/5/2017    Procedure: COLONOSCOPY WITH CO2 INSUFFLATION;  Surgeon: Duane, William Charles, MD;  Location: [facility]    COMBINED ESOPHAGOSCOPY, GASTROSCOPY, DUODENOSCOPY (EGD) WITH CO2 INSUFFLATION N/A 2/13/2019    Procedure: COMBINED ESOPHAGOSCOPY, GASTROSCOPY, DUODENOSCOPY (EGD) WITH CO2 INSUFFLATION;  Surgeon: Sly De Santiago MD;  Location: [facility]    COMBINED ESOPHAGOSCOPY, GASTROSCOPY, DUODENOSCOPY (EGD) WITH CO2 INSUFFLATION N/A 2/7/2022    Procedure: ESOPHAGOGASTRODUODENOSCOPY, WITH CO2 INSUFFLATION;  Surgeon: Luis Aceves MD;  [facility]: [facility]    COMBINED REPAIR PTOSIS WITH BLEPHAROPLASTY BILATERAL Bilateral 1/6/2017    Procedure: COMBINED REPAIR PTOSIS WITH BLEPHAROPLASTY BILATERAL;  Surgeon: Carly Norman MD;  [facility]    ESOPHAGOSCOPY, GASTROSCOPY, DUODENOSCOPY (EGD), COMBINED N/A 1/5/2016    Procedure: COMBINED ESOPHAGOSCOPY, GASTROSCOPY, DUODENOSCOPY (EGD), BIOPSY SINGLE OR MULTIPLE;  Surgeon: Duane, William Charles, MD;  Location:  [facility]    ESOPHAGOSCOPY, GASTROSCOPY, DUODENOSCOPY (EGD), COMBINED N/A 2/13/2019    Procedure: Combined Esophagoscopy, Gastroscopy, Duodenoscopy (Egd), Biopsy Single Or Multiple;  Surgeon: Jonnathan  Sly Perez MD;  Location: [facility]    ESOPHAGOSCOPY, GASTROSCOPY, DUODENOSCOPY (EGD), COMBINED N/A 2/7/2022    Procedure: ESOPHAGOGASTRODUODENOSCOPY, WITH BIOPSY;  Surgeon: Luis Aceves MD;  [facility]: [facility]    REPAIR ENTROPION BILATERAL Bilateral 1/6/2017    Procedure: REPAIR ENTROPION BILATERAL;  Surgeon: Carly Norman MD;  Location: [facility]    REPAIR PTOSIS Bilateral 01/2017        Family History   Problem Relation Age of Onset    Diabetes Mother     Cancer No family hx of     Hypertension No family hx of     Cerebrovascular Disease No family hx of     Thyroid Disease No family hx of     Glaucoma No family hx of     Macular Degeneration No family hx of        Social History     Socioeconomic History    Marital status:      Spouse name: Not on file    Number of children: Not on file    Years of education: Not on file    Highest education level: Not on file   Occupational History    Not on file   Tobacco Use    Smoking status: Never     Passive exposure: Never    Smokeless tobacco: Never   Vaping Use    Vaping Use: Never used   Substance and Sexual Activity    Alcohol use: No    Drug use: No    Sexual activity: Not Currently     Partners: Male     Birth control/protection: None   Other Topics Concern    Parent/sibling w/ CABG, MI or angioplasty before 65F 55M? No   Social History Narrative    Not on file     Social Determinants of Health     Financial Resource Strain: Low Risk  (12/22/2023)    Financial Resource Strain     Within the past 12 months, have you or your family members you live with been unable to get utilities (heat, electricity) when it was really needed?: No   Food Insecurity: Low Risk  (12/22/2023)    Food Insecurity     Within the past 12 months, did you worry that your food would run out before you got money to buy more?: No     Within the past 12 months, did the food you bought just not last and you didn t have money to get more?: No   Transportation Needs: Low Risk   (12/22/2023)    Transportation Needs     Within the past 12 months, has lack of transportation kept you from medical appointments, getting your medicines, non-medical meetings or appointments, work, or from getting things that you need?: No   Physical Activity: Not on file   Stress: Not on file   Social Connections: Not on file   Interpersonal Safety: Low Risk  (12/22/2023)    Interpersonal Safety     Do you feel physically and emotionally safe where you currently live?: Yes     Within the past 12 months, have you been hit, slapped, kicked or otherwise physically hurt by someone?: No     Within the past 12 months, have you been humiliated or emotionally abused in other ways by your partner or ex-partner?: No   Housing Stability: Low Risk  (12/22/2023)    Housing Stability     Do you have housing? : Yes     Are you worried about losing your housing?: No       Outpatient Encounter Medications as of 3/27/2024   Medication Sig Dispense Refill    finasteride (PROSCAR) 5 MG tablet Take 1 tablet (5 mg) by mouth daily 90 tablet 3    hydroquinone (JAVIER) 4 % external cream Apply daily to brown spots for 4-6 months. 28.35 g 4    minoxidil (ROGAINE) 5 % external solution Apply to scalp once daily. 120 mL 3    acetaminophen-codeine (TYLENOL #3) 300-30 MG tablet TAKE 1 TABLET BY MOUTH EVERY 4 TO 6 HOURS AS NEEDED FOR PAIN      albuterol (PROAIR HFA/PROVENTIL HFA/VENTOLIN HFA) 108 (90 Base) MCG/ACT inhaler INHALE 2 PUFFS INTO THE LUNGS EVERY 6 HOURS AS NEEDED FOR SHORTNESS OF BREATH OR WHEEZING OR COUGH (Patient not taking: Reported on 11/30/2023) 18 g 3    benzonatate (TESSALON) 200 MG capsule Take 1 capsule (200 mg) by mouth 3 times daily as needed for cough 30 capsule 3    celecoxib (CELEBREX) 100 MG capsule TAKE 1 CAPSULE(100 MG) BY MOUTH TWICE DAILY AS NEEDED FOR MODERATE PAIN OR PAIN 30 capsule 0    cetirizine (ZYRTEC) 10 MG tablet TAKE 1 TABLET(10 MG) BY MOUTH DAILY 90 tablet 1    chlorhexidine (PERIDEX) 0.12 % solution  SWISH AND SPIT 15 ML BY MOUTH TWICE DAILY AS NEEDED 473 mL 1    cholecalciferol (VITAMIN D3) 25 mcg (1000 units) capsule Take 1,000 Units by mouth      dextromethorphan (DELSYM) 30 MG/5ML liquid Take 10 mLs (60 mg) by mouth 2 times daily 148 mL 3    diclofenac (VOLTAREN) 1 % topical gel Apply 2 g topically 4 times daily as needed for moderate pain 350 g 1    fluticasone (FLONASE) 50 MCG/ACT nasal spray Spray 1-2 sprays into both nostrils daily 48 g 3    latanoprost (XALATAN) 0.005 % ophthalmic solution INSTILL 1 DROP IN BOTH EYES AT BEDTIME 7.5 mL 6    losartan-hydrochlorothiazide (HYZAAR) 50-12.5 MG tablet Take 1 tablet by mouth daily 90 tablet 3    magnesium 250 MG tablet TAKE 1 TABLET BY MOUTH DAILY AS NEEDED FOR LEG CRAMPS 90 tablet 1    meloxicam (MOBIC) 7.5 MG tablet 15 MG/D WITH FOOD IN AM FOR 2 WEEKS AND THEN STAY ON 7.5 MG DAILY WITH FOOD      methylcellulose (CITRUCEL) powder Take 0.3 g (1.05 teaspoonful) by mouth daily 454 g 1    metoprolol tartrate (LOPRESSOR) 25 MG tablet TAKE 1 TABLET(25 MG) BY MOUTH AT BEDTIME 90 tablet 3    multivitamin w/minerals (THERA-VIT-M) tablet Take 1 tablet by mouth daily 90 tablet 3    nitroGLYcerin (RECTIV) 0.4 % OINT rectal ointment 1 inch (1.5 mg) by Peritendinous route every 12 hours 30 g 0    omeprazole (PRILOSEC) 40 MG DR capsule TAKE 1 CAPSULE(40 MG) BY MOUTH DAILY 90 capsule 2    OPTIVE 0.5-0.9 % ophthalmic solution INSTILL 1 DROP IN BOTH EYES THREE TIMES DAILY AS NEEDED FOR DRY EYES 15 mL 1    pilocarpine (SALAGEN) 5 MG tablet Take 1 tablet (5 mg) by mouth 3 times daily 270 tablet 0    pravastatin (PRAVACHOL) 20 MG tablet Take 1 tablet (20 mg) by mouth daily 90 tablet 3    sodium fluoride (SF 5000 PLUS) 1.1 % CREA Use twice a day as instructed 51 g 1    sulfaSALAzine ER (AZULFIDINE EN) 500 MG EC tablet Take 2 tablets (1,000 mg) by mouth 2 times daily 360 tablet 3    tretinoin (RETIN-A) 0.05 % external cream Apply pea sized amount at bedtime. (Patient not taking:  Reported on 3/27/2024) 45 g 4    triamcinolone (KENALOG) 0.1 % external cream APPLY TOPICALLY TO THE AFFECTED AREA TWICE DAILY. MAXIMUM OF 2 WEEKS 30 g 0    triamcinolone (KENALOG) 0.1 % paste APPLY TO MOUTH TWICE DAILY 5 g 0    [DISCONTINUED] minoxidil (ROGAINE) 5 % external solution Apply to scalp once daily. (Patient not taking: Reported on 3/27/2024) 120 mL 3     Facility-Administered Encounter Medications as of 3/27/2024   Medication Dose Route Frequency Provider Last Rate Last Admin    2 mL bupivacaine (MARCAINE) preservative free injection 0.25% (10 mL vial)  2 mL   Isaac Zhou, DO   2 mL at 07/14/23 1335    gadobutrol (GADAVIST) injection 7.5 mL  7.5 mL Intravenous Once Palomo Terrell MD        lidocaine 1 % injection 2 mL  2 mL   Isaac Zhou, DO   2 mL at 07/14/23 1335    triamcinolone (KENALOG-40) injection 40 mg  40 mg   Isaac Zhou, DO   40 mg at 07/14/23 1335             O:   NAD, WDWN, Alert & Oriented, Mood & Affect wnl, Vitals stable   Here today alone   LMP 06/21/2002 (Approximate)    General appearance normal   Vitals stable   Alert, oriented and in no acute distress      Brown macules on cheeks    Nonscarring hair loss       Eyes: Conjunctivae/lids:Normal     ENT: Lips, normal    MSK:Normal    Pulm: Breathing Normal    Neuro/Psych: Orientation:Alert and Orientedx3 ; Mood/Affect:normal     A/P:  1.nonscarring hair loss favor androgenetic alopecia  Start finasteride 5 mg daily.   Can try to see if insurance will cover rogaine 5% daily to scalp again, sent in as prescription to see if insurance will cover.   2.  ephelides/lentigo, melasma  Apply tretinoin to affected area at bedtime.   [place] [email or web address] [employer] [facility].   Recheck in 6 months.

## 2024-04-11 NOTE — PROGRESS NOTES
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 Recent renal ultrasound shows that it is stable at 1.2 cm. Per [employer], this small AML can be monitored every 3 years with renal ultrasound.   -Renal ultrasound in 3 years. Return sooner with any recurrence of hematuria.     Haley Dugan PA-C  Department of Urology     HPI: Noreen Florence is a 74 year old female with a history of microscopic hematuria s/p negative urologic workup with CT, cytology, and cystoscopy.  She was found to have a very small AML (1.2-1.3 cm) on CT urogram in 2016.  She was seen by IR and they recommended yearly ultrasound to monitor. Last visit with myself on 4/2/21 at which time it was slightly increased to 1.5 cm.    She returns today and has been doing well. She denies any gross hematuria, UTIs, or flank pain. Had her renal ultrasound in January 2024.     PEx  LMP 06/21/2002 (Approximate)   GENERAL: alert and no distress  EYES: Eyes grossly normal to inspection.  No discharge or erythema, or obvious scleral/conjunctival abnormalities.  RESP: No audible wheeze, cough, or visible cyanosis.    SKIN: Visible skin clear. No significant rash, abnormal pigmentation or lesions.  NEURO: Cranial nerves grossly intact.  Mentation and speech appropriate for age.  PSYCH: Appropriate affect, tone, and pace of words    LABS:  Color Urine (no units)   Date Value   06/23/2021 Yellow     Appearance Urine (no units)   Date Value   06/23/2021 Clear     Glucose Urine (mg/dL)   Date Value   06/23/2021 Negative     Bilirubin Urine (no units)   Date Value   06/23/2021 Negative     Ketones Urine (mg/dL)   Date Value   06/23/2021 Negative     Specific Gravity Urine (no units)   Date Value   06/23/2021 1.015     pH Urine (pH)   Date Value   06/23/2021 6.0     Protein Albumin Urine (mg/dL)   Date  Value   06/23/2021 Negative     Urobilinogen Urine (EU/dL)   Date Value   06/23/2021 0.2     Nitrite Urine (no units)   Date Value   06/23/2021 Negative     Leukocyte Esterase Urine (no units)   Date Value   06/23/2021 Small (A)       Creatinine   Date Value Ref Range Status   02/15/2024 0.56 0.51 - 0.95 mg/dL Final   10/08/2020 0.41 (L) 0.52 - 1.04 mg/dL Final       IMAGING:  US RENAL COMPLETE NON-VASCULAR, 1/31/2024   FINDINGS:  Right kidney: 11.6 cm  cm in length. Parenchyma is of normal thickness  and echogenicity. No hydronephrosis. In the medial aspect of the right  kidney, there is an angiomyolipoma measuring 1.2 x 0.9 x 1.2 cm. In  2016, it measured 1.1 x 1.1 x 0.9 cm.     Left kidney: 11.7 cm cm in length. Parenchyma is of normal thickness  and echogenicity.  No solid mass.  No hydronephrosis.      Bladder: Empty.                                                                       IMPRESSION:  1.  No significant change in the right angiomyolipoma since 2016.      US RENAL COMPLETE, 4/2/2021   FINDINGS:  Right kidney: Measures 12.2 cm in length. Parenchyma is of normal  thickness and echogenicity. Focal cortical based hyperechoic round  lesion at the medial aspect of the lower pole of the right kidney  adjacent to the renal hilum measuring 1.4 x 1.5 x 1.3 cm, previously  measured 1.0 x 1.0 x 0.9 cm. No internal blood flow by color Doppler  No focal mass. No hydronephrosis.     Left kidney: Measures 12.0 cm in length. Parenchyma is of normal  thickness and echogenicity. No focal mass. No hydronephrosis.      Bladder: Partially distended and unremarkable                                                                  IMPRESSION:  1.  Increased size of right renal angiomyolipoma now measuring up to  1.5 cm, previously measured up to 1.0 cm on 1/14/2019.      CT urogram 6/15/16:  Impression:  1. 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 This  abuts the renal collecting system and may be the  source of  microhematuria although significant bleeding is uncommon in lesions of  this size. No other abnormality in the urinary collecting system.    2. Other incidental findings as above.          10 minutes spent on the date of the encounter doing chart review, review of test results, interpretation of tests, patient visit, and documentation

## 2024-04-12 ENCOUNTER — OFFICE VISIT (OUTPATIENT)
Dept: UROLOGY | Facility: CLINIC | Age: 74
End: 2024-04-12
Payer: COMMERCIAL

## 2024-04-12 VITALS — HEART RATE: 76 BPM | DIASTOLIC BLOOD PRESSURE: 84 MMHG | SYSTOLIC BLOOD PRESSURE: 145 MMHG

## 2024-04-12 DIAGNOSIS — D17.71 ANGIOMYOLIPOMA OF RIGHT KIDNEY: Primary | ICD-10-CM

## 2024-04-12 PROCEDURE — 99203 OFFICE O/P NEW LOW 30 MIN: CPT | Performed by: PHYSICIAN ASSISTANT

## 2024-04-12 NOTE — NURSING NOTE
Noreen Florence's chief complaint for this visit includes:  Chief Complaint   Patient presents with    Follow Up     2 year follow up; angiomyolipoma of right kidney     PCP: Jud Morales    Referring Provider:  No referring provider defined for this encounter.    LMP 06/21/2002 (Approximate)

## 2024-04-12 NOTE — PATIENT INSTRUCTIONS
UROLOGY CLINIC VISIT PATIENT INSTRUCTIONS    Follow up with Haley Dugan PA-C in 3 year with a kidney ultrasound prior.     If you have any issues, questions or concerns in the meantime, do not hesitate to contact us at [phone] or via [employer].     It was a pleasure meeting with you today.  Thank you for allowing me and my team the privilege of caring for you today.  YOU are the reason we are here, and I truly hope we provided you with the excellent service you deserve.  Please let us know if there is anything else we can do for you so that we can be sure you are leaving completely satisfied with your care experience.

## 2024-04-17 DIAGNOSIS — J30.1 HAYFEVER: ICD-10-CM

## 2024-04-17 RX ORDER — CETIRIZINE HYDROCHLORIDE 10 MG/1
TABLET ORAL
Qty: 90 TABLET | Refills: 1 | Status: SHIPPED | OUTPATIENT
Start: 2024-04-17

## 2024-04-26 ENCOUNTER — OFFICE VISIT (OUTPATIENT)
Dept: URGENT CARE | Facility: URGENT CARE | Age: 74
End: 2024-04-26
Payer: COMMERCIAL

## 2024-04-26 VITALS
HEART RATE: 86 BPM | TEMPERATURE: 97.7 F | RESPIRATION RATE: 14 BRPM | SYSTOLIC BLOOD PRESSURE: 158 MMHG | BODY MASS INDEX: 20.23 KG/M2 | DIASTOLIC BLOOD PRESSURE: 88 MMHG | WEIGHT: 114.2 LBS | OXYGEN SATURATION: 99 %

## 2024-04-26 DIAGNOSIS — M25.569 ACUTE KNEE PAIN, UNSPECIFIED LATERALITY: Primary | ICD-10-CM

## 2024-04-26 DIAGNOSIS — M06.4 POLYARTHRITIS, INFLAMMATORY (H): ICD-10-CM

## 2024-04-26 PROCEDURE — 99214 OFFICE O/P EST MOD 30 MIN: CPT | Performed by: PHYSICIAN ASSISTANT

## 2024-04-26 RX ORDER — CELECOXIB 100 MG/1
100 CAPSULE ORAL 2 TIMES DAILY
Qty: 60 CAPSULE | Refills: 0 | Status: SHIPPED | OUTPATIENT
Start: 2024-04-26 | End: 2024-06-25

## 2024-04-26 ASSESSMENT — ENCOUNTER SYMPTOMS
DIZZINESS: 0
CHILLS: 0
FEVER: 0
ARTHRALGIAS: 1
VOMITING: 0
DIARRHEA: 0
BACK PAIN: 0
PALPITATIONS: 0
LIGHT-HEADEDNESS: 0
EYES NEGATIVE: 1
NECK PAIN: 0
CARDIOVASCULAR NEGATIVE: 1
SHORTNESS OF BREATH: 0
NECK STIFFNESS: 0
RHINORRHEA: 0
WOUND: 0
HEADACHES: 0
WEAKNESS: 0
ALLERGIC/IMMUNOLOGIC NEGATIVE: 1
COUGH: 0
SORE THROAT: 0
NAUSEA: 0
ENDOCRINE NEGATIVE: 1
MYALGIAS: 0
RESPIRATORY NEGATIVE: 1
JOINT SWELLING: 0

## 2024-04-26 NOTE — PROGRESS NOTES
"Chief Complaint:     Chief Complaint   Patient presents with    Urgent Care    Knee Pain     Both knees pain x 3 days, \"not like this before\"        ASSESSMENT     1. Acute knee pain, unspecified laterality    2. Polyarthritis, inflammatory (H)         PLAN    Rx for refill of Celebrex.    Order placed for follow up with ortho.    ROXANE discussed  Recommended rest and avoidance of activities which cause pain or swelling.  Pain relief: Acetaminophen and or Ibuprofen with food.  Patient verbalized understanding, and agrees with this plan.    33 minutes was spent in the care of this patient including chart review, HPI, ROS, PE, review of plan, and placing of orders.      HPI: Noreen Florence is an 74 year old female who presents today for evaluation of bilateral knee pain.  Symptoms started 3 days ago and have not changed.  She does not recall any acute injury.  She has not tried anything for the pain.  Pain is worse with kneeling down.  Patient has a significant Hx of RA.      Patient denies any numbness, tingling, or dysfunction of the bilateral legs.    ROS:      Review of Systems   Constitutional:  Negative for chills and fever.   HENT:  Negative for congestion, ear pain, rhinorrhea and sore throat.    Eyes: Negative.    Respiratory: Negative.  Negative for cough and shortness of breath.    Cardiovascular: Negative.  Negative for chest pain and palpitations.   Gastrointestinal:  Negative for diarrhea, nausea and vomiting.   Endocrine: Negative.    Genitourinary: Negative.    Musculoskeletal:  Positive for arthralgias. Negative for back pain, joint swelling, myalgias, neck pain and neck stiffness.   Skin: Negative.  Negative for rash and wound.   Allergic/Immunologic: Negative.  Negative for immunocompromised state.   Neurological:  Negative for dizziness, weakness, light-headedness and headaches.        Problem history  Patient Active Problem List   Diagnosis    Osteopenia    Hayfever    Hypertension goal BP (blood " pressure) < 140/90    Polyarthritis, inflammatory (H)    GERD (gastroesophageal reflux disease)    Hyperlipidemia with target LDL less than 130    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    High risk medication use    Microscopic hematuria    Pterygium of right eye    Sicca syndrome (H24)    Angiomyolipoma of right kidney    History of Helicobacter pylori infection    Pre-diabetes    NAFLD (nonalcoholic fatty liver disease)    Elevated LFTs    Heel pain, chronic, right    Dermatochalasis of both upper eyelids    Dermatochalasis of both lower eyelids    Tendonitis, Achilles, right        Allergies  No Known Allergies     Smoking History  History   Smoking Status    Never   Smokeless Tobacco    Never        Current Meds    Current Outpatient Medications:     celecoxib (CELEBREX) 100 MG capsule, Take 1 capsule (100 mg) by mouth 2 times daily, Disp: 60 capsule, Rfl: 0    acetaminophen-codeine (TYLENOL #3) 300-30 MG tablet, TAKE 1 TABLET BY MOUTH EVERY 4 TO 6 HOURS AS NEEDED FOR PAIN, Disp: , Rfl:     albuterol (PROAIR HFA/PROVENTIL HFA/VENTOLIN HFA) 108 (90 Base) MCG/ACT inhaler, INHALE 2 PUFFS INTO THE LUNGS EVERY 6 HOURS AS NEEDED FOR SHORTNESS OF BREATH OR WHEEZING OR COUGH (Patient not taking: Reported on 11/30/2023), Disp: 18 g, Rfl: 3    cetirizine (ZYRTEC) 10 MG tablet, TAKE 1 TABLET(10 MG) BY MOUTH DAILY, Disp: 90 tablet, Rfl: 1    chlorhexidine (PERIDEX) 0.12 % solution, SWISH AND SPIT 15 ML BY MOUTH TWICE DAILY AS NEEDED (Patient not taking: Reported on 4/12/2024), Disp: 473 mL, Rfl: 1    cholecalciferol (VITAMIN D3) 25 mcg (1000 units) capsule, Take 1,000 Units by mouth, Disp: , Rfl:     diclofenac (VOLTAREN) 1 % topical gel, Apply 2 g topically 4 times daily as needed for moderate pain, Disp: 350 g, Rfl: 1    finasteride (PROSCAR) 5 MG tablet, Take 1 tablet (5 mg) by mouth daily, Disp: 90 tablet, Rfl: 3    fluticasone (FLONASE) 50 MCG/ACT nasal spray, Spray 1-2 sprays into both nostrils daily,  Disp: 48 g, Rfl: 3    hydroquinone (JAVIER) 4 % external cream, Apply daily to brown spots for 4-6 months., Disp: 28.35 g, Rfl: 4    latanoprost (XALATAN) 0.005 % ophthalmic solution, INSTILL 1 DROP IN BOTH EYES AT BEDTIME, Disp: 7.5 mL, Rfl: 6    losartan-hydrochlorothiazide (HYZAAR) 50-12.5 MG tablet, Take 1 tablet by mouth daily, Disp: 90 tablet, Rfl: 3    magnesium 250 MG tablet, TAKE 1 TABLET BY MOUTH DAILY AS NEEDED FOR LEG CRAMPS, Disp: 90 tablet, Rfl: 1    meloxicam (MOBIC) 7.5 MG tablet, 15 MG/D WITH FOOD IN AM FOR 2 WEEKS AND THEN STAY ON 7.5 MG DAILY WITH FOOD, Disp: , Rfl:     methylcellulose (CITRUCEL) powder, Take 0.3 g (1.05 teaspoonful) by mouth daily, Disp: 454 g, Rfl: 1    metoprolol tartrate (LOPRESSOR) 25 MG tablet, TAKE 1 TABLET(25 MG) BY MOUTH AT BEDTIME, Disp: 90 tablet, Rfl: 3    minoxidil (ROGAINE) 5 % external solution, Apply to scalp once daily., Disp: 120 mL, Rfl: 3    multivitamin w/minerals (THERA-VIT-M) tablet, Take 1 tablet by mouth daily, Disp: 90 tablet, Rfl: 3    nitroGLYcerin (RECTIV) 0.4 % OINT rectal ointment, 1 inch (1.5 mg) by Peritendinous route every 12 hours, Disp: 30 g, Rfl: 0    omeprazole (PRILOSEC) 40 MG DR capsule, TAKE 1 CAPSULE(40 MG) BY MOUTH DAILY, Disp: 90 capsule, Rfl: 2    OPTIVE 0.5-0.9 % ophthalmic solution, INSTILL 1 DROP IN BOTH EYES THREE TIMES DAILY AS NEEDED FOR DRY EYES, Disp: 15 mL, Rfl: 1    pilocarpine (SALAGEN) 5 MG tablet, Take 1 tablet (5 mg) by mouth 3 times daily, Disp: 270 tablet, Rfl: 0    pravastatin (PRAVACHOL) 20 MG tablet, Take 1 tablet (20 mg) by mouth daily, Disp: 90 tablet, Rfl: 3    sodium fluoride (SF 5000 PLUS) 1.1 % CREA, Use twice a day as instructed, Disp: 51 g, Rfl: 1    sulfaSALAzine ER (AZULFIDINE EN) 500 MG EC tablet, Take 2 tablets (1,000 mg) by mouth 2 times daily, Disp: 360 tablet, Rfl: 3    tretinoin (RETIN-A) 0.05 % external cream, Apply pea sized amount at bedtime. (Patient not taking: Reported on 3/27/2024), Disp: 45 g,  Rfl: 4    triamcinolone (KENALOG) 0.1 % external cream, APPLY TOPICALLY TO THE AFFECTED AREA TWICE DAILY. MAXIMUM OF 2 WEEKS, Disp: 30 g, Rfl: 0    triamcinolone (KENALOG) 0.1 % paste, APPLY TO MOUTH TWICE DAILY, Disp: 5 g, Rfl: 0    Current Facility-Administered Medications:     2 mL bupivacaine (MARCAINE) preservative free injection 0.25% (10 mL vial), 2 mL, , , Isaac Zhou, , 2 mL at 07/14/23 1335    lidocaine 1 % injection 2 mL, 2 mL, , , Abelardo, Isaac, , 2 mL at 07/14/23 1335    triamcinolone (KENALOG-40) injection 40 mg, 40 mg, , , Isaac Zhou, , 40 mg at 07/14/23 1335    Facility-Administered Medications Ordered in Other Visits:     gadobutrol (GADAVIST) injection 7.5 mL, 7.5 mL, Intravenous, Once, Palomo Terrell MD        Vital signs reviewed by Indra Sarah PA-C  BP (!) 158/88 (BP Location: Left arm, Patient Position: Sitting, Cuff Size: Adult Regular)   Pulse 86   Temp 97.7  F (36.5  C) (Oral)   Resp 14   Wt 51.8 kg (114 lb 3.2 oz)   LMP 06/21/2002 (Approximate)   SpO2 99%   BMI 20.23 kg/m      Physical Exam     Physical Exam  Vitals and nursing note reviewed.   Constitutional:       General: She is not in acute distress.     Appearance: She is well-developed. She is not ill-appearing, toxic-appearing or diaphoretic.   HENT:      Head: Normocephalic and atraumatic.      Right Ear: Tympanic membrane and external ear normal. No drainage, swelling or tenderness. Tympanic membrane is not perforated, erythematous, retracted or bulging.      Left Ear: Tympanic membrane and external ear normal. No drainage, swelling or tenderness. Tympanic membrane is not perforated, erythematous, retracted or bulging.      Nose: No mucosal edema, congestion or rhinorrhea.      Right Sinus: No maxillary sinus tenderness or frontal sinus tenderness.      Left Sinus: No maxillary sinus tenderness or frontal sinus tenderness.      Mouth/Throat:      Pharynx: No pharyngeal swelling, oropharyngeal  exudate, posterior oropharyngeal erythema or uvula swelling.      Tonsils: No tonsillar abscesses.   Eyes:      Pupils: Pupils are equal, round, and reactive to light.   Neck:      Trachea: Trachea normal.   Cardiovascular:      Rate and Rhythm: Normal rate and regular rhythm.      Heart sounds: Normal heart sounds, S1 normal and S2 normal. No murmur heard.     No friction rub. No gallop.   Pulmonary:      Effort: Pulmonary effort is normal. No respiratory distress.      Breath sounds: Normal breath sounds. No decreased breath sounds, wheezing, rhonchi or rales.   Abdominal:      General: Bowel sounds are normal. There is no distension.      Palpations: Abdomen is soft. Abdomen is not rigid. There is no mass.      Tenderness: There is no abdominal tenderness. There is no guarding or rebound.   Musculoskeletal:      Cervical back: Full passive range of motion without pain, normal range of motion and neck supple.      Right knee: No swelling, deformity, effusion, erythema or bony tenderness. Normal range of motion. No tenderness. No LCL laxity, MCL laxity or ACL laxity.      Left knee: No swelling, deformity, effusion, erythema or bony tenderness. Normal range of motion. No tenderness. No LCL laxity, MCL laxity or ACL laxity.  Lymphadenopathy:      Cervical: No cervical adenopathy.   Skin:     General: Skin is warm and dry.   Neurological:      Mental Status: She is alert and oriented to person, place, and time.      Cranial Nerves: No cranial nerve deficit.      Deep Tendon Reflexes: Reflexes are normal and symmetric.   Psychiatric:         Behavior: Behavior normal. Behavior is cooperative.         Thought Content: Thought content normal.         Judgment: Judgment normal.             Indra Sarah PA-C  4/26/2024, 1:19 PM

## 2024-04-30 PROBLEM — M76.61 TENDONITIS, ACHILLES, RIGHT: Status: RESOLVED | Noted: 2024-01-12 | Resolved: 2024-04-30

## 2024-04-30 NOTE — PROGRESS NOTES
After Visit Summary   4/3/2017    Carl Wilkes    MRN: [id number]           Patient Information     Date Of Birth          1940        Visit Information        Provider Department      4/3/2017 4:00 PM Xiomara Alexis PA-C [facility]        Today's Diagnoses     Actinic keratoses    -  1      Care Instructions    Possible side effects  Photosensitivity reactions: Reactions caused by light can show up on the skin where the drug is applied. They usually involve redness and a tingling or burning sensation. For about 2 days after the drug is used, you should take care to not expose treated areas of your face and scalp to light.  Stay out of strong, direct light.   Stay indoors as much as possible.   Wear protective clothing and wide-brimmed hats to avoid sunlight when outdoors.   Avoid beaches, snow, light colored concrete, or other surfaces where strong light may be reflected.  Sunscreens will not protect the skin from photosensitivity reactions.  Skin changes: The treated skin will likely turn red and may swell after treatment. This usually peaks about a day after treatment and gets better within a week. It should be gone about 4 weeks after treatment. The skin may also be itchy or change color after treatment.  Recommended General Skin care:   Eliminate harsh soaps, i.e. Dial, Zest, Abigail spring.  Use mild soaps such as Cetaphil or Dove sensitive skin.  Avoid overly hot or cold showers.  Use Vanicream, Cetaphil, Eucerin or Cerave creams to moisturize your skin.  Scoop-able creams are better than thin lotions.  Apply moisturizer immediately to damp skin after patting skin dry with towel.   [employer] Public inFormation Center:   Informative resources for the public to learn more about   skin conditions, tips on performing self-examinations, sun   safety, and more The public can find information online at   www aad org or by calling [phone]-UISO (2040)      DISCHARGE  Reason for Discharge: Patient chooses to discontinue therapy.    Equipment Issued: HEP    Discharge Plan: Patient to continue home program.    Referring Provider:  Jud Morales     01/29/24 0500   Appointment Info   Signing clinician's name / credentials KAMRAN Thompson   Total/Authorized Visits 8   Visits Used 3   Medical Diagnosis Achilles tendinitis of right lower extremity   PT Tx Diagnosis R achilles tendonitis   Quick Adds Certification   Progress Note/Certification   Start of Care Date 01/12/24   Onset of illness/injury or Date of Surgery 12/22/23  (MD order date)   Therapy Frequency 1x/week   Predicted Duration x8 weeks   Certification date from 01/12/24   Certification date to 03/08/24   Progress Note Completed Date 01/12/24   PT Goal 1   Goal Identifier stairs   Goal Description able to descend 6-8 steps in normal gait pattern with no pain R ankle   Rationale to maximize safety and independence with performance of ADLs and functional tasks;to maximize safety and independence within the home;to maximize safety and independence within the community   Goal Progress 0/10PL with stairs today   Target Date 03/08/24   Subjective Report   Subjective Report pt stated she walked 3x ad the foot feels better; she would like to continue on her own now and return if needed   Objective Measures   Objective Measures Objective Measure 1;Objective Measure 2;Objective Measure 3   Objective Measure 1   Objective Measure palpation   Details tenderness R heel insertion on calcaneous   Objective Measure 2   Objective Measure observation   Details significant shoe wear noted on pt shoes- recommended new shoes and support for shoes   Objective Measure 3   Objective Measure R ankle AROM   Details WNL in all directions   PT Modalities   PT Modalities Ultrasound   Ultrasound   Ultrasound Minutes (35631) 10   Ultrasound -Type (does not include 3-5 min prep/cleanup time) Continuous   Intensity .6   Duration (does  "      Recheck in 3-4 months.             Follow-ups after your visit        Who to contact     If you have questions or need follow up information about today's clinic visit or your schedule please contact [facility] directly at [phone].  Normal or non-critical lab and imaging results will be communicated to you by Map[employer]art, letter or phone within 4 business days after the clinic has received the results. If you do not hear from us within 7 days, please contact the clinic through [employer] or phone. If you have a critical or abnormal lab result, we will notify you by phone as soon as possible.  Submit refill requests through [employer] or call your pharmacy and they will forward the refill request to us. Please allow 3 business days for your refill to be completed.          Additional Information About Your Visit        MapittrackitharBIOCUREX Information     [employer] lets you send messages to your doctor, view your test results, renew your prescriptions, schedule appointments and more. To sign up, go to [email or web address] . Click on \"Log in\" on the left side of the screen, which will take you to the Welcome page. Then click on \"Sign up Now\" on the right side of the page.     You will be asked to enter the access code listed below, as well as some personal information. Please follow the directions to create your username and password.     Your access code is: [email or web address]  Expires: 2017 12:14 PM     Your access code will  in 90 days. If you need help or a new code, please call your [facility] or [phone].        Care EveryWhere ID     This is your Care EveryWhere ID. This could be used by other organizations to access your [facility] medical records  [id number]        Your Vitals Were     Pulse Pulse Oximetry                61 94%           Blood Pressure from Last 3 Encounters:   17 122/79   17 134/81    Weight from Last 3 Encounters:   No data found for Wt              We Performed " "not include the 3-5 min set up/clean up time) 10 min   Frequency 3 MHz   Location R heel   Positioning prone   Patient Response/Progress tolerated US   Treatment Interventions (PT)   Interventions Therapeutic Procedure/Exercise;Manual Therapy   Therapeutic Procedure/Exercise   Therapeutic Procedures: strength, endurance, ROM, flexibility minutes (58092) 20   Ther Proc 1 taping of R achilles- kinesio taping   Ther Proc 1 - Details R achilles x2'   PTRx Ther Proc 1 Ankle Eversion Strengthening With Theraband   PTRx Ther Proc 1 - Details x10 Blue TB   PTRx Ther Proc 2 Theraband Ankle Inversion   PTRx Ther Proc 2 - Details x10; Blue tB   PTRx Ther Proc 3 Theraband Ankle Dorsiflexion   PTRx Ther Proc 3 - Details x10; Blue tB   PTRx Ther Proc 4 Toe Raises   PTRx Ther Proc 4 - Details verbal review   Skilled Intervention education on new exercises   Patient Response/Progress tolerated stretches well   PTRx Ther Proc 5 Reverse Toe Raises   PTRx Ther Proc 5 - Details verbal review   PTRx Ther Proc 6 Towel Stretch Gastroc   PTRx Ther Proc 6 - Details 5x10\" hold   PTRx Ther Proc 7 Standing Gastroc Stretch   PTRx Ther Proc 7 - Details verbal review   Manual Therapy   Patient Response/Progress pt did not want today   Education   Learner/Method Demonstration;Pictures/Video   Plan   Home program updated HEP   Plan for next session progress gentle strengthening; pain management as needed   Total Session Time   Timed Code Treatment Minutes 30   Total Treatment Time (sum of timed and untimed services) 30       " the Following     C AMINOLEVULINIC ACID HCL TOP, 354 MG     PHOTODYNAMIC THERAPY, SKIN PER SESSION        Primary Care Provider    None Specified       No primary provider on file.        Thank you!     Thank you for choosing [facility]  for your care. Our goal is always to provide you with excellent care. Hearing back from our patients is one way we can continue to improve our services. Please take a few minutes to complete the written survey that you may receive in the mail after your visit with us. Thank you!             Your Updated Medication List - Protect others around you: Learn how to safely use, store and throw away your medicines at www.disposemymeds.org.          This list is accurate as of: 4/3/17  5:14 PM.  Always use your most recent med list.                   Brand Name Dispense Instructions for use    fluorouracil 5 % cream    EFUDEX    40 g    Twice daily for 2 weeks to forehead and temples       losartan 25 MG tablet    COZAAR     Take 25 mg by mouth       metoprolol 50 MG 24 hr tablet    TOPROL-XL     TAKE ONE-HALF TABLET BY MOUTH ONCE DAILY TO  LOWER  BLOOD  PRESSURE  AND  PROTECT  THE  HEART

## 2024-05-01 ENCOUNTER — OFFICE VISIT (OUTPATIENT)
Dept: OPHTHALMOLOGY | Facility: CLINIC | Age: 74
End: 2024-05-01
Attending: OPHTHALMOLOGY
Payer: COMMERCIAL

## 2024-05-01 DIAGNOSIS — H43.813 POSTERIOR VITREOUS DEGENERATION, BILATERAL: Primary | ICD-10-CM

## 2024-05-01 PROCEDURE — G0463 HOSPITAL OUTPT CLINIC VISIT: HCPCS | Performed by: OPHTHALMOLOGY

## 2024-05-01 PROCEDURE — 99214 OFFICE O/P EST MOD 30 MIN: CPT | Mod: GC | Performed by: OPHTHALMOLOGY

## 2024-05-01 ASSESSMENT — TONOMETRY
OD_IOP_MMHG: 9
OD_IOP_MMHG: 14
OS_IOP_MMHG: 9
OS_IOP_MMHG: 14
IOP_METHOD: TONOPEN

## 2024-05-01 ASSESSMENT — SLIT LAMP EXAM - LIDS
COMMENTS: MGD
COMMENTS: MGD

## 2024-05-01 ASSESSMENT — CUP TO DISC RATIO
OD_RATIO: 0.6
OS_RATIO: 0.6

## 2024-05-01 ASSESSMENT — EXTERNAL EXAM - RIGHT EYE: OD_EXAM: NORMAL

## 2024-05-01 ASSESSMENT — VISUAL ACUITY
OS_SC: 20/25
METHOD: SNELLEN - LINEAR
OD_SC: 20/30
OS_SC+: -1+2

## 2024-05-01 ASSESSMENT — EXTERNAL EXAM - LEFT EYE: OS_EXAM: NORMAL

## 2024-05-01 NOTE — NURSING NOTE
Chief Complaints and History of Present Illnesses   Patient presents with    Consult For     Noreen BRUCE [place] is being seen for a consult today for Vitreolysis evaluation     Chief Complaint(s) and History of Present Illness(es)       Consult For              Associated symptoms: floaters.  Negative for eye pain and flashes    Treatments tried: eye drops    Pain scale: 0/10    Comments: Noreen [place] is being seen for a consult today for Vitreolysis evaluation              Comments    Pt states she has a small dark spot floater in left eye vision.   2 months ago, she began noticing large spider-like black floaters in right eye.    Denies flashes or curtains.   No new concerns today.    Ocular Meds:  Systane TID each eye   Latanaprost qPM each eye     Jerry Ho 3:53 PM May 1, 2024

## 2024-05-01 NOTE — PROGRESS NOTES
HPI       Consult For    Associated symptoms include floaters.  Negative for eye pain and flashes.  Treatments tried include eye drops.  Pain was noted as 0/10. Additional comments: Noreen Florence is being seen for a consult today for Vitreolysis evaluation             Comments    Pt states she has a small dark spot floater in left eye vision.   2 months ago, she began noticing large spider-like black floaters in right eye.    Denies flashes or curtains.   No new concerns today.    Ocular Meds:  Systane TID each eye   Latanaprost qPM each eye     Jerry Barnes 3:53 PM May 1, 2024            Last edited by Jerry Barnes on 5/1/2024  3:53 PM.          Review of systems for the eyes was negative other than the pertinent positives/negatives listed in the HPI.      Pt was referred by Dr. Banereje for possible bilateral YAG vitreolysis. She experienced PVD in the left eye, and subsequently the right eye a few months later. She is bothered by the persistence of the floaters.     Assessment & Plan      Noreen Florence is a 74 year old female with the following diagnoses:   1. Posterior vitreous degeneration, bilateral         Referral for persistent floaters right eye > left eye  Left eye for many years, right eye since March  RBA to YAG vitreolysis discussed, she would like to monitor symptoms at this time  Discussed symptoms of retinal tear/detachment and the need to be seen urgently should they occur     Patient disposition:   Return if symptoms worsen or fail to improve.           Attending Physician Attestation:  Complete documentation of historical and exam elements from today's encounter can be found in the full encounter summary report (not reduplicated in this progress note).  I personally obtained the chief complaint(s) and history of present illness.  I confirmed and edited as necessary the review of systems, past medical/surgical history, family history, social history, and examination findings as documented by others; and I examined the  patient myself.  I personally reviewed the relevant tests, images, and reports as documented above.  I formulated and edited as necessary the assessment and plan and discussed the findings and management plan with the patient and family. . - Terrell Nevarez MD

## 2024-05-02 ENCOUNTER — OFFICE VISIT (OUTPATIENT)
Dept: ORTHOPEDICS | Facility: CLINIC | Age: 74
End: 2024-05-02
Payer: COMMERCIAL

## 2024-05-02 ENCOUNTER — ANCILLARY PROCEDURE (OUTPATIENT)
Dept: GENERAL RADIOLOGY | Facility: CLINIC | Age: 74
End: 2024-05-02
Attending: FAMILY MEDICINE
Payer: COMMERCIAL

## 2024-05-02 DIAGNOSIS — M25.569 ACUTE KNEE PAIN, UNSPECIFIED LATERALITY: ICD-10-CM

## 2024-05-02 DIAGNOSIS — M76.61 ACHILLES TENDINITIS OF RIGHT LOWER EXTREMITY: ICD-10-CM

## 2024-05-02 DIAGNOSIS — M25.561 BILATERAL KNEE PAIN: ICD-10-CM

## 2024-05-02 DIAGNOSIS — M25.562 BILATERAL KNEE PAIN: ICD-10-CM

## 2024-05-02 DIAGNOSIS — M25.561 BILATERAL KNEE PAIN: Primary | ICD-10-CM

## 2024-05-02 DIAGNOSIS — M25.562 BILATERAL KNEE PAIN: Primary | ICD-10-CM

## 2024-05-02 PROCEDURE — 73564 X-RAY EXAM KNEE 4 OR MORE: CPT | Mod: RT | Performed by: RADIOLOGY

## 2024-05-02 PROCEDURE — 99214 OFFICE O/P EST MOD 30 MIN: CPT | Performed by: FAMILY MEDICINE

## 2024-05-02 RX ORDER — NITROGLYCERIN 4 MG/G
1 OINTMENT RECTAL EVERY 12 HOURS
Qty: 30 G | Refills: 0 | Status: SHIPPED | OUTPATIENT
Start: 2024-05-02

## 2024-05-02 ASSESSMENT — PAIN SCALES - GENERAL: PAINLEVEL: MILD PAIN (2)

## 2024-05-02 NOTE — LETTER
5/2/2024         RE: Noreen Florence  [address]  [place] [address]        Dear Colleague,    Thank you for referring your patient, Noreen [place], to the [facility] [place]. Please see a copy of my visit note below.    CHIEF COMPLAINT:  Pain and New Patient of the Right Knee and Pain and New Patient of the Left Knee       HISTORY OF PRESENT ILLNESS  Ms. Florence is a 74 year old female who presents to clinic today with bilateral knee pain.  Noreen experienced the sudden onset of bilateral knee pain about one week ago.  She had great difficulty ambulating and felt pain in the back of each knee.  Left was slightly worse than right, although both were severe.  She was seen at an urgent care facility where she was prescribed Celebrex.  She found great relief with this pretty quickly, since then she has had minimal pain.  She has mild knee pain at time, although this is definitely tolerable.  Of note, she did have an injection in her left knee many years ago, possibly 20 or more.        Additional history: as documented    MEDICAL HISTORY  Patient Active Problem List   Diagnosis     Osteopenia     Hayfever     Hypertension goal BP (blood pressure) < 140/90     Polyarthritis, inflammatory (H)     GERD (gastroesophageal reflux disease)     Hyperlipidemia with target LDL less than 130     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     High risk medication use     Microscopic hematuria     Pterygium of right eye     Sicca syndrome (H24)     Angiomyolipoma of right kidney     History of Helicobacter pylori infection     Pre-diabetes     NAFLD (nonalcoholic fatty liver disease)     Elevated LFTs     Heel pain, chronic, right     Dermatochalasis of both upper eyelids     Dermatochalasis of both lower eyelids       Current Outpatient Medications   Medication Sig Dispense Refill     acetaminophen-codeine (TYLENOL #3) 300-30 MG tablet TAKE 1 TABLET BY MOUTH EVERY 4 TO 6 HOURS AS NEEDED FOR PAIN        celecoxib (CELEBREX) 100 MG capsule Take 1 capsule (100 mg) by mouth 2 times daily 60 capsule 0     cetirizine (ZYRTEC) 10 MG tablet TAKE 1 TABLET(10 MG) BY MOUTH DAILY 90 tablet 1     cholecalciferol (VITAMIN D3) 25 mcg (1000 units) capsule Take 1,000 Units by mouth       diclofenac (VOLTAREN) 1 % topical gel Apply 2 g topically 4 times daily as needed for moderate pain 350 g 1     finasteride (PROSCAR) 5 MG tablet Take 1 tablet (5 mg) by mouth daily 90 tablet 3     fluticasone (FLONASE) 50 MCG/ACT nasal spray Spray 1-2 sprays into both nostrils daily 48 g 3     hydroquinone (JAVIER) 4 % external cream Apply daily to brown spots for 4-6 months. 28.35 g 4     latanoprost (XALATAN) 0.005 % ophthalmic solution INSTILL 1 DROP IN BOTH EYES AT BEDTIME 7.5 mL 6     losartan-hydrochlorothiazide (HYZAAR) 50-12.5 MG tablet Take 1 tablet by mouth daily 90 tablet 3     magnesium 250 MG tablet TAKE 1 TABLET BY MOUTH DAILY AS NEEDED FOR LEG CRAMPS 90 tablet 1     meloxicam (MOBIC) 7.5 MG tablet 15 MG/D WITH FOOD IN AM FOR 2 WEEKS AND THEN STAY ON 7.5 MG DAILY WITH FOOD       methylcellulose (CITRUCEL) powder Take 0.3 g (1.05 teaspoonful) by mouth daily 454 g 1     metoprolol tartrate (LOPRESSOR) 25 MG tablet TAKE 1 TABLET(25 MG) BY MOUTH AT BEDTIME 90 tablet 3     minoxidil (ROGAINE) 5 % external solution Apply to scalp once daily. 120 mL 3     multivitamin w/minerals (THERA-VIT-M) tablet Take 1 tablet by mouth daily 90 tablet 3     nitroGLYcerin (RECTIV) 0.4 % OINT rectal ointment 1 inch (1.5 mg) by Peritendinous route every 12 hours 30 g 0     omeprazole (PRILOSEC) 40 MG DR capsule TAKE 1 CAPSULE(40 MG) BY MOUTH DAILY 90 capsule 2     OPTIVE 0.5-0.9 % ophthalmic solution INSTILL 1 DROP IN BOTH EYES THREE TIMES DAILY AS NEEDED FOR DRY EYES 15 mL 1     pilocarpine (SALAGEN) 5 MG tablet Take 1 tablet (5 mg) by mouth 3 times daily 270 tablet 0     pravastatin (PRAVACHOL) 20 MG tablet Take 1 tablet (20 mg) by mouth daily 90  tablet 3     sodium fluoride (SF 5000 PLUS) 1.1 % CREA Use twice a day as instructed 51 g 1     sulfaSALAzine ER (AZULFIDINE EN) 500 MG EC tablet Take 2 tablets (1,000 mg) by mouth 2 times daily 360 tablet 3     tretinoin (RETIN-A) 0.05 % external cream Apply pea sized amount at bedtime. 45 g 4     triamcinolone (KENALOG) 0.1 % external cream APPLY TOPICALLY TO THE AFFECTED AREA TWICE DAILY. MAXIMUM OF 2 WEEKS 30 g 0     triamcinolone (KENALOG) 0.1 % paste APPLY TO MOUTH TWICE DAILY 5 g 0     albuterol (PROAIR HFA/PROVENTIL HFA/VENTOLIN HFA) 108 (90 Base) MCG/ACT inhaler INHALE 2 PUFFS INTO THE LUNGS EVERY 6 HOURS AS NEEDED FOR SHORTNESS OF BREATH OR WHEEZING OR COUGH (Patient not taking: Reported on 11/30/2023) 18 g 3     chlorhexidine (PERIDEX) 0.12 % solution SWISH AND SPIT 15 ML BY MOUTH TWICE DAILY AS NEEDED (Patient not taking: Reported on 4/12/2024) 473 mL 1       No Known Allergies    Family History   Problem Relation Age of Onset     Diabetes Mother      Cancer No family hx of      Hypertension No family hx of      Cerebrovascular Disease No family hx of      Thyroid Disease No family hx of      Glaucoma No family hx of      Macular Degeneration No family hx of        Additional medical/Social/Surgical histories reviewed in [facility] and updated as appropriate.        PHYSICAL EXAM  General  - normal appearance, in no obvious distress  Musculoskeletal - right and left knee  - stance: normal gait without limp  - inspection: no swelling or effusion, normal bone and joint alignment, no obvious deformity  - palpation: no joint line tenderness, patella and patellar tendon non-tender  - ROM: FROM  - strength: 5/5 in flexion, 5/5 in extension  - special tests:  (-) Lachman  (-) Lani  (-) varus at 0 and 30 degrees flexion  (-) valgus at 0 and 30 degrees flexion    Neuro  - no sensory or motor deficit, grossly normal coordination, normal muscle tone  Skin  - no ecchymosis, erythema, warmth, or induration, no obvious  rash                   ASSESSMENT & PLAN  Ms. Florence is a 74 year old female who presents to clinic today with bilateral knee pain.    I ordered and independently reviewed an xray of her knees, this reveals mild to moderate osteoarthritis bilaterally.    I happy that her pain has subsided, this is encouraging as she can continue to take Celebrex as needed.  We did discuss that if her pain is not controlled with her Celebrex regimen we could consider corticosteroid injections.    It was a pleasure seeing Noreen today.    Michel Awad DO, [facility]  Primary Care Sports Medicine      This note was constructed using Dragon dictation software, please excuse any minor errors in spelling, grammar, or syntax.      Again, thank you for allowing me to participate in the care of your patient.        Sincerely,        Michel Awad DO

## 2024-05-02 NOTE — PROGRESS NOTES
CHIEF COMPLAINT:  Pain and New Patient of the Right Knee and Pain and New Patient of the Left Knee       HISTORY OF PRESENT ILLNESS  Ms. [place] is a 74 year old female who presents to clinic today with bilateral knee pain.  Noreen experienced the sudden onset of bilateral knee pain about one week ago.  She had great difficulty ambulating and felt pain in the back of each knee.  Left was slightly worse than right, although both were severe.  She was seen at an urgent care facility where she was prescribed Celebrex.  She found great relief with this pretty quickly, since then she has had minimal pain.  She has mild knee pain at time, although this is definitely tolerable.  Of note, she did have an injection in her left knee many years ago, possibly 20 or more.        Additional history: as documented    MEDICAL HISTORY  Patient Active Problem List   Diagnosis    Osteopenia    Hayfever    Hypertension goal BP (blood pressure) < 140/90    Polyarthritis, inflammatory (H)    GERD (gastroesophageal reflux disease)    Hyperlipidemia with target LDL less than 130    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    High risk medication use    Microscopic hematuria    Pterygium of right eye    Sicca syndrome (H24)    Angiomyolipoma of right kidney    History of Helicobacter pylori infection    Pre-diabetes    NAFLD (nonalcoholic fatty liver disease)    Elevated LFTs    Heel pain, chronic, right    Dermatochalasis of both upper eyelids    Dermatochalasis of both lower eyelids       Current Outpatient Medications   Medication Sig Dispense Refill    acetaminophen-codeine (TYLENOL #3) 300-30 MG tablet TAKE 1 TABLET BY MOUTH EVERY 4 TO 6 HOURS AS NEEDED FOR PAIN      celecoxib (CELEBREX) 100 MG capsule Take 1 capsule (100 mg) by mouth 2 times daily 60 capsule 0    cetirizine (ZYRTEC) 10 MG tablet TAKE 1 TABLET(10 MG) BY MOUTH DAILY 90 tablet 1    cholecalciferol (VITAMIN D3) 25 mcg (1000 units) capsule Take 1,000 Units by mouth       diclofenac (VOLTAREN) 1 % topical gel Apply 2 g topically 4 times daily as needed for moderate pain 350 g 1    finasteride (PROSCAR) 5 MG tablet Take 1 tablet (5 mg) by mouth daily 90 tablet 3    fluticasone (FLONASE) 50 MCG/ACT nasal spray Spray 1-2 sprays into both nostrils daily 48 g 3    hydroquinone (JAVIER) 4 % external cream Apply daily to brown spots for 4-6 months. 28.35 g 4    latanoprost (XALATAN) 0.005 % ophthalmic solution INSTILL 1 DROP IN BOTH EYES AT BEDTIME 7.5 mL 6    losartan-hydrochlorothiazide (HYZAAR) 50-12.5 MG tablet Take 1 tablet by mouth daily 90 tablet 3    magnesium 250 MG tablet TAKE 1 TABLET BY MOUTH DAILY AS NEEDED FOR LEG CRAMPS 90 tablet 1    meloxicam (MOBIC) 7.5 MG tablet 15 MG/D WITH FOOD IN AM FOR 2 WEEKS AND THEN STAY ON 7.5 MG DAILY WITH FOOD      methylcellulose (CITRUCEL) powder Take 0.3 g (1.05 teaspoonful) by mouth daily 454 g 1    metoprolol tartrate (LOPRESSOR) 25 MG tablet TAKE 1 TABLET(25 MG) BY MOUTH AT BEDTIME 90 tablet 3    minoxidil (ROGAINE) 5 % external solution Apply to scalp once daily. 120 mL 3    multivitamin w/minerals (THERA-VIT-M) tablet Take 1 tablet by mouth daily 90 tablet 3    nitroGLYcerin (RECTIV) 0.4 % OINT rectal ointment 1 inch (1.5 mg) by Peritendinous route every 12 hours 30 g 0    omeprazole (PRILOSEC) 40 MG DR capsule TAKE 1 CAPSULE(40 MG) BY MOUTH DAILY 90 capsule 2    OPTIVE 0.5-0.9 % ophthalmic solution INSTILL 1 DROP IN BOTH EYES THREE TIMES DAILY AS NEEDED FOR DRY EYES 15 mL 1    pilocarpine (SALAGEN) 5 MG tablet Take 1 tablet (5 mg) by mouth 3 times daily 270 tablet 0    pravastatin (PRAVACHOL) 20 MG tablet Take 1 tablet (20 mg) by mouth daily 90 tablet 3    sodium fluoride (SF 5000 PLUS) 1.1 % CREA Use twice a day as instructed 51 g 1    sulfaSALAzine ER (AZULFIDINE EN) 500 MG EC tablet Take 2 tablets (1,000 mg) by mouth 2 times daily 360 tablet 3    tretinoin (RETIN-A) 0.05 % external cream Apply pea sized amount at bedtime. 45 g 4     triamcinolone (KENALOG) 0.1 % external cream APPLY TOPICALLY TO THE AFFECTED AREA TWICE DAILY. MAXIMUM OF 2 WEEKS 30 g 0    triamcinolone (KENALOG) 0.1 % paste APPLY TO MOUTH TWICE DAILY 5 g 0    albuterol (PROAIR HFA/PROVENTIL HFA/VENTOLIN HFA) 108 (90 Base) MCG/ACT inhaler INHALE 2 PUFFS INTO THE LUNGS EVERY 6 HOURS AS NEEDED FOR SHORTNESS OF BREATH OR WHEEZING OR COUGH (Patient not taking: Reported on 11/30/2023) 18 g 3    chlorhexidine (PERIDEX) 0.12 % solution SWISH AND SPIT 15 ML BY MOUTH TWICE DAILY AS NEEDED (Patient not taking: Reported on 4/12/2024) 473 mL 1       No Known Allergies    Family History   Problem Relation Age of Onset    Diabetes Mother     Cancer No family hx of     Hypertension No family hx of     Cerebrovascular Disease No family hx of     Thyroid Disease No family hx of     Glaucoma No family hx of     Macular Degeneration No family hx of        Additional medical/Social/Surgical histories reviewed in [facility] and updated as appropriate.        PHYSICAL EXAM  General  - normal appearance, in no obvious distress  Musculoskeletal - right and left knee  - stance: normal gait without limp  - inspection: no swelling or effusion, normal bone and joint alignment, no obvious deformity  - palpation: no joint line tenderness, patella and patellar tendon non-tender  - ROM: FROM  - strength: 5/5 in flexion, 5/5 in extension  - special tests:  (-) Lachman  (-) Lani  (-) varus at 0 and 30 degrees flexion  (-) valgus at 0 and 30 degrees flexion    Neuro  - no sensory or motor deficit, grossly normal coordination, normal muscle tone  Skin  - no ecchymosis, erythema, warmth, or induration, no obvious rash                   ASSESSMENT & PLAN  Ms. Florence is a 74 year old female who presents to clinic today with bilateral knee pain.    I ordered and independently reviewed an xray of her knees, this reveals mild to moderate osteoarthritis bilaterally.    I happy that her pain has subsided, this is encouraging as  she can continue to take Celebrex as needed.  We did discuss that if her pain is not controlled with her Celebrex regimen we could consider corticosteroid injections.    It was a pleasure seeing Noreen today.    Michel Awad DO, [facility]  Primary Care Sports Medicine      This note was constructed using Dragon dictation software, please excuse any minor errors in spelling, grammar, or syntax.

## 2024-05-02 NOTE — NURSING NOTE
Chief Complaint   Patient presents with    Right Knee - Pain, New Patient    Left Knee - Pain, New Patient       There were no vitals filed for this visit.    There is no height or weight on file to calculate BMI.      STEFANO Gibbons NREMT

## 2024-05-17 DIAGNOSIS — I10 HYPERTENSION GOAL BP (BLOOD PRESSURE) < 140/90: ICD-10-CM

## 2024-05-17 DIAGNOSIS — E78.2 MIXED HYPERLIPIDEMIA: ICD-10-CM

## 2024-05-17 RX ORDER — PRAVASTATIN SODIUM 20 MG
20 TABLET ORAL DAILY
Qty: 90 TABLET | Refills: 0 | Status: SHIPPED | OUTPATIENT
Start: 2024-05-17 | End: 2024-08-15

## 2024-05-17 RX ORDER — LOSARTAN POTASSIUM AND HYDROCHLOROTHIAZIDE 12.5; 5 MG/1; MG/1
1 TABLET ORAL DAILY
Qty: 90 TABLET | Refills: 0 | Status: SHIPPED | OUTPATIENT
Start: 2024-05-17 | End: 2024-08-15

## 2024-05-24 ENCOUNTER — OFFICE VISIT (OUTPATIENT)
Dept: URGENT CARE | Facility: URGENT CARE | Age: 74
End: 2024-05-24
Payer: COMMERCIAL

## 2024-05-24 VITALS
SYSTOLIC BLOOD PRESSURE: 123 MMHG | WEIGHT: 113.2 LBS | DIASTOLIC BLOOD PRESSURE: 79 MMHG | RESPIRATION RATE: 18 BRPM | TEMPERATURE: 98.7 F | OXYGEN SATURATION: 96 % | BODY MASS INDEX: 20.05 KG/M2 | HEART RATE: 87 BPM

## 2024-05-24 DIAGNOSIS — S10.86XA INSECT BITE OF OTHER PART OF NECK, INITIAL ENCOUNTER: Primary | ICD-10-CM
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
## 2024-05-24 PROCEDURE — 99213 OFFICE O/P EST LOW 20 MIN: CPT

## 2024-05-24 RX ORDER — LORATADINE 10 MG/1
10 TABLET ORAL DAILY
Qty: 30 TABLET | Refills: 0 | Status: SHIPPED | OUTPATIENT
Start: 2024-05-24 | End: 2024-05-24

## 2024-05-24 RX ORDER — HYDROCORTISONE 25 MG/G
OINTMENT TOPICAL 2 TIMES DAILY
Qty: 30 G | Refills: 0 | Status: SHIPPED | OUTPATIENT
Start: 2024-05-24

## 2024-05-24 RX ORDER — HYDROCORTISONE 25 MG/G
OINTMENT TOPICAL 2 TIMES DAILY
Qty: 30 G | Refills: 0 | Status: SHIPPED | OUTPATIENT
Start: 2024-05-24 | End: 2024-05-24

## 2024-05-24 RX ORDER — LORATADINE 10 MG/1
10 TABLET ORAL DAILY
Qty: 30 TABLET | Refills: 0 | Status: SHIPPED | OUTPATIENT
Start: 2024-05-24

## 2024-05-24 NOTE — PROGRESS NOTES
ASSESSMENT:  (S10.86XA,  W57.XXXA) Insect bite of other part of neck, initial encounter  (primary encounter diagnosis)  Plan: hydrocortisone 2.5 % ointment, loratadine         (CLARITIN) 10 MG tablet    PLAN:  Insect stings and bites Urdu patient instructions discussed and provided.  Informed the patient to take Claritin and use ointment as prescribed.  Discussed the need to return to clinic with any new or worsening symptoms.  Patient acknowledged her understanding of the above plan.    The use of Dragon/[employer] dictation services may have been used to construct the content in this note; any grammatical or spelling errors are non-intentional. Please contact the author of this note directly if you are in need of any clarification.      DEJA Durant CNP     SUBJECTIVE:  Noreen [place] is a 74 year old female who presents with a insect bite/sting to the Right anterior neck. This occurred 1 day ago and has been slowly worsening.  Patient has been utilizing anti-itch cream for treatment.    ROS:  Negative except noted above.    OBJECTIVE:  /79 (BP Location: Left arm, Patient Position: Sitting, Cuff Size: Adult Regular)   Pulse 87   Temp 98.7  F (37.1  C) (Oral)   Resp 18   Wt 51.3 kg (113 lb 3.2 oz)   LMP 06/21/2002 (Approximate)   SpO2 96%   BMI 20.05 kg/m    PHYSICAL EXAM:  GENERAL APPEARANCE: healthy, alert and no distress  SKIN: 2 erythematous papules on the right sided anterior neck region with surrounding erythema and edema.  No drainage or bleeding noted.

## 2024-05-29 DIAGNOSIS — K21.00 GASTROESOPHAGEAL REFLUX DISEASE WITH ESOPHAGITIS WITHOUT HEMORRHAGE: ICD-10-CM

## 2024-05-29 RX ORDER — OMEPRAZOLE 40 MG/1
40 CAPSULE, DELAYED RELEASE ORAL DAILY
Qty: 90 CAPSULE | Refills: 0 | Status: SHIPPED | OUTPATIENT
Start: 2024-05-29 | End: 2024-06-25

## 2024-06-25 ENCOUNTER — OFFICE VISIT (OUTPATIENT)
Dept: FAMILY MEDICINE | Facility: CLINIC | Age: 74
End: 2024-06-25
Payer: COMMERCIAL

## 2024-06-25 VITALS
TEMPERATURE: 98.1 F | SYSTOLIC BLOOD PRESSURE: 137 MMHG | DIASTOLIC BLOOD PRESSURE: 75 MMHG | BODY MASS INDEX: 19.84 KG/M2 | HEART RATE: 91 BPM | HEIGHT: 63 IN | WEIGHT: 112 LBS | OXYGEN SATURATION: 97 % | RESPIRATION RATE: 16 BRPM

## 2024-06-25 DIAGNOSIS — L30.9 DERMATITIS: ICD-10-CM

## 2024-06-25 DIAGNOSIS — E78.5 HYPERLIPIDEMIA WITH TARGET LDL LESS THAN 130: Primary | ICD-10-CM

## 2024-06-25 DIAGNOSIS — L81.1 MELASMA: ICD-10-CM

## 2024-06-25 DIAGNOSIS — R25.2 BILATERAL LEG CRAMPS: ICD-10-CM

## 2024-06-25 DIAGNOSIS — I10 HYPERTENSION GOAL BP (BLOOD PRESSURE) < 140/90: ICD-10-CM

## 2024-06-25 DIAGNOSIS — R73.03 PRE-DIABETES: ICD-10-CM

## 2024-06-25 DIAGNOSIS — K21.00 GASTROESOPHAGEAL REFLUX DISEASE WITH ESOPHAGITIS WITHOUT HEMORRHAGE: ICD-10-CM

## 2024-06-25 DIAGNOSIS — K05.10 GINGIVITIS: ICD-10-CM

## 2024-06-25 DIAGNOSIS — M06.4 POLYARTHRITIS, INFLAMMATORY (H): ICD-10-CM

## 2024-06-25 DIAGNOSIS — M35.00 SICCA SYNDROME (H): ICD-10-CM

## 2024-06-25 LAB — HBA1C MFR BLD: 5.4 % (ref 0–5.6)

## 2024-06-25 PROCEDURE — 83721 ASSAY OF BLOOD LIPOPROTEIN: CPT | Mod: 59 | Performed by: PREVENTIVE MEDICINE

## 2024-06-25 PROCEDURE — 82570 ASSAY OF URINE CREATININE: CPT | Performed by: PREVENTIVE MEDICINE

## 2024-06-25 PROCEDURE — 99214 OFFICE O/P EST MOD 30 MIN: CPT | Performed by: PREVENTIVE MEDICINE

## 2024-06-25 PROCEDURE — 80053 COMPREHEN METABOLIC PANEL: CPT | Performed by: PREVENTIVE MEDICINE

## 2024-06-25 PROCEDURE — 91320 SARSCV2 VAC 30MCG TRS-SUC IM: CPT | Performed by: PREVENTIVE MEDICINE

## 2024-06-25 PROCEDURE — 36415 COLL VENOUS BLD VENIPUNCTURE: CPT | Performed by: PREVENTIVE MEDICINE

## 2024-06-25 PROCEDURE — 82043 UR ALBUMIN QUANTITATIVE: CPT | Performed by: PREVENTIVE MEDICINE

## 2024-06-25 PROCEDURE — 83036 HEMOGLOBIN GLYCOSYLATED A1C: CPT | Performed by: PREVENTIVE MEDICINE

## 2024-06-25 PROCEDURE — 90480 ADMN SARSCOV2 VAC 1/ONLY CMP: CPT | Performed by: PREVENTIVE MEDICINE

## 2024-06-25 PROCEDURE — 80061 LIPID PANEL: CPT | Performed by: PREVENTIVE MEDICINE

## 2024-06-25 RX ORDER — OMEPRAZOLE 40 MG/1
40 CAPSULE, DELAYED RELEASE ORAL DAILY
Qty: 90 CAPSULE | Refills: 0 | Status: SHIPPED | OUTPATIENT
Start: 2024-06-25

## 2024-06-25 RX ORDER — TRIAMCINOLONE ACETONIDE 1 MG/G
CREAM TOPICAL
Qty: 30 G | Refills: 0 | Status: SHIPPED | OUTPATIENT
Start: 2024-06-25

## 2024-06-25 RX ORDER — ADAPALENE 0.1 G/100G
CREAM TOPICAL
Qty: 45 G | Refills: 1 | Status: SHIPPED | OUTPATIENT
Start: 2024-06-25

## 2024-06-25 RX ORDER — MULTIPLE VITAMINS W/ MINERALS TAB 9MG-400MCG
1 TAB ORAL DAILY
Qty: 90 TABLET | Refills: 3 | Status: SHIPPED | OUTPATIENT
Start: 2024-06-25

## 2024-06-25 RX ORDER — MULTIVITAMIN WITH IRON
TABLET ORAL
Qty: 90 TABLET | Refills: 1 | Status: SHIPPED | OUTPATIENT
Start: 2024-06-25

## 2024-06-25 RX ORDER — CHLORHEXIDINE GLUCONATE ORAL RINSE 1.2 MG/ML
SOLUTION DENTAL
Qty: 473 ML | Refills: 1 | Status: SHIPPED | OUTPATIENT
Start: 2024-06-25

## 2024-06-25 ASSESSMENT — PAIN SCALES - GENERAL: PAINLEVEL: NO PAIN (0)

## 2024-06-25 NOTE — RESULT ENCOUNTER NOTE
Noreen,     3-month glucose number hemoglobin A1c has improved from last check from 6.1 and is now normal at 5.4.    Please do not hesitate to call us at [phone] if you have any questions or concerns.    Thank you,    Jud Morales MD MPH

## 2024-06-25 NOTE — NURSING NOTE
Prior to immunization administration, verified patients identity using patient s name and date of birth. Please see Immunization Activity for additional information.     Screening Questionnaire for Adult Immunization    Are you sick today?   No   Do you have allergies to medications, food, a vaccine component or latex?   No   Have you ever had a serious reaction after receiving a vaccination?   No   Do you have a long-term health problem with heart, lung, kidney, or metabolic disease (e.g., diabetes), asthma, a blood disorder, no spleen, complement component deficiency, a cochlear implant, or a spinal fluid leak?  Are you on long-term aspirin therapy?   No   Do you have cancer, leukemia, HIV/AIDS, or any other immune system problem?   No   Do you have a parent, brother, or sister with an immune system problem?   No   In the past 3 months, have you taken medications that affect  your immune system, such as prednisone, other steroids, or anticancer drugs; drugs for the treatment of rheumatoid arthritis, Crohn s disease, or psoriasis; or have you had radiation treatments?   No   Have you had a seizure, or a brain or other nervous system problem?   No   During the past year, have you received a transfusion of blood or blood    products, or been given immune (gamma) globulin or antiviral drug?   No   For women: Are you pregnant or is there a chance you could become       pregnant during the next month?   No   Have you received any vaccinations in the past 4 weeks?   No     Immunization questionnaire answers were all negative.      Patient instructed to remain in clinic for 15 minutes afterwards, and to report any adverse reactions.     Screening performed by Chelsie Crawford MA on 6/25/2024 at 1:37 PM.

## 2024-06-25 NOTE — PROGRESS NOTES
Assessment & Plan     Hyperlipidemia with target LDL less than 130  -Continue pravastatin  - Lipid panel reflex to direct LDL Non-fasting  - REVIEW OF HEALTH MAINTENANCE PROTOCOL ORDERS      The 10-year ASCVD risk score (Roby CARRINGTON, et al., 2019) is: 17.6%    Values used to calculate the score:      Age: 74 years      Sex: Female      Is Non- : No      Diabetic: No      Tobacco smoker: No      Systolic Blood Pressure: 137 mmHg      Is BP treated: Yes      HDL Cholesterol: 47 mg/dL      Total Cholesterol: 187 mg/dL    Hypertension goal BP (blood pressure) < 140/90  -Blood pressure is at goal   -continue current medications  -Patient is on metoprolol 25 mg daily and Hyzaar to 50-12.5 mg daily    Sicca syndrome (H24)  -Has needed pilocarpine in the past    Polyarthritis, inflammatory (H)  -Followed by rheumatology, has a follow-up scheduled in July  -Is on sulfasalazine  - multivitamin w/minerals (THERA-VIT-M) tablet  Dispense: 90 tablet; Refill: 3    Gastroesophageal reflux disease with esophagitis without hemorrhage  -Almost daily use of proton pump inhibitor  -Understands that prolonged use can lead to irreversible bone loss  - omeprazole (PRILOSEC) 40 MG DR capsule  Dispense: 90 capsule; Refill: 0  -DEXA 2016    Bilateral leg cramps  -Refills on medications provided  - magnesium 250 MG tablet  Dispense: 90 tablet; Refill: 1    Gingivitis  - chlorhexidine (PERIDEX) 0.12 % solution  Dispense: 473 mL; Refill: 1    Dermatitis  -Risks of long-term use of topical steroids reviewed including skin atrophy and pigment changes  - triamcinolone (KENALOG) 0.1 % external cream  Dispense: 30 g; Refill: 0    Pre-diabetes  - Hemoglobin A1c  - Comprehensive metabolic panel (BMP + Alb, Alk Phos, ALT, AST, Total. Bili, TP)  - Albumin Random Urine Quantitative with Creat Ratio    Lab Results   Component Value Date    A1C 6.1 09/11/2023    A1C 6.1 04/26/2023    A1C 6.2 10/21/2022    A1C 6.5 05/10/2022    A1C  5.6 10/08/2020    A1C 5.9 04/26/2019    A1C 5.9 06/21/2018    A1C 5.9 10/27/2016         Melasma  -Patient had gotten a sample of adapalene from the dermatologist, she requested a prescription  - adapalene (DIFFERIN) 0.1 % external cream  Dispense: 45 g; Refill: 1    Subjective   Noreen is a 74 year old, presenting for the following health issues:  No chief complaint on file.        6/25/2024    12:52 PM   Additional Questions   Roomed by Estefania   Accompanied by self         6/25/2024   Forms   Any forms needing to be completed Yes          6/25/2024    12:52 PM   Patient Reported Additional Medications   Patient reports taking the following new medications none     History of Present Illness       Reason for visit:  Folow up    She eats 2-3 servings of fruits and vegetables daily.She consumes 0 sweetened beverage(s) daily.She exercises with enough effort to increase her heart rate 30 to 60 minutes per day.  She exercises with enough effort to increase her heart rate 7 days per week.   She is taking medications regularly.     Hypertension Follow-up    Do you check your blood pressure regularly outside of the clinic? No   Are you following a low salt diet? Yes  Are your blood pressures ever more than 140 on the top number (systolic) OR more   than 90 on the bottom number (diastolic), for example 140/90? No  How many servings of fruits and vegetables do you eat daily?  2-3  On average, how many sweetened beverages do you drink each day (Examples: soda, juice, sweet tea, etc.  Do NOT count diet or artificially sweetened beverages)?   0  How many days per week do you exercise enough to make your heart beat faster? 5  How many minutes a day do you exercise enough to make your heart beat faster? 30 - 60  How many days per week do you miss taking your medication? 0  Walking and gardening  No tobacco  No alcohol     Arthritis+ managed by Rheumatology   This is better    Hyperlipidemia Follow-Up    Are you regularly taking  "any medication or supplement to lower your cholesterol?   Yes- statin   Are you having muscle aches or other side effects that you think could be caused by your cholesterol lowering medication?  No      Review of Systems  Constitutional, HEENT, cardiovascular, pulmonary, gi and gu systems are negative, except as otherwise noted.      Objective    /75 (BP Location: Left arm, Patient Position: Sitting, Cuff Size: Adult Regular)   Pulse 91   Temp 98.1  F (36.7  C) (Oral)   Resp 16   Ht 1.6 m (5' 3\")   Wt 50.8 kg (112 lb)   LMP 06/21/2002 (Approximate)   SpO2 97%   BMI 19.84 kg/m    Body mass index is 19.84 kg/m .  Physical Exam   GENERAL APPEARANCE: healthy, alert and no distress  EYES: Eyes grossly normal to inspection and conjunctivae and sclerae normal  NECK: no adenopathy and trachea midline and normal to palpation  RESP: lungs clear to auscultation - no rales, rhonchi or wheezes  CV: regular rates and rhythm, normal S1 S2, no S3 or S4 and no murmur, click or rub  ABDOMEN: soft, non-tender and no rebound or guarding   MS: extremities normal- no gross deformities noted and peripheral pulses normal  SKIN: no suspicious lesions or rashes, dark spots and patch on cheeks   NEURO: Normal strength and tone, mentation intact and speech normal  PSYCH: mentation appears normal      No results found for this or any previous visit (from the past 24 hour(s)).        Signed Electronically by: Jud Morales MD MPH      "

## 2024-06-26 LAB
ALBUMIN SERPL BCG-MCNC: 4.7 G/DL (ref 3.5–5.2)
ALP SERPL-CCNC: 76 U/L (ref 40–150)
ALT SERPL W P-5'-P-CCNC: 29 U/L (ref 0–50)
ANION GAP SERPL CALCULATED.3IONS-SCNC: 11 MMOL/L (ref 7–15)
AST SERPL W P-5'-P-CCNC: 35 U/L (ref 0–45)
BILIRUB SERPL-MCNC: 0.2 MG/DL
BUN SERPL-MCNC: 12.4 MG/DL (ref 8–23)
CALCIUM SERPL-MCNC: 10.1 MG/DL (ref 8.8–10.2)
CHLORIDE SERPL-SCNC: 99 MMOL/L (ref 98–107)
CHOLEST SERPL-MCNC: 241 MG/DL
CREAT SERPL-MCNC: 0.44 MG/DL (ref 0.51–0.95)
CREAT UR-MCNC: 27.8 MG/DL
DEPRECATED HCO3 PLAS-SCNC: 27 MMOL/L (ref 22–29)
EGFRCR SERPLBLD CKD-EPI 2021: >90 ML/MIN/1.73M2
FASTING STATUS PATIENT QL REPORTED: NO
FASTING STATUS PATIENT QL REPORTED: NO
GLUCOSE SERPL-MCNC: 87 MG/DL (ref 70–99)
HDLC SERPL-MCNC: 43 MG/DL
LDLC SERPL CALC-MCNC: ABNORMAL MG/DL
LDLC SERPL DIRECT ASSAY-MCNC: 139 MG/DL
MICROALBUMIN UR-MCNC: <12 MG/L
MICROALBUMIN/CREAT UR: NORMAL MG/G{CREAT}
NONHDLC SERPL-MCNC: 198 MG/DL
POTASSIUM SERPL-SCNC: 4.2 MMOL/L (ref 3.4–5.3)
PROT SERPL-MCNC: 7.9 G/DL (ref 6.4–8.3)
SODIUM SERPL-SCNC: 137 MMOL/L (ref 135–145)
TRIGL SERPL-MCNC: 460 MG/DL

## 2024-06-27 NOTE — RESULT ENCOUNTER NOTE
Noreen,     LDL cholesterol has increased from last check.  I hope that you are taking the cholesterol medication pravastatin daily.  If you are then we can recheck levels in 6 months and if still elevated then can consider dose adjustment or an alternate cholesterol medication.    Electrolytes, glucose, kidney function, and liver function tests are normal.  Urine sample is not showing any abnormal protein.    Please do not hesitate to call us at [phone] if you have any questions or concerns.    Thank you,    Jud Morales MD MPH

## 2024-07-15 ENCOUNTER — OFFICE VISIT (OUTPATIENT)
Dept: RHEUMATOLOGY | Facility: CLINIC | Age: 74
End: 2024-07-15
Payer: COMMERCIAL

## 2024-07-15 ENCOUNTER — LAB (OUTPATIENT)
Dept: LAB | Facility: CLINIC | Age: 74
End: 2024-07-15
Payer: COMMERCIAL

## 2024-07-15 VITALS
WEIGHT: 111.7 LBS | BODY MASS INDEX: 19.79 KG/M2 | SYSTOLIC BLOOD PRESSURE: 128 MMHG | TEMPERATURE: 98.3 F | DIASTOLIC BLOOD PRESSURE: 76 MMHG | HEART RATE: 76 BPM | OXYGEN SATURATION: 96 %

## 2024-07-15 DIAGNOSIS — M06.4 POLYARTHRITIS, INFLAMMATORY (H): ICD-10-CM

## 2024-07-15 LAB
ALBUMIN SERPL BCG-MCNC: 4.7 G/DL (ref 3.5–5.2)
ALT SERPL W P-5'-P-CCNC: 20 U/L (ref 0–50)
AST SERPL W P-5'-P-CCNC: 41 U/L (ref 0–45)
CREAT SERPL-MCNC: 0.57 MG/DL (ref 0.51–0.95)
CRP SERPL-MCNC: <3 MG/L
EGFRCR SERPLBLD CKD-EPI 2021: >90 ML/MIN/1.73M2
ERYTHROCYTE [DISTWIDTH] IN BLOOD BY AUTOMATED COUNT: 13.5 % (ref 10–15)
ERYTHROCYTE [SEDIMENTATION RATE] IN BLOOD BY WESTERGREN METHOD: 3 MM/HR (ref 0–30)
HCT VFR BLD AUTO: 41.7 % (ref 35–47)
HGB BLD-MCNC: 13.8 G/DL (ref 11.7–15.7)
MCH RBC QN AUTO: 28.6 PG (ref 26.5–33)
MCHC RBC AUTO-ENTMCNC: 33.1 G/DL (ref 31.5–36.5)
MCV RBC AUTO: 87 FL (ref 78–100)
PLATELET # BLD AUTO: 217 10E3/UL (ref 150–450)
RBC # BLD AUTO: 4.82 10E6/UL (ref 3.8–5.2)
WBC # BLD AUTO: 5.4 10E3/UL (ref 4–11)

## 2024-07-15 PROCEDURE — 84460 ALANINE AMINO (ALT) (SGPT): CPT

## 2024-07-15 PROCEDURE — 36415 COLL VENOUS BLD VENIPUNCTURE: CPT

## 2024-07-15 PROCEDURE — 82040 ASSAY OF SERUM ALBUMIN: CPT

## 2024-07-15 PROCEDURE — 86140 C-REACTIVE PROTEIN: CPT

## 2024-07-15 PROCEDURE — 84450 TRANSFERASE (AST) (SGOT): CPT

## 2024-07-15 PROCEDURE — 82565 ASSAY OF CREATININE: CPT

## 2024-07-15 PROCEDURE — 85027 COMPLETE CBC AUTOMATED: CPT

## 2024-07-15 PROCEDURE — G2211 COMPLEX E/M VISIT ADD ON: HCPCS | Performed by: NURSE PRACTITIONER

## 2024-07-15 PROCEDURE — 85652 RBC SED RATE AUTOMATED: CPT

## 2024-07-15 PROCEDURE — 99214 OFFICE O/P EST MOD 30 MIN: CPT | Performed by: NURSE PRACTITIONER

## 2024-07-15 RX ORDER — SULFASALAZINE 500 MG/1
1000 TABLET, DELAYED RELEASE ORAL 2 TIMES DAILY
Qty: 360 TABLET | Refills: 3 | Status: SHIPPED | OUTPATIENT
Start: 2024-07-15

## 2024-07-15 ASSESSMENT — PAIN SCALES - GENERAL: PAINLEVEL: MODERATE PAIN (5)

## 2024-07-15 NOTE — NURSING NOTE
"Noreen Florence's goals for this visit include:   Chief Complaint   Patient presents with    RECHECK     Tendinopathy of left rotator cuff       PCP: Jud Morales    Referring Provider:  Referred Self, MD  No address on file      Initial /76 (BP Location: Left arm, Patient Position: Sitting, Cuff Size: Adult Regular)   Pulse 76   Temp 98.3  F (36.8  C) (Oral)   Wt 50.7 kg (111 lb 11.2 oz)   LMP 06/21/2002 (Approximate)   SpO2 96%   Breastfeeding No   BMI 19.79 kg/m   Estimated body mass index is 19.79 kg/m  as calculated from the following:    Height as of 6/25/24: 1.6 m (5' 3\").    Weight as of this encounter: 50.7 kg (111 lb 11.2 oz).    Medication Reconciliation: complete    Do you need any medication refills at today's visit? Yes    PORFIRIO Snell  Rheumatology/Infectious disease  [facility]   [phone]      "

## 2024-07-15 NOTE — PATIENT INSTRUCTIONS
1) Continue sulfasalazine 2 tabs twice a day    2) Use dicflonec (Voltaren) gel up to 4 times a day to painful joints    3) Labs every 6 months (due in Jan)     4) See me back in [place]

## 2024-07-15 NOTE — PROGRESS NOTES
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 Current treatment: SSZ 1gm BID (9/23), Pilocarpine TID (9/23)     7/15/2024:   Is tolerating increase in SSZ, no SE. 11/27/23 had L ankle surgery, slightly better still hurts. Shoulders better since starting SSZ.  Had acute knee pain, went to urgent care, prescribed celebrex and it helped, is no longer taking.   Now BL 5th lateral MCP when walking hurts.  Otherwise doing well.     Consult 10/09/2023:   Here for second opinion . Meloxicam was very helpful for her, no pain,was her # 1 drug. Due to elevated LFTs and HTN was recommend she top this. She stopped this a month ago and now R achilles and R shoulder are problematic for her. In past methotrexate was beneficial but had to discontinue due to elevated LFTs', Was on SSZ in past, wasn't that helpful per her recollection however she is nor sure what dose.  Reports PT in past not helpful, saw podiatry and received a brace that is not helpful.  Now that off of meloxicam, is having a very hard time walking due to R heel pain, use to walk 45 min a day now less than 30 min a day. Has a hard time doing stairs.     -Stopped meloxicom a month ago after on for a long period of time, meloxicam worked well for her but not that she stopped has increase in pain of R achilles (with swelling) and R shoulder. R shoulder hurts, but not hard to move. In past L shoulder pain had cortisone injection. Last month knees are doing well.  Is tolerating SSZ. Hands ok.   -Started pilocarpine, is tolerating.  Is helping, has improved with dryness but still has.    ROS: On omperazole and stomach better. No infections, fevers, diarrhea, rashes.  "    Previous Rheumatology care at [facility] 3/17/2023  with Dr. Jesenia Bazan: \" My impression is this is a 73-year-old  female who had presented with features of inflammatory polyarthritis in the year 2000.   In 2009 her colonoscopy had revealed minimally active ileitis with mild neutrophilic inflammation and intraepithelial lymphocytosis of the terminal ileum and also the colon. They mention in the note that intraepithelial lymphocytosis could be seen by celiac as well.   She was initially started on methotrexate in October 2011 at 4 tablets/week. She flared when she went off methotrexate and leucovorin for 2 years. Methotrexate was restarted in December of 2014 and stopped in 6/2021 due to elevated liver enzymes. Her prednisone was discontinued in February 2017.  Per Ortho she has left shoulder impingement and rotator cuff tendinopathy and she has had 2 injections which have helped her a lot and the right foot and ankle pain have resolved on their own.  She presented with increasing joint pains in November 2022 and I started her on sulfasalazine 1 tablet twice a day and she was able to tolerate it well without any adverse effects. However, she stopped in 7/2023 due to a flare without Mobic 7.5 mg/d and feels that the SSZ no help.  She flared without mobic 7.5 mg/d and now back on mobic for the last 2 months and it is helping a lot.  I am not able to document any synovitis on exam today. I am recommending that she restart the sulfasalazine so that we can decrease the meloxicam frequency to every other day and she is agreeable to the plan.    She has worsening dry mouth and she has had longstanding dry eyes and dry mouth. Over-the-counter products are not helping and I am recommending a trial of pilocarpine and have gone over the risks and benefits in detail she understands and she is willing to try it.    She has had elevated liver enzymes on and off. She has fatty liver and she is on a statin.   She has " "had minimally elevated CK on and off but no muscle weakness on exam. This could be from her muscle cramps. \"    Past Rheum tx:  SSZ- not effective  Methotrexate-elevated LFT's      Social History  Lives [place], with daughter  Retired    FMH:  NO AI     Active Problem list:  Patient Active Problem List    Diagnosis Date Noted    Dermatochalasis of both upper eyelids 12/04/2023     Priority: Medium    Dermatochalasis of both lower eyelids 12/04/2023     Priority: Medium    Heel pain, chronic, right 03/07/2022     Priority: Medium    NAFLD (nonalcoholic fatty liver disease) 10/18/2021     Priority: Medium    Elevated LFTs 10/18/2021     Priority: Medium    Pre-diabetes 11/08/2019     Priority: Medium    History of Helicobacter pylori infection 04/16/2018     Priority: Medium    Angiomyolipoma of right kidney 01/09/2017     Priority: Medium    Pterygium of right eye 09/21/2016     Priority: Medium    Sicca syndrome (H24) 08/30/2016     Priority: Medium    Microscopic hematuria 03/20/2016     Priority: Medium    High risk medication use 10/23/2015     Priority: Medium     Methotrexate      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 Provider to review and confirm.      GERD (gastroesophageal reflux disease) 03/20/2015     Priority: Medium    Polyarthritis, inflammatory (H) 02/16/2015     Priority: Medium    Hypertension goal BP (blood pressure) < 140/90 11/28/2014     Priority: Medium    Osteopenia 04/21/2014     Priority: Medium    Hayfever 04/21/2014     Priority: Medium            Objective:     Physical Exam  /76 (BP Location: Left arm, Patient Position: Sitting, Cuff Size: Adult Regular)   Pulse 76   Temp 98.3  F (36.8  C) (Oral)   Wt 50.7 kg (111 lb 11.2 oz)   LMP 06/21/2002 (Approximate)   SpO2 96%   Breastfeeding No   BMI 19.79 kg/m  "   Body mass index is 19.79 kg/m .    Constitutional: Normal body habitus with out gross deformities. Well groomed.   Psych: nl judgement, orientation, memory, affect.  Skin: Hyperpigmentation below eyes. Dm digits.     Msk:  Elbows: -T/S, FROM   Wrists: -T/S, slight LROM   Hands: Significant bony changes of PIP's, DIP's, and mild of CMCs. L2-4 PIP most significant changes.   Ankles: R achilles slight thinking, but no swelling.   Feet. Bony changes. Bl 5th lateral MTPs with bony prominence with related planter thickened callus.     Labs:    Lab Results   Component Value Date    ALT 20 07/15/2024    AST 41 07/15/2024    CR 0.57 07/15/2024     3/01/22  -MAGGY, -SSA, -SSB,  Cr. .68, ALT 82,AST 78  2011  -RNP, -SMITH, -Hep C, -Hep b s ag, -HIV.    Imaging:   Narrative & Impression   EXAM: MR ANKLE RIGHT W/O CONTRAST  LOCATION: [facility]  DATE/TIME: 8/16/2022 6:20 PM     INDICATION: Right rear foot MRI to evaluate peroneal tendons.  COMPARISON: None.  TECHNIQUE: Unenhanced.     FINDINGS:      TENDONS:   -Peroneal: There is mild peroneus longus tendinopathy, with a markedly edematous os peroneum. (Series 6, image 9). Peroneus brevis is intact. There is mild to moderate peroneal tenosynovitis.  -Medial: Posterior tibialis tendon is intact. No tendinopathy or tenosynovitis. Flexor digitorum longus and flexor hallucis longus tendons are intact. There is mild flexor digitorum longus and flexor hallucis longus tenosynovitis.  -Anterior: Anterior tibialis, extensor hallucis longus, and extensor digitorum longus tendons are normal. No tenosynovitis.  -Achilles: Moderate Achilles tendinosis with some low-grade interstitial longitudinal partial tearing. No high-grade Achilles tear is seen. There is mild Achilles paratenonitis. There is insertional enthesopathy at the calcaneal tuberosity.     LIGAMENTS:   -Anterior talofibular ligament: There is some thinning of the anterior talofibular ligament which relates  to chronic partial tearing.   -Calcaneofibular ligament: Intact.   -Posterior talofibular ligament: Intact.  -Syndesmotic inferior tibiofibular ligaments: Intact.  -Deltoid ligament complex: Intact.  -Spring ligament complex: Intact.     JOINTS AND BONES:   -No fracture, bone contusion or osteochondral lesion. No joint effusion or synovitis.     SOFT TISSUES:  -Plantar fascia: Intact. No acute fasciitis or tear.  -Sinus tarsi and tarsal tunnel: Normal.  -Muscles: Normal.                                                                      IMPRESSION:  1.  Mild peroneus longus tendinopathy, with a markedly edematous os peroneum. 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 There is mild to moderate peroneal tenosynovitis. No peroneal tear is seen.  2.  Moderate Achilles tendinosis with some low-grade interstitial longitudinal partial tearing. No high-grade Achilles tear is seen. There is mild Achilles paratenonitis as well as insertional enthesopathy at the calcaneal tuberosity.                 Assessment and Plan:     1) Inflammatory polyarthropathy with OA (dx 2000):  Pt seen in 2000 by  Dr. Jesenia Bazan and presented with polyinflammatory arthritis with OA at that time, improved on methotrexate however discontinue due to elevated LFT's in NAFLD.   mg BID attempted and then stopped due to pt not perceiving benefit. Did mobic 7.5 mg daily, and for pt had great response to this, specifically it helped with her heel swelling. She has seen podaitry and attempted bracing, PT, dicflonec gel and failed to improve. Currently pt has resumed  mg BID (9/23) and stopped mobic at that time due to NAFLD/ mild elevation in LFT's/ HTN.  Now R heel is swelling, painful and affecting ability to walk.   -Given past benefit with achilles swelling on methotrexate and mobic do feel SSZ appropriate to trial.  Update:  Has had moderate improvement of arthralgias 1gm BID SSZ.  Toxicity labs updated and  WNL.  Will continue. If callus continue to bother consider to see podiatry.     2) SICCA complex: Improvement with pilocarpine.  Previous -MAGGY,  -SSA and -SSB. SPEP,  RF, C4 normal, C3 slight elevated. Consider repeat in 1 year.  Parotid US showed homogenous parotids with 5 mm cyst. Consider repeat imaging of cyst in future and consider zeeshan bx. Consider plaquenil.        Continue with long term management of chronic medical condition.     Pt instructions:     1) Continue sulfasalazine 2 tabs twice a day    2) Use dicflonec (Voltaren) gel up to 4 times a day to painful joints    3) Labs every 6 months (due in Jan)     4) See me back in Jan     Pt states understanding and agreement to plan of care, has no further questions.       Amanda Warner, CNP  Rheumatology, [facility]

## 2024-07-16 ENCOUNTER — OFFICE VISIT (OUTPATIENT)
Dept: AUDIOLOGY | Facility: CLINIC | Age: 74
End: 2024-07-16
Payer: COMMERCIAL

## 2024-07-16 ENCOUNTER — OFFICE VISIT (OUTPATIENT)
Dept: OTOLARYNGOLOGY | Facility: CLINIC | Age: 74
End: 2024-07-16
Payer: COMMERCIAL

## 2024-07-16 DIAGNOSIS — H90.3 SENSORINEURAL HEARING LOSS (SNHL) OF BOTH EARS: Primary | ICD-10-CM

## 2024-07-16 DIAGNOSIS — H93.13 TINNITUS, BILATERAL: ICD-10-CM

## 2024-07-16 DIAGNOSIS — M67.912 TENDINOPATHY OF LEFT ROTATOR CUFF: ICD-10-CM

## 2024-07-16 PROCEDURE — 92557 COMPREHENSIVE HEARING TEST: CPT | Performed by: AUDIOLOGIST

## 2024-07-16 PROCEDURE — 99213 OFFICE O/P EST LOW 20 MIN: CPT | Performed by: OTOLARYNGOLOGY

## 2024-07-16 PROCEDURE — 92550 TYMPANOMETRY & REFLEX THRESH: CPT | Performed by: AUDIOLOGIST

## 2024-07-16 ASSESSMENT — ENCOUNTER SYMPTOMS
RESPIRATORY NEGATIVE: 1
DIZZINESS: 0
GASTROINTESTINAL NEGATIVE: 1
DEPRESSION: 0
NERVOUS/ANXIOUS: 0
COUGH: 0
CONSTITUTIONAL NEGATIVE: 1
EYES NEGATIVE: 1

## 2024-07-16 NOTE — LETTER
7/16/2024      Noreen Florence  [address]  [place] [address]      Dear Colleague,    Thank you for referring your patient, Noreen Florence, to the [facility]. Please see a copy of my visit note below.    Chief Complaint   Patient presents with     Follow Up     SNHL, Tinnitus. No change.       PCP: Jud Morales     Referring Provider: Jud Morales    [facility] 06/21/2002 (Approximate)     ENT Problem List:  Patient Active Problem List   Diagnosis     Osteopenia     Hayfever     Hypertension goal BP (blood pressure) < 140/90     Polyarthritis, inflammatory (H)     GERD (gastroesophageal reflux disease)     Hyperlipidemia with target LDL less than 130     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     High risk medication use     Microscopic hematuria     Pterygium of right eye     Sicca syndrome (H24)     Angiomyolipoma of right kidney     History of Helicobacter pylori infection     Pre-diabetes     NAFLD (nonalcoholic fatty liver disease)     Elevated LFTs     Heel pain, chronic, right     Dermatochalasis of both upper eyelids     Dermatochalasis of both lower eyelids      Current Medications:  Current Outpatient Medications   Medication Sig Dispense Refill     cetirizine (ZYRTEC) 10 MG tablet TAKE 1 TABLET(10 MG) BY MOUTH DAILY 90 tablet 1     chlorhexidine (PERIDEX) 0.12 % solution SWISH AND SPIT 15 ML BY MOUTH TWICE DAILY AS NEEDED 473 mL 1     cholecalciferol (VITAMIN D3) 25 mcg (1000 units) capsule Take 1,000 Units by mouth       diclofenac (VOLTAREN) 1 % topical gel Apply 2 g topically 4 times daily as needed for moderate pain 350 g 1     finasteride (PROSCAR) 5 MG tablet Take 1 tablet (5 mg) by mouth daily 90 tablet 3     fluticasone (FLONASE) 50 MCG/ACT nasal spray Spray 1-2 sprays into both nostrils daily 48 g 3     hydrocortisone 2.5 % ointment Apply topically 2 times daily 30 g 0     hydroquinone (JAVIER) 4 % external cream Apply daily to brown spots for 4-6 months. 28.35 g  4     latanoprost (XALATAN) 0.005 % ophthalmic solution INSTILL 1 DROP IN BOTH EYES AT BEDTIME 7.5 mL 6     loratadine (CLARITIN) 10 MG tablet Take 1 tablet (10 mg) by mouth daily 30 tablet 0     losartan-hydrochlorothiazide (HYZAAR) 50-12.5 MG tablet TAKE 1 TABLET BY MOUTH DAILY 90 tablet 0     magnesium 250 MG tablet TAKE 1 TABLET BY MOUTH DAILY AS NEEDED FOR LEG CRAMPS 90 tablet 1     metoprolol tartrate (LOPRESSOR) 25 MG tablet TAKE 1 TABLET(25 MG) BY MOUTH AT BEDTIME 90 tablet 3     minoxidil (ROGAINE) 5 % external solution Apply to scalp once daily. 120 mL 3     multivitamin w/minerals (THERA-VIT-M) tablet Take 1 tablet by mouth daily 90 tablet 3     nitroGLYcerin (RECTIV) 0.4 % OINT rectal ointment 1 inch (1.5 mg) by Peritendinous route every 12 hours 30 g 0     omeprazole (PRILOSEC) 40 MG DR capsule Take 1 capsule (40 mg) by mouth daily 90 capsule 0     OPTIVE 0.5-0.9 % ophthalmic solution INSTILL 1 DROP IN BOTH EYES THREE TIMES DAILY AS NEEDED FOR DRY EYES 15 mL 1     pilocarpine (SALAGEN) 5 MG tablet Take 1 tablet (5 mg) by mouth 3 times daily 270 tablet 0     pravastatin (PRAVACHOL) 20 MG tablet TAKE 1 TABLET(20 MG) BY MOUTH DAILY 90 tablet 0     sodium fluoride (SF 5000 PLUS) 1.1 % CREA Use twice a day as instructed 51 g 1     sulfaSALAzine ER (AZULFIDINE EN) 500 MG EC tablet Take 2 tablets (1,000 mg) by mouth 2 times daily 360 tablet 3     tretinoin (RETIN-A) 0.05 % external cream Apply pea sized amount at bedtime. 45 g 4     triamcinolone (KENALOG) 0.1 % external cream APPLY TOPICALLY TO THE AFFECTED AREA TWICE DAILY. MAXIMUM OF 2 WEEKS 30 g 0     triamcinolone (KENALOG) 0.1 % paste APPLY TO MOUTH TWICE DAILY 5 g 0     adapalene (DIFFERIN) 0.1 % external cream Apply to affected area once daily (Patient not taking: Reported on 7/15/2024) 45 g 1     albuterol (PROAIR HFA/PROVENTIL HFA/VENTOLIN HFA) 108 (90 Base) MCG/ACT inhaler INHALE 2 PUFFS INTO THE LUNGS EVERY 6 HOURS AS NEEDED FOR SHORTNESS OF BREATH  OR WHEEZING OR COUGH (Patient not taking: Reported on 7/15/2024) 18 g 3     methylcellulose (CITRUCEL) powder Take 0.3 g (1.05 teaspoonful) by mouth daily (Patient not taking: Reported on 7/15/2024) 454 g 1     Current Facility-Administered Medications   Medication Dose Route Frequency Provider Last Rate Last Admin     2 mL bupivacaine (MARCAINE) preservative free injection 0.25% (10 mL vial)  2 mL   Isaac Zhou, DO   2 mL at 07/14/23 1335     lidocaine 1 % injection 2 mL  2 mL   Isaac Zhou, DO   2 mL at 07/14/23 1335     triamcinolone (KENALOG-40) injection 40 mg  40 mg   Isaac Zhou, DO   40 mg at 07/14/23 1335     Facility-Administered Medications Ordered in Other Visits   Medication Dose Route Frequency Provider Last Rate Last Admin     gadobutrol (GADAVIST) injection 7.5 mL  7.5 mL Intravenous Once Palomo Terrell MD         HPI  Pleasant 74 year old female presents today as a(n) established patient for bilateral tinnitus and hearing loss. She was last seen on 7/11/23 and reports no changes since her last visit. She reports dry mouth.    She denies difficulty falling asleep, depression/anxiety, TMJ issues, joint problems, allergies, rhinorrhea, sneezing, coughing, facial pressure, dizziness/vertigo.    Review of Systems   Constitutional: Negative.    HENT:  Positive for hearing loss and tinnitus.    Eyes: Negative.    Respiratory: Negative.  Negative for cough.    Gastrointestinal: Negative.    Musculoskeletal:  Negative for joint pain.   Skin: Negative.    Neurological:  Negative for dizziness.   Endo/Heme/Allergies: Negative.  Negative for environmental allergies.   Psychiatric/Behavioral:  Negative for depression. The patient is not nervous/anxious.        Physical Exam  Vitals and nursing note reviewed.   Constitutional:       Appearance: Normal appearance.   HENT:      Head: Normocephalic and atraumatic.      Jaw: There is normal jaw occlusion.      Right Ear: Tympanic membrane and ear  canal normal. Decreased hearing noted.      Left Ear: Tympanic membrane and ear canal normal. Decreased hearing noted.      Nose: No mucosal edema, congestion or rhinorrhea.      Right Nostril: No occlusion.      Left Nostril: No occlusion.      Right Turbinates: Not enlarged or swollen.      Left Turbinates: Not enlarged or swollen.      Right Sinus: No maxillary sinus tenderness or frontal sinus tenderness.      Left Sinus: No maxillary sinus tenderness or frontal sinus tenderness.      Mouth/Throat:      Mouth: Mucous membranes are dry.      Pharynx: Oropharynx is clear. Uvula midline.   Eyes:      Extraocular Movements: Extraocular movements intact.      Pupils: Pupils are equal, round, and reactive to light.   Neurological:      Mental Status: She is alert.       AUDIOGRAM: The patient underwent an audiogram today.  WNL sloping to mild SNHL right. WNL sloping to moderate SNHL left. 100% word rec. right, 96% left. Tymps WNL. Reflexes as marked.  Right: Speech reception threshold is 25 dB with 100% word recognition. Tympanogram A type.  Left: Speech reception threshold is 25 dB with 96% word recognition. Tympanogram A type.    A/P  Audiogram was independently reviewed and discussed in detail with the patient. This pleasant patient has bilateral SNHL and bilateral tinnitus that are stable. There are no ear or sinus infections present. She is advised to not put Q-tips into her ear canal, and advised to get good hydration for dry mouth.    Follow up in clinic in 1 year.    Scribe/Staff:    Scribe Disclosure:   I, Leeann Jason, am serving as a scribe; to document services personally performed by Melania Escobar MD based on data collection and the provider's statements to me.     Provider Disclosure:  I agree with above History, Review of Systems, Physical exam and Plan.  I have reviewed the content of the documentation and have edited it as needed. I have personally performed the services documented here and  the documentation accurately represents those services and the decisions I have made.      Electronically signed by:  Melania Escobar MD         Again, thank you for allowing me to participate in the care of your patient.        Sincerely,        Melania Escobar MD

## 2024-07-16 NOTE — PROGRESS NOTES
AUDIOLOGY REPORT    SUMMARY: Audiology visit completed. See audiogram for results.    RECOMMENDATIONS: Follow-up with ENT.    Sunny Marquez  Doctor of Audiology  [place] License # [id number]

## 2024-07-16 NOTE — NURSING NOTE
Noreen Florence's chief complaint for this visit includes:  Chief Complaint   Patient presents with    Follow Up     SNHL, Tinnitus. No change.      PCP: Jud Morales    Referring Provider:  Jud Morales MD  [id number]  [place]  [place] [address]    [facility] 06/21/2002 (Approximate)

## 2024-07-16 NOTE — PROGRESS NOTES
Chief Complaint   Patient presents with    Follow Up     SNHL, Tinnitus. No change.       PCP: Jud Morales     Referring Provider: Jud Morales    [facility] 06/21/2002 (Approximate)     ENT Problem List:  Patient Active Problem List   Diagnosis    Osteopenia    Hayfever    Hypertension goal BP (blood pressure) < 140/90    Polyarthritis, inflammatory (H)    GERD (gastroesophageal reflux disease)    Hyperlipidemia with target LDL less than 130    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    High risk medication use    Microscopic hematuria    Pterygium of right eye    Sicca syndrome (H24)    Angiomyolipoma of right kidney    History of Helicobacter pylori infection    Pre-diabetes    NAFLD (nonalcoholic fatty liver disease)    Elevated LFTs    Heel pain, chronic, right    Dermatochalasis of both upper eyelids    Dermatochalasis of both lower eyelids      Current Medications:  Current Outpatient Medications   Medication Sig Dispense Refill    cetirizine (ZYRTEC) 10 MG tablet TAKE 1 TABLET(10 MG) BY MOUTH DAILY 90 tablet 1    chlorhexidine (PERIDEX) 0.12 % solution SWISH AND SPIT 15 ML BY MOUTH TWICE DAILY AS NEEDED 473 mL 1    cholecalciferol (VITAMIN D3) 25 mcg (1000 units) capsule Take 1,000 Units by mouth      diclofenac (VOLTAREN) 1 % topical gel Apply 2 g topically 4 times daily as needed for moderate pain 350 g 1    finasteride (PROSCAR) 5 MG tablet Take 1 tablet (5 mg) by mouth daily 90 tablet 3    fluticasone (FLONASE) 50 MCG/ACT nasal spray Spray 1-2 sprays into both nostrils daily 48 g 3    hydrocortisone 2.5 % ointment Apply topically 2 times daily 30 g 0    hydroquinone (JAVIER) 4 % external cream Apply daily to brown spots for 4-6 months. 28.35 g 4    latanoprost (XALATAN) 0.005 % ophthalmic solution INSTILL 1 DROP IN BOTH EYES AT BEDTIME 7.5 mL 6    loratadine (CLARITIN) 10 MG tablet Take 1 tablet (10 mg) by mouth daily 30 tablet 0    losartan-hydrochlorothiazide (HYZAAR) 50-12.5 MG tablet TAKE 1  TABLET BY MOUTH DAILY 90 tablet 0    magnesium 250 MG tablet TAKE 1 TABLET BY MOUTH DAILY AS NEEDED FOR LEG CRAMPS 90 tablet 1    metoprolol tartrate (LOPRESSOR) 25 MG tablet TAKE 1 TABLET(25 MG) BY MOUTH AT BEDTIME 90 tablet 3    minoxidil (ROGAINE) 5 % external solution Apply to scalp once daily. 120 mL 3    multivitamin w/minerals (THERA-VIT-M) tablet Take 1 tablet by mouth daily 90 tablet 3    nitroGLYcerin (RECTIV) 0.4 % OINT rectal ointment 1 inch (1.5 mg) by Peritendinous route every 12 hours 30 g 0    omeprazole (PRILOSEC) 40 MG DR capsule Take 1 capsule (40 mg) by mouth daily 90 capsule 0    OPTIVE 0.5-0.9 % ophthalmic solution INSTILL 1 DROP IN BOTH EYES THREE TIMES DAILY AS NEEDED FOR DRY EYES 15 mL 1    pilocarpine (SALAGEN) 5 MG tablet Take 1 tablet (5 mg) by mouth 3 times daily 270 tablet 0    pravastatin (PRAVACHOL) 20 MG tablet TAKE 1 TABLET(20 MG) BY MOUTH DAILY 90 tablet 0    sodium fluoride (SF 5000 PLUS) 1.1 % CREA Use twice a day as instructed 51 g 1    sulfaSALAzine ER (AZULFIDINE EN) 500 MG EC tablet Take 2 tablets (1,000 mg) by mouth 2 times daily 360 tablet 3    tretinoin (RETIN-A) 0.05 % external cream Apply pea sized amount at bedtime. 45 g 4    triamcinolone (KENALOG) 0.1 % external cream APPLY TOPICALLY TO THE AFFECTED AREA TWICE DAILY. MAXIMUM OF 2 WEEKS 30 g 0    triamcinolone (KENALOG) 0.1 % paste APPLY TO MOUTH TWICE DAILY 5 g 0    adapalene (DIFFERIN) 0.1 % external cream Apply to affected area once daily (Patient not taking: Reported on 7/15/2024) 45 g 1    albuterol (PROAIR HFA/PROVENTIL HFA/VENTOLIN HFA) 108 (90 Base) MCG/ACT inhaler INHALE 2 PUFFS INTO THE LUNGS EVERY 6 HOURS AS NEEDED FOR SHORTNESS OF BREATH OR WHEEZING OR COUGH (Patient not taking: Reported on 7/15/2024) 18 g 3    methylcellulose (CITRUCEL) powder Take 0.3 g (1.05 teaspoonful) by mouth daily (Patient not taking: Reported on 7/15/2024) 454 g 1     Current Facility-Administered Medications   Medication Dose  Route Frequency Provider Last Rate Last Admin    2 mL bupivacaine (MARCAINE) preservative free injection 0.25% (10 mL vial)  2 mL   Isaac Zhou, DO   2 mL at 07/14/23 1335    lidocaine 1 % injection 2 mL  2 mL   sIaac Zhou, DO   2 mL at 07/14/23 1335    triamcinolone (KENALOG-40) injection 40 mg  40 mg   Isaac Zhou, DO   40 mg at 07/14/23 1335     Facility-Administered Medications Ordered in Other Visits   Medication Dose Route Frequency Provider Last Rate Last Admin    gadobutrol (GADAVIST) injection 7.5 mL  7.5 mL Intravenous Once Palomo Terrell MD         HPI  Pleasant 74 year old female presents today as a(n) established patient for bilateral tinnitus and hearing loss. She was last seen on 7/11/23 and reports no changes since her last visit. She reports dry mouth.    She denies difficulty falling asleep, depression/anxiety, TMJ issues, joint problems, allergies, rhinorrhea, sneezing, coughing, facial pressure, dizziness/vertigo.    Review of Systems   Constitutional: Negative.    HENT:  Positive for hearing loss and tinnitus.    Eyes: Negative.    Respiratory: Negative.  Negative for cough.    Gastrointestinal: Negative.    Musculoskeletal:  Negative for joint pain.   Skin: Negative.    Neurological:  Negative for dizziness.   Endo/Heme/Allergies: Negative.  Negative for environmental allergies.   Psychiatric/Behavioral:  Negative for depression. The patient is not nervous/anxious.        Physical Exam  Vitals and nursing note reviewed.   Constitutional:       Appearance: Normal appearance.   HENT:      Head: Normocephalic and atraumatic.      Jaw: There is normal jaw occlusion.      Right Ear: Tympanic membrane and ear canal normal. Decreased hearing noted.      Left Ear: Tympanic membrane and ear canal normal. Decreased hearing noted.      Nose: No mucosal edema, congestion or rhinorrhea.      Right Nostril: No occlusion.      Left Nostril: No occlusion.      Right Turbinates: Not enlarged or  swollen.      Left Turbinates: Not enlarged or swollen.      Right Sinus: No maxillary sinus tenderness or frontal sinus tenderness.      Left Sinus: No maxillary sinus tenderness or frontal sinus tenderness.      Mouth/Throat:      Mouth: Mucous membranes are dry.      Pharynx: Oropharynx is clear. Uvula midline.   Eyes:      Extraocular Movements: Extraocular movements intact.      Pupils: Pupils are equal, round, and reactive to light.   Neurological:      Mental Status: She is alert.       AUDIOGRAM: The patient underwent an audiogram today.  WNL sloping to mild SNHL right. WNL sloping to moderate SNHL left. 100% word rec. right, 96% left. Tymps WNL. Reflexes as marked.  Right: Speech reception threshold is 25 dB with 100% word recognition. Tympanogram A type.  Left: Speech reception threshold is 25 dB with 96% word recognition. Tympanogram A type.    A/P  Audiogram was independently reviewed and discussed in detail with the patient. This pleasant patient has bilateral SNHL and bilateral tinnitus that are stable. There are no ear or sinus infections present. She is advised to not put Q-tips into her ear canal, and advised to get good hydration for dry mouth.    Follow up in clinic in 1 year.    Scribe/Staff:    Scribe Disclosure:   I, Leeann Jason, am serving as a scribe; to document services personally performed by Melania Escobar MD based on data collection and the provider's statements to me.     Provider Disclosure:  I agree with above History, Review of Systems, Physical exam and Plan.  I have reviewed the content of the documentation and have edited it as needed. I have personally performed the services documented here and the documentation accurately represents those services and the decisions I have made.      Electronically signed by:  Melania Escobar MD

## 2024-07-18 NOTE — TELEPHONE ENCOUNTER
LVD:  7/15/2024  [facility]     Amanda Warner, NP  Nurse Practitioner     Refilled per protocol.

## 2024-07-29 ENCOUNTER — PATIENT OUTREACH (OUTPATIENT)
Dept: GERIATRIC MEDICINE | Facility: CLINIC | Age: 74
End: 2024-07-29
Payer: COMMERCIAL

## 2024-07-29 NOTE — PROGRESS NOTES
[facility] Care Coordination Contact      [facility] Six-Month Telephone Assessment    6 month telephone assessment completed on 7/29/24.    ER visits: Yes -  [facility]   Hospitalizations: No  TCU stays: No  Significant health status changes: None reported.  Falls/Injuries: No  ADL/IADL changes: No  Changes in services: No, denies the need for any additional assistance or supports at this time.    Goals: See POC in chart for goal progress documentation.    Will see member in 6 months for an annual health risk assessment.   Encouraged member to call CC with any questions or concerns in the meantime.     Norah Peguero, RN/BSN  [facility]  [phone]

## 2024-08-15 DIAGNOSIS — I10 HYPERTENSION GOAL BP (BLOOD PRESSURE) < 140/90: ICD-10-CM

## 2024-08-15 DIAGNOSIS — E78.2 MIXED HYPERLIPIDEMIA: ICD-10-CM

## 2024-08-15 RX ORDER — LOSARTAN POTASSIUM AND HYDROCHLOROTHIAZIDE 12.5; 5 MG/1; MG/1
1 TABLET ORAL DAILY
Qty: 90 TABLET | Refills: 2 | Status: SHIPPED | OUTPATIENT
Start: 2024-08-15

## 2024-08-15 RX ORDER — PRAVASTATIN SODIUM 20 MG
20 TABLET ORAL DAILY
Qty: 90 TABLET | Refills: 2 | Status: SHIPPED | OUTPATIENT
Start: 2024-08-15

## 2024-08-28 DIAGNOSIS — M35.00 SICCA SYNDROME (H): ICD-10-CM

## 2024-08-29 RX ORDER — CARBOXYMETHYLCELLULOSE SODIUM AND GLYCERIN 5; 9 MG/ML; MG/ML
SOLUTION/ DROPS OPHTHALMIC
Qty: 15 ML | Refills: 1 | Status: SHIPPED | OUTPATIENT
Start: 2024-08-29

## 2024-09-20 ENCOUNTER — OFFICE VISIT (OUTPATIENT)
Dept: OPHTHALMOLOGY | Facility: CLINIC | Age: 74
End: 2024-09-20
Payer: COMMERCIAL

## 2024-09-20 DIAGNOSIS — H52.4 PRESBYOPIA: ICD-10-CM

## 2024-09-20 DIAGNOSIS — H40.013 OPEN ANGLE WITH BORDERLINE FINDINGS AND LOW GLAUCOMA RISK IN BOTH EYES: Primary | ICD-10-CM

## 2024-09-20 DIAGNOSIS — H25.13 SENILE NUCLEAR SCLEROSIS, BILATERAL: ICD-10-CM

## 2024-09-20 PROCEDURE — 92015 DETERMINE REFRACTIVE STATE: CPT | Performed by: OPTOMETRIST

## 2024-09-20 PROCEDURE — 92014 COMPRE OPH EXAM EST PT 1/>: CPT | Performed by: OPTOMETRIST

## 2024-09-20 RX ORDER — LATANOPROST 50 UG/ML
1 SOLUTION/ DROPS OPHTHALMIC AT BEDTIME
Qty: 7.5 ML | Refills: 6 | Status: SHIPPED | OUTPATIENT
Start: 2024-09-20

## 2024-09-20 ASSESSMENT — REFRACTION_MANIFEST
OD_CYLINDER: +0.25
OD_ADD: +2.50
OD_SPHERE: +0.50
OS_AXIS: 040
OD_AXIS: 085
OS_SPHERE: PLANO
OS_ADD: +2.50
OS_CYLINDER: +0.75

## 2024-09-20 ASSESSMENT — TONOMETRY
OD_IOP_MMHG: 14
OD_IOP_MMHG: 10
OS_IOP_MMHG: 10
IOP_METHOD: APPLANATION
OS_IOP_MMHG: 14

## 2024-09-20 ASSESSMENT — CUP TO DISC RATIO
OD_RATIO: 0.6
OS_RATIO: 0.6

## 2024-09-20 ASSESSMENT — EXTERNAL EXAM - LEFT EYE: OS_EXAM: NORMAL

## 2024-09-20 ASSESSMENT — CONF VISUAL FIELD
OS_NORMAL: 1
OD_INFERIOR_NASAL_RESTRICTION: 0
OS_INFERIOR_TEMPORAL_RESTRICTION: 0
OD_NORMAL: 1
OD_SUPERIOR_NASAL_RESTRICTION: 0
OS_SUPERIOR_NASAL_RESTRICTION: 0
METHOD: COUNTING FINGERS
OD_SUPERIOR_TEMPORAL_RESTRICTION: 0
OS_INFERIOR_NASAL_RESTRICTION: 0
OD_INFERIOR_TEMPORAL_RESTRICTION: 0
OS_SUPERIOR_TEMPORAL_RESTRICTION: 0

## 2024-09-20 ASSESSMENT — VISUAL ACUITY
METHOD: SNELLEN - LINEAR
OD_SC+: -2
OD_SC: 20/20
OS_SC: 20/25

## 2024-09-20 ASSESSMENT — SLIT LAMP EXAM - LIDS
COMMENTS: MGD
COMMENTS: MGD

## 2024-09-20 ASSESSMENT — EXTERNAL EXAM - RIGHT EYE: OD_EXAM: NORMAL

## 2024-09-20 NOTE — NURSING NOTE
Chief Complaints and History of Present Illnesses   Patient presents with    Glaucoma Follow Up     Chief Complaint(s) and History of Present Illness(es)       Glaucoma Follow Up              Laterality: both eyes              Comments    Pt returns for a comprehensive eye exam and glaucoma follow-up. She denies significant vision changes each eye. She notes that the floaters remain stable and unchanged since her last exam.  No new flashes. She continues on the Latanoprost drops every night both eyes. Last instilled around 10 or 11 pm last night, good compliance.

## 2024-09-20 NOTE — PROGRESS NOTES
Assessment/Plan  (H40.013) Open angle with borderline findings and low glaucoma risk in both eyes  (primary encounter diagnosis)  Comment: History of taking latanoprost. No progression noted. IOP well controlled.  Plan: latanoprost (XALATAN) 0.005 % ophthalmic         solution        Continue latanoprost at bedtime in both eyes. Return to clinic in 6 months with IOP check. Consider annual follow up given stable findings and most likely physiologic cupping.     (H25.13) Senile nuclear sclerosis, bilateral  Plan: Monitor.     (H52.4) Presbyopia  Plan: REFRACTION [[id number]]        Discussed findings with patient. New spectacle prescription dispensed to patient. Patient is welcome to return to clinic with prolonged adaptation difficulties.     Complete documentation of historical and exam elements from today's encounter can  be found in the full encounter summary report (not reduplicated in this progress  note). I personally obtained the chief complaint(s) and history of present illness. I  confirmed and edited as necessary the review of systems, past medical/surgical  history, family history, social history, and examination findings as documented by  others; and I examined the patient myself. I personally reviewed the relevant tests,  images, and reports as documented above. I formulated and edited as necessary the  assessment and plan and discussed the findings and management plan with the  patient and family.    Prabhakar Banerjee OD

## 2024-09-23 ENCOUNTER — OFFICE VISIT (OUTPATIENT)
Dept: URGENT CARE | Facility: URGENT CARE | Age: 74
End: 2024-09-23
Payer: COMMERCIAL

## 2024-09-23 VITALS
DIASTOLIC BLOOD PRESSURE: 76 MMHG | TEMPERATURE: 98.6 F | RESPIRATION RATE: 18 BRPM | OXYGEN SATURATION: 98 % | SYSTOLIC BLOOD PRESSURE: 150 MMHG | BODY MASS INDEX: 19.7 KG/M2 | HEART RATE: 75 BPM | WEIGHT: 111.2 LBS

## 2024-09-23 DIAGNOSIS — B35.1 FUNGAL INFECTION OF NAIL: Primary | ICD-10-CM

## 2024-09-23 PROCEDURE — 99213 OFFICE O/P EST LOW 20 MIN: CPT

## 2024-09-23 RX ORDER — PRENATAL VIT 91/IRON/FOLIC/DHA 28-975-200
COMBINATION PACKAGE (EA) ORAL 2 TIMES DAILY
Qty: 24 G | Refills: 0 | Status: SHIPPED | OUTPATIENT
Start: 2024-09-23

## 2024-09-23 NOTE — PATIENT INSTRUCTIONS
Use the cream as prescribed. This may take up to four weeks to fully resolve.  Follow up with your primary care provider should symptoms persist.     Neonatologist

## 2024-09-23 NOTE — PROGRESS NOTES
Assessment & Plan   (B35.1) Fungal infection of nail  (primary encounter diagnosis)  Plan: terbinafine (LAMISIL) 1 % external cream    Informed the patient to use the cream as prescribed and that it may take up to 4 weeks to fully resolved.  Discussed following up with her primary care provider should symptoms persist as the oral therapy requires liver function test monitoring.  Patient acknowledged her understanding of the above plan.    The use of [employer] dictation services may have been used to construct the content in this note; any grammatical or spelling errors are non-intentional. Please contact the author of this note directly if you are in need of any clarification.      Sly Marte, [facility]    Weston Sorto is a 74 year old female who presents to clinic today for the following health issues:  Chief Complaint   Patient presents with    Nail Problem     Thinks she has nail fungus on left thumb and right pointer and middle finger, no pain      HPI  Patient is concerned that she may have a nail fungus that has been on the tips of her fingernails for the past 6 months.  She has not utilized any treatment.    ROS:  Negative except noted above.    Review of Systems        Objective    BP (!) 150/76 (BP Location: Left arm, Patient Position: Sitting, Cuff Size: Adult Regular)   Pulse 75   Temp 98.6  F (37  C) (Tympanic)   Resp 18   Wt 50.4 kg (111 lb 3.2 oz)   LMP 06/21/2002 (Approximate)   SpO2 98%   BMI 19.70 kg/m    Physical Exam   GENERAL: alert and no distress  SKIN: Yellowish/white discoloration to the distal fingernails on the patient's bilateral thumbs, index/middle fingers and left ring finger.

## 2024-09-30 ENCOUNTER — OFFICE VISIT (OUTPATIENT)
Dept: URGENT CARE | Facility: URGENT CARE | Age: 74
End: 2024-09-30
Payer: COMMERCIAL

## 2024-09-30 VITALS
RESPIRATION RATE: 18 BRPM | WEIGHT: 113 LBS | HEART RATE: 91 BPM | OXYGEN SATURATION: 95 % | SYSTOLIC BLOOD PRESSURE: 138 MMHG | BODY MASS INDEX: 20.02 KG/M2 | TEMPERATURE: 98.5 F | DIASTOLIC BLOOD PRESSURE: 80 MMHG

## 2024-09-30 DIAGNOSIS — T63.441A BEE STING REACTION, ACCIDENTAL OR UNINTENTIONAL, INITIAL ENCOUNTER: Primary | ICD-10-CM

## 2024-09-30 PROCEDURE — 99213 OFFICE O/P EST LOW 20 MIN: CPT | Performed by: PHYSICIAN ASSISTANT

## 2024-09-30 RX ORDER — TRIAMCINOLONE ACETONIDE 1 MG/G
CREAM TOPICAL
Qty: 45 G | Refills: 0 | Status: SHIPPED | OUTPATIENT
Start: 2024-09-30 | End: 2024-09-30

## 2024-09-30 RX ORDER — TRIAMCINOLONE ACETONIDE 1 MG/G
CREAM TOPICAL
Qty: 45 G | Refills: 2 | Status: SHIPPED | OUTPATIENT
Start: 2024-09-30

## 2024-09-30 RX ORDER — CETIRIZINE HYDROCHLORIDE 10 MG/1
10 TABLET ORAL 2 TIMES DAILY
Qty: 14 TABLET | Refills: 0 | Status: SHIPPED | OUTPATIENT
Start: 2024-09-30 | End: 2024-10-07

## 2024-09-30 ASSESSMENT — PAIN SCALES - GENERAL: PAINLEVEL: EXTREME PAIN (8)

## 2024-09-30 NOTE — PROGRESS NOTES
Assessment & Plan     Bee sting reaction, accidental or unintentional, initial encounter  Take zyrtec twice daily for 7 days, use triamcinolone cream as needed. Follow up if symptoms significantly worsen as oral steroids may be indicated.  - cetirizine (ZYRTEC) 10 MG tablet; Take 1 tablet (10 mg) by mouth 2 times daily for 7 days.  - triamcinolone (KENALOG) 0.1 % external cream; Apply to left index finger twice daily for 7 days    Return in about 3 days (around 10/3/2024) for visit with primary care provider or return to urgent care if not improving.     [facility]    Subjective   Noreen Florence is a 74 year old who presents for the following health issues     Patient presents with:  Insect Bites: Stung twice (both pointer fingers) - swelling, redness, itchy - happened today       HPI    Noreen presents for evaluation of a bee sting. It happened while working in her garden about 1.5 hours ago. Swelling tenderness redness warmth and itching in her left index finger.       Review of Systems   ROS negative except as stated above.      Objective    /80 (BP Location: Left arm, Patient Position: Sitting, Cuff Size: Adult Regular)   Pulse 91   Temp 98.5  F (36.9  C) (Tympanic)   Resp 18   Wt 51.3 kg (113 lb)   LMP 06/21/2002 (Approximate)   SpO2 95%   BMI 20.02 kg/m    Physical Exam   GENERAL: alert and no distress  NECK: no adenopathy, no asymmetry, masses, or scars  RESP: lungs clear to auscultation - no rales, rhonchi or wheezes  CV: regular rate and rhythm, normal S1 S2, no S3 or S4, no murmur, click or rub, no peripheral edema  SKIN: redness swelling excess warmth and erythema in left index finger    No results found for any visits on 09/30/24.

## 2024-09-30 NOTE — PATIENT INSTRUCTIONS
Triamcinolone cream twice daily for 1 week  Cetirizine twice daily for 1 week  Apply ice to area to soothe symptoms.  If the sting is on your arm or leg, elevate it to reduce swelling.  Wash area with soap and water and avoid scratching to prevent infection.  Watch for signs of infection- fever, thick drainage or dramatically worsening redness, swelling or pain 3 -5 days after sting. This is when the large local reaction should be improving  Follow up with primary care provider if you notice no improvement or if the reaction to the bee sting worsens or persists despite treatment provided.  Present to the emergency room right away with any swelling tingling or numbness around lips, mouth or tongue.

## 2024-10-03 NOTE — PROGRESS NOTES
An  was offered to the patient and the patient declined.  Geri Wyatt MA     Pt states she tripped and fell and injured her left forearm and left wrist area.    appropriate home care of this wound.   Dressings:   Wash daily  Silvercel daily  Recommended a Darco shoe, he will wear a sandal that he has at home  Discontinue using the boots until this heals      No orders of the defined types were placed in this encounter.      No orders of the defined types were placed in this encounter.      Follow up: 1 week.    Electronically signed by Miguel Calix DPM on 10/3/2024 at 4:41 PM  10/3/2024

## 2024-10-16 DIAGNOSIS — J30.1 HAYFEVER: ICD-10-CM

## 2024-10-16 RX ORDER — CETIRIZINE HYDROCHLORIDE 10 MG/1
TABLET ORAL
Qty: 90 TABLET | Refills: 0 | Status: SHIPPED | OUTPATIENT
Start: 2024-10-16

## 2024-10-28 DIAGNOSIS — B35.1 FUNGAL INFECTION OF NAIL: ICD-10-CM

## 2024-10-28 RX ORDER — PRENATAL VIT 91/IRON/FOLIC/DHA 28-975-200
COMBINATION PACKAGE (EA) ORAL 2 TIMES DAILY
Qty: 24 G | Refills: 0 | Status: CANCELLED | OUTPATIENT
Start: 2024-10-28

## 2024-10-28 NOTE — TELEPHONE ENCOUNTER
Clinic RN: Please investigate patient's chart or contact patient if the information cannot be found because this medication was prescribed for an acute condition. Confirm current symptoms/need for medication and possible need for appointment. If necessary, document reason and route refill encounter to provider for approval or denial.

## 2024-10-28 NOTE — TELEPHONE ENCOUNTER
Called and spoke to pt.  States that she did request a refill on the medication.    States that the  provider told her that it can take 4 weeks for the fungal infection to go away.  Pt states that she feels that the infection is not fully gone and wants to have more medication.     Offered virtual visit with a provider for tomorrow to further discuss symptoms and treatment. Pt says that she prefers in person visits because she cannot do over the phone.     Assisted in scheduling a visit for 11/12. Pt said she can wait till then.  Understands to call back if symptoms worsen or change.    No questions at this time.    Marlin Newton, STACIEN, RN  [facility]

## 2024-11-12 ENCOUNTER — OFFICE VISIT (OUTPATIENT)
Dept: FAMILY MEDICINE | Facility: CLINIC | Age: 74
End: 2024-11-12
Payer: COMMERCIAL

## 2024-11-12 VITALS
DIASTOLIC BLOOD PRESSURE: 78 MMHG | HEIGHT: 63 IN | WEIGHT: 113.2 LBS | RESPIRATION RATE: 14 BRPM | TEMPERATURE: 97.5 F | HEART RATE: 81 BPM | OXYGEN SATURATION: 97 % | SYSTOLIC BLOOD PRESSURE: 123 MMHG | BODY MASS INDEX: 20.06 KG/M2

## 2024-11-12 DIAGNOSIS — B35.1 ONYCHOMYCOSIS: ICD-10-CM

## 2024-11-12 DIAGNOSIS — Z23 NEED FOR INFLUENZA VACCINATION: ICD-10-CM

## 2024-11-12 DIAGNOSIS — Z23 NEED FOR COVID-19 VACCINE: Primary | ICD-10-CM

## 2024-11-12 PROCEDURE — 90662 IIV NO PRSV INCREASED AG IM: CPT | Performed by: NURSE PRACTITIONER

## 2024-11-12 PROCEDURE — 90480 ADMN SARSCOV2 VAC 1/ONLY CMP: CPT | Performed by: NURSE PRACTITIONER

## 2024-11-12 PROCEDURE — 36415 COLL VENOUS BLD VENIPUNCTURE: CPT | Performed by: NURSE PRACTITIONER

## 2024-11-12 PROCEDURE — 91320 SARSCV2 VAC 30MCG TRS-SUC IM: CPT | Performed by: NURSE PRACTITIONER

## 2024-11-12 PROCEDURE — 80076 HEPATIC FUNCTION PANEL: CPT | Performed by: NURSE PRACTITIONER

## 2024-11-12 PROCEDURE — G0008 ADMIN INFLUENZA VIRUS VAC: HCPCS | Performed by: NURSE PRACTITIONER

## 2024-11-12 PROCEDURE — 99213 OFFICE O/P EST LOW 20 MIN: CPT | Mod: 25 | Performed by: NURSE PRACTITIONER

## 2024-11-12 RX ORDER — TERBINAFINE HYDROCHLORIDE 250 MG/1
250 TABLET ORAL DAILY
Qty: 42 TABLET | Refills: 0 | Status: SHIPPED | OUTPATIENT
Start: 2024-11-12 | End: 2024-12-24

## 2024-11-12 RX ORDER — TRIAMCINOLONE ACETONIDE 0.1 %
PASTE (GRAM) DENTAL
Qty: 5 G | Refills: 0 | Status: CANCELLED | OUTPATIENT
Start: 2024-11-12

## 2024-11-12 RX ORDER — TRIAMCINOLONE ACETONIDE 1 MG/G
CREAM TOPICAL
Qty: 30 G | Refills: 0 | Status: CANCELLED | OUTPATIENT
Start: 2024-11-12

## 2024-11-12 RX ORDER — HYDROCORTISONE 25 MG/G
OINTMENT TOPICAL 2 TIMES DAILY
Qty: 30 G | Refills: 0 | Status: CANCELLED | OUTPATIENT
Start: 2024-11-12

## 2024-11-12 RX ORDER — PILOCARPINE HYDROCHLORIDE 5 MG/1
5 TABLET, FILM COATED ORAL 3 TIMES DAILY
Qty: 270 TABLET | Refills: 0 | Status: CANCELLED | OUTPATIENT
Start: 2024-11-12

## 2024-11-12 ASSESSMENT — PAIN SCALES - GENERAL: PAINLEVEL_OUTOF10: NO PAIN (0)

## 2024-11-12 NOTE — PROGRESS NOTES
Assessment & Plan     Onychomycosis  Evaluated a month ago in urgent care and started on Lamisil cream, minimal improvement.  Reviewed drug interactions with current medications.  Went over the importance of following up for liver function test with patient and symptoms to watch for.  - PRIMARY CARE FOLLOW-UP SCHEDULING; Future  - Hepatic panel (Albumin, ALT, AST, Bili, Alk Phos, TP); Future  - terbinafine (LAMISIL) 250 MG tablet; Take 1 tablet (250 mg) by mouth daily.    Need for COVID-19 vaccine  - COVID-19 12+ (PFIZER)    Need for influenza vaccination  - INFLUENZA HIGH DOSE, TRIVALENT, PF (FLUZONE)        Weston Sorto is a 74 year old, presenting for the following health issues:  Finger (Patient needs a refill on medication hydro cortisone cream)        11/12/2024    12:47 PM   Additional Questions   Roomed by gina   Accompanied by self         11/12/2024    12:47 PM   Patient Reported Additional Medications   Patient reports taking the following new medications none     History of Present Illness       Reason for visit:  Medication follow up   She is taking medications regularly.         Annual Wellness Visit     Patient has been advised of split billing requirements and indicates understanding: Yes          Health Care Directive  Patient does not have a Health Care Directive: Discussed advance care planning with patient; however, patient declined at this time.  In general, how would you rate your overall physical health? excellent  Do you have a special diet?  Regular (no restrictions)         No data to display              Do you see a dentist two times every year?  Yes  Have you been more tired than usual lately?  No  If you drink alcohol do you typically have >3 drinks per day or >7 drinks per week? No  Do you have a current opioid prescription? No  Do you use any other controlled substances or medications that are not prescribed by a provider? None  Social History     Tobacco Use    Smoking  status: Never     Passive exposure: Never    Smokeless tobacco: Never   Vaping Use    Vaping status: Never Used   Substance Use Topics    Alcohol use: No    Drug use: No       Needs assistance for the following daily activities: no assistance needed  Which of the following safety concerns are present in your home?  none identified   Do you (or your family members) have any concerns about your safety while driving?  No  Do you have any of the following hearing concerns?: No hearing concerns  In the past 6 months, have you been bothered by leaking of urine? No        6/25/2024   Social Factors   Worry food won't last until get money to buy more No   Food not last or not have enough money for food? No   Do you have housing? (Housing is defined as stable permanent housing and does not include staying ouside in a car, in a tent, in an abandoned building, in an overnight shelter, or couch-surfing.) No   Are you worried about losing your housing? No   Lack of transportation? No   Unable to get utilities (heat,electricity)? No   Want help with housing or utility concern? No            12/22/2023   Fall Risk   Fallen 2 or more times in the past year? No        Patient-reported            Today's PHQ-2 Score:       11/12/2024     1:08 PM   PHQ-2 ( 1999 Pf[employer])   Q1: Little interest or pleasure in doing things 0   Q2: Feeling down, depressed or hopeless 0   PHQ-2 Score 0         1/2/2024   LAST FHS-7 RESULTS   1st degree relative breast or ovarian cancer No   Any relative bilateral breast cancer No   Any male have breast cancer No   Any ONE woman have BOTH breast AND ovarian cancer No   Any woman with breast cancer before 50yrs No   2 or more relatives with breast AND/OR ovarian cancer No   2 or more relatives with breast AND/OR bowel cancer No               ASCVD Risk   The 10-year ASCVD risk score (oRby CARRINGTON, et al., 2019) is: 15.3%    Values used to calculate the score:      Age: 74 years      Sex: Female      Is  Non- : No      Diabetic: No      Tobacco smoker: No      Systolic Blood Pressure: 123 mmHg      Is BP treated: Yes      HDL Cholesterol: 43 mg/dL      Total Cholesterol: 241 mg/dL            Reviewed and updated as needed this visit by Provider                        Current providers sharing in care for this patient include:  Patient Care Team:  Jud Morales MD as PCP - General (Family Medicine)  Emi Morales MD as MD (Urology)  Luis Cabrera MD as Resident (Radiology)  Bri Morales MD as MD (Urology)  Rylie Marques [facility] as Pharmacist (Pharmacist)  Jud Morales MD as Assigned PCP  Isaac Zhou DO as Assigned Neuroscience Provider  Norah Peguero RN as Lead Care Coordinator (Primary Care - CC)  Khloe Esquivel PA-C as Physician Assistant (Dermatology)  Al Vieira MD as Assigned Musculoskeletal Provider  Amanda Warner NP as Assigned Rheumatology Provider  Prabhakar Banerjee OD as Assigned Surgical Provider    The following health maintenance items are reviewed in Epic and correct as of today:  Health Maintenance   Topic Date Due    URINE DRUG SCREEN  Never done    MEDICARE ANNUAL WELLNESS VISIT  10/21/2023    INFLUENZA VACCINE (1) 09/01/2024    COVID-19 Vaccine (10 - 2024-25 season) 09/01/2024    FALL RISK ASSESSMENT  12/22/2024    BMP  06/25/2025    LIPID  06/25/2025    ANNUAL REVIEW OF HM ORDERS  06/25/2025    EYE EXAM  09/20/2025    MAMMO SCREENING  01/02/2026    COLORECTAL CANCER SCREENING  04/05/2027    GLUCOSE  06/25/2027    ADVANCE CARE PLANNING  11/12/2029    DEXA  03/18/2031    DTAP/TDAP/TD IMMUNIZATION (5 - Td or Tdap) 08/11/2032    HEPATITIS C SCREENING  Completed    PHQ-2 (once per calendar year)  Completed    Pneumococcal Vaccine: 65+ Years  Completed    ZOSTER IMMUNIZATION  Completed    RSV VACCINE  Completed    HPV IMMUNIZATION  Aged Out    MENINGITIS IMMUNIZATION  Aged Out    RSV MONOCLONAL ANTIBODY  Aged  "Out       Appropriate preventive services were discussed with this patient, including applicable screening as appropriate for fall prevention, nutrition, physical activity, Tobacco-use cessation, weight loss and cognition.  Checklist reviewing preventive services available has been given to the patient.           11/12/2024   Mini Cog   Clock Draw Score 2 Normal    2 Normal    2 Normal    2 Normal   3 Item Recall 3 objects recalled    3 objects recalled    3 objects recalled    3 objects recalled   Mini Cog Total Score 5    5    5    5       Multiple values from one day are sorted in reverse-chronological order            Concern -thumb on left and right hand  Onset: 5 Weeks ago  Description: Nail is darken and was seen in Urgent Care was given medication for the nail fungus and like a refill.  Intensity: mild  Progression of Symptoms:  improving  Accompanying Signs & Symptoms: none  Previous history of similar problem: none    Precipitating factors:        Worsened by: none  Alleviating factors:        Improved by: Medication cream helped and would like refills on pended medications  Therapies tried and outcome: Patient states the medication helped more.      Review of Systems  Constitutional, HEENT, cardiovascular, pulmonary, gi and gu systems are negative, except as otherwise noted.      Objective    /78 (BP Location: Left arm, Patient Position: Sitting, Cuff Size: Adult Regular)   Pulse 81   Temp 97.5  F (36.4  C) (Oral)   Resp 14   Ht 1.6 m (5' 3\")   Wt 51.3 kg (113 lb 3.2 oz)   LMP 06/21/2002 (Approximate)   SpO2 97%   BMI 20.05 kg/m    Body mass index is 20.05 kg/m .  Physical Exam   GENERAL: alert and no distress  RESP: lungs clear to auscultation - no rales, rhonchi or wheezes  ABDOMEN: soft, nontender, no hepatosplenomegaly, no masses and bowel sounds normal  SKIN: Bilateral fingernail fungus  PSYCH: mentation appears normal, affect normal/bright            Signed Electronically by: Lorena" DEJA Candelaria CNP

## 2024-11-13 LAB
ALBUMIN SERPL BCG-MCNC: 4.7 G/DL (ref 3.5–5.2)
ALP SERPL-CCNC: 83 U/L (ref 40–150)
ALT SERPL W P-5'-P-CCNC: 31 U/L (ref 0–50)
AST SERPL W P-5'-P-CCNC: 34 U/L (ref 0–45)
BILIRUB DIRECT SERPL-MCNC: <0.2 MG/DL (ref 0–0.3)
BILIRUB SERPL-MCNC: 0.2 MG/DL
PROT SERPL-MCNC: 7.7 G/DL (ref 6.4–8.3)

## 2024-11-19 DIAGNOSIS — K21.00 GASTROESOPHAGEAL REFLUX DISEASE WITH ESOPHAGITIS WITHOUT HEMORRHAGE: ICD-10-CM

## 2024-11-19 RX ORDER — OMEPRAZOLE 40 MG/1
40 CAPSULE, DELAYED RELEASE ORAL DAILY
Qty: 90 CAPSULE | Refills: 2 | Status: SHIPPED | OUTPATIENT
Start: 2024-11-19

## 2024-11-21 ENCOUNTER — APPOINTMENT (OUTPATIENT)
Dept: OPTOMETRY | Facility: CLINIC | Age: 74
End: 2024-11-21
Payer: COMMERCIAL

## 2024-12-03 ENCOUNTER — OFFICE VISIT (OUTPATIENT)
Dept: FAMILY MEDICINE | Facility: CLINIC | Age: 74
End: 2024-12-03
Payer: COMMERCIAL

## 2024-12-03 VITALS
HEART RATE: 69 BPM | BODY MASS INDEX: 21.41 KG/M2 | HEIGHT: 61 IN | SYSTOLIC BLOOD PRESSURE: 137 MMHG | TEMPERATURE: 97.4 F | OXYGEN SATURATION: 99 % | RESPIRATION RATE: 22 BRPM | DIASTOLIC BLOOD PRESSURE: 74 MMHG | WEIGHT: 113.4 LBS

## 2024-12-03 DIAGNOSIS — B35.1 ONYCHOMYCOSIS: Primary | ICD-10-CM

## 2024-12-03 PROCEDURE — 80076 HEPATIC FUNCTION PANEL: CPT | Performed by: NURSE PRACTITIONER

## 2024-12-03 PROCEDURE — 99213 OFFICE O/P EST LOW 20 MIN: CPT | Performed by: NURSE PRACTITIONER

## 2024-12-03 PROCEDURE — G2211 COMPLEX E/M VISIT ADD ON: HCPCS | Performed by: NURSE PRACTITIONER

## 2024-12-03 PROCEDURE — 36415 COLL VENOUS BLD VENIPUNCTURE: CPT | Performed by: NURSE PRACTITIONER

## 2024-12-03 ASSESSMENT — PAIN SCALES - GENERAL: PAINLEVEL_OUTOF10: NO PAIN (0)

## 2024-12-03 NOTE — PROGRESS NOTES
"  Assessment & Plan     Onychomycosis  Symptoms improving to bilateral nails. Denied any symptoms of liver injury including RUQ pain, jaundice, itching, dark urine.    - Continue on oral Lamisil as prescribed  - RTC if above symptoms occur.  - Hepatic panel (Albumin, ALT, AST, Bili, Alk Phos, TP)          Subjective   Noreen is a 74 year old, presenting for the following health issues:  Recheck Medication      12/3/2024     1:08 PM   Additional Questions   Roomed by ibeth   Accompanied by self         12/3/2024     1:08 PM   Patient Reported Additional Medications   Patient reports taking the following new medications yes-Lamasil     HPI         Medication Followup of  LAMASIL  Taking Medication as prescribed: yes  Side Effects:  None  Medication Helping Symptoms:  Pt can't tell      Review of Systems  Constitutional, HEENT, cardiovascular, pulmonary, gi and gu systems are negative, except as otherwise noted.      Objective    /74 (BP Location: Left arm, Patient Position: Sitting, Cuff Size: Adult Large)   Pulse 69   Temp 97.4  F (36.3  C) (Temporal)   Resp 22   Ht 1.549 m (5' 1\")   Wt 51.4 kg (113 lb 6.4 oz)   LMP 06/21/2002 (Approximate)   SpO2 99%   BMI 21.43 kg/m    Body mass index is 21.43 kg/m .  Physical Exam   GENERAL: alert and no distress  RESP: lungs clear to auscultation - no rales, rhonchi or wheezes  ABDOMEN: soft, nontender, no hepatosplenomegaly, no masses and bowel sounds normal  SKIN: no suspicious lesions or rashes. Bilateral fingernail fungus improving, residual symptoms to L thumb and index finger as well as R index finger  PSYCH: mentation appears normal, affect normal/bright            Signed Electronically by: DEJA Mcbride CNP    "

## 2024-12-04 LAB
ALBUMIN SERPL BCG-MCNC: 4.5 G/DL (ref 3.5–5.2)
ALP SERPL-CCNC: 75 U/L (ref 40–150)
ALT SERPL W P-5'-P-CCNC: 33 U/L (ref 0–50)
AST SERPL W P-5'-P-CCNC: 37 U/L (ref 0–45)
BILIRUB DIRECT SERPL-MCNC: <0.2 MG/DL (ref 0–0.3)
BILIRUB SERPL-MCNC: 0.3 MG/DL
PROT SERPL-MCNC: 7.5 G/DL (ref 6.4–8.3)

## 2024-12-11 ENCOUNTER — PATIENT OUTREACH (OUTPATIENT)
Dept: GERIATRIC MEDICINE | Facility: CLINIC | Age: 74
End: 2024-12-11
Payer: COMMERCIAL

## 2024-12-11 NOTE — PROGRESS NOTES
[facility] Care Coordination Contact      [facility] Refusal Telephone Assessment    Member refused home visit HRA on 12/10/24.    ER visits: Yes -  [facility]   Hospitalizations: No  Health concerns: None reported.  Falls/Injuries: No  ADL/IADL Dependencies: Member reports independence, states she is healthy.        Member currently receiving the following home care services:  None   Member currently receiving the following community resources:  None  Informal support(s):  Family    Advanced Care Planning discussion, complete code section.    [facility] [place] [facility] sponsored benefits: Shared information re: Silver Sneakers/gym memberships, ASA, Calcium +D.    Follow-Up Plan: Member informed of future contact, plan to f/u with member with a 6 month telephone assessment and offer a home visit.  Contact information shared with member and family, encouraged member to call with any questions or concerns at any time.    This CC note routed to PCP, Jud Morales.    Norah Peguero RN/BSN  [facility]  [phone]

## 2024-12-11 NOTE — LETTER
December 12, 2024    NOREEN WOLF  [address]  [place] [address]        Dear Noreen:    As a member of [facility]'s [facility] program, we offer a health risk assessment at no cost to you. I know you don't want to have the assessment right now. If you change your mind, please call me at the number below.    Who performs the health risk assessment?  A [facility] Care Coordinator performs the assessment. Our Care Coordinators can also help you understand your benefits. They can tell you about services to aid you at home, such as managing your care with your doctors if your health worsens.    Our Care Coordinators are here for you if you need:  Support for activities you used to do by yourself (including making meals, bathing, and paying bills)  Equipment for bathroom or home safety  Help finding a new place to live  Information on staying healthy, preventing falls and immunizations    Questions?  If you have questions, or you would like to do the assessment, call me at [phone]. TTY users call [phone]. I'm here from 8am to 5pm. I may reach out to you again soon.      Sincerely,        Norah Peguero RN    E-mail:  [email or web address]  Phone: [phone]    Care Manager  [facility]    <[id number] accepted    [id number] (12/2021)  [id number]>

## 2024-12-12 DIAGNOSIS — R05.1 ACUTE COUGH: ICD-10-CM

## 2024-12-12 RX ORDER — ALBUTEROL SULFATE 90 UG/1
INHALANT RESPIRATORY (INHALATION)
Qty: 18 G | Refills: 0 | Status: SHIPPED | OUTPATIENT
Start: 2024-12-12

## 2024-12-12 NOTE — PROGRESS NOTES
"[facility] Care Coordination Contact    Per CC, mailed client a \"Refusal of Home Visit\" letter and uploaded Refusal POC to AMOL Quezada  Care Management Specialist  [facility]  [phone]        "

## 2024-12-19 ENCOUNTER — OFFICE VISIT (OUTPATIENT)
Dept: FAMILY MEDICINE | Facility: CLINIC | Age: 74
End: 2024-12-19
Payer: COMMERCIAL

## 2024-12-19 VITALS
TEMPERATURE: 96.9 F | RESPIRATION RATE: 18 BRPM | HEART RATE: 80 BPM | SYSTOLIC BLOOD PRESSURE: 126 MMHG | DIASTOLIC BLOOD PRESSURE: 80 MMHG | BODY MASS INDEX: 21.64 KG/M2 | HEIGHT: 61 IN | OXYGEN SATURATION: 97 % | WEIGHT: 114.6 LBS

## 2024-12-19 DIAGNOSIS — J20.9 ACUTE BRONCHITIS WITH COEXISTING CONDITION REQUIRING PROPHYLACTIC TREATMENT: ICD-10-CM

## 2024-12-19 DIAGNOSIS — J02.9 SORE THROAT: ICD-10-CM

## 2024-12-19 DIAGNOSIS — E28.39 ESTROGEN DEFICIENCY: ICD-10-CM

## 2024-12-19 DIAGNOSIS — R68.2 DRY MOUTH: ICD-10-CM

## 2024-12-19 DIAGNOSIS — Z00.00 MEDICARE ANNUAL WELLNESS VISIT, SUBSEQUENT: Primary | ICD-10-CM

## 2024-12-19 RX ORDER — PRENATAL VIT 91/IRON/FOLIC/DHA 28-975-200
COMBINATION PACKAGE (EA) ORAL 2 TIMES DAILY
Qty: 24 G | Refills: 0 | Status: CANCELLED | OUTPATIENT
Start: 2024-12-19

## 2024-12-19 RX ORDER — MULTIVITAMIN WITH IRON
TABLET ORAL
Qty: 90 TABLET | Refills: 1 | Status: CANCELLED | OUTPATIENT
Start: 2024-12-19

## 2024-12-19 RX ORDER — NITROGLYCERIN 4 MG/G
1 OINTMENT RECTAL EVERY 12 HOURS
Qty: 30 G | Refills: 0 | Status: CANCELLED | OUTPATIENT
Start: 2024-12-19

## 2024-12-19 RX ORDER — PILOCARPINE HYDROCHLORIDE 5 MG/1
5 TABLET, FILM COATED ORAL 3 TIMES DAILY
Qty: 270 TABLET | Refills: 1 | Status: SHIPPED | OUTPATIENT
Start: 2024-12-19

## 2024-12-19 RX ORDER — CODEINE PHOSPHATE AND GUAIFENESIN 10; 100 MG/5ML; MG/5ML
1-2 SOLUTION ORAL EVERY 8 HOURS PRN
Qty: 237 ML | Refills: 1 | Status: SHIPPED | OUTPATIENT
Start: 2024-12-19

## 2024-12-19 RX ORDER — TRIAMCINOLONE ACETONIDE 0.1 %
PASTE (GRAM) DENTAL
Qty: 5 G | Refills: 0 | Status: CANCELLED | OUTPATIENT
Start: 2024-12-19

## 2024-12-19 RX ORDER — TERBINAFINE HYDROCHLORIDE 250 MG/1
250 TABLET ORAL DAILY
Qty: 42 TABLET | Refills: 0 | Status: CANCELLED | OUTPATIENT
Start: 2024-12-19

## 2024-12-19 RX ORDER — TRIAMCINOLONE ACETONIDE 1 MG/G
CREAM TOPICAL
Qty: 30 G | Refills: 0 | Status: CANCELLED | OUTPATIENT
Start: 2024-12-19

## 2024-12-19 RX ORDER — AMOXICILLIN 500 MG/1
500 CAPSULE ORAL 2 TIMES DAILY
Qty: 20 CAPSULE | Refills: 0 | Status: SHIPPED | OUTPATIENT
Start: 2024-12-19

## 2024-12-19 RX ORDER — CETIRIZINE HYDROCHLORIDE 10 MG/1
10 TABLET ORAL DAILY
Qty: 90 TABLET | Refills: 0 | Status: CANCELLED | OUTPATIENT
Start: 2024-12-19

## 2024-12-19 RX ORDER — LORATADINE 10 MG/1
10 TABLET ORAL DAILY
Qty: 30 TABLET | Refills: 0 | Status: CANCELLED | OUTPATIENT
Start: 2024-12-19

## 2024-12-19 RX ORDER — BENZONATATE 200 MG/1
200 CAPSULE ORAL 3 TIMES DAILY PRN
Qty: 30 CAPSULE | Refills: 3 | Status: SHIPPED | OUTPATIENT
Start: 2024-12-19

## 2024-12-19 ASSESSMENT — PAIN SCALES - GENERAL: PAINLEVEL_OUTOF10: EXTREME PAIN (8)

## 2024-12-19 ASSESSMENT — ENCOUNTER SYMPTOMS: SORE THROAT: 1

## 2024-12-19 NOTE — PROGRESS NOTES
Assessment & Plan     Acute bronchitis with coexisting condition requiring prophylactic treatment  Ongoing cough for the last few days  She has bronchitis  She also has some sore throat  She was requesting codeine cough syrup  I told her that she cannot keep on using this  I told her that I will be giving 1 prescription and 1 more refill but no refills after that  She also wanted some Tessalon cough Perles  She also wanted some antibiotics for her sore throat although this most probably viral she told me got better in the past with antibiotics and she feels this is like a strep infection  - guaiFENesin-codeine (ROBITUSSIN AC) 100-10 MG/5ML solution; Take 5-10 mLs by mouth every 8 hours as needed for cough.  - benzonatate (TESSALON) 200 MG capsule; Take 1 capsule (200 mg) by mouth 3 times daily as needed for cough.    Medicare annual wellness visit, subsequent  No falls in the last 1 year  Up to date with all vaccinations  She never had a DEXA scan  We will order that today  Up-to-date with colonoscopy  Up-to-date with mammograms  She also appears to have hypertriglyceridemia and in the past was on gemfibrozil  For reasons that remain unclear she is currently not on this  She is due to follow-up with her PCP next week  I advised her to discuss this with her    Dry mouth    - pilocarpine (SALAGEN) 5 MG tablet; Take 1 tablet (5 mg) by mouth 3 times daily.    Estrogen deficiency  She never had a DEXA scan I will order this today  She does take calcium and vitamin D tablets  - DX Bone Density; Future    Sore throat  She has a sore throat on examination there is erythematous throat  She thinks it is strep  Offered rapid strep testing however she declined  She wants to start antibiotics right away  - amoxicillin (AMOXIL) 500 MG capsule; Take 1 capsule (500 mg) by mouth 2 times daily.                Weston Sorto is a 74 year old, presenting for the following health issues:  Pharyngitis      12/19/2024    11:12 AM    Additional Questions   Roomed by NATHANIEL   Accompanied by SELF         12/19/2024    11:12 AM   Patient Reported Additional Medications   Patient reports taking the following new medications NONE     Pharyngitis     History of Present Illness       Reason for visit:  ST  Symptom onset:  1-3 days ago  Symptoms include:  SORE THROAT, WORSE IN THE AM  Symptom intensity:  Moderate  Symptom progression:  Improving  Had these symptoms before:  Yes  Has tried/received treatment for these symptoms:  No  What makes it worse:  NONE  What makes it better:  NONE   She is taking medications regularly.         Acute Illness  Acute illness concerns: SORE THROAT   Onset/Duration: 2 DAYS GO  Symptoms:  Fever: No  Chills/Sweats: No  Headache (location?): No  Sinus Pressure: No  Conjunctivitis:  No  Ear Pain: YES- RT SIDE STATES IT IS ITCHY  Rhinorrhea: No  Congestion: YES  Sore Throat: YES  Cough: YES-productive of yellow sputum  Wheeze: No  Decreased Appetite: No  Nausea: No  Vomiting: No  Diarrhea: No  Dysuria/Freq.: No  Dysuria or Hematuria: No  Fatigue/Achiness: YES  Sick/Strep Exposure: No  Therapies tried and outcome: None  Annual Wellness Visit     Patient has been advised of split billing requirements and indicates understanding: Yes          Health Care Directive  Patient does not have a Health Care Directive: Discussed advance care planning with patient; however, patient declined at this time.  In general, how would you rate your overall physical health? (!) FAIR   Discussed with patient their rating of physical health; information has been provided.   Do you have a special diet?  Regular (no restrictions)         No data to display              Do you see a dentist two times every year?  Yes  Have you been more tired than usual lately?  (!) YES   Discussed possible causes of fatigue.   If you drink alcohol do you typically have >3 drinks per day or >7 drinks per week? No  Do you have a current opioid prescription? No  Do  you use any other controlled substances or medications that are not prescribed by a provider? None  Social History     Tobacco Use    Smoking status: Never     Passive exposure: Never    Smokeless tobacco: Never   Vaping Use    Vaping status: Never Used   Substance Use Topics    Alcohol use: No    Drug use: No       Needs assistance for the following daily activities: no assistance needed  Which of the following safety concerns are present in your home?  none identified   Do you (or your family members) have any concerns about your safety while driving?  No  Do you have any of the following hearing concerns?: No hearing concerns  In the past 6 months, have you been bothered by leaking of urine? No        6/25/2024   Social Factors   Worry food won't last until get money to buy more No   Food not last or not have enough money for food? No   Do you have housing? (Housing is defined as stable permanent housing and does not include staying ouside in a car, in a tent, in an abandoned building, in an overnight shelter, or couch-surfing.) No   Are you worried about losing your housing? No   Lack of transportation? No   Unable to get utilities (heat,electricity)? No   Want help with housing or utility concern? No         12/3/2024   Fall Risk   Fallen 2 or more times in the past year? No   Trouble with walking or balance? No            Today's PHQ-2 Score:       11/12/2024     1:08 PM   PHQ-2 ( 1999 [employer])   Q1: Little interest or pleasure in doing things 0   Q2: Feeling down, depressed or hopeless 0   PHQ-2 Score 0         1/2/2024   LAST FHS-7 RESULTS   1st degree relative breast or ovarian cancer No   Any relative bilateral breast cancer No   Any male have breast cancer No   Any ONE woman have BOTH breast AND ovarian cancer No   Any woman with breast cancer before 50yrs No   2 or more relatives with breast AND/OR ovarian cancer No   2 or more relatives with breast AND/OR bowel cancer No            ASCVD Risk    The 10-year ASCVD risk score (Roby CARRINGTON, et al., 2019) is: 16%    Values used to calculate the score:      Age: 74 years      Sex: Female      Is Non- : No      Diabetic: No      Tobacco smoker: No      Systolic Blood Pressure: 126 mmHg      Is BP treated: Yes      HDL Cholesterol: 43 mg/dL      Total Cholesterol: 241 mg/dL    Fracture Risk Assessment Tool  Link to Frax Calculator  Use the information below to complete the Frax calculator  : 1950  Sex: female  Weight (kg): 52 kg (actual weight)  Height (cm): 156 cm  Previous Fragility Fracture:  No  History of parent with fractured hip:  No  Current Smoking:  No  Patient has been on glucocorticoids for more than 3 months (5mg/day or more): No  Rheumatoid Arthritis on Problem List:  No  Secondary Osteoporosis on Problem List:  No  Consumes 3 or more units of alcohol per day: No  Femoral Neck BMD (g/cm2)            Reviewed and updated as needed this visit by Provider                        Current providers sharing in care for this patient include:  Patient Care Team:  Jud Morales MD as PCP - General (Family Medicine)  Emi Morales MD as MD (Urology)  Luis Cabrera MD as Resident (Radiology)  Bri Morales MD as MD (Urology)  Rylie Marques [facility] as Pharmacist (Pharmacist)  Jud Morales MD as Assigned PCP  Isaac Zhou DO as Assigned Neuroscience Provider  Norah Peguero RN as Lead Care Coordinator (Primary Care - CC)  Khloe Esquivel PA-C as Physician Assistant (Dermatology)  Al Vieira MD as Assigned Musculoskeletal Provider  Amanda Warner NP as Assigned Rheumatology Provider  Prabhakar Banerjee OD as Assigned Surgical Provider    The following health maintenance items are reviewed in Epic and correct as of today:  Health Maintenance   Topic Date Due    URINE DRUG SCREEN  Never done    MEDICARE ANNUAL WELLNESS VISIT  10/21/2023    COVID-19 Vaccine (11 -  "2024-25 season) 01/07/2025    BMP  06/25/2025    LIPID  06/25/2025    ANNUAL REVIEW OF HM ORDERS  06/25/2025    EYE EXAM  09/20/2025    FALL RISK ASSESSMENT  12/03/2025    MAMMO SCREENING  01/02/2026    COLORECTAL CANCER SCREENING  04/05/2027    GLUCOSE  06/25/2027    ADVANCE CARE PLANNING  11/12/2029    DEXA  03/18/2031    DTAP/TDAP/TD IMMUNIZATION (5 - Td or Tdap) 08/11/2032    HEPATITIS C SCREENING  Completed    PHQ-2 (once per calendar year)  Completed    INFLUENZA VACCINE  Completed    Pneumococcal Vaccine: 50+ Years  Completed    ZOSTER IMMUNIZATION  Completed    RSV VACCINE  Completed    HPV IMMUNIZATION  Aged Out    MENINGITIS IMMUNIZATION  Aged Out    RSV MONOCLONAL ANTIBODY  Aged Out       Appropriate preventive services were discussed with this patient, including applicable screening as appropriate for fall prevention, nutrition, physical activity, Tobacco-use cessation, weight loss and cognition.  Checklist reviewing preventive services available has been given to the patient.          11/12/2024   Mini Cog   Clock Draw Score 2 Normal    2 Normal    2 Normal    2 Normal   3 Item Recall 3 objects recalled    3 objects recalled    3 objects recalled    3 objects recalled   Mini Cog Total Score 5    5    5    5       Multiple values from one day are sorted in reverse-chronological order                  Review of Systems  Constitutional, HEENT, cardiovascular, pulmonary, gi and gu systems are negative, except as otherwise noted.      Objective    /80 (BP Location: Left arm, Patient Position: Sitting, Cuff Size: Adult Regular)   Pulse 80   Temp 96.9  F (36.1  C) (Temporal)   Resp 18   Ht 1.56 m (5' 1.42\")   Wt 52 kg (114 lb 9.6 oz)   LMP 06/21/2002 (Approximate)   SpO2 97%   BMI 21.36 kg/m    Body mass index is 21.36 kg/m .  Physical Exam   GENERAL: alert and no distress  EYES: Eyes grossly normal to inspection, PERRL and conjunctivae and sclerae normal  HENT: Erythematous throat  NECK: no " adenopathy, no asymmetry, masses, or scars  RESP: lungs clear to auscultation - no rales, rhonchi or wheezes  CV: regular rate and rhythm, normal S1 S2, no S3 or S4, no murmur, click or rub, no peripheral edema  ABDOMEN: soft, nontender, no hepatosplenomegaly, no masses and bowel sounds normal  MS: no gross musculoskeletal defects noted, no edema  SKIN: no suspicious lesions or rashes  NEURO: Normal strength and tone, mentation intact and speech normal  PSYCH: mentation appears normal, affect normal/bright            Signed Electronically by: Talon Ferrell MD

## 2024-12-19 NOTE — PATIENT INSTRUCTIONS
At [facility], we strive to deliver an exceptional experience to you, every time we see you. If you receive a survey, please let us know what we are doing well and/or what we could improve upon, as we do value your feedback.  If you have MyChart, you can expect to receive results automatically within 24 hours of their completion.  Your provider will send a note interpreting your results as well.   If you do not have MyChart, you should receive your results in about a week by mail.    Your care team:                            Family Medicine Internal Medicine   MD Alfredo Morgan, MD Stefanie Burris, MD Talon Ferrell, MD Jael Church, PAShahbazC    Binh Barnes, MD Pediatrics   Jud Morales, MD Darlene Sousa, MD Ashli Monge, APRN CNP Julianna Burton APRN CNP   MD Naila Stephen, MD Yissel Beal, CNP     Camilo Echavarria, CNP Same-Day Provider (No follow-up visits)   DEJA Mcbride, DNP Ilene Rangel, DEJA Guadalupe, FNP, BC LION SantanaC     Clinic hours: Monday - Thursday 7 am-6 pm; Fridays 7 am-5 pm.   Urgent care: Monday - Friday 10 am- 8 pm; Saturday and Sunday 9 am-5 pm.    Clinic: [phone]       [facility]: Monday - Thursday 8 am - 7 pm; Friday 8 am - 6 pm  [facility]: [phone]

## 2024-12-20 ENCOUNTER — ANCILLARY ORDERS (OUTPATIENT)
Dept: FAMILY MEDICINE | Facility: CLINIC | Age: 74
End: 2024-12-20

## 2024-12-20 DIAGNOSIS — Z12.31 SCREENING MAMMOGRAM FOR BREAST CANCER: Primary | ICD-10-CM

## 2025-01-05 ENCOUNTER — HEALTH MAINTENANCE LETTER (OUTPATIENT)
Age: 75
End: 2025-01-05

## 2025-01-14 ENCOUNTER — ANCILLARY PROCEDURE (OUTPATIENT)
Dept: MAMMOGRAPHY | Facility: CLINIC | Age: 75
End: 2025-01-14
Attending: PREVENTIVE MEDICINE
Payer: COMMERCIAL

## 2025-01-14 DIAGNOSIS — Z12.31 SCREENING MAMMOGRAM FOR BREAST CANCER: ICD-10-CM

## 2025-01-14 PROCEDURE — 77067 SCR MAMMO BI INCL CAD: CPT | Mod: TC | Performed by: RADIOLOGY

## 2025-01-14 PROCEDURE — 77063 BREAST TOMOSYNTHESIS BI: CPT | Mod: TC | Performed by: RADIOLOGY

## 2025-01-15 ENCOUNTER — ANCILLARY PROCEDURE (OUTPATIENT)
Dept: BONE DENSITY | Facility: CLINIC | Age: 75
End: 2025-01-15
Attending: INTERNAL MEDICINE
Payer: COMMERCIAL

## 2025-01-15 DIAGNOSIS — E28.39 ESTROGEN DEFICIENCY: ICD-10-CM

## 2025-01-15 PROCEDURE — 77080 DXA BONE DENSITY AXIAL: CPT | Performed by: RADIOLOGY

## 2025-01-15 PROCEDURE — 77081 DXA BONE DENSITY APPENDICULR: CPT | Mod: XU | Performed by: RADIOLOGY

## 2025-02-11 DIAGNOSIS — R05.1 ACUTE COUGH: ICD-10-CM

## 2025-02-11 DIAGNOSIS — M35.00 SICCA SYNDROME: ICD-10-CM

## 2025-02-11 DIAGNOSIS — J30.1 HAYFEVER: ICD-10-CM

## 2025-02-11 DIAGNOSIS — R25.2 BILATERAL LEG CRAMPS: ICD-10-CM

## 2025-02-12 RX ORDER — CARBOXYMETHYLCELLULOSE SODIUM AND GLYCERIN 5; 9 MG/ML; MG/ML
SOLUTION/ DROPS OPHTHALMIC
Qty: 15 ML | Refills: 1 | Status: SHIPPED | OUTPATIENT
Start: 2025-02-12

## 2025-02-12 RX ORDER — MULTIVITAMIN WITH IRON
TABLET ORAL
Qty: 90 TABLET | Refills: 1 | Status: SHIPPED | OUTPATIENT
Start: 2025-02-12

## 2025-02-12 RX ORDER — CETIRIZINE HYDROCHLORIDE 10 MG/1
TABLET ORAL
Qty: 90 TABLET | Refills: 0 | Status: SHIPPED | OUTPATIENT
Start: 2025-02-12

## 2025-02-12 RX ORDER — ALBUTEROL SULFATE 90 UG/1
INHALANT RESPIRATORY (INHALATION)
Qty: 18 G | Refills: 0 | Status: SHIPPED | OUTPATIENT
Start: 2025-02-12

## 2025-02-14 DIAGNOSIS — L64.9 ANDROGENETIC ALOPECIA: ICD-10-CM

## 2025-02-14 NOTE — TELEPHONE ENCOUNTER
Last Written Prescription Date:  3/27/24  Last Fill Quantity: 90 tablets ,  # refills:  3   Last office visit: 3/27/2024 ; last virtual visit: Visit date not found with prescribing provider:     Future Office Visit:  none        Requested Prescriptions   Pending Prescriptions Disp Refills    finasteride (PROSCAR) 5 MG tablet 90 tablet 3     Sig: Take 1 tablet (5 mg) by mouth daily.       There is no refill protocol information for this order

## 2025-02-17 RX ORDER — FINASTERIDE 5 MG/1
5 TABLET, FILM COATED ORAL DAILY
Qty: 90 TABLET | Refills: 0 | Status: SHIPPED | OUTPATIENT
Start: 2025-02-17

## 2025-02-20 ENCOUNTER — OFFICE VISIT (OUTPATIENT)
Dept: ORTHOPEDICS | Facility: CLINIC | Age: 75
End: 2025-02-20
Payer: COMMERCIAL

## 2025-02-20 VITALS — BODY MASS INDEX: 21.77 KG/M2 | WEIGHT: 116.8 LBS

## 2025-02-20 DIAGNOSIS — M25.511 CHRONIC PAIN OF BOTH SHOULDERS: Primary | ICD-10-CM

## 2025-02-20 DIAGNOSIS — G89.29 CHRONIC PAIN OF RIGHT ANKLE: ICD-10-CM

## 2025-02-20 DIAGNOSIS — G89.29 CHRONIC PAIN OF BOTH SHOULDERS: Primary | ICD-10-CM

## 2025-02-20 DIAGNOSIS — M67.912 TENDINOPATHY OF LEFT ROTATOR CUFF: ICD-10-CM

## 2025-02-20 DIAGNOSIS — M25.571 CHRONIC PAIN OF RIGHT ANKLE: ICD-10-CM

## 2025-02-20 DIAGNOSIS — M25.512 CHRONIC PAIN OF BOTH SHOULDERS: Primary | ICD-10-CM

## 2025-02-20 RX ORDER — ROPIVACAINE HYDROCHLORIDE 5 MG/ML
4 INJECTION, SOLUTION EPIDURAL; INFILTRATION; PERINEURAL
Status: COMPLETED | OUTPATIENT
Start: 2025-02-20 | End: 2025-02-20

## 2025-02-20 RX ORDER — BETAMETHASONE SODIUM PHOSPHATE AND BETAMETHASONE ACETATE 3; 3 MG/ML; MG/ML
6 INJECTION, SUSPENSION INTRA-ARTICULAR; INTRALESIONAL; INTRAMUSCULAR; SOFT TISSUE
Status: COMPLETED | OUTPATIENT
Start: 2025-02-20 | End: 2025-02-20

## 2025-02-20 RX ADMIN — ROPIVACAINE HYDROCHLORIDE 4 ML: 5 INJECTION, SOLUTION EPIDURAL; INFILTRATION; PERINEURAL at 14:59

## 2025-02-20 RX ADMIN — BETAMETHASONE SODIUM PHOSPHATE AND BETAMETHASONE ACETATE 6 MG: 3; 3 INJECTION, SUSPENSION INTRA-ARTICULAR; INTRALESIONAL; INTRAMUSCULAR; SOFT TISSUE at 14:59

## 2025-02-20 NOTE — PATIENT INSTRUCTIONS
# Acute on Chronic Right Ankle Pain, Bilateral Shoulder Pain: Noreen Florence  was seen today for right ankle pain. Symptoms had been going on for years worsening over the past few months. On examination there are positive findings of tenderness to palpation over the right peroneal tendon, bilateral rotator cuff tendons. Imaging findings showed mild shoulder arthritis, calcific tendinitis. Likely cause of patient's condition due to irritated rotator cuff tendon, irritated peroneal tendon. Other possible conditions contributing to symptoms include shoulder arthritis.  Counseled patient on nature of condition and treatment options.  Given this plan as below, follow-up 1 mon as needed.     Image Findings: bilateral shoulder arthritis, calcific tendinitis  Treatment: Activities as tolerated, home exercises given today  Medications/Injections: Limited tylenol/ibuprofen for pain for 1-2 weeks, Topical Voltaren gel, bilateral subacromial bursa steroid injections  Follow-up: In one month if symptoms do not improve, sooner if worsening  Can consider shoulder joint steroid injections    Please call [phone]   Ask for my team if you have any questions or concerns    If you have not yet received the influenza vaccine but would like to get one, please call  [phone] or you can schedule via [employer]    It was great seeing you again today!    Al Vieira MD, CAM     [facility] Injection Discharge Instructions    Procedure: bilateral subacromial bursa steroid injections    You may shower, however avoid swimming, tub baths or hot tubs for 24 hours following your procedure  You may have a mild to moderate increase in pain for several days following the injection.  It may take up to 14 days for the steroid medication to start working although you may feel the effect as early as a few days after the procedure.  You may use ice packs for 10-15 minutes, 3 to 4 times a day at the injection site for comfort  You may use  anti-inflammatory medications (such as Ibuprofen or Aleve or Advil) or Tylenol for pain control if necessary  If you were fasting, you may resume your normal diet and medications after the procedure  If you have diabetes, check your blood sugar more frequently than usual as your blood sugar may be higher than normal for 10-14 days following a steroid injection. Contact your doctor who manages your diabetes if your blood sugar is higher than usual    If you experience any of the following, call [facility] @ [phone] or [phone]  -Fever over 100 degree F  -Swelling, bleeding, redness, drainage, warmth at the injection site  - New or worsening pain

## 2025-02-20 NOTE — PROGRESS NOTES
ASSESSMENT & PLAN    Noreen was seen today for follow up, pain, pain and pain.    Diagnoses and all orders for this visit:    Chronic pain of both shoulders  -     XR Shoulder Bilateral G/E 2 Views; Future  -     Large Joint Injection/Arthocentesis: bilateral subacromial bursa    Chronic pain of right ankle    Tendinopathy of left rotator cuff  -     diclofenac (VOLTAREN) 1 % topical gel; Apply 2 g topically 4 times daily as needed for moderate pain.        # Acute on Chronic Right Ankle Pain, Bilateral Shoulder Pain: Noreen Florence  was seen today for right ankle pain. Symptoms had been going on for years worsening over the past few months. On examination there are positive findings of tenderness to palpation over the right peroneal tendon, bilateral rotator cuff tendons. Imaging findings showed mild shoulder arthritis, calcific tendinitis. Likely cause of patient's condition due to irritated rotator cuff tendon, irritated peroneal tendon. Other possible conditions contributing to symptoms include shoulder arthritis.  Counseled patient on nature of condition and treatment options.  Given this plan as below, follow-up 1 mon as needed.     Image Findings: bilateral shoulder arthritis, calcific tendinitis  Treatment: Activities as tolerated, home exercises given today  Medications/Injections: Limited tylenol/ibuprofen for pain for 1-2 weeks, Topical Voltaren gel, bilateral subacromial bursa steroid injections  Follow-up: In one month if symptoms do not improve, sooner if worsening  Can consider shoulder joint steroid injections    -----    SUBJECTIVE:  Noreen Florence is a 75 year old female who is seen in follow-up for right ankle pain. They were last seen 12/14/23.  The patient is seen by themselves.    Since their last visit reports lateral lower leg pain.  They have tried right ankle US guided tenotomy 11/27/23, voltaren, Meloxicam, boot.         Patient's past medical, surgical, social, and family histories were reviewed  today and no changes are noted.    REVIEW OF SYSTEMS:  Constitutional: NEGATIVE for fever, chills, change in weight  Skin: NEGATIVE for worrisome rashes, moles or lesions  GI/: NEGATIVE for bowel or bladder changes  Neuro: NEGATIVE for weakness, dizziness or paresthesias    OBJECTIVE:  Wt 53 kg (116 lb 12.8 oz)   LMP 06/21/2002 (Approximate)   BMI 21.77 kg/m     General: healthy, alert and in no distress  HEENT: no scleral icterus or conjunctival erythema  Skin: no suspicious lesions or rash. No jaundice.  CV: regular rhythm by palpation, no pedal edema  Resp: normal respiratory effort without conversational dyspnea   Psych: normal mood and affect  Gait: normal steady gait with appropriate coordination and balance  Neuro: normal light touch sensory exam of the extremities.    MSK:    BILATERAL SHOULDER  Inspection:    no swelling, bruising, discoloration, or obvious deformity or asymmetry  Palpation:    Tender about the supraspinatus insertion. Remainder of bony and tendinous landmarks are nontender.  Active Range of Motion:     Abduction 1650, FF 1650, , IR hip pocket.    Strength:    Scapular plane abduction 5-/5, painful,  ER 5/5, IR 5/5, biceps , triceps 5/55/5  Special Tests:    Positive: Neer's, Fuentes', and supraspinatus (empty can)         LEFT ANKLE  Inspection:    No swelling, redness, edema or ecchymosis is observed  Palpation:    Tender about the peroneal tendon. Remainder of bony and ligamentous landmarks are nontender.  Range of Motion:     Plantarflexion full / dorsiflexion full / inversion full / eversion full  Strength:    full  Special Tests:    negative anterior drawer, negative talar tilt, negative valgus stress, negative squeeze test.      Independent visualization of the below image:     Bilateral shoulder joint arthritis, concern for right shoulder calcific tendinitis    Al Vieira MD, [facility]    Disclaimer: This note consists of symbols  derived from keyboarding, dictation and/or voice recognition software. As a result, there may be errors in the script that have gone undetected. Please consider this when interpreting information found in this chart.    Large Joint Injection/Arthocentesis: bilateral subacromial bursa    Date/Time: 2/20/2025 2:59 PM    Performed by: Al Vieira MD  Authorized by: Al Vieira MD    Indications:  Pain  Needle Size:  25 G  Guidance: ultrasound    Approach:  Lateral  Location:  Shoulder  Laterality:  Bilateral      Site:  Bilateral subacromial bursa  Medications (Right):  6 mg betamethasone acet & sod phos 6 (3-3) MG/ML; 4 mL ROPivacaine 5 MG/ML  Medications (Left):  6 mg betamethasone acet & sod phos 6 (3-3) MG/ML; 4 mL ROPivacaine 5 MG/ML  Outcome:  Tolerated well, no immediate complications  Procedure discussed: discussed risks, benefits, and alternatives    Consent Given by:  Patient  Timeout: timeout called immediately prior to procedure    Prep: patient was prepped and draped in usual sterile fashion     Ultrasound images of procedure were permanently stored.     Patient reported significant improvement of pain after the numbing portion bilateral subacromial bursa steroid injections.  Ultrasound guided images were permanently stored.   Aftercare instructions given to patient.  Plan to follow-up as discussed above.     Al Vieira MD [facility]

## 2025-02-20 NOTE — LETTER
2/20/2025      Noreen Florence  [address]  [place] [address]      Dear Colleague,    Thank you for referring your patient, Noreen Florence, to the [facility]. Please see a copy of my visit note below.    ASSESSMENT & PLAN    Noreen was seen today for follow up, pain, pain and pain.    Diagnoses and all orders for this visit:    Chronic pain of both shoulders  -     XR Shoulder Bilateral G/E 2 Views; Future  -     Large Joint Injection/Arthocentesis: bilateral subacromial bursa    Chronic pain of right ankle    Tendinopathy of left rotator cuff  -     diclofenac (VOLTAREN) 1 % topical gel; Apply 2 g topically 4 times daily as needed for moderate pain.        # Acute on Chronic Right Ankle Pain, Bilateral Shoulder Pain: Noreen Florence  was seen today for right ankle pain. Symptoms had been going on for years worsening over the past few months. On examination there are positive findings of tenderness to palpation over the right peroneal tendon, bilateral rotator cuff tendons. Imaging findings showed mild shoulder arthritis, calcific tendinitis. Likely cause of patient's condition due to irritated rotator cuff tendon, irritated peroneal tendon. Other possible conditions contributing to symptoms include shoulder arthritis.  Counseled patient on nature of condition and treatment options.  Given this plan as below, follow-up 1 mon as needed.     Image Findings: bilateral shoulder arthritis, calcific tendinitis  Treatment: Activities as tolerated, home exercises given today  Medications/Injections: Limited tylenol/ibuprofen for pain for 1-2 weeks, Topical Voltaren gel, bilateral subacromial bursa steroid injections  Follow-up: In one month if symptoms do not improve, sooner if worsening  Can consider shoulder joint steroid injections    -----    SUBJECTIVE:  Noreen Florence is a 75 year old female who is seen in follow-up for right ankle pain. They were last seen 12/14/23.  The patient is seen by  themselves.    Since their last visit reports lateral lower leg pain.  They have tried right ankle US guided tenotomy 11/27/23, voltaren, Meloxicam, boot.         Patient's past medical, surgical, social, and family histories were reviewed today and no changes are noted.    REVIEW OF SYSTEMS:  Constitutional: NEGATIVE for fever, chills, change in weight  Skin: NEGATIVE for worrisome rashes, moles or lesions  GI/: NEGATIVE for bowel or bladder changes  Neuro: NEGATIVE for weakness, dizziness or paresthesias    OBJECTIVE:  Wt 53 kg (116 lb 12.8 oz)   LMP 06/21/2002 (Approximate)   BMI 21.77 kg/m     General: healthy, alert and in no distress  HEENT: no scleral icterus or conjunctival erythema  Skin: no suspicious lesions or rash. No jaundice.  CV: regular rhythm by palpation, no pedal edema  Resp: normal respiratory effort without conversational dyspnea   Psych: normal mood and affect  Gait: normal steady gait with appropriate coordination and balance  Neuro: normal light touch sensory exam of the extremities.    MSK:    BILATERAL SHOULDER  Inspection:    no swelling, bruising, discoloration, or obvious deformity or asymmetry  Palpation:    Tender about the supraspinatus insertion. Remainder of bony and tendinous landmarks are nontender.  Active Range of Motion:     Abduction 1650, FF 1650, , IR hip pocket.    Strength:    Scapular plane abduction 5-/5, painful,  ER 5/5, IR 5/5, biceps , triceps 5/55/5  Special Tests:    Positive: Neer's, Fuentes', and supraspinatus (empty can)         LEFT ANKLE  Inspection:    No swelling, redness, edema or ecchymosis is observed  Palpation:    Tender about the peroneal tendon. Remainder of bony and ligamentous landmarks are nontender.  Range of Motion:     Plantarflexion full / dorsiflexion full / inversion full / eversion full  Strength:    full  Special Tests:    negative anterior drawer, negative talar tilt, negative valgus stress, negative squeeze test.       Independent visualization of the below image:     Bilateral shoulder joint arthritis, concern for right shoulder calcific tendinitis    Al Vieira MD, [facility]    Disclaimer: This note consists of symbols derived from keyboarding, dictation and/or voice recognition software. As a result, there may be errors in the script that have gone undetected. Please consider this when interpreting information found in this chart.    Large Joint Injection/Arthocentesis: bilateral subacromial bursa    Date/Time: 2/20/2025 2:59 PM    Performed by: Al Vieira MD  Authorized by: Al Vieira MD    Indications:  Pain  Needle Size:  25 G  Guidance: ultrasound    Approach:  Lateral  Location:  Shoulder  Laterality:  Bilateral      Site:  Bilateral subacromial bursa  Medications (Right):  6 mg betamethasone acet & sod phos 6 (3-3) MG/ML; 4 mL ROPivacaine 5 MG/ML  Medications (Left):  6 mg betamethasone acet & sod phos 6 (3-3) MG/ML; 4 mL ROPivacaine 5 MG/ML  Outcome:  Tolerated well, no immediate complications  Procedure discussed: discussed risks, benefits, and alternatives    Consent Given by:  Patient  Timeout: timeout called immediately prior to procedure    Prep: patient was prepped and draped in usual sterile fashion     Ultrasound images of procedure were permanently stored.     Patient reported significant improvement of pain after the numbing portion bilateral subacromial bursa steroid injections.  Ultrasound guided images were permanently stored.   Aftercare instructions given to patient.  Plan to follow-up as discussed above.     Al Vieira MD [facility]              Again, thank you for allowing me to participate in the care of your patient.        Sincerely,        Al Vieira MD    Electronically signed

## 2025-03-12 ENCOUNTER — OFFICE VISIT (OUTPATIENT)
Dept: FAMILY MEDICINE | Facility: CLINIC | Age: 75
End: 2025-03-12
Payer: COMMERCIAL

## 2025-03-12 VITALS
HEIGHT: 62 IN | BODY MASS INDEX: 21.09 KG/M2 | RESPIRATION RATE: 16 BRPM | TEMPERATURE: 98.1 F | HEART RATE: 92 BPM | DIASTOLIC BLOOD PRESSURE: 77 MMHG | OXYGEN SATURATION: 95 % | WEIGHT: 114.6 LBS | SYSTOLIC BLOOD PRESSURE: 123 MMHG

## 2025-03-12 DIAGNOSIS — E78.5 HYPERLIPIDEMIA WITH TARGET LDL LESS THAN 130: ICD-10-CM

## 2025-03-12 DIAGNOSIS — R73.03 PRE-DIABETES: ICD-10-CM

## 2025-03-12 DIAGNOSIS — B35.1 ONYCHOMYCOSIS: Primary | ICD-10-CM

## 2025-03-12 DIAGNOSIS — M06.4 POLYARTHRITIS, INFLAMMATORY (H): ICD-10-CM

## 2025-03-12 LAB
EST. AVERAGE GLUCOSE BLD GHB EST-MCNC: 117 MG/DL
HBA1C MFR BLD: 5.7 % (ref 0–5.6)

## 2025-03-12 PROCEDURE — 1125F AMNT PAIN NOTED PAIN PRSNT: CPT | Performed by: PREVENTIVE MEDICINE

## 2025-03-12 PROCEDURE — 36415 COLL VENOUS BLD VENIPUNCTURE: CPT | Performed by: PREVENTIVE MEDICINE

## 2025-03-12 PROCEDURE — 3074F SYST BP LT 130 MM HG: CPT | Performed by: PREVENTIVE MEDICINE

## 2025-03-12 PROCEDURE — 99214 OFFICE O/P EST MOD 30 MIN: CPT | Performed by: PREVENTIVE MEDICINE

## 2025-03-12 PROCEDURE — 80061 LIPID PANEL: CPT | Performed by: PREVENTIVE MEDICINE

## 2025-03-12 PROCEDURE — 83036 HEMOGLOBIN GLYCOSYLATED A1C: CPT | Performed by: PREVENTIVE MEDICINE

## 2025-03-12 PROCEDURE — 3078F DIAST BP <80 MM HG: CPT | Performed by: PREVENTIVE MEDICINE

## 2025-03-12 RX ORDER — TERBINAFINE HYDROCHLORIDE 250 MG/1
250 TABLET ORAL DAILY
Qty: 42 TABLET | Refills: 0 | Status: SHIPPED | OUTPATIENT
Start: 2025-03-12 | End: 2025-04-23

## 2025-03-12 ASSESSMENT — PAIN SCALES - GENERAL: PAINLEVEL_OUTOF10: MILD PAIN (3)

## 2025-03-12 NOTE — PATIENT INSTRUCTIONS
Referral Details    Referred By  Referred To   Jud Morales MD [address]   [facility] [id number]   Phone: [phone]   Fax: [phone]    Diagnoses: Onychomycosis   Order: Adult Dermatology  Referral       Comment: Please be aware that coverage of these services is subject to the terms and limitations of your health insurance plan.  Call member services at your health plan with any benefit or coverage questions.   [facility] will call you to coordinate your care as prescribed by your provider. If you don't hear from a representative within 2 business days, please call [phone].          At [facility], we strive to deliver an exceptional experience to you, every time we see you. If you receive a survey, please let us know what we are doing well and/or what we could improve upon, as we do value your feedback.  If you have MyChart, you can expect to receive results automatically within 24 hours of their completion.  Your provider will send a note interpreting your results as well.   If you do not have MyChart, you should receive your results in about a week by mail.    Your care team:                            Family Medicine Internal Medicine   MD Alfredo Morgan MD Shantel Branch-Fleming, MD Srinivasa Vaka, MD Katya Belousova, MOSES Barnes MD Pediatrics   MD Darlene Champion MD Kim Thein, APRN CNP Julianna Burton APRN CNP   MD Naila Stephen MD Kathleen Widmer, PHIL Echavarria, CNP Same-Day Provider (No follow-up visits)   Lorena Candelaria, APRN, DNP MOSES King APRN, FNP, BC Haley Alexander PA-C     Clinic hours: Monday - Thursday 7 am-6 pm; Fridays 7 am-5 pm.   Urgent care: Monday - Friday 10 am- 8 pm; Saturday and Sunday 9 am-5 pm.    Clinic: [phone]       [facility]: Monday - Thursday 8 am - 7 pm; Friday  8 am - 6 pm  [facility]: [phone]

## 2025-03-12 NOTE — RESULT ENCOUNTER NOTE
Noreen,     Three month glucose number Hemoglobin A1C is in a pre diabetic range at 5.7.    Please do not hesitate to call us at [phone] if you have any questions or concerns.    Thank you,    Jud Morales MD MPH

## 2025-03-12 NOTE — PROGRESS NOTES
Assessment & Plan     Onychomycosis  - terbinafine (LAMISIL) 250 MG tablet  Dispense: 42 tablet; Refill: 0  - Adult Dermatology  Referral    Polyarthritis, inflammatory (H)  -would like second opinion for Rheumatology  - Adult Rheumatology  Referral    Pre-diabetes  -check labs  - Hemoglobin A1c    Lab Results   Component Value Date    A1C 5.4 06/25/2024    A1C 6.1 09/11/2023    A1C 6.1 04/26/2023    A1C 6.2 10/21/2022    A1C 6.5 05/10/2022    A1C 5.6 10/08/2020    A1C 5.9 04/26/2019    A1C 5.9 06/21/2018    A1C 5.9 10/27/2016         Hyperlipidemia with target LDL less than 130  -continue statin   - Lipid panel reflex to direct LDL Non-fasting    The 10-year ASCVD risk score (Roby CARRINGTON, et al., 2019) is: 16.7%    Values used to calculate the score:      Age: 75 years      Sex: Female      Is Non- : No      Diabetic: No      Tobacco smoker: No      Systolic Blood Pressure: 123 mmHg      Is BP treated: Yes      HDL Cholesterol: 43 mg/dL      Total Cholesterol: 241 mg/dL    LDL Cholesterol Calculated   Date Value Ref Range Status   06/25/2024   Final     Comment:     Cannot estimate LDL when triglyceride exceeds 400 mg/dL   10/08/2020 92 <100 mg/dL Final     Comment:     Desirable:       <100 mg/dl     LDL Cholesterol Direct   Date Value Ref Range Status   06/25/2024 139 (H) <100 mg/dL Final     Comment:     Age 2-19 years:  Desirable: 0-110 mg/dL   Borderline high: 110-129 mg/dL   High: >= 130 mg/dL    Age 20 years and older:  Desirable: <100mg/dL  Above desirable: 100-129 mg/dL   Borderline high: 130-159 mg/dL   High: 160-189 mg/dL   Very high: >= 190 mg/dL   10/31/2021 131 (H) <100 mg/dL Final     Comment:     Age 0-19 years:  Desirable: 0-110 mg/dL   Borderline high: 110-129 mg/dL   High: >= 130 mg/dL    Age 20 years and older:  Desirable: <100mg/dL  Above desirable: 100-129 mg/dL   Borderline high: 130-159 mg/dL   High: 160-189 mg/dL   Very high: >= 190 mg/dL         I  "ended our visit today by discussing the patient's diagnoses and recommended treatment. Please refer to today's diagnoses and orders for further details. I briefly discussed the pathophysiology of these conditions and outlined their expected course. I discussed the warning symptoms and signs that indicate an atypical course that would need urgent or emergent care. Common side effects of medications prescribed at this visit were discussed with the patient. Severe side effects, including current applicable black box warnings, were discussed.         Weston Sorto is a 75 year old, presenting for the following health issues:  Musculoskeletal Problem (Arm/Shoulder pain left)      3/12/2025     1:49 PM   Additional Questions   Roomed by Kala   Accompanied by self         3/12/2025     1:49 PM   Patient Reported Additional Medications   Patient reports taking the following new medications no     Musculoskeletal Problem    History of Present Illness       Reason for visit:  Shoulder pain   She is taking medications regularly.          Took topical lamisil and it seems oral medication for one week  No bleeding  Nails feel loose  No chemical exposures      Has had cortisone injections with sports medication  Shoulder pain is better  Shoulder pain in both shoulders.    Would like to change the Rheumatologist   Would like a second opinion        Review of Systems  Constitutional, HEENT, cardiovascular, pulmonary, gi and gu systems are negative, except as otherwise noted.      Objective    /77 (BP Location: Left arm, Patient Position: Sitting, Cuff Size: Adult Regular)   Pulse 92   Temp 98.1  F (36.7  C) (Temporal)   Resp 16   Ht 1.565 m (5' 1.61\")   Wt 52 kg (114 lb 9.6 oz)   LMP 06/21/2002 (Approximate)   SpO2 95%   BMI 21.22 kg/m    Body mass index is 21.22 kg/m .  Physical Exam   GENERAL APPEARANCE: healthy, alert and no distress  EYES: Eyes grossly normal to inspection and conjunctivae and sclerae " normal  RESP: lungs clear to auscultation - no rales, rhonchi or wheezes  CV: regular rates and rhythm, normal S1 S2  ABDOMEN: soft, non-tender and no rebound or guarding   MS: extremities normal- no gross deformities noted  Fingernails: brittle discolored nails+   NEURO: Normal strength and tone, mentation intact and speech normal  PSYCH: mentation appears normal      No results found for this or any previous visit (from the past 24 hours).        Signed Electronically by: Jud Morales MD

## 2025-03-13 ENCOUNTER — TELEPHONE (OUTPATIENT)
Dept: RHEUMATOLOGY | Facility: CLINIC | Age: 75
End: 2025-03-13
Payer: COMMERCIAL

## 2025-03-13 DIAGNOSIS — I10 HYPERTENSION, GOAL BELOW 150/90: ICD-10-CM

## 2025-03-13 LAB
CHOLEST SERPL-MCNC: 226 MG/DL
FASTING STATUS PATIENT QL REPORTED: NO
HDLC SERPL-MCNC: 44 MG/DL
LDLC SERPL CALC-MCNC: 103 MG/DL
NONHDLC SERPL-MCNC: 182 MG/DL
TRIGL SERPL-MCNC: 394 MG/DL

## 2025-03-13 RX ORDER — METOPROLOL TARTRATE 25 MG/1
25 TABLET, FILM COATED ORAL AT BEDTIME
Qty: 90 TABLET | Refills: 0 | Status: SHIPPED | OUTPATIENT
Start: 2025-03-13

## 2025-03-13 NOTE — TELEPHONE ENCOUNTER
[facility] Call Center    Phone Message    May a detailed message be left on voicemail: yes     Reason for Call: Other: Pt calling wanting to transfer care to a different provider. Per pt her apts has been cancelled twice already now she's booking out till August. Pt is wanting to see someone else sooner.         Action Taken: Other: rheum     Travel Screening: Not Applicable     Date of Service:                                                                      You can access the FollowMyHealth Patient Portal offered by [facility] by registering at the following website: [email or web address]. By joining [employer]’s FollowMyHealth portal, you will also be able to view your health information using other applications (apps) compatible with our system.

## 2025-03-13 NOTE — TELEPHONE ENCOUNTER
3/13/2025 11:10AM  Patient Contacted for the patient to call back and schedule the following:    Appointment type: New Rheumatology  Provider: Provider that can see dx Polyarthragia  Return date: Next available  Specialty phone number: [phone]  Additional appointment(s) needed: NA  Additonal Notes: 3/13 Offered New Rheumatology at , [facility] [place], and [facility] but pt only wants to go to . Let pt know Dr. Che will be starting at  soon and we can offer an appt once the template is open. BACILIO [facility]ierrez [facility]   Rheumatology, Gastroenterology, Nephrology, Infectious Disease, Pulmonology  [facility]

## 2025-03-15 ENCOUNTER — OFFICE VISIT (OUTPATIENT)
Dept: URGENT CARE | Facility: URGENT CARE | Age: 75
End: 2025-03-15
Payer: COMMERCIAL

## 2025-03-15 VITALS
RESPIRATION RATE: 24 BRPM | WEIGHT: 113.1 LBS | DIASTOLIC BLOOD PRESSURE: 77 MMHG | OXYGEN SATURATION: 94 % | HEART RATE: 79 BPM | TEMPERATURE: 97.8 F | BODY MASS INDEX: 20.95 KG/M2 | SYSTOLIC BLOOD PRESSURE: 119 MMHG

## 2025-03-15 DIAGNOSIS — L30.9 ECZEMA, UNSPECIFIED TYPE: ICD-10-CM

## 2025-03-15 DIAGNOSIS — R30.0 DYSURIA: Primary | ICD-10-CM

## 2025-03-15 DIAGNOSIS — B30.9 VIRAL CONJUNCTIVITIS: ICD-10-CM

## 2025-03-15 LAB
ALBUMIN UR-MCNC: NEGATIVE MG/DL
APPEARANCE UR: CLEAR
BACTERIA #/AREA URNS HPF: ABNORMAL /HPF
BILIRUB UR QL STRIP: NEGATIVE
COLOR UR AUTO: YELLOW
GLUCOSE UR STRIP-MCNC: NEGATIVE MG/DL
HGB UR QL STRIP: ABNORMAL
KETONES UR STRIP-MCNC: NEGATIVE MG/DL
LEUKOCYTE ESTERASE UR QL STRIP: NEGATIVE
NITRATE UR QL: NEGATIVE
PH UR STRIP: 7 [PH] (ref 5–7)
RBC #/AREA URNS AUTO: ABNORMAL /HPF
SP GR UR STRIP: 1.01 (ref 1–1.03)
UROBILINOGEN UR STRIP-ACNC: 0.2 E.U./DL
WBC #/AREA URNS AUTO: ABNORMAL /HPF

## 2025-03-15 PROCEDURE — 3074F SYST BP LT 130 MM HG: CPT

## 2025-03-15 PROCEDURE — 81001 URINALYSIS AUTO W/SCOPE: CPT

## 2025-03-15 PROCEDURE — 3078F DIAST BP <80 MM HG: CPT

## 2025-03-15 PROCEDURE — 99214 OFFICE O/P EST MOD 30 MIN: CPT

## 2025-03-15 RX ORDER — HYDROCORTISONE 25 MG/G
OINTMENT TOPICAL 2 TIMES DAILY
Qty: 30 G | Refills: 0 | Status: SHIPPED | OUTPATIENT
Start: 2025-03-15

## 2025-03-15 RX ORDER — KETOTIFEN FUMARATE 0.35 MG/ML
1 SOLUTION/ DROPS OPHTHALMIC 2 TIMES DAILY
Qty: 10 ML | Refills: 0 | Status: SHIPPED | OUTPATIENT
Start: 2025-03-15

## 2025-03-15 NOTE — PROGRESS NOTES
URGENT CARE  Assessment & Plan   Assessment:   Noreen Florence is a 75 year old female who's clinical presentation today is consistent with:   1. Dysuria    - UA Macroscopic with reflex to Microscopic and Culture - Lab Collect; Future  Plan:  Reassurance, normal UA, Push fluids; Continue to monitor symptoms. Return to clinic if symptoms worsen or do not improve; otherwise follow up as needed      No alarm signs or symptoms present   Differential Diagnoses for this patient's CC include: Bacterial vaginosis, candidiasis, Vulvovaginitis, atrophic vaginitis, Overactive bladder, urethritis, interstitial cystitis, urethral irritation,  Pyelonephritis, nephrolithiasis, Pelvic organ (uterine/bladder) prolapse, Foreign body in bladder,  Atypical etiology of cystitis: fungal, viral, contact vs allergic vaginitis,  Malignancy (vaginal, ovarian, cervical)     2. Eczema, chronic   - hydrocortisone 2.5 % ointment; Apply topically 2 times daily.  Dispense: 30 g; Refill: 0  Plan:  Patient requesting a refill of her steroid cream for chronic eczema  No alarm signs or symptoms present     3. Viral conjunctivitis  - ketotifen fumarate 0.035% 0.035 % SOLN ophthalmic solution;   Plan:  Patient has bilateral injection as well as clear tearing and itching to her eyes, I am suspicious for a viral conjunctivitis and recommend topical antihistamine drops  No alarm signs or symptoms present     DEJA Cameron [facility]      ______________________________________________________________________      Subjective     HPI: Noreen Florence  is a 75 year old  female who presents today for evaluation the following concerns:   (1) The patient presents today complaining of blood in her urine for one day, patient states it happened yesterday but she did not notice any today.  Patient denies any dysuria, urgency or frequency, patient denies any past medical history of frequent urinary tract infections, patient denies any flank  "or back pain.  Patient denies any fever or chills    (2) patient is requesting a refill of her steroid cream for chronic atopic dermatitis, patient brought her tube today and showed it to me requesting a refill of \"the exact tube \"    (3) patient also presents today with bilateral eye itchy and red and watery eyes, patient states it started about 3 to 4 days ago, patient denies any visual changes, patient denies any purulent eye drainage, patient denies any eye pain    Review of Systems:  Pertinent review of systems as reflected in HPI, otherwise negative.     Objective    Physical Exam:  Vitals:    03/15/25 1347   BP: 119/77   BP Location: Left arm   Patient Position: Sitting   Cuff Size: Adult Regular   Pulse: 79   Resp: 24   Temp: 97.8  F (36.6  C)   TempSrc: Tympanic   SpO2: 94%   Weight: 51.3 kg (113 lb 1.6 oz)      General: Alert and oriented, no acute distress, afebrile, normotensive  Psy/mental status: Nonanxious, cooperative  EYE:   Bilateral injection noted, no other erythema or edema present to surrounding soft tissue.  Clear tearing present  SKIN: Intact, no open areas  ABDOMEN:  soft, non-tender, non-distended    No flank pain or CVA tenderness   Pelvic/ :   Deferred    LABS:   Results for orders placed or performed in visit on 03/15/25   UA Macroscopic with reflex to Microscopic and Culture - Lab Collect     Status: Abnormal    Specimen: Urine, Midstream   Result Value Ref Range    Color Urine Yellow Colorless, Straw, Light Yellow, Yellow    Appearance Urine Clear Clear    Glucose Urine Negative Negative mg/dL    Bilirubin Urine Negative Negative    Ketones Urine Negative Negative mg/dL    Specific Gravity Urine 1.015 1.003 - 1.035    Blood Urine Trace (A) Negative    pH Urine 7.0 5.0 - 7.0    Protein Albumin Urine Negative Negative mg/dL    Urobilinogen Urine 0.2 0.2, 1.0 E.U./dL    Nitrite Urine Negative Negative    Leukocyte Esterase Urine Negative Negative   UA Microscopic with Reflex to Culture  "    Status: Abnormal   Result Value Ref Range    Bacteria Urine Few (A) None Seen /HPF    RBC Urine 0-2 0-2 /HPF /HPF    WBC Urine None Seen 0-5 /HPF /HPF    Narrative    Urine Culture not indicated        ______________________________________________________________________    I explained my diagnostic considerations and recommendations to the patient  All questions were answered.   Please see AVS for any patient instructions & handouts given.

## 2025-04-23 NOTE — PATIENT INSTRUCTIONS
Patient Education     Inner Ear Problems: Causes of Dizziness (Vertigo)        Benign positional vertigo (BPV)   This is the most common cause of vertigo. BPV is also called benign positional paroxysmal vertigo (BPPV). It happens when crystals in the ear canals shift into the wrong place. Vertigo usually occurs when you move your head in a certain way. This can happen when turning in bed, bending, or looking up. Because BPV comes on quickly, you should think about if you are safe to drive or do other tasks that need your full attention.   BPV:    Causes vertigo that lasts for seconds. Vertigo can occur several times a day, depending on body position.    Doesn t cause hearing loss.    Often goes away on its own. But it may go away sooner with treatment.  Infection or inflammation  Sometimes the semicircular canals swell and send incorrect balance signals. This problem may be caused by a viral infection. Depending on the cause, your hearing can be affected (labyrinthitis). Or your hearing can remain normal (neuronitis).   Infection or inflammation:    Causes vertigo that lasts for hours or days. The first episode is usually the worst.    Can cause hearing loss.    Often goes away on its own. But it may go away sooner with treatment.  You may need vestibular rehabilitation if you have balance problems that don't go away.   Meniere s disease  This condition is uncommon. It happens when there is too much fluid in the ear canals. This causes increased pressure and swelling. It affects balance and hearing signals.   Meniere s disease may:    Cause vertigo that last for hours    Cause hearing problems that come and go. The problems are usually in one ear and get worse over time.    Cause buzzing or ringing in the ears (tinnitus)    Cause a feeling of fullness or pressure in the ear    Cause any of these lasting symptoms: vertigo, hearing loss, tinnitus, or ear fullness  Other causes of vertigo  Vertigo can also be caused   Chronic, not at goal (unstable), Has cut down to 1/2 pack daily; about to start Wellbutrin and Nicorette gum therapy.   by:     A head injury    Certain medicines    Migraines    Brain problems, such as a stroke or bleeding in the brain    [employer] last reviewed this educational content on 2/1/2020 2000-2020 [employer]. [address] [place] [address], [place] [address]. All rights reserved. This information is not intended as a substitute for professional medical care. Always follow your healthcare professional's instructions.

## 2025-04-28 ENCOUNTER — TELEPHONE (OUTPATIENT)
Dept: PHARMACY | Facility: OTHER | Age: 75
End: 2025-04-28

## 2025-04-28 ENCOUNTER — OFFICE VISIT (OUTPATIENT)
Dept: RHEUMATOLOGY | Facility: CLINIC | Age: 75
End: 2025-04-28
Payer: COMMERCIAL

## 2025-04-28 VITALS
SYSTOLIC BLOOD PRESSURE: 129 MMHG | HEART RATE: 74 BPM | OXYGEN SATURATION: 96 % | BODY MASS INDEX: 20.74 KG/M2 | WEIGHT: 112 LBS | DIASTOLIC BLOOD PRESSURE: 75 MMHG

## 2025-04-28 DIAGNOSIS — L84 CALLUS OF FOOT: ICD-10-CM

## 2025-04-28 DIAGNOSIS — M15.0 PRIMARY OSTEOARTHRITIS INVOLVING MULTIPLE JOINTS: ICD-10-CM

## 2025-04-28 DIAGNOSIS — M06.4 POLYARTHRITIS, INFLAMMATORY (H): Primary | ICD-10-CM

## 2025-04-28 ASSESSMENT — PAIN SCALES - GENERAL: PAINLEVEL_OUTOF10: SEVERE PAIN (7)

## 2025-04-28 NOTE — TELEPHONE ENCOUNTER
MT Recruitment: [facility] insurance     Referral outreach attempt #1 on April 28, 2025      Outcome: left voicemail- Call back number [phone]    Jolly Bueno  [facility]

## 2025-04-28 NOTE — PROGRESS NOTES
Rheumatology Outpatient Consult Note    DATE OF SERVICE: 4/28/2025    REQUESTING PHYSICIAN:  [unfilled]    CHIEF COMPLAINT:  Chief Complaint   Patient presents with    RECHECK     Polyarthritis, inflammatory  Patient of Amanda Sorto is a 75 year old @ patient who presents today to the  Rheumatology Clinic at the request of [unfilled] for consultation of inflammatory arthritis .    Subjective     HISTORY OF PRESENT ILLNESS     has a past medical history of GERD (gastroesophageal reflux disease) (3/20/2015), Glaucoma (increased eye pressure), Hyperlipidemia LDL goal < 130 (8/3/2015), Hypertension, Hypertension, goal below 150/90 (11/28/2014), and Rheumatoid arthritis, adult (H).    She has no past medical history of Malignant hyperthermia.   has a past surgical history that includes Esophagoscopy, gastroscopy, duodenoscopy (EGD), combined (N/A, 1/5/2016); Combined repair ptosis with blepharoplasty bilateral (Bilateral, 1/6/2017); Repair entropion bilateral (Bilateral, 1/6/2017); colonoscopy; Colonoscopy with CO2 insufflation (N/A, 4/5/2017); Repair ptosis (Bilateral, 01/2017); Combined Esophagoscopy, Gastroscopy, Duodenoscopy (Egd) With Co2 Insufflation (N/A, 2/13/2019); Esophagoscopy, gastroscopy, duodenoscopy (EGD), combined (N/A, 2/13/2019); Combined Esophagoscopy, Gastroscopy, Duodenoscopy (Egd) With Co2 Insufflation (N/A, 2/7/2022); and Esophagoscopy, gastroscopy, duodenoscopy (EGD), combined (N/A, 2/7/2022).         No data to display                Patient reports she is here due to left foot pain. She also does have hx of long standing inflammatory arthritis in her hands. She takes SSZ for this for many years. She reports overall excellent control of her hands and she is here to discuss her foot pain.   She points to the lateral first MTP with skin thickening and lateral 5th MTP with skin thickening.  She is reports the pain is only active intermittently and with walking.    Past:  Failed methotrexate  due to LFT elevation    No history of:   seizures  No inflammatory eye disease, iritis or uveitis.   No dry eyes,  no dry mouth,  No mouth sores  No skin rashes, no psoriasis   No photosensitivity, hair loss,  No Raynaud's phenomenon      I have reviewed medical records and performed history and exam.    PAST MEDICAL HISTORY  Past Medical History:   Diagnosis Date    GERD (gastroesophageal reflux disease) 3/20/2015    Glaucoma (increased eye pressure)     Hyperlipidemia LDL goal < 130 8/3/2015    Hypertension     Hypertension, goal below 150/90 11/28/2014    Rheumatoid arthritis, adult (H)     reviewed  Past Surgical History:   Procedure Laterality Date    COLONOSCOPY      COLONOSCOPY WITH CO2 INSUFFLATION N/A 4/5/2017    Procedure: COLONOSCOPY WITH CO2 INSUFFLATION;  Surgeon: Duane, William Charles, MD;  Location:  OR    COMBINED ESOPHAGOSCOPY, GASTROSCOPY, DUODENOSCOPY (EGD) WITH CO2 INSUFFLATION N/A 2/13/2019    Procedure: COMBINED ESOPHAGOSCOPY, GASTROSCOPY, DUODENOSCOPY (EGD) WITH CO2 INSUFFLATION;  Surgeon: Sly De Santiago MD;  Location:  OR    COMBINED ESOPHAGOSCOPY, GASTROSCOPY, DUODENOSCOPY (EGD) WITH CO2 INSUFFLATION N/A 2/7/2022    Procedure: ESOPHAGOGASTRODUODENOSCOPY, WITH CO2 INSUFFLATION;  Surgeon: Luis Aceves MD;  Location:  OR    COMBINED REPAIR PTOSIS WITH BLEPHAROPLASTY BILATERAL Bilateral 1/6/2017    Procedure: COMBINED REPAIR PTOSIS WITH BLEPHAROPLASTY BILATERAL;  Surgeon: Carly Norman MD;  Location: [facility]    ESOPHAGOSCOPY, GASTROSCOPY, DUODENOSCOPY (EGD), COMBINED N/A 1/5/2016    Procedure: COMBINED ESOPHAGOSCOPY, GASTROSCOPY, DUODENOSCOPY (EGD), BIOPSY SINGLE OR MULTIPLE;  Surgeon: Duane, William Charles, MD;  Location:  OR    ESOPHAGOSCOPY, GASTROSCOPY, DUODENOSCOPY (EGD), COMBINED N/A 2/13/2019    Procedure: Combined Esophagoscopy, Gastroscopy, Duodenoscopy (Egd), Biopsy Single Or Multiple;  Surgeon: Sly De Santiago MD;  Location:  OR     ESOPHAGOSCOPY, GASTROSCOPY, DUODENOSCOPY (EGD), COMBINED N/A 2/7/2022    Procedure: ESOPHAGOGASTRODUODENOSCOPY, WITH BIOPSY;  Surgeon: Luis Aceves MD;  Location: [facility]    REPAIR ENTROPION BILATERAL Bilateral 1/6/2017    Procedure: REPAIR ENTROPION BILATERAL;  Surgeon: Carly Norman MD;  Location: [facility]    REPAIR PTOSIS Bilateral 01/2017    reviewed    MEDICATIONS  Current Outpatient Medications   Medication Sig Dispense Refill    amoxicillin (AMOXIL) 500 MG capsule Take 1 capsule (500 mg) by mouth 2 times daily. 20 capsule 0    benzonatate (TESSALON) 200 MG capsule Take 1 capsule (200 mg) by mouth 3 times daily as needed for cough. 30 capsule 3    cetirizine (ZYRTEC) 10 MG tablet TAKE 1 TABLET(10 MG) BY MOUTH DAILY 90 tablet 0    chlorhexidine (PERIDEX) 0.12 % solution SWISH AND SPIT 15 ML BY MOUTH TWICE DAILY AS NEEDED 473 mL 1    cholecalciferol (VITAMIN D3) 25 mcg (1000 units) capsule Take 1,000 Units by mouth      diclofenac (VOLTAREN) 1 % topical gel Apply 2 g topically 4 times daily as needed for moderate pain. 350 g 1    finasteride (PROSCAR) 5 MG tablet Take 1 tablet (5 mg) by mouth daily. 90 tablet 0    fluticasone (FLONASE) 50 MCG/ACT nasal spray Spray 1-2 sprays into both nostrils daily 48 g 3    guaiFENesin-codeine (ROBITUSSIN AC) 100-10 MG/5ML solution Take 5-10 mLs by mouth every 8 hours as needed for cough. 237 mL 1    hydrocortisone 2.5 % ointment Apply topically 2 times daily. 30 g 0    hydroquinone (JAVIER) 4 % external cream Apply daily to brown spots for 4-6 months. 28.35 g 4    ketotifen fumarate 0.035% 0.035 % SOLN ophthalmic solution Place 1 drop into both eyes 2 times daily. 10 mL 0    latanoprost (XALATAN) 0.005 % ophthalmic solution Place 1 drop into both eyes at bedtime. 7.5 mL 6    loratadine (CLARITIN) 10 MG tablet Take 1 tablet (10 mg) by mouth daily 30 tablet 0    losartan-hydrochlorothiazide (HYZAAR) 50-12.5 MG tablet Take 1 tablet by mouth daily 90 tablet 2     magnesium 250 MG tablet TAKE 1 TABLET BY MOUTH ONCE DAILY AS NEEDED FOR LEG CRAMPS 90 tablet 1    methylcellulose (CITRUCEL) powder Take 0.3 g (1.05 teaspoonful) by mouth daily 454 g 1    metoprolol tartrate (LOPRESSOR) 25 MG tablet TAKE 1 TABLET(25 MG) BY MOUTH AT BEDTIME 90 tablet 0    minoxidil (ROGAINE) 5 % external solution Apply to scalp once daily. 120 mL 3    multivitamin w/minerals (THERA-VIT-M) tablet Take 1 tablet by mouth daily 90 tablet 3    nitroGLYcerin (RECTIV) 0.4 % OINT rectal ointment 1 inch (1.5 mg) by Peritendinous route every 12 hours 30 g 0    omeprazole (PRILOSEC) 40 MG DR capsule Take 1 capsule (40 mg) by mouth daily. 90 capsule 2    OPTIVE 0.5-0.9 % ophthalmic solution INSTILL 1 DROP IN BOTH EYES THREE TIMES DAILY AS NEEDED FOR DRY EYES 15 mL 1    pilocarpine (SALAGEN) 5 MG tablet Take 1 tablet (5 mg) by mouth 3 times daily. 270 tablet 1    pravastatin (PRAVACHOL) 20 MG tablet Take 1 tablet (20 mg) by mouth daily 90 tablet 2    sodium fluoride (SF 5000 PLUS) 1.1 % CREA Use twice a day as instructed 51 g 1    sulfaSALAzine ER (AZULFIDINE EN) 500 MG EC tablet Take 2 tablets (1,000 mg) by mouth 2 times daily 360 tablet 3    terbinafine (LAMISIL) 1 % external cream Apply topically 2 times daily. 24 g 0    tretinoin (RETIN-A) 0.05 % external cream Apply pea sized amount at bedtime. 45 g 4    triamcinolone (KENALOG) 0.1 % external cream Apply to left index finger twice daily for 7 days 45 g 2    triamcinolone (KENALOG) 0.1 % paste APPLY TO MOUTH TWICE DAILY 5 g 0       ALLERGIES  No Known Allergies    SOCIAL HISTORY  Social History     Socioeconomic History    Marital status:      Spouse name: Not on file    Number of children: Not on file    Years of education: Not on file    Highest education level: Not on file   Occupational History    Not on file   Tobacco Use    Smoking status: Never     Passive exposure: Never    Smokeless tobacco: Never   Vaping Use    Vaping status: Never Used    Substance and Sexual Activity    Alcohol use: No    Drug use: No    Sexual activity: Not Currently     Partners: Male     Birth control/protection: None   Other Topics Concern    Parent/sibling w/ CABG, MI or angioplasty before 65F 55M? No   Social History Narrative    Not on file     Social Drivers of Health     Financial Resource Strain: Low Risk  (6/25/2024)    Financial Resource Strain     Within the past 12 months, have you or your family members you live with been unable to get utilities (heat, electricity) when it was really needed?: No   Food Insecurity: Low Risk  (6/25/2024)    Food Insecurity     Within the past 12 months, did you worry that your food would run out before you got money to buy more?: No     Within the past 12 months, did the food you bought just not last and you didn t have money to get more?: No   Transportation Needs: Low Risk  (6/25/2024)    Transportation Needs     Within the past 12 months, has lack of transportation kept you from medical appointments, getting your medicines, non-medical meetings or appointments, work, or from getting things that you need?: No   Physical Activity: Not on file   Stress: Not on file   Social Connections: Unknown (7/14/2023)    Received [facility], [facility]    Social Connections     Frequency of Communication with Friends and Family: Not on file   Interpersonal Safety: Low Risk  (11/12/2024)    Interpersonal Safety     Do you feel physically and emotionally safe where you currently live?: Yes     Within the past 12 months, have you been hit, slapped, kicked or otherwise physically hurt by someone?: No     Within the past 12 months, have you been humiliated or emotionally abused in other ways by your partner or ex-partner?: No   Housing Stability: High Risk (6/25/2024)    Housing Stability     Do you have housing? : No     Are you worried about losing your housing?: No     reviewed    FAMILY HISTORY  Family History   Problem Relation Age of Onset    Diabetes Mother     Cancer No family hx of     Hypertension No family hx of     Cerebrovascular Disease No family hx of     Thyroid Disease No family hx of     Glaucoma No family hx of     Macular Degeneration No family hx of    : reviewed .    No history of autoimmune diseases, RA, SLE, Scleroderma, OP.    REVIEW OF SYSTEMS: I have reviewed 14 organ systems and are all negative  except as per HPI.    ROS    Review of Systems:    Negtative for General: Fever, chills, night sweats, weight loss/gain     Negtative for HEENT: Oral ulcers, dry eyes, dry mouth, red/itchy eyes     Negtative for Card: Chest pain, palpitations    Negtative for Pulm: SOB, cough    Negtative for GI: Abd pain, nausea, vomiting, diarrhea, dysphagia, reflux     Negtative for : Dysuria, urgency, hematuria, genital ulcers    Negtative for Neuro: Weakness, numbness, tingling, headache    Negtative for Skin: Raynaud's, rash, hair loss, photosensitivity, hair changes     Negtative for Psych: Depression, anxiety, difficulty sleeping           Objective   PHYSICAL EXAMINATION  /75 (BP Location: Left arm, Patient Position: Sitting, Cuff Size: Adult Regular)   Pulse 74   Wt 50.8 kg (112 lb)   LMP 06/21/2002 (Approximate)   SpO2 96%   BMI 20.74 kg/m  : reviewed    Physical Exam    GENERAL: Pleasant and cooperative, in no distress. Alert and Oriented x 3.  SKIN: No rashes, lesions. No psoriasis. No Raynaud's.  HEENT: Normocephalic, atraumatic. Moist mucosal membranes without  ulcerations.  LYMPHATICS: No cervical or supraclavicular lymph nodes enlargement.  CARDIOVASCULAR: RR  PULMONARY: No accessory muscle use, no wheeze, cough or audible rales  ABDOMEN: Soft, non-distended    MUSCULOSKELETAL:  Shoulders: Normal ROM.  Elbows:  No extension deficits or contractures. No tophi or rheumatoid  nodules.  Wrists:  No Tenosynovitis dorsum of wrist. Full ROM.  Hands:  No  Synovitis noted MCP, PIP joints. Good .  Bilateral hands with osteoarthritis changes including Jordan's nodes.  Hips: Negative TANA b/l No pain over trochanteric bursas.  Knees: No crepitus.  No swelling or effusion, or bulge sign.  Ankles:  No swelling. Normal achilles tendon. No pain over plantar fascia.  Feet: No pain on metatarsal .  Skin thickening of lateral first MTP and lateral fifth MTP with no reproducible tenderness or pain on exam  Back: No scoliosis,  normal spine flexion, extension, rotation, lateral bending.  Normal chest expansion.   Negative Myofascial tender points.  Neuro: No focal deficits  Gait: Normal, requires no assistive device.    LABS: Past labs reviewed and discussed with the patient.    [unfilled]    IMAGING: Reviewed         ASSESSMENT/PLAN:    (M06.4) Polyarthritis, inflammatory (H)  (primary encounter diagnosis)  Comment: stable disease, no active synovitis noted on exam.  Plan: Continue same dose of sulfasalazine 500 mg 2 tablets twice daily.  Patient currently follows with rheumatology in another location.    (L84) Callus of foot  Comment: discussed avoiding narrow toed shoes, extra padding on sides of feet. Follow up with podiatry further.   Plan: Orthopedic  Referral            (M15.0) Primary osteoarthritis involving multiple joints  Comment: noted on x-ray  Plan: conservative treatment, volteren gel and tylenol as needed.       Follow up as needed    Thank you very much for allowing me to participate in the care of  Noreen Please call with any questions.    Linda Che MD

## 2025-04-29 ENCOUNTER — PATIENT OUTREACH (OUTPATIENT)
Dept: CARE COORDINATION | Facility: CLINIC | Age: 75
End: 2025-04-29
Payer: COMMERCIAL

## 2025-05-01 ENCOUNTER — PATIENT OUTREACH (OUTPATIENT)
Dept: CARE COORDINATION | Facility: CLINIC | Age: 75
End: 2025-05-01
Payer: COMMERCIAL

## 2025-05-11 ENCOUNTER — HEALTH MAINTENANCE LETTER (OUTPATIENT)
Age: 75
End: 2025-05-11

## 2025-05-19 ENCOUNTER — TELEPHONE (OUTPATIENT)
Dept: PHARMACY | Facility: OTHER | Age: 75
End: 2025-05-19
Payer: COMMERCIAL

## 2025-05-19 NOTE — TELEPHONE ENCOUNTER
MT Recruitment: [facility] insurance     Referral outreach attempt #2 on May 19, 2025      Outcome: left voicemail- Call back number [phone]    Jolly Bueno  [facility]

## 2025-05-20 DIAGNOSIS — J30.1 HAYFEVER: ICD-10-CM

## 2025-05-20 RX ORDER — CETIRIZINE HYDROCHLORIDE 10 MG/1
10 TABLET ORAL
Qty: 90 TABLET | Refills: 0 | Status: SHIPPED | OUTPATIENT
Start: 2025-05-20

## 2025-05-21 ENCOUNTER — OFFICE VISIT (OUTPATIENT)
Dept: URGENT CARE | Facility: URGENT CARE | Age: 75
End: 2025-05-21
Payer: COMMERCIAL

## 2025-05-21 VITALS
HEART RATE: 78 BPM | TEMPERATURE: 97.9 F | DIASTOLIC BLOOD PRESSURE: 81 MMHG | BODY MASS INDEX: 20.93 KG/M2 | SYSTOLIC BLOOD PRESSURE: 149 MMHG | WEIGHT: 113 LBS | RESPIRATION RATE: 18 BRPM | OXYGEN SATURATION: 95 %

## 2025-05-21 DIAGNOSIS — M54.2 NECK PAIN: Primary | ICD-10-CM

## 2025-05-21 PROCEDURE — 99213 OFFICE O/P EST LOW 20 MIN: CPT

## 2025-05-21 PROCEDURE — 3079F DIAST BP 80-89 MM HG: CPT

## 2025-05-21 PROCEDURE — 1125F AMNT PAIN NOTED PAIN PRSNT: CPT

## 2025-05-21 PROCEDURE — 3077F SYST BP >= 140 MM HG: CPT

## 2025-05-21 RX ORDER — ACETAMINOPHEN 500 MG
500-1000 TABLET ORAL EVERY 6 HOURS PRN
Qty: 30 TABLET | Refills: 0 | Status: SHIPPED | OUTPATIENT
Start: 2025-05-21

## 2025-05-21 ASSESSMENT — PAIN SCALES - GENERAL: PAINLEVEL_OUTOF10: SEVERE PAIN (9)

## 2025-05-21 NOTE — PROGRESS NOTES
Urgent Care Clinic Visit    Chief Complaint   Patient presents with    Urgent Care    Neck Pain     Per patient states about 10 days ago she started to have neck pain no trauma or injury only right side               5/21/2025    10:11 AM   Additional Questions   Roomed by CHAMP Gregory   Accompanied by Self

## 2025-05-21 NOTE — PATIENT INSTRUCTIONS
Your pain seems to be due to a muscle strain. I have sent in a prescription for Voltaren gel to apply to area of pain, use this 4x daily. I would also take tylenol for pain as needed. Plan to follow-up at your appt next Thursday to see how these interventions are helping.  If you experience symptoms of chest pain, shortness of breath, visual changes, or radiating pain, be seen right away for further evaluation.

## 2025-05-21 NOTE — PROGRESS NOTES
Patient presents with:  Urgent Care  Neck Pain: Per patient states about 10 days ago she started to have neck pain no trauma or injury only right side       Clinical Decision Making:      ICD-10-CM    1. Neck pain  M54.2 diclofenac (VOLTAREN) 1 % topical gel     acetaminophen (TYLENOL) 500 MG tablet        Patient with symptoms of right sided neck pain that started about 10 days ago after working in her garden, she states that pain is worse with movement and improved at rest.  She is neurologically intact and denies any radiating pain, denies headaches or visual changes, less suspicious for stroke, herniated disk, or tumor given these negative findings.  Pain is localized on her right neck at the base of her skull.  Minimal tenderness to the area.  She has full range of motion of neck and no neck rigidity.  Denies chest pain or shortness of breath and pain does not radiate survival lower suspicion for cardiac etiology especially given that pain is consistent with movement. No impact or trauma that I would feel the need for further  imaging at this point.  Symptoms likely due to muscle strain of the neck, recommend Voltaren gel and Tylenol as needed for symptoms.  She has a follow-up appointment with PCP next week, recommend following up with symptoms at that time. Patient instructions as below. Discussed red flag symptoms for when to return.       Patient Instructions   Your pain seems to be due to a muscle strain. I have sent in a prescription for Voltaren gel to apply to area of pain, use this 4x daily. I would also take tylenol for pain as needed. Plan to follow-up at your appt next Thursday to see how these interventions are helping.  If you experience symptoms of chest pain, shortness of breath, visual changes, or radiating pain, be seen right away for further evaluation.     HPI:  Noreen Florence is a 75 year old female who presents today with concerns of neck pain. Symptoms started 10 days ago when she was working  in her garden. No known impact or trauma. Pain is worse after movement and better at rest. Pain is on the right side of her neck and does not radiate. Denies chest pain or shortness of breath. No headache or visual changes. Pain is 8/10 in severity with movement. Has not taken anything OTC. No numbness or tingling.     History obtained from the patient.    Problem List:  2025-03: Eczema, unspecified type  2024-01: Tendonitis, Achilles, right  2023-12: Dermatochalasis of both upper eyelids  2023-12: Dermatochalasis of both lower eyelids  2022-03: Heel pain, chronic, right  2021-10: NAFLD (nonalcoholic fatty liver disease)  2021-10: Elevated LFTs  2020-09: Pain in joint, ankle and foot, left  2019-11: Pre-diabetes  2019-06: Achilles tendon pain  2018-04: History of Helicobacter pylori infection  2017-01: Angiomyolipoma of right kidney  2016-09: Pterygium of right eye  2016-08: Sicca syndrome  2016-07: Positive H. pylori test  2016-03: Nodule of kidney  2016-03: Microscopic hematuria  2015-10: High risk medication use  2015-09: 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  2015-08: Hyperlipidemia with target LDL less than 130  2015-03: GERD (gastroesophageal reflux disease)  2015-02: Polyarthritis, inflammatory (H)  2014-11: Hypertension goal BP (blood pressure) < 140/90  2014-04: Osteopenia  2014-04: Hayfever      Past Medical History:   Diagnosis Date    GERD (gastroesophageal reflux disease) 3/20/2015    Glaucoma (increased eye pressure)     Hyperlipidemia LDL goal < 130 8/3/2015    Hypertension     Hypertension, goal below 150/90 11/28/2014    Rheumatoid arthritis, adult (H)        Social History     Tobacco Use    Smoking status: Never     Passive exposure: Never    Smokeless tobacco: Never   Substance Use Topics    Alcohol use: No       ROS is negative other than what is noted in HPI.       Vitals:    05/21/25 1010   BP: (!) 149/81   Pulse: 78   Resp: 18   Temp: 97.9  F (36.6  C)   TempSrc: Oral   SpO2: 95%    Weight: 51.3 kg (113 lb)       Physical Exam  Constitutional:       General: She is not in acute distress.     Appearance: She is not diaphoretic.   HENT:      Head: Normocephalic and atraumatic.      Right Ear: Tympanic membrane and external ear normal.      Left Ear: Tympanic membrane and external ear normal.      Mouth/Throat:      Pharynx: No oropharyngeal exudate or posterior oropharyngeal erythema.   Eyes:      Conjunctiva/sclera: Conjunctivae normal.   Cardiovascular:      Rate and Rhythm: Normal rate and regular rhythm.      Heart sounds: Normal heart sounds. No murmur heard.  Pulmonary:      Effort: Pulmonary effort is normal. No respiratory distress.      Breath sounds: Normal breath sounds. No wheezing.   Musculoskeletal:         General: Normal range of motion.      Cervical back: Normal range of motion. No rigidity or tenderness.   Skin:     General: Skin is warm.      Capillary Refill: Capillary refill takes less than 2 seconds.      Findings: No erythema.   Neurological:      General: No focal deficit present.      Mental Status: She is alert.      Cranial Nerves: Cranial nerves 2-12 are intact. No cranial nerve deficit or facial asymmetry.      Sensory: No sensory deficit.      Motor: No weakness.      Coordination: Coordination is intact. Coordination normal.      Gait: Gait normal.   Psychiatric:         Mood and Affect: Mood normal.         Thought Content: Thought content normal.         Judgment: Judgment normal.           At the end of the encounter, I discussed results, diagnosis, medications. Discussed red flags for immediate return to clinic/ER, as well as indications for follow up if no improvement. Patient understood and agreed to plan. Patient was stable for discharge.    DEJA Nolen [facility]

## 2025-05-28 DIAGNOSIS — L64.9 ANDROGENETIC ALOPECIA: ICD-10-CM

## 2025-05-28 NOTE — TELEPHONE ENCOUNTER
Routing refill request to provider for review/approval because:  Patient needs to be seen because it has been more than 1 year since last office visit. Has appointment made: 7/24/2025 12:45 PM DEJA Alicia CNP. Previous [place] garcía.     Yamilex JIANG RN BSN  [facility]  [phone]

## 2025-05-28 NOTE — TELEPHONE ENCOUNTER
Requested Prescriptions   Pending Prescriptions Disp Refills    finasteride (PROSCAR) 5 MG tablet 90 tablet 0     Sig: Take 1 tablet (5 mg) by mouth daily.       There is no refill protocol information for this order        Last Written Prescription Date:  02/17/25  Last Fill Quantity: 90 tablet,  # refills: 0   Last office visit: 3/27/2024 ; last virtual visit: Visit date not found with prescribing provider:  Khloe Esquivel PA-C      Future Office Visit:   Next 5 appointments (look out 90 days)      May 29, 2025 4:00 PM  (Arrive by 3:40 PM)  Provider Visit with Shy Rodriguez  [facility] ([facility]) [address] [place] [id number] [phone]

## 2025-05-29 ENCOUNTER — ANCILLARY PROCEDURE (OUTPATIENT)
Dept: GENERAL RADIOLOGY | Facility: CLINIC | Age: 75
End: 2025-05-29
Payer: COMMERCIAL

## 2025-05-29 ENCOUNTER — OFFICE VISIT (OUTPATIENT)
Dept: FAMILY MEDICINE | Facility: CLINIC | Age: 75
End: 2025-05-29
Payer: COMMERCIAL

## 2025-05-29 VITALS
RESPIRATION RATE: 22 BRPM | HEART RATE: 81 BPM | HEIGHT: 62 IN | BODY MASS INDEX: 20.65 KG/M2 | OXYGEN SATURATION: 98 % | SYSTOLIC BLOOD PRESSURE: 143 MMHG | TEMPERATURE: 97.7 F | DIASTOLIC BLOOD PRESSURE: 84 MMHG | WEIGHT: 112.2 LBS

## 2025-05-29 DIAGNOSIS — M54.2 NECK PAIN: Primary | ICD-10-CM

## 2025-05-29 DIAGNOSIS — M54.2 NECK PAIN: ICD-10-CM

## 2025-05-29 DIAGNOSIS — M76.61 ACHILLES TENDINITIS OF RIGHT LOWER EXTREMITY: ICD-10-CM

## 2025-05-29 PROCEDURE — 1125F AMNT PAIN NOTED PAIN PRSNT: CPT

## 2025-05-29 PROCEDURE — 3077F SYST BP >= 140 MM HG: CPT

## 2025-05-29 PROCEDURE — 99213 OFFICE O/P EST LOW 20 MIN: CPT

## 2025-05-29 PROCEDURE — 3078F DIAST BP <80 MM HG: CPT

## 2025-05-29 PROCEDURE — 72040 X-RAY EXAM NECK SPINE 2-3 VW: CPT | Mod: TC | Performed by: RADIOLOGY

## 2025-05-29 RX ORDER — NITROGLYCERIN 4 MG/G
1 OINTMENT RECTAL EVERY 12 HOURS
Qty: 30 G | Refills: 0 | Status: SHIPPED | OUTPATIENT
Start: 2025-05-29

## 2025-05-29 ASSESSMENT — PAIN SCALES - GENERAL: PAINLEVEL_OUTOF10: SEVERE PAIN (7)

## 2025-05-29 NOTE — PROGRESS NOTES
Assessment & Plan     Neck pain  Patient with persistent neck pain. I suspect that symptoms are secondary to a muscle strain. Discussed checking a baseline x-ray and having her start a course of conservative cares including muscle relaxer as needed, naproxen, and warm packs.  Patient to follow-up with PCP in 1-2 weeks for a recheck.  - XR Cervical Spine 2/3 Views    Achilles tendinitis of right lower extremity  Patient requested refill of nitroglycerin ointment that she uses as needed for tendonitis. Refill sent.  - nitroGLYcerin (RECTIV) 0.4 % OINT rectal ointment  Dispense: 30 g; Refill: 0      Follow-up    Follow-up Visit   Expected date:  Jun 08, 2025 (Approximate)      Follow Up Appointment Details:     Follow-up with whom?: PCP    Follow-Up for what?: Acute Issue Recheck    Additional Details: neck pain    How?: In Person                 Subjective   Noreen is a 75 year old, presenting for the following health issues:  Musculoskeletal Problem (Neck pain for 1 week. Pt was seen in  about 1 week ago)      5/29/2025     3:41 PM   Additional Questions   Roomed by Kala   Accompanied by self         5/29/2025     3:41 PM   Patient Reported Additional Medications   Patient reports taking the following new medications Yes- Tylenol     Musculoskeletal Problem    History of Present Illness       Reason for visit:  Neck pain   She is taking medications regularly.      Concern - Neck Pain  Onset: about 2 weeks ago  Description: Painful on right side only  Intensity: severe  Progression of Symptoms:  worsening  Accompanying Signs & Symptoms: No  Previous history of similar problem: No  Precipitating factors:        Worsened by: Anything, even sleeping  Alleviating factors:        Improved by: NO  Therapies tried and outcome: Tylenol & a topical Diclofenac    Patient presents for follow-up on neck pain. Symptoms started ~ 3 weeks ago while gardening. Severity of pain has been intermittent and typically ranging from  "6-7/10. Pain is worse with movement and palpation, better with rest. Has tried tylenol and topical diclofenac gel as needed without significant improvement. Patient denies any radiating pain, headaches, visual changes, numbness/tingling, chest pain, shortness of breath, or other acute neurological change.         Objective    BP (!) 149/78 (BP Location: Left arm, Patient Position: Sitting, Cuff Size: Adult Regular)   Pulse 81   Temp 97.7  F (36.5  C) (Temporal)   Resp 22   Ht 1.568 m (5' 1.73\")   Wt 50.9 kg (112 lb 3.2 oz)   LMP 06/21/2002 (Approximate)   SpO2 98%   BMI 20.70 kg/m    Body mass index is 20.7 kg/m .  Physical Exam   GENERAL: alert and no distress  EYES: Eyes grossly normal to inspection, PERRL and conjunctivae and sclerae normal  HENT: ear canals and TM's normal, nose and mouth without ulcers or lesions  NECK: some tenderness to palpation of posterior scalp and right neck. Does still have full ROM in the neck.  RESP: lungs clear to auscultation - no rales, rhonchi or wheezes  CV: regular rate and rhythm, normal S1 S2, no S3 or S4, no murmur, click or rub, no peripheral edema  MS: no gross musculoskeletal defects noted, no edema  SKIN: no suspicious lesions or rashes  NEURO: CN II - XII are intact. PERRL . EOMI. Muscle strength equal in the upper and lower extremities. Peripheral sensation is intact. Normal gait. Normal fine motor coordination.   PSYCH: mentation appears normal, affect normal/bright        Signed Electronically by: Shy Rodriguez    "

## 2025-05-29 NOTE — TELEPHONE ENCOUNTER
Patient Contact    Attempt #1    Was call answered? No    Left a voicemail asking patient to give us a call back at our main dermatology line at [phone]- needs to schedule hair loss follow up for this medication-     Yamilex JIANG RN BSN  [facility]  [phone]

## 2025-05-29 NOTE — PATIENT INSTRUCTIONS
At [facility], we strive to deliver an exceptional experience to you, every time we see you. If you receive a survey, please let us know what we are doing well and/or what we could improve upon, as we do value your feedback.  If you have MyChart, you can expect to receive results automatically within 24 hours of their completion.  Your provider will send a note interpreting your results as well.   If you do not have MyChart, you should receive your results in about a week by mail.    Your care team:                            Family Medicine Internal Medicine   MD Alfredo Morgan, MD Stefanie Burris, MD Talon Ferrell, MD Jael Church, PAShahbazC    Binh Barnes, MD Pediatrics   Jud Morales, MD Darlene Sousa, MD Ashli Monge, APRN CNP Julianna Burton APRN CNP   MD Naila Stephen, MD Yissel Beal, CNP     Camilo Echavarria, CNP Same-Day Provider (No follow-up visits)   DEJA Mcbride, DNP Ilene Rangel, DEJA Guadalupe, FNP, BC LION SantanaC     Clinic hours: Monday - Thursday 7 am-6 pm; Fridays 7 am-5 pm.   Urgent care: Monday - Friday 10 am- 8 pm; Saturday and Sunday 9 am-5 pm.    Clinic: [phone]       [facility]: Monday - Thursday 8 am - 7 pm; Friday 8 am - 6 pm  [facility]: [phone]

## 2025-06-01 RX ORDER — TIZANIDINE 2 MG/1
2 TABLET ORAL
Qty: 10 TABLET | Refills: 0 | Status: SHIPPED | OUTPATIENT
Start: 2025-06-01

## 2025-06-01 RX ORDER — NAPROXEN 500 MG/1
500 TABLET ORAL 2 TIMES DAILY WITH MEALS
Qty: 14 TABLET | Refills: 0 | Status: SHIPPED | OUTPATIENT
Start: 2025-06-01 | End: 2025-06-08

## 2025-06-02 NOTE — TELEPHONE ENCOUNTER
Patient Contact    Attempt # 2    Mychat message sent.      [facility]   [phone]

## 2025-06-04 ENCOUNTER — PATIENT OUTREACH (OUTPATIENT)
Dept: CARE COORDINATION | Facility: CLINIC | Age: 75
End: 2025-06-04
Payer: COMMERCIAL

## 2025-06-16 RX ORDER — FINASTERIDE 5 MG/1
5 TABLET, FILM COATED ORAL DAILY
Qty: 90 TABLET | Refills: 0 | OUTPATIENT
Start: 2025-06-16

## 2025-06-24 DIAGNOSIS — M06.4 POLYARTHRITIS, INFLAMMATORY (H): ICD-10-CM

## 2025-06-24 DIAGNOSIS — L30.9 DERMATITIS: Primary | ICD-10-CM

## 2025-06-24 DIAGNOSIS — T63.441A BEE STING REACTION, ACCIDENTAL OR UNINTENTIONAL, INITIAL ENCOUNTER: ICD-10-CM

## 2025-06-25 RX ORDER — TRIAMCINOLONE ACETONIDE 1 MG/G
CREAM TOPICAL
Qty: 30 G | Refills: 0 | Status: SHIPPED | OUTPATIENT
Start: 2025-06-25

## 2025-06-25 RX ORDER — ASPIRIN 81 MG
1 TABLET, DELAYED RELEASE (ENTERIC COATED) ORAL DAILY
Qty: 90 TABLET | Refills: 1 | Status: SHIPPED | OUTPATIENT
Start: 2025-06-25

## 2025-06-25 NOTE — TELEPHONE ENCOUNTER
Called patient re: refill request. Patient states she uses this PRN for eczema rashes, mostly on legs and arms. Doesn't use it longer than 1-2 weeks on one area, as it usually helps quite quickly whenever her eczema flares.    Requesting refill.      Routing to provider to review/advise      Joellen Jarvis RN, BSN  [facility]  [place], [place] and [place]

## 2025-06-25 NOTE — TELEPHONE ENCOUNTER
Clinic RN: Please investigate patient's chart or contact patient if the information cannot be found because the triamcinolone cream was to be taken for only 2 weeks.     Fiona David RN on 6/25/2025 at 8:06 AM

## 2025-07-08 ENCOUNTER — OFFICE VISIT (OUTPATIENT)
Dept: FAMILY MEDICINE | Facility: CLINIC | Age: 75
End: 2025-07-08
Payer: COMMERCIAL

## 2025-07-08 VITALS
DIASTOLIC BLOOD PRESSURE: 78 MMHG | OXYGEN SATURATION: 98 % | HEIGHT: 63 IN | HEART RATE: 74 BPM | BODY MASS INDEX: 19.67 KG/M2 | TEMPERATURE: 96.9 F | RESPIRATION RATE: 16 BRPM | SYSTOLIC BLOOD PRESSURE: 131 MMHG | WEIGHT: 111 LBS

## 2025-07-08 DIAGNOSIS — M54.2 NECK PAIN: Primary | ICD-10-CM

## 2025-07-08 DIAGNOSIS — I10 HYPERTENSION GOAL BP (BLOOD PRESSURE) < 140/90: ICD-10-CM

## 2025-07-08 DIAGNOSIS — M06.4 POLYARTHRITIS, INFLAMMATORY (H): ICD-10-CM

## 2025-07-08 PROCEDURE — 99213 OFFICE O/P EST LOW 20 MIN: CPT | Performed by: PHYSICIAN ASSISTANT

## 2025-07-08 PROCEDURE — 3078F DIAST BP <80 MM HG: CPT | Performed by: PHYSICIAN ASSISTANT

## 2025-07-08 PROCEDURE — 3075F SYST BP GE 130 - 139MM HG: CPT | Performed by: PHYSICIAN ASSISTANT

## 2025-07-08 RX ORDER — MENTHOL AND METHYL SALICYLATE 10; 30 G/100G; G/100G
CREAM TOPICAL 4 TIMES DAILY PRN
Qty: 85 G | Refills: 1 | Status: SHIPPED | OUTPATIENT
Start: 2025-07-08

## 2025-07-08 NOTE — PATIENT INSTRUCTIONS
At [facility], we strive to deliver an exceptional experience to you, every time we see you. If you receive a survey, please let us know what we are doing well and/or what we could improve upon, as we do value your feedback.  If you have MyChart, you can expect to receive results automatically within 24 hours of their completion.  Your provider will send a note interpreting your results as well.   If you do not have MyChart, you should receive your results in about a week by mail.    Your care team:                            Family Medicine Internal Medicine   MD Alfredo Morgan, MD Stefanie Burris, MD Talon Ferrell, MD Jael Church, PA-C DEJA Mcbride, SANDRA Barnes, MD Pediatrics   MD Darlene hCampion, MD Shy Rodriguez, PAKIRAN Burton APRN CNP   MD Naila Stephen, MD Yissel Beal, CNP      Same-Day Provider (No follow-up visits)    MOSES King PA-C     Clinic hours: Monday - Thursday 7 am-6 pm; Fridays 7 am-5 pm.   Urgent care: Monday - Friday 10 am- 8 pm; Saturday and Sunday 9 am-5 pm.    Clinic: [phone]       [facility]: Monday - Thursday 8 am - 7 pm; Friday 8 am - 6 pm  [facility]: [phone]     [facility] Scheduling: Monday - Friday 7 am - 7 pm; Saturday 7 am - 3:30 pm  [phone] [place] : [phone]

## 2025-07-08 NOTE — PROGRESS NOTES
Assessment & Plan     Neck pain  Similar issue a little over a month ago, resolved with conservative care however returned without clear injury.  Suspect recurrent trapezius strain, recommend physical therapy, patient initially hesitant but does agree with this.  Will also utilize over-the-counter Tylenol and diclofenac gel and IcyHot as needed.  Diclofenac gel worked for her in the past but interested in trying another topical.  Reviewed previous urgent care and primary care visit as well as x-ray.  - Physical Therapy  Referral; Future  - diclofenac (VOLTAREN) 1 % topical gel; Apply 2 g topically 4 times daily.  - Menthol-Methyl Salicylate (ICY HOT EXTRA STRENGTH) 10-30 % CREA; Externally apply topically 4 times daily as needed (pain).    Hypertension goal BP (blood pressure) < 140/90  At goal today.    Polyarthritis, inflammatory (H)  Follows with rheumatology, reviewed most recent note.  More had issues with her foot and not neck.                Subjective   Noreen is a 75 year old, presenting for the following health issues:  Neck Pain and Back Pain        7/8/2025     9:00 AM   Additional Questions   Roomed by xochitl     History of Present Illness       Reason for visit:  Back head pain and neck pain   She is taking medications regularly.          Pain History:  When did you first notice your pain? 1 month    Have you seen anyone else for your pain? No  How has your pain affected your ability to work? Pain does not limit ability to work   What type of work do you or did you do?   Where in your body do you have pain? Neck Pain  Onset/Duration: 1 month   Description:   Location: right side of neck and middle of head   Radiation: none  Intensity: severe  Progression of Symptoms:  same  Accompanying Signs & Symptoms:  Burning, tingling, prickly sensation in arm(s): No  Numbness in arm(s): No  Weakness in arm(s):  No  Fever: No  Headache: YES  Nausea and/or vomiting: No  History:   Trauma: No  Previous neck  "pain: No  Previous surgery or injections: No  Previous Imaging (MRI,X ray): No  Precipitating or alleviating factors: None  Does movement impact the pain:  YES  Therapies tried and outcome: rest/inactivity    Initially started about a month ago after working in her garden.  Went to urgent care and followed up in clinic, symptoms improved after conservative care.  Seem to return a few days ago without clear injury.  She feels it to her right neck up to the base of her head.  Some associated headache.  It is worse first thing in the morning and seems to improve throughout the day.  Does not limit her activity and she has been able to garden without issue.  No associated numbness, tingling, weakness.  No associated dizziness.  Currently not taking anything for this.          Objective    /78   Pulse 74   Temp 96.9  F (36.1  C) (Temporal)   Resp 16   Ht 1.6 m (5' 3\")   Wt 50.3 kg (111 lb)   LMP 06/21/2002 (Approximate)   SpO2 98%   BMI 19.66 kg/m    Body mass index is 19.66 kg/m .  Physical Exam   GENERAL: alert and no distress  NECK: No midline spinal tenderness.  Mild tenderness to right trapezius throughout.  No palpable spasm.  Full range of motion of neck.  MS: Full range of motion of bilateral upper extremities.  Distal strength intact.  Radial pulses 2+.  SKIN: Warm, dry.  No associated rashes.            Signed Electronically by: Haley Alexander PA-C    "

## 2025-07-09 ENCOUNTER — THERAPY VISIT (OUTPATIENT)
Dept: PHYSICAL THERAPY | Facility: CLINIC | Age: 75
End: 2025-07-09
Payer: COMMERCIAL

## 2025-07-09 DIAGNOSIS — M54.2 NECK PAIN: ICD-10-CM

## 2025-07-09 PROCEDURE — 97161 PT EVAL LOW COMPLEX 20 MIN: CPT | Mod: GP | Performed by: PHYSICAL THERAPIST

## 2025-07-09 PROCEDURE — 97110 THERAPEUTIC EXERCISES: CPT | Mod: GP | Performed by: PHYSICAL THERAPIST

## 2025-07-09 PROCEDURE — 97140 MANUAL THERAPY 1/> REGIONS: CPT | Mod: GP | Performed by: PHYSICAL THERAPIST

## 2025-07-09 NOTE — PROGRESS NOTES
PHYSICAL THERAPY EVALUATION  Type of Visit: Evaluation       Fall Risk Screen:  Have you fallen 2 or more times in the past year?: No  Have you fallen and had an injury in the past year?: No    Subjective         Presenting condition or subjective complaint: Neck and back of head pain;  started 3 moths ago  Date of onset: 07/08/25    Relevant medical history: Arthritis; High blood pressure   Dates & types of surgery:      Prior diagnostic imaging/testing results:       Prior therapy history for the same diagnosis, illness or injury: No      Prior Level of Function  Transfers: Independent  Ambulation: Independent  ADL: Independent  IADL: Driving, Housekeeping, Laundry, Meal preparation, Yard work    Living Environment  Social support: With family members   Type of home: House   Stairs to enter the home: Yes       Ramp: No   Stairs inside the home: Yes 12 Is there a railing: Yes     Help at home: None  Equipment owned:       Employment: No    Hobbies/Interests: gardens    Patient goals for therapy: turn my head without pain    Pain assessment: See objective evaluation for additional pain details     Objective   CERVICAL SPINE EVALUATION  PAIN: Pain Level at Rest: 7/10  Pain Level with Use: 8/10  Pain Location: cervical spine and back of head  Pain Quality: Aching and Dull  Pain Frequency: constant  Pain is Worst: daytime or nighttime  Pain is Exacerbated By: bending over in the yard; working in the yard  Pain is Relieved By: heat, rest, and some medication    POSTURE: Standing Posture: Rounded shoulders, Forward head    ROM:   (Degrees) Left AROM Right AROM    Cervical Flexion Chin to chest; pain R side    Cervical Extension Min loss; pain R side    Cervical Side bend Mod loss ; R side Min loss; no pain    Cervical Rotation Mod loss; R side Mod loss; R side    Cervical Protrusion     Cervical Retraction     Thoracic Flexion     Thoracic Extension     Thoracic Rotation       Left AROM Left PROM Right AROM Right PROM    Shoulder Flexion WFL  WFL    Shoulder Extension       Shoulder Abduction WFL  WFL    Shoulder Adduction       Shoulder IR       Shoulder ER       Shoulder Horiz Abduction       Shoulder Horiz Adduction         MYOTOMES:    Left Right   C1-2 (Neck Flexion) 5 5   C3 (Neck Side Bend)  5 5   C4 (Shrug) 5 5   C5 (Deltoid) 5 5   C6 (Biceps) 5 5   C7 (Triceps)     C8 (Thumb Ext)     T1 (Intrinsics)         PALPATION:   + Tenderness At Location Left Right   Sternocleidomastoid  +   Scalenes  +   Rhomboids     Facet  +   Upper Trap  +   Levator  +   Erector Spinae     Suboccipitals  +         Assessment & Plan   CLINICAL IMPRESSIONS  Medical Diagnosis: Neck pain    Treatment Diagnosis: Neck Pain   Impression/Assessment: Patient is a 75 year old female with R side neck pain complaints.  The following significant findings have been identified: Pain, Decreased ROM/flexibility, Impaired muscle performance, and Decreased activity tolerance. These impairments interfere with their ability to perform recreational activities and household chores as compared to previous level of function.     Clinical Decision Making (Complexity):  Clinical Presentation: Stable/Uncomplicated  Clinical Presentation Rationale: based on medical and personal factors listed in PT evaluation  Clinical Decision Making (Complexity): Low complexity    PLAN OF CARE  Treatment Interventions:  Modalities: Ultrasound  Interventions: Manual Therapy, Neuromuscular Re-education, Therapeutic Activity, Therapeutic Exercise    Long Term Goals     PT Goal 1  Goal Description: pt able to sit and turn her head ot the R without R side neck pain  Rationale: to maximize safety and independence with performance of ADLs and functional tasks;to maximize safety and independence within the home;to maximize safety and independence within the community  Goal Progress: pain with turning her head to the Right  Target Date: 09/03/25      Frequency of Treatment: 1x/ week  Duration of  Treatment: x8 weeks    Recommended Referrals to Other Professionals: Physical Therapy  Education Assessment:   Learner/Method: Demonstration;Pictures/Video    Risks and benefits of evaluation/treatment have been explained.   Patient/Family/caregiver agrees with Plan of Care.     Evaluation Time:     PT Eval, Low Complexity Minutes (24352): 16       Signing Clinician: Mikaela Earl, PT        [facility]                                                                                   OUTPATIENT PHYSICAL THERAPY      PLAN OF TREATMENT FOR OUTPATIENT REHABILITATION   Patient's Last Name, First Name, Noreen Barajas YOB: 1950   Provider's Name   [facility]   Medical Record No.  [id number]     Onset Date: 07/08/25  Start of Care Date: 07/09/25     Medical Diagnosis:  Neck pain      PT Treatment Diagnosis:  Neck Pain Plan of Treatment  Frequency/Duration: 1x/ week/ x8 weeks    Certification date from 07/09/25 to 09/03/25         See note for plan of treatment details and functional goals     Mikaela Earl, PT                         I CERTIFY THE NEED FOR THESE SERVICES FURNISHED UNDER        THIS PLAN OF TREATMENT AND WHILE UNDER MY CARE     (Physician attestation of this document indicates review and certification of the therapy plan).              Referring Provider:  Haley Alexander    Initial Assessment  See Epic Evaluation- Start of Care Date: 07/09/25

## 2025-07-16 ENCOUNTER — PATIENT OUTREACH (OUTPATIENT)
Dept: GERIATRIC MEDICINE | Facility: CLINIC | Age: 75
End: 2025-07-16
Payer: COMMERCIAL

## 2025-07-16 NOTE — PROGRESS NOTES
[facility] Care Coordination Contact      [facility] Six-Month Telephone Assessment    6 month telephone assessment completed on 7/16/25.    ER visits: No  Hospitalizations: No  TCU stays: No  Significant health status changes: None reported.  Falls/Injuries: No  ADL/IADL changes: No  Changes in services: No    Goals: See Support Plan for goal progress documentation.      Will see member in 6 months for an annual health risk assessment.   Encouraged member to call CC with any questions or concerns in the meantime.     Norah Peguero, RN/BSN  [facility]  [phone]

## 2025-07-22 ENCOUNTER — OFFICE VISIT (OUTPATIENT)
Dept: OTOLARYNGOLOGY | Facility: CLINIC | Age: 75
End: 2025-07-22
Payer: COMMERCIAL

## 2025-07-22 DIAGNOSIS — H60.8X3 CHRONIC ECZEMATOUS OTITIS EXTERNA OF BOTH EARS: Primary | ICD-10-CM

## 2025-07-22 PROCEDURE — 99213 OFFICE O/P EST LOW 20 MIN: CPT | Performed by: OTOLARYNGOLOGY

## 2025-07-22 RX ORDER — FLUOCINOLONE ACETONIDE 0.11 MG/ML
5 OIL AURICULAR (OTIC) DAILY
Qty: 20 ML | Refills: 1 | Status: SHIPPED | OUTPATIENT
Start: 2025-07-22

## 2025-07-22 ASSESSMENT — ENCOUNTER SYMPTOMS
RESPIRATORY NEGATIVE: 1
GASTROINTESTINAL NEGATIVE: 1
EYES NEGATIVE: 1
DIZZINESS: 0
CONSTITUTIONAL NEGATIVE: 1

## 2025-07-22 NOTE — NURSING NOTE
Noreen Florence's chief complaint for this visit includes:  Chief Complaint   Patient presents with    Follow Up     Yearly SNHL, Tinnitus check. No change.      PCP: Jud Morales    Referring Provider:  Jud Morales MD  [id number] [place] [address]    [facility] 06/21/2002 (Approximate)

## 2025-07-22 NOTE — PROGRESS NOTES
Chief Complaint   Patient presents with    Follow Up     Yearly SNHL, Tinnitus check. No change.       PCP: Jud Morales     Referring Provider: Jud Morales    LMP 06/21/2002 (Approximate)     ENT Problem List:  Patient Active Problem List   Diagnosis    Osteopenia    Hayfever    Hypertension goal BP (blood pressure) < 140/90    Polyarthritis, inflammatory (H)    GERD (gastroesophageal reflux disease)    Hyperlipidemia with target LDL less than 130    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    High risk medication use    Microscopic hematuria    Pterygium of right eye    Sicca syndrome    Angiomyolipoma of right kidney    History of Helicobacter pylori infection    Pre-diabetes    NAFLD (nonalcoholic fatty liver disease)    Elevated LFTs    Heel pain, chronic, right    Dermatochalasis of both upper eyelids    Dermatochalasis of both lower eyelids    Eczema, unspecified type    Neck pain      Current Medications:  Current Outpatient Medications   Medication Sig Dispense Refill    acetaminophen (TYLENOL) 500 MG tablet Take 1-2 tablets (500-1,000 mg) by mouth every 6 hours as needed for mild pain. 30 tablet 0    amoxicillin (AMOXIL) 500 MG capsule Take 1 capsule (500 mg) by mouth 2 times daily. 20 capsule 0    benzonatate (TESSALON) 200 MG capsule Take 1 capsule (200 mg) by mouth 3 times daily as needed for cough. 30 capsule 3    cetirizine (ZYRTEC) 10 MG tablet TAKE 1 TABLET(10 MG) BY MOUTH DAILY 90 tablet 0    chlorhexidine (PERIDEX) 0.12 % solution SWISH AND SPIT 15 ML BY MOUTH TWICE DAILY AS NEEDED 473 mL 1    cholecalciferol (VITAMIN D3) 25 mcg (1000 units) capsule Take 1,000 Units by mouth      diclofenac (VOLTAREN) 1 % topical gel Apply 2 g topically 4 times daily. 50 g 1    diclofenac (VOLTAREN) 1 % topical gel Apply 2 g topically 4 times daily. 100 g 0    diclofenac (VOLTAREN) 1 % topical gel Apply 2 g topically 4 times daily as needed for moderate pain. 350 g 1    finasteride (PROSCAR) 5 MG  tablet Take 1 tablet (5 mg) by mouth daily. 90 tablet 0    fluocinolone acetonide oil (DERMOTIC) 0.01 % ear drops Place 0.25 mLs (5 drops) into both ears daily. 20 mL 1    fluticasone (FLONASE) 50 MCG/ACT nasal spray Spray 1-2 sprays into both nostrils daily 48 g 3    guaiFENesin-codeine (ROBITUSSIN AC) 100-10 MG/5ML solution Take 5-10 mLs by mouth every 8 hours as needed for cough. 237 mL 1    hydrocortisone 2.5 % ointment Apply topically 2 times daily. 30 g 0    hydroquinone (JAVIER) 4 % external cream Apply daily to brown spots for 4-6 months. 28.35 g 4    ketotifen fumarate 0.035% 0.035 % SOLN ophthalmic solution Place 1 drop into both eyes 2 times daily. 10 mL 0    latanoprost (XALATAN) 0.005 % ophthalmic solution Place 1 drop into both eyes at bedtime. 7.5 mL 6    loratadine (CLARITIN) 10 MG tablet Take 1 tablet (10 mg) by mouth daily 30 tablet 0    losartan-hydrochlorothiazide (HYZAAR) 50-12.5 MG tablet Take 1 tablet by mouth daily. 90 tablet 0    magnesium 250 MG tablet TAKE 1 TABLET BY MOUTH ONCE DAILY AS NEEDED FOR LEG CRAMPS 90 tablet 1    Menthol-Methyl Salicylate (ICY HOT EXTRA STRENGTH) 10-30 % CREA Externally apply topically 4 times daily as needed (pain). 85 g 1    methylcellulose (CITRUCEL) powder Take 0.3 g (1.05 teaspoonful) by mouth daily 454 g 1    metoprolol tartrate (LOPRESSOR) 25 MG tablet TAKE 1 TABLET(25 MG) BY MOUTH AT BEDTIME 90 tablet 0    minoxidil (ROGAINE) 5 % external solution Apply to scalp once daily. 120 mL 3    multivitamin w/minerals (MULTIVITAMIN, THERAPEUTIC WITH MINERALS) tablet TAKE 1 TABLET BY MOUTH ONCE DAILY 90 tablet 1    nitroGLYcerin (RECTIV) 0.4 % OINT rectal ointment 1 inch (1.5 mg) by Peritendinous route every 12 hours. 30 g 0    omeprazole (PRILOSEC) 40 MG DR capsule Take 1 capsule (40 mg) by mouth daily. 90 capsule 2    OPTIVE 0.5-0.9 % ophthalmic solution INSTILL 1 DROP IN BOTH EYES THREE TIMES DAILY AS NEEDED FOR DRY EYES 15 mL 1    pilocarpine (SALAGEN) 5 MG  tablet Take 1 tablet (5 mg) by mouth 3 times daily. 270 tablet 1    pravastatin (PRAVACHOL) 20 MG tablet Take 1 tablet (20 mg) by mouth daily. 90 tablet 2    sodium fluoride (SF 5000 PLUS) 1.1 % CREA Use twice a day as instructed 51 g 1    sulfaSALAzine ER (AZULFIDINE EN) 500 MG EC tablet Take 2 tablets (1,000 mg) by mouth 2 times daily 360 tablet 3    terbinafine (LAMISIL) 1 % external cream Apply topically 2 times daily. 24 g 0    tiZANidine (ZANAFLEX) 2 MG tablet Take 1 tablet (2 mg) by mouth nightly as needed for muscle spasms. 10 tablet 0    tretinoin (RETIN-A) 0.05 % external cream Apply pea sized amount at bedtime. 45 g 4    triamcinolone (KENALOG) 0.1 % external cream APPLY TOPICALLY TO THE AFFECTED AREA TWICE DAILY. MAXIMUM OF 2 WEEKS 30 g 0    triamcinolone (KENALOG) 0.1 % paste APPLY TO MOUTH TWICE DAILY 5 g 0     No current facility-administered medications for this visit.     Facility-Administered Medications Ordered in Other Visits   Medication Dose Route Frequency Provider Last Rate Last Admin    gadobutrol (GADAVIST) injection 7.5 mL  7.5 mL Intravenous Once Palomo Terrell MD         Surgical History:  Past Surgical History:   Procedure Laterality Date    COLONOSCOPY      COLONOSCOPY WITH CO2 INSUFFLATION N/A 4/5/2017    Procedure: COLONOSCOPY WITH CO2 INSUFFLATION;  Surgeon: Duane, William Charles, MD;  Location: [facility]    COMBINED ESOPHAGOSCOPY, GASTROSCOPY, DUODENOSCOPY (EGD) WITH CO2 INSUFFLATION N/A 2/13/2019    Procedure: COMBINED ESOPHAGOSCOPY, GASTROSCOPY, DUODENOSCOPY (EGD) WITH CO2 INSUFFLATION;  Surgeon: Sly De Santiago MD;  Location: [facility]    COMBINED ESOPHAGOSCOPY, GASTROSCOPY, DUODENOSCOPY (EGD) WITH CO2 INSUFFLATION N/A 2/7/2022    Procedure: ESOPHAGOGASTRODUODENOSCOPY, WITH CO2 INSUFFLATION;  Surgeon: Luis Aceves MD;  Location: [facility]    COMBINED REPAIR PTOSIS WITH BLEPHAROPLASTY BILATERAL Bilateral 1/6/2017    Procedure: COMBINED REPAIR PTOSIS WITH  BLEPHAROPLASTY BILATERAL;  Surgeon: Carly Norman MD;  Location: [facility]    ESOPHAGOSCOPY, GASTROSCOPY, DUODENOSCOPY (EGD), COMBINED N/A 1/5/2016    Procedure: COMBINED ESOPHAGOSCOPY, GASTROSCOPY, DUODENOSCOPY (EGD), BIOPSY SINGLE OR MULTIPLE;  Surgeon: Duane, William Charles, MD;  Location: [facility]    ESOPHAGOSCOPY, GASTROSCOPY, DUODENOSCOPY (EGD), COMBINED N/A 2/13/2019    Procedure: Combined Esophagoscopy, Gastroscopy, Duodenoscopy (Egd), Biopsy Single Or Multiple;  Surgeon: Sly De Santiago MD;  Location: [facility]    ESOPHAGOSCOPY, GASTROSCOPY, DUODENOSCOPY (EGD), COMBINED N/A 2/7/2022    Procedure: ESOPHAGOGASTRODUODENOSCOPY, WITH BIOPSY;  Surgeon: Luis Aceves MD;  Location: [facility]    REPAIR ENTROPION BILATERAL Bilateral 1/6/2017    Procedure: REPAIR ENTROPION BILATERAL;  Surgeon: Carly Norman MD;  Location: [facility]    REPAIR PTOSIS Bilateral 01/2017     HPI  Pleasant 75 year old female presents today as a(n) established patient for stable SNHL. She was last seen on 7/16/24. She states that her hearing loss symptoms feel the same. She reports continued tinnitus.  She states that sometimes her ears are itchy.    She denies aural fullness/pressure, otorrhea, dizziness/vertigo.    Review of Systems   Constitutional: Negative.    HENT:  Positive for hearing loss and tinnitus. Negative for ear discharge.    Eyes: Negative.    Respiratory: Negative.     Gastrointestinal: Negative.    Skin:  Positive for itching.   Neurological:  Negative for dizziness.   Endo/Heme/Allergies: Negative.        Physical Exam  Vitals and nursing note reviewed.   Constitutional:       Appearance: Normal appearance.   HENT:      Head: Normocephalic and atraumatic.      Jaw: There is normal jaw occlusion.      Right Ear: Tympanic membrane and ear canal normal. Decreased hearing noted.      Left Ear: Tympanic membrane and ear canal normal. Decreased hearing noted.      Nose: No mucosal edema, congestion or rhinorrhea.       Right Nostril: No occlusion.      Left Nostril: No occlusion.      Right Turbinates: Not enlarged or swollen.      Left Turbinates: Not enlarged or swollen.      Right Sinus: No maxillary sinus tenderness or frontal sinus tenderness.      Left Sinus: No maxillary sinus tenderness or frontal sinus tenderness.      Mouth/Throat:      Mouth: Mucous membranes are moist.      Pharynx: Oropharynx is clear. Uvula midline.   Eyes:      Extraocular Movements: Extraocular movements intact.      Pupils: Pupils are equal, round, and reactive to light.   Neurological:      Mental Status: She is alert.       A/P  This pleasant patient has bilateral SNHL with associated tinnitus and chronic eczematoid otitis externa of both ears. There are no ear or sinus infections present.    Regarding chronic eczematoid otitis externa, she was prescribed fluocinolone acetonide oil (DERMOTIC) 0.01 % ear drops, place 0.25 mLs (5 drops) into both ears daily for 3 weeks, today. She is informed that she can use this medication as needed later on.    Questions and concerns were addressed.    Follow up in clinic in 1 year.    Scribe/Staff:    Scribe Disclosure:   I, Leeann Jason, am serving as a scribe; to document services personally performed by Melania Escobar MD based on data collection and the provider's statements to me.     Insert provider disclosure and electronic signature here Provider Disclosure:  I agree with above History, Review of Systems, Physical exam and Plan.  I have reviewed the content of the documentation and have edited it as needed. I have personally performed the services documented here and the documentation accurately represents those services and the decisions I have made.    Melania Escobar MD

## 2025-07-22 NOTE — LETTER
7/22/2025      Noreen Florence  [address]  [place] [address]      Dear Colleague,    Thank you for referring your patient, Noreen Florence, to the [facility]. Please see a copy of my visit note below.    Chief Complaint   Patient presents with     Follow Up     Yearly SNHL, Tinnitus check. No change.       PCP: Jud Morales     Referring Provider: Jud Morales    [facility] 06/21/2002 (Approximate)     ENT Problem List:  Patient Active Problem List   Diagnosis     Osteopenia     Hayfever     Hypertension goal BP (blood pressure) < 140/90     Polyarthritis, inflammatory (H)     GERD (gastroesophageal reflux disease)     Hyperlipidemia with target LDL less than 130     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     High risk medication use     Microscopic hematuria     Pterygium of right eye     Sicca syndrome     Angiomyolipoma of right kidney     History of Helicobacter pylori infection     Pre-diabetes     NAFLD (nonalcoholic fatty liver disease)     Elevated LFTs     Heel pain, chronic, right     Dermatochalasis of both upper eyelids     Dermatochalasis of both lower eyelids     Eczema, unspecified type     Neck pain      Current Medications:  Current Outpatient Medications   Medication Sig Dispense Refill     acetaminophen (TYLENOL) 500 MG tablet Take 1-2 tablets (500-1,000 mg) by mouth every 6 hours as needed for mild pain. 30 tablet 0     amoxicillin (AMOXIL) 500 MG capsule Take 1 capsule (500 mg) by mouth 2 times daily. 20 capsule 0     benzonatate (TESSALON) 200 MG capsule Take 1 capsule (200 mg) by mouth 3 times daily as needed for cough. 30 capsule 3     cetirizine (ZYRTEC) 10 MG tablet TAKE 1 TABLET(10 MG) BY MOUTH DAILY 90 tablet 0     chlorhexidine (PERIDEX) 0.12 % solution SWISH AND SPIT 15 ML BY MOUTH TWICE DAILY AS NEEDED 473 mL 1     cholecalciferol (VITAMIN D3) 25 mcg (1000 units) capsule Take 1,000 Units by mouth       diclofenac (VOLTAREN) 1 % topical gel Apply 2 g  topically 4 times daily. 50 g 1     diclofenac (VOLTAREN) 1 % topical gel Apply 2 g topically 4 times daily. 100 g 0     diclofenac (VOLTAREN) 1 % topical gel Apply 2 g topically 4 times daily as needed for moderate pain. 350 g 1     finasteride (PROSCAR) 5 MG tablet Take 1 tablet (5 mg) by mouth daily. 90 tablet 0     fluocinolone acetonide oil (DERMOTIC) 0.01 % ear drops Place 0.25 mLs (5 drops) into both ears daily. 20 mL 1     fluticasone (FLONASE) 50 MCG/ACT nasal spray Spray 1-2 sprays into both nostrils daily 48 g 3     guaiFENesin-codeine (ROBITUSSIN AC) 100-10 MG/5ML solution Take 5-10 mLs by mouth every 8 hours as needed for cough. 237 mL 1     hydrocortisone 2.5 % ointment Apply topically 2 times daily. 30 g 0     hydroquinone (JAVIER) 4 % external cream Apply daily to brown spots for 4-6 months. 28.35 g 4     ketotifen fumarate 0.035% 0.035 % SOLN ophthalmic solution Place 1 drop into both eyes 2 times daily. 10 mL 0     latanoprost (XALATAN) 0.005 % ophthalmic solution Place 1 drop into both eyes at bedtime. 7.5 mL 6     loratadine (CLARITIN) 10 MG tablet Take 1 tablet (10 mg) by mouth daily 30 tablet 0     losartan-hydrochlorothiazide (HYZAAR) 50-12.5 MG tablet Take 1 tablet by mouth daily. 90 tablet 0     magnesium 250 MG tablet TAKE 1 TABLET BY MOUTH ONCE DAILY AS NEEDED FOR LEG CRAMPS 90 tablet 1     Menthol-Methyl Salicylate (ICY HOT EXTRA STRENGTH) 10-30 % CREA Externally apply topically 4 times daily as needed (pain). 85 g 1     methylcellulose (CITRUCEL) powder Take 0.3 g (1.05 teaspoonful) by mouth daily 454 g 1     metoprolol tartrate (LOPRESSOR) 25 MG tablet TAKE 1 TABLET(25 MG) BY MOUTH AT BEDTIME 90 tablet 0     minoxidil (ROGAINE) 5 % external solution Apply to scalp once daily. 120 mL 3     multivitamin w/minerals (MULTIVITAMIN, THERAPEUTIC WITH MINERALS) tablet TAKE 1 TABLET BY MOUTH ONCE DAILY 90 tablet 1     nitroGLYcerin (RECTIV) 0.4 % OINT rectal ointment 1 inch (1.5 mg) by  Peritendinous route every 12 hours. 30 g 0     omeprazole (PRILOSEC) 40 MG DR capsule Take 1 capsule (40 mg) by mouth daily. 90 capsule 2     OPTIVE 0.5-0.9 % ophthalmic solution INSTILL 1 DROP IN BOTH EYES THREE TIMES DAILY AS NEEDED FOR DRY EYES 15 mL 1     pilocarpine (SALAGEN) 5 MG tablet Take 1 tablet (5 mg) by mouth 3 times daily. 270 tablet 1     pravastatin (PRAVACHOL) 20 MG tablet Take 1 tablet (20 mg) by mouth daily. 90 tablet 2     sodium fluoride (SF 5000 PLUS) 1.1 % CREA Use twice a day as instructed 51 g 1     sulfaSALAzine ER (AZULFIDINE EN) 500 MG EC tablet Take 2 tablets (1,000 mg) by mouth 2 times daily 360 tablet 3     terbinafine (LAMISIL) 1 % external cream Apply topically 2 times daily. 24 g 0     tiZANidine (ZANAFLEX) 2 MG tablet Take 1 tablet (2 mg) by mouth nightly as needed for muscle spasms. 10 tablet 0     tretinoin (RETIN-A) 0.05 % external cream Apply pea sized amount at bedtime. 45 g 4     triamcinolone (KENALOG) 0.1 % external cream APPLY TOPICALLY TO THE AFFECTED AREA TWICE DAILY. MAXIMUM OF 2 WEEKS 30 g 0     triamcinolone (KENALOG) 0.1 % paste APPLY TO MOUTH TWICE DAILY 5 g 0     No current facility-administered medications for this visit.     Facility-Administered Medications Ordered in Other Visits   Medication Dose Route Frequency Provider Last Rate Last Admin     gadobutrol (GADAVIST) injection 7.5 mL  7.5 mL Intravenous Once Palomo Terrlel MD         Surgical History:  Past Surgical History:   Procedure Laterality Date     COLONOSCOPY       COLONOSCOPY WITH CO2 INSUFFLATION N/A 4/5/2017    Procedure: COLONOSCOPY WITH CO2 INSUFFLATION;  Surgeon: Duane, William Charles, MD;  Location: [facility]     COMBINED ESOPHAGOSCOPY, GASTROSCOPY, DUODENOSCOPY (EGD) WITH CO2 INSUFFLATION N/A 2/13/2019    Procedure: COMBINED ESOPHAGOSCOPY, GASTROSCOPY, DUODENOSCOPY (EGD) WITH CO2 INSUFFLATION;  Surgeon: Sly De Santiago MD;  Location: [facility]     COMBINED ESOPHAGOSCOPY,  GASTROSCOPY, DUODENOSCOPY (EGD) WITH CO2 INSUFFLATION N/A 2/7/2022    Procedure: ESOPHAGOGASTRODUODENOSCOPY, WITH CO2 INSUFFLATION;  Surgeon: Luis Aceves MD;  Location: [facility]     COMBINED REPAIR PTOSIS WITH BLEPHAROPLASTY BILATERAL Bilateral 1/6/2017    Procedure: COMBINED REPAIR PTOSIS WITH BLEPHAROPLASTY BILATERAL;  Surgeon: Carly Norman MD;  Location: [facility]     ESOPHAGOSCOPY, GASTROSCOPY, DUODENOSCOPY (EGD), COMBINED N/A 1/5/2016    Procedure: COMBINED ESOPHAGOSCOPY, GASTROSCOPY, DUODENOSCOPY (EGD), BIOPSY SINGLE OR MULTIPLE;  Surgeon: Duane, William Charles, MD;  Location: [facility]     ESOPHAGOSCOPY, GASTROSCOPY, DUODENOSCOPY (EGD), COMBINED N/A 2/13/2019    Procedure: Combined Esophagoscopy, Gastroscopy, Duodenoscopy (Egd), Biopsy Single Or Multiple;  Surgeon: Sly De Santiago MD;  Location: [facility]     ESOPHAGOSCOPY, GASTROSCOPY, DUODENOSCOPY (EGD), COMBINED N/A 2/7/2022    Procedure: ESOPHAGOGASTRODUODENOSCOPY, WITH BIOPSY;  Surgeon: Luis Aceves MD;  Location: [facility]     REPAIR ENTROPION BILATERAL Bilateral 1/6/2017    Procedure: REPAIR ENTROPION BILATERAL;  Surgeon: Carly Norman MD;  Location: [facility]     REPAIR PTOSIS Bilateral 01/2017     HPI  Pleasant 75 year old female presents today as a(n) established patient for stable SNHL. She was last seen on 7/16/24. She states that her hearing loss symptoms feel the same. She reports continued tinnitus.  She states that sometimes her ears are itchy.    She denies aural fullness/pressure, otorrhea, dizziness/vertigo.    Review of Systems   Constitutional: Negative.    HENT:  Positive for hearing loss and tinnitus. Negative for ear discharge.    Eyes: Negative.    Respiratory: Negative.     Gastrointestinal: Negative.    Skin:  Positive for itching.   Neurological:  Negative for dizziness.   Endo/Heme/Allergies: Negative.        Physical Exam  Vitals and nursing note reviewed.   Constitutional:       Appearance: Normal  appearance.   HENT:      Head: Normocephalic and atraumatic.      Jaw: There is normal jaw occlusion.      Right Ear: Tympanic membrane and ear canal normal. Decreased hearing noted.      Left Ear: Tympanic membrane and ear canal normal. Decreased hearing noted.      Nose: No mucosal edema, congestion or rhinorrhea.      Right Nostril: No occlusion.      Left Nostril: No occlusion.      Right Turbinates: Not enlarged or swollen.      Left Turbinates: Not enlarged or swollen.      Right Sinus: No maxillary sinus tenderness or frontal sinus tenderness.      Left Sinus: No maxillary sinus tenderness or frontal sinus tenderness.      Mouth/Throat:      Mouth: Mucous membranes are moist.      Pharynx: Oropharynx is clear. Uvula midline.   Eyes:      Extraocular Movements: Extraocular movements intact.      Pupils: Pupils are equal, round, and reactive to light.   Neurological:      Mental Status: She is alert.       A/P  This pleasant patient has bilateral SNHL with associated tinnitus and chronic eczematoid otitis externa of both ears. There are no ear or sinus infections present.    Regarding chronic eczematoid otitis externa, she was prescribed fluocinolone acetonide oil (DERMOTIC) 0.01 % ear drops, place 0.25 mLs (5 drops) into both ears daily for 3 weeks, today. She is informed that she can use this medication as needed later on.    Questions and concerns were addressed.    Follow up in clinic in 1 year.    Scribe/Staff:    Scribe Disclosure:   I, Leeann Jason, am serving as a scribe; to document services personally performed by Melania Escobar MD based on data collection and the provider's statements to me.     Insert provider disclosure and electronic signature here Provider Disclosure:  I agree with above History, Review of Systems, Physical exam and Plan.  I have reviewed the content of the documentation and have edited it as needed. I have personally performed the services documented here and the  documentation accurately represents those services and the decisions I have made.    Melania Escobar MD       Again, thank you for allowing me to participate in the care of your patient.        Sincerely,        Melania Escobar MD    Electronically signed

## 2025-07-23 NOTE — PATIENT INSTRUCTIONS
Proper skin care from [facility]:    -Eliminate harsh soaps as they strip the natural oils from the skin, often resulting in dry itchy skin ( i.e. Dial, Zest, [place] Spring)  -Use mild soaps such as Cetaphil or Dove Sensitive Skin in the shower. You do not need to use soap on arms, legs, and trunk every time you shower unless visibly soiled.   -Avoid hot or cold showers.  -After showering, lightly (pat) dry off and apply moisturizing within 2-3 minutes. This will help trap moisture in the skin.   -Aggressive use of a moisturizer at least 1-2 times a day to the entire body (including -Vanicream, Cetaphil, Aquaphor or Cerave) and moisturize hands after every washing.  -We recommend using moisturizers that come in a tub that needs to be scooped out, not a pump. This has more of an oil base. It will hold moisture in your skin much better than a water base moisturizer. The above recommended are non-pore clogging.      Wear a sunscreen with at least SPF 30 on your face, ears, neck and V of the chest daily. Wear sunscreen on other areas of the body if those areas are exposed to the sun throughout the day. Sunscreens can contain physical and/or chemical blockers. Physical blockers are less likely to clog pores, these include zinc oxide and titanium dioxide. Reapply every two hour and after swimming.     Sunscreen examples: https://www.ewg.org/sunscreen/    UV radiation  UVA radiation remains constant throughout the day and throughout the year. It is a longer wavelength than UVB and therefore penetrates deeper into the skin leading to immediate and delayed tanning, photoaging, and skin cancer. 70-80% of UVA and UVB radiation occurs between the hours of 10am-2pm.  UVB radiation  UVB radiation causes the most harmful effects and is more significant during the summer months. However, snow and ice can reflect UVB radiation leading to skin damage during the winter months as well. UVB radiation is responsible for  tanning, burning, inflammation, delayed erythema (pinkness), pigmentation (brown spots), and skin cancer.     I recommend self monthly full body exams and yearly full body exams with a dermatology provider. If you develop a new or changing lesion please follow up for examination. Most skin cancers are pink and scaly or pink and pearly. However, we do see blue/brown/black skin cancers.  Consider the ABCDEs of melanoma when giving yourself your monthly full body exam ( don't forget the groin, buttocks, feet, toes, etc). A-asymmetry, B-borders, C-color, D-diameter, E-elevation or evolving. If you see any of these changes please follow up in clinic. If you cannot see your back I recommend purchasing a hand held mirror to use with a larger wall mirror.       Checking for Skin Cancer  You can find cancer early by checking your skin each month. There are 3 kinds of skin cancer. They are melanoma, basal cell carcinoma, and squamous cell carcinoma. Doing monthly skin checks is the best way to find new marks or skin changes. Follow the instructions below for checking your skin.   The ABCDEs of checking moles for melanoma   Check your moles or growths for signs of melanoma using ABCDE:   Asymmetry: the sides of the mole or growth don t match  Border: the edges are ragged, notched, or blurred  Color: the color within the mole or growth varies  Diameter: the mole or growth is larger than 6 mm (size of a pencil eraser)  Evolving: the size, shape, or color of the mole or growth is changing (evolving is not shown in the images below)    Checking for other types of skin cancer  Basal cell carcinoma or squamous cell carcinoma have symptoms such as:     A spot or mole that looks different from all other marks on your skin  Changes in how an area feels, such as itching, tenderness, or pain  Changes in the skin's surface, such as oozing, bleeding, or scaliness  A sore that does not heal  New swelling or redness beyond the border of a  mole    Who s at risk?  Anyone can get skin cancer. But you are at greater risk if you have:   Fair skin, light-colored hair, or light-colored eyes  Many moles or abnormal moles on your skin  A history of sunburns from sunlight or tanning beds  A family history of skin cancer  A history of exposure to radiation or chemicals  A weakened immune system  If you have had skin cancer in the past, you are at risk for recurring skin cancer.   How to check your skin  Do your monthly skin checkups in front of a full-length mirror. Check all parts of your body, including your:   Head (ears, face, neck, and scalp)  Torso (front, back, and sides)  Arms (tops, undersides, upper, and lower armpits)  Hands (palms, backs, and fingers, including under the nails)  Buttocks and genitals  Legs (front, back, and sides)  Feet (tops, soles, toes, including under the nails, and between toes)  If you have a lot of moles, take digital photos of them each month. Make sure to take photos both up close and from a distance. These can help you see if any moles change over time.   Most skin changes are not cancer. But if you see any changes in your skin, call your doctor right away. Only he or she can diagnose a problem. If you have skin cancer, seeing your doctor can be the first step toward getting the treatment that could save your life.   [employer] last reviewed this educational content on 4/1/2019 2000-2020 [employer]. [address] [place] [address], [place] [address]. All rights reserved. This information is not intended as a substitute for professional medical care. Always follow your healthcare professional's instructions.       When should I call my doctor?  If you are worsening or not improving, please, contact us or seek urgent care as noted below.     Who should I call with questions (adults)?    [facility] [phone]  For urgent needs outside of business hours call the [facility] at  [phone] and ask for the dermatology resident on call to be paged  If this is a medical emergency and you are unable to reach an ER, Call 911      If you need a prescription refill, please contact your pharmacy. Refills are approved or denied by our Physicians during normal business hours, Monday through Friday.  Per office policy, refills will not be granted if you have not been seen within the past year (or sooner depending on the condition).

## 2025-07-24 ENCOUNTER — TELEPHONE (OUTPATIENT)
Dept: DERMATOLOGY | Facility: CLINIC | Age: 75
End: 2025-07-24

## 2025-07-24 ENCOUNTER — OFFICE VISIT (OUTPATIENT)
Dept: DERMATOLOGY | Facility: CLINIC | Age: 75
End: 2025-07-24
Attending: PREVENTIVE MEDICINE
Payer: COMMERCIAL

## 2025-07-24 DIAGNOSIS — L72.0 MILIA: ICD-10-CM

## 2025-07-24 DIAGNOSIS — L64.9 ANDROGENETIC ALOPECIA: ICD-10-CM

## 2025-07-24 DIAGNOSIS — L70.0 ACNE VULGARIS: Primary | ICD-10-CM

## 2025-07-24 DIAGNOSIS — M06.4 POLYARTHRITIS, INFLAMMATORY (H): ICD-10-CM

## 2025-07-24 RX ORDER — TRETINOIN 0.5 MG/G
CREAM TOPICAL AT BEDTIME
Qty: 45 G | Refills: 11 | Status: SHIPPED | OUTPATIENT
Start: 2025-07-24

## 2025-07-24 RX ORDER — FINASTERIDE 5 MG/1
5 TABLET, FILM COATED ORAL DAILY
Qty: 90 TABLET | Refills: 1 | Status: SHIPPED | OUTPATIENT
Start: 2025-07-24

## 2025-07-24 RX ORDER — TRETINOIN 0.5 MG/G
CREAM TOPICAL AT BEDTIME
Qty: 45 G | Refills: 11 | Status: SHIPPED | OUTPATIENT
Start: 2025-07-24 | End: 2025-07-24

## 2025-07-24 RX ORDER — ASPIRIN 81 MG
1 TABLET, DELAYED RELEASE (ENTERIC COATED) ORAL DAILY
Qty: 90 TABLET | Refills: 1 | Status: SHIPPED | OUTPATIENT
Start: 2025-07-24

## 2025-07-24 NOTE — TELEPHONE ENCOUNTER
Jo-  Patient scheduled to see you on 1/7/26 for return hair loss, previous patient of Khloe Esquivel PA-C. In to see Edilma Kate CNP for acne and looking to get refills for Finasteride 5 mg daily and Thera-M enhanced Vitamins ( Multi Vitamin w/Minerals).    Wondering if you would be comfortable refilling a lorin prescription for both until her 1/7/26 appointment? She is presently out and has been an effective treatment for her.    Pharmacy is [id number] [place] and is looking for a 3 month supply with each fill.     Please advise-    Delano REYES

## 2025-07-24 NOTE — LETTER
7/24/2025      Noreen Florence  [address]  [place] [address]      Dear Colleague,    Thank you for referring your patient, Noreen Florence, to the [facility]. Please see a copy of my visit note below.    [facility] Dermatology Note  Encounter Date: Jul 24, 2025  Office Visit     Reviewed patients past medical history and pertinent chart review prior to patients visit today.     Dermatology Problem List:  Milia/acne on chin    ____________________________________________    Assessment & Plan:     # Milia and acne. Benign, no further treatment needed.   -Start tretinoin 0.05% cream. Apply once every other day and increase to nightly as tolerate.  Waiting a few minutes after washing affected areas will decrease irritation. Method of application, side effects and expected results were discussed. The patient will apply pea size amount to the entire face, avoid areas around the eyes, corners of nose and mouth. Discussed side effects including photosensitivity and irritation.       Edilma Kate, CNP  Dermatology    _______________________________________    CC: Acne    HPI:  Ms. Noreen Florence is a(n) 75 year old female who presents today as a return patient for bumps on the chin. She has always had acne on the chin and sometimes gets acne that stays and doesn't go away. She has tried adapalene 0.1% gel nightly to this area but continues to get acne and persistent bumps that do not resolve    Patient is otherwise feeling well, without additional skin concerns.      Physical Exam:  SKIN: Focused examination of face and neck was performed.  - scattered 1 mm white dome shaped firm papules on chin and neck with a few hyperpigmented macules from previous acne    - No other lesions of concern on areas examined.     Medications:  Current Outpatient Medications   Medication Sig Dispense Refill     tretinoin (RETIN-A) 0.05 % external cream Apply topically at bedtime. For acne 45 g 11      acetaminophen (TYLENOL) 500 MG tablet Take 1-2 tablets (500-1,000 mg) by mouth every 6 hours as needed for mild pain. 30 tablet 0     amoxicillin (AMOXIL) 500 MG capsule Take 1 capsule (500 mg) by mouth 2 times daily. 20 capsule 0     benzonatate (TESSALON) 200 MG capsule Take 1 capsule (200 mg) by mouth 3 times daily as needed for cough. 30 capsule 3     cetirizine (ZYRTEC) 10 MG tablet TAKE 1 TABLET(10 MG) BY MOUTH DAILY 90 tablet 0     chlorhexidine (PERIDEX) 0.12 % solution SWISH AND SPIT 15 ML BY MOUTH TWICE DAILY AS NEEDED 473 mL 1     cholecalciferol (VITAMIN D3) 25 mcg (1000 units) capsule Take 1,000 Units by mouth       diclofenac (VOLTAREN) 1 % topical gel Apply 2 g topically 4 times daily. 50 g 1     diclofenac (VOLTAREN) 1 % topical gel Apply 2 g topically 4 times daily. 100 g 0     diclofenac (VOLTAREN) 1 % topical gel Apply 2 g topically 4 times daily as needed for moderate pain. 350 g 1     finasteride (PROSCAR) 5 MG tablet Take 1 tablet (5 mg) by mouth daily. 90 tablet 0     fluocinolone acetonide oil (DERMOTIC) 0.01 % ear drops Place 0.25 mLs (5 drops) into both ears daily. 20 mL 1     fluticasone (FLONASE) 50 MCG/ACT nasal spray Spray 1-2 sprays into both nostrils daily 48 g 3     guaiFENesin-codeine (ROBITUSSIN AC) 100-10 MG/5ML solution Take 5-10 mLs by mouth every 8 hours as needed for cough. 237 mL 1     hydrocortisone 2.5 % ointment Apply topically 2 times daily. 30 g 0     hydroquinone (JAVIER) 4 % external cream Apply daily to brown spots for 4-6 months. 28.35 g 4     ketotifen fumarate 0.035% 0.035 % SOLN ophthalmic solution Place 1 drop into both eyes 2 times daily. 10 mL 0     latanoprost (XALATAN) 0.005 % ophthalmic solution Place 1 drop into both eyes at bedtime. 7.5 mL 6     loratadine (CLARITIN) 10 MG tablet Take 1 tablet (10 mg) by mouth daily 30 tablet 0     losartan-hydrochlorothiazide (HYZAAR) 50-12.5 MG tablet Take 1 tablet by mouth daily. 90 tablet 0     magnesium 250 MG tablet  TAKE 1 TABLET BY MOUTH ONCE DAILY AS NEEDED FOR LEG CRAMPS 90 tablet 1     Menthol-Methyl Salicylate (ICY HOT EXTRA STRENGTH) 10-30 % CREA Externally apply topically 4 times daily as needed (pain). 85 g 1     methylcellulose (CITRUCEL) powder Take 0.3 g (1.05 teaspoonful) by mouth daily 454 g 1     metoprolol tartrate (LOPRESSOR) 25 MG tablet TAKE 1 TABLET(25 MG) BY MOUTH AT BEDTIME 90 tablet 0     minoxidil (ROGAINE) 5 % external solution Apply to scalp once daily. (Patient not taking: Reported on 7/24/2025) 120 mL 3     multivitamin w/minerals (MULTIVITAMIN, THERAPEUTIC WITH MINERALS) tablet TAKE 1 TABLET BY MOUTH ONCE DAILY 90 tablet 1     nitroGLYcerin (RECTIV) 0.4 % OINT rectal ointment 1 inch (1.5 mg) by Peritendinous route every 12 hours. 30 g 0     omeprazole (PRILOSEC) 40 MG DR capsule Take 1 capsule (40 mg) by mouth daily. 90 capsule 2     OPTIVE 0.5-0.9 % ophthalmic solution INSTILL 1 DROP IN BOTH EYES THREE TIMES DAILY AS NEEDED FOR DRY EYES 15 mL 1     pilocarpine (SALAGEN) 5 MG tablet Take 1 tablet (5 mg) by mouth 3 times daily. 270 tablet 1     pravastatin (PRAVACHOL) 20 MG tablet Take 1 tablet (20 mg) by mouth daily. 90 tablet 2     sodium fluoride (SF 5000 PLUS) 1.1 % CREA Use twice a day as instructed 51 g 1     sulfaSALAzine ER (AZULFIDINE EN) 500 MG EC tablet Take 2 tablets (1,000 mg) by mouth 2 times daily 360 tablet 3     terbinafine (LAMISIL) 1 % external cream Apply topically 2 times daily. 24 g 0     tiZANidine (ZANAFLEX) 2 MG tablet Take 1 tablet (2 mg) by mouth nightly as needed for muscle spasms. 10 tablet 0     tretinoin (RETIN-A) 0.05 % external cream Apply pea sized amount at bedtime. (Patient not taking: Reported on 7/24/2025) 45 g 4     triamcinolone (KENALOG) 0.1 % external cream APPLY TOPICALLY TO THE AFFECTED AREA TWICE DAILY. MAXIMUM OF 2 WEEKS 30 g 0     triamcinolone (KENALOG) 0.1 % paste APPLY TO MOUTH TWICE DAILY (Patient not taking: Reported on 7/24/2025) 5 g 0     No  current facility-administered medications for this visit.     Facility-Administered Medications Ordered in Other Visits   Medication Dose Route Frequency Provider Last Rate Last Admin     gadobutrol (GADAVIST) injection 7.5 mL  7.5 mL Intravenous Once Palomo Terrell MD          Past Medical History:   Patient Active Problem List   Diagnosis     Osteopenia     Hayfever     Hypertension goal BP (blood pressure) < 140/90     Polyarthritis, inflammatory (H)     GERD (gastroesophageal reflux disease)     Hyperlipidemia with target LDL less than 130     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     High risk medication use     Microscopic hematuria     Pterygium of right eye     Sicca syndrome     Angiomyolipoma of right kidney     History of Helicobacter pylori infection     Pre-diabetes     NAFLD (nonalcoholic fatty liver disease)     Elevated LFTs     Heel pain, chronic, right     Dermatochalasis of both upper eyelids     Dermatochalasis of both lower eyelids     Eczema, unspecified type     Neck pain     Past Medical History:   Diagnosis Date     GERD (gastroesophageal reflux disease) 3/20/2015     Glaucoma (increased eye pressure)      Hyperlipidemia LDL goal < 130 8/3/2015     Hypertension      Hypertension, goal below 150/90 11/28/2014     Rheumatoid arthritis, adult (H)        CC Jud Morales MD  [address] [place] [address] on close of this encounter.     Again, thank you for allowing me to participate in the care of your patient.        Sincerely,        DEJA Daley CNP    Electronically signed

## 2025-07-24 NOTE — PROGRESS NOTES
[facility] Dermatology Note  Encounter Date: Jul 24, 2025  Office Visit     Reviewed patients past medical history and pertinent chart review prior to patients visit today.     Dermatology Problem List:  Milia/acne on chin    ____________________________________________    Assessment & Plan:     # Milia and acne. Benign, no further treatment needed.   -Start tretinoin 0.05% cream. Apply once every other day and increase to nightly as tolerate.  Waiting a few minutes after washing affected areas will decrease irritation. Method of application, side effects and expected results were discussed. The patient will apply pea size amount to the entire face, avoid areas around the eyes, corners of nose and mouth. Discussed side effects including photosensitivity and irritation.       Edilma Kate, CNP  Dermatology    _______________________________________    CC: Acne    HPI:  Ms. Noreen Florence is a(n) 75 year old female who presents today as a return patient for bumps on the chin. She has always had acne on the chin and sometimes gets acne that stays and doesn't go away. She has tried adapalene 0.1% gel nightly to this area but continues to get acne and persistent bumps that do not resolve    Patient is otherwise feeling well, without additional skin concerns.      Physical Exam:  SKIN: Focused examination of face and neck was performed.  - scattered 1 mm white dome shaped firm papules on chin and neck with a few hyperpigmented macules from previous acne    - No other lesions of concern on areas examined.     Medications:  Current Outpatient Medications   Medication Sig Dispense Refill    tretinoin (RETIN-A) 0.05 % external cream Apply topically at bedtime. For acne 45 g 11    acetaminophen (TYLENOL) 500 MG tablet Take 1-2 tablets (500-1,000 mg) by mouth every 6 hours as needed for mild pain. 30 tablet 0    amoxicillin (AMOXIL) 500 MG capsule Take 1 capsule (500 mg) by mouth 2 times daily. 20 capsule 0     benzonatate (TESSALON) 200 MG capsule Take 1 capsule (200 mg) by mouth 3 times daily as needed for cough. 30 capsule 3    cetirizine (ZYRTEC) 10 MG tablet TAKE 1 TABLET(10 MG) BY MOUTH DAILY 90 tablet 0    chlorhexidine (PERIDEX) 0.12 % solution SWISH AND SPIT 15 ML BY MOUTH TWICE DAILY AS NEEDED 473 mL 1    cholecalciferol (VITAMIN D3) 25 mcg (1000 units) capsule Take 1,000 Units by mouth      diclofenac (VOLTAREN) 1 % topical gel Apply 2 g topically 4 times daily. 50 g 1    diclofenac (VOLTAREN) 1 % topical gel Apply 2 g topically 4 times daily. 100 g 0    diclofenac (VOLTAREN) 1 % topical gel Apply 2 g topically 4 times daily as needed for moderate pain. 350 g 1    finasteride (PROSCAR) 5 MG tablet Take 1 tablet (5 mg) by mouth daily. 90 tablet 0    fluocinolone acetonide oil (DERMOTIC) 0.01 % ear drops Place 0.25 mLs (5 drops) into both ears daily. 20 mL 1    fluticasone (FLONASE) 50 MCG/ACT nasal spray Spray 1-2 sprays into both nostrils daily 48 g 3    guaiFENesin-codeine (ROBITUSSIN AC) 100-10 MG/5ML solution Take 5-10 mLs by mouth every 8 hours as needed for cough. 237 mL 1    hydrocortisone 2.5 % ointment Apply topically 2 times daily. 30 g 0    hydroquinone (JAVIER) 4 % external cream Apply daily to brown spots for 4-6 months. 28.35 g 4    ketotifen fumarate 0.035% 0.035 % SOLN ophthalmic solution Place 1 drop into both eyes 2 times daily. 10 mL 0    latanoprost (XALATAN) 0.005 % ophthalmic solution Place 1 drop into both eyes at bedtime. 7.5 mL 6    loratadine (CLARITIN) 10 MG tablet Take 1 tablet (10 mg) by mouth daily 30 tablet 0    losartan-hydrochlorothiazide (HYZAAR) 50-12.5 MG tablet Take 1 tablet by mouth daily. 90 tablet 0    magnesium 250 MG tablet TAKE 1 TABLET BY MOUTH ONCE DAILY AS NEEDED FOR LEG CRAMPS 90 tablet 1    Menthol-Methyl Salicylate (ICY HOT EXTRA STRENGTH) 10-30 % CREA Externally apply topically 4 times daily as needed (pain). 85 g 1    methylcellulose (CITRUCEL) powder Take 0.3 g  (1.05 teaspoonful) by mouth daily 454 g 1    metoprolol tartrate (LOPRESSOR) 25 MG tablet TAKE 1 TABLET(25 MG) BY MOUTH AT BEDTIME 90 tablet 0    minoxidil (ROGAINE) 5 % external solution Apply to scalp once daily. (Patient not taking: Reported on 7/24/2025) 120 mL 3    multivitamin w/minerals (MULTIVITAMIN, THERAPEUTIC WITH MINERALS) tablet TAKE 1 TABLET BY MOUTH ONCE DAILY 90 tablet 1    nitroGLYcerin (RECTIV) 0.4 % OINT rectal ointment 1 inch (1.5 mg) by Peritendinous route every 12 hours. 30 g 0    omeprazole (PRILOSEC) 40 MG DR capsule Take 1 capsule (40 mg) by mouth daily. 90 capsule 2    OPTIVE 0.5-0.9 % ophthalmic solution INSTILL 1 DROP IN BOTH EYES THREE TIMES DAILY AS NEEDED FOR DRY EYES 15 mL 1    pilocarpine (SALAGEN) 5 MG tablet Take 1 tablet (5 mg) by mouth 3 times daily. 270 tablet 1    pravastatin (PRAVACHOL) 20 MG tablet Take 1 tablet (20 mg) by mouth daily. 90 tablet 2    sodium fluoride (SF 5000 PLUS) 1.1 % CREA Use twice a day as instructed 51 g 1    sulfaSALAzine ER (AZULFIDINE EN) 500 MG EC tablet Take 2 tablets (1,000 mg) by mouth 2 times daily 360 tablet 3    terbinafine (LAMISIL) 1 % external cream Apply topically 2 times daily. 24 g 0    tiZANidine (ZANAFLEX) 2 MG tablet Take 1 tablet (2 mg) by mouth nightly as needed for muscle spasms. 10 tablet 0    tretinoin (RETIN-A) 0.05 % external cream Apply pea sized amount at bedtime. (Patient not taking: Reported on 7/24/2025) 45 g 4    triamcinolone (KENALOG) 0.1 % external cream APPLY TOPICALLY TO THE AFFECTED AREA TWICE DAILY. MAXIMUM OF 2 WEEKS 30 g 0    triamcinolone (KENALOG) 0.1 % paste APPLY TO MOUTH TWICE DAILY (Patient not taking: Reported on 7/24/2025) 5 g 0     No current facility-administered medications for this visit.     Facility-Administered Medications Ordered in Other Visits   Medication Dose Route Frequency Provider Last Rate Last Admin    gadobutrol (GADAVIST) injection 7.5 mL  7.5 mL Intravenous Once Palomo Terrell MD           Past Medical History:   Patient Active Problem List   Diagnosis    Osteopenia    Hayfever    Hypertension goal BP (blood pressure) < 140/90    Polyarthritis, inflammatory (H)    GERD (gastroesophageal reflux disease)    Hyperlipidemia with target LDL less than 130    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    High risk medication use    Microscopic hematuria    Pterygium of right eye    Sicca syndrome    Angiomyolipoma of right kidney    History of Helicobacter pylori infection    Pre-diabetes    NAFLD (nonalcoholic fatty liver disease)    Elevated LFTs    Heel pain, chronic, right    Dermatochalasis of both upper eyelids    Dermatochalasis of both lower eyelids    Eczema, unspecified type    Neck pain     Past Medical History:   Diagnosis Date    GERD (gastroesophageal reflux disease) 3/20/2015    Glaucoma (increased eye pressure)     Hyperlipidemia LDL goal < 130 8/3/2015    Hypertension     Hypertension, goal below 150/90 11/28/2014    Rheumatoid arthritis, adult (H)        CC Jud Morales MD  [address] [place] [address] on close of this encounter.

## 2025-07-28 ENCOUNTER — THERAPY VISIT (OUTPATIENT)
Dept: PHYSICAL THERAPY | Facility: CLINIC | Age: 75
End: 2025-07-28
Payer: COMMERCIAL

## 2025-07-28 DIAGNOSIS — M54.2 NECK PAIN: Primary | ICD-10-CM

## 2025-07-28 PROCEDURE — 97110 THERAPEUTIC EXERCISES: CPT | Mod: GP | Performed by: PHYSICAL THERAPIST

## 2025-07-28 PROCEDURE — 97140 MANUAL THERAPY 1/> REGIONS: CPT | Mod: GP | Performed by: PHYSICAL THERAPIST

## 2025-07-29 NOTE — TELEPHONE ENCOUNTER
[employer] message sent 7/25/25. Patient has not read as of today 07/29/25    Patient Contact    Attempt #2    Was call answered? No    Left a voicemail asking patient to give us a call back at our main dermatology line at [phone] for update on medication request.     Yamilex JIANG RN BSN  [facility]  [phone]

## 2025-07-30 NOTE — TELEPHONE ENCOUNTER
Patient Contact    Attempt #3    Was call answered? No    Left a voicemail asking patient to give us a call back at our main dermatology line at [phone] or read [employer] message.     Yamilex JIANG RN BSN  [facility]  [phone]

## 2025-08-02 DIAGNOSIS — I10 HYPERTENSION GOAL BP (BLOOD PRESSURE) < 140/90: ICD-10-CM

## 2025-08-05 RX ORDER — LOSARTAN POTASSIUM AND HYDROCHLOROTHIAZIDE 12.5; 5 MG/1; MG/1
1 TABLET ORAL DAILY
Qty: 90 TABLET | Refills: 0 | Status: SHIPPED | OUTPATIENT
Start: 2025-08-05

## 2025-08-06 DIAGNOSIS — M35.00 SICCA SYNDROME: ICD-10-CM

## 2025-08-07 RX ORDER — CARBOXYMETHYLCELLULOSE SODIUM AND GLYCERIN 5; 9 MG/ML; MG/ML
SOLUTION/ DROPS OPHTHALMIC
Qty: 15 ML | Refills: 1 | Status: SHIPPED | OUTPATIENT
Start: 2025-08-07

## 2025-08-12 ENCOUNTER — MYC MEDICAL ADVICE (OUTPATIENT)
Dept: FAMILY MEDICINE | Facility: CLINIC | Age: 75
End: 2025-08-12
Payer: COMMERCIAL

## 2025-08-12 DIAGNOSIS — J30.1 HAYFEVER: ICD-10-CM

## 2025-08-12 DIAGNOSIS — K21.00 GASTROESOPHAGEAL REFLUX DISEASE WITH ESOPHAGITIS WITHOUT HEMORRHAGE: ICD-10-CM

## 2025-08-12 DIAGNOSIS — L30.9 ECZEMA, UNSPECIFIED TYPE: ICD-10-CM

## 2025-08-12 DIAGNOSIS — R25.2 BILATERAL LEG CRAMPS: ICD-10-CM

## 2025-08-13 RX ORDER — CETIRIZINE HYDROCHLORIDE 10 MG/1
10 TABLET ORAL DAILY
Qty: 90 TABLET | Refills: 0 | Status: SHIPPED | OUTPATIENT
Start: 2025-08-13

## 2025-08-13 RX ORDER — MULTIVITAMIN WITH IRON
TABLET ORAL
Qty: 90 TABLET | Refills: 0 | Status: SHIPPED | OUTPATIENT
Start: 2025-08-13

## 2025-08-13 RX ORDER — OMEPRAZOLE 40 MG/1
40 CAPSULE, DELAYED RELEASE ORAL DAILY
Qty: 90 CAPSULE | Refills: 0 | Status: SHIPPED | OUTPATIENT
Start: 2025-08-13

## 2025-08-13 RX ORDER — HYDROCORTISONE 25 MG/G
OINTMENT TOPICAL 2 TIMES DAILY
Qty: 30 G | Refills: 0 | Status: SHIPPED | OUTPATIENT
Start: 2025-08-13

## 2025-08-19 DIAGNOSIS — I10 HYPERTENSION, GOAL BELOW 150/90: ICD-10-CM

## 2025-08-20 RX ORDER — METOPROLOL TARTRATE 25 MG/1
25 TABLET, FILM COATED ORAL AT BEDTIME
Qty: 90 TABLET | Refills: 0 | Status: SHIPPED | OUTPATIENT
Start: 2025-08-20

## (undated) DEVICE — PREP CHLORAPREP 26ML TINTED ORANGE  260815

## (undated) DEVICE — SOL WATER IRRIG 1000ML BOTTLE 07139-09

## (undated) RX ORDER — SIMETHICONE 40MG/0.6ML
SUSPENSION, DROPS(FINAL DOSAGE FORM)(ML) ORAL
Status: DISPENSED
Start: 2019-02-13

## (undated) RX ORDER — FENTANYL CITRATE 50 UG/ML
INJECTION, SOLUTION INTRAMUSCULAR; INTRAVENOUS
Status: DISPENSED
Start: 2019-02-13

## (undated) RX ORDER — FENTANYL CITRATE 50 UG/ML
INJECTION, SOLUTION INTRAMUSCULAR; INTRAVENOUS
Status: DISPENSED
Start: 2022-02-07